# Patient Record
Sex: FEMALE | Race: WHITE | Employment: OTHER | URBAN - NONMETROPOLITAN AREA
[De-identification: names, ages, dates, MRNs, and addresses within clinical notes are randomized per-mention and may not be internally consistent; named-entity substitution may affect disease eponyms.]

---

## 2017-01-03 DIAGNOSIS — G44.201 INTRACTABLE TENSION-TYPE HEADACHE, UNSPECIFIED CHRONICITY PATTERN: ICD-10-CM

## 2017-01-03 RX ORDER — TRAMADOL HYDROCHLORIDE 50 MG/1
50 TABLET ORAL EVERY 4 HOURS PRN
Qty: 20 TABLET | Refills: 0 | Status: SHIPPED | OUTPATIENT
Start: 2017-01-03 | End: 2017-01-13 | Stop reason: SDUPTHER

## 2017-01-03 RX ORDER — PROMETHAZINE HYDROCHLORIDE 25 MG/1
25 TABLET ORAL EVERY 6 HOURS PRN
Qty: 6 TABLET | Refills: 0 | Status: SHIPPED | OUTPATIENT
Start: 2017-01-03 | End: 2017-01-13 | Stop reason: SDUPTHER

## 2017-01-13 DIAGNOSIS — G44.201 INTRACTABLE TENSION-TYPE HEADACHE, UNSPECIFIED CHRONICITY PATTERN: ICD-10-CM

## 2017-01-16 RX ORDER — PROMETHAZINE HYDROCHLORIDE 25 MG/1
25 TABLET ORAL EVERY 6 HOURS PRN
Qty: 6 TABLET | Refills: 0 | Status: SHIPPED | OUTPATIENT
Start: 2017-01-16 | End: 2017-02-11

## 2017-01-16 RX ORDER — TRAMADOL HYDROCHLORIDE 50 MG/1
50 TABLET ORAL EVERY 4 HOURS PRN
Qty: 20 TABLET | Refills: 0 | Status: SHIPPED | OUTPATIENT
Start: 2017-01-16 | End: 2017-01-24 | Stop reason: SDUPTHER

## 2017-01-24 DIAGNOSIS — G44.201 INTRACTABLE TENSION-TYPE HEADACHE, UNSPECIFIED CHRONICITY PATTERN: ICD-10-CM

## 2017-01-24 RX ORDER — TRAMADOL HYDROCHLORIDE 50 MG/1
50 TABLET ORAL EVERY 4 HOURS PRN
Qty: 20 TABLET | Refills: 0 | Status: SHIPPED | OUTPATIENT
Start: 2017-01-24 | End: 2017-02-15 | Stop reason: ALTCHOICE

## 2017-01-25 ENCOUNTER — TELEPHONE (OUTPATIENT)
Dept: FAMILY MEDICINE CLINIC | Age: 62
End: 2017-01-25

## 2017-02-06 DIAGNOSIS — G44.201 INTRACTABLE TENSION-TYPE HEADACHE, UNSPECIFIED CHRONICITY PATTERN: ICD-10-CM

## 2017-02-06 RX ORDER — TRAMADOL HYDROCHLORIDE 50 MG/1
50 TABLET ORAL EVERY 4 HOURS PRN
Qty: 20 TABLET | Refills: 0 | Status: CANCELLED | OUTPATIENT
Start: 2017-02-06

## 2017-02-11 ENCOUNTER — HOSPITAL ENCOUNTER (EMERGENCY)
Age: 62
Discharge: HOME | End: 2017-02-11
Attending: EMERGENCY MEDICINE
Payer: COMMERCIAL

## 2017-02-11 VITALS
DIASTOLIC BLOOD PRESSURE: 72 MMHG | HEART RATE: 80 BPM | WEIGHT: 285 LBS | HEIGHT: 68 IN | RESPIRATION RATE: 18 BRPM | SYSTOLIC BLOOD PRESSURE: 149 MMHG | BODY MASS INDEX: 43.19 KG/M2 | OXYGEN SATURATION: 94 % | TEMPERATURE: 96.3 F

## 2017-02-11 DIAGNOSIS — G89.29 CHRONIC INTRACTABLE HEADACHE, UNSPECIFIED HEADACHE TYPE: ICD-10-CM

## 2017-02-11 DIAGNOSIS — J40 BRONCHITIS: ICD-10-CM

## 2017-02-11 DIAGNOSIS — J01.11 ACUTE RECURRENT FRONTAL SINUSITIS: Primary | ICD-10-CM

## 2017-02-11 DIAGNOSIS — R51.9 CHRONIC INTRACTABLE HEADACHE, UNSPECIFIED HEADACHE TYPE: ICD-10-CM

## 2017-02-11 PROCEDURE — 6370000000 HC RX 637 (ALT 250 FOR IP): Performed by: EMERGENCY MEDICINE

## 2017-02-11 PROCEDURE — 94664 DEMO&/EVAL PT USE INHALER: CPT

## 2017-02-11 PROCEDURE — 99283 EMERGENCY DEPT VISIT LOW MDM: CPT

## 2017-02-11 PROCEDURE — 6360000002 HC RX W HCPCS: Performed by: EMERGENCY MEDICINE

## 2017-02-11 RX ORDER — ALBUTEROL SULFATE 2.5 MG/3ML
2.5 SOLUTION RESPIRATORY (INHALATION) ONCE
Status: DISCONTINUED | OUTPATIENT
Start: 2017-02-11 | End: 2017-02-11

## 2017-02-11 RX ORDER — FLUTICASONE PROPIONATE 50 MCG
1 SPRAY, SUSPENSION (ML) NASAL DAILY
Qty: 1 BOTTLE | Refills: 0 | Status: SHIPPED | OUTPATIENT
Start: 2017-02-11 | End: 2017-03-30

## 2017-02-11 RX ORDER — ALBUTEROL SULFATE 90 UG/1
2 AEROSOL, METERED RESPIRATORY (INHALATION) ONCE
Status: COMPLETED | OUTPATIENT
Start: 2017-02-11 | End: 2017-02-11

## 2017-02-11 RX ORDER — AMOXICILLIN AND CLAVULANATE POTASSIUM 875; 125 MG/1; MG/1
1 TABLET, FILM COATED ORAL ONCE
Status: COMPLETED | OUTPATIENT
Start: 2017-02-11 | End: 2017-02-11

## 2017-02-11 RX ORDER — PROMETHAZINE HYDROCHLORIDE 25 MG/1
25 TABLET ORAL ONCE
Status: COMPLETED | OUTPATIENT
Start: 2017-02-11 | End: 2017-02-11

## 2017-02-11 RX ORDER — AMOXICILLIN AND CLAVULANATE POTASSIUM 875; 125 MG/1; MG/1
1 TABLET, FILM COATED ORAL 2 TIMES DAILY
Qty: 20 TABLET | Refills: 0 | Status: SHIPPED | OUTPATIENT
Start: 2017-02-11 | End: 2017-02-21

## 2017-02-11 RX ORDER — ALBUTEROL SULFATE 90 UG/1
2 AEROSOL, METERED RESPIRATORY (INHALATION) EVERY 6 HOURS PRN
Status: DISCONTINUED | OUTPATIENT
Start: 2017-02-11 | End: 2017-02-11

## 2017-02-11 RX ORDER — OXYCODONE HYDROCHLORIDE AND ACETAMINOPHEN 5; 325 MG/1; MG/1
2 TABLET ORAL ONCE
Status: COMPLETED | OUTPATIENT
Start: 2017-02-11 | End: 2017-02-11

## 2017-02-11 RX ORDER — PROMETHAZINE HYDROCHLORIDE 25 MG/1
25 TABLET ORAL EVERY 6 HOURS PRN
Qty: 20 TABLET | Refills: 0 | Status: SHIPPED | OUTPATIENT
Start: 2017-02-11 | End: 2017-02-18

## 2017-02-11 RX ORDER — ALBUTEROL SULFATE 90 UG/1
2 AEROSOL, METERED RESPIRATORY (INHALATION) 4 TIMES DAILY
Qty: 1 INHALER | Refills: 0 | Status: SHIPPED | OUTPATIENT
Start: 2017-02-11 | End: 2017-06-15 | Stop reason: ALTCHOICE

## 2017-02-11 RX ADMIN — AMOXICILLIN AND CLAVULANATE POTASSIUM 1 TABLET: 875; 125 TABLET, FILM COATED ORAL at 12:07

## 2017-02-11 RX ADMIN — OXYCODONE HYDROCHLORIDE AND ACETAMINOPHEN 2 TABLET: 5; 325 TABLET ORAL at 12:07

## 2017-02-11 RX ADMIN — ALBUTEROL SULFATE 2 PUFF: 90 AEROSOL, METERED RESPIRATORY (INHALATION) at 12:23

## 2017-02-11 RX ADMIN — PROMETHAZINE HYDROCHLORIDE 25 MG: 25 TABLET ORAL at 12:07

## 2017-02-11 ASSESSMENT — PAIN DESCRIPTION - ONSET: ONSET: ON-GOING

## 2017-02-11 ASSESSMENT — PAIN SCALES - GENERAL
PAINLEVEL_OUTOF10: 9

## 2017-02-11 ASSESSMENT — PAIN DESCRIPTION - FREQUENCY: FREQUENCY: CONTINUOUS

## 2017-02-11 ASSESSMENT — PAIN SCALES - WONG BAKER: WONGBAKER_NUMERICALRESPONSE: 8

## 2017-02-11 ASSESSMENT — PAIN DESCRIPTION - LOCATION: LOCATION: HEAD

## 2017-02-11 ASSESSMENT — PAIN DESCRIPTION - DESCRIPTORS: DESCRIPTORS: ACHING

## 2017-02-11 ASSESSMENT — PAIN DESCRIPTION - PAIN TYPE: TYPE: ACUTE PAIN

## 2017-02-15 ENCOUNTER — OFFICE VISIT (OUTPATIENT)
Dept: FAMILY MEDICINE CLINIC | Age: 62
End: 2017-02-15

## 2017-02-15 VITALS
BODY MASS INDEX: 40.98 KG/M2 | RESPIRATION RATE: 16 BRPM | TEMPERATURE: 98.7 F | HEART RATE: 84 BPM | DIASTOLIC BLOOD PRESSURE: 88 MMHG | HEIGHT: 68 IN | WEIGHT: 270.4 LBS | SYSTOLIC BLOOD PRESSURE: 142 MMHG | OXYGEN SATURATION: 98 %

## 2017-02-15 DIAGNOSIS — J01.40 ACUTE PANSINUSITIS, RECURRENCE NOT SPECIFIED: Primary | ICD-10-CM

## 2017-02-15 DIAGNOSIS — R51.9 CHRONIC NONINTRACTABLE HEADACHE, UNSPECIFIED HEADACHE TYPE: ICD-10-CM

## 2017-02-15 DIAGNOSIS — G89.29 CHRONIC NONINTRACTABLE HEADACHE, UNSPECIFIED HEADACHE TYPE: ICD-10-CM

## 2017-02-15 PROCEDURE — 96372 THER/PROPH/DIAG INJ SC/IM: CPT | Performed by: FAMILY MEDICINE

## 2017-02-15 PROCEDURE — 99213 OFFICE O/P EST LOW 20 MIN: CPT | Performed by: FAMILY MEDICINE

## 2017-02-15 RX ORDER — KETOROLAC TROMETHAMINE 30 MG/ML
30 INJECTION, SOLUTION INTRAMUSCULAR; INTRAVENOUS ONCE
Status: COMPLETED | OUTPATIENT
Start: 2017-02-15 | End: 2017-02-15

## 2017-02-15 RX ORDER — PREDNISONE 20 MG/1
20 TABLET ORAL 2 TIMES DAILY
Qty: 6 TABLET | Refills: 0 | Status: SHIPPED | OUTPATIENT
Start: 2017-02-15 | End: 2017-02-18

## 2017-02-15 RX ADMIN — KETOROLAC TROMETHAMINE 30 MG: 30 INJECTION, SOLUTION INTRAMUSCULAR; INTRAVENOUS at 15:44

## 2017-03-01 DIAGNOSIS — G44.201 INTRACTABLE TENSION-TYPE HEADACHE, UNSPECIFIED CHRONICITY PATTERN: ICD-10-CM

## 2017-03-01 RX ORDER — CLONIDINE HYDROCHLORIDE 0.1 MG/1
0.1 TABLET ORAL 2 TIMES DAILY PRN
Qty: 60 TABLET | Refills: 0 | Status: SHIPPED | OUTPATIENT
Start: 2017-03-01 | End: 2017-05-01 | Stop reason: SDUPTHER

## 2017-03-06 ENCOUNTER — OFFICE VISIT (OUTPATIENT)
Dept: PRIMARY CARE CLINIC | Age: 62
End: 2017-03-06

## 2017-03-06 VITALS
SYSTOLIC BLOOD PRESSURE: 130 MMHG | OXYGEN SATURATION: 98 % | HEART RATE: 60 BPM | WEIGHT: 278 LBS | BODY MASS INDEX: 42.13 KG/M2 | DIASTOLIC BLOOD PRESSURE: 82 MMHG | TEMPERATURE: 98.4 F | HEIGHT: 68 IN

## 2017-03-06 DIAGNOSIS — G44.89 OTHER HEADACHE SYNDROME: Primary | ICD-10-CM

## 2017-03-06 PROCEDURE — 96372 THER/PROPH/DIAG INJ SC/IM: CPT | Performed by: FAMILY MEDICINE

## 2017-03-06 PROCEDURE — 99214 OFFICE O/P EST MOD 30 MIN: CPT | Performed by: FAMILY MEDICINE

## 2017-03-06 RX ORDER — KETOROLAC TROMETHAMINE 30 MG/ML
60 INJECTION, SOLUTION INTRAMUSCULAR; INTRAVENOUS ONCE
Status: COMPLETED | OUTPATIENT
Start: 2017-03-06 | End: 2017-03-06

## 2017-03-06 RX ADMIN — KETOROLAC TROMETHAMINE 60 MG: 30 INJECTION, SOLUTION INTRAMUSCULAR; INTRAVENOUS at 18:20

## 2017-03-06 ASSESSMENT — ENCOUNTER SYMPTOMS
PHOTOPHOBIA: 0
RESPIRATORY NEGATIVE: 1
GASTROINTESTINAL NEGATIVE: 1
EYES NEGATIVE: 1

## 2017-03-07 RX ORDER — PROMETHAZINE HYDROCHLORIDE 25 MG/1
25 TABLET ORAL EVERY 6 HOURS PRN
Qty: 20 TABLET | Refills: 0 | Status: SHIPPED | OUTPATIENT
Start: 2017-03-07 | End: 2017-03-14

## 2017-03-30 ENCOUNTER — OFFICE VISIT (OUTPATIENT)
Dept: FAMILY MEDICINE CLINIC | Age: 62
End: 2017-03-30
Payer: COMMERCIAL

## 2017-03-30 VITALS
RESPIRATION RATE: 16 BRPM | DIASTOLIC BLOOD PRESSURE: 86 MMHG | HEART RATE: 86 BPM | HEIGHT: 68 IN | SYSTOLIC BLOOD PRESSURE: 124 MMHG | BODY MASS INDEX: 42.56 KG/M2 | WEIGHT: 280.8 LBS

## 2017-03-30 DIAGNOSIS — I10 ESSENTIAL HYPERTENSION: Primary | ICD-10-CM

## 2017-03-30 DIAGNOSIS — G44.89 OTHER HEADACHE SYNDROME: Primary | ICD-10-CM

## 2017-03-30 PROCEDURE — 99213 OFFICE O/P EST LOW 20 MIN: CPT | Performed by: FAMILY MEDICINE

## 2017-03-30 RX ORDER — METOCLOPRAMIDE 10 MG/1
10 TABLET ORAL 3 TIMES DAILY PRN
COMMUNITY
Start: 2017-03-27 | End: 2017-04-26

## 2017-03-30 RX ORDER — NORTRIPTYLINE HYDROCHLORIDE 10 MG/1
CAPSULE ORAL
Refills: 0 | COMMUNITY
Start: 2017-03-08 | End: 2017-06-15 | Stop reason: ALTCHOICE

## 2017-03-30 RX ORDER — MAGNESIUM OXIDE 400 MG/1
400 TABLET ORAL DAILY
COMMUNITY
End: 2017-12-20

## 2017-04-17 ENCOUNTER — TELEPHONE (OUTPATIENT)
Dept: FAMILY MEDICINE CLINIC | Age: 62
End: 2017-04-17

## 2017-05-01 DIAGNOSIS — G44.201 INTRACTABLE TENSION-TYPE HEADACHE, UNSPECIFIED CHRONICITY PATTERN: ICD-10-CM

## 2017-05-01 RX ORDER — CLONIDINE HYDROCHLORIDE 0.1 MG/1
0.1 TABLET ORAL 2 TIMES DAILY PRN
Qty: 60 TABLET | Refills: 1 | Status: SHIPPED | OUTPATIENT
Start: 2017-05-01 | End: 2017-07-06 | Stop reason: SDUPTHER

## 2017-06-12 ENCOUNTER — HOSPITAL ENCOUNTER (EMERGENCY)
Age: 62
Discharge: HOME | End: 2017-06-12
Attending: EMERGENCY MEDICINE
Payer: COMMERCIAL

## 2017-06-12 VITALS
DIASTOLIC BLOOD PRESSURE: 76 MMHG | BODY MASS INDEX: 40.92 KG/M2 | SYSTOLIC BLOOD PRESSURE: 165 MMHG | HEART RATE: 86 BPM | HEIGHT: 68 IN | OXYGEN SATURATION: 96 % | TEMPERATURE: 99.1 F | RESPIRATION RATE: 16 BRPM | WEIGHT: 270 LBS

## 2017-06-12 DIAGNOSIS — G89.29 CHRONIC INTRACTABLE HEADACHE, UNSPECIFIED HEADACHE TYPE: Primary | ICD-10-CM

## 2017-06-12 DIAGNOSIS — R51.9 CHRONIC INTRACTABLE HEADACHE, UNSPECIFIED HEADACHE TYPE: Primary | ICD-10-CM

## 2017-06-12 PROCEDURE — 99283 EMERGENCY DEPT VISIT LOW MDM: CPT

## 2017-06-12 PROCEDURE — 96372 THER/PROPH/DIAG INJ SC/IM: CPT

## 2017-06-12 PROCEDURE — 6370000000 HC RX 637 (ALT 250 FOR IP): Performed by: EMERGENCY MEDICINE

## 2017-06-12 PROCEDURE — 6360000002 HC RX W HCPCS: Performed by: EMERGENCY MEDICINE

## 2017-06-12 RX ORDER — GABAPENTIN 600 MG/1
600 TABLET ORAL
COMMUNITY
End: 2017-08-22 | Stop reason: SDUPTHER

## 2017-06-12 RX ORDER — OXYCODONE HYDROCHLORIDE AND ACETAMINOPHEN 5; 325 MG/1; MG/1
2 TABLET ORAL ONCE
Status: COMPLETED | OUTPATIENT
Start: 2017-06-12 | End: 2017-06-12

## 2017-06-12 RX ORDER — OXYCODONE HYDROCHLORIDE AND ACETAMINOPHEN 5; 325 MG/1; MG/1
1-2 TABLET ORAL EVERY 6 HOURS PRN
Qty: 10 TABLET | Refills: 0 | Status: SHIPPED | OUTPATIENT
Start: 2017-06-12 | End: 2017-06-16 | Stop reason: ALTCHOICE

## 2017-06-12 RX ORDER — PROMETHAZINE HYDROCHLORIDE 25 MG/ML
25 INJECTION, SOLUTION INTRAMUSCULAR; INTRAVENOUS ONCE
Status: COMPLETED | OUTPATIENT
Start: 2017-06-12 | End: 2017-06-12

## 2017-06-12 RX ADMIN — PROMETHAZINE HYDROCHLORIDE 25 MG: 25 INJECTION INTRAMUSCULAR; INTRAVENOUS at 13:05

## 2017-06-12 RX ADMIN — OXYCODONE HYDROCHLORIDE AND ACETAMINOPHEN 2 TABLET: 5; 325 TABLET ORAL at 13:05

## 2017-06-12 ASSESSMENT — PAIN SCALES - GENERAL
PAINLEVEL_OUTOF10: 10
PAINLEVEL_OUTOF10: 10
PAINLEVEL_OUTOF10: 8

## 2017-06-12 ASSESSMENT — PAIN DESCRIPTION - LOCATION: LOCATION: HEAD

## 2017-06-12 ASSESSMENT — PAIN DESCRIPTION - PAIN TYPE
TYPE: ACUTE PAIN;CHRONIC PAIN
TYPE: CHRONIC PAIN;ACUTE PAIN

## 2017-06-15 ENCOUNTER — OFFICE VISIT (OUTPATIENT)
Dept: FAMILY MEDICINE CLINIC | Age: 62
End: 2017-06-15
Payer: COMMERCIAL

## 2017-06-15 VITALS
HEIGHT: 68 IN | TEMPERATURE: 97.2 F | WEIGHT: 290.8 LBS | OXYGEN SATURATION: 100 % | SYSTOLIC BLOOD PRESSURE: 156 MMHG | BODY MASS INDEX: 44.07 KG/M2 | RESPIRATION RATE: 12 BRPM | DIASTOLIC BLOOD PRESSURE: 76 MMHG | HEART RATE: 80 BPM

## 2017-06-15 DIAGNOSIS — Z91.09 ENVIRONMENTAL ALLERGIES: ICD-10-CM

## 2017-06-15 DIAGNOSIS — G44.201 INTRACTABLE TENSION-TYPE HEADACHE, UNSPECIFIED CHRONICITY PATTERN: Primary | ICD-10-CM

## 2017-06-15 DIAGNOSIS — J01.40 ACUTE PANSINUSITIS, RECURRENCE NOT SPECIFIED: ICD-10-CM

## 2017-06-15 PROCEDURE — 99214 OFFICE O/P EST MOD 30 MIN: CPT | Performed by: NURSE PRACTITIONER

## 2017-06-15 RX ORDER — FEXOFENADINE HCL 180 MG/1
180 TABLET ORAL DAILY
Qty: 30 TABLET | Refills: 0 | Status: SHIPPED | OUTPATIENT
Start: 2017-06-15 | End: 2017-07-15

## 2017-06-15 RX ORDER — AMOXICILLIN AND CLAVULANATE POTASSIUM 875; 125 MG/1; MG/1
1 TABLET, FILM COATED ORAL 2 TIMES DAILY
Qty: 20 TABLET | Refills: 0 | Status: SHIPPED | OUTPATIENT
Start: 2017-06-15 | End: 2017-06-25

## 2017-06-15 ASSESSMENT — ENCOUNTER SYMPTOMS
NAUSEA: 0
ABDOMINAL PAIN: 0
SINUS PRESSURE: 1
GASTROINTESTINAL NEGATIVE: 1
CHEST TIGHTNESS: 0
EYES NEGATIVE: 1
RESPIRATORY NEGATIVE: 1
COUGH: 0
SHORTNESS OF BREATH: 0
TROUBLE SWALLOWING: 0
ALLERGIC/IMMUNOLOGIC NEGATIVE: 1
DIARRHEA: 0
RHINORRHEA: 1
CONSTIPATION: 0
SORE THROAT: 0
VOMITING: 0

## 2017-06-15 ASSESSMENT — PATIENT HEALTH QUESTIONNAIRE - PHQ9
SUM OF ALL RESPONSES TO PHQ QUESTIONS 1-9: 0
SUM OF ALL RESPONSES TO PHQ9 QUESTIONS 1 & 2: 0
2. FEELING DOWN, DEPRESSED OR HOPELESS: 0
1. LITTLE INTEREST OR PLEASURE IN DOING THINGS: 0

## 2017-06-16 ENCOUNTER — OFFICE VISIT (OUTPATIENT)
Dept: OTOLARYNGOLOGY | Age: 62
End: 2017-06-16
Payer: COMMERCIAL

## 2017-06-16 VITALS
TEMPERATURE: 97.7 F | WEIGHT: 289 LBS | SYSTOLIC BLOOD PRESSURE: 132 MMHG | DIASTOLIC BLOOD PRESSURE: 82 MMHG | RESPIRATION RATE: 12 BRPM | HEIGHT: 68 IN | HEART RATE: 72 BPM | BODY MASS INDEX: 43.8 KG/M2

## 2017-06-16 DIAGNOSIS — J32.9 SINUSITIS, UNSPECIFIED CHRONICITY, UNSPECIFIED LOCATION: Primary | ICD-10-CM

## 2017-06-16 DIAGNOSIS — J34.89 NASAL SEPTAL PERFORATION: ICD-10-CM

## 2017-06-16 DIAGNOSIS — R51.9 HEADACHE, UNSPECIFIED HEADACHE TYPE: ICD-10-CM

## 2017-06-16 LAB
ABSOLUTE EOS #: 0.1 K/UL (ref 0–0.4)
ABSOLUTE LYMPH #: 1.6 K/UL (ref 1–4.8)
ABSOLUTE MONO #: 0.6 K/UL (ref 0.1–1.2)
BASOPHILS # BLD: 1 %
BASOPHILS ABSOLUTE: 0 K/UL (ref 0–0.2)
DIFFERENTIAL TYPE: ABNORMAL
EOSINOPHILS RELATIVE PERCENT: 2 %
HCT VFR BLD CALC: 36.9 % (ref 36–46)
HEMOGLOBIN: 11.8 G/DL (ref 12–16)
LYMPHOCYTES # BLD: 20 %
MCH RBC QN AUTO: 28.3 PG (ref 26–34)
MCHC RBC AUTO-ENTMCNC: 31.9 G/DL (ref 31–37)
MCV RBC AUTO: 88.7 FL (ref 80–100)
MONOCYTES # BLD: 7 %
PDW BLD-RTO: 14.4 % (ref 11–14.5)
PLATELET # BLD: 202 K/UL (ref 140–450)
PLATELET ESTIMATE: ABNORMAL
PMV BLD AUTO: 9.3 FL (ref 6–12)
RBC # BLD: 4.16 M/UL (ref 4–5.2)
RBC # BLD: ABNORMAL 10*6/UL
SEG NEUTROPHILS: 70 %
SEGMENTED NEUTROPHILS ABSOLUTE COUNT: 5.8 K/UL (ref 1.8–7.7)
WBC # BLD: 8.2 K/UL (ref 3.5–11)
WBC # BLD: ABNORMAL 10*3/UL

## 2017-06-16 PROCEDURE — 31231 NASAL ENDOSCOPY DX: CPT | Performed by: OTOLARYNGOLOGY

## 2017-06-20 ENCOUNTER — OFFICE VISIT (OUTPATIENT)
Dept: FAMILY MEDICINE CLINIC | Age: 62
End: 2017-06-20
Payer: COMMERCIAL

## 2017-06-20 VITALS
SYSTOLIC BLOOD PRESSURE: 142 MMHG | WEIGHT: 288.6 LBS | HEART RATE: 76 BPM | BODY MASS INDEX: 43.74 KG/M2 | HEIGHT: 68 IN | RESPIRATION RATE: 16 BRPM | DIASTOLIC BLOOD PRESSURE: 78 MMHG

## 2017-06-20 DIAGNOSIS — I10 ESSENTIAL HYPERTENSION: ICD-10-CM

## 2017-06-20 DIAGNOSIS — R80.9 TYPE 2 DIABETES MELLITUS WITH MICROALBUMINURIA, WITHOUT LONG-TERM CURRENT USE OF INSULIN (HCC): ICD-10-CM

## 2017-06-20 DIAGNOSIS — E11.29 TYPE 2 DIABETES MELLITUS WITH MICROALBUMINURIA, WITHOUT LONG-TERM CURRENT USE OF INSULIN (HCC): ICD-10-CM

## 2017-06-20 DIAGNOSIS — R51.9 HEADACHE, UNSPECIFIED HEADACHE TYPE: Primary | ICD-10-CM

## 2017-06-20 PROCEDURE — 99213 OFFICE O/P EST LOW 20 MIN: CPT | Performed by: FAMILY MEDICINE

## 2017-06-20 RX ORDER — TRAMADOL HYDROCHLORIDE 50 MG/1
50 TABLET ORAL EVERY 4 HOURS PRN
Qty: 40 TABLET | Refills: 0 | Status: SHIPPED | OUTPATIENT
Start: 2017-06-20 | End: 2017-07-03 | Stop reason: SDUPTHER

## 2017-06-29 ENCOUNTER — TELEPHONE (OUTPATIENT)
Dept: FAMILY MEDICINE CLINIC | Age: 62
End: 2017-06-29

## 2017-06-29 DIAGNOSIS — R51.9 HEADACHE, UNSPECIFIED HEADACHE TYPE: Primary | ICD-10-CM

## 2017-07-03 ENCOUNTER — TELEPHONE (OUTPATIENT)
Dept: FAMILY MEDICINE CLINIC | Age: 62
End: 2017-07-03

## 2017-07-03 DIAGNOSIS — R51.9 HEADACHE, UNSPECIFIED HEADACHE TYPE: Primary | ICD-10-CM

## 2017-07-03 RX ORDER — TRAMADOL HYDROCHLORIDE 50 MG/1
50 TABLET ORAL EVERY 4 HOURS PRN
Qty: 20 TABLET | Refills: 0 | Status: SHIPPED | OUTPATIENT
Start: 2017-07-03 | End: 2017-07-20 | Stop reason: ALTCHOICE

## 2017-07-06 DIAGNOSIS — G44.201 INTRACTABLE TENSION-TYPE HEADACHE, UNSPECIFIED CHRONICITY PATTERN: ICD-10-CM

## 2017-07-06 RX ORDER — CLONIDINE HYDROCHLORIDE 0.1 MG/1
0.1 TABLET ORAL 2 TIMES DAILY PRN
Qty: 60 TABLET | Refills: 0 | Status: SHIPPED | OUTPATIENT
Start: 2017-07-06 | End: 2017-07-20 | Stop reason: SDUPTHER

## 2017-07-11 ENCOUNTER — HOSPITAL ENCOUNTER (EMERGENCY)
Age: 62
Discharge: HOME | End: 2017-07-11
Attending: EMERGENCY MEDICINE
Payer: COMMERCIAL

## 2017-07-11 VITALS
SYSTOLIC BLOOD PRESSURE: 178 MMHG | BODY MASS INDEX: 41.68 KG/M2 | OXYGEN SATURATION: 100 % | HEART RATE: 58 BPM | WEIGHT: 275 LBS | TEMPERATURE: 96.6 F | RESPIRATION RATE: 16 BRPM | HEIGHT: 68 IN | DIASTOLIC BLOOD PRESSURE: 70 MMHG

## 2017-07-11 DIAGNOSIS — R51.9 HEADACHE, UNSPECIFIED HEADACHE TYPE: Primary | ICD-10-CM

## 2017-07-11 DIAGNOSIS — G89.29 CHRONIC INTRACTABLE HEADACHE, UNSPECIFIED HEADACHE TYPE: Primary | ICD-10-CM

## 2017-07-11 DIAGNOSIS — R51.9 CHRONIC INTRACTABLE HEADACHE, UNSPECIFIED HEADACHE TYPE: Primary | ICD-10-CM

## 2017-07-11 PROCEDURE — 2580000003 HC RX 258: Performed by: EMERGENCY MEDICINE

## 2017-07-11 PROCEDURE — 99283 EMERGENCY DEPT VISIT LOW MDM: CPT

## 2017-07-11 PROCEDURE — 6360000002 HC RX W HCPCS: Performed by: EMERGENCY MEDICINE

## 2017-07-11 PROCEDURE — 96372 THER/PROPH/DIAG INJ SC/IM: CPT

## 2017-07-11 PROCEDURE — 96374 THER/PROPH/DIAG INJ IV PUSH: CPT

## 2017-07-11 RX ORDER — METHYLPREDNISOLONE 4 MG/1
TABLET ORAL
Qty: 1 KIT | Refills: 0 | Status: SHIPPED | OUTPATIENT
Start: 2017-07-11 | End: 2017-07-17

## 2017-07-11 RX ORDER — KETOROLAC TROMETHAMINE 30 MG/ML
30 INJECTION, SOLUTION INTRAMUSCULAR; INTRAVENOUS ONCE
Status: COMPLETED | OUTPATIENT
Start: 2017-07-11 | End: 2017-07-11

## 2017-07-11 RX ORDER — METHYLPREDNISOLONE SODIUM SUCCINATE 125 MG/2ML
125 INJECTION, POWDER, LYOPHILIZED, FOR SOLUTION INTRAMUSCULAR; INTRAVENOUS ONCE
Status: COMPLETED | OUTPATIENT
Start: 2017-07-11 | End: 2017-07-11

## 2017-07-11 RX ORDER — DIPHENHYDRAMINE HYDROCHLORIDE 50 MG/ML
50 INJECTION INTRAMUSCULAR; INTRAVENOUS ONCE
Status: COMPLETED | OUTPATIENT
Start: 2017-07-11 | End: 2017-07-11

## 2017-07-11 RX ORDER — 0.9 % SODIUM CHLORIDE 0.9 %
1000 INTRAVENOUS SOLUTION INTRAVENOUS ONCE
Status: COMPLETED | OUTPATIENT
Start: 2017-07-11 | End: 2017-07-11

## 2017-07-11 RX ORDER — TRAMADOL HYDROCHLORIDE 50 MG/1
50 TABLET ORAL EVERY 4 HOURS PRN
Qty: 20 TABLET | Refills: 0 | Status: CANCELLED | OUTPATIENT
Start: 2017-07-11

## 2017-07-11 RX ADMIN — KETOROLAC TROMETHAMINE 30 MG: 30 INJECTION, SOLUTION INTRAMUSCULAR at 05:23

## 2017-07-11 RX ADMIN — DIPHENHYDRAMINE HYDROCHLORIDE 50 MG: 50 INJECTION, SOLUTION INTRAMUSCULAR; INTRAVENOUS at 05:22

## 2017-07-11 RX ADMIN — PROCHLORPERAZINE EDISYLATE 10 MG: 5 INJECTION INTRAMUSCULAR; INTRAVENOUS at 05:24

## 2017-07-11 RX ADMIN — METHYLPREDNISOLONE SODIUM SUCCINATE 125 MG: 125 INJECTION, POWDER, FOR SOLUTION INTRAMUSCULAR; INTRAVENOUS at 06:28

## 2017-07-11 RX ADMIN — SODIUM CHLORIDE 1000 ML: 9 INJECTION, SOLUTION INTRAVENOUS at 05:50

## 2017-07-11 ASSESSMENT — ENCOUNTER SYMPTOMS
VOMITING: 0
SHORTNESS OF BREATH: 0

## 2017-07-11 ASSESSMENT — PAIN SCALES - GENERAL
PAINLEVEL_OUTOF10: 9
PAINLEVEL_OUTOF10: 6

## 2017-07-11 ASSESSMENT — PAIN DESCRIPTION - PAIN TYPE: TYPE: CHRONIC PAIN

## 2017-07-11 ASSESSMENT — PAIN DESCRIPTION - LOCATION: LOCATION: HEAD

## 2017-07-12 ENCOUNTER — TELEPHONE (OUTPATIENT)
Dept: PAIN MANAGEMENT | Age: 62
End: 2017-07-12

## 2017-07-13 ENCOUNTER — TELEPHONE (OUTPATIENT)
Dept: FAMILY MEDICINE CLINIC | Age: 62
End: 2017-07-13

## 2017-07-20 ENCOUNTER — OFFICE VISIT (OUTPATIENT)
Dept: FAMILY MEDICINE CLINIC | Age: 62
End: 2017-07-20
Payer: COMMERCIAL

## 2017-07-20 VITALS
SYSTOLIC BLOOD PRESSURE: 138 MMHG | RESPIRATION RATE: 16 BRPM | WEIGHT: 289.2 LBS | HEART RATE: 82 BPM | BODY MASS INDEX: 43.83 KG/M2 | HEIGHT: 68 IN | DIASTOLIC BLOOD PRESSURE: 86 MMHG

## 2017-07-20 DIAGNOSIS — G44.201 INTRACTABLE TENSION-TYPE HEADACHE, UNSPECIFIED CHRONICITY PATTERN: ICD-10-CM

## 2017-07-20 DIAGNOSIS — Z12.31 ENCOUNTER FOR SCREENING MAMMOGRAM FOR BREAST CANCER: ICD-10-CM

## 2017-07-20 DIAGNOSIS — E11.29 TYPE 2 DIABETES MELLITUS WITH MICROALBUMINURIA, WITHOUT LONG-TERM CURRENT USE OF INSULIN (HCC): Primary | ICD-10-CM

## 2017-07-20 DIAGNOSIS — R80.9 TYPE 2 DIABETES MELLITUS WITH MICROALBUMINURIA, WITHOUT LONG-TERM CURRENT USE OF INSULIN (HCC): Primary | ICD-10-CM

## 2017-07-20 DIAGNOSIS — E55.9 VITAMIN D DEFICIENCY: ICD-10-CM

## 2017-07-20 DIAGNOSIS — I10 ESSENTIAL HYPERTENSION: ICD-10-CM

## 2017-07-20 PROCEDURE — 99214 OFFICE O/P EST MOD 30 MIN: CPT | Performed by: FAMILY MEDICINE

## 2017-07-20 RX ORDER — LISINOPRIL AND HYDROCHLOROTHIAZIDE 20; 12.5 MG/1; MG/1
1 TABLET ORAL DAILY
Qty: 30 TABLET | Refills: 6 | Status: SHIPPED | OUTPATIENT
Start: 2017-07-20 | End: 2018-01-23 | Stop reason: SDUPTHER

## 2017-07-20 RX ORDER — DULOXETIN HYDROCHLORIDE 60 MG/1
CAPSULE, DELAYED RELEASE ORAL
Qty: 30 CAPSULE | Refills: 6 | Status: SHIPPED | OUTPATIENT
Start: 2017-07-20 | End: 2017-08-18 | Stop reason: SDUPTHER

## 2017-07-20 RX ORDER — TRAMADOL HYDROCHLORIDE 50 MG/1
50 TABLET ORAL EVERY 4 HOURS PRN
Qty: 30 TABLET | Refills: 0 | Status: SHIPPED | OUTPATIENT
Start: 2017-07-20 | End: 2017-08-04 | Stop reason: SDUPTHER

## 2017-07-20 RX ORDER — CLONIDINE HYDROCHLORIDE 0.1 MG/1
0.1 TABLET ORAL 2 TIMES DAILY PRN
Qty: 60 TABLET | Refills: 6 | Status: SHIPPED | OUTPATIENT
Start: 2017-07-20 | End: 2017-12-22 | Stop reason: ALTCHOICE

## 2017-07-20 RX ORDER — PIOGLITAZONE HCL AND METFORMIN HCL 500; 15 MG/1; MG/1
TABLET ORAL
Qty: 60 TABLET | Refills: 6 | Status: SHIPPED | OUTPATIENT
Start: 2017-07-20 | End: 2017-08-18 | Stop reason: SDUPTHER

## 2017-07-20 RX ORDER — CHOLECALCIFEROL (VITAMIN D3) 1250 MCG
1 CAPSULE ORAL WEEKLY
Qty: 13 CAPSULE | Refills: 1 | Status: SHIPPED | OUTPATIENT
Start: 2017-07-20 | End: 2017-10-24 | Stop reason: SDUPTHER

## 2017-07-20 RX ORDER — LISINOPRIL 20 MG/1
TABLET ORAL
Qty: 30 TABLET | Refills: 6 | Status: CANCELLED | OUTPATIENT
Start: 2017-07-20

## 2017-08-04 ENCOUNTER — OFFICE VISIT (OUTPATIENT)
Dept: PRIMARY CARE CLINIC | Age: 62
End: 2017-08-04
Payer: COMMERCIAL

## 2017-08-04 VITALS
WEIGHT: 287.2 LBS | SYSTOLIC BLOOD PRESSURE: 110 MMHG | BODY MASS INDEX: 43.53 KG/M2 | DIASTOLIC BLOOD PRESSURE: 80 MMHG | RESPIRATION RATE: 16 BRPM | OXYGEN SATURATION: 96 % | HEIGHT: 68 IN | TEMPERATURE: 98.7 F | HEART RATE: 89 BPM

## 2017-08-04 DIAGNOSIS — R07.81 RIB PAIN ON RIGHT SIDE: Primary | ICD-10-CM

## 2017-08-04 PROCEDURE — 99213 OFFICE O/P EST LOW 20 MIN: CPT | Performed by: NURSE PRACTITIONER

## 2017-08-04 RX ORDER — TRAMADOL HYDROCHLORIDE 50 MG/1
50 TABLET ORAL EVERY 6 HOURS PRN
Qty: 10 TABLET | Refills: 0 | Status: SHIPPED | OUTPATIENT
Start: 2017-08-04 | End: 2017-12-20 | Stop reason: ALTCHOICE

## 2017-08-04 RX ORDER — DULOXETIN HYDROCHLORIDE 60 MG/1
60 CAPSULE, DELAYED RELEASE ORAL
COMMUNITY
End: 2017-08-04 | Stop reason: SDUPTHER

## 2017-08-04 ASSESSMENT — ENCOUNTER SYMPTOMS
DIARRHEA: 0
CONSTIPATION: 0
CHOKING: 0
CHEST TIGHTNESS: 0
WHEEZING: 0
ABDOMINAL PAIN: 0
SHORTNESS OF BREATH: 0
BACK PAIN: 0
BLOOD IN STOOL: 0
NAUSEA: 0
COUGH: 0
ABDOMINAL DISTENTION: 0
ANAL BLEEDING: 0

## 2017-08-18 DIAGNOSIS — E11.29 TYPE 2 DIABETES MELLITUS WITH MICROALBUMINURIA, WITHOUT LONG-TERM CURRENT USE OF INSULIN (HCC): ICD-10-CM

## 2017-08-18 DIAGNOSIS — G44.201 INTRACTABLE TENSION-TYPE HEADACHE, UNSPECIFIED CHRONICITY PATTERN: ICD-10-CM

## 2017-08-18 DIAGNOSIS — R80.9 TYPE 2 DIABETES MELLITUS WITH MICROALBUMINURIA, WITHOUT LONG-TERM CURRENT USE OF INSULIN (HCC): ICD-10-CM

## 2017-08-18 RX ORDER — DULOXETIN HYDROCHLORIDE 60 MG/1
CAPSULE, DELAYED RELEASE ORAL
Qty: 90 CAPSULE | Refills: 1 | Status: SHIPPED | OUTPATIENT
Start: 2017-08-18 | End: 2018-01-23 | Stop reason: SDUPTHER

## 2017-08-18 RX ORDER — PIOGLITAZONE HCL AND METFORMIN HCL 500; 15 MG/1; MG/1
TABLET ORAL
Qty: 180 TABLET | Refills: 1 | Status: SHIPPED | OUTPATIENT
Start: 2017-08-18 | End: 2018-01-23 | Stop reason: SDUPTHER

## 2017-08-22 RX ORDER — GABAPENTIN 600 MG/1
600 TABLET ORAL
Qty: 15 TABLET | Refills: 0 | Status: SHIPPED | OUTPATIENT
Start: 2017-08-22 | End: 2017-11-28 | Stop reason: SDUPTHER

## 2017-09-13 ENCOUNTER — TELEPHONE (OUTPATIENT)
Dept: FAMILY MEDICINE CLINIC | Age: 62
End: 2017-09-13

## 2017-09-13 DIAGNOSIS — R51.9 HEADACHE, UNSPECIFIED HEADACHE TYPE: Primary | ICD-10-CM

## 2017-10-23 DIAGNOSIS — E55.9 VITAMIN D DEFICIENCY: ICD-10-CM

## 2017-10-24 RX ORDER — METHOCARBAMOL 750 MG/1
TABLET ORAL
Qty: 13 CAPSULE | Refills: 1 | Status: SHIPPED | OUTPATIENT
Start: 2017-10-24 | End: 2018-06-22 | Stop reason: SDUPTHER

## 2017-11-28 DIAGNOSIS — R51.9 NONINTRACTABLE HEADACHE, UNSPECIFIED CHRONICITY PATTERN, UNSPECIFIED HEADACHE TYPE: Primary | ICD-10-CM

## 2017-11-28 RX ORDER — GABAPENTIN 600 MG/1
600 TABLET ORAL
Qty: 65 TABLET | Refills: 0 | Status: SHIPPED | OUTPATIENT
Start: 2017-11-28 | End: 2018-03-28 | Stop reason: SDUPTHER

## 2017-11-28 NOTE — TELEPHONE ENCOUNTER
Last ov 07/20/17,next ov 01/23/18. oarrs report available for review including [address] [place] [address] and [place]. Last filled 08/22/17,#450,90 day supply. She is scheduled to see a new neurologist at [facility] 12/11/17. She no longer sees pain management. Requesting enough medication to cover until her ov 12/11/17.

## 2017-12-12 DIAGNOSIS — R51.9 NONINTRACTABLE HEADACHE, UNSPECIFIED CHRONICITY PATTERN, UNSPECIFIED HEADACHE TYPE: ICD-10-CM

## 2017-12-13 ENCOUNTER — TELEPHONE (OUTPATIENT)
Dept: FAMILY MEDICINE CLINIC | Age: 62
End: 2017-12-13

## 2017-12-13 RX ORDER — GABAPENTIN 600 MG/1
600 TABLET ORAL
Qty: 65 TABLET | Refills: 0 | OUTPATIENT
Start: 2017-12-13

## 2017-12-13 NOTE — TELEPHONE ENCOUNTER
Pt calling requesting a referral to Dr Maicol Pettit at [facility] for her headaches, please advise pt at above number.

## 2017-12-13 NOTE — TELEPHONE ENCOUNTER
The [place] office of Dr Janee Vasques last saw Renea Schaumann in 2008,Left message at his other office at [facility] to call back regarding last OV and pain contract.

## 2017-12-13 NOTE — TELEPHONE ENCOUNTER
Notes reviewed. I will order referral to Dr. Syed Wilson. If Dr. Delfino Montanez does not want to order Gabapentin, I can order, but she will need to have regular office visits with me. I reviewed Dr. Morris Knox notes from the visit at [facility]. He has strongly recommended evaluation at the [facility] [address] in [place] due to the complexity of her case. She can get another opinion from Dr. Syed Wilson if she would like. The referral order is in the refill encounter.

## 2017-12-13 NOTE — TELEPHONE ENCOUNTER
Please clarify. The telephone encounter from 12/12/2017 indicated that she was seeing a neurologist at [facility].

## 2017-12-14 NOTE — TELEPHONE ENCOUNTER
Patient notified that a referral was made to Dr Mikey Allan office. She reports that Dr Julianne Saenz did refill the Gabapentin.

## 2017-12-20 ENCOUNTER — OFFICE VISIT (OUTPATIENT)
Dept: FAMILY MEDICINE CLINIC | Age: 62
End: 2017-12-20
Payer: COMMERCIAL

## 2017-12-20 VITALS
HEART RATE: 74 BPM | WEIGHT: 282.4 LBS | BODY MASS INDEX: 42.8 KG/M2 | HEIGHT: 68 IN | SYSTOLIC BLOOD PRESSURE: 132 MMHG | RESPIRATION RATE: 14 BRPM | DIASTOLIC BLOOD PRESSURE: 78 MMHG

## 2017-12-20 DIAGNOSIS — Z23 NEED FOR SHINGLES VACCINE: ICD-10-CM

## 2017-12-20 DIAGNOSIS — M25.511 CHRONIC RIGHT SHOULDER PAIN: Primary | ICD-10-CM

## 2017-12-20 DIAGNOSIS — G89.29 CHRONIC RIGHT SHOULDER PAIN: Primary | ICD-10-CM

## 2017-12-20 PROCEDURE — 99213 OFFICE O/P EST LOW 20 MIN: CPT | Performed by: FAMILY MEDICINE

## 2017-12-22 ENCOUNTER — OFFICE VISIT (OUTPATIENT)
Dept: PRIMARY CARE CLINIC | Age: 62
End: 2017-12-22
Payer: COMMERCIAL

## 2017-12-22 VITALS
DIASTOLIC BLOOD PRESSURE: 76 MMHG | BODY MASS INDEX: 42.89 KG/M2 | HEIGHT: 68 IN | RESPIRATION RATE: 16 BRPM | OXYGEN SATURATION: 98 % | SYSTOLIC BLOOD PRESSURE: 142 MMHG | WEIGHT: 283 LBS | HEART RATE: 56 BPM | TEMPERATURE: 97.9 F

## 2017-12-22 DIAGNOSIS — M77.8 SUBACROMIAL TENDINITIS OF RIGHT SHOULDER: Primary | ICD-10-CM

## 2017-12-22 DIAGNOSIS — I49.49 PREMATURE BEATS: ICD-10-CM

## 2017-12-22 PROCEDURE — 20610 DRAIN/INJ JOINT/BURSA W/O US: CPT | Performed by: FAMILY MEDICINE

## 2017-12-22 PROCEDURE — 93000 ELECTROCARDIOGRAM COMPLETE: CPT | Performed by: FAMILY MEDICINE

## 2017-12-22 PROCEDURE — 99214 OFFICE O/P EST MOD 30 MIN: CPT | Performed by: FAMILY MEDICINE

## 2017-12-22 ASSESSMENT — ENCOUNTER SYMPTOMS
ALLERGIC/IMMUNOLOGIC NEGATIVE: 1
RESPIRATORY NEGATIVE: 1
EYES NEGATIVE: 1
GASTROINTESTINAL NEGATIVE: 1

## 2017-12-22 NOTE — PROGRESS NOTES
Subjective:      Patient ID: Amadou West is a 58 y.o. female. HPI acute urgent care visit for requested injection to right shoulder. She has reported rotator cuff injury from past history. She has had injection in September that worked well until the last 2 weeks. Pain persistent and progressive in the last 2 weeks. No recalled injury or initiating event , other than wrapping presents. I have reviewed the patient's medical history in detail and updated the computerized patient record. She reports some chronic neck pain and headaches. Neck and occipital area hurting since may. Chiropractor has helped some.        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 Marv Portillo    COLONOSCOPY  5/3/2012    diverticular disease    DENTAL SURGERY  08/2015    full dental extraction    FOREIGN BODY REMOVAL  5/28/2016    left-sided temporal craniotomy wound reexploration with removal of small retained foreign body (plastic from Ruth clip from surgery 3 days prior), [facility], Dr. Taylor Parks  09/06/2005    supracervical hysterectomy bilateral salpingo oophectomy    KNEE ARTHROSCOPY Left     OTHER SURGICAL HISTORY  2011    endoscopic mucosal resection of duodenal polyp    TONSILLECTOMY      UPPER GASTROINTESTINAL ENDOSCOPY       Current Outpatient Prescriptions   Medication Sig Dispense Refill    Zoster Vac Recomb Adjuvanted (SHINGRIX) 50 MCG SUSR Inject 0.5 mls into the muscle  Once for 1 dose . Repeat dose in 2 months. Disp 0.5 each,refill 1 1 each 1    gabapentin (NEURONTIN) 600 MG tablet Take 1 tablet by mouth 5 times daily 65 tablet 0    D3-50 34027 units CAPS TAKE 1 CAPSULE ONCE A WEEK 13 capsule 1    DULoxetine (CYMBALTA) 60 MG extended release capsule TAKE 1 CAPSULE DAILY 90 capsule 1    pioglitazone-metformin (ACTOPLUS MET)  MG per tablet TAKE 1 TABLET TWICE A DAY WITH MEALS 180 tablet 1    lisinopril-hydrochlorothiazide (PRINZIDE;ZESTORETIC) 20-12.5 MG per tablet Take 1 tablet by mouth daily 30 tablet 6     No current facility-administered medications for this visit. Allergies   Allergen Reactions    Asa [Aspirin] Other (See Comments)     Stomach irritation         Review of Systems   Constitutional: Negative. HENT: Negative. Eyes: Negative. Respiratory: Negative. Cardiovascular: Negative. Negative for chest pain, palpitations and leg swelling. Gastrointestinal: Negative. Endocrine: Negative. Genitourinary: Negative. Musculoskeletal: Positive for arthralgias (right shoulder), neck pain and neck stiffness. Skin: Negative. Allergic/Immunologic: Negative. Neurological: Positive for headaches. Hematological: Negative. Psychiatric/Behavioral: Negative. Objective:   Physical Exam   Constitutional: She is oriented to person, place, and time. She appears well-developed and well-nourished. No distress. HENT:   Head: Normocephalic and atraumatic. Right Ear: External ear normal.   Left Ear: External ear normal.   Mouth/Throat: Oropharynx is clear and moist. No oropharyngeal exudate. Eyes: Conjunctivae and EOM are normal. No scleral icterus. Neck: Neck supple. No thyromegaly present.    Cardiovascular:

## 2017-12-27 DIAGNOSIS — G89.29 CHRONIC RIGHT SHOULDER PAIN: Primary | ICD-10-CM

## 2017-12-27 DIAGNOSIS — M25.511 CHRONIC RIGHT SHOULDER PAIN: Primary | ICD-10-CM

## 2018-01-18 ENCOUNTER — TELEPHONE (OUTPATIENT)
Dept: FAMILY MEDICINE CLINIC | Age: 63
End: 2018-01-18

## 2018-01-18 DIAGNOSIS — Z13.29 THYROID DISORDER SCREENING: Primary | ICD-10-CM

## 2018-01-19 ENCOUNTER — HOSPITAL ENCOUNTER (OUTPATIENT)
Dept: LAB | Age: 63
Setting detail: SPECIMEN
Discharge: HOME | End: 2018-01-19
Payer: COMMERCIAL

## 2018-01-19 DIAGNOSIS — E11.29 TYPE 2 DIABETES MELLITUS WITH MICROALBUMINURIA, WITHOUT LONG-TERM CURRENT USE OF INSULIN (HCC): ICD-10-CM

## 2018-01-19 DIAGNOSIS — I10 ESSENTIAL HYPERTENSION: ICD-10-CM

## 2018-01-19 DIAGNOSIS — R80.9 TYPE 2 DIABETES MELLITUS WITH MICROALBUMINURIA, WITHOUT LONG-TERM CURRENT USE OF INSULIN (HCC): ICD-10-CM

## 2018-01-19 DIAGNOSIS — Z13.29 THYROID DISORDER SCREENING: ICD-10-CM

## 2018-01-19 DIAGNOSIS — E55.9 VITAMIN D DEFICIENCY: ICD-10-CM

## 2018-01-19 LAB
ALBUMIN SERPL-MCNC: 3.9 G/DL (ref 3.5–5.2)
ALBUMIN/GLOBULIN RATIO: 1.3 (ref 1–2.5)
ALP BLD-CCNC: 112 U/L (ref 35–104)
ALT SERPL-CCNC: 10 U/L (ref 5–33)
ANION GAP SERPL CALCULATED.3IONS-SCNC: 10 MMOL/L (ref 9–17)
AST SERPL-CCNC: 12 U/L
BILIRUB SERPL-MCNC: 0.38 MG/DL (ref 0.3–1.2)
BUN BLDV-MCNC: 25 MG/DL (ref 8–23)
BUN/CREAT BLD: 27 (ref 9–20)
CALCIUM SERPL-MCNC: 9.4 MG/DL (ref 8.6–10.4)
CHLORIDE BLD-SCNC: 100 MMOL/L (ref 98–107)
CHOLESTEROL/HDL RATIO: 2.4
CHOLESTEROL: 161 MG/DL
CO2: 31 MMOL/L (ref 20–31)
CREAT SERPL-MCNC: 0.94 MG/DL (ref 0.5–0.9)
ESTIMATED AVERAGE GLUCOSE: 143 MG/DL
GFR AFRICAN AMERICAN: >60 ML/MIN
GFR NON-AFRICAN AMERICAN: >60 ML/MIN
GFR SERPL CREATININE-BSD FRML MDRD: ABNORMAL ML/MIN/{1.73_M2}
GFR SERPL CREATININE-BSD FRML MDRD: ABNORMAL ML/MIN/{1.73_M2}
GLUCOSE BLD-MCNC: 133 MG/DL (ref 70–99)
HBA1C MFR BLD: 6.6 % (ref 4.8–5.9)
HDLC SERPL-MCNC: 66 MG/DL
LDL CHOLESTEROL: 83 MG/DL (ref 0–130)
POTASSIUM SERPL-SCNC: 4.4 MMOL/L (ref 3.7–5.3)
SODIUM BLD-SCNC: 141 MMOL/L (ref 135–144)
THYROXINE, FREE: 1.28 NG/DL (ref 0.93–1.7)
TOTAL PROTEIN: 7 G/DL (ref 6.4–8.3)
TRIGL SERPL-MCNC: 61 MG/DL
TSH SERPL DL<=0.05 MIU/L-ACNC: 3.82 MIU/L (ref 0.3–5)
VITAMIN D 25-HYDROXY: 29.8 NG/ML (ref 30–100)
VLDLC SERPL CALC-MCNC: NORMAL MG/DL (ref 1–30)

## 2018-01-19 PROCEDURE — 36415 COLL VENOUS BLD VENIPUNCTURE: CPT

## 2018-01-19 PROCEDURE — 80061 LIPID PANEL: CPT

## 2018-01-19 PROCEDURE — 80053 COMPREHEN METABOLIC PANEL: CPT

## 2018-01-19 PROCEDURE — 84443 ASSAY THYROID STIM HORMONE: CPT

## 2018-01-19 PROCEDURE — 83036 HEMOGLOBIN GLYCOSYLATED A1C: CPT

## 2018-01-19 PROCEDURE — 82306 VITAMIN D 25 HYDROXY: CPT

## 2018-01-19 PROCEDURE — 84439 ASSAY OF FREE THYROXINE: CPT

## 2018-01-23 ENCOUNTER — OFFICE VISIT (OUTPATIENT)
Dept: FAMILY MEDICINE CLINIC | Age: 63
End: 2018-01-23
Payer: COMMERCIAL

## 2018-01-23 VITALS
RESPIRATION RATE: 16 BRPM | DIASTOLIC BLOOD PRESSURE: 72 MMHG | SYSTOLIC BLOOD PRESSURE: 112 MMHG | HEART RATE: 74 BPM | WEIGHT: 289 LBS | BODY MASS INDEX: 43.8 KG/M2 | HEIGHT: 68 IN

## 2018-01-23 DIAGNOSIS — G44.201 INTRACTABLE TENSION-TYPE HEADACHE, UNSPECIFIED CHRONICITY PATTERN: ICD-10-CM

## 2018-01-23 DIAGNOSIS — M54.2 NECK PAIN: ICD-10-CM

## 2018-01-23 DIAGNOSIS — R80.9 TYPE 2 DIABETES MELLITUS WITH MICROALBUMINURIA, WITHOUT LONG-TERM CURRENT USE OF INSULIN (HCC): Primary | ICD-10-CM

## 2018-01-23 DIAGNOSIS — E11.29 TYPE 2 DIABETES MELLITUS WITH MICROALBUMINURIA, WITHOUT LONG-TERM CURRENT USE OF INSULIN (HCC): Primary | ICD-10-CM

## 2018-01-23 DIAGNOSIS — I10 ESSENTIAL HYPERTENSION: ICD-10-CM

## 2018-01-23 DIAGNOSIS — Z23 NEED FOR INFLUENZA VACCINATION: ICD-10-CM

## 2018-01-23 PROCEDURE — 90688 IIV4 VACCINE SPLT 0.5 ML IM: CPT | Performed by: FAMILY MEDICINE

## 2018-01-23 PROCEDURE — 99214 OFFICE O/P EST MOD 30 MIN: CPT | Performed by: FAMILY MEDICINE

## 2018-01-23 PROCEDURE — 90471 IMMUNIZATION ADMIN: CPT | Performed by: FAMILY MEDICINE

## 2018-01-23 RX ORDER — DULOXETIN HYDROCHLORIDE 60 MG/1
CAPSULE, DELAYED RELEASE ORAL
Qty: 90 CAPSULE | Refills: 1 | Status: SHIPPED | OUTPATIENT
Start: 2018-01-23 | End: 2018-07-26 | Stop reason: SDUPTHER

## 2018-01-23 RX ORDER — LISINOPRIL AND HYDROCHLOROTHIAZIDE 20; 12.5 MG/1; MG/1
1 TABLET ORAL DAILY
Qty: 90 TABLET | Refills: 1 | Status: SHIPPED | OUTPATIENT
Start: 2018-01-23 | End: 2018-07-19 | Stop reason: SDUPTHER

## 2018-01-23 RX ORDER — PIOGLITAZONE HCL AND METFORMIN HCL 500; 15 MG/1; MG/1
TABLET ORAL
Qty: 180 TABLET | Refills: 1 | Status: SHIPPED | OUTPATIENT
Start: 2018-01-23 | End: 2018-03-21 | Stop reason: SDUPTHER

## 2018-01-23 NOTE — PROGRESS NOTES
Have you had an allergic reaction to the flu (influenza) shot? NO  Are you allergic to eggs or any component of the flu vaccine? NO  Do you have a history of Guillain-Milton Syndrome (GBS), which is paralysis after receiving the flu vaccine? NO  Are you feeling well today? YES  Flu vaccine given as ordered. Patient tolerated it well. No questions re: VIS information.

## 2018-01-23 NOTE — PROGRESS NOTES
40-59    Borderline    >59     Desirable         LDL Cholesterol 01/19/2018 83  0 - 130 mg/dL Final    Comment:    LDL Guidelines:     <100    Desirable   100-129   Near to/above Desirable   130-159   Borderline      >159   Undesirable     Direct (measured) LDL and calculated LDL are not interchangeable tests.  Chol/HDL Ratio 01/19/2018 2.4  <5 Final    Triglycerides 01/19/2018 61  <150 mg/dL Final    Comment:    Triglyceride Guidelines:     <150   Desirable   150-199  Borderline   200-499  High     >499   Very high   Based on AHA Guidelines for fasting triglyceride, October 2012. Performed at [facility] [address] [address] [phone]      VLDL 01/19/2018 NOT REPORTED  1 - 30 mg/dL Final    Vit D, 25-Hydroxy 01/19/2018 29.8* 30.0 - 100.0 ng/mL Final    Comment:    Reference Range:  Vitamin D status         Range   Deficiency              <20 ng/mL   Mild Deficiency       20-30 ng/mL   Sufficiency           ng/mL   Toxicity               >100 ng/mL  Performed at [address] [place] [address] [phone]         Assessment and Plan:    1. Type 2 diabetes mellitus with microalbuminuria, without long-term current use of insulin (HCC)  Her HgbA1c was 6.6 %, which is excellent. She was advised to continue her current regimen. ActoPlus Met was refilled:   - pioglitazone-metformin (ACTOPLUS MET)  MG per tablet; TAKE 1 TABLET TWICE A DAY WITH MEALS  Dispense: 180 tablet; Refill: 1    Labs were ordered to be done prior to her return visit in 6 months:  - Microalbumin, Ur; Future  - Hemoglobin A1C; Future  - Lipid Panel; Future    2. Essential hypertension  Her blood pressure is very well-controlled. (BP: 112/72)   She was advised to continue current medications. Lisinopril-Hydrochlorothiazide was refilled:   - lisinopril-hydrochlorothiazide (PRINZIDE;ZESTORETIC) 20-12.5 MG per tablet;  Take 1 tablet by mouth daily  Dispense: 90 tablet; Refill: 1    - Comprehensive Metabolic Panel; Future was ordered to be done in 6 months. 3. Intractable tension-type headache, unspecified chronicity pattern  As above, she is waiting for an appointment with Dr. Rickey Briscoe. Cymbalta was refilled:  - DULoxetine (CYMBALTA) 60 MG extended release capsule; TAKE 1 CAPSULE DAILY  Dispense: 90 capsule; Refill: 1    4. Neck pain  She was referred for physical therapy:  - Ambulatory referral to Physical Therapy    5. Routine health maintenance  Health maintenance was reviewed with the patient. Annual influenza vaccine was recommended and ordered. She has an order for a mammogram.  All other health maintenance, including Pneumovax, and Tdap, is up to date at this time. There is no indication for Prevnar-13 at this time.      (Please note that portions of this note were completed with a voice-recognition program. Efforts were made to edit the dictation but occasionally words are mis-transcribed.)

## 2018-02-01 RX ORDER — NAPROXEN 500 MG/1
500 TABLET ORAL 2 TIMES DAILY WITH MEALS
Qty: 60 TABLET | Refills: 3 | Status: SHIPPED | OUTPATIENT
Start: 2018-02-01 | End: 2018-07-26

## 2018-02-09 ENCOUNTER — HOSPITAL ENCOUNTER (OUTPATIENT)
Dept: PHYSICAL THERAPY | Age: 63
Setting detail: THERAPIES SERIES
Discharge: HOME | End: 2018-02-09
Payer: COMMERCIAL

## 2018-02-09 PROCEDURE — 97110 THERAPEUTIC EXERCISES: CPT | Performed by: PHYSICAL THERAPIST

## 2018-02-09 PROCEDURE — G8982 BODY POS GOAL STATUS: HCPCS | Performed by: PHYSICAL THERAPIST

## 2018-02-09 PROCEDURE — 97161 PT EVAL LOW COMPLEX 20 MIN: CPT | Performed by: PHYSICAL THERAPIST

## 2018-02-09 PROCEDURE — G8981 BODY POS CURRENT STATUS: HCPCS | Performed by: PHYSICAL THERAPIST

## 2018-02-09 ASSESSMENT — PAIN DESCRIPTION - ORIENTATION: ORIENTATION: RIGHT

## 2018-02-09 ASSESSMENT — PAIN DESCRIPTION - PAIN TYPE: TYPE: CHRONIC PAIN

## 2018-02-09 ASSESSMENT — PAIN DESCRIPTION - LOCATION: LOCATION: NECK;HEAD

## 2018-02-09 ASSESSMENT — PAIN SCALES - GENERAL: PAINLEVEL_OUTOF10: 8

## 2018-02-09 ASSESSMENT — PAIN DESCRIPTION - PROGRESSION: CLINICAL_PROGRESSION: NOT CHANGED

## 2018-02-09 ASSESSMENT — PAIN DESCRIPTION - FREQUENCY: FREQUENCY: CONTINUOUS

## 2018-02-09 ASSESSMENT — PAIN DESCRIPTION - ONSET: ONSET: ON-GOING

## 2018-02-09 ASSESSMENT — PAIN DESCRIPTION - DESCRIPTORS: DESCRIPTORS: PRESSURE

## 2018-02-09 NOTE — FLOWSHEET NOTE
Physical Therapy Daily Treatment Note    Date:  2018    Patient Name:  Akira Zapien    :  1955  MRN: [id number]  Restrictions/Precautions:     Medical/Treatment Diagnosis Information:   · Diagnosis: M54.2 neck pain  · Treatment Diagnosis: M54.2 neck pain  Insurance/Certification information:  PT Insurance Information: [address]  Physician Information:  Referring Practitioner: Arlene Edge of care signed (Y/N):  n  Visit# / total visits:   Pain level: 8/10     G-Code (if applicable):      Date G-Code Applied: 18   PT G-Codes  Functional Assessment Tool Used: Neck Pain Disability Scale  Score: 1750 = 34%  Functional Limitation: Changing and maintaining body position  Changing and Maintaining Body Position Current Status (): At least 20 percent but less than 40 percent impaired, limited or restricted  Changing and Maintaining Body Position Goal Status (): At least 1 percent but less than 20 percent impaired, limited or restricted    Time In:12:30   Time Out:1:10    Progress Note: [x]  Yes  []  No  Next due by: Visit #8, or 3/11/18      Subjective:   See eval    Objective: See eval  Observation:   Test measurements:      Exercises: There ex for C-spine ROM  Exercise/Equipment Resistance/Repetitions Other comments   Retraction 10x x3    Retraction/extension 10x    Active rotation 10xL, 10xR         B rowing/extension     Closed chain rotation          Upper cervical mob  Supine on towel roll                        Manual retraction/ extension 10'    MFR sob occ 5'    [x] Provided verbal/tactile cueing for activities related to strengthening, flexibility, endurance, ROM. (78861)  [] Provided verbal/tactile cueing for activities related to improving balance, coordination, kinesthetic sense, posture, motor skill, proprioception. (98451)    Therapeutic Activities:     [] Therapeutic activities, direct (one-on-one) patient contact (use of dynamic activities to improve functional performance). Cervical Traction    Electronically signed by:  Estefania Meyer

## 2018-02-13 ENCOUNTER — HOSPITAL ENCOUNTER (OUTPATIENT)
Dept: PHYSICAL THERAPY | Age: 63
Setting detail: THERAPIES SERIES
Discharge: HOME | End: 2018-02-13
Payer: COMMERCIAL

## 2018-02-13 PROCEDURE — 97110 THERAPEUTIC EXERCISES: CPT | Performed by: PHYSICAL THERAPY ASSISTANT

## 2018-02-13 PROCEDURE — 97012 MECHANICAL TRACTION THERAPY: CPT | Performed by: PHYSICAL THERAPY ASSISTANT

## 2018-02-13 NOTE — FLOWSHEET NOTE
Physical Therapy Daily Treatment Note    Date:  2018    Patient Name:  Hattie Hernandez    :  1955  MRN: [id number]  Restrictions/Precautions:     Medical/Treatment Diagnosis Information:   · Diagnosis: M54.2 neck pain  · Treatment Diagnosis: M54.2 neck pain  Insurance/Certification information:  PT Insurance Information: Lliia Zendejas  Physician Information:  Referring Practitioner: [address] [place] [address],  [address] of care signed (Y/N): YES  Visit# / total visits:   Pain level: 5/10     G-Code (if applicable):      Date G-Code Applied: 18   PT G-Codes  Functional Assessment Tool Used: [place] Pain Disability Scale  Score: 50 = 34%  Functional Limitation: Changing and maintaining body position  Changing and Maintaining Body Position Current Status (): At least 20 percent but less than 40 percent impaired, limited or restricted  Changing and Maintaining Body Position Goal Status (): At least 1 percent but less than 20 percent impaired, limited or restricted    Time In: 1138  Time Out:1225    Progress Note: []  Yes  [x]  No  Next due by: Visit #8, or 3/11/18      Subjective:   Pt. Relates improvement in symptoms but pain remains. Rated 5/10 this date in right cervical paraspinal.  Pt. States compliance with current HEP. Objective: RASHID complete per flow chart to facilitate postural strength, cervical motion and stability. Verbal cuing for progression, technique and order of exercises.    Observation:   Test measurements:      Exercises:    Exercise/Equipment Resistance/Repetitions Other comments   Retraction 10x x3    Retraction/extension 10x    Active rotation 10xL, 10xR         B rowing/extension 15x RED    Closed chain rotation 10x RED         Upper cervical mob 10x Supine on towel roll                        Manual retraction/ extension 10'    MFR sob occ 5'    [x] Provided verbal/tactile cueing for activities related to strengthening, flexibility, endurance, ROM. (54599)  [] Provided verbal/tactile cueing for

## 2018-02-15 ENCOUNTER — HOSPITAL ENCOUNTER (OUTPATIENT)
Dept: PHYSICAL THERAPY | Age: 63
Setting detail: THERAPIES SERIES
Discharge: HOME | End: 2018-02-15
Payer: COMMERCIAL

## 2018-02-15 PROCEDURE — 97110 THERAPEUTIC EXERCISES: CPT | Performed by: PHYSICAL THERAPY ASSISTANT

## 2018-02-15 PROCEDURE — 97012 MECHANICAL TRACTION THERAPY: CPT | Performed by: PHYSICAL THERAPY ASSISTANT

## 2018-02-15 NOTE — FLOWSHEET NOTE
medical complications  [] Other:     Prognosis: [x] Good [] Fair  [] Poor    Patient Requires Follow-up: [x] Yes  [] No      Goals:  Short term goals  Time Frame for Short term goals: 1 week  Short term goal 1: Start HEP    Long term goals  Time Frame for Long term goals : 4 weeks  Long term goal 1: Pain controlled 2-3/10 to allowregular ADL  Long term goal 2: Active c-spine rotation 60 deg for proper driving  Long term goal 3: Reduction of neck pain and headache for reductiion of pain meds and further medical intervention  Long term goal 4: Continue part time emploment    Plan:   [x] Continue per plan of care [] Alter current plan (see comments)  [] Plan of care initiated [] Hold pending MD visit [] Discharge    Plan for Next Session:   Monitor tolerance to traction and advance, progress as able. Electronically signed by:   Ashli Walton

## 2018-02-20 ENCOUNTER — HOSPITAL ENCOUNTER (OUTPATIENT)
Dept: PHYSICAL THERAPY | Age: 63
Setting detail: THERAPIES SERIES
Discharge: HOME | End: 2018-02-20
Payer: COMMERCIAL

## 2018-02-20 PROCEDURE — 97012 MECHANICAL TRACTION THERAPY: CPT | Performed by: PHYSICAL THERAPY ASSISTANT

## 2018-02-20 PROCEDURE — 97110 THERAPEUTIC EXERCISES: CPT | Performed by: PHYSICAL THERAPY ASSISTANT

## 2018-02-20 NOTE — FLOWSHEET NOTE
pain  [] Patient limited by other medical complications  [] Other:     Prognosis: [x] Good [] Fair  [] Poor    Patient Requires Follow-up: [x] Yes  [] No      Goals:  Short term goals  Time Frame for Short term goals: 1 week  Short term goal 1: Start HEP    Long term goals  Time Frame for Long term goals : 4 weeks  Long term goal 1: Pain controlled 2-3/10 to allowregular ADL (8/10 at worst)  Long term goal 2: Active c-spine rotation 60 deg for proper driving  Long term goal 3: Reduction of neck pain and headache for reductiion of pain meds and further medical intervention (Overall improvement reported, pain remains but varied)  Long term goal 4: Continue part time emploment    Plan:   [x] Continue per plan of care [] Alter current plan (see comments)  [] Plan of care initiated [] Hold pending MD visit [] Discharge    Plan for Next Session:   Monitor tolerance to traction and advance, progress as able. Electronically signed by:   Cayla Portillo

## 2018-02-21 ENCOUNTER — TELEPHONE (OUTPATIENT)
Dept: FAMILY MEDICINE CLINIC | Age: 63
End: 2018-02-21

## 2018-02-21 DIAGNOSIS — R51.9 NONINTRACTABLE HEADACHE, UNSPECIFIED CHRONICITY PATTERN, UNSPECIFIED HEADACHE TYPE: Primary | ICD-10-CM

## 2018-02-23 ENCOUNTER — HOSPITAL ENCOUNTER (OUTPATIENT)
Dept: PHYSICAL THERAPY | Age: 63
Setting detail: THERAPIES SERIES
Discharge: HOME | End: 2018-02-23
Payer: COMMERCIAL

## 2018-02-23 PROCEDURE — 97012 MECHANICAL TRACTION THERAPY: CPT | Performed by: PHYSICAL THERAPY ASSISTANT

## 2018-02-23 PROCEDURE — 97110 THERAPEUTIC EXERCISES: CPT | Performed by: PHYSICAL THERAPY ASSISTANT

## 2018-02-23 NOTE — FLOWSHEET NOTE
Physical Therapy Daily Treatment Note    Date:  2018    Patient Name:  Gómez Zapata    :  1955  MRN: [id number]  Restrictions/Precautions:     Medical/Treatment Diagnosis Information:   · Diagnosis: M54.2 neck pain  · Treatment Diagnosis: M54.2 neck pain  Insurance/Certification information:  PT Insurance Information: [address]  Physician Information:  Referring Practitioner: [address] [place] [address],  [address] of care signed (Y/N): YES  Visit# / total visits:   Pain level: 3/10     G-Code (if applicable):      Date G-Code Applied: 18   PT G-Codes  Functional Assessment Tool Used: Neck Pain Disability Scale  Score: 17/50 = 34%  Functional Limitation: Changing and maintaining body position  Changing and Maintaining Body Position Current Status (): At least 20 percent but less than 40 percent impaired, limited or restricted  Changing and Maintaining Body Position Goal Status (): At least 1 percent but less than 20 percent impaired, limited or restricted    Time In: 1100  Time Out:1146    Progress Note: []  Yes  [x]  No  Next due by: Visit #8, or 3/11/18      Subjective:   Pain this date rated 3/10 through neck. Notes pain at worst rated 8-9/10 previous day. Questions if headache is primary cause of pain. Objective: RASHID complete per flow chart to facilitate postural strength, cervical motion and stability. Held resisted shoulder exercises due to patient complaints of shoulder discomfort. Manual cervical distraction, occipital release, retraction and extension performed to improve mobility with increased mobility noted this date. Verbal cuing for progression and technique with exercises. Mechanical cervical traction 19#-8# to decrease pain and tightness.     Observation:   Test measurements:  CROM rotation 65° bilaterally    Exercises:    Exercise/Equipment Resistance/Repetitions Other comments   Retraction 10x x3    Retraction/extension 10x3    Active rotation 10xL, 10xR         B rowing/extension 15x  No resistance

## 2018-02-27 ENCOUNTER — HOSPITAL ENCOUNTER (OUTPATIENT)
Dept: PHYSICAL THERAPY | Age: 63
Setting detail: THERAPIES SERIES
Discharge: HOME | End: 2018-02-27
Payer: COMMERCIAL

## 2018-02-27 ENCOUNTER — TELEPHONE (OUTPATIENT)
Dept: FAMILY MEDICINE CLINIC | Age: 63
End: 2018-02-27

## 2018-02-27 DIAGNOSIS — G89.29 CHRONIC RIGHT SHOULDER PAIN: Primary | ICD-10-CM

## 2018-02-27 DIAGNOSIS — M25.511 CHRONIC RIGHT SHOULDER PAIN: Primary | ICD-10-CM

## 2018-02-27 NOTE — PROGRESS NOTES
Physical Therapy    Outpatient Physical Therapy    [x] [place]  Phone: [phone]  Fax: [phone]      [] [place]  Phone: [phone]  Fax: [phone]    Physical Therapy  Cancellation/No-show Note  Patient Name:  Bernadette Gates  :  1955   Date:  2018  Cancelled visits to date: 0  No-shows to date: 0    For today's appointment patient:  [x]  Cancelled  []  Rescheduled appointment  []  No-show     Reason given by patient:  []  Patient ill  []  Conflicting appointment  [x]  No transportation    []  Conflict with work  []  No reason given  []  Other:     Comments:      Electronically signed by: Asa Johnson PT

## 2018-02-27 NOTE — TELEPHONE ENCOUNTER
Pt calling requesting a referral to ortho for her rotator cuff tear, please advise pt at above number.

## 2018-03-01 ENCOUNTER — HOSPITAL ENCOUNTER (OUTPATIENT)
Dept: PHYSICAL THERAPY | Age: 63
Setting detail: THERAPIES SERIES
Discharge: HOME | End: 2018-03-01
Payer: COMMERCIAL

## 2018-03-01 PROCEDURE — G0283 ELEC STIM OTHER THAN WOUND: HCPCS | Performed by: PHYSICAL THERAPY ASSISTANT

## 2018-03-01 PROCEDURE — 97012 MECHANICAL TRACTION THERAPY: CPT | Performed by: PHYSICAL THERAPY ASSISTANT

## 2018-03-01 PROCEDURE — 97110 THERAPEUTIC EXERCISES: CPT | Performed by: PHYSICAL THERAPY ASSISTANT

## 2018-03-01 NOTE — FLOWSHEET NOTE
Retraction/extension 10x3    Active rotation 10xL, 10xR         B rowing/extension 20x No resistance due to shoulder pain/injury   Closed chain rotation 10x2 RED         Upper cervical mob 10x Supine on towel roll        Wall posture 10x flex/h. abd Ball behind head,              Manual retraction/ extension 10'    MFR sob occ 5'    [x] Provided verbal/tactile cueing for activities related to strengthening, flexibility, endurance, ROM. (76759)  [] Provided verbal/tactile cueing for activities related to improving balance, coordination, kinesthetic sense, posture, motor skill, proprioception. (84279)    Therapeutic Activities:     [] Therapeutic activities, direct (one-on-one) patient contact (use of dynamic activities to improve functional performance). (35792)    Gait:   [] Provided training and instruction to the patient for ambulation re-education. (58360)    Self-Care/ADL's  [] Self-care/home management training and compensatory training, meal preparation, safety procedures, and instructions in use of assistive technology devices/adaptive equipment, direct one-on-one contact. (33694)    Home Exercise Program:   C-spine extension progression  [x] Reviewed/Progressed HEP activities related to strengthening, flexibility, endurance, ROM. (19724)  [] Reviewed/Progressed HEP activities related to improving balance, coordination, kinesthetic sense, posture, motor skill, proprioception.  (53774)    Manual Treatments:    [] Provided manual therapy to mobilize soft tissue/joints for the purpose of modulating pain, promoting relaxation,  increasing ROM, reducing/eliminating soft tissue swelling/inflammation/restriction, improving soft tissue extensibility.  (14192)    Service Based Modalities:  19-8# mechanical cervical traction x15'       IFC 15'    Timed Code Treatment Minutes:  31' RASHID      Total Treatment Minutes: 64'    Treatment/Activity Tolerance:  [x] Patient tolerated treatment well [] Patient limited by

## 2018-03-06 ENCOUNTER — HOSPITAL ENCOUNTER (OUTPATIENT)
Dept: PHYSICAL THERAPY | Age: 63
Setting detail: THERAPIES SERIES
Discharge: HOME | End: 2018-03-06
Payer: COMMERCIAL

## 2018-03-06 PROCEDURE — 97012 MECHANICAL TRACTION THERAPY: CPT | Performed by: PHYSICAL THERAPIST

## 2018-03-06 PROCEDURE — 97110 THERAPEUTIC EXERCISES: CPT | Performed by: PHYSICAL THERAPIST

## 2018-03-06 NOTE — FLOWSHEET NOTE
for Long term goals : 4 weeks  Long term goal 1: Pain controlled 2-3/10 to allowregular ADL (8/10 at worst previous day)  Long term goal 2: Active c-spine rotation 60 deg for proper driving (Tightness remains, L>R)  Long term goal 3: Reduction of neck pain and headache for reductiion of pain meds and further medical intervention (Overall improvement reported, pain remains but varied)  Long term goal 4: Continue part time emploment    Plan:   [x] Continue per plan of care [] Alter current plan (see comments)  [] Plan of care initiated [] Hold pending MD visit [] Discharge    Plan for Next Session:   Monitor tolerance to progression and initiation of IFC.     Electronically signed by:  Manuel Rocha

## 2018-03-07 ENCOUNTER — OFFICE VISIT (OUTPATIENT)
Dept: ORTHOPEDIC SURGERY | Age: 63
End: 2018-03-07
Payer: COMMERCIAL

## 2018-03-07 ENCOUNTER — HOSPITAL ENCOUNTER (OUTPATIENT)
Dept: GENERAL RADIOLOGY | Age: 63
Discharge: HOME | End: 2018-03-09
Payer: COMMERCIAL

## 2018-03-07 VITALS
BODY MASS INDEX: 43.8 KG/M2 | SYSTOLIC BLOOD PRESSURE: 138 MMHG | HEIGHT: 68 IN | DIASTOLIC BLOOD PRESSURE: 77 MMHG | WEIGHT: 289 LBS | HEART RATE: 67 BPM

## 2018-03-07 DIAGNOSIS — G89.29 CHRONIC RIGHT SHOULDER PAIN: ICD-10-CM

## 2018-03-07 DIAGNOSIS — M25.511 CHRONIC RIGHT SHOULDER PAIN: ICD-10-CM

## 2018-03-07 DIAGNOSIS — M12.811 ROTATOR CUFF TEAR ARTHROPATHY, RIGHT: Primary | ICD-10-CM

## 2018-03-07 DIAGNOSIS — M75.101 ROTATOR CUFF TEAR ARTHROPATHY, RIGHT: Primary | ICD-10-CM

## 2018-03-07 PROCEDURE — 73030 X-RAY EXAM OF SHOULDER: CPT

## 2018-03-07 PROCEDURE — 20610 DRAIN/INJ JOINT/BURSA W/O US: CPT | Performed by: FAMILY MEDICINE

## 2018-03-07 PROCEDURE — 99203 OFFICE O/P NEW LOW 30 MIN: CPT | Performed by: FAMILY MEDICINE

## 2018-03-07 RX ORDER — TRIAMCINOLONE ACETONIDE 40 MG/ML
40 INJECTION, SUSPENSION INTRA-ARTICULAR; INTRAMUSCULAR ONCE
Status: COMPLETED | OUTPATIENT
Start: 2018-03-07 | End: 2018-03-07

## 2018-03-07 RX ORDER — BUPIVACAINE HYDROCHLORIDE 5 MG/ML
4 INJECTION, SOLUTION PERINEURAL ONCE
Status: COMPLETED | OUTPATIENT
Start: 2018-03-07 | End: 2018-03-07

## 2018-03-07 RX ADMIN — TRIAMCINOLONE ACETONIDE 40 MG: 40 INJECTION, SUSPENSION INTRA-ARTICULAR; INTRAMUSCULAR at 13:53

## 2018-03-07 RX ADMIN — BUPIVACAINE HYDROCHLORIDE 20 MG: 5 INJECTION, SOLUTION PERINEURAL at 13:55

## 2018-03-09 ENCOUNTER — HOSPITAL ENCOUNTER (OUTPATIENT)
Dept: PHYSICAL THERAPY | Age: 63
Setting detail: THERAPIES SERIES
Discharge: HOME | End: 2018-03-09
Payer: COMMERCIAL

## 2018-03-09 PROCEDURE — 97012 MECHANICAL TRACTION THERAPY: CPT | Performed by: PHYSICAL THERAPY ASSISTANT

## 2018-03-09 PROCEDURE — 97110 THERAPEUTIC EXERCISES: CPT | Performed by: PHYSICAL THERAPY ASSISTANT

## 2018-03-10 PROCEDURE — G8983 BODY POS D/C STATUS: HCPCS | Performed by: PHYSICAL THERAPIST

## 2018-03-10 PROCEDURE — G8982 BODY POS GOAL STATUS: HCPCS | Performed by: PHYSICAL THERAPIST

## 2018-03-10 NOTE — DISCHARGE SUMMARY
HEP-met     Long term goals  Time Frame for Long term goals : 4 weeks  Long term goal 1: Pain controlled 2-3/10 to allowregular ADL -part met  Long term goal 2: Active c-spine rotation 60 deg for proper driving -met  Long term goal 3: Reduction of neck pain and headache for reductiion of pain meds and further medical intervention - met  Long term goal 4: Continue part time employment- met          Percentage of Goals Met: 90            Discharge Prognosis: [] Excellent [x] Good [] Fair  [] Poor     Goal Status:  [x] Achieved [] Partially Achieved  [] Not Achieved       Electronically signed by:  Macie Chaudhari, PT

## 2018-03-14 ENCOUNTER — HOSPITAL ENCOUNTER (OUTPATIENT)
Dept: PHYSICAL THERAPY | Age: 63
Setting detail: THERAPIES SERIES
Discharge: HOME | End: 2018-03-14
Payer: COMMERCIAL

## 2018-03-14 PROCEDURE — 97035 APP MDLTY 1+ULTRASOUND EA 15: CPT

## 2018-03-14 PROCEDURE — G8984 CARRY CURRENT STATUS: HCPCS

## 2018-03-14 PROCEDURE — G8985 CARRY GOAL STATUS: HCPCS

## 2018-03-14 PROCEDURE — 97161 PT EVAL LOW COMPLEX 20 MIN: CPT

## 2018-03-14 ASSESSMENT — PAIN DESCRIPTION - LOCATION: LOCATION: SHOULDER

## 2018-03-14 ASSESSMENT — PAIN DESCRIPTION - ORIENTATION: ORIENTATION: RIGHT

## 2018-03-14 ASSESSMENT — PAIN DESCRIPTION - ONSET: ONSET: AWAKENED FROM SLEEP

## 2018-03-14 ASSESSMENT — PAIN DESCRIPTION - FREQUENCY: FREQUENCY: CONTINUOUS

## 2018-03-14 ASSESSMENT — PAIN DESCRIPTION - PROGRESSION: CLINICAL_PROGRESSION: GRADUALLY WORSENING

## 2018-03-14 ASSESSMENT — PAIN DESCRIPTION - PAIN TYPE: TYPE: ACUTE PAIN

## 2018-03-14 ASSESSMENT — PAIN DESCRIPTION - DESCRIPTORS: DESCRIPTORS: SHARP

## 2018-03-14 ASSESSMENT — PAIN SCALES - GENERAL: PAINLEVEL_OUTOF10: 8

## 2018-03-14 NOTE — PROGRESS NOTES
Physical Therapy  Initial Assessment  Date: 3/14/2018  Patient Name: Osman Corey  MRN: [id number]  : 1955          Restrictions       Subjective   General  Chart Reviewed: Yes  Patient assessed for rehabilitation services?: Yes  Referring Practitioner: Manuel Kirk DO  Referral Date : 18  Diagnosis: M12.811 (ICD-10-CM) - Rotator cuff tear arthropathy, right  PT Visit Information  PT Insurance Information: BCBS  Subjective  Subjective: Feb shoulder started to bother her, but has had shoulder problems for years. Patient noting having incresaed pain in the last month. Patient noting has had a RTC tear for years. Patient noting over the last several years and has been getting injections and would get relief, but did not get relief after last injections.     Pain Screening  Patient Currently in Pain: Yes  Pain Assessment  Pain Assessment: 0-10  Pain Level: 8  Pain Type: Acute pain  Pain Location: Shoulder (Shoulder blade )  Pain Orientation: Right  Pain Descriptors: Sharp  Pain Frequency: Continuous  Pain Onset: Awakened from sleep  Clinical Progression: Gradually worsening  Vital Signs  Patient Currently in Pain: Yes    Vision/Hearing       Orientation       Social/Functional History  Social/Functional History  Type of Home: House  Home Layout: Two level  ADL Assistance: Independent  Homemaking Assistance: Independent  Homemaking Responsibilities: Yes  Ambulation Assistance: Independent  Transfer Assistance: Independent  Active : Yes  Mode of Transportation: Car  Occupation: Part time employment  Type of occupation: [place]  Objective     Observation/Palpation  Posture: Fair  Palpation: mild to moderate tenderness of the long head of the biceps and insertion of the deltoid, tenderness also of upper trap and rhomboids     PROM RUE (degrees)  RUE PROM: Exceptions  R Shoulder Flex  0-180: 170  R Shoulder ABduction 0-180: 180  R Shoulder Int Rotation  0-70: 64  R Shoulder Ext Rotation  0-90: 90  AROM RUE moving and handling objects  Carrying, Moving and Handling Objects Current Status (): At least 20 percent but less than 40 percent impaired, limited or restricted  Carrying, Moving and Handling Objects Goal Status ():  At least 1 percent but less than 20 percent impaired, limited or restricted    OutComes Score                                           Goals  Short term goals  Time Frame for Short term goals: 1 week  Short term goal 1: Start HEP  Long term goals  Time Frame for Long term goals : 4 weeks  Long term goal 1: Pain controlled 2-3/10 to allow regular ADL  Long term goal 2: Improve AROM of the Right shoulder to 150 to allow for overhead reach   Long term goal 3: Improve shoulder flexion strength to 4+/5 and abd to 4/5 to allow for light lifting   Long term goal 4: Continue part time emploment  Long term goal 5: UEFI 65/80 or greater to improve tolerance to ADLs        Therapy Time   Individual Concurrent Group Co-treatment   Time In 1120         Time Out 1210         Minutes 50         Timed Code Treatment Minutes: 15 [facility], PT

## 2018-03-14 NOTE — FLOWSHEET NOTE
Physical Therapy Daily Treatment Note    Date:  3/14/2018    Patient Name:  Candida Silveira    :  1955  MRN: [id number]  Restrictions/Precautions:     Medical/Treatment Diagnosis Information:   · Diagnosis: M12.811 (ICD-10-CM) - Rotator cuff tear arthropathy, right  ·    Insurance/Certification information:  PT Insurance Information: BCBS  Physician Information:  Referring Practitioner: Kang Davis DO  Plan of care signed (Y/N):  n  Visit# / total visits: 1 / 10  Pain level: 8/10     G-Code (if applicable):      Date G-Code Applied:  3/15/18  PT G-Codes  Functional Assessment Tool Used: UEFI   Score: 50/80=38% Disability   Functional Limitation: Carrying, moving and handling objects  Carrying, Moving and Handling Objects Current Status (): At least 20 percent but less than 40 percent impaired, limited or restricted  Carrying, Moving and Handling Objects Goal Status (): At least 1 percent but less than 20 percent impaired, limited or restricted    Time In:1120   Time Out:1210    Progress Note: [x]  Yes  []  No  Next due by: Visit #10      Subjective:   See Eval     Objective: See Eval   Observation:   Test measurements:      Exercises:   Exercise/Equipment Resistance/Repetitions Other comments        Pulleys      Pendulums  4way HEP    Table Glides     Wall Walks      scap squeezes                                              [x] Provided verbal/tactile cueing for activities related to strengthening, flexibility, endurance, ROM. (48719)  [] Provided verbal/tactile cueing for activities related to improving balance, coordination, kinesthetic sense, posture, motor skill, proprioception. (67566)    Therapeutic Activities:     [] Therapeutic activities, direct (one-on-one) patient contact (use of dynamic activities to improve functional performance). (75045)    Gait:   [] Provided training and instruction to the patient for ambulation re-education.  (43181)    Self-Care/ADL's  [] Self-care/home management training and compensatory training, meal preparation, safety procedures, and instructions in use of assistive technology devices/adaptive equipment, direct one-on-one contact. (05131)    Home Exercise Program:     [] Reviewed/Progressed HEP activities related to strengthening, flexibility, endurance, ROM. (61967)  [] Reviewed/Progressed HEP activities related to improving balance, coordination, kinesthetic sense, posture, motor skill, proprioception.  (63568)    Manual Treatments:    [] Provided manual therapy to mobilize soft tissue/joints for the purpose of modulating pain, promoting relaxation,  increasing ROM, reducing/eliminating soft tissue swelling/inflammation/restriction, improving soft tissue extensibility. (76268)    Service Based Modalities:  US 1 MHZ 1.2 w/cm2 50% to decrease pain and inflammation to the ant shoulder     Timed Code Treatment Minutes:        Total Treatment Minutes: 48'      Treatment/Activity Tolerance:  [x] Patient tolerated treatment well [] Patient limited by fatique  [] Patient limited by pain  [] Patient limited by other medical complications  [] Other:     Prognosis: [x] Good [] Fair  [] Poor    Patient Requires Follow-up: [x] Yes  [] No      Goals:  Short term goals  Time Frame for Short term goals: 1 week  Short term goal 1: Start HEP    Long term goals  Time Frame for Long term goals : 4 weeks  Long term goal 1: Pain controlled 2-3/10 to allow regular ADL  Long term goal 2: Improve AROM of the Right shoulder to 150 to allow for overhead reach   Long term goal 3: Improve shoulder flexion strength to 4+/5 and abd to 4/5 to allow for light lifting   Long term goal 4: Continue part time emploment  Long term goal 5: UEFI 65/80 or greater to improve tolerance to ADLs           Plan:   [] Continue per plan of care [] Alter current plan (see comments)  [x] Plan of care initiated [] Hold pending MD visit [] Discharge  Plan for Next Session:   Decrease Pain and improve ROM

## 2018-03-14 NOTE — PLAN OF CARE
[facility]    [x] [place]  Phone: [phone]  Fax: [phone]      [] [place]  Phone: [phone]  Fax: [phone]        To: Referring Practitioner: Payton Lowry DO      Patient: Amparo Madrigal  : 1955   MRN: [id number]  Evaluation Date: 3/14/2018      Diagnosis Information:  · Diagnosis: M12.811 (ICD-10-CM) - Rotator cuff tear arthropathy, right   ·       Physical Therapy Certification  Dear Payton Lowry DO  The following patient has been evaluated for physical therapy services and for therapy to continue, Medicare requires monthly physician review of the treatment plan. Please review the attached evaluation and/or summary of the patient's plan of care, and verify that you agree therapy should continue by signing the attached document and sending it back to our office. Plan of Care/Treatment to date:  [x] Therapeutic Exercise    [x] Modalities:  [x] Therapeutic Activity     [x] Ultrasound  [x] Electrical Stimulation  [x] Gait Training      [] Cervical Traction [] Lumbar Traction  [x] Neuromuscular Re-education    [x] Cold/hotpack [] Iontophoresis   [x] Instruction in HEP     Other:  [x] Manual Therapy      []             [] Aquatic Therapy      []           ? Goals:  Short term goals  Time Frame for Short term goals: 1 week  Short term goal 1: Start HEP    Long term goals  Time Frame for Long term goals : 4 weeks  Long term goal 1: Pain controlled 2-3/10 to allow regular ADL  Long term goal 2: Improve AROM of the Right shoulder to 150 to allow for overhead reach   Long term goal 3: Improve shoulder flexion strength to 4+/5 and abd to 4/5 to allow for light lifting   Long term goal 4: Continue part time emploment  Long term goal 5: UEFI 65/80 or greater to improve tolerance to ADLs     Frequency/Duration:3/14/18-18  # Days per week: [] 1 day # Weeks: [] 1 week [] 5 weeks     [x] 2 days?    [] 2 weeks [x] 6 weeks     [x] 3 days   []

## 2018-03-20 ENCOUNTER — HOSPITAL ENCOUNTER (OUTPATIENT)
Dept: PHYSICAL THERAPY | Age: 63
Setting detail: THERAPIES SERIES
Discharge: HOME | End: 2018-03-20
Payer: COMMERCIAL

## 2018-03-20 PROCEDURE — 97035 APP MDLTY 1+ULTRASOUND EA 15: CPT

## 2018-03-20 PROCEDURE — 97110 THERAPEUTIC EXERCISES: CPT

## 2018-03-20 NOTE — FLOWSHEET NOTE
verbal/tactile cueing for activities related to improving balance, coordination, kinesthetic sense, posture, motor skill, proprioception. (45072)    Therapeutic Activities:     [] Therapeutic activities, direct (one-on-one) patient contact (use of dynamic activities to improve functional performance). (30971)    Gait:   [] Provided training and instruction to the patient for ambulation re-education. (06230)    Self-Care/ADL's  [] Self-care/home management training and compensatory training, meal preparation, safety procedures, and instructions in use of assistive technology devices/adaptive equipment, direct one-on-one contact. (88515)    Home Exercise Program:     [] Reviewed/Progressed HEP activities related to strengthening, flexibility, endurance, ROM. (20072)  [] Reviewed/Progressed HEP activities related to improving balance, coordination, kinesthetic sense, posture, motor skill, proprioception.  (95225)    Manual Treatments:    [] Provided manual therapy to mobilize soft tissue/joints for the purpose of modulating pain, promoting relaxation,  increasing ROM, reducing/eliminating soft tissue swelling/inflammation/restriction, improving soft tissue extensibility.  (77196)    Service Based Modalities:   Timed Code Treatment Minutes:    8' US 1 MHZ 1.4 w/cm2 50% to decrease pain and inflammation to the ant shoulder, 34' Ex       Total Treatment Minutes: 43'       Treatment/Activity Tolerance:  [x] Patient tolerated treatment well [] Patient limited by fatique  [] Patient limited by pain  [] Patient limited by other medical complications  [] Other:     Prognosis: [x] Good [] Fair  [] Poor    Patient Requires Follow-up: [x] Yes  [] No      Goals:  Short term goals  Time Frame for Short term goals: 1 week  Short term goal 1: Start HEP    Long term goals  Time Frame for Long term goals : 4 weeks  Long term goal 1: Pain controlled 2-3/10 to allow regular ADL  Long term goal 2: Improve AROM of the Right shoulder to 150 to allow for overhead reach   Long term goal 3: Improve shoulder flexion strength to 4+/5 and abd to 4/5 to allow for light lifting   Long term goal 4: Continue part time emploment  Long term goal 5: UEFI 65/80 or greater to improve tolerance to ADLs           Plan:   [x] Continue per plan of care [] Alter current plan (see comments)  [] Plan of care initiated [] Hold pending MD visit [] Discharge  Plan for Next Session:   Decrease Pain and improve ROM     Electronically signed by:  Gaby Anne

## 2018-03-21 DIAGNOSIS — E11.29 TYPE 2 DIABETES MELLITUS WITH MICROALBUMINURIA, WITHOUT LONG-TERM CURRENT USE OF INSULIN (HCC): ICD-10-CM

## 2018-03-21 DIAGNOSIS — R51.9 NONINTRACTABLE HEADACHE, UNSPECIFIED CHRONICITY PATTERN, UNSPECIFIED HEADACHE TYPE: ICD-10-CM

## 2018-03-21 DIAGNOSIS — R80.9 TYPE 2 DIABETES MELLITUS WITH MICROALBUMINURIA, WITHOUT LONG-TERM CURRENT USE OF INSULIN (HCC): ICD-10-CM

## 2018-03-21 RX ORDER — GABAPENTIN 600 MG/1
600 TABLET ORAL
Qty: 80 TABLET | Refills: 0 | Status: CANCELLED | OUTPATIENT
Start: 2018-03-21 | End: 2018-04-20

## 2018-03-21 RX ORDER — PIOGLITAZONE HCL AND METFORMIN HCL 500; 15 MG/1; MG/1
1 TABLET ORAL 2 TIMES DAILY WITH MEALS
Qty: 180 TABLET | Refills: 1 | Status: SHIPPED | OUTPATIENT
Start: 2018-03-21 | End: 2018-07-26 | Stop reason: SDUPTHER

## 2018-03-22 DIAGNOSIS — E11.29 TYPE 2 DIABETES MELLITUS WITH MICROALBUMINURIA, WITHOUT LONG-TERM CURRENT USE OF INSULIN (HCC): ICD-10-CM

## 2018-03-22 DIAGNOSIS — R80.9 TYPE 2 DIABETES MELLITUS WITH MICROALBUMINURIA, WITHOUT LONG-TERM CURRENT USE OF INSULIN (HCC): ICD-10-CM

## 2018-03-22 RX ORDER — PIOGLITAZONE HCL AND METFORMIN HCL 500; 15 MG/1; MG/1
TABLET ORAL
Qty: 60 TABLET | Refills: 6 | OUTPATIENT
Start: 2018-03-22

## 2018-03-23 ENCOUNTER — HOSPITAL ENCOUNTER (OUTPATIENT)
Dept: PHYSICAL THERAPY | Age: 63
Setting detail: THERAPIES SERIES
Discharge: HOME | End: 2018-03-23
Payer: COMMERCIAL

## 2018-03-23 NOTE — PROGRESS NOTES
Physical Therapy    Outpatient Physical Therapy    [x] [place]  Phone: [phone]  Fax: [phone]      [] [place]  Phone: [phone]  Fax: [phone]    Physical Therapy  Cancellation/No-show Note  Patient Name:  Hattie Hernandez  :  1955   Date:  3/23/2018  Cancelled visits to date: 1  No-shows to date: 0    For today's appointment patient:  [x]  Cancelled  []  Rescheduled appointment  []  No-show     Reason given by patient:  [x]  Patient ill  []  Conflicting appointment  []  No transportation    []  Conflict with work  []  No reason given  []  Other:     Comments:      Electronically signed by: Le Willams PT

## 2018-03-27 ENCOUNTER — HOSPITAL ENCOUNTER (OUTPATIENT)
Dept: PHYSICAL THERAPY | Age: 63
Setting detail: THERAPIES SERIES
Discharge: HOME | End: 2018-03-27
Payer: COMMERCIAL

## 2018-03-27 PROCEDURE — G0283 ELEC STIM OTHER THAN WOUND: HCPCS | Performed by: PHYSICAL THERAPY ASSISTANT

## 2018-03-27 PROCEDURE — 97110 THERAPEUTIC EXERCISES: CPT | Performed by: PHYSICAL THERAPY ASSISTANT

## 2018-03-28 DIAGNOSIS — R51.9 NONINTRACTABLE HEADACHE, UNSPECIFIED CHRONICITY PATTERN, UNSPECIFIED HEADACHE TYPE: ICD-10-CM

## 2018-03-28 RX ORDER — GABAPENTIN 600 MG/1
600 TABLET ORAL
Qty: 90 TABLET | Refills: 0 | Status: SHIPPED | OUTPATIENT
Start: 2018-03-28 | End: 2018-04-11 | Stop reason: SDUPTHER

## 2018-03-29 ENCOUNTER — HOSPITAL ENCOUNTER (OUTPATIENT)
Dept: PHYSICAL THERAPY | Age: 63
Setting detail: THERAPIES SERIES
Discharge: HOME | End: 2018-03-29
Payer: COMMERCIAL

## 2018-03-29 PROCEDURE — 97110 THERAPEUTIC EXERCISES: CPT

## 2018-03-29 PROCEDURE — 97035 APP MDLTY 1+ULTRASOUND EA 15: CPT

## 2018-03-29 NOTE — FLOWSHEET NOTE
regular ADL  Long term goal 2: Improve AROM of the Right shoulder to 150 to allow for overhead reach   Long term goal 3: Improve shoulder flexion strength to 4+/5 and abd to 4/5 to allow for light lifting   Long term goal 4: Continue part time emploment  Long term goal 5: UEFI 65/80 or greater to improve tolerance to ADLs           Plan:   [x] Continue per plan of care [] Alter current plan (see comments)  [] Plan of care initiated [] Hold pending MD visit [] Discharge  Plan for Next Session:   Monitor patient tolerance, try to readd previous days exs as patient able.       Electronically signed by:  Sangita Chambers

## 2018-04-06 ENCOUNTER — HOSPITAL ENCOUNTER (OUTPATIENT)
Dept: PHYSICAL THERAPY | Age: 63
Setting detail: THERAPIES SERIES
Discharge: HOME | End: 2018-04-06
Payer: COMMERCIAL

## 2018-04-06 PROCEDURE — 97110 THERAPEUTIC EXERCISES: CPT

## 2018-04-11 ENCOUNTER — HOSPITAL ENCOUNTER (OUTPATIENT)
Dept: PHYSICAL THERAPY | Age: 63
Setting detail: THERAPIES SERIES
Discharge: HOME | End: 2018-04-11
Payer: COMMERCIAL

## 2018-04-11 DIAGNOSIS — R51.9 NONINTRACTABLE HEADACHE, UNSPECIFIED CHRONICITY PATTERN, UNSPECIFIED HEADACHE TYPE: ICD-10-CM

## 2018-04-11 PROCEDURE — 97110 THERAPEUTIC EXERCISES: CPT | Performed by: PHYSICAL THERAPY ASSISTANT

## 2018-04-11 PROCEDURE — 97150 GROUP THERAPEUTIC PROCEDURES: CPT | Performed by: PHYSICAL THERAPY ASSISTANT

## 2018-04-11 RX ORDER — GABAPENTIN 600 MG/1
600 TABLET ORAL
Qty: 90 TABLET | Refills: 0 | Status: SHIPPED | OUTPATIENT
Start: 2018-04-11 | End: 2018-05-02 | Stop reason: SDUPTHER

## 2018-04-13 ENCOUNTER — HOSPITAL ENCOUNTER (OUTPATIENT)
Dept: PHYSICAL THERAPY | Age: 63
Setting detail: THERAPIES SERIES
Discharge: HOME | End: 2018-04-13
Payer: COMMERCIAL

## 2018-04-13 PROCEDURE — 97110 THERAPEUTIC EXERCISES: CPT | Performed by: PHYSICAL THERAPY ASSISTANT

## 2018-04-13 PROCEDURE — 97150 GROUP THERAPEUTIC PROCEDURES: CPT | Performed by: PHYSICAL THERAPY ASSISTANT

## 2018-04-18 ENCOUNTER — HOSPITAL ENCOUNTER (OUTPATIENT)
Dept: PHYSICAL THERAPY | Age: 63
Setting detail: THERAPIES SERIES
Discharge: HOME | End: 2018-04-18
Payer: COMMERCIAL

## 2018-04-18 PROCEDURE — G0283 ELEC STIM OTHER THAN WOUND: HCPCS | Performed by: PHYSICAL THERAPY ASSISTANT

## 2018-04-18 PROCEDURE — 97110 THERAPEUTIC EXERCISES: CPT | Performed by: PHYSICAL THERAPY ASSISTANT

## 2018-04-20 ENCOUNTER — HOSPITAL ENCOUNTER (OUTPATIENT)
Dept: PHYSICAL THERAPY | Age: 63
Setting detail: THERAPIES SERIES
Discharge: HOME | End: 2018-04-20
Payer: COMMERCIAL

## 2018-04-20 PROCEDURE — 97110 THERAPEUTIC EXERCISES: CPT | Performed by: PHYSICAL THERAPY ASSISTANT

## 2018-04-20 PROCEDURE — 97035 APP MDLTY 1+ULTRASOUND EA 15: CPT | Performed by: PHYSICAL THERAPY ASSISTANT

## 2018-04-23 PROCEDURE — G8986 CARRY D/C STATUS: HCPCS

## 2018-04-23 PROCEDURE — G8985 CARRY GOAL STATUS: HCPCS

## 2018-04-25 DIAGNOSIS — R51.9 NONINTRACTABLE HEADACHE, UNSPECIFIED CHRONICITY PATTERN, UNSPECIFIED HEADACHE TYPE: ICD-10-CM

## 2018-04-25 RX ORDER — GABAPENTIN 600 MG/1
600 TABLET ORAL
Qty: 90 TABLET | Refills: 0 | Status: CANCELLED | OUTPATIENT
Start: 2018-04-25 | End: 2018-05-13

## 2018-05-02 DIAGNOSIS — R51.9 NONINTRACTABLE HEADACHE, UNSPECIFIED CHRONICITY PATTERN, UNSPECIFIED HEADACHE TYPE: ICD-10-CM

## 2018-05-02 RX ORDER — GABAPENTIN 600 MG/1
600 TABLET ORAL
Qty: 150 TABLET | Refills: 0 | Status: SHIPPED | OUTPATIENT
Start: 2018-05-02 | End: 2018-05-17 | Stop reason: SDUPTHER

## 2018-05-17 DIAGNOSIS — R51.9 NONINTRACTABLE HEADACHE, UNSPECIFIED CHRONICITY PATTERN, UNSPECIFIED HEADACHE TYPE: ICD-10-CM

## 2018-05-17 RX ORDER — GABAPENTIN 600 MG/1
600 TABLET ORAL
Qty: 150 TABLET | Refills: 0 | Status: SHIPPED | OUTPATIENT
Start: 2018-05-17 | End: 2020-05-07

## 2018-06-22 DIAGNOSIS — E55.9 VITAMIN D DEFICIENCY: ICD-10-CM

## 2018-06-22 RX ORDER — METHOCARBAMOL 750 MG/1
TABLET ORAL
Qty: 13 CAPSULE | Refills: 0 | Status: SHIPPED | OUTPATIENT
Start: 2018-06-22 | End: 2018-06-27 | Stop reason: SDUPTHER

## 2018-07-17 ENCOUNTER — OFFICE VISIT (OUTPATIENT)
Dept: PRIMARY CARE CLINIC | Age: 63
End: 2018-07-17
Payer: COMMERCIAL

## 2018-07-17 VITALS
OXYGEN SATURATION: 97 % | TEMPERATURE: 98.5 F | BODY MASS INDEX: 44.41 KG/M2 | SYSTOLIC BLOOD PRESSURE: 120 MMHG | DIASTOLIC BLOOD PRESSURE: 70 MMHG | WEIGHT: 293 LBS | HEART RATE: 76 BPM | RESPIRATION RATE: 16 BRPM | HEIGHT: 68 IN

## 2018-07-17 DIAGNOSIS — M25.511 CHRONIC RIGHT SHOULDER PAIN: Primary | ICD-10-CM

## 2018-07-17 DIAGNOSIS — M75.101 TEAR OF RIGHT ROTATOR CUFF, UNSPECIFIED TEAR EXTENT: ICD-10-CM

## 2018-07-17 DIAGNOSIS — G89.29 CHRONIC RIGHT SHOULDER PAIN: Primary | ICD-10-CM

## 2018-07-17 PROCEDURE — 99214 OFFICE O/P EST MOD 30 MIN: CPT | Performed by: FAMILY MEDICINE

## 2018-07-17 PROCEDURE — 20610 DRAIN/INJ JOINT/BURSA W/O US: CPT | Performed by: FAMILY MEDICINE

## 2018-07-17 RX ORDER — TRIAMCINOLONE ACETONIDE 40 MG/ML
40 INJECTION, SUSPENSION INTRA-ARTICULAR; INTRAMUSCULAR ONCE
Status: COMPLETED | OUTPATIENT
Start: 2018-07-17 | End: 2018-07-17

## 2018-07-17 RX ORDER — CLONIDINE HYDROCHLORIDE 0.1 MG/1
0.1 TABLET ORAL 2 TIMES DAILY PRN
COMMUNITY
End: 2018-11-26 | Stop reason: SDUPTHER

## 2018-07-17 RX ADMIN — TRIAMCINOLONE ACETONIDE 40 MG: 40 INJECTION, SUSPENSION INTRA-ARTICULAR; INTRAMUSCULAR at 11:07

## 2018-07-17 ASSESSMENT — PATIENT HEALTH QUESTIONNAIRE - PHQ9
2. FEELING DOWN, DEPRESSED OR HOPELESS: 0
1. LITTLE INTEREST OR PLEASURE IN DOING THINGS: 0
SUM OF ALL RESPONSES TO PHQ QUESTIONS 1-9: 0
SUM OF ALL RESPONSES TO PHQ9 QUESTIONS 1 & 2: 0

## 2018-07-17 ASSESSMENT — ENCOUNTER SYMPTOMS
GASTROINTESTINAL NEGATIVE: 1
EYES NEGATIVE: 1
RESPIRATORY NEGATIVE: 1

## 2018-07-17 NOTE — PROGRESS NOTES
Musculoskeletal: She exhibits tenderness. She exhibits no edema or deformity. Right shoulder with pain with palpation to the anterior, lateral and posterior aspect of the joint. Pain with abduction of the right upper extremity past 90 degrees. Neurological: She is alert and oriented to person, place, and time. Skin: Skin is warm and dry. No rash noted. She is not diaphoretic. No erythema. No pallor. Psychiatric: She has a normal mood and affect. Her behavior is normal. Judgment and thought content normal.   Nursing note and vitals reviewed. Assessment:      Encounter Diagnoses   Name Primary?  Chronic right shoulder pain Yes    Tear of right rotator cuff, unspecified tear extent            Plan:      Orders Placed This Encounter   Medications    triamcinolone acetonide (KENALOG-40) injection 40 mg     Orders Placed This Encounter   Procedures    GA ARTHROCENTESIS ASPIR&/INJ MAJOR JT/BURSA W/O US     Procedure Note    PREOP DIAGNOSIS:  [x] Right []  Left    [] Bilateral Shoulder Pain    POSTOP DIAGNOSIS:  [x] Right []  Left    [] Bilateral Shoulder Pain    OPERATION:  [x] Right []  Left    [] Bilateral INTRA-ARTICULAR Glenohumeral Injection    PROCEDURE:  After sterile prep, a posterior approach was used. 40 mg of Kenalog and 2 cc of 1% Xylocaine injected intra-articularly without incident. Pre- and post neurovascular status verified. No complications noted. Continue with NSAIDs as directed. Would consider evaluation by a shoulder specialist if persistent issues or if no improvement with injection today. Return  if no improvement in symptoms or if any further symptoms arise.

## 2018-07-19 DIAGNOSIS — I10 ESSENTIAL HYPERTENSION: ICD-10-CM

## 2018-07-19 RX ORDER — LISINOPRIL AND HYDROCHLOROTHIAZIDE 20; 12.5 MG/1; MG/1
1 TABLET ORAL DAILY
Qty: 90 TABLET | Refills: 0 | Status: SHIPPED | OUTPATIENT
Start: 2018-07-19 | End: 2018-07-26 | Stop reason: SDUPTHER

## 2018-07-25 ENCOUNTER — HOSPITAL ENCOUNTER (OUTPATIENT)
Dept: LAB | Age: 63
Setting detail: SPECIMEN
Discharge: HOME | End: 2018-07-25
Payer: COMMERCIAL

## 2018-07-25 DIAGNOSIS — R80.9 TYPE 2 DIABETES MELLITUS WITH MICROALBUMINURIA, WITHOUT LONG-TERM CURRENT USE OF INSULIN (HCC): ICD-10-CM

## 2018-07-25 DIAGNOSIS — E11.29 TYPE 2 DIABETES MELLITUS WITH MICROALBUMINURIA, WITHOUT LONG-TERM CURRENT USE OF INSULIN (HCC): ICD-10-CM

## 2018-07-25 LAB
ALBUMIN SERPL-MCNC: 3.9 G/DL (ref 3.5–5.2)
ALBUMIN/GLOBULIN RATIO: 1.1 (ref 1–2.5)
ALP BLD-CCNC: 129 U/L (ref 35–104)
ALT SERPL-CCNC: 10 U/L (ref 5–33)
ANION GAP SERPL CALCULATED.3IONS-SCNC: 10 MMOL/L (ref 9–17)
AST SERPL-CCNC: 12 U/L
BILIRUB SERPL-MCNC: 0.35 MG/DL (ref 0.3–1.2)
BUN BLDV-MCNC: 33 MG/DL (ref 8–23)
BUN/CREAT BLD: 31 (ref 9–20)
CALCIUM SERPL-MCNC: 9.5 MG/DL (ref 8.6–10.4)
CHLORIDE BLD-SCNC: 100 MMOL/L (ref 98–107)
CHOLESTEROL/HDL RATIO: 2.5
CHOLESTEROL: 151 MG/DL
CO2: 31 MMOL/L (ref 20–31)
CREAT SERPL-MCNC: 1.07 MG/DL (ref 0.5–0.9)
CREATININE URINE: 55.9 MG/DL (ref 28–217)
ESTIMATED AVERAGE GLUCOSE: 146 MG/DL
GFR AFRICAN AMERICAN: >60 ML/MIN
GFR NON-AFRICAN AMERICAN: 52 ML/MIN
GFR SERPL CREATININE-BSD FRML MDRD: ABNORMAL ML/MIN/{1.73_M2}
GFR SERPL CREATININE-BSD FRML MDRD: ABNORMAL ML/MIN/{1.73_M2}
GLUCOSE BLD-MCNC: 145 MG/DL (ref 70–99)
HBA1C MFR BLD: 6.7 % (ref 4.8–5.9)
HDLC SERPL-MCNC: 60 MG/DL
LDL CHOLESTEROL: 80 MG/DL (ref 0–130)
MICROALBUMIN/CREAT 24H UR: <12 MG/L
MICROALBUMIN/CREAT UR-RTO: NORMAL MCG/MG CREAT
POTASSIUM SERPL-SCNC: 4.4 MMOL/L (ref 3.7–5.3)
SODIUM BLD-SCNC: 141 MMOL/L (ref 135–144)
TOTAL PROTEIN: 7.6 G/DL (ref 6.4–8.3)
TRIGL SERPL-MCNC: 57 MG/DL
VLDLC SERPL CALC-MCNC: NORMAL MG/DL (ref 1–30)

## 2018-07-25 PROCEDURE — 80061 LIPID PANEL: CPT

## 2018-07-25 PROCEDURE — 82043 UR ALBUMIN QUANTITATIVE: CPT

## 2018-07-25 PROCEDURE — 82570 ASSAY OF URINE CREATININE: CPT

## 2018-07-25 PROCEDURE — 36415 COLL VENOUS BLD VENIPUNCTURE: CPT

## 2018-07-25 PROCEDURE — 80053 COMPREHEN METABOLIC PANEL: CPT

## 2018-07-25 PROCEDURE — 83036 HEMOGLOBIN GLYCOSYLATED A1C: CPT

## 2018-07-26 ENCOUNTER — OFFICE VISIT (OUTPATIENT)
Dept: FAMILY MEDICINE CLINIC | Age: 63
End: 2018-07-26
Payer: COMMERCIAL

## 2018-07-26 VITALS
WEIGHT: 293 LBS | DIASTOLIC BLOOD PRESSURE: 86 MMHG | BODY MASS INDEX: 44.41 KG/M2 | HEIGHT: 68 IN | SYSTOLIC BLOOD PRESSURE: 128 MMHG | HEART RATE: 86 BPM | RESPIRATION RATE: 16 BRPM

## 2018-07-26 DIAGNOSIS — M25.511 RIGHT SHOULDER PAIN, UNSPECIFIED CHRONICITY: ICD-10-CM

## 2018-07-26 DIAGNOSIS — R80.9 TYPE 2 DIABETES MELLITUS WITH MICROALBUMINURIA, WITHOUT LONG-TERM CURRENT USE OF INSULIN (HCC): Primary | ICD-10-CM

## 2018-07-26 DIAGNOSIS — E11.29 TYPE 2 DIABETES MELLITUS WITH MICROALBUMINURIA, WITHOUT LONG-TERM CURRENT USE OF INSULIN (HCC): Primary | ICD-10-CM

## 2018-07-26 DIAGNOSIS — G44.201 INTRACTABLE TENSION-TYPE HEADACHE, UNSPECIFIED CHRONICITY PATTERN: ICD-10-CM

## 2018-07-26 DIAGNOSIS — Z23 NEED FOR SHINGLES VACCINE: ICD-10-CM

## 2018-07-26 DIAGNOSIS — I10 ESSENTIAL HYPERTENSION: ICD-10-CM

## 2018-07-26 DIAGNOSIS — Z12.31 ENCOUNTER FOR SCREENING MAMMOGRAM FOR BREAST CANCER: ICD-10-CM

## 2018-07-26 PROCEDURE — 99214 OFFICE O/P EST MOD 30 MIN: CPT | Performed by: FAMILY MEDICINE

## 2018-07-26 RX ORDER — DULOXETIN HYDROCHLORIDE 60 MG/1
CAPSULE, DELAYED RELEASE ORAL
Qty: 90 CAPSULE | Refills: 1 | Status: SHIPPED | OUTPATIENT
Start: 2018-07-26 | End: 2019-01-22 | Stop reason: SDUPTHER

## 2018-07-26 RX ORDER — LISINOPRIL AND HYDROCHLOROTHIAZIDE 20; 12.5 MG/1; MG/1
1 TABLET ORAL DAILY
Qty: 90 TABLET | Refills: 1 | Status: SHIPPED | OUTPATIENT
Start: 2018-07-26 | End: 2019-02-05 | Stop reason: SDUPTHER

## 2018-07-26 RX ORDER — PIOGLITAZONE HCL AND METFORMIN HCL 500; 15 MG/1; MG/1
1 TABLET ORAL 2 TIMES DAILY WITH MEALS
Qty: 180 TABLET | Refills: 1 | Status: SHIPPED | OUTPATIENT
Start: 2018-07-26 | End: 2018-08-02 | Stop reason: SDUPTHER

## 2018-07-26 ASSESSMENT — PATIENT HEALTH QUESTIONNAIRE - PHQ9
2. FEELING DOWN, DEPRESSED OR HOPELESS: 0
SUM OF ALL RESPONSES TO PHQ QUESTIONS 1-9: 0
1. LITTLE INTEREST OR PLEASURE IN DOING THINGS: 0
SUM OF ALL RESPONSES TO PHQ9 QUESTIONS 1 & 2: 0

## 2018-07-26 NOTE — PROGRESS NOTES
[address], [address] [place] [address] Drive                        Telephone [phone]             Fax [phone]     Heidi Alatorre  1955  MRN:  [id number]  Date of visit:  7/26/2018    Subjective:    Heidi Alatorre is a 58 y.o.  female who presents to [facility] today (7/26/2018) for follow up/evaluation of:  Diabetes (6 month follow up); Hypertension (6 month follow up); and Shoulder Pain (seen in  07/17/18 for right shoulder pain,recieved injection in the joint with no relief,she is wanting to be referred to Ortho)      She is here for follow up of diabetes and hypertension. She also has concerns about right shoulder pain. She was seen at Urgent Care on 7/17/2018 and had an injection in the right shoulder. This did not provide any significant relief. She would like to see an orthopedic surgeon. She saw Dr. Sona Alicia a few months ago, but she would like a second opinion. She had an MRI in January 2018 that showed a tear of the supraspinatus. She is tolerating her medications well. She is seeing neurology for management of chronic headaches.        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  VLDL 07/25/2018 NOT REPORTED  1 - 30 mg/dL Final    Microalb, Ur 07/25/2018 <12  <21 mg/L Final    Creatinine, Ur 07/25/2018 55.9  28.0 - 217.0 mg/dL Final    Microalb/Crt. Ratio 07/25/2018 CANNOT BE CALCULATED  <25 mcg/mg creat Final         Assessment and Plan:    1. Type 2 diabetes mellitus with microalbuminuria, without long-term current use of insulin (HCC)  Her HgbA1c was 6.7 %, which is at goal.   She was advised to continue her current regimen. Actoplus Met was refilled:   - pioglitazone-metformin (ACTOPLUS MET)  MG per tablet; Take 1 tablet by mouth 2 times daily (with meals)  Dispense: 180 tablet; Refill: 1  - External Referral To Optometry  -  DIABETES FOOT EXAM  - Hemoglobin A1C; Future  - Lipid Panel; Future    2. Essential hypertension  Her blood pressure is adequately-controlled. (BP: 128/86)   She was advised to continue current medications. Lisinopril-Hydrochlorothiazide was refilled:   - lisinopril-hydrochlorothiazide (PRINZIDE;ZESTORETIC) 20-12.5 MG per tablet; Take 1 tablet by mouth daily  Dispense: 90 tablet; Refill: 1    - Comprehensive Metabolic Panel; Future prior to her return visit in 6 months. 3. Intractable tension-type headache, unspecified chronicity pattern  As above, she continues to see neurology. Cymbalta was refilled:  - DULoxetine (CYMBALTA) 60 MG extended release capsule; TAKE 1 CAPSULE DAILY  Dispense: 90 capsule; Refill: 1    4. Right shoulder pain, unspecified chronicity  As above, her pain has persisted despite a recent steroid injection. She has a documented supraspinatus tear on MRI. She was referred to orthopedics:  - Ambulatory referral to Orthopedic Surgery    5.   Routine health maintenance  Health maintenance was reviewed with the patient. Shingrix was recommended, and a printed prescription for Shingrix was given to the patient. Screening mammogram was recommended and ordered. All other health maintenance, including Tdap and Pneumovax, is up to date at this time. There is no indication for Prevnar-13 at this time.      (Please note that portions of this note were completed with a voice-recognition program. Efforts were made to edit the dictation but occasionally words are mis-transcribed.)

## 2018-08-02 DIAGNOSIS — R80.9 TYPE 2 DIABETES MELLITUS WITH MICROALBUMINURIA, WITHOUT LONG-TERM CURRENT USE OF INSULIN (HCC): ICD-10-CM

## 2018-08-02 DIAGNOSIS — E11.29 TYPE 2 DIABETES MELLITUS WITH MICROALBUMINURIA, WITHOUT LONG-TERM CURRENT USE OF INSULIN (HCC): ICD-10-CM

## 2018-08-02 RX ORDER — PIOGLITAZONE HCL AND METFORMIN HCL 500; 15 MG/1; MG/1
1 TABLET ORAL 2 TIMES DAILY WITH MEALS
Qty: 14 TABLET | Refills: 0 | Status: SHIPPED | OUTPATIENT
Start: 2018-08-02 | End: 2019-02-05 | Stop reason: SDUPTHER

## 2018-10-03 ENCOUNTER — TELEPHONE (OUTPATIENT)
Dept: FAMILY MEDICINE CLINIC | Age: 63
End: 2018-10-03

## 2018-10-03 NOTE — TELEPHONE ENCOUNTER
Patient was called and scheduled for pre op clearance 10/10/18. The orthopedic office will mail to her labs and ekg orders. Advised to have them done prior to appointment with Dr Leopoldo Leek voiced understanding.

## 2018-10-09 ENCOUNTER — HOSPITAL ENCOUNTER (OUTPATIENT)
Dept: LAB | Age: 63
Discharge: HOME | End: 2018-10-09
Payer: COMMERCIAL

## 2018-10-09 ENCOUNTER — HOSPITAL ENCOUNTER (OUTPATIENT)
Dept: NON INVASIVE DIAGNOSTICS | Age: 63
Discharge: HOME | End: 2018-10-09
Payer: COMMERCIAL

## 2018-10-09 LAB
ANION GAP SERPL CALCULATED.3IONS-SCNC: 11 MMOL/L (ref 9–17)
BUN BLDV-MCNC: 32 MG/DL (ref 8–23)
CHLORIDE BLD-SCNC: 98 MMOL/L (ref 98–107)
CO2: 29 MMOL/L (ref 20–31)
CREAT SERPL-MCNC: 1.12 MG/DL (ref 0.5–0.9)
EKG ATRIAL RATE: 56 BPM
EKG P AXIS: 24 DEGREES
EKG P-R INTERVAL: 184 MS
EKG Q-T INTERVAL: 446 MS
EKG QRS DURATION: 96 MS
EKG QTC CALCULATION (BAZETT): 430 MS
EKG R AXIS: 23 DEGREES
EKG T AXIS: 33 DEGREES
EKG VENTRICULAR RATE: 56 BPM
ESTIMATED AVERAGE GLUCOSE: 146 MG/DL
GFR AFRICAN AMERICAN: 60 ML/MIN
GFR NON-AFRICAN AMERICAN: 49 ML/MIN
GFR SERPL CREATININE-BSD FRML MDRD: ABNORMAL ML/MIN/{1.73_M2}
GFR SERPL CREATININE-BSD FRML MDRD: ABNORMAL ML/MIN/{1.73_M2}
GLUCOSE BLD-MCNC: 152 MG/DL (ref 70–99)
HBA1C MFR BLD: 6.7 % (ref 4.8–5.9)
POTASSIUM SERPL-SCNC: 4.7 MMOL/L (ref 3.7–5.3)
SODIUM BLD-SCNC: 138 MMOL/L (ref 135–144)

## 2018-10-09 PROCEDURE — 80051 ELECTROLYTE PANEL: CPT

## 2018-10-09 PROCEDURE — 83036 HEMOGLOBIN GLYCOSYLATED A1C: CPT

## 2018-10-09 PROCEDURE — 93005 ELECTROCARDIOGRAM TRACING: CPT

## 2018-10-09 PROCEDURE — 84520 ASSAY OF UREA NITROGEN: CPT

## 2018-10-09 PROCEDURE — 36415 COLL VENOUS BLD VENIPUNCTURE: CPT

## 2018-10-09 PROCEDURE — 82565 ASSAY OF CREATININE: CPT

## 2018-10-09 PROCEDURE — 82947 ASSAY GLUCOSE BLOOD QUANT: CPT

## 2018-10-10 ENCOUNTER — OFFICE VISIT (OUTPATIENT)
Dept: FAMILY MEDICINE CLINIC | Age: 63
End: 2018-10-10
Payer: COMMERCIAL

## 2018-10-10 VITALS
WEIGHT: 293 LBS | HEIGHT: 68 IN | DIASTOLIC BLOOD PRESSURE: 80 MMHG | TEMPERATURE: 98.1 F | RESPIRATION RATE: 16 BRPM | BODY MASS INDEX: 44.41 KG/M2 | HEART RATE: 76 BPM | OXYGEN SATURATION: 98 % | SYSTOLIC BLOOD PRESSURE: 128 MMHG

## 2018-10-10 DIAGNOSIS — Z01.818 PRE-OP EVALUATION: Primary | ICD-10-CM

## 2018-10-10 PROCEDURE — 99242 OFF/OP CONSLTJ NEW/EST SF 20: CPT | Performed by: FAMILY MEDICINE

## 2018-10-10 ASSESSMENT — ENCOUNTER SYMPTOMS
COUGH: 0
SPUTUM PRODUCTION: 0
SINUS PAIN: 0
HEARTBURN: 0
HEMOPTYSIS: 0
CONSTIPATION: 0
WHEEZING: 0
DIARRHEA: 0
BLOOD IN STOOL: 0
DOUBLE VISION: 0
NAUSEA: 0
EYE PAIN: 0
SHORTNESS OF BREATH: 1
VOMITING: 0
BLURRED VISION: 0

## 2018-10-10 ASSESSMENT — PATIENT HEALTH QUESTIONNAIRE - PHQ9
2. FEELING DOWN, DEPRESSED OR HOPELESS: 0
SUM OF ALL RESPONSES TO PHQ9 QUESTIONS 1 & 2: 0
SUM OF ALL RESPONSES TO PHQ QUESTIONS 1-9: 0
1. LITTLE INTEREST OR PLEASURE IN DOING THINGS: 0
SUM OF ALL RESPONSES TO PHQ QUESTIONS 1-9: 0

## 2018-10-10 NOTE — PROGRESS NOTES
contents into the lateral sphenoid sinus, [facility], Dr. Meagan Chamberlain COLONOSCOPY  5/3/2012    diverticular disease    DENTAL SURGERY  08/2015    full dental extraction    FOREIGN BODY REMOVAL  5/28/2016    left-sided temporal craniotomy wound reexploration with removal of small retained foreign body (plastic from Ruth clip from surgery 3 days prior), [facility], Dr. Susy Frost  09/06/2005    supracervical hysterectomy bilateral salpingo oophectomy    KNEE ARTHROSCOPY Left     OTHER SURGICAL HISTORY  2011    endoscopic mucosal resection of duodenal polyp    TONSILLECTOMY      UPPER GASTROINTESTINAL ENDOSCOPY         Current Outpatient Prescriptions   Medication Sig Dispense Refill    pioglitazone-metformin (ACTOPLUS MET)  MG per tablet Take 1 tablet by mouth 2 times daily (with meals) 14 tablet 0    DULoxetine (CYMBALTA) 60 MG extended release capsule TAKE 1 CAPSULE DAILY 90 capsule 1    vitamin D (CHOLECALCIFEROL) 67865 UNIT CAPS Take 1 capsule by mouth once a week 13 capsule 1    lisinopril-hydrochlorothiazide (PRINZIDE;ZESTORETIC) 20-12.5 MG per tablet Take 1 tablet by mouth daily 90 tablet 1    cloNIDine (CATAPRES) 0.1 MG tablet Take 0.1 mg by mouth 2 times daily as needed for Other      gabapentin (NEURONTIN) 600 MG tablet Take 1 tablet by mouth 5 times daily for 30 days. . 150 tablet 0     No current facility-administered medications for this visit. She is allergic to asa [aspirin]. She  reports that she has never smoked. She has never used smokeless tobacco..    Review of Systems:  Review of Systems   Constitutional: Negative for chills, diaphoresis and fever. HENT: Negative for ear pain, hearing loss, nosebleeds and sinus pain. Eyes: Negative for blurred vision, double vision and pain. Respiratory: Positive for shortness of breath. Negative for cough, hemoptysis, sputum production and wheezing. She reports some shortness of breath with activity. Final    GFR Non- 10/09/2018 49* >60 mL/min Final    GFR  10/09/2018 60* >60 mL/min Final    GFR Comment 10/09/2018        Final    Comment: Average GFR for 61-76 years old:   80 mL/min/1.73sq m  Chronic Kidney Disease:   <60 mL/min/1.73sq m  Kidney failure:   <15 mL/min/1.73sq m              eGFR calculated using average adult body mass. Additional eGFR calculator   available at:        [email or web address]            GFR Staging 10/09/2018 NOT REPORTED   Final    Glucose 10/09/2018 152* 70 - 99 mg/dL Final    Hemoglobin A1C 10/09/2018 6.7* 4.8 - 5.9 % Final    Estimated Avg Glucose 10/09/2018 146  mg/dL Final    Sodium 10/09/2018 138  135 - 144 mmol/L Final    Potassium 10/09/2018 4.7  3.7 - 5.3 mmol/L Final    Chloride 10/09/2018 98  98 - 107 mmol/L Final    CO2 10/09/2018 29  20 - 31 mmol/L Final    Anion Gap 10/09/2018 11  9 - 17 mmol/L Final        ECG done 10/9/2018 showed sinus bradycardia with a rate of 56, otherwise normal ECG. Assessment and Plan:    1. Pre-operative evaluation  Slim is a low-risk candidate for the planned surgery. 2.  Routine health maintenance  Health maintenance was reviewed with the patient. Annual influenza vaccine was recommended and declined. She has an order for a mammogram.  She was reminded to schedule this. Shingrix was recommended and declined. All other health maintenance, including Tdap and Pneumovax, is up to date at this time. There is no indication for Prevnar-13 at this time. This note was created using voice-recognition software, and may contain inaccuracies of transcription which were inadvertently overlooked prior to signature. For any questions, please contact me.

## 2018-10-30 ENCOUNTER — HOSPITAL ENCOUNTER (OUTPATIENT)
Dept: PHYSICAL THERAPY | Age: 63
Setting detail: THERAPIES SERIES
Discharge: HOME | End: 2018-10-30
Payer: COMMERCIAL

## 2018-10-30 PROCEDURE — G8985 CARRY GOAL STATUS: HCPCS

## 2018-10-30 PROCEDURE — 97161 PT EVAL LOW COMPLEX 20 MIN: CPT

## 2018-10-30 PROCEDURE — G8984 CARRY CURRENT STATUS: HCPCS

## 2018-10-30 ASSESSMENT — PAIN DESCRIPTION - ORIENTATION: ORIENTATION: RIGHT

## 2018-10-30 ASSESSMENT — PAIN DESCRIPTION - ONSET: ONSET: AWAKENED FROM SLEEP

## 2018-10-30 ASSESSMENT — PAIN SCALES - GENERAL: PAINLEVEL_OUTOF10: 8

## 2018-10-30 ASSESSMENT — PAIN DESCRIPTION - LOCATION: LOCATION: ARM;SHOULDER

## 2018-10-30 ASSESSMENT — PAIN DESCRIPTION - PAIN TYPE: TYPE: SURGICAL PAIN

## 2018-10-30 ASSESSMENT — PAIN DESCRIPTION - DESCRIPTORS: DESCRIPTORS: ACHING

## 2018-10-30 ASSESSMENT — PAIN DESCRIPTION - FREQUENCY: FREQUENCY: CONTINUOUS

## 2018-10-30 NOTE — PLAN OF CARE
[facility]    [x] [place]  Phone: [phone]  Fax: [phone]      [] [place]  Phone: [phone]  Fax: [phone]        To: Referring Practitioner: Dr Renelle Mortimer      Patient: Janeth Sarabia  : 1955   MRN: [id number]  Evaluation Date: 10/30/2018      Diagnosis Information:  · Diagnosis: Right Shoulder RTC Repair   ·       Physical Therapy Certification  Dear Bonny Garcia   The following patient has been evaluated for physical therapy services and for therapy to continue, Medicare requires monthly physician review of the treatment plan. Please review the attached evaluation and/or summary of the patient's plan of care, and verify that you agree therapy should continue by signing the attached document and sending it back to our office. Plan of Care/Treatment to date:  [x] Therapeutic Exercise    [x] Modalities:  [x] Therapeutic Activity     [] Ultrasound  [x] Electrical Stimulation  [] Gait Training      [] Cervical Traction [] Lumbar Traction  [x] Neuromuscular Re-education    [x] Cold/hotpack [] Iontophoresis   [x] Instruction in HEP     Other:  [x] Manual Therapy      []             [] Aquatic Therapy      []           ? Goals:  Short term goals  Time Frame for Short term goals: 3weeks   Short term goal 1: Initiate HEP    Long term goals  Time Frame for Long term goals : 6weeks   Long term goal 1: Independent in HEP  Long term goal 2: Increase PROM to [facility] with no greater than 2 point increase in pain  Long term goal 3: Goals for Strength and AROM to be progressed per protocol. Long term goal 4: Educate on need for use of sling. Long term goal 5: UEFI 50/80 or greater to improve tolerance to ADLs     Frequency/Duration:10/30/18-12/10/18  # Days per week: [] 1 day # Weeks: [] 1 week [] 5 weeks     [x] 2 days?    [] 2 weeks [x] 6 weeks     [x] 3 days   [] 3 weeks [] 7 weeks     [] 4 days   [] 4 weeks [] 8 weeks    Rehab Potential: [] Excellent [x] Good [] Fair  [] Poor     Electronically signed by:  Kristin James PT    If you have any questions or concerns, please don't hesitate to call.   Thank you for your referral.      Physician Signature:________________________________Date:__________________  By signing above, therapists plan is approved by physician

## 2018-11-01 ENCOUNTER — HOSPITAL ENCOUNTER (OUTPATIENT)
Dept: PHYSICAL THERAPY | Age: 63
Setting detail: THERAPIES SERIES
Discharge: HOME | End: 2018-11-01
Payer: COMMERCIAL

## 2018-11-01 PROCEDURE — 97140 MANUAL THERAPY 1/> REGIONS: CPT

## 2018-11-01 PROCEDURE — 97110 THERAPEUTIC EXERCISES: CPT

## 2018-11-01 NOTE — FLOWSHEET NOTE
Physical Therapy Daily Treatment Note    Date:  2018    Patient Name:  Leonel Farfan    :  1955  MRN: [id number]  Restrictions/Precautions:     Medical/Treatment Diagnosis Information:   · Diagnosis: Right Shoulder RTC Repair     Insurance/Certification information:  PT Insurance Information: BCBS - [place] PPO  Physician Information:  Referring Practitioner: Dr Nya Gutierrez of care signed (Y/N):  n  Visit# / total visits: 2/10  Pain level: 7/10     G-Code (if applicable):      Date G-Code Applied:  10/30/18  PT G-Codes  Functional Assessment Tool Used: UEFI   Score: 1680=80% Disabitity   Functional Limitation: Carrying, moving and handling objects  Carrying, Moving and Handling Objects Current Status (): At least 80 percent but less than 100 percent impaired, limited or restricted  Carrying, Moving and Handling Objects Goal Status (): At least 1 percent but less than 20 percent impaired, limited or restricted    Time In: 9:01   Time Out: 9:40    Progress Note: []  Yes  [x]  No  Next due by: Visit #10      Subjective:  Pt rpts to clinic with no protocol and moderate complaints of R shoulder pain stating \"I went to the doctor and they told me that this is just standard protocol. I'm R handed so this is kind of aggravating\". Objective: Pt tolerated todays session well indicating no increase in pain post session. Pt required vc with each exercise to ensure proper performance and was most challenged by walk backs this date indicating some pain and fatigue. Tolerated PROM well exhibiting little muscle guarding with all motions but had a hard time relaxing with passive motion. Given HEP with understanding noted. Observation: Rpts with sling donned. Test measurements:      Exercises: Performed all RASHID per flow chart to increase R shoulder ROM and strength.    Exercise/Equipment Resistance/Repetitions Other comments        PROM  10'    Stress ball squeeze 3' HEP   pendulums 1' ea 4 way   Wrist pro/sup x30 ea 2#   Wrist maze x15    Walk backs at bar 3'                                        [x] Provided verbal/tactile cueing for activities related to strengthening, flexibility, endurance, ROM. (12934)  [] Provided verbal/tactile cueing for activities related to improving balance, coordination, kinesthetic sense, posture, motor skill, proprioception. (89112)    Therapeutic Activities:     [] Therapeutic activities, direct (one-on-one) patient contact (use of dynamic activities to improve functional performance). (76322)    Gait:   [] Provided training and instruction to the patient for ambulation re-education. (16102)    Self-Care/ADL's  [] Self-care/home management training and compensatory training, meal preparation, safety procedures, and instructions in use of assistive technology devices/adaptive equipment, direct one-on-one contact. (14266)    Home Exercise Program: Pt given pendulums, ball squeeze. [x] Reviewed/Progressed HEP activities related to strengthening, flexibility, endurance, ROM. (78908)  [] Reviewed/Progressed HEP activities related to improving balance, coordination, kinesthetic sense, posture, motor skill, proprioception.  (34346)    Manual Treatments: PROM to R shoulder x 10'. [x] Provided manual therapy to mobilize soft tissue/joints for the purpose of modulating pain, promoting relaxation,  increasing ROM, reducing/eliminating soft tissue swelling/inflammation/restriction, improving soft tissue extensibility.  (23360)    Service Based Modalities:      Timed Code Treatment Minutes: 34' therex       10' manual      Total Treatment Minutes:   44'    Treatment/Activity Tolerance:  [] Patient tolerated treatment well [] Patient limited by fatique  [x] Patient limited by pain  [] Patient limited by other medical complications  [] Other:     Prognosis: [x] Good [] Fair  [] Poor    Patient Requires Follow-up: [x] Yes  [] No      Goals:  Short term goals  Time Frame for Short term goals: 3weeks   Short term goal 1: Initiate HEP    Long term goals  Time Frame for Long term goals : 6weeks   Long term goal 1: Independent in HEP  Long term goal 2: Increase PROM to [facility] with no greater than 2 point increase in pain  Long term goal 3: Goals for Strength and AROM to be progressed per protocol. Long term goal 4: Educate on need for use of sling. Long term goal 5: UEFI 50/80 or greater to improve tolerance to ADLs           Plan:   [x] Continue per plan of care [] Alter current plan (see comments)  [] Plan of care initiated [] Hold pending MD visit [] Discharge    Plan for Next Session: Progress per Carmen Suresh protocol.         Electronically signed by:  Raghu Cross PTA

## 2018-11-06 ENCOUNTER — HOSPITAL ENCOUNTER (OUTPATIENT)
Dept: PHYSICAL THERAPY | Age: 63
Setting detail: THERAPIES SERIES
Discharge: HOME | End: 2018-11-06
Payer: COMMERCIAL

## 2018-11-06 PROCEDURE — 97110 THERAPEUTIC EXERCISES: CPT

## 2018-11-06 PROCEDURE — 97140 MANUAL THERAPY 1/> REGIONS: CPT

## 2018-11-08 ENCOUNTER — HOSPITAL ENCOUNTER (OUTPATIENT)
Dept: PHYSICAL THERAPY | Age: 63
Setting detail: THERAPIES SERIES
Discharge: HOME | End: 2018-11-08
Payer: COMMERCIAL

## 2018-11-08 PROCEDURE — 97140 MANUAL THERAPY 1/> REGIONS: CPT

## 2018-11-08 PROCEDURE — 97110 THERAPEUTIC EXERCISES: CPT

## 2018-11-12 ENCOUNTER — HOSPITAL ENCOUNTER (OUTPATIENT)
Dept: PHYSICAL THERAPY | Age: 63
Setting detail: THERAPIES SERIES
Discharge: HOME | End: 2018-11-12
Payer: COMMERCIAL

## 2018-11-12 PROCEDURE — 97140 MANUAL THERAPY 1/> REGIONS: CPT

## 2018-11-12 PROCEDURE — 97110 THERAPEUTIC EXERCISES: CPT

## 2018-11-12 NOTE — FLOWSHEET NOTE
Physical Therapy Daily Treatment Note    Date:  2018    Patient Name:  Beth Vanegas    :  1955  MRN: [id number]  Restrictions/Precautions:     Medical/Treatment Diagnosis Information:   · Diagnosis: Right Shoulder RTC Repair     Insurance/Certification information:  PT Insurance Information: BCBS - [place]  Physician Information:  Referring Practiti tessa: Dr Rhett Briggs of care signed (Y/N):  n  Visit# / total visits: 5/10  Pain level: 6/10     G-Code (if applicable):      Date G-Code Applied:  10/30/18  PT G-Codes  Functional Assessment Tool Used: UEFI   Score: 1680=80% Disabitity   Functional Limitation: Carrying, moving and handling objects  Carrying, Moving and Handling Objects Current Status (): At least 80 percent but less than 100 percent impaired, limited or restricted  Carrying, Moving and Handling Objects Goal Status (): At least 1 percent but less than 20 percent impaired, limited or restricted    Time In: 2:00   Time Out:   2:38    Progress Note: []  Yes  [x]  No  Next due by: Visit #10      Subjective: Pt rpts to clinic with moderate complaints of R shoulder pain stating \"I took a trip to [place] this weekend and definitely had to take pain meds. I slept all night in my bed last night\". Objective: Pt tolerated todays session well indicating no increase in pain post session. Pt was most challenged by walk backs this date indicating some fatigue. Exhibits decent pain free ROM with no muscle guarding present and no limitations thus far. Observation: Rpts without sling donned. Test measurements:      Exercises: Performed all RASHID per flow chart to increase R shoulder ROM and strength.    Exercise/Equipment Resistance/Repetitions Other comments   pulleys 5'    PROM  10'    Stress ball squeeze 3' HEP   pendulums 1' ea 4 way   Wrist pro/sup x30 ea 3#   Wrist maze x15    Walk backs at wall 3'    Wrist flx/ext x20 3#   flxbar U bends/twist x20 ea red                             [x] Provided verbal/tactile cueing for activities related to strengthening, flexibility, endurance, ROM. (35916)  [] Provided verbal/tactile cueing for activities related to improving balance, coordination, kinesthetic sense, posture, motor skill, proprioception. (48765)    Therapeutic Activities:     [] Therapeutic activities, direct (one-on-one) patient contact (use of dynamic activities to improve functional performance). (79532)    Gait:   [] Provided training and instruction to the patient for ambulation re-education. (70510)    Self-Care/ADL's  [] Self-care/home management training and compensatory training, meal preparation, safety procedures, and instructions in use of assistive technology devices/adaptive equipment, direct one-on-one contact. (52541)    Home Exercise Program: Pt given pendulums, ball squeeze. [x] Reviewed/Progressed HEP activities related to strengthening, flexibility, endurance, ROM. (61348)  [] Reviewed/Progressed HEP activities related to improving balance, coordination, kinesthetic sense, posture, motor skill, proprioception.  (76371)    Manual Treatments: PROM to R shoulder x 10'. [x] Provided manual therapy to mobilize soft tissue/joints for the purpose of modulating pain, promoting relaxation,  increasing ROM, reducing/eliminating soft tissue swelling/inflammation/restriction, improving soft tissue extensibility.  (06021)    Service Based Modalities:      Timed Code Treatment Minutes: 29' therex       10' manual      Total Treatment Minutes:   45'    Treatment/Activity Tolerance:  [] Patient tolerated treatment well [] Patient limited by fatique  [x] Patient limited by pain  [] Patient limited by other medical complications  [] Other:     Prognosis: [x] Good [] Fair  [] Poor    Patient Requires Follow-up: [x] Yes  [] No      Goals:  Short term goals  Time Frame for Short term goals: 3weeks   Short term goal 1: Initiate HEP    Long term goals  Time Frame for Long term goals : 6weeks Long term goal 1: Independent in HEP  Long term goal 2: Increase PROM to [facility] with no greater than 2 point increase in pain  Long term goal 3: Goals for Strength and AROM to be progressed per protocol. Long term goal 4: Educate on need for use of sling. Long term goal 5: UEFI 50/80 or greater to improve tolerance to ADLs           Plan:   [x] Continue per plan of care [] Alter current plan (see comments)  [] Plan of care initiated [] Hold pending MD visit [] Discharge    Plan for Next Session: Progress per Guille Devlin protocol.         Electronically signed by:  Sofy Clark PTA

## 2018-11-14 ENCOUNTER — HOSPITAL ENCOUNTER (OUTPATIENT)
Dept: PHYSICAL THERAPY | Age: 63
Setting detail: THERAPIES SERIES
Discharge: HOME | End: 2018-11-14
Payer: COMMERCIAL

## 2018-11-20 ENCOUNTER — HOSPITAL ENCOUNTER (OUTPATIENT)
Dept: PHYSICAL THERAPY | Age: 63
Setting detail: THERAPIES SERIES
Discharge: HOME | End: 2018-11-20
Payer: COMMERCIAL

## 2018-11-20 PROCEDURE — 97140 MANUAL THERAPY 1/> REGIONS: CPT

## 2018-11-20 PROCEDURE — 97150 GROUP THERAPEUTIC PROCEDURES: CPT

## 2018-11-23 ENCOUNTER — HOSPITAL ENCOUNTER (OUTPATIENT)
Dept: PHYSICAL THERAPY | Age: 63
Setting detail: THERAPIES SERIES
Discharge: HOME | End: 2018-11-23
Payer: COMMERCIAL

## 2018-11-23 PROCEDURE — 97140 MANUAL THERAPY 1/> REGIONS: CPT | Performed by: PHYSICAL THERAPIST

## 2018-11-23 PROCEDURE — 97110 THERAPEUTIC EXERCISES: CPT | Performed by: PHYSICAL THERAPIST

## 2018-11-26 ENCOUNTER — TELEPHONE (OUTPATIENT)
Dept: FAMILY MEDICINE CLINIC | Age: 63
End: 2018-11-26

## 2018-11-26 DIAGNOSIS — F11.93 OPIOID WITHDRAWAL (HCC): Primary | ICD-10-CM

## 2018-11-26 RX ORDER — CLONIDINE HYDROCHLORIDE 0.1 MG/1
0.1 TABLET ORAL 2 TIMES DAILY PRN
Qty: 60 TABLET | Refills: 0 | Status: SHIPPED | OUTPATIENT
Start: 2018-11-26 | End: 2019-05-13

## 2018-11-29 ENCOUNTER — HOSPITAL ENCOUNTER (OUTPATIENT)
Dept: PHYSICAL THERAPY | Age: 63
Setting detail: THERAPIES SERIES
Discharge: HOME | End: 2018-11-29
Payer: COMMERCIAL

## 2018-11-29 PROCEDURE — 97140 MANUAL THERAPY 1/> REGIONS: CPT

## 2018-11-29 PROCEDURE — 97110 THERAPEUTIC EXERCISES: CPT

## 2018-11-30 ENCOUNTER — HOSPITAL ENCOUNTER (OUTPATIENT)
Dept: PHYSICAL THERAPY | Age: 63
Setting detail: THERAPIES SERIES
Discharge: HOME | End: 2018-11-30
Payer: COMMERCIAL

## 2018-11-30 PROCEDURE — 97110 THERAPEUTIC EXERCISES: CPT

## 2018-11-30 NOTE — FLOWSHEET NOTE
Physical Therapy Daily Treatment Note    Date:  2018    Patient Name:  Catherine Reynoso    :  1955  MRN: [id number]  Restrictions/Precautions:     Medical/Treatment Diagnosis Information:   · Diagnosis: Right Shoulder RTC Repair   s/p Right Shoulder Arthroscopic debribedment  S/p right subacromial decompression  Right mini open RTC Repiar   Insurance/Certification information:  PT Insurance Information: [address] [facility]  Physician Information:  Referring Practiti tessa: Dr Darius Corrales of care signed (Y/N):  n  Visit# / total visits: 9/10  Pain level: 4/10     G-Code (if applicable):      Date G-Code Applied:  10/30/18  PT G-Codes  Functional Assessment Tool Used: UEFI   Score: 16/80=80% Disabitity   Functional Limitation: Carrying, moving and handling objects  Carrying, Moving and Handling Objects Current Status (): At least 80 percent but less than 100 percent impaired, limited or restricted  Carrying, Moving and Handling Objects Goal Status (): At least 1 percent but less than 20 percent impaired, limited or restricted    Time In: 1000 Time Out: 1040  Progress Note: []  Yes  [x]  No  Next due by: Visit #10      Subjective: Patient reporting pian of 6/10 this date and just took a pain pill. Patient noting having increased pain after last session. Surgery Date: Oct 18    Return to Dr Marisol Hinds     Objective: Pt tolerated todays session with slight increase in pain. Added wand supine exs with shoulder flex to 165. Good AAROM exhibited this date. Moderate pain with abd AROM, which decreased upon completion of exs. Currently 5 weeks post-op   Observation: Rpts with sling donned. Test measurements:    AAROM flexion 165  AROM flexion 120, abd 85  Exercises: Performed all RASHID per flow chart to increase R shoulder ROM and strength.    Exercise/Equipment Resistance/Repetitions Other comments   pulleys 5' Flex/abd    PROM      Stress ball squeeze  HEP   pendulums 1' ea 4 way   Wrist pro/sup  3#

## 2018-12-04 ENCOUNTER — HOSPITAL ENCOUNTER (OUTPATIENT)
Dept: PHYSICAL THERAPY | Age: 63
Setting detail: THERAPIES SERIES
Discharge: HOME | End: 2018-12-04
Payer: COMMERCIAL

## 2018-12-04 PROCEDURE — 97110 THERAPEUTIC EXERCISES: CPT

## 2018-12-04 PROCEDURE — G8984 CARRY CURRENT STATUS: HCPCS

## 2018-12-04 PROCEDURE — G8985 CARRY GOAL STATUS: HCPCS

## 2018-12-04 NOTE — FLOWSHEET NOTE
Physical Therapy Daily Treatment Note    Date:  2018    Patient Name:  Lisa Cordero    :  1955  MRN: [id number]  Restrictions/Precautions:     Medical/Treatment Diagnosis Information:   · Diagnosis: Right Shoulder RTC Repair   s/p Right Shoulder Arthroscopic debribedment  S/p right subacromial decompression  Right mini open RTC Repiar   Insurance/Certification information:  PT Insurance Information: [address] [facility]  Physician Information:  Referring Practiti tessa: Dr Yi Ice of care signed (Y/N):  n  Visit# / total visits: 10/10, 0/10  Pain level: 6/10     G-Code (if applicable):      Date G-Code Applied:  18  PT G-Codes  Functional Assessment Tool Used: UEFI   Score: 1680=80% Disabitity IE, Current 48/80=40%  Functional Limitation: Carrying, moving and handling objects  Carrying, Moving and Handling Objects Current Status (): At least 40 percent but less than 60 percent impaired, limited or restricted  Carrying, Moving and Handling Objects Goal Status (): At least 1 percent but less than 20 percent impaired, limited or restricted    Time In: 26 Time Out: 1321  Progress Note: []  Yes  [x]  No  Next due by: Visit #10      Subjective: Patient reporting pian of 6/10 this date. More pain since  [address] sling and starting AROM. Patinet worried about having so much pain. Discussed with patient overall improvement with AROM, decreased pain since having surgery and improvement in UEFI. Surgery Date: Oct 18    Return to Dr Emma Colin     Objective: Currently 7 weeks post-op on 18. Recheck completed this date with improvement in strength, initiation of AROM and progression of PROM/AAROM. Patient has moderate fatigue and weakness with AROM, but overall steadily improving. Observation: Rpts with sling donned.    Test measurements:    Strength felxion/abd 3+/5, IR 4+/5, ER 4 to 4+/5  AAROM flexion 168, abd 170,    AROM flexion 102, abd 94, IR to iliac crest, ER to C3   Exercises: Performed all RASHID per flow chart to increase R shoulder ROM and strength. Exercise/Equipment Resistance/Repetitions Other comments   pulleys 5' Flex/abd    PROM      Stress ball squeeze  HEP   pendulums  4 way   Wrist pro/sup  3#   Wrist maze     Walk backs at wall     Wrist flx/ext  3#   flxbar U bends/twist  grn    AROM flexion/abd  X 10     Finger ladder  3x Flex/abd    Shoulder isometrics  5\" x 10     Shoulder Rows/Ext      Supine Wand Flexion/ER/Abd  x 10     Table Glides  Flexion/abd/ER x 10          Sidelying ABd      Sidelying ER     [x] Provided verbal/tactile cueing for activities related to strengthening, flexibility, endurance, ROM. (31623)  [] Provided verbal/tactile cueing for activities related to improving balance, coordination, kinesthetic sense, posture, motor skill, proprioception. (84353)    Therapeutic Activities:     [] Therapeutic activities, direct (one-on-one) patient contact (use of dynamic activities to improve functional performance). (77615)    Gait:   [] Provided training and instruction to the patient for ambulation re-education. (50657)    Self-Care/ADL's  [] Self-care/home management training and compensatory training, meal preparation, safety procedures, and instructions in use of assistive technology devices/adaptive equipment, direct one-on-one contact. (79139)    Home Exercise Program: Pt given pendulums, ball squeeze. [x] Reviewed/Progressed HEP activities related to strengthening, flexibility, endurance, ROM. (58990)  [] Reviewed/Progressed HEP activities related to improving balance, coordination, kinesthetic sense, posture, motor skill, proprioception.  (51117)    Manual Treatments:   [] Provided manual therapy to mobilize soft tissue/joints for the purpose of modulating pain, promoting relaxation,  increasing ROM, reducing/eliminating soft tissue swelling/inflammation/restriction, improving soft tissue extensibility.  (53921)    Service Based Modalities:      Timed Code

## 2018-12-06 ENCOUNTER — HOSPITAL ENCOUNTER (OUTPATIENT)
Dept: PHYSICAL THERAPY | Age: 63
Setting detail: THERAPIES SERIES
Discharge: HOME | End: 2018-12-06
Payer: COMMERCIAL

## 2018-12-06 PROCEDURE — 97110 THERAPEUTIC EXERCISES: CPT

## 2018-12-11 ENCOUNTER — HOSPITAL ENCOUNTER (OUTPATIENT)
Dept: PHYSICAL THERAPY | Age: 63
Setting detail: THERAPIES SERIES
Discharge: HOME | End: 2018-12-11
Payer: COMMERCIAL

## 2018-12-11 PROCEDURE — 97110 THERAPEUTIC EXERCISES: CPT

## 2018-12-11 NOTE — FLOWSHEET NOTE
Physical Therapy Daily Treatment Note    Date:  2018    Patient Name:  Jimmy Chavez    :  1955  MRN: [id number]  Restrictions/Precautions:     Medical/Treatment Diagnosis Information:   · Diagnosis: Right Shoulder RTC Repair   s/p Right Shoulder Arthroscopic debribedment  S/p right subacromial decompression  Right mini open RTC Repiar   Insurance/Certification information:  PT Insurance Information: [address] [facility]  Physician Information:  Referring Practiti tessa: Dr Enedina Farrell of care signed (Y/N):  n  Visit# / total visits: 2/10 2nd POC 12 total  Pain level:  5/10     G-Code (if applicable):      Date G-Code Applied:  18  PT G-Codes  Functional Assessment Tool Used: UEFI   Score: 16/80=80% Disabitity IE, Current 48/80=40%  Functional Limitation: Carrying, moving and handling objects  Carrying, Moving and Handling Objects Current Status (): At least 40 percent but less than 60 percent impaired, limited or restricted  Carrying, Moving and Handling Objects Goal Status (): At least 1 percent but less than 20 percent impaired, limited or restricted     Time In: 335 Time Out: 415  Progress Note: []  Yes  [x]  No  Next due by: Visit #10      Subjective: Pt rpts to clinic with moderate complaints of R shoulder pain. Reporting had an increase in shoulder pain on  after working. Patient states she had to open a heavy sliding door approx 10 times with increased pain. Surgery Date: Oct 18     Return to Dr Ankit Abdul     Objective: Pt having increased difficulty this date, due to having more pain. Patient needing 2 rests with ball on wall activiity indicating fatigue and pain. PROM this date reaching functional end range easily with passive movement. Pt was able to perform all RASHID per flow chart with little vc required for proper performance. Held AROM flexion and abduction/scaption movement this date due to pain and fatigue.    Observation:  Test measurements:      Exercises: Performed all RASHID per flow chart to increase R shoulder ROM and strength. Exercise/Equipment Resistance/Repetitions Other comments   pulleys 5' Flex/abd    PROM      Stress ball squeeze  HEP   pendulums  4 way   Wrist pro/sup  3#   Wrist maze     Walk backs at wall 3'    Wrist flx/ext  3#   flxbar U bends/twist  grn    AROM flexion/abd  X  15 (held this date)    Finger ladder  5x (held this date) Flex/abd    Shoulder isometrics  5\" x 15  Adv   Shoulder Rows/Ext      Supine Wand Flexion/ER/Abd  x 15 Adv   Table Glides  Flexion/abd/ER x 15  Adv   Ball up wall x15 Red swiss ball   Sidelying ABd      Sidelying ER     [x] Provided verbal/tactile cueing for activities related to strengthening, flexibility, endurance, ROM. (46276)  [] Provided verbal/tactile cueing for activities related to improving balance, coordination, kinesthetic sense, posture, motor skill, proprioception. (71581)    Therapeutic Activities:     [] Therapeutic activities, direct (one-on-one) patient contact (use of dynamic activities to improve functional performance). (36007)    Gait:   [] Provided training and instruction to the patient for ambulation re-education. (90434)    Self-Care/ADL's  [] Self-care/home management training and compensatory training, meal preparation, safety procedures, and instructions in use of assistive technology devices/adaptive equipment, direct one-on-one contact. (43724)    Home Exercise Program: Pt given pendulums, ball squeeze.     [x] Reviewed/Progressed HEP activities related to strengthening, flexibility, endurance, ROM. (48538)  [] Reviewed/Progressed HEP activities related to improving balance, coordination, kinesthetic sense, posture, motor skill, proprioception.  (02425)    Manual Treatments:   [] Provided manual therapy to mobilize soft tissue/joints for the purpose of modulating pain, promoting relaxation,  increasing ROM, reducing/eliminating soft tissue swelling/inflammation/restriction, improving soft tissue

## 2018-12-13 ENCOUNTER — HOSPITAL ENCOUNTER (OUTPATIENT)
Dept: PHYSICAL THERAPY | Age: 63
Setting detail: THERAPIES SERIES
Discharge: HOME | End: 2018-12-13
Payer: COMMERCIAL

## 2018-12-13 NOTE — PROGRESS NOTES
Physical Therapy    Outpatient Physical Therapy    [x] [place]  Phone: [phone]  Fax: [phone]      [] [place]  Phone: [phone]  Fax: [phone]    Physical Therapy  Cancellation/No-show Note  Patient Name:  Jimmy Chavez  :  1955   Date:  2018  Cancelled visits to date: 2  No-shows to date: 0    For today's appointment patient:  [x]  Cancelled  []  Rescheduled appointment  []  No-show     Reason given by patient:  [x]  Patient ill  []  Conflicting appointment  []  No transportation    []  Conflict with work  []  No reason given  []  Other:     Comments:      Electronically signed by: Olvin Coronel PT

## 2018-12-18 ENCOUNTER — HOSPITAL ENCOUNTER (OUTPATIENT)
Dept: PHYSICAL THERAPY | Age: 63
Setting detail: THERAPIES SERIES
Discharge: HOME | End: 2018-12-18
Payer: COMMERCIAL

## 2018-12-18 PROCEDURE — 97110 THERAPEUTIC EXERCISES: CPT

## 2018-12-18 NOTE — FLOWSHEET NOTE
Exercise/Equipment Resistance/Repetitions Other comments   pulleys 5' Flex/abd    PROM      Stress ball squeeze  HEP   pendulums  4 way   Wrist pro/sup  3#   Wrist maze     Walk backs at wall     Wrist flx/ext  3#   flxbar U bends/twist  grn    AROM flexion/abd  X  10    Finger ladder  5x  Flex/abd    Shoulder isometrics  5\" x 15  Adv   Shoulder Rows/Ext      Supine Wand Flexion/ER/Abd  x 15 Adv   Table Glides  Flexion/abd/ER x 15  Adv   Ball up wall x15 Red ball   Sidelying ABd      Sidelying ER     [x] Provided verbal/tactile cueing for activities related to strengthening, flexibility, endurance, ROM. (10042)  [] Provided verbal/tactile cueing for activities related to improving balance, coordination, kinesthetic sense, posture, motor skill, proprioception. (78062)    Therapeutic Activities:     [] Therapeutic activities, direct (one-on-one) patient contact (use of dynamic activities to improve functional performance). (79286)    Gait:   [] Provided training and instruction to the patient for ambulation re-education. (58102)    Self-Care/ADL's  [] Self-care/home management training and compensatory training, meal preparation, safety procedures, and instructions in use of assistive technology devices/adaptive equipment, direct one-on-one contact. (53986)    Home Exercise Program: Pt given pendulums, ball squeeze. [x] Reviewed/Progressed HEP activities related to strengthening, flexibility, endurance, ROM. (63007)  [] Reviewed/Progressed HEP activities related to improving balance, coordination, kinesthetic sense, posture, motor skill, proprioception.  (38345)    Manual Treatments:   [] Provided manual therapy to mobilize soft tissue/joints for the purpose of modulating pain, promoting relaxation,  increasing ROM, reducing/eliminating soft tissue swelling/inflammation/restriction, improving soft tissue extensibility.  (08455)    Service Based Modalities:      Timed Code Treatment Minutes: 40' ex    Total

## 2018-12-20 ENCOUNTER — HOSPITAL ENCOUNTER (OUTPATIENT)
Dept: PHYSICAL THERAPY | Age: 63
Setting detail: THERAPIES SERIES
Discharge: HOME | End: 2018-12-20
Payer: COMMERCIAL

## 2018-12-20 PROCEDURE — G0283 ELEC STIM OTHER THAN WOUND: HCPCS

## 2018-12-20 PROCEDURE — 97110 THERAPEUTIC EXERCISES: CPT

## 2018-12-20 NOTE — FLOWSHEET NOTE
Physical Therapy Daily Treatment Note    Date:  2018    Patient Name:  Danyel Hyde    :  1955  MRN: [id number]  Restrictions/Precautions:     Medical/Treatment Diagnosis Information:   · Diagnosis: Right Shoulder RTC Repair   s/p Right Shoulder Arthroscopic debribedment  S/p right subacromial decompression  Right mini open RTC Repiar   Insurance/Certification information:  PT Insurance Information: [address] [facility]  Physician Information:  Referring Practiti tessa: Dr Jerome Alexis of care signed (Y/N):  n  Visit# / total visits: 4/10 2nd POC 14 total  Pain level:  5/10     G-Code (if applicable):      Date G-Code Applied:  18  PT G-Codes  Functional Assessment Tool Used: UEFI   Score: 16/80=80% Disabitity IE, Current 48/80=40%  Functional Limitation: Carrying, moving and handling objects  Carrying, Moving and Handling Objects Current Status (): At least 40 percent but less than 60 percent impaired, limited or restricted  Carrying, Moving and Handling Objects Goal Status (): At least 1 percent but less than 20 percent impaired, limited or restricted     Time In: 1236 Time Out: 1335  Progress Note: []  Yes  [x]  No  Next due by: Visit #10      Subjective: Pt rpts to clinic with R shoulder pain of 4/10. Reporting pain of 6/10 in shoulder blade this date. Surgery Date: Oct 18     Return to Dr Les Quinn     Objective: Pt having increased difficulty this date, due to having more pain. Patient needing 2 rests with ball on wall activiity indicating fatigue and pain. Pt was able to perform all RASHID per flow chart with little vc required for proper performance. Patient noting moderate fatigue and pain with ball on wall acitivites. Applied Estim this date with decreased pain noted in the scapula. Observation:  Test measurements:      Exercises: Performed all RASHID per flow chart to increase R shoulder ROM and strength.    Exercise/Equipment Resistance/Repetitions Other comments   pulleys 5' Flex/abd    PROM      Stress ball squeeze  HEP   pendulums  4 way   Wrist pro/sup  3#   Wrist maze     Walk backs at wall     Wrist flx/ext  3#   flxbar U bends/twist  grn    AROM flexion/abd  X  15 Adv   Finger ladder  5x  Flex/abd    Shoulder isometrics  5\" x 10    Shoulder Rows/Ext      Supine Wand Flexion/ER/Abd  x 15 Adv   Table Glides  Flexion/abd/ER x 15  Adv   Ball up wall x15 Red ball   Sidelying ABd      Sidelying ER     [x] Provided verbal/tactile cueing for activities related to strengthening, flexibility, endurance, ROM. (06072)  [] Provided verbal/tactile cueing for activities related to improving balance, coordination, kinesthetic sense, posture, motor skill, proprioception. (35463)    Therapeutic Activities:     [] Therapeutic activities, direct (one-on-one) patient contact (use of dynamic activities to improve functional performance). (83670)    Gait:   [] Provided training and instruction to the patient for ambulation re-education. (80828)    Self-Care/ADL's  [] Self-care/home management training and compensatory training, meal preparation, safety procedures, and instructions in use of assistive technology devices/adaptive equipment, direct one-on-one contact. (46346)    Home Exercise Program: Pt given pendulums, ball squeeze. [x] Reviewed/Progressed HEP activities related to strengthening, flexibility, endurance, ROM. (61721)  [] Reviewed/Progressed HEP activities related to improving balance, coordination, kinesthetic sense, posture, motor skill, proprioception.  (77497)    Manual Treatments:   [] Provided manual therapy to mobilize soft tissue/joints for the purpose of modulating pain, promoting relaxation,  increasing ROM, reducing/eliminating soft tissue swelling/inflammation/restriction, improving soft tissue extensibility.  (64113)    Service Based Modalities:  13' Estim to the Right shoulder and scapula to decrease pain     Timed Code Treatment Minutes:35'' ex    Total Treatment Minutes:

## 2018-12-26 ENCOUNTER — HOSPITAL ENCOUNTER (OUTPATIENT)
Dept: PHYSICAL THERAPY | Age: 63
Setting detail: THERAPIES SERIES
Discharge: HOME | End: 2018-12-26
Payer: COMMERCIAL

## 2018-12-26 PROCEDURE — 97110 THERAPEUTIC EXERCISES: CPT

## 2018-12-26 NOTE — FLOWSHEET NOTE
Physical Therapy Daily Treatment Note    Date:  2018    Patient Name:  Zeke Mayfield    :  1955  MRN: [id number]  Restrictions/Precautions:     Medical/Treatment Diagnosis Information:   · Diagnosis: Right Shoulder RTC Repair   s/p Right Shoulder Arthroscopic debribedment  S/p right subacromial decompression  Right mini open RTC Repiar   Insurance/Certification information:  PT Insurance Information: [address] [facility]  Physician Information:  Referring Practiti tessa: Dr Fabián Barrios of care signed (Y/N):  n  Visit# / total visits: 5/10 2nd POC 15 total  Pain level:  6/10     G-Code (if applicable):      Date G-Code Applied:  18  PT G-Codes  Functional Assessment Tool Used: UEFI   Score: 16/80=80% Disabitity IE, Current 48/80=40%  Functional Limitation: Carrying, moving and handling objects  Carrying, Moving and Handling Objects Current Status (): At least 40 percent but less than 60 percent impaired, limited or restricted  Carrying, Moving and Handling Objects Goal Status (): At least 1 percent but less than 20 percent impaired, limited or restricted     Time In: 1:21 Time Out: 2:02     Progress Note: []  Yes  [x]  No  Next due by: Visit #10      Subjective: Pt rpts to clinic with moderate complaints of R shoulder pain stating \"My shoulder hurts a lot today. Its been hurting since Saturday because I had to move heavy items across the scanner at work\". Surgery Date: Oct 18     Return to Dr Rudy Huddleston     Objective: Pt tolerated todays session relatively well indicating no increase in pain post session and noting decreased pain following IFC application. Pt was able to initiate wand exercises with good tolerance noted and very little pain noted with passive range reaching premature end ranges. Pt was most challenged by ball up wall exercises. Tolerated IFC application well.    Observation:   Test measurements:      Exercises: Performed all RASHID per flow chart to increase R shoulder ROM and

## 2018-12-28 ENCOUNTER — HOSPITAL ENCOUNTER (OUTPATIENT)
Dept: PHYSICAL THERAPY | Age: 63
Setting detail: THERAPIES SERIES
Discharge: HOME | End: 2018-12-28
Payer: COMMERCIAL

## 2018-12-28 PROCEDURE — 97110 THERAPEUTIC EXERCISES: CPT

## 2019-01-04 ENCOUNTER — HOSPITAL ENCOUNTER (OUTPATIENT)
Dept: PHYSICAL THERAPY | Age: 64
Setting detail: THERAPIES SERIES
Discharge: HOME | End: 2019-01-04
Payer: COMMERCIAL

## 2019-01-04 PROCEDURE — 97110 THERAPEUTIC EXERCISES: CPT

## 2019-01-08 ENCOUNTER — HOSPITAL ENCOUNTER (OUTPATIENT)
Dept: PHYSICAL THERAPY | Age: 64
Setting detail: THERAPIES SERIES
Discharge: HOME | End: 2019-01-08
Payer: COMMERCIAL

## 2019-01-08 PROCEDURE — 97110 THERAPEUTIC EXERCISES: CPT

## 2019-01-10 ENCOUNTER — HOSPITAL ENCOUNTER (OUTPATIENT)
Dept: PHYSICAL THERAPY | Age: 64
Setting detail: THERAPIES SERIES
Discharge: HOME | End: 2019-01-10
Payer: COMMERCIAL

## 2019-01-10 PROCEDURE — 97110 THERAPEUTIC EXERCISES: CPT

## 2019-01-15 ENCOUNTER — APPOINTMENT (OUTPATIENT)
Dept: PHYSICAL THERAPY | Age: 64
End: 2019-01-15
Payer: COMMERCIAL

## 2019-01-18 ENCOUNTER — APPOINTMENT (OUTPATIENT)
Dept: PHYSICAL THERAPY | Age: 64
End: 2019-01-18
Payer: COMMERCIAL

## 2019-01-21 ENCOUNTER — TELEPHONE (OUTPATIENT)
Dept: GENERAL RADIOLOGY | Age: 64
End: 2019-01-21

## 2019-01-24 ENCOUNTER — HOSPITAL ENCOUNTER (OUTPATIENT)
Dept: MRI IMAGING | Age: 64
Discharge: HOME | End: 2019-01-26
Payer: COMMERCIAL

## 2019-01-24 ENCOUNTER — HOSPITAL ENCOUNTER (OUTPATIENT)
Dept: GENERAL RADIOLOGY | Age: 64
Discharge: HOME | End: 2019-01-26
Payer: COMMERCIAL

## 2019-01-24 DIAGNOSIS — M75.121 COMPLETE ROTATR-CUFF TEAR/RUPTR OF R SHOULDER, NOT TRAUMA: ICD-10-CM

## 2019-01-24 DIAGNOSIS — Z47.89 ORTHOPEDIC AFTERCARE: ICD-10-CM

## 2019-01-24 DIAGNOSIS — M75.121 COMPLETE ROTATOR CUFF TEAR OR RUPTURE OF RIGHT SHOULDER, NOT SPECIFIED AS TRAUMATIC: ICD-10-CM

## 2019-01-24 PROCEDURE — A9579 GAD-BASE MR CONTRAST NOS,1ML: HCPCS | Performed by: ORTHOPAEDIC SURGERY

## 2019-01-24 PROCEDURE — 73040 CONTRAST X-RAY OF SHOULDER: CPT

## 2019-01-24 PROCEDURE — 6360000004 HC RX CONTRAST MEDICATION: Performed by: ORTHOPAEDIC SURGERY

## 2019-01-24 PROCEDURE — 73222 MRI JOINT UPR EXTREM W/DYE: CPT

## 2019-01-24 PROCEDURE — 2580000003 HC RX 258

## 2019-01-24 PROCEDURE — 2709999900 FL ARTHROGRAM INJECTION SHOULDER

## 2019-01-24 RX ADMIN — IOHEXOL 5 ML: 240 INJECTION, SOLUTION INTRATHECAL; INTRAVASCULAR; INTRAVENOUS; ORAL at 14:26

## 2019-01-24 RX ADMIN — GADOTERIDOL 0.2 ML: 279.3 INJECTION, SOLUTION INTRAVENOUS at 14:26

## 2019-01-26 ENCOUNTER — OFFICE VISIT (OUTPATIENT)
Dept: PRIMARY CARE CLINIC | Age: 64
End: 2019-01-26
Payer: COMMERCIAL

## 2019-01-26 VITALS
OXYGEN SATURATION: 98 % | HEART RATE: 74 BPM | WEIGHT: 284 LBS | DIASTOLIC BLOOD PRESSURE: 82 MMHG | SYSTOLIC BLOOD PRESSURE: 132 MMHG | TEMPERATURE: 98 F | BODY MASS INDEX: 43.18 KG/M2

## 2019-01-26 DIAGNOSIS — S86.912A KNEE STRAIN, LEFT, INITIAL ENCOUNTER: Primary | ICD-10-CM

## 2019-01-26 DIAGNOSIS — M25.562 ACUTE PAIN OF LEFT KNEE: ICD-10-CM

## 2019-01-26 PROCEDURE — 20610 DRAIN/INJ JOINT/BURSA W/O US: CPT | Performed by: FAMILY MEDICINE

## 2019-01-26 PROCEDURE — 99214 OFFICE O/P EST MOD 30 MIN: CPT | Performed by: FAMILY MEDICINE

## 2019-01-26 RX ORDER — TRAMADOL HYDROCHLORIDE 50 MG/1
50 TABLET ORAL EVERY 6 HOURS PRN
COMMUNITY
End: 2019-05-13

## 2019-01-26 RX ORDER — TRIAMCINOLONE ACETONIDE 40 MG/ML
40 INJECTION, SUSPENSION INTRA-ARTICULAR; INTRAMUSCULAR ONCE
Status: COMPLETED | OUTPATIENT
Start: 2019-01-26 | End: 2019-01-26

## 2019-01-26 RX ADMIN — TRIAMCINOLONE ACETONIDE 40 MG: 40 INJECTION, SUSPENSION INTRA-ARTICULAR; INTRAMUSCULAR at 12:34

## 2019-01-26 ASSESSMENT — ENCOUNTER SYMPTOMS
RESPIRATORY NEGATIVE: 1
GASTROINTESTINAL NEGATIVE: 1

## 2019-01-29 ENCOUNTER — TELEPHONE (OUTPATIENT)
Dept: FAMILY MEDICINE CLINIC | Age: 64
End: 2019-01-29

## 2019-01-29 DIAGNOSIS — M25.562 ACUTE PAIN OF LEFT KNEE: Primary | ICD-10-CM

## 2019-02-02 ENCOUNTER — HOSPITAL ENCOUNTER (OUTPATIENT)
Dept: LAB | Age: 64
Discharge: HOME | End: 2019-02-02
Payer: COMMERCIAL

## 2019-02-02 DIAGNOSIS — E11.29 TYPE 2 DIABETES MELLITUS WITH MICROALBUMINURIA, WITHOUT LONG-TERM CURRENT USE OF INSULIN (HCC): ICD-10-CM

## 2019-02-02 DIAGNOSIS — I10 ESSENTIAL HYPERTENSION: ICD-10-CM

## 2019-02-02 DIAGNOSIS — R80.9 TYPE 2 DIABETES MELLITUS WITH MICROALBUMINURIA, WITHOUT LONG-TERM CURRENT USE OF INSULIN (HCC): ICD-10-CM

## 2019-02-02 LAB
ALBUMIN SERPL-MCNC: 4.1 G/DL (ref 3.5–5.2)
ALBUMIN/GLOBULIN RATIO: 1.2 (ref 1–2.5)
ALP BLD-CCNC: 125 U/L (ref 35–104)
ALT SERPL-CCNC: 6 U/L (ref 5–33)
ANION GAP SERPL CALCULATED.3IONS-SCNC: 10 MMOL/L (ref 9–17)
AST SERPL-CCNC: 11 U/L
BILIRUB SERPL-MCNC: 0.34 MG/DL (ref 0.3–1.2)
BUN BLDV-MCNC: 25 MG/DL (ref 8–23)
BUN/CREAT BLD: 24 (ref 9–20)
CALCIUM SERPL-MCNC: 9.9 MG/DL (ref 8.6–10.4)
CHLORIDE BLD-SCNC: 98 MMOL/L (ref 98–107)
CHOLESTEROL/HDL RATIO: 2.6
CHOLESTEROL: 148 MG/DL
CO2: 31 MMOL/L (ref 20–31)
CREAT SERPL-MCNC: 1.03 MG/DL (ref 0.5–0.9)
ESTIMATED AVERAGE GLUCOSE: 146 MG/DL
GFR AFRICAN AMERICAN: >60 ML/MIN
GFR NON-AFRICAN AMERICAN: 54 ML/MIN
GFR SERPL CREATININE-BSD FRML MDRD: ABNORMAL ML/MIN/{1.73_M2}
GFR SERPL CREATININE-BSD FRML MDRD: ABNORMAL ML/MIN/{1.73_M2}
GLUCOSE BLD-MCNC: 120 MG/DL (ref 70–99)
HBA1C MFR BLD: 6.7 % (ref 4.8–5.9)
HDLC SERPL-MCNC: 57 MG/DL
LDL CHOLESTEROL: 76 MG/DL (ref 0–130)
POTASSIUM SERPL-SCNC: 4.1 MMOL/L (ref 3.7–5.3)
SODIUM BLD-SCNC: 139 MMOL/L (ref 135–144)
TOTAL PROTEIN: 7.4 G/DL (ref 6.4–8.3)
TRIGL SERPL-MCNC: 77 MG/DL
VLDLC SERPL CALC-MCNC: NORMAL MG/DL (ref 1–30)

## 2019-02-02 PROCEDURE — 80053 COMPREHEN METABOLIC PANEL: CPT

## 2019-02-02 PROCEDURE — 80061 LIPID PANEL: CPT

## 2019-02-02 PROCEDURE — 36415 COLL VENOUS BLD VENIPUNCTURE: CPT

## 2019-02-02 PROCEDURE — 83036 HEMOGLOBIN GLYCOSYLATED A1C: CPT

## 2019-02-05 ENCOUNTER — OFFICE VISIT (OUTPATIENT)
Dept: FAMILY MEDICINE CLINIC | Age: 64
End: 2019-02-05
Payer: COMMERCIAL

## 2019-02-05 VITALS
DIASTOLIC BLOOD PRESSURE: 78 MMHG | SYSTOLIC BLOOD PRESSURE: 112 MMHG | WEIGHT: 287.4 LBS | HEIGHT: 68 IN | BODY MASS INDEX: 43.56 KG/M2 | HEART RATE: 62 BPM | RESPIRATION RATE: 16 BRPM

## 2019-02-05 DIAGNOSIS — G62.9 PERIPHERAL POLYNEUROPATHY: ICD-10-CM

## 2019-02-05 DIAGNOSIS — E55.9 VITAMIN D DEFICIENCY: ICD-10-CM

## 2019-02-05 DIAGNOSIS — M25.562 LEFT KNEE PAIN, UNSPECIFIED CHRONICITY: ICD-10-CM

## 2019-02-05 DIAGNOSIS — E11.29 TYPE 2 DIABETES MELLITUS WITH MICROALBUMINURIA, WITHOUT LONG-TERM CURRENT USE OF INSULIN (HCC): Primary | ICD-10-CM

## 2019-02-05 DIAGNOSIS — Z91.81 H/O FALL: ICD-10-CM

## 2019-02-05 DIAGNOSIS — R80.9 TYPE 2 DIABETES MELLITUS WITH MICROALBUMINURIA, WITHOUT LONG-TERM CURRENT USE OF INSULIN (HCC): Primary | ICD-10-CM

## 2019-02-05 DIAGNOSIS — I10 ESSENTIAL HYPERTENSION: ICD-10-CM

## 2019-02-05 DIAGNOSIS — G44.201 INTRACTABLE TENSION-TYPE HEADACHE, UNSPECIFIED CHRONICITY PATTERN: ICD-10-CM

## 2019-02-05 PROCEDURE — 99214 OFFICE O/P EST MOD 30 MIN: CPT | Performed by: FAMILY MEDICINE

## 2019-02-05 RX ORDER — LISINOPRIL AND HYDROCHLOROTHIAZIDE 20; 12.5 MG/1; MG/1
1 TABLET ORAL DAILY
Qty: 90 TABLET | Refills: 1 | Status: SHIPPED | OUTPATIENT
Start: 2019-02-05 | End: 2019-08-29 | Stop reason: SDUPTHER

## 2019-02-05 RX ORDER — DULOXETIN HYDROCHLORIDE 60 MG/1
CAPSULE, DELAYED RELEASE ORAL
Qty: 90 CAPSULE | Refills: 1 | Status: SHIPPED | OUTPATIENT
Start: 2019-02-05 | End: 2019-09-25 | Stop reason: SDUPTHER

## 2019-02-05 RX ORDER — CHOLECALCIFEROL (VITAMIN D3) 1250 MCG
CAPSULE ORAL
Qty: 13 CAPSULE | Refills: 1 | Status: SHIPPED | OUTPATIENT
Start: 2019-02-05 | End: 2019-09-25 | Stop reason: SDUPTHER

## 2019-02-05 RX ORDER — PIOGLITAZONE HCL AND METFORMIN HCL 500; 15 MG/1; MG/1
1 TABLET ORAL 2 TIMES DAILY WITH MEALS
Qty: 180 TABLET | Refills: 1 | Status: SHIPPED | OUTPATIENT
Start: 2019-02-05 | End: 2019-09-02 | Stop reason: SDUPTHER

## 2019-02-05 ASSESSMENT — PATIENT HEALTH QUESTIONNAIRE - PHQ9
2. FEELING DOWN, DEPRESSED OR HOPELESS: 1
SUM OF ALL RESPONSES TO PHQ QUESTIONS 1-9: 2
SUM OF ALL RESPONSES TO PHQ QUESTIONS 1-9: 2
SUM OF ALL RESPONSES TO PHQ9 QUESTIONS 1 & 2: 2
1. LITTLE INTEREST OR PLEASURE IN DOING THINGS: 1

## 2019-02-11 ENCOUNTER — HOSPITAL ENCOUNTER (OUTPATIENT)
Dept: MRI IMAGING | Age: 64
Discharge: HOME | End: 2019-02-13
Payer: COMMERCIAL

## 2019-02-11 DIAGNOSIS — M25.562 LEFT KNEE PAIN, UNSPECIFIED CHRONICITY: ICD-10-CM

## 2019-02-12 ENCOUNTER — TELEPHONE (OUTPATIENT)
Dept: FAMILY MEDICINE CLINIC | Age: 64
End: 2019-02-12

## 2019-02-14 ENCOUNTER — TELEPHONE (OUTPATIENT)
Dept: PRIMARY CARE CLINIC | Age: 64
End: 2019-02-14

## 2019-02-14 DIAGNOSIS — M25.562 LEFT KNEE PAIN, UNSPECIFIED CHRONICITY: Primary | ICD-10-CM

## 2019-02-19 ENCOUNTER — OFFICE VISIT (OUTPATIENT)
Dept: ORTHOPEDIC SURGERY | Age: 64
End: 2019-02-19
Payer: COMMERCIAL

## 2019-02-19 VITALS
WEIGHT: 287 LBS | HEART RATE: 68 BPM | HEIGHT: 68 IN | SYSTOLIC BLOOD PRESSURE: 118 MMHG | BODY MASS INDEX: 43.5 KG/M2 | DIASTOLIC BLOOD PRESSURE: 62 MMHG

## 2019-02-19 DIAGNOSIS — M25.562 LEFT KNEE PAIN, UNSPECIFIED CHRONICITY: Primary | ICD-10-CM

## 2019-02-19 DIAGNOSIS — M17.12 ARTHRITIS OF LEFT KNEE: ICD-10-CM

## 2019-02-19 PROCEDURE — 99202 OFFICE O/P NEW SF 15 MIN: CPT | Performed by: PHYSICIAN ASSISTANT

## 2019-02-19 RX ORDER — NAPROXEN 500 MG/1
250 TABLET ORAL 2 TIMES DAILY WITH MEALS
Qty: 60 TABLET | Refills: 3 | Status: SHIPPED | OUTPATIENT
Start: 2019-02-19 | End: 2019-03-01

## 2019-02-25 ENCOUNTER — HOSPITAL ENCOUNTER (OUTPATIENT)
Dept: NON INVASIVE DIAGNOSTICS | Age: 64
Discharge: HOME | End: 2019-02-25
Payer: COMMERCIAL

## 2019-02-25 LAB
EKG ATRIAL RATE: 77 BPM
EKG P AXIS: 57 DEGREES
EKG P-R INTERVAL: 162 MS
EKG Q-T INTERVAL: 386 MS
EKG QRS DURATION: 96 MS
EKG QTC CALCULATION (BAZETT): 436 MS
EKG R AXIS: 26 DEGREES
EKG T AXIS: 37 DEGREES
EKG VENTRICULAR RATE: 77 BPM

## 2019-02-25 PROCEDURE — 93005 ELECTROCARDIOGRAM TRACING: CPT

## 2019-02-26 ENCOUNTER — HOSPITAL ENCOUNTER (OUTPATIENT)
Dept: PHYSICAL THERAPY | Age: 64
Setting detail: THERAPIES SERIES
Discharge: HOME | End: 2019-02-26
Payer: COMMERCIAL

## 2019-02-26 PROCEDURE — 97161 PT EVAL LOW COMPLEX 20 MIN: CPT

## 2019-02-26 PROCEDURE — 97140 MANUAL THERAPY 1/> REGIONS: CPT

## 2019-02-26 ASSESSMENT — PAIN DESCRIPTION - LOCATION: LOCATION: KNEE

## 2019-02-26 ASSESSMENT — PAIN DESCRIPTION - FREQUENCY: FREQUENCY: CONTINUOUS

## 2019-02-26 ASSESSMENT — PAIN DESCRIPTION - DESCRIPTORS: DESCRIPTORS: ACHING

## 2019-02-26 ASSESSMENT — PAIN DESCRIPTION - ORIENTATION: ORIENTATION: LEFT

## 2019-02-26 ASSESSMENT — PAIN DESCRIPTION - PAIN TYPE: TYPE: ACUTE PAIN;CHRONIC PAIN

## 2019-02-26 ASSESSMENT — PAIN SCALES - GENERAL: PAINLEVEL_OUTOF10: 5

## 2019-02-26 ASSESSMENT — PAIN DESCRIPTION - PROGRESSION: CLINICAL_PROGRESSION: GRADUALLY IMPROVING

## 2019-02-27 ENCOUNTER — TELEPHONE (OUTPATIENT)
Dept: FAMILY MEDICINE CLINIC | Age: 64
End: 2019-02-27

## 2019-02-28 ENCOUNTER — HOSPITAL ENCOUNTER (OUTPATIENT)
Dept: PHYSICAL THERAPY | Age: 64
Setting detail: THERAPIES SERIES
Discharge: HOME | End: 2019-02-28
Payer: COMMERCIAL

## 2019-02-28 PROCEDURE — 97110 THERAPEUTIC EXERCISES: CPT

## 2019-02-28 PROCEDURE — 97140 MANUAL THERAPY 1/> REGIONS: CPT

## 2019-03-01 ENCOUNTER — OFFICE VISIT (OUTPATIENT)
Dept: FAMILY MEDICINE CLINIC | Age: 64
End: 2019-03-01
Payer: COMMERCIAL

## 2019-03-01 VITALS
TEMPERATURE: 97.6 F | RESPIRATION RATE: 16 BRPM | WEIGHT: 284.8 LBS | SYSTOLIC BLOOD PRESSURE: 118 MMHG | HEART RATE: 80 BPM | BODY MASS INDEX: 43.16 KG/M2 | HEIGHT: 68 IN | DIASTOLIC BLOOD PRESSURE: 70 MMHG | OXYGEN SATURATION: 94 %

## 2019-03-01 DIAGNOSIS — Z01.818 PRE-OP EVALUATION: Primary | ICD-10-CM

## 2019-03-01 PROCEDURE — 99213 OFFICE O/P EST LOW 20 MIN: CPT | Performed by: FAMILY MEDICINE

## 2019-03-01 ASSESSMENT — ENCOUNTER SYMPTOMS
NAUSEA: 0
DIARRHEA: 0
SINUS PRESSURE: 0
COUGH: 0
CHEST TIGHTNESS: 0
SHORTNESS OF BREATH: 0
EYE DISCHARGE: 0
ABDOMINAL PAIN: 0
SORE THROAT: 0
SINUS PAIN: 0
CONSTIPATION: 0
VOMITING: 0

## 2019-03-01 ASSESSMENT — PATIENT HEALTH QUESTIONNAIRE - PHQ9
SUM OF ALL RESPONSES TO PHQ QUESTIONS 1-9: 0
SUM OF ALL RESPONSES TO PHQ9 QUESTIONS 1 & 2: 0
1. LITTLE INTEREST OR PLEASURE IN DOING THINGS: 0
SUM OF ALL RESPONSES TO PHQ QUESTIONS 1-9: 0
2. FEELING DOWN, DEPRESSED OR HOPELESS: 0

## 2019-03-04 ENCOUNTER — HOSPITAL ENCOUNTER (OUTPATIENT)
Dept: PHYSICAL THERAPY | Age: 64
Setting detail: THERAPIES SERIES
Discharge: HOME | End: 2019-03-04
Payer: COMMERCIAL

## 2019-03-04 PROCEDURE — 97140 MANUAL THERAPY 1/> REGIONS: CPT | Performed by: PHYSICAL THERAPIST

## 2019-03-04 PROCEDURE — 97110 THERAPEUTIC EXERCISES: CPT | Performed by: PHYSICAL THERAPIST

## 2019-03-05 ENCOUNTER — HOSPITAL ENCOUNTER (OUTPATIENT)
Dept: MAMMOGRAPHY | Age: 64
Discharge: HOME | End: 2019-03-07
Payer: COMMERCIAL

## 2019-03-05 DIAGNOSIS — Z12.31 ENCOUNTER FOR SCREENING MAMMOGRAM FOR BREAST CANCER: ICD-10-CM

## 2019-03-05 PROCEDURE — 77063 BREAST TOMOSYNTHESIS BI: CPT

## 2019-03-06 ENCOUNTER — HOSPITAL ENCOUNTER (OUTPATIENT)
Dept: PHYSICAL THERAPY | Age: 64
Setting detail: THERAPIES SERIES
Discharge: HOME | End: 2019-03-06
Payer: COMMERCIAL

## 2019-03-12 ENCOUNTER — HOSPITAL ENCOUNTER (OUTPATIENT)
Dept: PHYSICAL THERAPY | Age: 64
Setting detail: THERAPIES SERIES
Discharge: HOME | End: 2019-03-12
Payer: COMMERCIAL

## 2019-03-12 PROCEDURE — 97162 PT EVAL MOD COMPLEX 30 MIN: CPT | Performed by: PHYSICAL THERAPIST

## 2019-03-12 PROCEDURE — 97140 MANUAL THERAPY 1/> REGIONS: CPT | Performed by: PHYSICAL THERAPIST

## 2019-03-12 ASSESSMENT — PAIN DESCRIPTION - PROGRESSION: CLINICAL_PROGRESSION: GRADUALLY IMPROVING

## 2019-03-12 ASSESSMENT — PAIN DESCRIPTION - ONSET: ONSET: ON-GOING

## 2019-03-12 ASSESSMENT — PAIN DESCRIPTION - FREQUENCY: FREQUENCY: CONTINUOUS

## 2019-03-12 ASSESSMENT — PAIN DESCRIPTION - DESCRIPTORS: DESCRIPTORS: SHARP;STABBING

## 2019-03-12 ASSESSMENT — PAIN DESCRIPTION - PAIN TYPE: TYPE: SURGICAL PAIN

## 2019-03-12 ASSESSMENT — PAIN - FUNCTIONAL ASSESSMENT: PAIN_FUNCTIONAL_ASSESSMENT: PREVENTS OR INTERFERES WITH ALL ACTIVE AND SOME PASSIVE ACTIVITIES

## 2019-03-12 ASSESSMENT — PAIN SCALES - GENERAL: PAINLEVEL_OUTOF10: 5

## 2019-03-12 ASSESSMENT — PAIN DESCRIPTION - LOCATION: LOCATION: SHOULDER

## 2019-03-13 ENCOUNTER — HOSPITAL ENCOUNTER (OUTPATIENT)
Dept: PHYSICAL THERAPY | Age: 64
Setting detail: THERAPIES SERIES
Discharge: HOME | End: 2019-03-13
Payer: COMMERCIAL

## 2019-03-13 PROCEDURE — 97140 MANUAL THERAPY 1/> REGIONS: CPT | Performed by: PHYSICAL THERAPY ASSISTANT

## 2019-03-13 PROCEDURE — 97110 THERAPEUTIC EXERCISES: CPT | Performed by: PHYSICAL THERAPY ASSISTANT

## 2019-03-15 ENCOUNTER — HOSPITAL ENCOUNTER (OUTPATIENT)
Dept: PHYSICAL THERAPY | Age: 64
Setting detail: THERAPIES SERIES
Discharge: HOME | End: 2019-03-15
Payer: COMMERCIAL

## 2019-03-15 PROCEDURE — 97140 MANUAL THERAPY 1/> REGIONS: CPT

## 2019-03-15 PROCEDURE — 97110 THERAPEUTIC EXERCISES: CPT

## 2019-03-18 ENCOUNTER — HOSPITAL ENCOUNTER (OUTPATIENT)
Dept: PHYSICAL THERAPY | Age: 64
Setting detail: THERAPIES SERIES
Discharge: HOME | End: 2019-03-18
Payer: COMMERCIAL

## 2019-03-18 PROCEDURE — 97140 MANUAL THERAPY 1/> REGIONS: CPT

## 2019-03-18 PROCEDURE — 97110 THERAPEUTIC EXERCISES: CPT

## 2019-03-20 ENCOUNTER — HOSPITAL ENCOUNTER (OUTPATIENT)
Dept: PHYSICAL THERAPY | Age: 64
Setting detail: THERAPIES SERIES
Discharge: HOME | End: 2019-03-20
Payer: COMMERCIAL

## 2019-03-20 PROCEDURE — 97140 MANUAL THERAPY 1/> REGIONS: CPT

## 2019-03-20 PROCEDURE — 97110 THERAPEUTIC EXERCISES: CPT

## 2019-03-22 ENCOUNTER — HOSPITAL ENCOUNTER (OUTPATIENT)
Dept: PHYSICAL THERAPY | Age: 64
Setting detail: THERAPIES SERIES
Discharge: HOME | End: 2019-03-22
Payer: COMMERCIAL

## 2019-03-22 PROCEDURE — 97110 THERAPEUTIC EXERCISES: CPT

## 2019-03-22 PROCEDURE — 97140 MANUAL THERAPY 1/> REGIONS: CPT

## 2019-03-26 ENCOUNTER — HOSPITAL ENCOUNTER (OUTPATIENT)
Dept: PHYSICAL THERAPY | Age: 64
Setting detail: THERAPIES SERIES
Discharge: HOME | End: 2019-03-26
Payer: COMMERCIAL

## 2019-03-26 PROCEDURE — 97110 THERAPEUTIC EXERCISES: CPT

## 2019-03-26 PROCEDURE — 97140 MANUAL THERAPY 1/> REGIONS: CPT

## 2019-03-28 ENCOUNTER — APPOINTMENT (OUTPATIENT)
Dept: PHYSICAL THERAPY | Age: 64
End: 2019-03-28
Payer: COMMERCIAL

## 2019-04-01 ENCOUNTER — HOSPITAL ENCOUNTER (OUTPATIENT)
Dept: PHYSICAL THERAPY | Age: 64
Setting detail: THERAPIES SERIES
Discharge: HOME | End: 2019-04-01
Payer: COMMERCIAL

## 2019-04-01 PROCEDURE — 97140 MANUAL THERAPY 1/> REGIONS: CPT

## 2019-04-01 PROCEDURE — 97110 THERAPEUTIC EXERCISES: CPT

## 2019-04-01 NOTE — FLOWSHEET NOTE
Physical Therapy Daily Treatment Note    Date:  2019     Patient Name:  Rufino Castro    :  1955  MRN: [id number]  Restrictions/Precautions:     Medical/Treatment Diagnosis Information:   · Diagnosis: R rotator cuff repair-revision  · Treatment Diagnosis: R shoulder pain, weakness. s/p rotator cuff revision  Insurance/Certification information:  PT Insurance Information: [address]  Physician Information:  Referring Practitioner: Dr Louise Gann of care signed (Y/N):  N   Visit# / total visits:     Pain level: 4/10     Time In: 2:00   Time Out: 2:35    Progress Note: []  Yes  [x]  No  Next due by: Visit #12  Or by 19    Subjective: Pt rpts to clinic with moderate complaints of R shoulder pain stating \"I had to work this weekend. Its the first time I've been back and its harder than I thought working with one hand\". RTD: 3/20/19    Objective: Pt tolerated todays session well indicating no increase in pain or fatigue post session. Pt requires little vc to ensure proper and safe performance of exercises. Most challenged by progressed pendulums this date. Continues to tolerate PROM well having no muscle guarding present in all direction and noting only slight pain near premature end range. Observation: Rpts to clinic with sling donned. Test measurements:  ER 45, IR 65, flex 145, abd 110  Exercises:   Exercise/Equipment Resistance/Repetitions Other comments   Pendulums 5' 1#        Ball Squeeze 3'    Wrist maze 20x Initiated   gripper x15 ea digit.  red   Red flexbar x20 U's, n's, twist   Wrist DB flex/ext/UD/RD/pro/sup x20 2#                                  PROM 15'    [x] Provided verbal/tactile cueing for activities related to strengthening, flexibility, endurance, ROM. (45523)  [] Provided verbal/tactile cueing for activities related to improving balance, coordination, kinesthetic sense, posture, motor skill, proprioception. (40745)    Therapeutic Activities:     [] Therapeutic shoulder AROM to [facility] for improved ease with ADL and lifting/reaching tasks  Long term goal 4: Increase R shoulder strength to 4+/5 all motions for improved ease with lifting and reaching  Long term goal 5: DASH and/or SPADI score to <15% disabled for return to previouc level of function    Plan:   [x] Continue per plan of care [] Alter current plan (see comments)  [] Plan of care initiated [] Hold pending MD visit [] Discharge    Plan for Next Session:  Progress as tolerated.   PROM and pendulums only for 4-6 weeks    Electronically signed by:  Kingsley Zaldivar PTA

## 2019-04-03 ENCOUNTER — HOSPITAL ENCOUNTER (OUTPATIENT)
Dept: PHYSICAL THERAPY | Age: 64
Setting detail: THERAPIES SERIES
Discharge: HOME | End: 2019-04-03
Payer: COMMERCIAL

## 2019-04-03 PROCEDURE — 97140 MANUAL THERAPY 1/> REGIONS: CPT

## 2019-04-03 PROCEDURE — 97110 THERAPEUTIC EXERCISES: CPT

## 2019-04-03 NOTE — FLOWSHEET NOTE
dynamic activities to improve functional performance). (57838)    Gait:   [] Provided training and instruction to the patient for ambulation re-education. (98534)    Self-Care/ADL's  [] Self-care/home management training and compensatory training, meal preparation, safety procedures, and instructions in use of assistive technology devices/adaptive equipment, direct one-on-one contact. (00908)    Home Exercise Program: PROM and pendulums. [x] Reviewed/Progressed HEP activities related to strengthening, flexibility, endurance, ROM. (55024)  [] Reviewed/Progressed HEP activities related to improving balance, coordination, kinesthetic sense, posture, motor skill, proprioception.  (55457)    Manual Treatments:    [x] Provided manual therapy to mobilize soft tissue/joints for the purpose of modulating pain, promoting relaxation,  increasing ROM, reducing/eliminating soft tissue swelling/inflammation/restriction, improving soft tissue extensibility. (53424)    Service Based Modalities:       Timed Code Treatment Minutes:   15' Manual        19' RASHID           Total Treatment Minutes:   29'    Treatment/Activity Tolerance:  [x] Patient tolerated treatment well [] Patient limited by fatique  [] Patient limited by pain  [] Patient limited by other medical complications  [] Other:     Prognosis: [x] Good [] Fair  [] Poor    Patient Requires Follow-up: [x] Yes  [] No      Goals:  Short term goals  Time Frame for Short term goals: 6 weeks  Short term goal 1: Initiate HEP  Short term goal 2: Decrease R shoulder pain to <2/10 for improved ease with ADL  Short term goal 3: Increase R shoulder PROM to WNL for improved ease with ADL and prepare for strengthening phase    Long term goals  Time Frame for Long term goals : 12 weeks  Long term goal 1: Indep with HEP  Long term goal 2: Decrease R shoulder pain to 0/10 for improved ease with ADL   Long term goal 3:  Increase R shoulder AROM to [facility] for improved ease with ADL and lifting/reaching tasks  Long term goal 4: Increase R shoulder strength to 4+/5 all motions for improved ease with lifting and reaching  Long term goal 5: DASH and/or SPADI score to <15% disabled for return to previouc level of function    Plan:   [x] Continue per plan of care [] Alter current plan (see comments)  [] Plan of care initiated [] Hold pending MD visit [] Discharge    Plan for Next Session:  Progress as tolerated. Progress after f/u on 15APR.      Electronically signed by:  Ne Mcdonald PTA

## 2019-04-10 ENCOUNTER — HOSPITAL ENCOUNTER (OUTPATIENT)
Dept: PHYSICAL THERAPY | Age: 64
Setting detail: THERAPIES SERIES
Discharge: HOME | End: 2019-04-10
Payer: COMMERCIAL

## 2019-04-10 PROCEDURE — 97110 THERAPEUTIC EXERCISES: CPT

## 2019-04-10 PROCEDURE — 97140 MANUAL THERAPY 1/> REGIONS: CPT

## 2019-04-10 NOTE — FLOWSHEET NOTE
Physical Therapy Daily Treatment Note    Date:  4/10/2019     Patient Name:  Francesco Bell    :  1955  MRN: [id number]  Restrictions/Precautions:     Medical/Treatment Diagnosis Information:   · Diagnosis: R rotator cuff repair-revision   · Treatment Diagnosis: R shoulder pain, weakness. s/p rotator cuff revision  Insurance/Certification information:  PT Insurance Information: [address]   Physician Information:  Referring Practitioner: Dr Oanh Schultz of care signed (Y/N):  N     Visit# / total visits:  10/12   Pain level: 1/10       Time In: 12:34   Time Out: 1:07    Progress Note: []  Yes  [x]  No  Next due by: Visit #12  Or by 19    Subjective: Pt rpts to clinic with mild complaints of R shoulder pain stating \"I did a gentle stretch to it yesterday which aggravated it but today it feels great\". RTD: 3/20/19    Objective: Pt tolerated todays session well indicating no increase in pain post session. Pt was able to achieve greater PROM this date with slight muscle guarding near premature end range of motion. Pt was most challenged by abduction PROM this date. Observation: Rpts to clinic with sling donned. Test measurements:  ER 55, IR 65, flex 145, abd 115 (10APR)  Exercises:   Exercise/Equipment Resistance/Repetitions Other comments   Pendulums 5' 1#        Ball Squeeze 3'    Wrist maze 20x Initiated   gripper x15 ea digit. grn   Red flexbar x20 U's, n's, twist   Wrist DB flex/ext/UD/RD/pro/sup x20 2#                                  PROM 15'    [x] Provided verbal/tactile cueing for activities related to strengthening, flexibility, endurance, ROM. (14728)  [] Provided verbal/tactile cueing for activities related to improving balance, coordination, kinesthetic sense, posture, motor skill, proprioception. (95916)    Therapeutic Activities:     [] Therapeutic activities, direct (one-on-one) patient contact (use of dynamic activities to improve functional performance).  (88421)    Gait:   [] Provided training and instruction to the patient for ambulation re-education. (66470)    Self-Care/ADL's  [] Self-care/home management training and compensatory training, meal preparation, safety procedures, and instructions in use of assistive technology devices/adaptive equipment, direct one-on-one contact. (91738)    Home Exercise Program: PROM and pendulums. [x] Reviewed/Progressed HEP activities related to strengthening, flexibility, endurance, ROM. (99736)  [] Reviewed/Progressed HEP activities related to improving balance, coordination, kinesthetic sense, posture, motor skill, proprioception.  (41804)    Manual Treatments:    [x] Provided manual therapy to mobilize soft tissue/joints for the purpose of modulating pain, promoting relaxation,  increasing ROM, reducing/eliminating soft tissue swelling/inflammation/restriction, improving soft tissue extensibility. (66804)    Service Based Modalities:       Timed Code Treatment Minutes:   15' Manual        18' RASHID           Total Treatment Minutes:   35'    Treatment/Activity Tolerance:  [x] Patient tolerated treatment well [] Patient limited by fatique  [] Patient limited by pain  [] Patient limited by other medical complications  [] Other:     Prognosis: [x] Good [] Fair  [] Poor    Patient Requires Follow-up: [x] Yes  [] No      Goals:  Short term goals  Time Frame for Short term goals: 6 weeks  Short term goal 1: Initiate HEP  Short term goal 2: Decrease R shoulder pain to <2/10 for improved ease with ADL  Short term goal 3: Increase R shoulder PROM to WNL for improved ease with ADL and prepare for strengthening phase    Long term goals  Time Frame for Long term goals : 12 weeks  Long term goal 1: Indep with HEP  Long term goal 2: Decrease R shoulder pain to 0/10 for improved ease with ADL (Not met. 10APR)  Long term goal 3: Increase R shoulder AROM to [facility] for improved ease with ADL and lifting/reaching tasks  Long term goal 4:  Increase R shoulder

## 2019-04-12 ENCOUNTER — HOSPITAL ENCOUNTER (OUTPATIENT)
Dept: PHYSICAL THERAPY | Age: 64
Setting detail: THERAPIES SERIES
Discharge: HOME | End: 2019-04-12
Payer: COMMERCIAL

## 2019-04-16 ENCOUNTER — HOSPITAL ENCOUNTER (OUTPATIENT)
Dept: PHYSICAL THERAPY | Age: 64
Setting detail: THERAPIES SERIES
Discharge: HOME | End: 2019-04-16
Payer: COMMERCIAL

## 2019-04-16 PROCEDURE — 97110 THERAPEUTIC EXERCISES: CPT

## 2019-04-16 NOTE — FLOWSHEET NOTE
Physical Therapy Daily Treatment Note    Date:  2019     Patient Name:  Olimpia Lee    :  1955  MRN: [id number]  Restrictions/Precautions:     Medical/Treatment Diagnosis Information:   · Diagnosis: R rotator cuff repair-revision   · Treatment Diagnosis: R shoulder pain, weakness. s/p rotator cuff revision  Insurance/Certification information:  PT Insurance Information: [address]   Physician Information:  Referring Practitioner: Dr Lorena Barillas of care signed (Y/N):  N     Visit# / total visits:     Pain level: 5/10       Time In: 11:21   Time Out: 11:57    Progress Note: []  Yes  [x]  No  Next due by: Visit #12  Or by 19    Subjective: Pt rpts to clinic with moderate complaints of R shoulder pain stating \"I had a rough day at work today so its hurting me. I felt fine after the last session\". Objective: Pt tolerated todays session well indicating no increase in pain post session and noting only fatigue with exercises. Pt was able to perform finger ladder and ball up wall exercises with decent tolerance noted. Updated goals this date but didn't attempt muscle testing d/t complaints of increased pain. Observation: Rpts to clinic with sling donned.    Test measurements:  ER 55, IR 65, flex 145, abd 115 (10APR)  Exercises:   Exercise/Equipment Resistance/Repetitions Other comments   Pendulums 5' 1#        Ball Squeeze 3'    Wrist maze 20x Initiated   gripper x15 ea digit. grn   Red flexbar x20 U's, n's, twist   Wrist DB flex/ext/UD/RD/pro/sup x20 3#   Finger ladder x10 flx   Ball up wall x15 Red swiss ball                        PROM 15'    [x] Provided verbal/tactile cueing for activities related to strengthening, flexibility, endurance, ROM. (30467)  [] Provided verbal/tactile cueing for activities related to improving balance, coordination, kinesthetic sense, posture, motor skill, proprioception. (05144)    Therapeutic Activities:     [] Therapeutic activities, direct term goal 3: Increase R shoulder AROM to [facility] for improved ease with ADL and lifting/reaching tasks (flx: 90, abd: 85, Ext: 50, IR: R PSIS, ER: subocciput. 16APR)  Long term goal 4: Increase R shoulder strength to 4+/5 all motions for improved ease with lifting and reaching (Not attempted. 16APR)   Long term goal 5: DASH and/or SPADI score to <15% disabled for return to previouc level of function (Scored 51/130=39%disability. 16APR)    Plan:   [x] Continue per plan of care [] Alter current plan (see comments)  [] Plan of care initiated [] Hold pending MD visit [] Discharge    Plan for Next Session:  Continue to progress to tolerance.      Electronically signed by:  Wallace Hayes PTA

## 2019-04-19 ENCOUNTER — HOSPITAL ENCOUNTER (OUTPATIENT)
Dept: PHYSICAL THERAPY | Age: 64
Setting detail: THERAPIES SERIES
Discharge: HOME | End: 2019-04-19
Payer: COMMERCIAL

## 2019-04-24 ENCOUNTER — HOSPITAL ENCOUNTER (OUTPATIENT)
Dept: PHYSICAL THERAPY | Age: 64
Setting detail: THERAPIES SERIES
Discharge: HOME | End: 2019-04-24
Payer: COMMERCIAL

## 2019-04-24 PROCEDURE — 97110 THERAPEUTIC EXERCISES: CPT

## 2019-04-24 NOTE — FLOWSHEET NOTE
Physical Therapy Daily Treatment Note    Date:  2019     Patient Name:  Von Clinton    :  1955  MRN: [id number]  Restrictions/Precautions:     Medical/Treatment Diagnosis Information:   · Diagnosis: R rotator cuff repair-revision   · Treatment Diagnosis: R shoulder pain, weakness. s/p rotator cuff revision  Insurance/Certification information:  PT Insurance Information: [address]   Physician Information:  Referring Practitioner: Dr Leyda Auguste of care signed (Y/N):  N     Visit# / total visits:     Pain level: 5/10       Time In:237   Time Out: 325  Progress Note: []  Yes  [x]  No  Next due by: Visit #12  Or by 19    Subjective: Pt rpts to clinic DC'd sling at last 's appt. /10 at worst.  Current 6/10 shoudler blade pain and 3/10 shoulder. Patient noting an increase in pain since DC 'ing the sling. States she swept with the vacuum and mopped in the last 2 days and has had an increase in pain. Patient was educated that she should not be performing either of those tasks yet. Educated that she is still at risk for a re-tear of the shoulder. Instructed to wear sling x 2 days to see if shoulder will calm down. If no relief of pain, patient is to call the Dr. Wagner Re: Pt tolerated todays session, with no noted increase in pain. Recheck completed with PROM assessed. Education completed for do/Don'ts of the Right shoulder. Pt was able to perform finger ladder and ball up wall exercises with decent tolerance noted. Updated goals this date but didn't attempt muscle testing d/t complaints of increased pain. Observation: Rpts to clinic no sling .    Test measurements:  ER 75, IR 80, flex 154, abd/scaption 168 (10APR)  19-- 7 weeks post op     Exercises:   Exercise/Equipment Resistance/Repetitions Other comments   Pendulums 5' 1#        Ball Squeeze 3'    Wrist maze 20x Initiated   gripper x15 ea digit. grn   Red flexbar x20 U's, n's, twist   Wrist DB flex/ext/UD/RD/pro/sup x20 3#   Finger ladder x10 flx   Ball up wall x15 Red swiss ball                        PROM 15'    [x] Provided verbal/tactile cueing for activities related to strengthening, flexibility, endurance, ROM. (11704)  [] Provided verbal/tactile cueing for activities related to improving balance, coordination, kinesthetic sense, posture, motor skill, proprioception. (29123)    Therapeutic Activities:     [] Therapeutic activities, direct (one-on-one) patient contact (use of dynamic activities to improve functional performance). (31622)    Gait:   [] Provided training and instruction to the patient for ambulation re-education. (88423)    Self-Care/ADL's  [] Self-care/home management training and compensatory training, meal preparation, safety procedures, and instructions in use of assistive technology devices/adaptive equipment, direct one-on-one contact. (40408)    Home Exercise Program: PROM and pendulums. [x] Reviewed/Progressed HEP activities related to strengthening, flexibility, endurance, ROM. (65832)  [] Reviewed/Progressed HEP activities related to improving balance, coordination, kinesthetic sense, posture, motor skill, proprioception.  (47613)    Manual Treatments:    [x] Provided manual therapy to mobilize soft tissue/joints for the purpose of modulating pain, promoting relaxation,  increasing ROM, reducing/eliminating soft tissue swelling/inflammation/restriction, improving soft tissue extensibility.  (08293)    Service Based Modalities:       Timed Code Treatment Minutes:         50' RASHID           Total Treatment Minutes:   50'    Treatment/Activity Tolerance:  [x] Patient tolerated treatment well [] Patient limited by fatique  [] Patient limited by pain  [] Patient limited by other medical complications  [] Other:     Prognosis: [x] Good [] Fair  [] Poor    Patient Requires Follow-up: [x] Yes  [] No      Goals:  Short term goals  Time Frame for Short term goals: 6 weeks  Short term goal 1: Initiate HEP (MET. 16APR)  Short term goal 2: Decrease R shoulder pain to <2/10 for improved ease with ADL (  Short term goal 3: Increase R shoulder PROM to WNL for improved ease with ADL and prepare for strengthening phase (Not met. 16APR)    Long term goals  Time Frame for Long term goals : 12 weeks   Long term goal 1: Indep with HEP (Met.)  Long term goal 2: Decrease R shoulder pain to 0/10 for improved ease with ADL (Not met. 10APR)   Long term goal 3: Increase R shoulder AROM to [facility] for improved ease with ADL and lifting/reaching tasks (flx: 90, abd: 85, Ext: 50, IR: R PSIS, ER: subocciput. 16APR)  Long term goal 4: Increase R shoulder strength to 4+/5 all motions for improved ease with lifting and reaching (Not attempted. 16APR)   Long term goal 5: DASH and/or SPADI score to <15% disabled for return to previouc level of function (Scored 51/130=39%disability. 16APR)    Plan:   [x] Continue per plan of care [] Alter current plan (see comments)  [] Plan of care initiated [] Hold pending MD visit [] Discharge    Plan for Next Session:  Continue to progress to tolerance.      Electronically signed by:  Michael Pineda PT

## 2019-04-24 NOTE — PROGRESS NOTES
Physical Therapy    [facility]  Rehabilitation and Sports Medicine    [x] [place]  Phone: [phone]  Fax: [phone]      [] [place]  Phone: [phone]  Fax: [phone]    Physical Therapy Progress Note  Date: 2019        Patient Name:  Kurtis Unger    :  1955  MRN: [id number]  Restrictions/Precautions:     Medical/Treatment Diagnosis Information:   · Diagnosis: R rotator cuff repair-revision   · Treatment Diagnosis: R shoulder pain, weakness. s/p rotator cuff revision  Insurance/Certification information:  PT Insurance Information: [address]   Physician Information:  Referring Practitioner: Dr Lisa Jung of care signed (Y/N):  N     Visit# / total visits:     Pain level:      5/10             Time Period for Report: 3/12/19-19  Cancels/No-shows to date:  3    Plan of Care/Treatment to date:  [x] Therapeutic Exercise    [x] Modalities:  [x] Therapeutic Activity     [x] Ultrasound  [x] Electrical Stimulation  [] Gait Training      [] Cervical Traction    [] Lumbar Traction  [x] Neuromuscular Re-education  [x] Cold/hotpack [] Iontophoresis  [x] Instruction in HEP      Other:  [x] Manual Therapy       []    [] Aquatic Therapy       []                    ? Subjective:    Subjective: Pt rpts to clinic [address] sling at last Dr's appt. 6/10 at worst.  Current 6/10 shoudler blade pain and 3/10 shoulder. Patient noting an increase in pain since DC 'ing the sling. States she swept with the vacuum and mopped in the last 2 days and has had an increase in pain. Patient was educated that she should not be performing either of those tasks yet. Educated that she is still at risk for a re-tear of the shoulder. Instructed to wear sling x 2 days to see if shoulder will calm down. If no relief of pain, patient is to call the Dr. Greg Campoverde  · Objective: Pt tolerated todays session, with no noted increase in pain. Recheck completed with PROM assessed.   Education completed for do/Don'ts of the Right shoulder. Pt was able to perform finger ladder and ball up wall exercises with decent tolerance noted. Updated goals this date but didn't attempt muscle testing d/t complaints of increased pain. · Observation: Rpts to clinic no sling . Test measurements:  ER 75, IR 80, flex 154, abd/scaption 168 (10APR)  8/7/19-- 7 weeks post op       Assessment:     Patient demonstrates increased PROM to date, AROM very limited as is strength. Patient mopped in the last several days with increased pian noted. Patient educated that she not to mop or vacuum. Patient to wear sling x 2 days if no change in pain will have patient return to the Dr. Jad Chew:    Continue to Progress AROM and strength per protocol and per pain tolerance,. Goals:    Short term goals  Time Frame for Short term goals: 6 weeks  Short term goal 1: Initiate HEP (MET. 16APR)  Short term goal 2: Decrease R shoulder pain to <2/10 for improved ease with ADL (  Short term goal 3: Increase R shoulder PROM to WNL for improved ease with ADL and prepare for strengthening phase (Not met. 16APR)     Long term goals  Time Frame for Long term goals : 12 weeks   Long term goal 1: Indep with HEP (Met.)  Long term goal 2: Decrease R shoulder pain to 0/10 for improved ease with ADL (Not met. 10APR)   Long term goal 3: Increase R shoulder AROM to [facility] for improved ease with ADL and lifting/reaching tasks (flx: 90, abd: 85, Ext: 50, IR: R PSIS, ER: subocciput. 16APR)  Long term goal 4: Increase R shoulder strength to 4+/5 all motions for improved ease with lifting and reaching (Not attempted. 16APR)   Long term goal 5: DASH and/or SPADI score to <15% disabled for return to previouc level of function (Scored 51/130=39%disability. 16APR)    Current Frequency/Duration:4/24/19-6/5/19  # Days per week: [] 1 day # Weeks: [] 1 week [] 4 weeks      [x] 2 days?    [] 2 weeks [] 5 weeks      [x] 3 days   [] 3 weeks [x] 6 weeks     Rehab

## 2019-04-26 ENCOUNTER — HOSPITAL ENCOUNTER (OUTPATIENT)
Dept: PHYSICAL THERAPY | Age: 64
Setting detail: THERAPIES SERIES
Discharge: HOME | End: 2019-04-26
Payer: COMMERCIAL

## 2019-04-26 PROCEDURE — 97140 MANUAL THERAPY 1/> REGIONS: CPT

## 2019-04-26 PROCEDURE — 97110 THERAPEUTIC EXERCISES: CPT

## 2019-04-26 NOTE — FLOWSHEET NOTE
Physical Therapy Daily Treatment Note    Date:  2019     Patient Name:  Rufino Castro    :  1955  MRN: [id number]  Restrictions/Precautions:     Medical/Treatment Diagnosis Information:   · Diagnosis: R rotator cuff repair-revision   · Treatment Diagnosis: R shoulder pain, weakness. s/p rotator cuff revision   Insurance/Certification information:  PT Insurance Information: [address]   Physician Information:  Referring Practitioner: Dr Louise Gann of care signed (Y/N):  N      Visit# / total visits:   2nd POC 13 total  Pain level: 3/10       Time In: 11:02   Time Out: 11:43  Progress Note: []  Yes  [x]  No  Next due by: Visit #12  Or by 19    Subjective: Pt rpts to clinic with decreased complaints of pain from last session stating \"It feels better since the last session. I wore my sling since that visit and was very careful not to aggravate it\". Objective: Pt tolerated todays session well indicating little increase in pain or fatigue. Most challenged by PROM this date but was able to move pt into further passive range with little pain and minimal muscle guarding present. Instructed extensive safety precautions with pt as pt stated wanting to go into a shallow pool. Safety instructions understood by pt. Observation: Rpts to clinic no sling .    Test measurements:  ER 75, IR 80, flex 154, abd/scaption 168 (10APR)  19-- 7 weeks post op     Exercises:   Exercise/Equipment Resistance/Repetitions Other comments   Pendulums 5' 1#        Ball Squeeze 3'    Wrist maze 20x Initiated   gripper x15 ea digit. grn   grn flexbar x20 U's, n's, twist   Wrist DB flex/ext/UD/RD/pro/sup x20 3#   Finger ladder x10 flx   Ball up wall x15 Red swiss ball                        PROM 15'    [x] Provided verbal/tactile cueing for activities related to strengthening, flexibility, endurance, ROM. (41466)  [] Provided verbal/tactile cueing for activities related to improving balance, coordination, kinesthetic sense, posture, motor skill, proprioception. (66425)    Therapeutic Activities:     [] Therapeutic activities, direct (one-on-one) patient contact (use of dynamic activities to improve functional performance). (11794)    Gait:   [] Provided training and instruction to the patient for ambulation re-education. (00680)    Self-Care/ADL's  [] Self-care/home management training and compensatory training, meal preparation, safety procedures, and instructions in use of assistive technology devices/adaptive equipment, direct one-on-one contact. (98208)    Home Exercise Program: PROM and pendulums. [x] Reviewed/Progressed HEP activities related to strengthening, flexibility, endurance, ROM. (14468)  [] Reviewed/Progressed HEP activities related to improving balance, coordination, kinesthetic sense, posture, motor skill, proprioception.  (55648)    Manual Treatments:  PROM x 10'  [x] Provided manual therapy to mobilize soft tissue/joints for the purpose of modulating pain, promoting relaxation,  increasing ROM, reducing/eliminating soft tissue swelling/inflammation/restriction, improving soft tissue extensibility. (89876)    Service Based Modalities:       Timed Code Treatment Minutes:  Manual 10'       31' RASHID           Total Treatment Minutes:   39'    Treatment/Activity Tolerance:  [x] Patient tolerated treatment well [] Patient limited by fatique  [] Patient limited by pain  [] Patient limited by other medical complications  [] Other:     Prognosis: [x] Good [] Fair  [] Poor    Patient Requires Follow-up: [x] Yes  [] No      Goals:  Short term goals  Time Frame for Short term goals: 6 weeks  Short term goal 1: Initiate HEP (MET. 16APR)  Short term goal 2: Decrease R shoulder pain to <2/10 for improved ease with ADL (  Short term goal 3: Increase R shoulder PROM to WNL for improved ease with ADL and prepare for strengthening phase (Not met.  16APR)    Long term goals  Time Frame for Long term goals : 12 weeks   Long term goal 1: Indep with HEP (Met.)   Long term goal 2: Decrease R shoulder pain to 0/10 for improved ease with ADL (Not met. 10APR)   Long term goal 3: Increase R shoulder AROM to [facility] for improved ease with ADL and lifting/reaching tasks (flx: 90, abd: 85, Ext: 50, IR: R PSIS, ER: subocciput. 16APR)  Long term goal 4: Increase R shoulder strength to 4+/5 all motions for improved ease with lifting and reaching (Not attempted. 16APR)   Long term goal 5: DASH and/or SPADI score to <15% disabled for return to previouc level of function (Scored 51/130=39%disability. 16APR)    Plan:   [x] Continue per plan of care [] Alter current plan (see comments)  [] Plan of care initiated [] Hold pending MD visit [] Discharge    Plan for Next Session:  Continue to progress to tolerance.      Electronically signed by:  Jeovany Cano PTA

## 2019-04-30 ENCOUNTER — HOSPITAL ENCOUNTER (OUTPATIENT)
Dept: PHYSICAL THERAPY | Age: 64
Setting detail: THERAPIES SERIES
Discharge: HOME | End: 2019-04-30
Payer: COMMERCIAL

## 2019-04-30 PROCEDURE — 97140 MANUAL THERAPY 1/> REGIONS: CPT

## 2019-04-30 PROCEDURE — 97110 THERAPEUTIC EXERCISES: CPT

## 2019-04-30 NOTE — FLOWSHEET NOTE
Physical Therapy Daily Treatment Note    Date:  2019     Patient Name:  Francesco Bell    :  1955  MRN: [id number]  Restrictions/Precautions:     Medical/Treatment Diagnosis Information:   · Diagnosis: R rotator cuff repair-revision   · Treatment Diagnosis: R shoulder pain, weakness. s/p rotator cuff revision   Insurance/Certification information:  PT Insurance Information: [address]   Physician Information:  Referring Practitioner: Dr Oanh Schultz of care signed (Y/N):  N      Visit# / total visits:   2nd POC 14 total  Pain level: 4/10       Time In: 12:35   Time Out: 1:15   Progress Note: []  Yes  [x]  No  Next due by: Visit #12  Or by 19    Subjective: Pt rpts to clinic with mild complaints of R shoulder pain stating \"My shoulder blade is still bothering me a bit. I iced it today to help relieve some of the pain\". Objective: Pt tolerated todays session well indicating no increase in pain post session. Pt was able to perform all RASHID per flow chart to increase R shoulder strength and stability and reduce pain with vc required for proper performance. Most challenged by ball up wall and finger ladder this date noting fatigue. Tolerates PROM well. Observation: Rpts to clinic no sling. Test measurements:  ER 75, IR 80, flex 154, abd/scaption 168 (10APR)  19-- 7 weeks post op     Exercises:   Exercise/Equipment Resistance/Repetitions Other comments   Pendulums 5' 1#        Ball Squeeze 3'    Wrist maze 20x Initiated   gripper x15 ea digit. grn   grn flexbar x20 U's, n's, twist   Wrist DB flex/ext/UD/RD/pro/sup x20 3#   Finger ladder x10 flx   Ball up wall x15 Red swiss ball                        PROM 15'    [x] Provided verbal/tactile cueing for activities related to strengthening, flexibility, endurance, ROM. (75122)  [] Provided verbal/tactile cueing for activities related to improving balance, coordination, kinesthetic sense, posture, motor skill, proprioception. (65854)    Therapeutic Activities:     [] Therapeutic activities, direct (one-on-one) patient contact (use of dynamic activities to improve functional performance). (95610)    Gait:   [] Provided training and instruction to the patient for ambulation re-education. (94583)    Self-Care/ADL's  [] Self-care/home management training and compensatory training, meal preparation, safety procedures, and instructions in use of assistive technology devices/adaptive equipment, direct one-on-one contact. (39574)    Home Exercise Program: PROM and pendulums. [x] Reviewed/Progressed HEP activities related to strengthening, flexibility, endurance, ROM. (65402)  [] Reviewed/Progressed HEP activities related to improving balance, coordination, kinesthetic sense, posture, motor skill, proprioception.  (06386)    Manual Treatments:  PROM x 10'  [x] Provided manual therapy to mobilize soft tissue/joints for the purpose of modulating pain, promoting relaxation,  increasing ROM, reducing/eliminating soft tissue swelling/inflammation/restriction, improving soft tissue extensibility. (28583)    Service Based Modalities:       Timed Code Treatment Minutes:  Manual 10'       30' RASHID           Total Treatment Minutes:   36'    Treatment/Activity Tolerance:  [x] Patient tolerated treatment well [] Patient limited by fatique  [] Patient limited by pain  [] Patient limited by other medical complications  [] Other:     Prognosis: [x] Good [] Fair  [] Poor    Patient Requires Follow-up: [x] Yes  [] No      Goals:  Short term goals  Time Frame for Short term goals: 6 weeks  Short term goal 1: Initiate HEP (MET. 16APR)  Short term goal 2: Decrease R shoulder pain to <2/10 for improved ease with ADL (  Short term goal 3: Increase R shoulder PROM to WNL for improved ease with ADL and prepare for strengthening phase (Not met.  16APR)    Long term goals  Time Frame for Long term goals : 12 weeks   Long term goal 1: Indep with HEP (Met.)   Long term goal 2: Decrease R shoulder pain to 0/10 for improved ease with ADL (Not met. 10APR)   Long term goal 3: Increase R shoulder AROM to [facility] for improved ease with ADL and lifting/reaching tasks (flx: 90, abd: 85, Ext: 50, IR: R PSIS, ER: subocciput. 16APR)  Long term goal 4: Increase R shoulder strength to 4+/5 all motions for improved ease with lifting and reaching (Not attempted. 16APR)   Long term goal 5: DASH and/or SPADI score to <15% disabled for return to previouc level of function (Scored 51/130=39%disability. 16APR)    Plan:   [x] Continue per plan of care [] Alter current plan (see comments)  [] Plan of care initiated [] Hold pending MD visit [] Discharge    Plan for Next Session:  Continue to progress to tolerance.      Electronically signed by:  Bernie Brandon PTA

## 2019-05-02 ENCOUNTER — HOSPITAL ENCOUNTER (OUTPATIENT)
Dept: PHYSICAL THERAPY | Age: 64
Setting detail: THERAPIES SERIES
Discharge: HOME | End: 2019-05-02
Payer: COMMERCIAL

## 2019-05-02 PROCEDURE — 97140 MANUAL THERAPY 1/> REGIONS: CPT

## 2019-05-02 PROCEDURE — 97110 THERAPEUTIC EXERCISES: CPT

## 2019-05-02 NOTE — FLOWSHEET NOTE
Physical Therapy Daily Treatment Note    Date:  2019     Patient Name:  Kurtis Unger    :  1955  MRN: [id number]  Restrictions/Precautions:     Medical/Treatment Diagnosis Information:   · Diagnosis: R rotator cuff repair-revision   · Treatment Diagnosis: R shoulder pain, weakness. s/p rotator cuff revision    Insurance/Certification information:  PT Insurance Information: [address]   Physician Information:  Referring Practitioner: Dr Lisa Jung of care signed (Y/N):  N      Visit# / total visits:  3/12 2nd POC 15 total  Pain level: 3/10       Time In: 12:35   Time Out:  1:17    Progress Note: []  Yes  [x]  No  Next due by: Visit #12  Or by 19    Subjective: Pt rpts to clinic with mild complaints of R shoulder pain stating \"I still am icing it at home. It just gets sore because I'm using it more\". Objective: Pt continues to tolerate PT sessions well progressing and initiating multiple exercises to increase R shoulder strength and stability. Most challenged by ball up wall this date noting some pain that didn't persist past conclusion of exercise. Tolerates PROM well. Observation: Rpts to clinic no sling.    Test measurements:  ER 75, IR 80, flex 154, abd/scaption 168 (10APR)  19-- 7 weeks post op     Exercises:   Exercise/Equipment Resistance/Repetitions Other comments   Pendulums 5' 1#   pulleys 5'    Ball Squeeze 3'    Wrist maze 20x Initiated   gripper x15 ea digit. grn   grn flexbar x20 U's, n's, twist   Wrist DB flex/ext/UD/RD/pro/sup x20 3#   Finger ladder x10 flx   Ball up wall x15 Red swiss ball    bicep curls x20 4#                  PROM 15'    [x] Provided verbal/tactile cueing for activities related to strengthening, flexibility, endurance, ROM. (70175)  [] Provided verbal/tactile cueing for activities related to improving balance, coordination, kinesthetic sense, posture, motor skill, proprioception. (89903)    Therapeutic Activities:     [] Therapeutic activities, direct (one-on-one) patient contact (use of dynamic activities to improve functional performance). (67559)    Gait:   [] Provided training and instruction to the patient for ambulation re-education. (63097)    Self-Care/ADL's  [] Self-care/home management training and compensatory training, meal preparation, safety procedures, and instructions in use of assistive technology devices/adaptive equipment, direct one-on-one contact. (51307)    Home Exercise Program: PROM and pendulums. [x] Reviewed/Progressed HEP activities related to strengthening, flexibility, endurance, ROM. (93247)  [] Reviewed/Progressed HEP activities related to improving balance, coordination, kinesthetic sense, posture, motor skill, proprioception.  (00561)    Manual Treatments:  PROM x 10'  [x] Provided manual therapy to mobilize soft tissue/joints for the purpose of modulating pain, promoting relaxation,  increasing ROM, reducing/eliminating soft tissue swelling/inflammation/restriction, improving soft tissue extensibility. (04543)    Service Based Modalities:       Timed Code Treatment Minutes:  Manual 10'       32' RASHID           Total Treatment Minutes:   43'    Treatment/Activity Tolerance:  [x] Patient tolerated treatment well [] Patient limited by fatique  [] Patient limited by pain  [] Patient limited by other medical complications  [] Other:     Prognosis: [x] Good [] Fair  [] Poor    Patient Requires Follow-up: [x] Yes  [] No      Goals:  Short term goals  Time Frame for Short term goals: 6 weeks  Short term goal 1: Initiate HEP (MET. 16APR)  Short term goal 2: Decrease R shoulder pain to <2/10 for improved ease with ADL (  Short term goal 3: Increase R shoulder PROM to WNL for improved ease with ADL and prepare for strengthening phase (Not met.  16APR)    Long term goals  Time Frame for Long term goals : 12 weeks   Long term goal 1: Indep with HEP (Met.)   Long term goal 2: Decrease R shoulder pain to 0/10 for improved ease with ADL (Not met. 10APR)   Long term goal 3: Increase R shoulder AROM to [facility] for improved ease with ADL and lifting/reaching tasks (flx: 90, abd: 85, Ext: 50, IR: R PSIS, ER: subocciput. 16APR)  Long term goal 4: Increase R shoulder strength to 4+/5 all motions for improved ease with lifting and reaching (Not attempted. 16APR)   Long term goal 5: DASH and/or SPADI score to <15% disabled for return to previouc level of function (Scored 51/130=39%disability. 16APR)    Plan:   [x] Continue per plan of care [] Alter current plan (see comments)  [] Plan of care initiated [] Hold pending MD visit [] Discharge    Plan for Next Session:  Continue to progress to tolerance.      Electronically signed by:  Harshil Bourgeois PTA

## 2019-05-07 ENCOUNTER — HOSPITAL ENCOUNTER (OUTPATIENT)
Dept: PHYSICAL THERAPY | Age: 64
Setting detail: THERAPIES SERIES
Discharge: HOME | End: 2019-05-07
Payer: COMMERCIAL

## 2019-05-07 PROCEDURE — 97110 THERAPEUTIC EXERCISES: CPT

## 2019-05-07 NOTE — FLOWSHEET NOTE
Physical Therapy Daily Treatment Note    Date:  2019     Patient Name:  Veronica Gibson    :  1955  MRN: [id number]  Restrictions/Precautions:     Medical/Treatment Diagnosis Information:   · Diagnosis: R rotator cuff repair-revision   · Treatment Diagnosis: R shoulder pain, weakness. s/p rotator cuff revision    Insurance/Certification information:  PT Insurance Information: [address]   Physician Information:  Referring Practitioner: Dr Garcia Child of care signed (Y/N):  N      Visit# / total visits:   2nd POC 16 total  Pain level: 3/10       Time In: 11:30  Time Out:  8667  Progress Note: []  Yes  [x]  No  Next due by: Visit #12  Or by 19    Subjective: Pt rpts to clinic 2-3/10 this date. Patient noting decreased pain over the weekend. Patient reports she needs to start cutting out the tramadol and asked for suggestions. Patient instructed to contact , if she feels she is unable to tolerate entirely cutting out the tramadol. Objective: Pt continues to tolerate PT sessions well progressing and initiating multiple exercises to increase R shoulder strength and stability. Added AROM this date, with pain noted approx 85-90 degrees. Tolerates PROM well. Observation: Rpts to clinic no sling.    Test measurements:  ER 75, IR 80, flex 160, abd/scaption 175 ()  19-- 9 weeks post op     Exercises:   Exercise/Equipment Resistance/Repetitions Other comments   Pendulums 5' 1#   pulleys 5'    Ball Squeeze 3'    Wrist maze 20x Initiated   gripper x15 ea digit. grn   grn flexbar x20 U's, n's, twist   Wrist DB flex/ext/UD/RD/pro/sup x20 3#   Finger ladder x10 flx   Ball up wall x15 Red swiss ball    bicep curls x20 4#   AROM flexion/scaption  X 10               PROM 15'    [x] Provided verbal/tactile cueing for activities related to strengthening, flexibility, endurance, ROM. (89665)  [] Provided verbal/tactile cueing for activities related to improving balance, coordination, term goal 1: Indep with HEP (Met.)   Long term goal 2: Decrease R shoulder pain to 0/10 for improved ease with ADL (Not met. 10APR)   Long term goal 3: Increase R shoulder AROM to [facility] for improved ease with ADL and lifting/reaching tasks (flx: 90, abd: 85, Ext: 50, IR: R PSIS, ER: subocciput. 16APR)  Long term goal 4: Increase R shoulder strength to 4+/5 all motions for improved ease with lifting and reaching (Not attempted. 16APR)   Long term goal 5: DASH and/or SPADI score to <15% disabled for return to previouc level of function (Scored 51/130=39%disability. 16APR)    Plan:   [x] Continue per plan of care [] Alter current plan (see comments)  [] Plan of care initiated [] Hold pending MD visit [] Discharge    Plan for Next Session:  Continue to progress to tolerance.      Electronically signed by:  Saturnino Cannon, PT

## 2019-05-09 ENCOUNTER — HOSPITAL ENCOUNTER (OUTPATIENT)
Dept: PHYSICAL THERAPY | Age: 64
Setting detail: THERAPIES SERIES
Discharge: HOME | End: 2019-05-09
Payer: COMMERCIAL

## 2019-05-09 PROCEDURE — 97110 THERAPEUTIC EXERCISES: CPT

## 2019-05-13 ENCOUNTER — OFFICE VISIT (OUTPATIENT)
Dept: PRIMARY CARE CLINIC | Age: 64
End: 2019-05-13
Payer: COMMERCIAL

## 2019-05-13 VITALS
BODY MASS INDEX: 43.04 KG/M2 | TEMPERATURE: 97.6 F | SYSTOLIC BLOOD PRESSURE: 120 MMHG | HEART RATE: 63 BPM | HEIGHT: 68 IN | DIASTOLIC BLOOD PRESSURE: 74 MMHG | OXYGEN SATURATION: 99 % | WEIGHT: 284 LBS

## 2019-05-13 DIAGNOSIS — B96.89 ACUTE BACTERIAL SINUSITIS: Primary | ICD-10-CM

## 2019-05-13 DIAGNOSIS — R11.0 NAUSEA: ICD-10-CM

## 2019-05-13 DIAGNOSIS — J01.90 ACUTE BACTERIAL SINUSITIS: Primary | ICD-10-CM

## 2019-05-13 PROCEDURE — 99213 OFFICE O/P EST LOW 20 MIN: CPT | Performed by: NURSE PRACTITIONER

## 2019-05-13 RX ORDER — ONDANSETRON 4 MG/1
4 TABLET, ORALLY DISINTEGRATING ORAL EVERY 8 HOURS PRN
Qty: 9 TABLET | Refills: 0 | Status: SHIPPED | OUTPATIENT
Start: 2019-05-13 | End: 2019-05-16

## 2019-05-13 RX ORDER — AMOXICILLIN 500 MG/1
500 CAPSULE ORAL 3 TIMES DAILY
Qty: 30 CAPSULE | Refills: 0 | Status: SHIPPED | OUTPATIENT
Start: 2019-05-13 | End: 2019-05-21 | Stop reason: ALTCHOICE

## 2019-05-13 ASSESSMENT — ENCOUNTER SYMPTOMS
COUGH: 0
SINUS PRESSURE: 1
NAUSEA: 1
SINUS PAIN: 1
RHINORRHEA: 1
SINUS COMPLAINT: 1
RESPIRATORY NEGATIVE: 1

## 2019-05-13 ASSESSMENT — PATIENT HEALTH QUESTIONNAIRE - PHQ9
SUM OF ALL RESPONSES TO PHQ QUESTIONS 1-9: 0
2. FEELING DOWN, DEPRESSED OR HOPELESS: 0
SUM OF ALL RESPONSES TO PHQ9 QUESTIONS 1 & 2: 0
SUM OF ALL RESPONSES TO PHQ QUESTIONS 1-9: 0
1. LITTLE INTEREST OR PLEASURE IN DOING THINGS: 0

## 2019-05-14 ENCOUNTER — HOSPITAL ENCOUNTER (OUTPATIENT)
Dept: PHYSICAL THERAPY | Age: 64
Setting detail: THERAPIES SERIES
Discharge: HOME | End: 2019-05-14
Payer: COMMERCIAL

## 2019-05-14 NOTE — PROGRESS NOTES
Physical Therapy       Outpatient Physical Therapy    [x] [place]  Phone: [phone]  Fax: [phone]      [] [place]  Phone: [phone]  Fax: [phone]    Physical Therapy  Cancellation/No-show Note  Patient Name:  Ferdinand Silver  :  1955   Date:  2019  Cancelled visits to date: 4  No-shows to date: 0    For today's appointment patient:  [x]  Cancelled  []  Rescheduled appointment  []  No-show     Reason given by patient:  [x]  Patient ill  []  Conflicting appointment  []  No transportation    []  Conflict with work  []  No reason given  []  Other:     Comments:      Electronically signed by: Trevor Becker PTA

## 2019-05-17 ENCOUNTER — OFFICE VISIT (OUTPATIENT)
Dept: PRIMARY CARE CLINIC | Age: 64
End: 2019-05-17
Payer: COMMERCIAL

## 2019-05-17 ENCOUNTER — HOSPITAL ENCOUNTER (OUTPATIENT)
Dept: PHYSICAL THERAPY | Age: 64
Setting detail: THERAPIES SERIES
Discharge: HOME | End: 2019-05-17
Payer: COMMERCIAL

## 2019-05-17 VITALS
HEIGHT: 68 IN | OXYGEN SATURATION: 98 % | BODY MASS INDEX: 43.62 KG/M2 | HEART RATE: 74 BPM | WEIGHT: 287.8 LBS | TEMPERATURE: 98.7 F | SYSTOLIC BLOOD PRESSURE: 110 MMHG | DIASTOLIC BLOOD PRESSURE: 62 MMHG

## 2019-05-17 DIAGNOSIS — B96.89 ACUTE BACTERIAL SINUSITIS: Primary | ICD-10-CM

## 2019-05-17 DIAGNOSIS — J01.90 ACUTE BACTERIAL SINUSITIS: Primary | ICD-10-CM

## 2019-05-17 PROCEDURE — 97110 THERAPEUTIC EXERCISES: CPT

## 2019-05-17 PROCEDURE — 99214 OFFICE O/P EST MOD 30 MIN: CPT | Performed by: FAMILY MEDICINE

## 2019-05-17 RX ORDER — PREDNISONE 20 MG/1
20 TABLET ORAL 2 TIMES DAILY
Qty: 10 TABLET | Refills: 0 | Status: SHIPPED | OUTPATIENT
Start: 2019-05-17 | End: 2019-05-22

## 2019-05-17 RX ORDER — FLUTICASONE PROPIONATE 50 MCG
1 SPRAY, SUSPENSION (ML) NASAL 2 TIMES DAILY
Qty: 1 BOTTLE | Refills: 1 | COMMUNITY
Start: 2019-05-17 | End: 2019-07-02

## 2019-05-17 RX ORDER — LORATADINE 10 MG/1
10 TABLET ORAL DAILY
Qty: 30 TABLET | Refills: 2 | COMMUNITY
Start: 2019-05-17 | End: 2019-07-02

## 2019-05-17 RX ORDER — CLONIDINE HYDROCHLORIDE 0.1 MG/1
0.1 TABLET ORAL 2 TIMES DAILY PRN
COMMUNITY
End: 2020-04-24 | Stop reason: SDUPTHER

## 2019-05-17 ASSESSMENT — ENCOUNTER SYMPTOMS
SHORTNESS OF BREATH: 0
TROUBLE SWALLOWING: 0
SINUS PRESSURE: 1
NAUSEA: 0
WHEEZING: 0
DIARRHEA: 0
CHEST TIGHTNESS: 0
CHOKING: 0
CONSTIPATION: 0
COUGH: 0
SINUS COMPLAINT: 1
SORE THROAT: 0

## 2019-05-17 NOTE — FLOWSHEET NOTE
Physical Therapy Daily Treatment Note    Date:  2019     Patient Name:  Rufino Castro    :  1955  MRN: [id number]  Restrictions/Precautions:     Medical/Treatment Diagnosis Information:   · Diagnosis: R rotator cuff repair-revision   · Treatment Diagnosis: R shoulder pain, weakness. s/p rotator cuff revision    Insurance/Certification information:  PT Insurance Information: [address]   Physician Information:  Referring Practitioner: Dr Louise Gann of care signed (Y/N):  N      Visit# / total visits:   2nd POC 17 total  Pain level: 3/10       Time In: 0039  Time Out:  2553  Progress Note: []  Yes  [x]  No  Next due by: Visit #12  Or by 19    Subjective: Pt rpts to clinic 6/10 this date. Reports having an instance and fell. Patient noting has had an increase in pain since. States has not done much in the past few days and has been icing regularly. Objective: Pt able to tolerate PT sessions well progressing and initiating multiple exercises to increase R shoulder strength and stability. Added/Progressed AROM this date, patient demonstrates increased pain with active ROM, but feel did not lose any ROM since fall. Patient has moderate weakness and fatigue with flexion and abduction. Observation: Rpts to clinic no sling.    Test measurements:  ER 75, IR 80, flex 160, abd/scaption 175 ()  AROM flex 115, abd 76 with pain   5/15/19-- 10 weeks post op      Exercises:   Exercise/Equipment Resistance/Repetitions Other comments   Pendulums  1#   pulleys 5'    Ball Squeeze     Wrist maze  Initiated   gripper  grn   grn flexbar x20 U's, n's, twist   Wrist DB flex/ext/UD/RD/pro/sup x20 3#   Finger ladder x10 Flx/ABd    Ball up wall  Red swiss ball    bicep curls x20 4# 3 way    AROM flexion/scaption  X 10 flex,10 abd     Wand 8x     Flex/abd/ER    Shoulder theraband  Red x 10 flexion/ext     PROM     [x] Provided verbal/tactile cueing for activities related to strengthening, flexibility, endurance, ROM. (03789)  [] Provided verbal/tactile cueing for activities related to improving balance, coordination, kinesthetic sense, posture, motor skill, proprioception. (82954)    Therapeutic Activities:     [] Therapeutic activities, direct (one-on-one) patient contact (use of dynamic activities to improve functional performance). (46530)    Gait:   [] Provided training and instruction to the patient for ambulation re-education. (72654)    Self-Care/ADL's  [] Self-care/home management training and compensatory training, meal preparation, safety procedures, and instructions in use of assistive technology devices/adaptive equipment, direct one-on-one contact. (79508)    Home Exercise Program: PROM and pendulums. [x] Reviewed/Progressed HEP activities related to strengthening, flexibility, endurance, ROM. (87487)  [] Reviewed/Progressed HEP activities related to improving balance, coordination, kinesthetic sense, posture, motor skill, proprioception.  (21221)    Manual Treatments:  [] Provided manual therapy to mobilize soft tissue/joints for the purpose of modulating pain, promoting relaxation,  increasing ROM, reducing/eliminating soft tissue swelling/inflammation/restriction, improving soft tissue extensibility. (96224)    Service Based Modalities:       Timed Code Treatment Minutes:         32' RASHID           Total Treatment Minutes:   28' ex    Treatment/Activity Tolerance:  [x] Patient tolerated treatment well [] Patient limited by fatique  [] Patient limited by pain  [] Patient limited by other medical complications  [] Other:     Prognosis: [x] Good [] Fair  [] Poor    Patient Requires Follow-up: [x] Yes  [] No      Goals:  Short term goals  Time Frame for Short term goals: 6 weeks  Short term goal 1: Initiate HEP (MET. 16APR)  Short term goal 2: Decrease R shoulder pain to <2/10 for improved ease with ADL (  Short term goal 3:  Increase R shoulder PROM to WNL for improved ease with ADL and prepare for strengthening phase (Not met. 16APR)    Long term goals  Time Frame for Long term goals : 12 weeks   Long term goal 1: Indep with HEP (Met.)   Long term goal 2: Decrease R shoulder pain to 0/10 for improved ease with ADL (Not met. 10APR)   Long term goal 3: Increase R shoulder AROM to [facility] for improved ease with ADL and lifting/reaching tasks (flx: 90, abd: 85, Ext: 50, IR: R PSIS, ER: subocciput. 16APR)  Long term goal 4: Increase R shoulder strength to 4+/5 all motions for improved ease with lifting and reaching (Not attempted. 16APR)   Long term goal 5: DASH and/or SPADI score to <15% disabled for return to previouc level of function (Scored 51/130=39%disability. 16APR)    Plan:   [x] Continue per plan of care [] Alter current plan (see comments)  [] Plan of care initiated [] Hold pending MD visit [] Discharge    Plan for Next Session:  Continue to progress to tolerance.      Electronically signed by:  Jojo Velasco, PT

## 2019-05-17 NOTE — PROGRESS NOTES
[address]  [address] [facility]  Dept: [phone]  Dept Fax: [phone]  Loc: [phone]    Ursula Lopes is a 61 y.o. female who presents today for her medical conditions/complaints as noted below. Ursula Lopes is c/o of   Chief Complaint   Patient presents with    Sinus Problem     came in 8 days ago and was given antbiotics but states she still feels pain in her sinuses, and headache is the only sxs that are left        HPI:     Here today for sinus issues. Sinus Problem   This is a new problem. The current episode started 1 to 4 weeks ago (almost 2 weeks). The problem has been gradually improving since onset. There has been no fever. Her pain is at a severity of 6/10. The pain is moderate. Associated symptoms include headaches and sinus pressure. Pertinent negatives include no chills, congestion, coughing, ear pain, shortness of breath, sneezing or sore throat. Past treatments include antibiotics, acetaminophen and oral decongestants (mucinex). The treatment provided mild relief.          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    Obesity     Peripheral neuropathy     Pulmonary hypertension ([address] 2012    by echocardiogram    Shoulder pain, bilateral 8/21/2013    Type 2 diabetes mellitus ([address]     Unspecified essential hypertension     Essential hypertension    Vitamin D deficiency 11/5/2014          Social History     Tobacco Use    Smoking status: Never Smoker    Smokeless tobacco: Never Used   Substance Use Topics    Alcohol use: No     Alcohol/week: 0.0 oz     Current Outpatient Medications   Medication Sig Dispense Refill    cloNIDine (CATAPRES) 0.1 MG tablet Take 0.1 mg by mouth      predniSONE (DELTASONE) 20 MG tablet Take 1 tablet by mouth 2 times daily for 5 days 10 tablet 0    loratadine (CLARITIN) 10 MG tablet Take 1 tablet by mouth daily 30 tablet 2    fluticasone (FLONASE) 50 MCG/ACT nasal spray 1 spray by Each Nare route 2 times daily 1 Bottle 1    Gabapentin (NEURONTIN PO) Take 600 mg by mouth      amoxicillin (AMOXIL) 500 MG capsule Take 1 capsule by mouth 3 times daily 30 capsule 0    Cholecalciferol (VITAMIN D3) 19378 units CAPS TAKE 1 CAPSULE ONCE A WEEK 13 capsule 1    DULoxetine (CYMBALTA) 60 MG extended release capsule TAKE 1 CAPSULE DAILY 90 capsule 1    pioglitazone-metformin (ACTOPLUS MET)  MG per tablet Take 1 tablet by mouth 2 times daily (with meals) 180 tablet 1    lisinopril-hydrochlorothiazide (PRINZIDE;ZESTORETIC) 20-12.5 MG per tablet Take 1 tablet by mouth daily 90 tablet 1    Handicap Placard MISC by Does not apply route Issue parking placard for person with disability; Applicant meets the qualifying disability criteria. Length of time expected to have disability_____Lifetime  Other __x__. Prescription expires in 5 years from issuing date. 02/05/2024 1 each 0    Misc. Devices (ROLLATOR) MISC Use to reduce risk of falls when walking. 1 each 0    gabapentin (NEURONTIN) 600 MG tablet Take 1 tablet by mouth 5 times daily for 30 days. . 150 tablet 0     No current facility-administered medications for this visit. Allergies   Allergen Reactions    Asa [Aspirin] Other (See Comments)     Stomach irritation       Subjective:     Review of Systems   Constitutional: Negative for activity change, appetite change, chills, fatigue and fever. HENT: Positive for postnasal drip and sinus pressure. Negative for congestion, ear pain, sneezing, sore throat and trouble swallowing. Eyes: Negative for visual disturbance. Respiratory: Negative for cough, choking, chest tightness, shortness of breath and wheezing.     Cardiovascular: Negative for chest pain, palpitations and leg swelling. Gastrointestinal: Negative for constipation, diarrhea and nausea. Skin: Negative for rash. Allergic/Immunologic: Negative for environmental allergies. Neurological: Positive for headaches. Objective:      Physical Exam   Constitutional: She is oriented to person, place, and time. She appears well-developed and well-nourished. No distress. HENT:   Right Ear: Tympanic membrane, external ear and ear canal normal.   Left Ear: Tympanic membrane, external ear and ear canal normal.   Nose: Mucosal edema present. Right sinus exhibits maxillary sinus tenderness and frontal sinus tenderness. Left sinus exhibits maxillary sinus tenderness and frontal sinus tenderness. Mouth/Throat: Oropharynx is clear and moist. No oropharyngeal exudate. Eyes: Conjunctivae are normal.   Neck: Normal range of motion. Neck supple. Cardiovascular: Normal rate, regular rhythm, normal heart sounds and intact distal pulses. No murmur heard. Pulmonary/Chest: Effort normal and breath sounds normal. No respiratory distress. She has no wheezes. She has no rales. Lymphadenopathy:     She has no cervical adenopathy. Neurological: She is alert and oriented to person, place, and time. Skin: Skin is warm and dry. No rash noted. Psychiatric: She has a normal mood and affect. Her behavior is normal. Judgment normal.   Nursing note and vitals reviewed. /62 (Site: Left Upper Arm, Position: Sitting, Cuff Size: Large Adult)   Pulse 74   Temp 98.7 °F (37.1 °C) (Tympanic)   Ht 5' 8\" (1.727 m)   Wt 287 lb 12.8 oz (130.5 kg)   LMP 08/24/2010 (Approximate)   SpO2 98%   BMI 43.76 kg/m²     Assessment:       Diagnosis Orders   1. Acute bacterial sinusitis               Plan:        Sinusitis: stable; I recommended she finish her antibiotic. I also recommended Flonase and Claritin to help with he allergy component and prednisone for her swelling.     Return if symptoms worsen or fail to improve. Orders Placed This Encounter   Medications    predniSONE (DELTASONE) 20 MG tablet     Sig: Take 1 tablet by mouth 2 times daily for 5 days     Dispense:  10 tablet     Refill:  0    loratadine (CLARITIN) 10 MG tablet     Sig: Take 1 tablet by mouth daily     Dispense:  30 tablet     Refill:  2    fluticasone (FLONASE) 50 MCG/ACT nasal spray     Si spray by Each Nare route 2 times daily     Dispense:  1 Bottle     Refill:  1       Patientgiven educational materials - see patient instructions. Discussed use, benefit,and side effects of prescribed medications. All patient questions answered. Ptvoiced understanding. Reviewed health maintenance. Instructed to continue currentmedications, diet and exercise. Patient agreed with treatment plan. Follow up asdirected.      Electronically signed by Christopher Lutz MD on 2019 at 1:51 PM

## 2019-05-21 ENCOUNTER — HOSPITAL ENCOUNTER (OUTPATIENT)
Dept: GENERAL RADIOLOGY | Age: 64
Discharge: HOME | End: 2019-05-23
Payer: COMMERCIAL

## 2019-05-21 ENCOUNTER — OFFICE VISIT (OUTPATIENT)
Dept: PRIMARY CARE CLINIC | Age: 64
End: 2019-05-21
Payer: COMMERCIAL

## 2019-05-21 ENCOUNTER — HOSPITAL ENCOUNTER (OUTPATIENT)
Dept: PHYSICAL THERAPY | Age: 64
Setting detail: THERAPIES SERIES
Discharge: HOME | End: 2019-05-21
Payer: COMMERCIAL

## 2019-05-21 VITALS
HEIGHT: 68 IN | DIASTOLIC BLOOD PRESSURE: 80 MMHG | OXYGEN SATURATION: 99 % | SYSTOLIC BLOOD PRESSURE: 112 MMHG | BODY MASS INDEX: 43.04 KG/M2 | TEMPERATURE: 97.4 F | WEIGHT: 284 LBS | HEART RATE: 79 BPM

## 2019-05-21 DIAGNOSIS — R07.81 RIB PAIN: Primary | ICD-10-CM

## 2019-05-21 DIAGNOSIS — R07.81 RIB PAIN: ICD-10-CM

## 2019-05-21 PROCEDURE — 71101 X-RAY EXAM UNILAT RIBS/CHEST: CPT

## 2019-05-21 PROCEDURE — 99213 OFFICE O/P EST LOW 20 MIN: CPT | Performed by: NURSE PRACTITIONER

## 2019-05-21 ASSESSMENT — ENCOUNTER SYMPTOMS
SINUS PAIN: 1
SINUS PRESSURE: 1

## 2019-05-21 NOTE — PROGRESS NOTES
Manpreet Fung  5/21/19    Chief Complaint   Patient presents with    Chest Pain     Rib pain on left side; fell on Sunday- down 4 stairs; sinus pressure which caused her to be dizzy        HPI: Patient presents with sinus issues for a couple of weeks, she has been treated with steroids, amoxicillin, claritin and flonase- starting to improve a little bit, but still has maxillary pressure. The dizziness from the sinusitis caused her to fall down 4 steps 2 days ago. She has been using tylenol and icing it, some relief. The pain has not worsened, it just isn't getting better.      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 Mel Amaral    COLONOSCOPY  5/3/2012    diverticular disease    DENTAL SURGERY  08/2015    full dental extraction    FOREIGN BODY REMOVAL  5/28/2016    left-sided temporal craniotomy wound reexploration with removal of small retained foreign body (plastic from Ruth clip from surgery 3 days prior), [facility], Dr. Lily Garcia  09/06/2005    supracervical hysterectomy bilateral salpingo oophectomy    KNEE ARTHROSCOPY Left     OTHER SURGICAL HISTORY  2011    endoscopic mucosal resection of duodenal polyp    SHOULDER ARTHROPLASTY Right 10/18/2018    Harsh Jung MD; done at [address] [place] [address] ARTHROSCOPY Right 03/07/2019    revision arthroscopy with debridement,revision mini-open rotator cuff repair per Dr Radha Navarrete at [facility] Hx:     Social History     Tobacco Use    Smoking status: Never Smoker    Smokeless tobacco: Never Used   Substance Use Topics    Alcohol use: No     Alcohol/week: 0.0 oz    Drug use: No       Lab Results   Component Value Date    LABA1C 6.7 (H) 02/02/2019     Lab Results   Component Value Date     02/02/2019     Lab Results   Component Value Date    WBC 8.2 06/16/2017    HGB 11.8 (L) 06/16/2017    HCT 36.9 06/16/2017    MCV 88.7 06/16/2017     06/16/2017     Lab Results   Component Value Date     02/02/2019    K 4.1 02/02/2019    CL 98 02/02/2019    CO2 31 02/02/2019    BUN 25 (H) 02/02/2019    CREATININE 1.03 (H) 02/02/2019    GLUCOSE 120 (H) 02/02/2019    CALCIUM 9.9 02/02/2019    PROT 7.4 02/02/2019    LABALBU 4.1 02/02/2019    BILITOT 0.34 02/02/2019    ALKPHOS 125 (H) 02/02/2019    AST 11 02/02/2019    ALT 6 02/02/2019    LABGLOM 54 (L) 02/02/2019    GFRAA >60 02/02/2019       Outpatient Encounter Medications as of 5/21/2019   Medication Sig Dispense Refill    cloNIDine (CATAPRES) 0.1 MG tablet Take 0.1 mg by mouth      predniSONE (DELTASONE) 20 MG tablet Take 1 tablet by mouth 2 times daily for 5 days 10 tablet 0    loratadine (CLARITIN) 10 MG tablet Take 1 tablet by mouth daily 30 tablet 2    fluticasone (FLONASE) 50 MCG/ACT nasal spray 1 spray by Each Nare route 2 times daily 1 Bottle 1    Gabapentin (NEURONTIN PO) Take 600 mg by mouth      Cholecalciferol (VITAMIN D3) 51581 units CAPS TAKE 1 CAPSULE ONCE A WEEK 13 capsule 1    DULoxetine (CYMBALTA) 60 MG extended release capsule TAKE 1 CAPSULE DAILY 90 capsule 1    pioglitazone-metformin (ACTOPLUS MET)  MG per tablet Take 1 tablet by mouth 2 times daily (with meals) 180 tablet 1    lisinopril-hydrochlorothiazide (PRINZIDE;ZESTORETIC) 20-12.5 MG per tablet Take 1 tablet by mouth daily 90 tablet 1    Handicap Placard MISC by Does not apply route Issue parking placard for person with disability; Applicant meets the qualifying disability criteria. Length of time expected to have disability_____Lifetime  Other __x__. Prescription expires in 5 years from issuing date. 02/05/2024 1 each 0    Misc. Devices (ROLLATOR) MISC Use to reduce risk of falls when walking. 1 each 0    [DISCONTINUED] amoxicillin (AMOXIL) 500 MG capsule Take 1 capsule by mouth 3 times daily 30 capsule 0    gabapentin (NEURONTIN) 600 MG tablet Take 1 tablet by mouth 5 times daily for 30 days. . 150 tablet 0     No facility-administered encounter medications on file as of 5/21/2019. Review of Systems   Constitutional: Negative. HENT: Positive for sinus pressure and sinus pain. Musculoskeletal: Positive for arthralgias, joint swelling and myalgias. Neurological: Positive for dizziness. Physical Exam   Constitutional: She is oriented to person, place, and time. She appears well-developed and well-nourished. HENT:   Head: Normocephalic. Right Ear: External ear normal.   Left Ear: External ear normal.   Nose: Left sinus exhibits maxillary sinus tenderness. Mouth/Throat: Oropharynx is clear and moist.   Neck: Normal range of motion. No thyromegaly present. Cardiovascular: Normal rate, regular rhythm and normal heart sounds. Pulmonary/Chest: Effort normal and breath sounds normal.   Musculoskeletal: Normal range of motion. Arms:  Left rib pain, very scant bruising, no swelling, non tender to touch   Neurological: She is alert and oriented to person, place, and time. Skin: Skin is warm. Psychiatric: She has a normal mood and affect. Data reviewed:      :   Diagnosis Orders   1.  Rib pain  XR RIBS LEFT INCLUDE CHEST (MIN 3 VIEWS)

## 2019-05-21 NOTE — PROGRESS NOTES
Physical Therapy    Pt rpts to physical therapy for tx, but verbalized having a fall over the weekend and noting difficulty with taking deep breaths and having constant R shoulder pain since fall. Instructed to report to urgent care immediately and to hold therapy until f/u with surgeon to ensure no excessive damage to R shoulder. Instructed to return to sling until f/u with PCP.      Signed,    Dayo Cortes, PTA

## 2019-05-24 ENCOUNTER — APPOINTMENT (OUTPATIENT)
Dept: PHYSICAL THERAPY | Age: 64
End: 2019-05-24
Payer: COMMERCIAL

## 2019-05-28 ENCOUNTER — HOSPITAL ENCOUNTER (OUTPATIENT)
Dept: PHYSICAL THERAPY | Age: 64
Setting detail: THERAPIES SERIES
Discharge: HOME | End: 2019-05-28
Payer: COMMERCIAL

## 2019-05-28 NOTE — PROGRESS NOTES
Physical Therapy       Outpatient Physical Therapy    [x] [place]  Phone: [phone]  Fax: [phone]      [] [place]  Phone: [phone]  Fax: [phone]    Physical Therapy  Cancellation/No-show Note  Patient Name:  Sil Mendoza  :  1955   Date:  2019  Cancelled visits to date: 5  No-shows to date: 0    For today's appointment patient:  [x]  Cancelled  []  Rescheduled appointment  []  No-show     Reason given by patient:  []  Patient ill  []  Conflicting appointment  []  No transportation    []  Conflict with work  []  No reason given  [x]  Other:  Going to Dr   Comments:      Electronically signed by: Yan Hinds PT

## 2019-05-30 ENCOUNTER — HOSPITAL ENCOUNTER (OUTPATIENT)
Dept: PHYSICAL THERAPY | Age: 64
Setting detail: THERAPIES SERIES
Discharge: HOME | End: 2019-05-30
Payer: COMMERCIAL

## 2019-05-30 ENCOUNTER — OFFICE VISIT (OUTPATIENT)
Dept: PRIMARY CARE CLINIC | Age: 64
End: 2019-05-30
Payer: COMMERCIAL

## 2019-05-30 VITALS
SYSTOLIC BLOOD PRESSURE: 137 MMHG | WEIGHT: 290 LBS | RESPIRATION RATE: 16 BRPM | DIASTOLIC BLOOD PRESSURE: 70 MMHG | OXYGEN SATURATION: 96 % | HEIGHT: 68 IN | HEART RATE: 88 BPM | TEMPERATURE: 98.2 F | BODY MASS INDEX: 43.95 KG/M2

## 2019-05-30 DIAGNOSIS — R07.89 CHEST WALL PAIN: Primary | ICD-10-CM

## 2019-05-30 PROCEDURE — 99213 OFFICE O/P EST LOW 20 MIN: CPT | Performed by: FAMILY MEDICINE

## 2019-05-30 RX ORDER — HYDROCODONE BITARTRATE AND ACETAMINOPHEN 5; 325 MG/1; MG/1
1 TABLET ORAL EVERY 4 HOURS PRN
Qty: 18 TABLET | Refills: 0 | Status: SHIPPED | OUTPATIENT
Start: 2019-05-30 | End: 2019-06-02

## 2019-05-30 NOTE — PATIENT INSTRUCTIONS
Patient Education        Safe Use of Opioid Pain Medicine: Care Instructions  Your Care Instructions  Pain is your body's way of warning you that something is wrong. Pain feels different for everybody. Only you can describe your pain. A doctor can suggest or prescribe many types of medicines for pain. These range from over-the-counter medicines like acetaminophen (Tylenol) to powerful medicines called opioids. Examples of opioids are fentanyl, hydrocodone, morphine, and oxycodone. Heroin is an illegal opioid  Opioids are strong medicines. They can help you manage pain when you use them the right way. But if you misuse them, they can cause serious harm and even death. For these reasons, doctors are very careful about how they prescribe opioids. If you decide to take opioids, here are some things to remember. · Keep your doctor informed. You can get addicted to opioids. The risk is higher if you have a history of substance use. Your doctor will monitor you closely for signs of misuse and addiction and to figure out when you no longer need to take opioids. · Make a treatment plan. The goal of your plan is to be able to function and do the things you need to do, even if you still have some pain. You might be able to manage your pain with other non-opioid options like physical therapy, relaxation, or over-the-counter pain medicines. · Be aware of the side effects. Opioids can cause serious side effects, such as constipation, dry mouth, and nausea. And over time, you may need a higher dose to get pain relief. This is called tolerance. Your body also gets used to opioids. This is called physical dependence. If you suddenly stop taking them, you may have withdrawal symptoms. The doctor carefully considered what pain medicine is right for you. You may not have received opioids if your doctor was concerned about drug interactions or your safety, or if he or she had other concerns.   It is best to have one doctor or clinic treat your pain. This way you will get the pain medicine that will help you the most. And a doctor will be able to watch for any problems that the medicine might cause. The doctor has checked you carefully, but problems can develop later. If you notice any problems or new symptoms,  get medical treatment right away. Follow-up care is a key part of your treatment and safety. Be sure to make and go to all appointments, and call your doctor if you are having problems. It's also a good idea to know your test results and keep a list of the medicines you take. How can you care for yourself at home? If you need to take opioids to manage your pain, remember these safety tips. · Follow directions carefully. It's easy to misuse opioids if you take a dose other than what's prescribed by your doctor. This can lead to overdose and even death. Even sharing them with someone they weren't meant for is misuse. · Be cautious. Opioids may affect your judgment and decision making. Do not drive or operate machinery until you can think clearly. Talk with your doctor about when it is safe to drive. · Reduce the risk of drug interactions. Opioids can be dangerous if you take them with alcohol or with certain drugs like sleeping pills and muscle relaxers. Make sure your doctor knows about all the other medicines you take, including over-the-counter medicines. Don't start any new medicines before you talk to your doctor or pharmacist.  · Safely store and dispose of opioids. Store opioids in a safe and secure place. Make sure that pets, children, friends, and family can't get to them. When you're done using opioids, make sure to dispose of them safely and as quickly as possible. The U.S. Food and Drug Administration (FDA) recommends these disposal options. ? The best option is to take your medicine to a drop-off box or take-back program that is authorized by the [address] ([facility]).   ? If these programs aren't available in your area and your medicine doesn't have specific disposal instructions (such as flushing), you can throw them into your household trash if you follow the FDA's instructions. Visit fda.gov and search for \"unused medicine disposal.\"  ? If you have opioid patches (used or unused), your options are to take them to a [facility]-authorized site or flush them down the toilet. Do not throw them in the trash. ? Only flush your medicine down the toilet if you can't get to a [facility]-approved site or your medicine instructions state clearly to flush them. · Reduce the risk of overdose. Misuse of opioids can be very dangerous. Protect yourself by asking your doctor about a naloxone rescue kit. It can help you--and even save your life--if you take too much of an opioid. Try other ways to reduce pain. · Relax, and reduce stress. Relaxation techniques such as deep breathing or meditation can help. · Keep moving. Gentle, daily exercise can help reduce pain over the long run. Try low- or no-impact exercises such as walking, swimming, and stationary biking. Do stretches to stay flexible. · Try heat, cold packs, and massage. · Get enough sleep. Pain can make you tired and drain your energy. Talk with your doctor if you have trouble sleeping because of pain. · Think positive. Your thoughts can affect your pain level. Do things that you enjoy to distract yourself when you have pain instead of focusing on the pain. See a movie, read a book, listen to music, or spend time with a friend. If you are not taking a prescription pain medicine, ask your doctor if you can take an over-the-counter medicine. When should you call for help?   Call your doctor now or seek immediate medical care if:    · You have a new kind of pain.     · You have new symptoms, such as a fever or rash, along with the pain.    Watch closely for changes in your health, and be sure to contact your doctor if:    · You think you might be using too much pain medicine, and you need help to use less or stop.     · Your pain gets worse.     · You would like a referral to a doctor or clinic that specializes in pain management. Where can you learn more? Go to [email or web address]://[employer].Maimai. org and sign in to your [employer] account. Enter R108 in the [facility] box to learn more about \"Safe Use of Opioid Pain Medicine: Care Instructions. \"     If you do not have an account, please click on the \"Sign Up Now\" link. Current as of: Мария 3, 2018  Content Version: 12.0  © 3883-8595 Healthwise, Incorporated. Care instructions adapted under license by [facility] ([facility]). If you have questions about a medical condition or this instruction, always ask your healthcare professional. [address] any warranty or liability for your use of this information. Warning:  One or more of the medications that you have been prescribed may cause drowsiness and impair your ability to operate vehicles or machinery. Do not drive or operate machinery while taking narcotic pain medication. Do not use in combination with alcohol.

## 2019-05-30 NOTE — PROGRESS NOTES
[facility]             [address] [facility], [place], [address]                        Telephone [phone]             Fax [phone]       Miranda Vicente  1955  MRN:  [id number]  Date of visit:  5/30/2019     Subjective:    Miranda Vicente is a 61 y.o.  female who presents to [facility] today (5/30/2019) for evaluation of:  Chest Pain (rib pain, hurts to breathe, fell down stairs 10 days ago. was seen 5/21. xrays negative)      She states that she fell down some stairs on 5/19/2019. She has had pain in her back on the left side since the injury. She was seen at Urgent Care on 5/21/2019. An x-ray was done which was negative for fracture. She states that she continues to have a lot of pain. She has been using over the counter Lidocaine patches without significant improvement in her symptoms. She states that she has been taking Ibuprofen and Tylenol, which have helped somewhat. She states that she has taken Norco prescribed by her orthopedic surgeon due to recent shoulder surgery, which did help.     She has the following problem list:  Patient Active Problem List   Diagnosis    Shoulder pain, bilateral    Cervical spine pain    Knee pain, left    Essential hypertension    Pulmonary hypertension ([address]    Vitamin D deficiency    Encounter for long-term (current) use of other medications    Pneumonia    Headache    Subacute sphenoidal sinusitis    Type 2 diabetes mellitus with microalbuminuria ([address]    Peripheral neuropathy    Dizziness    H/O fall    Chronic sinusitis    Gastroesophageal reflux disease    Microalbuminuria    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HCC)    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 Assessment and Plan:    Chest wall pain  Chest wall contusion due to fall. As above, she has not been able to get adequate pain relief with non-narcotic pain medication. Norco was refilled:  - HYDROcodone-acetaminophen (NORCO) 5-325 MG per tablet; Take 1 tablet by mouth every 4 hours as needed for Pain for up to 3 days. Intended supply: 3 days. Take lowest dose possible to manage pain  Dispense: 18 tablet; Refill: 0    Printed information regarding Safe Use of Opioid Pain Medicine was provided to the patient with her after visit summary. She was advised not to drive or operate machinery while taking narcotic pain medication. She was also advised not to take this medication in combination with alcohol.        (Please note that portions of this note were completed with a voice-recognition program. Efforts were made to edit the dictation but occasionally words are mis-transcribed.)

## 2019-05-30 NOTE — PROGRESS NOTES
Physical Therapy       Outpatient Physical Therapy    [x] [place]  Phone: [phone]  Fax: [phone]      [] [place]  Phone: [phone]  Fax: [phone]    Physical Therapy  Cancellation/No-show Note  Patient Name:  Manpreet Fung  :  1955   Date:  2019  Cancelled visits to date: 10  No-shows to date: 0    For today's appointment patient:  [x]  Cancelled  []  Rescheduled appointment  []  No-show     Reason given by patient:  []  Patient ill  []  Conflicting appointment  []  No transportation    []  Conflict with work  []  No reason given  [x]  Other:  Going to Dr, c/o difficulty breathing and pain.     Comments:      Electronically signed by: Estefanía Vick PT

## 2019-06-07 DIAGNOSIS — R07.89 CHEST WALL PAIN: ICD-10-CM

## 2019-06-07 RX ORDER — HYDROCODONE BITARTRATE AND ACETAMINOPHEN 5; 325 MG/1; MG/1
1 TABLET ORAL EVERY 4 HOURS PRN
Qty: 18 TABLET | Refills: 0 | OUTPATIENT
Start: 2019-06-07 | End: 2019-06-10

## 2019-06-07 NOTE — TELEPHONE ENCOUNTER
Pt requesting refill of pain med, pt states she just needs enough until her ribs heal, please advise at above number.

## 2019-06-07 NOTE — TELEPHONE ENCOUNTER
Chiquita Franklin called requesting a refill of the below medication which has been pended for you: O[place] from [place], and [place] reviewed. Clyda Searing 5-325mg last filled: 05/30/19 #18/3days. Report available for review    Requested Prescriptions     Pending Prescriptions Disp Refills    HYDROcodone-acetaminophen (NORCO) 5-325 MG per tablet 18 tablet 0     Sig: Take 1 tablet by mouth every 4 hours as needed for Pain for up to 3 days. Intended supply: 3 days.  Take lowest dose possible to manage pain       Last Appointment Date: 5/30/2019  Next Appointment Date: 8/6/2019    Allergies   Allergen Reactions    Asa [Aspirin] Other (See Comments)     Stomach irritation

## 2019-06-18 ENCOUNTER — OFFICE VISIT (OUTPATIENT)
Dept: PRIMARY CARE CLINIC | Age: 64
End: 2019-06-18
Payer: COMMERCIAL

## 2019-06-18 VITALS
SYSTOLIC BLOOD PRESSURE: 128 MMHG | TEMPERATURE: 98 F | DIASTOLIC BLOOD PRESSURE: 68 MMHG | BODY MASS INDEX: 43.1 KG/M2 | WEIGHT: 291 LBS | HEIGHT: 69 IN | HEART RATE: 80 BPM | OXYGEN SATURATION: 95 %

## 2019-06-18 DIAGNOSIS — M54.50 LEFT-SIDED LOW BACK PAIN WITHOUT SCIATICA, UNSPECIFIED CHRONICITY: Primary | ICD-10-CM

## 2019-06-18 DIAGNOSIS — R07.81 RIB PAIN: ICD-10-CM

## 2019-06-18 PROCEDURE — 99214 OFFICE O/P EST MOD 30 MIN: CPT | Performed by: FAMILY MEDICINE

## 2019-06-18 RX ORDER — CYCLOBENZAPRINE HCL 5 MG
5-10 TABLET ORAL 3 TIMES DAILY PRN
Qty: 30 TABLET | Refills: 0 | Status: SHIPPED | OUTPATIENT
Start: 2019-06-18 | End: 2019-06-28

## 2019-06-18 RX ORDER — HYDROCODONE BITARTRATE AND ACETAMINOPHEN 5; 325 MG/1; MG/1
1 TABLET ORAL EVERY 4 HOURS PRN
Qty: 18 TABLET | Refills: 0 | Status: SHIPPED | OUTPATIENT
Start: 2019-06-18 | End: 2019-07-02 | Stop reason: SDUPTHER

## 2019-06-18 NOTE — PROGRESS NOTES
 Microalbuminuria    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)    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    Type 2 diabetes mellitus with microalbuminuria, without long-term current use of insulin ([facility])    Morbid obesity with BMI of 40.0-44.9, adult ([facility])    Restrictive lung disease secondary to obesity        Current medications are:  Current Outpatient Medications   Medication Sig Dispense Refill    cloNIDine (CATAPRES) 0.1 MG tablet Take 0.1 mg by mouth      loratadine (CLARITIN) 10 MG tablet Take 1 tablet by mouth daily 30 tablet 2    fluticasone (FLONASE) 50 MCG/ACT nasal spray 1 spray by Each Nare route 2 times daily 1 Bottle 1    Gabapentin (NEURONTIN PO) Take 600 mg by mouth      Cholecalciferol (VITAMIN D3) 89425 units CAPS TAKE 1 CAPSULE ONCE A WEEK 13 capsule 1    DULoxetine (CYMBALTA) 60 MG extended release capsule TAKE 1 CAPSULE DAILY 90 capsule 1    pioglitazone-metformin (ACTOPLUS MET)  MG per tablet Take 1 tablet by mouth 2 times daily (with meals) 180 tablet 1    lisinopril-hydrochlorothiazide (PRINZIDE;ZESTORETIC) 20-12.5 MG per tablet Take 1 tablet by mouth daily 90 tablet 1    Handicap Placard MISC by Does not apply route Issue parking placard for person with disability; Applicant meets the qualifying disability criteria. Length of time expected to have disability_____Lifetime  Other __x__. Prescription expires in 5 years from issuing date. 02/05/2024 1 each 0    Misc. Devices (ROLLATOR) MISC Use to reduce risk of falls when walking. 1 each 0    gabapentin (NEURONTIN) 600 MG tablet Take 1 tablet by mouth 5 times daily for 30 days. . 150 tablet 0     No current facility-administered medications for this visit. She is allergic to asa [aspirin]. She  reports that she has never smoked.  She has never used smokeless tobacco.      Objective:    Vitals:    06/18/19 1140   BP: 128/68   Pulse: 80   Temp: 98 °F (36.7 °C)   SpO2: 95%   Weight: 291 lb (132 kg)   Height: 5' 9\" (1.753 m)     Body mass index is 42.97 kg/m². Extremely obese  female, alert, no distress, cooperative. There is diffuse tenderness to palpation of the chest wall anteriorly, and the ribs bilaterally. There is no midline tenderness to palpation of the back. There is mild tenderness to palpation in the lumbar area on the right. There is no tenderness to palpation over the sciatic notches bilaterally. Strength and sensation are symmetric in the lower extremities bilaterally. The progress notes from the 5/21/2019 and 5/30/2019 Urgent Care visits were reviewed. The radiology report from 5/21/2019 was reviewed. Assessment and Plan:    1. Left-sided low back pain without sciatica, unspecified chronicity  2. Rib pain  As above, she has been going to therapy for her shoulder. I have recommended physical therapy for the back and rib pain also:  - Ambulatory referral to Physical Therapy    Controlled substances monitoring: No signs of potential drug abuse or diversion identified when the OAR[place] report from [place], and [place] was reviewed today. The activity on the report was consistent with the treatment plan. As she is not getting adequate pain relief with non-narcotic medications, Norco was prescribed:  - HYDROcodone-acetaminophen (NORCO) 5-325 MG per tablet; Take 1 tablet by mouth every 4 hours as needed for Pain for up to 3 days. Intended supply: 3 days. Take lowest dose possible to manage pain  Dispense: 18 tablet; Refill: 0    Flexeril was also prescribed:  - cyclobenzaprine (FLEXERIL) 5 MG tablet; Take 1-2 tablets by mouth 3 times daily as needed for Muscle spasms  Dispense: 30 tablet; Refill: 0    She was advised not to drive or operate machinery while taking narcotic pain medication or muscle relaxers. She was also advised not to take this medication in combination with alcohol.       (Please note that portions of this note were completed with a voice-recognition program. Efforts were made to edit the dictation but occasionally words are mis-transcribed.)

## 2019-06-19 ENCOUNTER — HOSPITAL ENCOUNTER (OUTPATIENT)
Dept: PHYSICAL THERAPY | Age: 64
Setting detail: THERAPIES SERIES
Discharge: HOME | End: 2019-06-19
Payer: COMMERCIAL

## 2019-06-19 PROCEDURE — 97110 THERAPEUTIC EXERCISES: CPT

## 2019-06-19 NOTE — PROGRESS NOTES
of pain limiting AROM and strength. Plan:    Continue to Progress AROM and strength per pain tolerance. Plan to initiate Aquatics secondary to high pain levels. Goals:    Short term goals  Time Frame for Short term goals: 6 weeks  Short term goal 1: Initiate HEP (MET. 16APR)  Short term goal 2: Decrease R shoulder pain to <2/10 for improved ease with ADL (not met)  Short term goal 3: Increase R shoulder PROM to WNL for improved ease with ADL and prepare for strengthening phase (Not met. 16APR)     Long term goals  Time Frame for Long term goals : 12 weeks   Long term goal 1: Indep with HEP (Met.)  Long term goal 2: Decrease R shoulder pain to 0/10 for improved ease with ADL (Not met. 10APR)   Long term goal 3: Increase R shoulder AROM to [facility] for improved ease with ADL and lifting/reaching tasks (not met ER C5, IR L1, flex 125, abd 122)  Long term goal 4: Increase R shoulder strength to 4+/5 all motions for improved ease with lifting and reaching (not met Flexion 4+/5, abd 4+/5, IR 4+/5, ER 5/5)   Long term goal 5: DASH and/or SPADI score to <15% disabled for return to previouc level of function (Scored 44/130=34%disability. )    Current Frequency/Duration:6/19/19-7/16/19  # Days per week: [] 1 day # Weeks: [] 1 week [x] 4 weeks      [x] 2 days? [] 2 weeks [] 5 weeks      [] 3 days   [] 3 weeks [] 6 weeks     Rehab Potential: [] Excellent [x] Good [x] Fair  [] Poor     Goal Status:  [] Achieved [x] Partially Achieved  [] Not Achieved     Patient Status: [] Continue per initial plan of Care     [] Patient now discharged     [x] Additional visits requested, Please re-certify for additional visits:      Requested frequency/duration:  2-3X/week for 6weeks    Electronically signed by:  Lexa Barboza PT    If you have any questions or concerns, please don't hesitate to call.   Thank you for your referral.    Physician Signature:________________________________Date:__________________  By signing above, therapists plan is approved by physician

## 2019-06-19 NOTE — FLOWSHEET NOTE
Outpatient Physical Therapy    [x] [place]  Phone: [phone]  Fax: [phone]      [] [place]  Phone: [phone]  Fax: [phone]      Physical Therapy Aquatic Flow Sheet   Date:  2019    Patient Name:  Ferdinand Silver    :  1955  Restrictions/Precautions:     Medical/Treatment Diagnosis Information:   · Diagnosis: R rotator cuff repair-revision   · Treatment Diagnosis: R shoulder pain, weakness. s/p rotator cuff revision  Insurance/Certification information:  PT Insurance Information: [address]   Physician Information:  Referring Practitioner: Dr Francisco Calles of care signed (Y/N):  N     Visit# / total visits:  1/10, 18 total   Pain level:      3/10            Progress Note: [x]  Yes  []  No  Next due by: Visit #10:    Time In : 1235  Time Out: 1300  Subjective:    ·   Subjective: Patient reporting pain of 3/10 with use of OTC patch for pain. Patient noting pain of 6/61 prior to application, and at worst 6/10. Patient c/o shoulder blade pain. Patient noting difficulty with reaching behind back, and overall strength. ·    · Objective:   Recheck completed this date, patient was last seen 19.   Updated goals this date. · Observation: mild to moderate rounded shoulders.    Test measurements:       AROM   ER C5, IR L1, flex 125, abd 122     3/7/19-- 15 weeks post op     MMT --Flexion 4+/5, abd 4+/5, IR 4+/5, ER 5/5        Key  B= Belt DB= Dumbells T= Theratube   H= Hydrotone N= Noodles W= Weights   P= Paddles S= Speedo equipment K= Kickboard     Exercises/Activities   Warm-up/Amb    Exercises      Slow forward     HR/TR      Slow sideways    Marches      Slow backwards    Mini-squats      Medium forward    4-way SLR      Medium sideways    Hip circles/fig 8      Small shuffle    Hamstring curls      Jog    Knee extension      Braiding    Pelvic tilts      Bicycling    Scap squeezes          Shoulder flex/ext      Functional    Shoulder abd/add      Step    Shoulder H. abd/add Lifting    Shoulder IR/ER      Hand to opp knee    Rowing      Push down squat    Bilateral pull down      UE PNF    Push/pull      LE PNF    Push downs      Wall push ups    Arm circles      SLS    Elbow flex/ext          Chin tuck      Stretching    UT shrugs/rolls      Gastroc/Soleus    Rocking horse      Hamstring          SKTC    Other      Piriformis          Hip flexor          Ladder pull          Pec stretch          Post deltoid           Aquatic Therapy  [] Aquatic therapy with therapeutic exercises (04205)    Timed Code Treatment Minutes:  Ex 25'    Total Treatment Minutes:  25'    Treatment/Activity Tolerance:  [x] Patient tolerated treatment well [] Patient limited by fatique  [] Patient limited by pain [] Patient limited by other medical complications  [] Other:     Prognosis: [] Good [x] Fair  [] Poor    Patient Requires Follow-up: [x] Yes  [] No    Goals:    Short term goals  Time Frame for Short term goals: 6 weeks  Short term goal 1: Initiate HEP (MET. 16APR)  Short term goal 2: Decrease R shoulder pain to <2/10 for improved ease with ADL (not met)  Short term goal 3: Increase R shoulder PROM to WNL for improved ease with ADL and prepare for strengthening phase (Not met. 16APR)     Long term goals  Time Frame for Long term goals : 12 weeks   Long term goal 1: Indep with HEP (Met.)  Long term goal 2: Decrease R shoulder pain to 0/10 for improved ease with ADL (Not met. 10APR)   Long term goal 3: Increase R shoulder AROM to [facility] for improved ease with ADL and lifting/reaching tasks (not met ER C5, IR L1, flex 125, abd 122)  Long term goal 4:  Increase R shoulder strength to 4+/5 all motions for improved ease with lifting and reaching (not met Flexion 4+/5, abd 4+/5, IR 4+/5, ER 5/5)   Long term goal 5: DASH and/or SPADI score to <15% disabled for return to previouc level of function (Scored 44/130=34%disability. )       Plan:   [] Continue per plan of care [] Alter current plan (see comments)  [] Plan of care initiated [] Hold pending MD visit [] Discharge  Plan for Next Session:  Trial Aquatics     Electronically signed by:  Yan Hinds PT

## 2019-06-21 ENCOUNTER — HOSPITAL ENCOUNTER (OUTPATIENT)
Dept: PHYSICAL THERAPY | Age: 64
Setting detail: THERAPIES SERIES
Discharge: HOME | End: 2019-06-21
Payer: COMMERCIAL

## 2019-06-21 PROCEDURE — 97113 AQUATIC THERAPY/EXERCISES: CPT

## 2019-06-21 NOTE — FLOWSHEET NOTE
Outpatient Physical Therapy    [x] [place]  Phone: [phone]  Fax: [phone]      [] [place]  Phone: [phone]  Fax: [phone]      Physical Therapy Aquatic Flow Sheet   Date:  2019    Patient Name:  Paul Saldivar    :  1955  Restrictions/Precautions:     Medical/Treatment Diagnosis Information:   · Diagnosis: R rotator cuff repair-revision   · Treatment Diagnosis: R shoulder pain, weakness. s/p rotator cuff revision  Insurance/Certification information:  PT Insurance Information: [address]   Physician Information:  Referring Practitioner: Dr Glendia Claude of care signed (Y/N):  N     Visit# / total visits:  2/10, 19 total     Pain level:      4/10            Progress Note: []  Yes  [x]  No  Next due by: Visit #10:    Time In : 11:12  Time Out: 11:45  Subjective:  Pt rpts to clinic with moderate complaints of R shoulder pain stating \"I worked this morning so I'm feeling it a little more today\". · Objective:  Pt tolerated todays session well as first pool session indicating no increase in pain post session. Required vc from PTA at [facility] to ensure proper and safe performance of exercises. Most challenged by arm circles this date. · Observation: mild to moderate rounded shoulders.    Test measurements:       AROM   ER C5, IR L1, flex 125, abd 122     3/7/19-- 15 weeks post op     MMT --Flexion 4+/5, abd 4+/5, IR 4+/5, ER 5/5        Key  B= Belt DB= Dumbells T= Theratube   H= Hydrotone N= Noodles W= Weights   P= Paddles S= Speedo equipment K= Kickboard     Exercises/Activities   Warm-up/Amb    Exercises      Slow forward   3 laps  HR/TR      Slow sideways  3 laps  Marches      Slow backwards  3 laps  Mini-squats      Medium forward    4-way SLR      Medium sideways    Hip circles/fig 8      Small shuffle    Hamstring curls      Jog    Knee extension      Braiding    Pelvic tilts      Bicycling    Scap squeezes  x20        Shoulder flex/ext  x30    Functional    Shoulder abd/add x30    Step    Shoulder H. abd/add  x30    Lifting    Shoulder IR/ER      Hand to opp knee    Rowing      Push down squat    Bilateral pull down  x15 N    UE PNF    Push/pull  x15 K    LE PNF    Push downs      Wall push ups  x20  Arm circles  3', frnt, lat    SLS    Elbow flex/ext          Chin tuck      Stretching    UT shrugs/rolls      Gastroc/Soleus    Rocking horse      Hamstring          SKTC    Other      Piriformis          Hip flexor          Ladder pull          Pec stretch          Post deltoid           Aquatic Therapy   [x] Aquatic therapy with therapeutic exercises (29860)    Timed Code Treatment Minutes:  Ex 33'    Total Treatment Minutes:  35'    Treatment/Activity Tolerance:  [x] Patient tolerated treatment well [] Patient limited by fatique  [] Patient limited by pain [] Patient limited by other medical complications  [] Other:     Prognosis: [] Good [x] Fair  [] Poor    Patient Requires Follow-up: [x] Yes  [] No    Goals:    Short term goals  Time Frame for Short term goals: 6 weeks  Short term goal 1: Initiate HEP (MET. 16APR)  Short term goal 2: Decrease R shoulder pain to <2/10 for improved ease with ADL (not met)  Short term goal 3: Increase R shoulder PROM to WNL for improved ease with ADL and prepare for strengthening phase (Not met. 16APR)     Long term goals  Time Frame for Long term goals : 12 weeks   Long term goal 1: Indep with HEP (Met.)  Long term goal 2: Decrease R shoulder pain to 0/10 for improved ease with ADL (Not met. 10APR)   Long term goal 3: Increase R shoulder AROM to [facility] for improved ease with ADL and lifting/reaching tasks (not met ER C5, IR L1, flex 125, abd 122)  Long term goal 4:  Increase R shoulder strength to 4+/5 all motions for improved ease with lifting and reaching (not met Flexion 4+/5, abd 4+/5, IR 4+/5, ER 5/5)   Long term goal 5: DASH and/or SPADI score to <15% disabled for return to previouc level of function (Scored 44/130=34%disability. )       Plan:   [x] Continue per plan of care [] Alter current plan (see comments)  [] Plan of care initiated [] Hold pending MD visit [] Discharge     Plan for Next Session:  Progress aquatics.      Electronically signed by:  Harshil Bourgeois PTA

## 2019-06-24 ENCOUNTER — HOSPITAL ENCOUNTER (OUTPATIENT)
Dept: PHYSICAL THERAPY | Age: 64
Setting detail: THERAPIES SERIES
Discharge: HOME | End: 2019-06-24
Payer: COMMERCIAL

## 2019-06-24 PROCEDURE — 97113 AQUATIC THERAPY/EXERCISES: CPT

## 2019-06-24 NOTE — FLOWSHEET NOTE
Outpatient Physical Therapy    [x] [place]  Phone: [phone]  Fax: [phone]      [] [place]  Phone: [phone]  Fax: [phone]      Physical Therapy Aquatic Flow Sheet   Date:  2019    Patient Name:  Al Olsen    :  1955  Restrictions/Precautions:     Medical/Treatment Diagnosis Information:   · Diagnosis: R rotator cuff repair-revision   · Treatment Diagnosis: R shoulder pain, weakness. s/p rotator cuff revision  Insurance/Certification information:  PT Insurance Information: [address]   Physician Information:  Referring Practitioner: Dr Kezia Riggs of care signed (Y/N):  N      Visit# / total visits:  3/10,  total     Pain level:      4/10            Progress Note: []  Yes  [x]  No  Next due by: Visit #10:    Time In : 12:35  Time Out: 1:15   · Subjective: Pt rpts to clinic with mild complaints of R shoulder pain stating \"I had some increased pain following the last session. It was sore but not too bad\". · Objective:  Pt tolerated todays aquatic session well indicating decreased complaints of pain this date. Progressed therex per flow chart to increase R shoulder strength requiring vc from PTA at [facility] to ensure safe and proper performance. · Observation: mild to moderate rounded shoulders.    Test measurements:       AROM   ER C5, IR L1, flex 125, abd 122     3/7/19-- 15 weeks post op     MMT --Flexion 4+/5, abd 4+/5, IR 4+/5, ER 5/5        Key  B= Belt DB= Dumbells T= Theratube   H= Hydrotone N= Noodles W= Weights   P= Paddles S= Speedo equipment K= Kickboard     Exercises/Activities   Warm-up/Amb    Exercises      Slow forward   3 laps  HR/TR      Slow sideways  3 laps  Marches      Slow backwards  3 laps  Mini-squats      Medium forward    4-way SLR      Medium sideways    Hip circles/fig 8      Small shuffle    Hamstring curls      Jog    Knee extension      Braiding    Pelvic tilts      Bicycling    Scap squeezes  x20        Shoulder flex/ext  x30 return to previouc level of function (Scored 44/130=34%disability. )       Plan:   [x] Continue per plan of care [] Alter current plan (see comments)  [] Plan of care initiated [] Hold pending MD visit [] Discharge     Plan for Next Session:  Progress aquatics.      Electronically signed by:  Elías Love PTA

## 2019-06-26 ENCOUNTER — HOSPITAL ENCOUNTER (OUTPATIENT)
Dept: GENERAL RADIOLOGY | Age: 64
Discharge: HOME | End: 2019-06-28
Payer: COMMERCIAL

## 2019-06-26 ENCOUNTER — HOSPITAL ENCOUNTER (OUTPATIENT)
Age: 64
Setting detail: SPECIMEN
Discharge: HOME | End: 2019-06-26
Payer: COMMERCIAL

## 2019-06-26 ENCOUNTER — HOSPITAL ENCOUNTER (OUTPATIENT)
Dept: PHYSICAL THERAPY | Age: 64
Setting detail: THERAPIES SERIES
Discharge: HOME | End: 2019-06-26
Payer: COMMERCIAL

## 2019-06-26 ENCOUNTER — OFFICE VISIT (OUTPATIENT)
Dept: PRIMARY CARE CLINIC | Age: 64
End: 2019-06-26
Payer: COMMERCIAL

## 2019-06-26 ENCOUNTER — HOSPITAL ENCOUNTER (OUTPATIENT)
Age: 64
Discharge: HOME | End: 2019-06-28
Payer: COMMERCIAL

## 2019-06-26 VITALS
HEART RATE: 94 BPM | DIASTOLIC BLOOD PRESSURE: 74 MMHG | OXYGEN SATURATION: 98 % | BODY MASS INDEX: 41.94 KG/M2 | TEMPERATURE: 102 F | WEIGHT: 284 LBS | SYSTOLIC BLOOD PRESSURE: 132 MMHG

## 2019-06-26 DIAGNOSIS — R07.9 CHEST PAIN, UNSPECIFIED TYPE: Primary | ICD-10-CM

## 2019-06-26 DIAGNOSIS — R31.9 HEMATURIA, UNSPECIFIED TYPE: ICD-10-CM

## 2019-06-26 DIAGNOSIS — R07.9 CHEST PAIN, UNSPECIFIED TYPE: ICD-10-CM

## 2019-06-26 LAB
-: ABNORMAL
AMORPHOUS: ABNORMAL
BACTERIA: ABNORMAL
BILIRUBIN URINE: NEGATIVE
CASTS UA: ABNORMAL /LPF (ref 0–2)
COLOR: ABNORMAL
COMMENT UA: ABNORMAL
CRYSTALS, UA: ABNORMAL /HPF
EPITHELIAL CELLS UA: ABNORMAL /HPF (ref 0–5)
GLUCOSE URINE: ABNORMAL
KETONES, URINE: NEGATIVE
LEUKOCYTE ESTERASE, URINE: NEGATIVE
MUCUS: ABNORMAL
NITRITE, URINE: NEGATIVE
OTHER OBSERVATIONS UA: ABNORMAL
PH UA: 5.5 (ref 5–6)
PROTEIN UA: NEGATIVE
RBC UA: ABNORMAL /HPF (ref 0–4)
RENAL EPITHELIAL, UA: ABNORMAL /HPF
SPECIFIC GRAVITY UA: 1.02 (ref 1.01–1.02)
TRICHOMONAS: ABNORMAL
TURBIDITY: ABNORMAL
URINE HGB: ABNORMAL
UROBILINOGEN, URINE: NORMAL
WBC UA: ABNORMAL /HPF (ref 0–4)
YEAST: ABNORMAL

## 2019-06-26 PROCEDURE — 93000 ELECTROCARDIOGRAM COMPLETE: CPT | Performed by: NURSE PRACTITIONER

## 2019-06-26 PROCEDURE — 97113 AQUATIC THERAPY/EXERCISES: CPT

## 2019-06-26 PROCEDURE — 71046 X-RAY EXAM CHEST 2 VIEWS: CPT

## 2019-06-26 PROCEDURE — 81001 URINALYSIS AUTO W/SCOPE: CPT

## 2019-06-26 PROCEDURE — 99213 OFFICE O/P EST LOW 20 MIN: CPT | Performed by: NURSE PRACTITIONER

## 2019-06-26 PROCEDURE — 96372 THER/PROPH/DIAG INJ SC/IM: CPT | Performed by: NURSE PRACTITIONER

## 2019-06-26 PROCEDURE — 87086 URINE CULTURE/COLONY COUNT: CPT

## 2019-06-26 RX ORDER — KETOROLAC TROMETHAMINE 30 MG/ML
30 INJECTION, SOLUTION INTRAMUSCULAR; INTRAVENOUS ONCE
Status: COMPLETED | OUTPATIENT
Start: 2019-06-26 | End: 2019-06-26

## 2019-06-26 RX ORDER — CIPROFLOXACIN 500 MG/1
500 TABLET, FILM COATED ORAL 2 TIMES DAILY
Qty: 10 TABLET | Refills: 0 | Status: SHIPPED | OUTPATIENT
Start: 2019-06-26 | End: 2019-07-01

## 2019-06-26 RX ADMIN — KETOROLAC TROMETHAMINE 30 MG: 30 INJECTION, SOLUTION INTRAMUSCULAR; INTRAVENOUS at 20:03

## 2019-06-26 ASSESSMENT — ENCOUNTER SYMPTOMS
COUGH: 0
BACK PAIN: 1

## 2019-06-26 NOTE — PROGRESS NOTES
[facility]  6/26/19    Chief Complaint   Patient presents with    Back Pain     chest pain fever       HPI: Patient presents today with onset of back and chest pain, onset yesterday. She also has a fever. She has tried ibuprofen, Tens unit, tiger balm, and a heating pad with no relief. She states it hurts to breathe. No known exposures. No cough. She has been dealing with chronic low back pain for weeks now, but this is different.  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    Obesity     Peripheral neuropathy     Pulmonary hypertension ([facility] 2012    by echocardiogram    Shoulder pain, bilateral 8/21/2013    Type 2 diabetes mellitus ([facility]     Unspecified essential hypertension     Essential hypertension    Vitamin D deficiency 11/5/2014        Past Surgical History:   Procedure Laterality Date    APPENDECTOMY      BRAIN SURGERY  05/24/2016    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 Michel White    COLONOSCOPY  5/3/2012    diverticular disease    DENTAL SURGERY  08/2015    full dental extraction    FOREIGN BODY REMOVAL  5/28/2016    left-sided temporal craniotomy wound reexploration with removal of small retained foreign body (plastic from Ruth clip from surgery 3 days prior), [facility], Dr. oRby Gupta  09/06/2005    supracervical hysterectomy bilateral salpingo oophectomy    KNEE ARTHROSCOPY Left     OTHER SURGICAL HISTORY  2011    endoscopic mucosal resection of duodenal polyp    SHOULDER ARTHROPLASTY Right 10/18/2018    Sumaya Calderon MD; done at [place] [id number] Right 03/07/2019 revision arthroscopy with debridement,revision mini-open rotator cuff repair per Dr Faith Whaley at [facility] Hx:     Social History     Tobacco Use    Smoking status: Never Smoker    Smokeless tobacco: Never Used   Substance Use Topics    Alcohol use: No     Alcohol/week: 0.0 oz    Drug use: No       Lab Results   Component Value Date    LABA1C 6.7 (H) 02/02/2019     Lab Results   Component Value Date     02/02/2019     Lab Results   Component Value Date    WBC 8.2 06/16/2017    HGB 11.8 (L) 06/16/2017    HCT 36.9 06/16/2017    MCV 88.7 06/16/2017     06/16/2017     Lab Results   Component Value Date     02/02/2019    K 4.1 02/02/2019    CL 98 02/02/2019    CO2 31 02/02/2019    BUN 25 (H) 02/02/2019    CREATININE 1.03 (H) 02/02/2019    GLUCOSE 120 (H) 02/02/2019    CALCIUM 9.9 02/02/2019    PROT 7.4 02/02/2019    LABALBU 4.1 02/02/2019    BILITOT 0.34 02/02/2019    ALKPHOS 125 (H) 02/02/2019    AST 11 02/02/2019    ALT 6 02/02/2019    LABGLOM 54 (L) 02/02/2019    GFRAA >60 02/02/2019       Outpatient Encounter Medications as of 6/26/2019   Medication Sig Dispense Refill    cyclobenzaprine (FLEXERIL) 5 MG tablet Take 1-2 tablets by mouth 3 times daily as needed for Muscle spasms 30 tablet 0    cloNIDine (CATAPRES) 0.1 MG tablet Take 0.1 mg by mouth      loratadine (CLARITIN) 10 MG tablet Take 1 tablet by mouth daily 30 tablet 2    fluticasone (FLONASE) 50 MCG/ACT nasal spray 1 spray by Each Nare route 2 times daily 1 Bottle 1    Gabapentin (NEURONTIN PO) Take 600 mg by mouth      Cholecalciferol (VITAMIN D3) 09079 units CAPS TAKE 1 CAPSULE ONCE A WEEK 13 capsule 1    DULoxetine (CYMBALTA) 60 MG extended release capsule TAKE 1 CAPSULE DAILY 90 capsule 1    pioglitazone-metformin (ACTOPLUS MET)  MG per tablet Take 1 tablet by mouth 2 times daily (with meals) 180 tablet 1    lisinopril-hydrochlorothiazide (PRINZIDE;ZESTORETIC) 20-12.5 MG per tablet Take 1 tablet by mouth daily 90 tablet 1    Handicap Placard MISC by Does not apply route Issue parking placard for person with disability; Applicant meets the qualifying disability criteria. Length of time expected to have disability_____Lifetime  Other __x__. Prescription expires in 5 years from issuing date. 02/05/2024 1 each 0    Misc. Devices (ROLLATOR) MISC Use to reduce risk of falls when walking. 1 each 0    gabapentin (NEURONTIN) 600 MG tablet Take 1 tablet by mouth 5 times daily for 30 days. . 150 tablet 0     No facility-administered encounter medications on file as of 6/26/2019. Review of Systems   Constitutional: Positive for fatigue and fever. HENT: Negative. Respiratory: Negative for cough. Chest tightness: hurts to breathe. Cardiovascular: Positive for chest pain. Musculoskeletal: Positive for arthralgias and back pain. Physical Exam   Constitutional: She appears well-developed and well-nourished. She appears ill. HENT:   Right Ear: Tympanic membrane normal.   Left Ear: Tympanic membrane normal.   Nose: Nose normal.   Mouth/Throat: Uvula is midline, oropharynx is clear and moist and mucous membranes are normal.   Cardiovascular: Normal rate and regular rhythm. Pulmonary/Chest: Effort normal. She has decreased breath sounds. She has no wheezes. She has no rhonchi. She has no rales. Musculoskeletal:        Back:    Low bilateral back pain   Lymphadenopathy:     She has no cervical adenopathy. Skin: Skin is warm and dry. Data reviewed:      Normal EKG  Normal Chest xray  Trace bacteria, Hgb and Glucose in urine      :   Diagnosis Orders   1. Chest pain, unspecified type  XR Chest PA and Lateral    EKG 12 Lead       PLAN:  Return if symptoms worsen or fail to improve. No orders of the defined types were placed in this encounter. Will treat for UTI due to trace bacteria and blood in urine, in addition to fever.

## 2019-06-26 NOTE — FLOWSHEET NOTE
Outpatient Physical Therapy    [x] [place]  Phone: [phone]  Fax: [phone]      [] [place]  Phone: [phone]  Fax: [phone]      Physical Therapy Aquatic Flow Sheet   Date:  2019    Patient Name:  Veronica Gibson    :  1955  Restrictions/Precautions:     Medical/Treatment Diagnosis Information:   · Diagnosis: R rotator cuff repair-revision   · Treatment Diagnosis: R shoulder pain, weakness. s/p rotator cuff revision  Insurance/Certification information:  PT Insurance Information: [address]   Physician Information:  Referring Practitioner: Dr Garcia Child of care signed (Y/N):  N      Visit# / total visits:  4/10,  total     Pain level:      7/10            Progress Note: []  Yes  [x]  No  Next due by: Visit #10:    Time In : 12:38  Time Out:  1:16  · Subjective: Pt rpts to clinic with moderate complaints of general body aches stating \"We went to [place] the last few days and all that walking really made me hurt for the last few days\". · Objective:  Pt tolerated todays aquatic session well indicating less pain post session and able to progress some UE exercises requiring vc from PTA at [facility] to ensure proper performance. Most challenged by arm circles this date noting fatigue. · Observation: mild to moderate rounded shoulders.    Test measurements:       AROM   ER C5, IR L1, flex 125, abd 122     3/7/19-- 15 weeks post op     MMT --Flexion 4+/5, abd 4+/5, IR 4+/5, ER 5/5        Key  B= Belt DB= Dumbells T= Theratube   H= Hydrotone N= Noodles W= Weights   P= Paddles S= Speedo equipment K= Kickboard     Exercises/Activities   Warm-up/Amb    Exercises      Slow forward   3 laps  HR/TR      Slow sideways  3 laps  Marches      Slow backwards  3 laps  Mini-squats      Medium forward    4-way SLR      Medium sideways    Hip circles/fig 8      Small shuffle    Hamstring curls      Jog    Knee extension      Braiding    Pelvic tilts      Bicycling    Scap squeezes  x20 Shoulder flex/ext  x30    Functional    Shoulder abd/add  x30    Step    Shoulder H. abd/add  x30    Lifting    Shoulder IR/ER      Hand to opp knee    Rowing      Push down squat    Bilateral pull down  x25 N    UE PNF    Push/pull  x20 K    LE PNF    Push downs  x20 N    Wall push ups  x20  Arm circles  3', frnt, lat    SLS    Elbow flex/ext          Chin tuck      Stretching    UT shrugs/rolls      Gastroc/Soleus    Rocking horse      Hamstring          SKTC    Other      Piriformis    makeshift butterly stroke  5 laps fwd/retro    Hip flexor          Ladder pull          Pec stretch          Post deltoid           Aquatic Therapy    [x] Aquatic therapy with therapeutic exercises (47187)    Timed Code Treatment Minutes:   Aqua Ex 38'     Total Treatment Minutes:  45'    Treatment/Activity Tolerance:  [x] Patient tolerated treatment well [] Patient limited by fatique  [] Patient limited by pain [] Patient limited by other medical complications  [] Other:     Prognosis: [] Good [x] Fair  [] Poor    Patient Requires Follow-up: [x] Yes  [] No    Goals:    Short term goals  Time Frame for Short term goals: 6 weeks  Short term goal 1: Initiate HEP (MET. 16APR)  Short term goal 2: Decrease R shoulder pain to <2/10 for improved ease with ADL (not met)   Short term goal 3: Increase R shoulder PROM to WNL for improved ease with ADL and prepare for strengthening phase (Not met. 16APR)     Long term goals  Time Frame for Long term goals : 12 weeks    Long term goal 1: Indep with HEP (Met.)  Long term goal 2: Decrease R shoulder pain to 0/10 for improved ease with ADL (Not met. 10APR)   Long term goal 3: Increase R shoulder AROM to [facility] for improved ease with ADL and lifting/reaching tasks (not met ER C5, IR L1, flex 125, abd 122)  Long term goal 4:  Increase R shoulder strength to 4+/5 all motions for improved ease with lifting and reaching (not met Flexion 4+/5, abd 4+/5, IR 4+/5, ER 5/5)   Long term goal 5: DASH and/or SPADI

## 2019-06-28 LAB
CULTURE: NORMAL
Lab: NORMAL
SPECIMEN DESCRIPTION: NORMAL

## 2019-07-01 ENCOUNTER — HOSPITAL ENCOUNTER (OUTPATIENT)
Dept: PHYSICAL THERAPY | Age: 64
Setting detail: THERAPIES SERIES
Discharge: HOME | End: 2019-07-01
Payer: COMMERCIAL

## 2019-07-01 PROCEDURE — 97113 AQUATIC THERAPY/EXERCISES: CPT

## 2019-07-01 NOTE — FLOWSHEET NOTE
SPADI score to <15% disabled for return to previouc level of function (Scored 44/130=34%disability. )       Plan:   [x] Continue per plan of care [] Alter current plan (see comments)  [] Plan of care initiated [] Hold pending MD visit [] Discharge     Plan for Next Session:  Progress aquatics.      Electronically signed by:  Ta Verdugo PTA

## 2019-07-02 ENCOUNTER — HOSPITAL ENCOUNTER (OUTPATIENT)
Dept: GENERAL RADIOLOGY | Age: 64
Discharge: HOME | End: 2019-07-04
Payer: COMMERCIAL

## 2019-07-02 ENCOUNTER — OFFICE VISIT (OUTPATIENT)
Dept: FAMILY MEDICINE CLINIC | Age: 64
End: 2019-07-02
Payer: COMMERCIAL

## 2019-07-02 VITALS
HEART RATE: 72 BPM | HEIGHT: 68 IN | DIASTOLIC BLOOD PRESSURE: 76 MMHG | RESPIRATION RATE: 16 BRPM | SYSTOLIC BLOOD PRESSURE: 112 MMHG | BODY MASS INDEX: 43.8 KG/M2 | WEIGHT: 289 LBS

## 2019-07-02 DIAGNOSIS — M54.50 BILATERAL LOW BACK PAIN WITHOUT SCIATICA, UNSPECIFIED CHRONICITY: Primary | ICD-10-CM

## 2019-07-02 DIAGNOSIS — M54.50 BILATERAL LOW BACK PAIN WITHOUT SCIATICA, UNSPECIFIED CHRONICITY: ICD-10-CM

## 2019-07-02 PROCEDURE — 99213 OFFICE O/P EST LOW 20 MIN: CPT | Performed by: FAMILY MEDICINE

## 2019-07-02 PROCEDURE — 72100 X-RAY EXAM L-S SPINE 2/3 VWS: CPT

## 2019-07-02 RX ORDER — METHYLPREDNISOLONE 4 MG/1
TABLET ORAL
Qty: 1 KIT | Refills: 0 | Status: SHIPPED | OUTPATIENT
Start: 2019-07-02 | End: 2019-07-08

## 2019-07-02 RX ORDER — HYDROCODONE BITARTRATE AND ACETAMINOPHEN 5; 325 MG/1; MG/1
1 TABLET ORAL EVERY 4 HOURS PRN
Qty: 18 TABLET | Refills: 0 | Status: SHIPPED | OUTPATIENT
Start: 2019-07-02 | End: 2019-07-05

## 2019-07-02 NOTE — PROGRESS NOTES
[address], [address] [place] [address] Drive                        Telephone [phone]             Fax [phone]     Yeni Rey  1955  MRN:  [id number]  Date of visit:  7/2/2019    Subjective:    Yeni Rye is a 61 y.o.  female who presents to [facility] today (7/2/2019) for follow up/evaluation of:  Lower Back Pain (continues with low back pain,has been recieving water therapy through PT for her shoulder,has appt next week for PT evaluation regarding back pain)      She was seen at Urgent Care on 6/18/2019 with back pain. Physical therapy was ordered. Norco and Flexeril were prescribed. She states that there was some miscommunication, and she has not yet started the physical therapy for her back. She is getting physical therapy for her shoulder, and she has been getting water therapy recently, which has helped her symptoms. She requests a refill of Norco.  She denies radiation of pain into her legs. She denies numbness or tingling in her legs.        She has the following problem list:  Patient Active Problem List   Diagnosis    Shoulder pain, bilateral    Cervical spine pain    Knee pain, left    Essential hypertension    Pulmonary hypertension ([address]    Vitamin D deficiency    Encounter for long-term (current) use of other medications    Pneumonia    Headache    Subacute sphenoidal sinusitis    Type 2 diabetes mellitus with microalbuminuria ([address]    Peripheral neuropathy    Dizziness    H/O fall    Chronic sinusitis    Gastroesophageal reflux disease    Microalbuminuria    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HCC)    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[address]    Type 2 diabetes mellitus with microalbuminuria, without long-term current use of insulin (HCC)    Morbid obesity with BMI of 40.0-44.9, adult (HCC)    Restrictive lung disease secondary to obesity        Current medications are:  Outpatient Medications Marked as Taking for the 7/2/19 encounter (Office Visit) with Angel Cummins MD   Medication Sig Dispense Refill    cloNIDine (CATAPRES) 0.1 MG tablet Take 0.1 mg by mouth      Gabapentin (NEURONTIN PO) Take 600 mg by mouth 5 times daily       Cholecalciferol (VITAMIN D3) 10984 units CAPS TAKE 1 CAPSULE ONCE A WEEK 13 capsule 1    DULoxetine (CYMBALTA) 60 MG extended release capsule TAKE 1 CAPSULE DAILY 90 capsule 1    pioglitazone-metformin (ACTOPLUS MET)  MG per tablet Take 1 tablet by mouth 2 times daily (with meals) 180 tablet 1    lisinopril-hydrochlorothiazide (PRINZIDE;ZESTORETIC) 20-12.5 MG per tablet Take 1 tablet by mouth daily 90 tablet 1    Misc. Devices (ROLLATOR) MISC Use to reduce risk of falls when walking. 1 each 0       She is allergic to asa [aspirin]. She  reports that she has never smoked. She has never used smokeless tobacco.      Objective:    Vitals:    07/02/19 1446   BP: 112/76   Site: Right Upper Arm   Position: Sitting   Cuff Size: Large Adult   Pulse: 72   Resp: 16   Weight: 289 lb (131.1 kg)   Height: 5' 8\" (1.727 m)     Body mass index is 43.94 kg/m². Extremely obese  female, alert, cooperative anxious, otherwise in no distress. There is no tenderness to palpation of the back in the midline. There is mild tenderness to palpation in the lumbar area bilaterally. Strength and sensation are symmetric in the lower extremities bilaterally. Assessment and Plan:    Bilateral low back pain without sciatica, unspecified chronicity  She has not had recent imaging of her back. X-rays were ordered to be done today:  - XR LUMBAR SPINE (2-3 VIEWS); Future    - methylPREDNISolone (MEDROL, ELISABETH,) 4 MG tablet; Take by mouth as directed per instructions on dose pack. Take with food. Dispense: 1 kit;  Refill: 0    Controlled substances monitoring: No signs of potential drug abuse or diversion identified when the [place] report from [facility], [place], and [place] was reviewed today. The activity on the report was consistent with the treatment plan. Norco was refilled:  - HYDROcodone-acetaminophen (NORCO) 5-325 MG per tablet; Take 1 tablet by mouth every 4 hours as needed for Pain for up to 3 days. Intended supply: 3 days. Take lowest dose possible to manage pain  Dispense: 18 tablet; Refill: 0    She was advised not to drive or operate machinery while taking narcotic pain medication. She was also advised not to take this medication in combination with alcohol.      She was also referred to pain management:  - External Referral To Pain Clinic    (Please note that portions of this note were completed with a voice-recognition program. Efforts were made to edit the dictation but occasionally words are mis-transcribed.)

## 2019-07-03 ENCOUNTER — APPOINTMENT (OUTPATIENT)
Dept: PHYSICAL THERAPY | Age: 64
End: 2019-07-03
Payer: COMMERCIAL

## 2019-07-09 ENCOUNTER — HOSPITAL ENCOUNTER (OUTPATIENT)
Dept: PHYSICAL THERAPY | Age: 64
Setting detail: THERAPIES SERIES
Discharge: HOME | End: 2019-07-09
Payer: COMMERCIAL

## 2019-07-09 PROCEDURE — 97110 THERAPEUTIC EXERCISES: CPT

## 2019-07-09 PROCEDURE — 97164 PT RE-EVAL EST PLAN CARE: CPT

## 2019-07-09 NOTE — FLOWSHEET NOTE
timed 8' Ex   Total Treatment Minutes:  43'  Treatment/Activity Tolerance:  [x] Patient tolerated treatment well [] Patient limited by fatique  [] Patient limited by pain [] Patient limited by other medical complications  [] Other:     Prognosis: [] Good [x] Fair  [] Poor    Patient Requires Follow-up: [x] Yes  [] No    Goals:    Short term goals  Time Frame for Short term goals: 6 weeks  Short term goal 1: Initiate HEP (MET. 16APR)  Short term goal 2: Decrease R shoulder pain to <2/10 for improved ease with ADL (not met)  Short term goal 3: Increase R shoulder PROM to WNL for improved ease with ADL and prepare for strengthening phase (Met. 7/9)     Long term goals  Time Frame for Long term goals : 12 weeks   Long term goal 1: Indep with HEP (Met.)  Long term goal 2: Decrease R shoulder pain to 0/10 for improved ease with ADL (Not met. 7/9)   Long term goal 3: Increase R shoulder AROM to [facility] for improved ease with ADL and lifting/reaching tasks (not metER C7, IR T12, flex 127, abd 123)  Long term goal 4: Increase R shoulder strength to 4+/5 all motions for improved ease with lifting and reaching ( Met Flexion 4+/5, abd 4+/5, IR 4+/5, ER 5/5)   Long term goal 5: DASH and/or SPADI score to <15% disabled for return to previouc level of function (Scored 44/130=34%disability. Goal DC'd used UEFI)  Long Term Goal 6: Added 7/9/19. Patient to be able to tolerate standing up to 15min to allow for simple meal prep without increase in Low back pain. Long Term Goal 7: Added 7/9/19. Patient to be able to walk community distances with no greater than 2 point increase in pain in low back. Long Term Goal 8: Added 7/9/19.   Patient to be able to lift and carry a bag of groceries with mild difficulty.         Plan:   [] Continue per plan of care [x] Alter current plan (see comments)  [] Plan of care initiated [] Hold pending MD visit [] Discharge     Plan for Next Session:  Progress aquatics with addition of Thoracic and lumbar

## 2019-07-09 NOTE — PROGRESS NOTES
Left LE hip flexion 4/5, hip abd 4+/5, hip add 5/5, knee flexion 5/5, knee ext 4+/5  Right  LE hip flexion 4 to 4+/5, hip abd 5/5, hip add 5/5, knee flexion 5/5, knee ext 5/5     Trunk ROM flexion to ankles, ext to 10 with increased lumbar spine pain, Left rotation limited 25% with pain, Right rotation limited 25%    Prone centralization, Prone Pressup no change in pain. Lumbar paraspinals bilaterally tight, Right periscapular pain tightness with tenderness laterally over Right rib cage. Assessment:     Patient demonstrates slight gains in Right UE AROM to date, Strength limited away from body and lifting overhead. New diagnosis added for the thoracic and lumbar spine this date. More posterior in nature. Pain limiting AROM and strength, standing and gait tolerance. Patient noting a significant reduction in pain with use of Aquatics, feel patient most appropriate for pool at this time with addition of the lumbar and thoracic spine. Plan:    Continue to Progress AROM and strength per pain tolerance or Right shoulder with addition of UE/LE/Core strength for added diagnosis of thoracic and lumbar chronic pain. .  Plan to initiate Aquatics secondary to high pain levels. Goals:    Short term goals  Time Frame for Short term goals: 6 weeks  Short term goal 1: Initiate HEP (MET. 16APR)  Short term goal 2: Decrease R shoulder pain to <2/10 for improved ease with ADL (not met)  Short term goal 3: Increase R shoulder PROM to WNL for improved ease with ADL and prepare for strengthening phase (Met. 7/9)     Long term goals  Time Frame for Long term goals : 12 weeks   Long term goal 1: Indep with HEP (Met.)  Long term goal 2: Decrease R shoulder pain to 0/10 for improved ease with ADL (Not met. 7/9)   Long term goal 3: Increase R shoulder AROM to [facility] for improved ease with ADL and lifting/reaching tasks (not metER C7, IR T12, flex 127, abd 123)  Long term goal 4:  Increase R shoulder strength to 4+/5 all

## 2019-07-12 ENCOUNTER — HOSPITAL ENCOUNTER (OUTPATIENT)
Dept: PHYSICAL THERAPY | Age: 64
Setting detail: THERAPIES SERIES
Discharge: HOME | End: 2019-07-12
Payer: COMMERCIAL

## 2019-07-12 PROCEDURE — 97113 AQUATIC THERAPY/EXERCISES: CPT

## 2019-07-12 NOTE — FLOWSHEET NOTE
Outpatient Physical Therapy    [x] [place]  Phone: [phone]  Fax: [phone]      [] [place]  Phone: [phone]  Fax: [phone]      Physical Therapy Aquatic Flow Sheet   Date:  2019    Patient Name:  Satinder Nixno    :  1955  Restrictions/Precautions:       Medical/Treatment Diagnosis Information:   · Diagnosis: R rotator cuff repair-revision   · Treatment Diagnosis: R shoulder pain, weakness. s/p rotator cuff revision  · Right Parascapular Pain M25.511  · Lumbar Chronic Pain M54.5  · Thoracic Chronic Pain Y69.1  Insurance/Certification information:  PT Insurance Information: [employer] [address] (per patient 61 visits per year)  Physician Information:  Referring Practitioner: Dr Rocio Nguyenr of care signed (Y/N):  N     Visit# / total visits:  7/10, 2 4 total   Pain level:     6/10 low back R radicular symptoms           Progress Note: []  Yes  [x]  No  Next due by: Visit #10:  POC 19  · Time In: 2:34  Time Out: 3:17  ·   · Subjective: Pt rpts to clinic with moderate complaints of back pain stating \"I have the same pain in my lower back. Nothing seems to help. I see pain management on \".    · Objective: Pt tolerated todays first aquatic PT session focusing on back well indicating decreased pain post session. Pt required vc from PTA at [facility] to ensure safe and proper performance of exercises. Most challenged by walking HS curls this date noting fatigue and increased back pain. Felt much relief from floating exercises.    · Observation: mild to moderate rounded shoulders.    Test measurements:       AROM   ER C7, IR T12, flex 127, abd 123      3/7/19-- 32 weeks post op     MMT --Flexion 4+/5, abd 4+/5, IR 4+/5, ER 5/5   Left LE hip flexion 4/5, hip abd 4+/5, hip add 5/5, knee flexion 5/5, knee ext 4+/5  Right  LE hip flexion 4 to 4+/5, hip abd 5/5, hip add 5/5, knee flexion 5/5, knee ext 5/5     Trunk ROM flexion to ankles, ext to 10 with increased lumbar spine pain, Left rotation limited 25% with pain, Right rotation limited 25%     Prone centralization, Prone Pressup no change in pain.       Lumbar paraspinals bilaterally tight, Right periscapular pain tightness with tenderness laterally over Right rib cage. Key  B= Belt DB= Dumbells T= Theratube   H= Hydrotone N= Noodles W= Weights   P= Paddles S= Speedo equipment K= Kickboard     Exercises/Activities   Warm-up/Amb    Exercises      Slow forward   3 laps  HR/TR      Slow sideways  3 laps  Marches  2 laps    Slow backwards  3 laps  Mini-squats  3x10    Medium forward  4-way SLR  x15 abd ext    Medium sideways  Hip circles/fig 8      Small shuffle  Hamstring curls  2 laps    Jog  Knee extension      Braiding  Pelvic tilts      Bicycling  Scap squeezes      Shoulder flex/ext    Functional  Shoulder abd/add    Step  Shoulder H. abd/add x20 P   Lifting  Shoulder IR/ER    Hand to opp knee  Rowing    Push down squat  Bilateral pull down  x25 N   UE PNF  Push/pull    LE PNF  Push downs    Wall push ups  x20 Arm circles    SLS  Elbow flex/ext      Chin tuck    Stretching  UT shrugs/rolls    Gastroc/Soleus  Rocking horse    Hamstring      SKTC  Other    Piriformis  makeshift butterly stroke    Hip flexor  float 5'    Ladder pull Float kix 5'    Pec stretch    float abd/add  5'    Post deltoid           Aquatic Therapy    [x] Aquatic therapy  (46910)    Timed Code Treatment Minutes:  Domitila RASHID 43'    Total Treatment Minutes:  37'    Treatment/Activity Tolerance:  [x] Patient tolerated treatment well [] Patient limited by fatique  [] Patient limited by pain [] Patient limited by other medical complications  [] Other:     Prognosis: [] Good [x] Fair  [] Poor    Patient Requires Follow-up: [x] Yes  [] No    Goals:    Short term goals  Time Frame for Short term goals: 6 weeks  Short term goal 1: Initiate HEP (MET. 16APR)  Short term goal 2: Decrease R shoulder pain to <2/10 for improved ease with ADL (not met)  Short term goal 3:  Increase R

## 2019-07-17 ENCOUNTER — HOSPITAL ENCOUNTER (OUTPATIENT)
Dept: PHYSICAL THERAPY | Age: 64
Setting detail: THERAPIES SERIES
Discharge: HOME | End: 2019-07-17
Payer: COMMERCIAL

## 2019-07-17 PROCEDURE — 97113 AQUATIC THERAPY/EXERCISES: CPT

## 2019-07-17 NOTE — FLOWSHEET NOTE
Outpatient Physical Therapy    [x] [place]  Phone: [phone]  Fax: [phone]      [] [place]  Phone: [phone]  Fax: [phone]      Physical Therapy Aquatic Flow Sheet   Date:  2019    Patient Name:  Yaneli Arce    :  1955  Restrictions/Precautions:       Medical/Treatment Diagnosis Information:   · Diagnosis: R rotator cuff repair-revision   · Treatment Diagnosis: R shoulder pain, weakness. s/p rotator cuff revision  · Right Parascapular Pain M25.511  · Lumbar Chronic Pain M54.5  · Thoracic Chronic Pain Y82.6  Insurance/Certification information:  PT Insurance Information: [email or web address] [address] (per patient 61 visits per year)  Physician Information:  Referring Practitioner: Dr Nathalie Doyle of care signed (Y/N):  N     Visit# / total visits:  3/10, (7 aquatics) 26 total   Pain level:     5/10 mid/low back      Progress Note: []  Yes  [x]  No  Next due by: Visit #10:  POC 19    Time In: 12:33    Time Out: 1:16    Subjective: \"When I'm in the water it helps tremendously but once I get out the pain is still there. Nadeen Silva gave the home exercises but it's hard to do them because I've fallen twice so I just don't like doing them. \"    Objective: Performed aquatic treatment per flow sheet to improve pain and facilitate ROM/mobility and core/LE strength. Provided verbal cues from poolside to ensure proper exercise technique and sequence. Patient tolerated treatment well indicating decreased pain upon completion of session. Most challenged by ascending/descending stairs with  Entering/exitting pool. · Observation:   · mild to moderate rounded shoulders. · Sedentary lifestyle which has led to patient being overweight, possibly causing increased pull on anatomical structures.    · Ascends/Descends stairs one step at a time entering/exiting pool  · Test measurements:       Key  B= Belt DB= Dumbells T= Theratube   H= Hydrotone N= Noodles W= Weights   P= Paddles S= Speedo equipment K= Kickboard Exercises/Activities   Warm-up/Amb    Exercises      Slow forward   3 laps 5ft  HR/TR      Slow sideways  3 laps 5ft  Marches  2 laps    Slow backwards  3 laps 5ft  Mini-squats  3x10    Medium forward  4-way SLR  x15 abd ext    Medium sideways  Hip circles/fig 8      Small shuffle  Hamstring curls  2 laps    Jog  Knee extension      Braiding  Pelvic tilts      Bicycling  Scap squeezes x20     Shoulder flex/ext    Functional  Shoulder abd/add    Step  Shoulder H. abd/add x20 P   Lifting  Shoulder IR/ER    Hand to opp knee  Rowing    Push down squat  Bilateral pull down  x25 N   UE PNF  Push/pull    LE PNF  Push downs    Wall push ups  x20 Arm circles    SLS  Elbow flex/ext      Chin tuck    Stretching  UT shrugs/rolls    Gastroc/Soleus  Rocking horse    Hamstring      SKTC  Other    Piriformis  makeshift butterly stroke    Hip flexor  float 5'    Ladder pull Float kix '    Pec stretch    float abd/add  5'    Post deltoid           Aquatic Therapy    [x] Aquatic therapy  (85236)    Timed Code Treatment Minutes:  Aqua RASHID 43'    Total Treatment Minutes:  37'     Treatment/Activity Tolerance:  [x] Patient tolerated treatment well [] Patient limited by fatique  [] Patient limited by pain [] Patient limited by other medical complications  [] Other:     Prognosis: [] Good [x] Fair  [] Poor    Patient Requires Follow-up: [x] Yes  [] No    Goals:    Short term goals  Time Frame for Short term goals: 6 weeks  Short term goal 1: Initiate HEP (MET. 16APR)  Short term goal 2: Decrease R shoulder pain to <2/10 for improved ease with ADL (not met)  Short term goal 3: Increase R shoulder PROM to WNL for improved ease with ADL and prepare for strengthening phase (Met. 7/9)     Long term goals  Time Frame for Long term goals : 12 weeks   Long term goal 1: Indep with HEP (Met.)  Long term goal 2: Decrease R shoulder pain to 0/10 for improved ease with ADL (Not met. 7/9)   Long term goal 3:  Increase R shoulder AROM to [facility] for

## 2019-07-19 ENCOUNTER — HOSPITAL ENCOUNTER (OUTPATIENT)
Dept: PHYSICAL THERAPY | Age: 64
Setting detail: THERAPIES SERIES
Discharge: HOME | End: 2019-07-19
Payer: COMMERCIAL

## 2019-07-19 PROCEDURE — 97113 AQUATIC THERAPY/EXERCISES: CPT

## 2019-07-19 NOTE — FLOWSHEET NOTE
Outpatient Physical Therapy    [x] [place]  Phone: [phone]  Fax: [phone]      [] [place]  Phone: [phone]  Fax: [phone]      Physical Therapy Aquatic Flow Sheet   Date:  2019    Patient Name:  Everette Gitelman    :  1955  Restrictions/Precautions:       Medical/Treatment Diagnosis Information:   · Diagnosis: R rotator cuff repair-revision   · Treatment Diagnosis: R shoulder pain, weakness. s/p rotator cuff revision  · Right Parascapular Pain M25.511  · Lumbar Chronic Pain M54.5  · Thoracic Chronic Pain N12.6  Insurance/Certification information:  PT Insurance Information: [employer] [address] (per patient 61 visits per year)  Physician Information:  Referring Practitioner: Dr Adan Bernard of care signed (Y/N):  N     Visit# / total visits:  4/10, (8 aquatics) 27 total   Pain level:     5/10 mid/low back      Progress Note: []  Yes  [x]  No  Next due by: Visit #10:  POC 19    Time In: 10:20    Time Out: 11:02    Subjective: Pt rpts to clinic with moderate complaints of LBP stating \"I am doing the exercises about once a day because it hurts and is difficult\". Objective: Performed aquatic treatment per flow sheet to improve pain and facilitate ROM/mobility and core/LE strength. Provided verbal cues from poolside to ensure proper exercise technique and sequence. Patient tolerated treatment well indicating decreased pain upon completion of session. Most challenged by ascending/descending stairs with  Entering/exitting pool. Re-initiated floating kicks and push/pull with K with good tolerance noted. · Observation:   · mild to moderate rounded shoulders. · Sedentary lifestyle which has led to patient being overweight, possibly causing increased pull on anatomical structures.    · Ascends/Descends stairs one step at a time entering/exiting pool  · Test measurements:       Key  B= Belt DB= Dumbells T= Theratube   H= Hydrotone N= Noodles W= Weights   P= Paddles S= Speedo equipment K=

## 2019-07-23 ENCOUNTER — HOSPITAL ENCOUNTER (OUTPATIENT)
Dept: PHYSICAL THERAPY | Age: 64
Setting detail: THERAPIES SERIES
Discharge: HOME | End: 2019-07-23
Payer: COMMERCIAL

## 2019-07-23 PROCEDURE — 97113 AQUATIC THERAPY/EXERCISES: CPT

## 2019-07-23 NOTE — FLOWSHEET NOTE
to [facility] for improved ease with ADL and lifting/reaching tasks (not metER C7, IR T12, flex 127, abd 123)  Long term goal 4: Increase R shoulder strength to 4+/5 all motions for improved ease with lifting and reaching ( Met Flexion 4+/5, abd 4+/5, IR 4+/5, ER 5/5)   Long term goal 5: DASH and/or SPADI score to <15% disabled for return to previouc level of function (Scored 44/130=34%disability. Goal DC'd used UEFI)  Long Term Goal 6: Added 7/9/19. Patient to be able to tolerate standing up to 15min to allow for simple meal prep without increase in Low back pain. Long Term Goal 7: Added 7/9/19. Patient to be able to walk community distances with no greater than 2 point increase in pain in low back. Long Term Goal 8: Added 7/9/19. Patient to be able to lift and carry a bag of groceries with mild difficulty. Plan:    [x] Continue per plan of care [] Alter current plan (see comments)  [] Plan of care initiated [] Hold pending MD visit [] Discharge     Plan for Next Session:  Progress aquatics with addition of Thoracic and lumbar Pain.       Electronically signed by:  Ladan Lorenzana PTA

## 2019-07-25 ENCOUNTER — HOSPITAL ENCOUNTER (OUTPATIENT)
Dept: PHYSICAL THERAPY | Age: 64
Setting detail: THERAPIES SERIES
Discharge: HOME | End: 2019-07-25
Payer: COMMERCIAL

## 2019-07-25 PROCEDURE — 97113 AQUATIC THERAPY/EXERCISES: CPT

## 2019-07-31 ENCOUNTER — HOSPITAL ENCOUNTER (OUTPATIENT)
Dept: PHYSICAL THERAPY | Age: 64
Setting detail: THERAPIES SERIES
Discharge: HOME | End: 2019-07-31
Payer: COMMERCIAL

## 2019-07-31 PROCEDURE — 97113 AQUATIC THERAPY/EXERCISES: CPT

## 2019-07-31 NOTE — FLOWSHEET NOTE
C7, IR T12, flex 127, abd 123)  Long term goal 4: Increase R shoulder strength to 4+/5 all motions for improved ease with lifting and reaching ( Met Flexion 4+/5, abd 4+/5, IR 4+/5, ER 5/5)   Long term goal 5: DASH and/or SPADI score to <15% disabled for return to previouc level of function (Scored 44/130=34%disability. Goal DC'd used UEFI)  Long Term Goal 6: Added 7/9/19. Patient to be able to tolerate standing up to 15min to allow for simple meal prep without increase in Low back pain. Long Term Goal 7: Added 7/9/19. Patient to be able to walk community distances with no greater than 2 point increase in pain in low back. Long Term Goal 8: Added 7/9/19. Patient to be able to lift and carry a bag of groceries with mild difficulty. Plan:     [x] Continue per plan of care [] Alter current plan (see comments)  [] Plan of care initiated [] Hold pending MD visit [] Discharge     Plan for Next Session:  Progress aquatics with addition of Thoracic and lumbar Pain.       Electronically signed by:  Ta Verdugo PTA

## 2019-08-02 ENCOUNTER — HOSPITAL ENCOUNTER (OUTPATIENT)
Dept: PHYSICAL THERAPY | Age: 64
Setting detail: THERAPIES SERIES
Discharge: HOME | End: 2019-08-02
Payer: COMMERCIAL

## 2019-08-02 PROCEDURE — 97113 AQUATIC THERAPY/EXERCISES: CPT

## 2019-08-02 NOTE — FLOWSHEET NOTE
Outpatient Physical Therapy    [x] [place]  Phone: [phone]  Fax: [phone]      [] [place]  Phone: [phone]  Fax: [phone]      Physical Therapy Aquatic Flow Sheet   Date:  2019    Patient Name:  Connor Kc    :  1955  Restrictions/Precautions:       Medical/Treatment Diagnosis Information:   · Diagnosis: R rotator cuff repair-revision   · Treatment Diagnosis: R shoulder pain, weakness. s/p rotator cuff revision  · Right Parascapular Pain M25.511  · Lumbar Chronic Pain M54.5  · Thoracic Chronic Pain N81.7  Insurance/Certification information:  PT Insurance Information: [id number] [address] (per patient 61 visits per year)   Physician Information:  Referring Practitioner: Dr Nighat Wagner of care signed (Y/N):  N     Visit# / total visits:  8/10, (12 aquatics) 31 total   Pain level:     5/10 mid/low back      Progress Note: []  Yes  [x]  No  Next due by: Visit #10:  POC 19    Time In: 12:25    Time Out:  1:06    · Subjective: Pt rpts to clinic with no change in symptoms this date. Rpts feeling better initially after therapy but pain settles back in later in day\". · Objective: Pt tolerated todays aquatic session well indicating decreased complaints of back pain post session. Pt was able to perform all RASHID per flow chart to decrease back pain with vc required from PTA at poolside to ensure safe and proper performance. Exhibits slow and labored ambulation into/ out of, and in aquatic environment. · Observation:   · mild to moderate rounded shoulders. · Sedentary lifestyle which has led to patient being overweight, possibly causing increased pull on anatomical structures.    · Ascends/Descends stairs one step at a time entering/exiting pool  · Test measurements:       Key  B= Belt DB= Dumbells T= Theratube   H= Hydrotone N= Noodles W= Weights   P= Paddles S= Speedo equipment K= Kickboard     Exercises/Activities   Warm-up/Amb    Exercises      Slow forward   3 laps 5ft  HR/TR

## 2019-08-07 ENCOUNTER — HOSPITAL ENCOUNTER (OUTPATIENT)
Dept: PHYSICAL THERAPY | Age: 64
Setting detail: THERAPIES SERIES
Discharge: HOME | End: 2019-08-07
Payer: COMMERCIAL

## 2019-08-07 PROCEDURE — 97113 AQUATIC THERAPY/EXERCISES: CPT

## 2019-08-07 PROCEDURE — 97110 THERAPEUTIC EXERCISES: CPT

## 2019-08-07 NOTE — FLOWSHEET NOTE
Outpatient Physical Therapy    [x] [place]  Phone: [phone]  Fax: [phone]      [] [place]  Phone: [phone]  Fax: [phone]      Physical Therapy Aquatic Flow Sheet   Date:  2019    Patient Name:  Aaron Dunaway    :  1955  Restrictions/Precautions:       Medical/Treatment Diagnosis Information:   · Diagnosis: R rotator cuff repair-revision   · Treatment Diagnosis: R shoulder pain, weakness. s/p rotator cuff revision  · Right Parascapular Pain M25.511  · Lumbar Chronic Pain M54.5  · Thoracic Chronic Pain D74.0  Insurance/Certification information:  PT Insurance Information: [employer] [id number] [address] (per patient 61 visits per year)   Physician Information:  Referring Practitioner: Dr Molina [facility] of care signed (Y/N):  N     Visit# / total visits:  9/10, (13 aquatics) 32 total   Pain level:     10 mid/low back      Progress Note: []  Yes  [x]  No  Next due by: Visit #10:  POC 19    Time In: 12:40   Time Out:  118    · Subjective: Pt rpts to clinic after seeing the pain Dr. Jackie Duncan she will be having a pain injection on one day and then the next day go back to the Dr to see if less pain. Patient noting has appt scheduled for  and . · Objective: Pt tolerated todays aquatic session well indicating decreased complaints of back pain post session. Pt was able to perform all RASHID per flow chart to decrease back pain with vc required from PTA at [facility] to ensure safe and proper performance. Exhibits slow and labored ambulation into/ out of, and in aquatic environment. · Observation:   · mild to moderate rounded shoulders. · Sedentary lifestyle which has led to patient being overweight, possibly causing increased pull on anatomical structures.    · Ascends/Descends stairs one step at a time entering/exiting pool  · Test measurements:       Key  B= Belt DB= Dumbells T= Theratube   H= Hydrotone N= Noodles W= Weights   P= Paddles S= Speedo equipment K= Kickboard

## 2019-08-09 ENCOUNTER — HOSPITAL ENCOUNTER (OUTPATIENT)
Dept: PHYSICAL THERAPY | Age: 64
Setting detail: THERAPIES SERIES
Discharge: HOME | End: 2019-08-09
Payer: COMMERCIAL

## 2019-08-09 PROCEDURE — 97113 AQUATIC THERAPY/EXERCISES: CPT

## 2019-08-09 NOTE — FLOWSHEET NOTE
Outpatient Physical Therapy    [x] [place]  Phone: [phone]  Fax: [phone]      [] [place]  Phone: [phone]  Fax: [phone]      Physical Therapy Aquatic Flow Sheet   Date:  2019    Patient Name:  Vinod Galeano    :  1955  Restrictions/Precautions:       Medical/Treatment Diagnosis Information:   · Diagnosis: R rotator cuff repair-revision   · Treatment Diagnosis: R shoulder pain, weakness. s/p rotator cuff revision  · Right Parascapular Pain M25.511  · Lumbar Chronic Pain M54.5  · Thoracic Chronic Pain W06.6  Insurance/Certification information:  PT Insurance Information: [employer] [address] (per patient 60 visits per year)   Physician Information:  Referring Practitioner: Dr Ju Jenkins of care signed (Y/N):  N     Visit# / total visits:  10/10, (14 aquatics) 33 total   Pain level:     5/10 mid/low back      Progress Note: []  Yes  [x]  No  Next due by: Visit #10:  POC 19    Time In: 12:22   Time Out:  1:04    · Subjective: Pt rpts to clinic with moderate complaints of LBP stating \"I have injections scheduled for the  for the purpose of eventually getting a nerve burn done in my back\". · Objective: Pt tolerated todays aquatic session well always noting decreased pain upon entering aquatic environment but pain returns upon departure. Pt has not made any improvement in back pain since starting aquatic therapy for back symptoms. Notes no difficulty or issue with shoulder this date. Agreed to d/c this date d/t not improving in symptoms. · Observation:   · mild to moderate rounded shoulders. · Sedentary lifestyle which has led to patient being overweight, possibly causing increased pull on anatomical structures.    · Ascends/Descends stairs one step at a time entering/exiting pool  · Test measurements:       Key  B= Belt DB= Dumbells T= Theratube   H= Hydrotone N= Noodles W= Weights   P= Paddles S= Speedo equipment K= Kickboard     Exercises/Activities   Warm-up/Amb Exercises      Slow forward   3 laps 5ft  HR/TR      Slow sideways  3 laps 5ft  Marches  2 laps    Slow backwards  3 laps 5ft  Mini-squats  3x10    Medium forward  4-way SLR  x15 abd ext    Medium sideways  Hip circles/fig 8      Small shuffle  Hamstring curls  2 laps    Jog  Knee extension      Braiding  Pelvic tilts      Bicycling  Scap squeezes x20     Shoulder flex/ext    Functional  Shoulder abd/add    Step  Shoulder H. abd/add x25 P   Lifting  Shoulder IR/ER    Hand to opp knee  Rowing    Push down squat  Bilateral pull down  x25 N   UE PNF  Push/pull  x25 K    LE PNF  Push downs  x25 N    Wall push ups  x25 Arm circles    SLS  Elbow flex/ext      Chin tuck    Stretching  UT shrugs/rolls    Gastroc/Soleus  Rocking horse    Hamstring      SKTC  Other    Piriformis  makeshift butterly stroke    Hip flexor  float 5'    Ladder pull Float kix 5'    Pec stretch    float abd/add  5'    Post deltoid           Aquatic Therapy     [x] Aquatic therapy  (73976)    Timed Code Treatment Minutes:  Aqua RASHID 42'    Total Treatment Minutes:  43'      Treatment/Activity Tolerance:  [x] Patient tolerated treatment well [] Patient limited by fatique  [] Patient limited by pain [] Patient limited by other medical complications  [] Other:     Prognosis: [] Good [x] Fair  [] Poor    Patient Requires Follow-up: [] Yes  [x] No    Goals:    Short term goals  Time Frame for Short term goals: 6 weeks  Short term goal 1: Initiate HEP (MET. 16APR)  Short term goal 2: Decrease R shoulder pain to <2/10 for improved ease with ADL (not met)  Short term goal 3: Increase R shoulder PROM to WNL for improved ease with ADL and prepare for strengthening phase (Met. 7/9)     Long term goals  Time Frame for Long term goals : 12 weeks   Long term goal 1: Indep with HEP (Met.)  Long term goal 2: Decrease R shoulder pain to 0/10 for improved ease with ADL (Not met. 7/9)   Long term goal 3:  Increase R shoulder AROM to [facility] for improved ease with ADL and lifting/reaching tasks (not metER C7, IR T12, flex 127, abd 123)  Long term goal 4: Increase R shoulder strength to 4+/5 all motions for improved ease with lifting and reaching ( Met Flexion 4+/5, abd 4+/5, IR 4+/5, ER 5/5)   Long term goal 5: DASH and/or SPADI score to <15% disabled for return to previouc level of function (Scored 44/130=34%disability. Goal DC'd used UEFI)  Long Term Goal 6: Added 7/9/19. Patient to be able to tolerate standing up to 15min to allow for simple meal prep without increase in Low back pain. (Rpts only tolerating 5' max standing. 09AUG)  Long Term Goal 7: Added 7/9/19. Patient to be able to walk community distances with no greater than 2 point increase in pain in low back. (Rpts increased pain to 7/10 with walking. 09AUG)     Long Term Goal 8: Added 7/9/19. Patient to be able to lift and carry a bag of groceries with mild difficulty. (Rpts trying to avoid heavy bags at all costs. Rpts spouse carrying most heavy things. 09AUG)     Plan:     [] Continue per plan of care [] Alter current plan (see comments)  [] Plan of care initiated [] Hold pending MD visit [x] Discharge     Plan for Next Session:  Pt d/c'd this date.      Electronically signed by:  Carrie Bone PTA

## 2019-08-29 DIAGNOSIS — I10 ESSENTIAL HYPERTENSION: ICD-10-CM

## 2019-08-29 RX ORDER — LISINOPRIL AND HYDROCHLOROTHIAZIDE 20; 12.5 MG/1; MG/1
TABLET ORAL
Qty: 90 TABLET | Refills: 0 | Status: SHIPPED | OUTPATIENT
Start: 2019-08-29 | End: 2019-09-25 | Stop reason: SDUPTHER

## 2019-09-16 DIAGNOSIS — E11.29 TYPE 2 DIABETES MELLITUS WITH MICROALBUMINURIA, WITHOUT LONG-TERM CURRENT USE OF INSULIN (HCC): ICD-10-CM

## 2019-09-16 DIAGNOSIS — R80.9 TYPE 2 DIABETES MELLITUS WITH MICROALBUMINURIA, WITHOUT LONG-TERM CURRENT USE OF INSULIN (HCC): ICD-10-CM

## 2019-09-16 NOTE — TELEPHONE ENCOUNTER
Py requesting 14 day supply to local pharmacy as she owes a small balance with her mail order and they will not sent out until pt pays balance.

## 2019-09-17 RX ORDER — PIOGLITAZONE HCL AND METFORMIN HCL 500; 15 MG/1; MG/1
TABLET ORAL
Qty: 60 TABLET | Refills: 0 | Status: SHIPPED | OUTPATIENT
Start: 2019-09-17 | End: 2019-09-18 | Stop reason: SDUPTHER

## 2019-09-24 ENCOUNTER — HOSPITAL ENCOUNTER (OUTPATIENT)
Dept: LAB | Age: 64
Discharge: HOME | End: 2019-09-24
Payer: COMMERCIAL

## 2019-09-24 DIAGNOSIS — I10 ESSENTIAL HYPERTENSION: ICD-10-CM

## 2019-09-24 DIAGNOSIS — E55.9 VITAMIN D DEFICIENCY: ICD-10-CM

## 2019-09-24 DIAGNOSIS — R80.9 TYPE 2 DIABETES MELLITUS WITH MICROALBUMINURIA, WITHOUT LONG-TERM CURRENT USE OF INSULIN (HCC): ICD-10-CM

## 2019-09-24 DIAGNOSIS — E11.29 TYPE 2 DIABETES MELLITUS WITH MICROALBUMINURIA, WITHOUT LONG-TERM CURRENT USE OF INSULIN (HCC): ICD-10-CM

## 2019-09-24 LAB
ALBUMIN SERPL-MCNC: 4.3 G/DL (ref 3.5–5.2)
ALBUMIN/GLOBULIN RATIO: 1.3 (ref 1–2.5)
ALP BLD-CCNC: 126 U/L (ref 35–104)
ALT SERPL-CCNC: 7 U/L (ref 5–33)
ANION GAP SERPL CALCULATED.3IONS-SCNC: 12 MMOL/L (ref 9–17)
AST SERPL-CCNC: 12 U/L
BILIRUB SERPL-MCNC: 0.31 MG/DL (ref 0.3–1.2)
BUN BLDV-MCNC: 26 MG/DL (ref 8–23)
BUN/CREAT BLD: 20 (ref 9–20)
CALCIUM SERPL-MCNC: 10.2 MG/DL (ref 8.6–10.4)
CHLORIDE BLD-SCNC: 97 MMOL/L (ref 98–107)
CHOLESTEROL, FASTING: 153 MG/DL
CHOLESTEROL/HDL RATIO: 3.2
CO2: 30 MMOL/L (ref 20–31)
CREAT SERPL-MCNC: 1.27 MG/DL (ref 0.5–0.9)
CREATININE URINE: 78.4 MG/DL (ref 28–217)
ESTIMATED AVERAGE GLUCOSE: 146 MG/DL
GFR AFRICAN AMERICAN: 52 ML/MIN
GFR NON-AFRICAN AMERICAN: 43 ML/MIN
GFR SERPL CREATININE-BSD FRML MDRD: ABNORMAL ML/MIN/{1.73_M2}
GFR SERPL CREATININE-BSD FRML MDRD: ABNORMAL ML/MIN/{1.73_M2}
GLUCOSE BLD-MCNC: 145 MG/DL (ref 70–99)
HBA1C MFR BLD: 6.7 % (ref 4.8–5.9)
HDLC SERPL-MCNC: 48 MG/DL
LDL CHOLESTEROL: 81 MG/DL (ref 0–130)
MICROALBUMIN/CREAT 24H UR: <12 MG/L
MICROALBUMIN/CREAT UR-RTO: NORMAL MCG/MG CREAT
POTASSIUM SERPL-SCNC: 5.2 MMOL/L (ref 3.7–5.3)
SODIUM BLD-SCNC: 139 MMOL/L (ref 135–144)
TOTAL PROTEIN: 7.7 G/DL (ref 6.4–8.3)
TRIGLYCERIDE, FASTING: 120 MG/DL
VITAMIN D 25-HYDROXY: 42.4 NG/ML (ref 30–100)
VLDLC SERPL CALC-MCNC: NORMAL MG/DL (ref 1–30)

## 2019-09-24 PROCEDURE — 82043 UR ALBUMIN QUANTITATIVE: CPT

## 2019-09-24 PROCEDURE — 80053 COMPREHEN METABOLIC PANEL: CPT

## 2019-09-24 PROCEDURE — 36415 COLL VENOUS BLD VENIPUNCTURE: CPT

## 2019-09-24 PROCEDURE — 83036 HEMOGLOBIN GLYCOSYLATED A1C: CPT

## 2019-09-24 PROCEDURE — 82570 ASSAY OF URINE CREATININE: CPT

## 2019-09-24 PROCEDURE — 80061 LIPID PANEL: CPT

## 2019-09-24 PROCEDURE — 82306 VITAMIN D 25 HYDROXY: CPT

## 2019-09-25 ENCOUNTER — OFFICE VISIT (OUTPATIENT)
Dept: FAMILY MEDICINE CLINIC | Age: 64
End: 2019-09-25
Payer: COMMERCIAL

## 2019-09-25 VITALS
SYSTOLIC BLOOD PRESSURE: 104 MMHG | HEIGHT: 68 IN | WEIGHT: 276.4 LBS | BODY MASS INDEX: 41.89 KG/M2 | DIASTOLIC BLOOD PRESSURE: 62 MMHG | TEMPERATURE: 97.7 F | RESPIRATION RATE: 16 BRPM | HEART RATE: 78 BPM

## 2019-09-25 DIAGNOSIS — E78.2 MIXED HYPERLIPIDEMIA: ICD-10-CM

## 2019-09-25 DIAGNOSIS — E55.9 VITAMIN D DEFICIENCY: ICD-10-CM

## 2019-09-25 DIAGNOSIS — R80.9 TYPE 2 DIABETES MELLITUS WITH MICROALBUMINURIA, WITHOUT LONG-TERM CURRENT USE OF INSULIN (HCC): Primary | ICD-10-CM

## 2019-09-25 DIAGNOSIS — G44.201 INTRACTABLE TENSION-TYPE HEADACHE, UNSPECIFIED CHRONICITY PATTERN: ICD-10-CM

## 2019-09-25 DIAGNOSIS — R30.0 DYSURIA: ICD-10-CM

## 2019-09-25 DIAGNOSIS — I10 ESSENTIAL HYPERTENSION: ICD-10-CM

## 2019-09-25 DIAGNOSIS — E11.29 TYPE 2 DIABETES MELLITUS WITH MICROALBUMINURIA, WITHOUT LONG-TERM CURRENT USE OF INSULIN (HCC): Primary | ICD-10-CM

## 2019-09-25 PROCEDURE — 99214 OFFICE O/P EST MOD 30 MIN: CPT | Performed by: FAMILY MEDICINE

## 2019-09-25 RX ORDER — DULOXETIN HYDROCHLORIDE 60 MG/1
CAPSULE, DELAYED RELEASE ORAL
Qty: 90 CAPSULE | Refills: 1 | Status: SHIPPED | OUTPATIENT
Start: 2019-09-25 | End: 2020-03-23

## 2019-09-25 RX ORDER — TRAMADOL HYDROCHLORIDE 50 MG/1
50 TABLET ORAL EVERY 6 HOURS PRN
Refills: 0 | COMMUNITY
Start: 2019-09-10 | End: 2020-07-02 | Stop reason: ALTCHOICE

## 2019-09-25 RX ORDER — LISINOPRIL AND HYDROCHLOROTHIAZIDE 20; 12.5 MG/1; MG/1
TABLET ORAL
Qty: 90 TABLET | Refills: 1 | Status: SHIPPED | OUTPATIENT
Start: 2019-09-25 | End: 2020-02-12 | Stop reason: SDUPTHER

## 2019-09-25 RX ORDER — CHOLECALCIFEROL (VITAMIN D3) 1250 MCG
CAPSULE ORAL
Qty: 13 CAPSULE | Refills: 1 | Status: SHIPPED | OUTPATIENT
Start: 2019-09-25 | End: 2021-07-28 | Stop reason: SDUPTHER

## 2019-09-25 RX ORDER — PIOGLITAZONE HCL AND METFORMIN HCL 500; 15 MG/1; MG/1
TABLET ORAL
Qty: 180 TABLET | Refills: 1 | Status: SHIPPED | OUTPATIENT
Start: 2019-09-25 | End: 2020-02-12 | Stop reason: SDUPTHER

## 2019-09-25 ASSESSMENT — PATIENT HEALTH QUESTIONNAIRE - PHQ9
1. LITTLE INTEREST OR PLEASURE IN DOING THINGS: 0
SUM OF ALL RESPONSES TO PHQ9 QUESTIONS 1 & 2: 0
2. FEELING DOWN, DEPRESSED OR HOPELESS: 0
SUM OF ALL RESPONSES TO PHQ QUESTIONS 1-9: 0
SUM OF ALL RESPONSES TO PHQ QUESTIONS 1-9: 0

## 2019-09-26 ENCOUNTER — HOSPITAL ENCOUNTER (OUTPATIENT)
Dept: LAB | Age: 64
Discharge: HOME | End: 2019-09-26
Payer: COMMERCIAL

## 2019-09-26 DIAGNOSIS — R30.0 DYSURIA: ICD-10-CM

## 2019-09-26 DIAGNOSIS — N39.0 URINARY TRACT INFECTION WITHOUT HEMATURIA, SITE UNSPECIFIED: Primary | ICD-10-CM

## 2019-09-26 LAB
-: ABNORMAL
AMORPHOUS: ABNORMAL
BACTERIA: ABNORMAL
BILIRUBIN URINE: ABNORMAL
CASTS UA: ABNORMAL /LPF (ref 0–2)
CASTS UA: ABNORMAL /LPF (ref 0–2)
COLOR: ABNORMAL
COMMENT UA: ABNORMAL
CRYSTALS, UA: ABNORMAL /HPF
EPITHELIAL CELLS UA: >50 /HPF (ref 0–5)
GLUCOSE URINE: ABNORMAL
KETONES, URINE: ABNORMAL
LEUKOCYTE ESTERASE, URINE: ABNORMAL
MUCUS: ABNORMAL
NITRITE, URINE: POSITIVE
OTHER OBSERVATIONS UA: ABNORMAL
PH UA: 5 (ref 5–6)
PROTEIN UA: ABNORMAL
RBC UA: ABNORMAL /HPF (ref 0–4)
RENAL EPITHELIAL, UA: ABNORMAL /HPF
SPECIFIC GRAVITY UA: 1.02 (ref 1.01–1.02)
TRICHOMONAS: ABNORMAL
TURBIDITY: ABNORMAL
URINE HGB: NEGATIVE
UROBILINOGEN, URINE: ABNORMAL
WBC UA: >100 /HPF (ref 0–4)
YEAST: ABNORMAL

## 2019-09-26 PROCEDURE — 87186 SC STD MICRODIL/AGAR DIL: CPT

## 2019-09-26 PROCEDURE — 87086 URINE CULTURE/COLONY COUNT: CPT

## 2019-09-26 PROCEDURE — 81001 URINALYSIS AUTO W/SCOPE: CPT

## 2019-09-26 PROCEDURE — 87077 CULTURE AEROBIC IDENTIFY: CPT

## 2019-09-26 RX ORDER — CIPROFLOXACIN 500 MG/1
500 TABLET, FILM COATED ORAL 2 TIMES DAILY
Qty: 14 TABLET | Refills: 0 | Status: SHIPPED | OUTPATIENT
Start: 2019-09-26 | End: 2019-10-03

## 2019-09-28 LAB
CULTURE: ABNORMAL
Lab: ABNORMAL
SPECIMEN DESCRIPTION: ABNORMAL

## 2020-01-13 ENCOUNTER — HOSPITAL ENCOUNTER (EMERGENCY)
Age: 65
Discharge: HOME | End: 2020-01-13
Attending: EMERGENCY MEDICINE
Payer: COMMERCIAL

## 2020-01-13 ENCOUNTER — APPOINTMENT (OUTPATIENT)
Dept: CT IMAGING | Age: 65
End: 2020-01-13
Payer: COMMERCIAL

## 2020-01-13 VITALS
DIASTOLIC BLOOD PRESSURE: 70 MMHG | HEART RATE: 70 BPM | WEIGHT: 273 LBS | OXYGEN SATURATION: 99 % | TEMPERATURE: 98.6 F | SYSTOLIC BLOOD PRESSURE: 150 MMHG | BODY MASS INDEX: 41.51 KG/M2 | RESPIRATION RATE: 16 BRPM

## 2020-01-13 LAB
ABSOLUTE EOS #: 0.05 K/UL (ref 0–0.44)
ABSOLUTE IMMATURE GRANULOCYTE: 0.03 K/UL (ref 0–0.3)
ABSOLUTE LYMPH #: 1.38 K/UL (ref 1.1–3.7)
ABSOLUTE MONO #: 0.65 K/UL (ref 0.1–1.2)
ALBUMIN SERPL-MCNC: 4.2 G/DL (ref 3.5–5.2)
ALBUMIN/GLOBULIN RATIO: 1.2 (ref 1–2.5)
ALP BLD-CCNC: 122 U/L (ref 35–104)
ALT SERPL-CCNC: 5 U/L (ref 5–33)
AMYLASE: 42 U/L (ref 28–100)
ANION GAP SERPL CALCULATED.3IONS-SCNC: 12 MMOL/L (ref 9–17)
AST SERPL-CCNC: 11 U/L
BASOPHILS # BLD: 1 % (ref 0–2)
BASOPHILS ABSOLUTE: 0.04 K/UL (ref 0–0.2)
BILIRUB SERPL-MCNC: 0.45 MG/DL (ref 0.3–1.2)
BUN BLDV-MCNC: 21 MG/DL (ref 8–23)
BUN/CREAT BLD: 19 (ref 9–20)
CALCIUM SERPL-MCNC: 10 MG/DL (ref 8.6–10.4)
CHLORIDE BLD-SCNC: 98 MMOL/L (ref 98–107)
CO2: 28 MMOL/L (ref 20–31)
CREAT SERPL-MCNC: 1.09 MG/DL (ref 0.5–0.9)
DIFFERENTIAL TYPE: ABNORMAL
EKG ATRIAL RATE: 67 BPM
EKG P AXIS: 34 DEGREES
EKG P-R INTERVAL: 182 MS
EKG Q-T INTERVAL: 404 MS
EKG QRS DURATION: 98 MS
EKG QTC CALCULATION (BAZETT): 426 MS
EKG R AXIS: 10 DEGREES
EKG T AXIS: 28 DEGREES
EKG VENTRICULAR RATE: 67 BPM
EOSINOPHILS RELATIVE PERCENT: 1 % (ref 1–4)
GFR AFRICAN AMERICAN: >60 ML/MIN
GFR NON-AFRICAN AMERICAN: 51 ML/MIN
GFR SERPL CREATININE-BSD FRML MDRD: ABNORMAL ML/MIN/{1.73_M2}
GFR SERPL CREATININE-BSD FRML MDRD: ABNORMAL ML/MIN/{1.73_M2}
GLUCOSE BLD-MCNC: 135 MG/DL (ref 70–99)
HCT VFR BLD CALC: 36.3 % (ref 36.3–47.1)
HEMOGLOBIN: 11.5 G/DL (ref 11.9–15.1)
IMMATURE GRANULOCYTES: 0 %
LIPASE: 15 U/L (ref 13–60)
LYMPHOCYTES # BLD: 17 % (ref 24–43)
MAGNESIUM: 2 MG/DL (ref 1.6–2.6)
MCH RBC QN AUTO: 28.2 PG (ref 25.2–33.5)
MCHC RBC AUTO-ENTMCNC: 31.7 G/DL (ref 25.2–33.5)
MCV RBC AUTO: 89 FL (ref 82.6–102.9)
MONOCYTES # BLD: 8 % (ref 3–12)
NRBC AUTOMATED: 0 PER 100 WBC
PDW BLD-RTO: 14.1 % (ref 11.8–14.4)
PLATELET # BLD: 241 K/UL (ref 138–453)
PLATELET ESTIMATE: ABNORMAL
PMV BLD AUTO: 10.4 FL (ref 8.1–13.5)
POTASSIUM SERPL-SCNC: 3.9 MMOL/L (ref 3.7–5.3)
RBC # BLD: 4.08 M/UL (ref 3.95–5.11)
RBC # BLD: ABNORMAL 10*6/UL
SEG NEUTROPHILS: 73 % (ref 36–65)
SEGMENTED NEUTROPHILS ABSOLUTE COUNT: 5.88 K/UL (ref 1.5–8.1)
SODIUM BLD-SCNC: 138 MMOL/L (ref 135–144)
TOTAL PROTEIN: 7.6 G/DL (ref 6.4–8.3)
TROPONIN INTERP: NORMAL
TROPONIN T: NORMAL NG/ML
TROPONIN, HIGH SENSITIVITY: 11 NG/L (ref 0–14)
WBC # BLD: 8 K/UL (ref 3.5–11.3)
WBC # BLD: ABNORMAL 10*3/UL

## 2020-01-13 PROCEDURE — 85025 COMPLETE CBC W/AUTO DIFF WBC: CPT

## 2020-01-13 PROCEDURE — 84484 ASSAY OF TROPONIN QUANT: CPT

## 2020-01-13 PROCEDURE — 83735 ASSAY OF MAGNESIUM: CPT

## 2020-01-13 PROCEDURE — 96375 TX/PRO/DX INJ NEW DRUG ADDON: CPT

## 2020-01-13 PROCEDURE — 82150 ASSAY OF AMYLASE: CPT

## 2020-01-13 PROCEDURE — 2580000003 HC RX 258: Performed by: EMERGENCY MEDICINE

## 2020-01-13 PROCEDURE — 2500000003 HC RX 250 WO HCPCS: Performed by: EMERGENCY MEDICINE

## 2020-01-13 PROCEDURE — 80053 COMPREHEN METABOLIC PANEL: CPT

## 2020-01-13 PROCEDURE — 6360000002 HC RX W HCPCS: Performed by: EMERGENCY MEDICINE

## 2020-01-13 PROCEDURE — 96374 THER/PROPH/DIAG INJ IV PUSH: CPT

## 2020-01-13 PROCEDURE — 74176 CT ABD & PELVIS W/O CONTRAST: CPT

## 2020-01-13 PROCEDURE — 6370000000 HC RX 637 (ALT 250 FOR IP): Performed by: EMERGENCY MEDICINE

## 2020-01-13 PROCEDURE — 83690 ASSAY OF LIPASE: CPT

## 2020-01-13 PROCEDURE — 93005 ELECTROCARDIOGRAM TRACING: CPT | Performed by: EMERGENCY MEDICINE

## 2020-01-13 PROCEDURE — 99284 EMERGENCY DEPT VISIT MOD MDM: CPT

## 2020-01-13 RX ORDER — 0.9 % SODIUM CHLORIDE 0.9 %
500 INTRAVENOUS SOLUTION INTRAVENOUS ONCE
Status: COMPLETED | OUTPATIENT
Start: 2020-01-13 | End: 2020-01-13

## 2020-01-13 RX ORDER — MAGNESIUM HYDROXIDE/ALUMINUM HYDROXICE/SIMETHICONE 120; 1200; 1200 MG/30ML; MG/30ML; MG/30ML
15 SUSPENSION ORAL ONCE
Status: COMPLETED | OUTPATIENT
Start: 2020-01-13 | End: 2020-01-13

## 2020-01-13 RX ORDER — LIDOCAINE HYDROCHLORIDE 20 MG/ML
5 SOLUTION OROPHARYNGEAL
Status: DISCONTINUED | OUTPATIENT
Start: 2020-01-13 | End: 2020-01-13 | Stop reason: HOSPADM

## 2020-01-13 RX ORDER — PANTOPRAZOLE SODIUM 20 MG/1
20 TABLET, DELAYED RELEASE ORAL DAILY
Qty: 14 TABLET | Refills: 0 | Status: SHIPPED | OUTPATIENT
Start: 2020-01-13 | End: 2020-05-07 | Stop reason: ALTCHOICE

## 2020-01-13 RX ORDER — ONDANSETRON 2 MG/ML
4 INJECTION INTRAMUSCULAR; INTRAVENOUS ONCE
Status: COMPLETED | OUTPATIENT
Start: 2020-01-13 | End: 2020-01-13

## 2020-01-13 RX ORDER — PROMETHAZINE HYDROCHLORIDE 25 MG/1
25 TABLET ORAL 3 TIMES DAILY PRN
Qty: 15 TABLET | Refills: 0 | Status: SHIPPED | OUTPATIENT
Start: 2020-01-13 | End: 2020-01-23 | Stop reason: SDUPTHER

## 2020-01-13 RX ADMIN — ALUMINUM HYDROXIDE, MAGNESIUM HYDROXIDE, AND SIMETHICONE 15 ML: 200; 200; 20 SUSPENSION ORAL at 10:53

## 2020-01-13 RX ADMIN — ONDANSETRON 4 MG: 2 INJECTION INTRAMUSCULAR; INTRAVENOUS at 09:45

## 2020-01-13 RX ADMIN — LIDOCAINE HYDROCHLORIDE 5 ML: 20 SOLUTION ORAL; TOPICAL at 10:54

## 2020-01-13 RX ADMIN — SODIUM CHLORIDE 500 ML: 0.9 INJECTION, SOLUTION INTRAVENOUS at 09:44

## 2020-01-13 RX ADMIN — FAMOTIDINE 20 MG: 10 INJECTION, SOLUTION INTRAVENOUS at 09:45

## 2020-01-13 ASSESSMENT — ENCOUNTER SYMPTOMS
NAUSEA: 1
DIARRHEA: 0
VOMITING: 0
SORE THROAT: 0
SHORTNESS OF BREATH: 0

## 2020-01-13 ASSESSMENT — PAIN SCALES - GENERAL
PAINLEVEL_OUTOF10: 3
PAINLEVEL_OUTOF10: 2
PAINLEVEL_OUTOF10: 8

## 2020-01-13 NOTE — ED PROVIDER NOTES
tablet Per ortho, R-0Historical Med      pioglitazone-metformin (ACTOPLUS MET)  MG per tablet Take one tablet twice a day with meals, Disp-180 tablet, R-1Normal      lisinopril-hydrochlorothiazide (PRINZIDE;ZESTORETIC) 20-12.5 MG per tablet TAKE 1 TABLET DAILY, Disp-90 tablet, R-1Normal      Cholecalciferol (VITAMIN D3) 69643 units CAPS TAKE 1 CAPSULE ONCE A WEEK, Disp-13 capsule, R-1Normal      DULoxetine (CYMBALTA) 60 MG extended release capsule TAKE 1 CAPSULE DAILY, Disp-90 capsule, R-1Normal      cloNIDine (CATAPRES) 0.1 MG tablet Take 0.1 mg by mouth 2 times daily as needed Historical Med      Gabapentin (NEURONTIN PO) Take 600 mg by mouth 5 times daily Historical Med      Handicap Placard [facility] [place], [place] Starting Tue 2/5/2019, Disp-1 each, R-0, PrintIssue parking placard for person with disability; Applicant meets the qualifying disability criteria. Length of time expected to have disability_____Lifetime  Other __x__. Prescription expires in 5 years fro m issuing date. 02/05/2024      Misc. Devices (ROLLATOR) MISC Disp-1 each, R-0, PrintUse to reduce risk of falls when walking.              ALLERGIES     Asa [aspirin]    FAMILY HISTORY       Family History   Problem Relation Age of Onset    Cancer Mother     Hypertension Father     Diabetes Father     Cancer Father           SOCIAL HISTORY       Social History     Socioeconomic History    Marital status:      Spouse name: None    Number of children: None    Years of education: None    Highest education level: None   Occupational History    None   Social Needs    Financial resource strain: None    Food insecurity:     Worry: None     Inability: None    Transportation needs:     Medical: None     Non-medical: None   Tobacco Use    Smoking status: Never Smoker    Smokeless tobacco: Never Used   Substance and Sexual Activity    Alcohol use: No     Alcohol/week: 0.0 standard drinks    Drug use: No    Sexual activity: None   Lifestyle    Physical activity:     Days per week: None     Minutes per session: None    Stress: None   Relationships    Social connections:     Talks on phone: None     Gets together: None     Attends Voodoo service: None     Active member of club or organization: None     Attends meetings of clubs or organizations: None     Relationship status: None    Intimate partner violence:     Fear of current or ex partner: None     Emotionally abused: None     Physically abused: None     Forced sexual activity: None   Other Topics Concern    None   Social History Narrative    None       SCREENINGS             PHYSICAL EXAM    (up to 7 for level 4, 8 or more for level 5)     ED Triage Vitals [01/13/20 0910]   BP Temp Temp Source Pulse Resp SpO2 Height Weight   -- 98.6 °F (37 °C) Tympanic 83 20 98 % -- 273 lb (123.8 kg)       Physical Exam  Vitals signs and nursing note reviewed. Constitutional:       General: She is not in acute distress. Appearance: Normal appearance. She is not ill-appearing or toxic-appearing. HENT:      Head: Normocephalic and atraumatic. Nose: Nose normal. No congestion. Mouth/Throat:      Mouth: Mucous membranes are moist.   Eyes:      General:         Right eye: No discharge. Left eye: No discharge. Conjunctiva/sclera: Conjunctivae normal.   Neck:      Musculoskeletal: Normal range of motion. Cardiovascular:      Rate and Rhythm: Normal rate and regular rhythm. Pulses: Normal pulses. Heart sounds: Normal heart sounds. No murmur. Pulmonary:      Effort: Pulmonary effort is normal. No respiratory distress. Breath sounds: Normal breath sounds. No wheezing. Abdominal:      General: Abdomen is flat. There is no distension. Palpations: Abdomen is soft. There is no mass. Tenderness: There is tenderness. There is guarding. Hernia: No hernia is present.       Comments: Mild midepigastric tenderness with some guarding, no left or right lower quadrant tenderness, no pulsatile mass appreciated on exam   Musculoskeletal: Normal range of motion. General: No deformity or signs of injury. Skin:     General: Skin is warm and dry. Capillary Refill: Capillary refill takes less than 2 seconds. Findings: No rash. Neurological:      General: No focal deficit present. Mental Status: She is alert. Mental status is at baseline. Motor: No weakness. Comments: Speaking normally. No facial asymmetry. Moving all 4 extremities. Normal gait. Psychiatric:         Mood and Affect: Mood normal.         EMERGENCY DEPARTMENT COURSE and DIFFERENTIAL DIAGNOSIS/MDM:   Vitals:    Vitals:    01/13/20 0910 01/13/20 0930 01/13/20 1018 01/13/20 1114   BP: (!) 214/100 (!) 166/79 (!) 188/81 (!) 150/70   Pulse: 83  68 70   Resp: 20  16 16   Temp: 98.6 °F (37 °C)      TempSrc: Tympanic      SpO2: 98%  99% 99%   Weight: 273 lb (123.8 kg)          Patient presents to the emergency department with the complaints described above. Vitals are grossly normal and the patient is nontoxic. Physical examination reveals some mild midepigastric tenderness. At this time, based on her age, hypertension and type 2 diabetes she will need rule out of an acute coronary syndrome though I have low suspicion for this. I will also be getting a full metabolic work-up that includes pancreatic enzymes, I will give some IV fluids, IV antiemetics, IV antacids and reevaluate.       DIAGNOSTIC RESULTS     LABS:  Labs Reviewed   CBC WITH AUTO DIFFERENTIAL - Abnormal; Notable for the following components:       Result Value    Hemoglobin 11.5 (*)     Seg Neutrophils 73 (*)     Lymphocytes 17 (*)     All other components within normal limits   COMPREHENSIVE METABOLIC PANEL - Abnormal; Notable for the following components:    Glucose 135 (*)     CREATININE 1.09 (*)     Alkaline Phosphatase 122 (*)     GFR Non- 51 (*)     All other components within normal limits   MAGNESIUM TROPONIN   LIPASE   AMYLASE       All other labs were within normal range or not returned as of this dictation. RADIOLOGY:  CT ABDOMEN PELVIS WO CONTRAST Additional Contrast? None   Final Result   1. Subtle perigastric stranding adjacent to the gastric fundus, potentially   related to mild gastritis. 2. Mild gallbladder distention without findings of cholelithiasis or acute   cholecystitis. Consider further evaluation with sonography or a nuclear   medicine hepatobiliary scan if there are clinical findings of cholecystitis. ED Course as of Jan 13 1912   Mon Jan 13, 2020   1153 Twelve lead EKG interpreted by myself:  A 12 lead EKG done at 0932, interpreted by myself, showed a regular rhythm at a rate of 67bpm.  The HI interval was normal.  The QRS complex was normal.  There was no ST segment elevation or depression, T wave inversion not present. QRS progression through precordial leads was grossly normal.  Interpretation: Sinus rhythm, no ST segment changes and no pattern consistent with acute ischemia or infarct    [TS]   1053 Reevaluation patient did improve. Her blood pressure was coming down just a little bit. Blood work is grossly unremarkable with the exception of slight elevation in creatinine, I had ordered a CT scan of the abdomen and pelvis without contrast which shows some inflammation around the stomach but otherwise no significant or acute abnormalities. Going to give her a GI cocktail that consists of Maalox and lidocaine and I will then reevaluate. [TS]   1254 On reevaluation, patient resting comfortably, continued to improve. I am going to discharge her with a short course of antacid treatment as well as nausea medication. I have talked about the importance of PCP follow-up and what to return to the ER for    At this time the patient is without objective evidence of an acute process requiring hospitalization or inpatient management.  They have remained hemodynamically

## 2020-01-23 RX ORDER — PROMETHAZINE HYDROCHLORIDE 25 MG/1
25 TABLET ORAL 3 TIMES DAILY PRN
Qty: 30 TABLET | Refills: 0 | Status: SHIPPED | OUTPATIENT
Start: 2020-01-23 | End: 2020-03-05 | Stop reason: SDUPTHER

## 2020-02-12 ENCOUNTER — OFFICE VISIT (OUTPATIENT)
Dept: FAMILY MEDICINE CLINIC | Age: 65
End: 2020-02-12
Payer: COMMERCIAL

## 2020-02-12 VITALS
RESPIRATION RATE: 16 BRPM | BODY MASS INDEX: 41.95 KG/M2 | HEART RATE: 80 BPM | HEIGHT: 68 IN | SYSTOLIC BLOOD PRESSURE: 102 MMHG | DIASTOLIC BLOOD PRESSURE: 64 MMHG | WEIGHT: 276.8 LBS

## 2020-02-12 PROCEDURE — 90471 IMMUNIZATION ADMIN: CPT | Performed by: FAMILY MEDICINE

## 2020-02-12 PROCEDURE — 90686 IIV4 VACC NO PRSV 0.5 ML IM: CPT | Performed by: FAMILY MEDICINE

## 2020-02-12 PROCEDURE — 99214 OFFICE O/P EST MOD 30 MIN: CPT | Performed by: FAMILY MEDICINE

## 2020-02-12 RX ORDER — PIOGLITAZONE HCL AND METFORMIN HCL 500; 15 MG/1; MG/1
TABLET ORAL
Qty: 180 TABLET | Refills: 1 | Status: SHIPPED | OUTPATIENT
Start: 2020-02-12 | End: 2020-02-13 | Stop reason: SDUPTHER

## 2020-02-12 RX ORDER — LISINOPRIL AND HYDROCHLOROTHIAZIDE 20; 12.5 MG/1; MG/1
TABLET ORAL
Qty: 90 TABLET | Refills: 1 | Status: SHIPPED | OUTPATIENT
Start: 2020-02-12 | End: 2020-02-13 | Stop reason: SDUPTHER

## 2020-02-12 RX ORDER — ONDANSETRON 4 MG/1
4-8 TABLET, ORALLY DISINTEGRATING ORAL EVERY 8 HOURS PRN
Qty: 30 TABLET | Refills: 0 | Status: SHIPPED | OUTPATIENT
Start: 2020-02-12 | End: 2020-04-02 | Stop reason: SDUPTHER

## 2020-02-12 ASSESSMENT — PATIENT HEALTH QUESTIONNAIRE - PHQ9
1. LITTLE INTEREST OR PLEASURE IN DOING THINGS: 0
SUM OF ALL RESPONSES TO PHQ QUESTIONS 1-9: 0
SUM OF ALL RESPONSES TO PHQ9 QUESTIONS 1 & 2: 0
SUM OF ALL RESPONSES TO PHQ QUESTIONS 1-9: 0
2. FEELING DOWN, DEPRESSED OR HOPELESS: 0

## 2020-02-12 NOTE — PATIENT INSTRUCTIONS
Patient Education        Constipation: Care Instructions  Your Care Instructions    Constipation means that you have a hard time passing stools (bowel movements). People pass stools from 3 times a day to once every 3 days. What is normal for you may be different. Constipation may occur with pain in the rectum and cramping. The pain may get worse when you try to pass stools. Sometimes there are small amounts of bright red blood on toilet paper or the surface of stools. This is because of enlarged veins near the rectum (hemorrhoids). A few changes in your diet and lifestyle may help you avoid ongoing constipation. Your doctor may also prescribe medicine to help loosen your stool. Some medicines can cause constipation. These include pain medicines and antidepressants. Tell your doctor about all the medicines you take. Your doctor may want to make a medicine change to ease your symptoms. Follow-up care is a key part of your treatment and safety. Be sure to make and go to all appointments, and call your doctor if you are having problems. It's also a good idea to know your test results and keep a list of the medicines you take. How can you care for yourself at home? · Drink plenty of fluids, enough so that your urine is light yellow or clear like water. If you have kidney, heart, or liver disease and have to limit fluids, talk with your doctor before you increase the amount of fluids you drink. · Include high-fiber foods in your diet each day. These include fruits, vegetables, beans, and whole grains. · Get at least 30 minutes of exercise on most days of the week. Walking is a good choice. You also may want to do other activities, such as running, swimming, cycling, or playing tennis or team sports. · Take a fiber supplement, such as Citrucel or Metamucil, every day. Read and follow all instructions on the label. · Schedule time each day for a bowel movement. A daily routine may help.  Take your time having your bowel movement. · Support your feet with a small step stool when you sit on the toilet. This helps flex your hips and places your pelvis in a squatting position. · Your doctor may recommend an over-the-counter laxative to relieve your constipation. Examples are Milk of Magnesia and MiraLax. Read and follow all instructions on the label. Do not use laxatives on a long-term basis. When should you call for help? Call your doctor now or seek immediate medical care if:    · You have new or worse belly pain.     · You have new or worse nausea or vomiting.     · You have blood in your stools.    Watch closely for changes in your health, and be sure to contact your doctor if:    · Your constipation is getting worse.     · You do not get better as expected. Where can you learn more? Go to [email or web address] and sign in to your [employer] account. Enter 21 [phone] in the [employer] box to learn more about \"Constipation: Care Instructions. \"     If you do not have an account, please click on the \"Sign Up Now\" link. Current as of: June 26, 2019  Content Version: 12.3  © 3894-4701 Healthwise, Incorporated. Care instructions adapted under license by [facility] ([facility]). If you have questions about a medical condition or this instruction, always ask your healthcare professional. [address] any warranty or liability for your use of this information.

## 2020-02-12 NOTE — PROGRESS NOTES
[address], [address] [place] [address] Drive                        Telephone [phone]             Fax [phone]    Jennifer Ny  1955  MRN:  [id number]  Date of visit:  2020    Subjective:    Jennifer Ny is a 59 y.o.  female who presents to [facility] today (2020) for annual female exam and Pap smear. Her last Pap test was 2016 and was negative for intraepithelial lesion or malignancy, but had reactive cellular changes (encompasses typical repair). She is a . Patient's last menstrual period was 2010 (approximate). She has had no abnormal Pap tests that she is aware of. Patient is having issue with passing hard stools, abdominal cramping and nausea. She states that her current symptoms started 6 days ago. Patient also requests a referral to orthopedics for left knee pain. She also is having neuropathy pain/numbness in both feet. She was seen at the ER on 2020 for nausea. She continues to have issues with nausea.        She has the following problem list:  Patient Active Problem List   Diagnosis    Shoulder pain, bilateral    Cervical spine pain    Knee pain, left    Essential hypertension    Pulmonary hypertension ([address]    Vitamin D deficiency    Encounter for long-term (current) use of other medications    Pneumonia    Headache    Subacute sphenoidal sinusitis    Type 2 diabetes mellitus with microalbuminuria ([address]    Peripheral neuropathy    Dizziness    H/O fall    Chronic sinusitis    Gastroesophageal reflux disease    Microalbuminuria    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HCC)    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 Per ortho  0    pioglitazone-metformin (ACTOPLUS MET)  MG per tablet Take one tablet twice a day with meals 180 tablet 1    lisinopril-hydrochlorothiazide (PRINZIDE;ZESTORETIC) 20-12.5 MG per tablet TAKE 1 TABLET DAILY 90 tablet 1    Cholecalciferol (VITAMIN D3) 43189 units CAPS TAKE 1 CAPSULE ONCE A WEEK 13 capsule 1    DULoxetine (CYMBALTA) 60 MG extended release capsule TAKE 1 CAPSULE DAILY 90 capsule 1    Gabapentin (NEURONTIN PO) Take 600 mg by mouth 5 times daily       Handicap Placard MISC by Does not apply route Issue parking placard for person with disability; Applicant meets the qualifying disability criteria. Length of time expected to have disability_____Lifetime  Other __x__. Prescription expires in 5 years from issuing date. 02/05/2024 1 each 0    Misc. Devices (ROLLATOR) MISC Use to reduce risk of falls when walking. 1 each 0    pantoprazole (PROTONIX) 20 MG tablet Take 1 tablet by mouth daily for 14 days 14 tablet 0    cloNIDine (CATAPRES) 0.1 MG tablet Take 0.1 mg by mouth 2 times daily as needed       gabapentin (NEURONTIN) 600 MG tablet Take 1 tablet by mouth 5 times daily for 30 days. . 150 tablet 0     No current facility-administered medications for this visit. She is allergic to asa [aspirin]. She  reports that she has never smoked. She has never used smokeless tobacco.      Objective:    Vitals:    02/12/20 1456   BP: 102/64   Site: Right Upper Arm   Position: Sitting   Cuff Size: Large Adult   Pulse: 80   Resp: 16   Weight: 276 lb 12.8 oz (125.6 kg)   Height: 5' 8\" (1.727 m)     Body mass index is 42.09 kg/m². Extremely obese  female, alert, no distress, cooperative . Neck supple. No adenopathy.  Thyroid symmetric, normal size,  Chest is clear to auscultation, no wheezes, rales, or rhonchi. Heart sounds are regular rate and rhythm, no murmurs. Abdomen soft, non-tender. No masses or organomegaly. Lower extremities have no edema.     Labs done 1/13/2020 were reviewed with the patient:  Admission on 01/13/2020, Discharged on 01/13/2020   Component Date Value Ref Range Status    WBC 01/13/2020 8.0  3.5 - 11.3 k/uL Final    RBC 01/13/2020 4.08  3.95 - 5.11 m/uL Final    Hemoglobin 01/13/2020 11.5* 11.9 - 15.1 g/dL Final    Hematocrit 01/13/2020 36.3  36.3 - 47.1 % Final    MCV 01/13/2020 89.0  82.6 - 102.9 fL Final    MCH 01/13/2020 28.2  25.2 - 33.5 pg Final    MCHC 01/13/2020 31.7  25.2 - 33.5 g/dL Final    RDW 01/13/2020 14.1  11.8 - 14.4 % Final    Platelets 84/13/6576 241  138 - 453 k/uL Final    MPV 01/13/2020 10.4  8.1 - 13.5 fL Final    NRBC Automated 01/13/2020 0.0  0.0 per 100 WBC Final    Differential Type 01/13/2020 NOT REPORTED   Final    WBC Morphology 01/13/2020 NOT REPORTED   Final    RBC Morphology 01/13/2020 NOT REPORTED   Final    Platelet Estimate 76/20/8190 NOT REPORTED   Final    Seg Neutrophils 01/13/2020 73* 36 - 65 % Final    Lymphocytes 01/13/2020 17* 24 - 43 % Final    Monocytes 01/13/2020 8  3 - 12 % Final    Eosinophils % 01/13/2020 1  1 - 4 % Final    Basophils 01/13/2020 1  0 - 2 % Final    Immature Granulocytes 01/13/2020 0  0 % Final    Segs Absolute 01/13/2020 5.88  1.50 - 8.10 k/uL Final    Absolute Lymph # 01/13/2020 1.38  1.10 - 3.70 k/uL Final    Absolute Mono # 01/13/2020 0.65  0.10 - 1.20 k/uL Final    Absolute Eos # 01/13/2020 0.05  0.00 - 0.44 k/uL Final    Basophils Absolute 01/13/2020 0.04  0.00 - 0.20 k/uL Final    Absolute Immature Granulocyte 01/13/2020 0.03  0.00 - 0.30 k/uL Final    Glucose 01/13/2020 135* 70 - 99 mg/dL Final    BUN 01/13/2020 21  8 - 23 mg/dL Final    CREATININE 01/13/2020 1.09* 0.50 - 0.90 mg/dL Final    Bun/Cre Ratio 01/13/2020 19  9 - 20 Final    Calcium 01/13/2020 10.0  8.6 - 10.4 mg/dL Final  Sodium 01/13/2020 138  135 - 144 mmol/L Final    Potassium 01/13/2020 3.9  3.7 - 5.3 mmol/L Final    Chloride 01/13/2020 98  98 - 107 mmol/L Final    CO2 01/13/2020 28  20 - 31 mmol/L Final    Anion Gap 01/13/2020 12  9 - 17 mmol/L Final    Alkaline Phosphatase 01/13/2020 122* 35 - 104 U/L Final    ALT 01/13/2020 5  5 - 33 U/L Final    AST 01/13/2020 11  <32 U/L Final    Total Bilirubin 01/13/2020 0.45  0.3 - 1.2 mg/dL Final    Total Protein 01/13/2020 7.6  6.4 - 8.3 g/dL Final    Alb 01/13/2020 4.2  3.5 - 5.2 g/dL Final    Albumin/Globulin Ratio 01/13/2020 1.2  1.0 - 2.5 Final    GFR Non- 01/13/2020 51* >60 mL/min Final    GFR  01/13/2020 >60  >60 mL/min Final    GFR Comment 01/13/2020        Final    Comment: Average GFR for 61-76 years old:   80 mL/min/1.73sq m  Chronic Kidney Disease:   <60 mL/min/1.73sq m  Kidney failure:   <15 mL/min/1.73sq m              eGFR calculated using average adult body mass. Additional eGFR calculator available at:        [email or web address]            GFR Staging 01/13/2020 NOT REPORTED   Final    Ventricular Rate 01/13/2020 67  BPM Final    Atrial Rate 01/13/2020 67  BPM Final    P-R Interval 01/13/2020 182  ms Final    QRS Duration 01/13/2020 98  ms Final    Q-T Interval 01/13/2020 404  ms Final    QTc Calculation (Bazett) 01/13/2020 426  ms Final    P Axis 01/13/2020 34  degrees Final    R Axis 01/13/2020 10  degrees Final    T Axis 01/13/2020 28  degrees Final    Magnesium 01/13/2020 2.0  1.6 - 2.6 mg/dL Final    Troponin, High Sensitivity 01/13/2020 11  0 - 14 ng/L Final    Comment:       High Sensitivity Troponin values cannot be compared with other Troponin methodologies. Patients with high levels of Biotin oral intake (i.e >5mg/day) may have falsely decreased   Troponin levels. Samples collected within 8 hours of biotin intake may require additional   information for diagnosis.  Troponin T 01/13/2020 NOT REPORTED  <0.03 ng/mL Final    Troponin Interp 01/13/2020 NOT REPORTED   Final    Lipase 01/13/2020 15  13 - 60 U/L Final    Amylase 01/13/2020 42  28 - 100 U/L Final     Results of the CT scan of abdomen and pelvis done 1/13/2020 were reviewed with the patient:  Impression   1. Subtle perigastric stranding adjacent to the gastric fundus, potentially   related to mild gastritis. 2. Mild gallbladder distention without findings of cholelithiasis or acute   cholecystitis.  Consider further evaluation with sonography or a nuclear   medicine hepatobiliary scan if there are clinical findings of cholecystitis. Assessment and Plan:    1. Type 2 diabetes mellitus with microalbuminuria, without long-term current use of insulin (HCC)  Her HgbA1c was 6.7 % on 9/24/2019, which is excellent. She was advised to continue her current regimen. She is due for a diabetic eye exam, and a referral was ordered:  - External Referral To Optometry    She has labs ordered to be done in 3 months. 2. Essential hypertension  Her blood pressure is well-controlled. (BP: 102/64)   She was advised to continue current medications. 3. Nausea  Progress notes and test results from her ER visit las month were reviewed. Zofran was prescribed:  - ondansetron (ZOFRAN ODT) 4 MG disintegrating tablet; Take 1-2 tablets by mouth every 8 hours as needed for Nausea or Vomiting  Dispense: 30 tablet; Refill: 0    4. Abnormal CT scan, gallbladder  I discussed the CT results with the patient. Gallbladder ultrasound was ordered:  - US Gallbladder Ruq; Future    5. Constipation  She was advised to increase fiber and fluids in her diet. Printed information regarding Constipation was provided to the patient with her after visit summary. 6. Mild anemia  - CBC Auto Differential; Future was ordered to be done today. 7.  Routine health maintenance  Health maintenance was reviewed with the patient.    Annual influenza vaccine was recommended and ordered. Screening mammogram was recommended and ordered. A Pap test was not done today per the patient's request.  I asked her to re-schedule an appointment for a Pap test in the next couple of months. Hepatitis B vaccine and Shingrix were recommended and declined. All other health maintenance, including Tdap and Pneumovax, is up to date at this time. There is no indication for Prevnar-13 at this time.          (Please note that portions of this note were completed with a voice-recognition program. Efforts were made to edit the dictation but occasionally words are mis-transcribed.)

## 2020-02-13 ENCOUNTER — HOSPITAL ENCOUNTER (OUTPATIENT)
Dept: ULTRASOUND IMAGING | Age: 65
Discharge: HOME | End: 2020-02-15
Payer: COMMERCIAL

## 2020-02-13 PROCEDURE — 76705 ECHO EXAM OF ABDOMEN: CPT

## 2020-02-13 RX ORDER — PIOGLITAZONE HCL AND METFORMIN HCL 500; 15 MG/1; MG/1
TABLET ORAL
Qty: 180 TABLET | Refills: 1 | Status: SHIPPED | OUTPATIENT
Start: 2020-02-13 | End: 2020-07-10 | Stop reason: SDUPTHER

## 2020-02-13 RX ORDER — LISINOPRIL AND HYDROCHLOROTHIAZIDE 20; 12.5 MG/1; MG/1
TABLET ORAL
Qty: 90 TABLET | Refills: 1 | Status: SHIPPED | OUTPATIENT
Start: 2020-02-13 | End: 2020-07-10 | Stop reason: SDUPTHER

## 2020-02-13 NOTE — TELEPHONE ENCOUNTER
Pt came to window stating two of her [facility] to [facility]. [facility] told [facility]. [facility].    Requested Prescriptions     Pending Prescriptions Disp Refills    lisinopril-hydrochlorothiazide (PRINZIDE;ZESTORETIC) 20-12.5 MG per tablet 90 tablet 1     Sig: TAKE 1 TABLET DAILY    pioglitazone-metformin (ACTOPLUS MET)  MG per tablet 180 tablet 1     Sig: Take one tablet twice a day with meals

## 2020-02-14 ENCOUNTER — TELEPHONE (OUTPATIENT)
Dept: FAMILY MEDICINE CLINIC | Age: 65
End: 2020-02-14

## 2020-02-14 NOTE — TELEPHONE ENCOUNTER
----- Message from Marko Pinto MD sent at 2/13/2020  1:25 PM EST -----  Please advise Ledora Opitz that the ultrasound showed a couple of gallstones. The gallbladder did not appear to be inflamed. I recommend a HIDA scan next. Please order.
None

## 2020-02-20 NOTE — PROGRESS NOTES
----- Message from Mary Blanco MD sent at 2/20/2020  9:19 AM CST -----  Please notify the patient of normal results.  Follow-up as planned.   Patient refused shingles,hepatitis B vaccine.

## 2020-02-28 ENCOUNTER — HOSPITAL ENCOUNTER (OUTPATIENT)
Dept: NUCLEAR MEDICINE | Age: 65
Discharge: HOME | End: 2020-03-01
Payer: COMMERCIAL

## 2020-02-28 PROCEDURE — 78227 HEPATOBIL SYST IMAGE W/DRUG: CPT

## 2020-02-28 PROCEDURE — 3430000000 HC RX DIAGNOSTIC RADIOPHARMACEUTICAL: Performed by: FAMILY MEDICINE

## 2020-02-28 PROCEDURE — A9537 TC99M MEBROFENIN: HCPCS | Performed by: FAMILY MEDICINE

## 2020-02-28 RX ADMIN — Medication 6 MILLICURIE: at 08:20

## 2020-03-03 ENCOUNTER — TELEPHONE (OUTPATIENT)
Dept: FAMILY MEDICINE CLINIC | Age: 65
End: 2020-03-03

## 2020-03-05 ENCOUNTER — TELEPHONE (OUTPATIENT)
Dept: FAMILY MEDICINE CLINIC | Age: 65
End: 2020-03-05

## 2020-03-05 RX ORDER — PROMETHAZINE HYDROCHLORIDE 25 MG/1
25 TABLET ORAL 3 TIMES DAILY PRN
Qty: 30 TABLET | Refills: 0 | Status: SHIPPED | OUTPATIENT
Start: 2020-03-05 | End: 2020-04-30 | Stop reason: SDUPTHER

## 2020-03-05 NOTE — TELEPHONE ENCOUNTER
Pt calling requesting a script for phenergan for her nausea, states she has an upcoming appt with a general surgeon but would like something for the nausea to be called to loaded pharmacy, please advise at above number.

## 2020-04-02 ENCOUNTER — OFFICE VISIT (OUTPATIENT)
Dept: FAMILY MEDICINE CLINIC | Age: 65
End: 2020-04-02
Payer: COMMERCIAL

## 2020-04-02 ENCOUNTER — APPOINTMENT (OUTPATIENT)
Dept: ULTRASOUND IMAGING | Age: 65
End: 2020-04-02
Payer: COMMERCIAL

## 2020-04-02 ENCOUNTER — HOSPITAL ENCOUNTER (OUTPATIENT)
Dept: LAB | Age: 65
Discharge: HOME | End: 2020-04-02
Payer: COMMERCIAL

## 2020-04-02 VITALS
WEIGHT: 277.3 LBS | SYSTOLIC BLOOD PRESSURE: 120 MMHG | TEMPERATURE: 97.5 F | BODY MASS INDEX: 43.52 KG/M2 | OXYGEN SATURATION: 98 % | HEIGHT: 67 IN | HEART RATE: 65 BPM | RESPIRATION RATE: 20 BRPM | DIASTOLIC BLOOD PRESSURE: 60 MMHG

## 2020-04-02 LAB
ABSOLUTE EOS #: 0.1 K/UL (ref 0–0.44)
ABSOLUTE IMMATURE GRANULOCYTE: 0.05 K/UL (ref 0–0.3)
ABSOLUTE LYMPH #: 1.62 K/UL (ref 1.1–3.7)
ABSOLUTE MONO #: 0.69 K/UL (ref 0.1–1.2)
ALBUMIN SERPL-MCNC: 4.2 G/DL (ref 3.5–5.2)
ALBUMIN/GLOBULIN RATIO: 1.2 (ref 1–2.5)
ALP BLD-CCNC: 157 U/L (ref 35–104)
ALT SERPL-CCNC: 32 U/L (ref 5–33)
AMYLASE: 49 U/L (ref 28–100)
ANION GAP SERPL CALCULATED.3IONS-SCNC: 15 MMOL/L (ref 9–17)
AST SERPL-CCNC: 88 U/L
BASOPHILS # BLD: 0 % (ref 0–2)
BASOPHILS ABSOLUTE: 0.04 K/UL (ref 0–0.2)
BILIRUB SERPL-MCNC: 0.53 MG/DL (ref 0.3–1.2)
BUN BLDV-MCNC: 35 MG/DL (ref 8–23)
BUN/CREAT BLD: 27 (ref 9–20)
CALCIUM SERPL-MCNC: 9.4 MG/DL (ref 8.6–10.4)
CHLORIDE BLD-SCNC: 93 MMOL/L (ref 98–107)
CO2: 28 MMOL/L (ref 20–31)
CREAT SERPL-MCNC: 1.28 MG/DL (ref 0.5–0.9)
DIFFERENTIAL TYPE: ABNORMAL
EOSINOPHILS RELATIVE PERCENT: 1 % (ref 1–4)
GFR AFRICAN AMERICAN: 51 ML/MIN
GFR NON-AFRICAN AMERICAN: 42 ML/MIN
GFR SERPL CREATININE-BSD FRML MDRD: ABNORMAL ML/MIN/{1.73_M2}
GFR SERPL CREATININE-BSD FRML MDRD: ABNORMAL ML/MIN/{1.73_M2}
GLUCOSE BLD-MCNC: 208 MG/DL (ref 70–99)
HCT VFR BLD CALC: 38.1 % (ref 36.3–47.1)
HEMOGLOBIN: 12 G/DL (ref 11.9–15.1)
IMMATURE GRANULOCYTES: 1 %
LIPASE: 24 U/L (ref 13–60)
LYMPHOCYTES # BLD: 15 % (ref 24–43)
MCH RBC QN AUTO: 28.6 PG (ref 25.2–33.5)
MCHC RBC AUTO-ENTMCNC: 31.5 G/DL (ref 25.2–33.5)
MCV RBC AUTO: 90.9 FL (ref 82.6–102.9)
MONOCYTES # BLD: 6 % (ref 3–12)
NRBC AUTOMATED: 0 PER 100 WBC
PDW BLD-RTO: 13.5 % (ref 11.8–14.4)
PLATELET # BLD: 283 K/UL (ref 138–453)
PLATELET ESTIMATE: ABNORMAL
PMV BLD AUTO: 10.9 FL (ref 8.1–13.5)
POTASSIUM SERPL-SCNC: 4.2 MMOL/L (ref 3.7–5.3)
RBC # BLD: 4.19 M/UL (ref 3.95–5.11)
RBC # BLD: ABNORMAL 10*6/UL
SEG NEUTROPHILS: 77 % (ref 36–65)
SEGMENTED NEUTROPHILS ABSOLUTE COUNT: 8.41 K/UL (ref 1.5–8.1)
SODIUM BLD-SCNC: 136 MMOL/L (ref 135–144)
TOTAL PROTEIN: 7.7 G/DL (ref 6.4–8.3)
WBC # BLD: 10.9 K/UL (ref 3.5–11.3)
WBC # BLD: ABNORMAL 10*3/UL

## 2020-04-02 PROCEDURE — 99214 OFFICE O/P EST MOD 30 MIN: CPT | Performed by: FAMILY MEDICINE

## 2020-04-02 PROCEDURE — 85025 COMPLETE CBC W/AUTO DIFF WBC: CPT

## 2020-04-02 PROCEDURE — 82150 ASSAY OF AMYLASE: CPT

## 2020-04-02 PROCEDURE — 36415 COLL VENOUS BLD VENIPUNCTURE: CPT

## 2020-04-02 PROCEDURE — 83690 ASSAY OF LIPASE: CPT

## 2020-04-02 PROCEDURE — 80053 COMPREHEN METABOLIC PANEL: CPT

## 2020-04-02 RX ORDER — ONDANSETRON 4 MG/1
4 TABLET, ORALLY DISINTEGRATING ORAL EVERY 8 HOURS PRN
Qty: 30 TABLET | Refills: 0 | Status: SHIPPED | OUTPATIENT
Start: 2020-04-02 | End: 2020-05-02

## 2020-04-02 RX ORDER — HYDROCODONE BITARTRATE AND ACETAMINOPHEN 5; 325 MG/1; MG/1
1 TABLET ORAL EVERY 4 HOURS PRN
Qty: 18 TABLET | Refills: 0 | Status: SHIPPED | OUTPATIENT
Start: 2020-04-02 | End: 2020-04-05

## 2020-04-02 ASSESSMENT — PATIENT HEALTH QUESTIONNAIRE - PHQ9
SUM OF ALL RESPONSES TO PHQ QUESTIONS 1-9: 0
1. LITTLE INTEREST OR PLEASURE IN DOING THINGS: 0
SUM OF ALL RESPONSES TO PHQ QUESTIONS 1-9: 0

## 2020-04-02 NOTE — PATIENT INSTRUCTIONS
Patient Education        Learning About Acute Cholecystitis  What is cholecystitis? Cholecystitis (say \"koh-lih-sis-TY-tus\") is inflammation of the gallbladder. The gallbladder stores bile. Bile helps the body digest food. Normally, the bile flows from the gallbladder to the small intestine. A gallstone stuck in the cystic duct is most often the cause of sudden (acute) cholecystitis. The cystic duct is the tube that carries the bile out of the gallbladder. The gallstone blocks the bile from leaving the gallbladder. This results in an irritated and swollen gallbladder. The disease can also be caused by infection or trauma, such as an injury from a car accident. Cholecystitis has to be treated right away. You will probably have to go to the hospital. Surgery is the usual treatment. What are the symptoms? Symptoms include:  · Steady and severe pain in the upper right part of belly. This is the most common symptom. The pain can sometimes move to your back or right shoulder blade. It may last for more than 6 hours. · Nausea or vomiting. · A fever. How is it treated? The main way to treat this disease is surgery to remove the gallbladder. This surgery can often be done through small cuts (incisions) in the belly. This is called a laparoscopic cholecystectomy. In some cases, you may need a more extensive surgery. You may need surgery as soon as possible. The doctor may try to reduce swelling and irritation in the gallbladder before removing it. You may be given fluids and antibiotics through an IV. You may also be given pain medicine. Follow-up care is a key part of your treatment and safety. Be sure to make and go to all appointments, and call your doctor if you are having problems. It's also a good idea to know your test results and keep a list of the medicines you take. Where can you learn more? Go to [email or web address] and sign in to your [facility] account.  Enter T940 in the Search Health Information box to learn more about \"Learning About Acute Cholecystitis. \"     If you do not have an account, please click on the \"Sign Up Now\" link. Current as of: August 11, 2019Content Version: 12.4  © 0703-6247 Healthwise, Incorporated. Care instructions adapted under license by [facility]. If you have questions about a medical condition or this instruction, always ask your healthcare professional. [place] [address] any warranty or liability for your use of this information. Warning:  One or more of the medications that you have been prescribed may cause drowsiness and impair your ability to operate vehicles or machinery. Do not drive or operate machinery while taking narcotic pain medication. Do not use in combination with alcohol.

## 2020-04-02 NOTE — PROGRESS NOTES
tablet by mouth every 8 hours as needed for Nausea or Vomiting  Dispense: 30 tablet; Refill: 0  - HYDROcodone-acetaminophen (NORCO) 5-325 MG per tablet; Take 1 tablet by mouth every 4 hours as needed for Pain for up to 3 days. Intended supply: 3 days. Take lowest dose possible to manage pain  Dispense: 18 tablet; Refill: 0    She was advised to eat a bland, low-fat diet. She will be contacted when the results of the gall bladder ultrasound are available. Printed information regarding Learning About Acute Cholecystitis was provided to the patient with her after visit summary. She was advised not to drive or operate machinery while taking narcotic pain medication or Zofran. She was also advised not to take this medication in combination with alcohol.          (Please note that portions of this note were completed with a voice-recognition program. Efforts were made to edit the dictation but occasionally words are mis-transcribed.)

## 2020-04-03 ENCOUNTER — HOSPITAL ENCOUNTER (OUTPATIENT)
Dept: ULTRASOUND IMAGING | Age: 65
Discharge: HOME | End: 2020-04-05
Payer: COMMERCIAL

## 2020-04-03 PROCEDURE — 76705 ECHO EXAM OF ABDOMEN: CPT

## 2020-04-24 RX ORDER — CLONIDINE HYDROCHLORIDE 0.1 MG/1
0.1 TABLET ORAL 2 TIMES DAILY PRN
Qty: 60 TABLET | Refills: 0 | Status: SHIPPED | OUTPATIENT
Start: 2020-04-24 | End: 2020-10-20 | Stop reason: SDUPTHER

## 2020-04-24 NOTE — TELEPHONE ENCOUNTER
Graceann Mason called requesting a refill of the below medication which has been pended for you:     Requested Prescriptions     Pending Prescriptions Disp Refills    cloNIDine (CATAPRES) 0.1 MG tablet 60 tablet      Sig: Take 1 tablet by mouth 2 times daily as needed       Last Appointment Date: 4/2/2020  Next Appointment Date: 5/14/2020    Allergies   Allergen Reactions    Asa [Aspirin] Other (See Comments)     Stomach irritation

## 2020-04-30 RX ORDER — PROMETHAZINE HYDROCHLORIDE 25 MG/1
25 TABLET ORAL 3 TIMES DAILY PRN
Qty: 30 TABLET | Refills: 0 | Status: SHIPPED | OUTPATIENT
Start: 2020-04-30 | End: 2020-05-07

## 2020-05-07 ENCOUNTER — INITIAL CONSULT (OUTPATIENT)
Dept: SURGERY | Age: 65
End: 2020-05-07
Payer: COMMERCIAL

## 2020-05-07 VITALS
DIASTOLIC BLOOD PRESSURE: 60 MMHG | BODY MASS INDEX: 42.44 KG/M2 | HEIGHT: 68 IN | SYSTOLIC BLOOD PRESSURE: 130 MMHG | TEMPERATURE: 99.2 F | WEIGHT: 280 LBS | HEART RATE: 80 BPM

## 2020-05-07 PROCEDURE — 99204 OFFICE O/P NEW MOD 45 MIN: CPT | Performed by: SURGERY

## 2020-05-07 ASSESSMENT — ENCOUNTER SYMPTOMS
SWOLLEN GLANDS: 0
VOICE CHANGE: 0
SINUS PRESSURE: 0
RHINORRHEA: 0
TROUBLE SWALLOWING: 0
SORE THROAT: 0
ANAL BLEEDING: 0
BACK PAIN: 1
BLOOD IN STOOL: 0
NAUSEA: 1
DIARRHEA: 0
CHEST TIGHTNESS: 0
EYES NEGATIVE: 1
WHEEZING: 0
ABDOMINAL DISTENTION: 1
CHANGE IN BOWEL HABIT: 1
COUGH: 0
ABDOMINAL PAIN: 1
CONSTIPATION: 1
SINUS PAIN: 0
VOMITING: 0
SHORTNESS OF BREATH: 0

## 2020-05-07 NOTE — PROGRESS NOTES
EGD scheduled for 5/27/20 at [facility]. Pre op instructions given written and verbally.
foreign body (plastic from Ruth clip from surgery 3 days prior), [facility], Dr. Jade Crawford  09/06/2005    supracervical hysterectomy bilateral salpingo oophectomy    KNEE ARTHROSCOPY Left     NERVE BLOCK  09/09/2019    right side L2 through L5 block nerve,facet joint,lumbar,medial branch per Dr Mika Pan at . [address]  2011    endoscopic mucosal resection of duodenal polyp    SHOULDER ARTHROPLASTY Right 10/18/2018    Brenden Byers MD; done at [facility] ARTHROSCOPY Right 03/07/2019    revision arthroscopy with debridement,revision mini-open rotator cuff repair per Dr Tanner Herrera at [address] [facility]       Current Outpatient Medications   Medication Sig Dispense Refill    promethazine (PHENERGAN) 25 MG tablet Take 1 tablet by mouth 3 times daily as needed for Nausea 30 tablet 0    DULoxetine (CYMBALTA) 60 MG extended release capsule TAKE 1 CAPSULE DAILY 90 capsule 0    lisinopril-hydrochlorothiazide (PRINZIDE;ZESTORETIC) 20-12.5 MG per tablet TAKE 1 TABLET DAILY 90 tablet 1    pioglitazone-metformin (ACTOPLUS MET)  MG per tablet Take one tablet twice a day with meals 180 tablet 1    traMADol (ULTRAM) 50 MG tablet Take 50 mg by mouth every 6 hours as needed. Per ortho  0    Cholecalciferol (VITAMIN D3) 71014 units CAPS TAKE 1 CAPSULE ONCE A WEEK 13 capsule 1    Gabapentin (NEURONTIN PO) Take 600 mg by mouth 5 times daily       Handicap Placard MISC by Does not apply route Issue parking placard for person with disability; Applicant meets the qualifying disability criteria. Length of time expected to have disability_____Lifetime  Other __x__. Prescription expires in 5 years from issuing date. 02/05/2024 1 each 0    Misc. Devices (ROLLATOR) MISC Use to reduce risk of falls when walking.  1 each 0    cloNIDine (CATAPRES) 0.1 MG tablet Take 1 tablet by mouth 2 times daily as needed for Other (withdrawl

## 2020-05-07 NOTE — LETTER
AMBULATORY SURGERY INSTRUCTIONS    - If you should develop a cold, sore throat, cough, fever or other new indication of illness prior to the scheduled surgery, please notify the [address] [place] [address] office as early as possible. - DO NOT EAT OR DRINK ANYTHING AFTER MIDNIGHT THE NIGHT BEFORE YOUR SURGERY. This includes water, tea, juice, cereal, coffee, etc.    - Refrain from smoking the day of your surgery or procedure. - Wear simple loose clothing, which can be easily changed. - Do not wear any make-up, lipstick or nail polish. - Please leave contact lenses at home or bring container for them. - Leave jewelry (including rings) and other valuables at home. - Make arrangements for a family member or friend to drive you to and from the surgery center. The anesthetic may affect your judgement following surgery and driving a vehicle within 24 hours after the anesthesia could be dangerous. Please remember that a responsible adult must remain in the building at all times during your surgery. - Children should be accompanied by two adults; one to watch the child, the other to drive the vehicle home. - You may be asked to sign several forms prior to surgery; patients under age 25 have a parent or legal guardian sign the permit to operate.    - DO NOT take aspirin, Aleve, Motrin, Ibuprofen, Naproxen, Vitamins or Herbal supplements for 1 weeks or 7 days prior to the day of surgery.    -The morning of your procedure please take blood pressure, heart, and seizure medications with a small sip of water.    -Check in at Emergency room entrance at [facility] requests that all medications are kept in their original bottles and brought to the procedure. PROCEDURE DATE:        Estimated surgery arrival time  Surgery will call with arrival time    You will be notified the day before surgery (usually in the afternoon) of your arrival time by the surgery center.

## 2020-05-07 NOTE — PATIENT INSTRUCTIONS
Upper endoscopy scheduled for 5/27/20 at [facility]. Follow pre op instructions as given.

## 2020-06-12 ENCOUNTER — OFFICE VISIT (OUTPATIENT)
Dept: SURGERY | Age: 65
End: 2020-06-12
Payer: COMMERCIAL

## 2020-06-12 VITALS
SYSTOLIC BLOOD PRESSURE: 130 MMHG | RESPIRATION RATE: 16 BRPM | DIASTOLIC BLOOD PRESSURE: 70 MMHG | TEMPERATURE: 97.5 F | OXYGEN SATURATION: 95 % | HEIGHT: 68 IN | HEART RATE: 74 BPM | WEIGHT: 274 LBS | BODY MASS INDEX: 41.52 KG/M2

## 2020-06-12 PROBLEM — K80.70 CALCULUS OF GALLBLADDER AND BILE DUCT WITHOUT CHOLECYSTITIS OR OBSTRUCTION: Status: ACTIVE | Noted: 2020-06-12

## 2020-06-12 PROBLEM — K25.9 MULTIPLE GASTRIC ULCERS: Status: ACTIVE | Noted: 2020-06-12

## 2020-06-12 PROBLEM — R11.0 CHRONIC NAUSEA: Status: ACTIVE | Noted: 2020-06-12

## 2020-06-12 PROCEDURE — 99212 OFFICE O/P EST SF 10 MIN: CPT | Performed by: SURGERY

## 2020-06-12 RX ORDER — PROMETHAZINE HYDROCHLORIDE 25 MG/1
25 TABLET ORAL EVERY 4 HOURS
COMMUNITY
End: 2020-07-02

## 2020-06-12 RX ORDER — OMEPRAZOLE 20 MG/1
CAPSULE, DELAYED RELEASE ORAL
COMMUNITY
Start: 2020-05-27 | End: 2020-10-20

## 2020-06-23 RX ORDER — DULOXETIN HYDROCHLORIDE 60 MG/1
CAPSULE, DELAYED RELEASE ORAL
Qty: 90 CAPSULE | Refills: 0 | Status: SHIPPED | OUTPATIENT
Start: 2020-06-23 | End: 2020-09-21

## 2020-07-01 ENCOUNTER — TELEPHONE (OUTPATIENT)
Dept: SURGERY | Age: 65
End: 2020-07-01

## 2020-07-01 NOTE — TELEPHONE ENCOUNTER
Spoke with patient and explained to her that if she is having lots of abdominal pain than she should go to the ED, she states that she is not having pain mostly nausea, patient given appointment on July 2 to be evaluated.

## 2020-07-01 NOTE — TELEPHONE ENCOUNTER
Patient called stating she saw Dr Adolfo Camargo 6/12/2020, but she has been just miserable with nausea that is lasting all day. She is using phenergan 25mg one every 6 hours and omeprazole 20mg one a day in morning. Patient is tearful on phone and wanted to know if there was anything else she could do or different medication to help. She uses [facility].   Call her back at [phone] or [phone]

## 2020-07-02 ENCOUNTER — NURSE ONLY (OUTPATIENT)
Dept: LAB | Age: 65
End: 2020-07-02
Payer: COMMERCIAL

## 2020-07-02 ENCOUNTER — HOSPITAL ENCOUNTER (OUTPATIENT)
Dept: LAB | Age: 65
Discharge: HOME | End: 2020-07-02
Payer: COMMERCIAL

## 2020-07-02 ENCOUNTER — OFFICE VISIT (OUTPATIENT)
Dept: SURGERY | Age: 65
End: 2020-07-02
Payer: COMMERCIAL

## 2020-07-02 VITALS
HEART RATE: 72 BPM | HEIGHT: 68 IN | DIASTOLIC BLOOD PRESSURE: 60 MMHG | BODY MASS INDEX: 42.89 KG/M2 | WEIGHT: 283 LBS | SYSTOLIC BLOOD PRESSURE: 110 MMHG | TEMPERATURE: 96.9 F

## 2020-07-02 LAB
ABSOLUTE EOS #: 0.27 K/UL (ref 0–0.44)
ABSOLUTE IMMATURE GRANULOCYTE: 0.05 K/UL (ref 0–0.3)
ABSOLUTE LYMPH #: 2.6 K/UL (ref 1.1–3.7)
ABSOLUTE MONO #: 1.01 K/UL (ref 0.1–1.2)
ALBUMIN SERPL-MCNC: 4.1 G/DL (ref 3.5–5.2)
ALBUMIN/GLOBULIN RATIO: 1.1 (ref 1–2.5)
ALP BLD-CCNC: 138 U/L (ref 35–104)
ALT SERPL-CCNC: <5 U/L (ref 5–33)
AMYLASE: 54 U/L (ref 28–100)
ANION GAP SERPL CALCULATED.3IONS-SCNC: 13 MMOL/L (ref 9–17)
AST SERPL-CCNC: 10 U/L
BASOPHILS # BLD: 0 % (ref 0–2)
BASOPHILS ABSOLUTE: 0.05 K/UL (ref 0–0.2)
BILIRUB SERPL-MCNC: 0.31 MG/DL (ref 0.3–1.2)
BUN BLDV-MCNC: 32 MG/DL (ref 8–23)
BUN/CREAT BLD: 24 (ref 9–20)
CALCIUM SERPL-MCNC: 9.6 MG/DL (ref 8.6–10.4)
CHLORIDE BLD-SCNC: 97 MMOL/L (ref 98–107)
CO2: 30 MMOL/L (ref 20–31)
CREAT SERPL-MCNC: 1.31 MG/DL (ref 0.5–0.9)
DIFFERENTIAL TYPE: ABNORMAL
EOSINOPHILS RELATIVE PERCENT: 2 % (ref 1–4)
GFR AFRICAN AMERICAN: 50 ML/MIN
GFR NON-AFRICAN AMERICAN: 41 ML/MIN
GFR SERPL CREATININE-BSD FRML MDRD: ABNORMAL ML/MIN/{1.73_M2}
GFR SERPL CREATININE-BSD FRML MDRD: ABNORMAL ML/MIN/{1.73_M2}
GLUCOSE BLD-MCNC: 127 MG/DL (ref 70–99)
HCT VFR BLD CALC: 38.5 % (ref 36.3–47.1)
HEMOGLOBIN: 12.1 G/DL (ref 11.9–15.1)
IMMATURE GRANULOCYTES: 0 %
LYMPHOCYTES # BLD: 22 % (ref 24–43)
MAGNESIUM: 2.1 MG/DL (ref 1.6–2.6)
MCH RBC QN AUTO: 28.2 PG (ref 25.2–33.5)
MCHC RBC AUTO-ENTMCNC: 31.4 G/DL (ref 25.2–33.5)
MCV RBC AUTO: 89.7 FL (ref 82.6–102.9)
MONOCYTES # BLD: 9 % (ref 3–12)
NRBC AUTOMATED: 0 PER 100 WBC
PDW BLD-RTO: 13.9 % (ref 11.8–14.4)
PLATELET # BLD: 257 K/UL (ref 138–453)
PLATELET ESTIMATE: ABNORMAL
PMV BLD AUTO: 11.1 FL (ref 8.1–13.5)
POTASSIUM SERPL-SCNC: 4 MMOL/L (ref 3.7–5.3)
RBC # BLD: 4.29 M/UL (ref 3.95–5.11)
RBC # BLD: ABNORMAL 10*6/UL
SEG NEUTROPHILS: 67 % (ref 36–65)
SEGMENTED NEUTROPHILS ABSOLUTE COUNT: 7.71 K/UL (ref 1.5–8.1)
SODIUM BLD-SCNC: 140 MMOL/L (ref 135–144)
TOTAL PROTEIN: 7.8 G/DL (ref 6.4–8.3)
TSH SERPL DL<=0.05 MIU/L-ACNC: 3.34 MIU/L (ref 0.3–5)
WBC # BLD: 11.7 K/UL (ref 3.5–11.3)
WBC # BLD: ABNORMAL 10*3/UL

## 2020-07-02 PROCEDURE — 85025 COMPLETE CBC W/AUTO DIFF WBC: CPT

## 2020-07-02 PROCEDURE — 80053 COMPREHEN METABOLIC PANEL: CPT

## 2020-07-02 PROCEDURE — 36415 COLL VENOUS BLD VENIPUNCTURE: CPT

## 2020-07-02 PROCEDURE — 96372 THER/PROPH/DIAG INJ SC/IM: CPT | Performed by: SURGERY

## 2020-07-02 PROCEDURE — 99214 OFFICE O/P EST MOD 30 MIN: CPT | Performed by: SURGERY

## 2020-07-02 PROCEDURE — 82150 ASSAY OF AMYLASE: CPT

## 2020-07-02 PROCEDURE — 84443 ASSAY THYROID STIM HORMONE: CPT

## 2020-07-02 PROCEDURE — 83735 ASSAY OF MAGNESIUM: CPT

## 2020-07-02 RX ORDER — PROMETHAZINE HYDROCHLORIDE 25 MG/ML
25 INJECTION, SOLUTION INTRAMUSCULAR; INTRAVENOUS ONCE
Qty: 1 ML | Refills: 0
Start: 2020-07-02 | End: 2020-07-02

## 2020-07-02 RX ORDER — PROMETHAZINE HYDROCHLORIDE 25 MG/ML
25 INJECTION, SOLUTION INTRAMUSCULAR; INTRAVENOUS ONCE
Status: COMPLETED | OUTPATIENT
Start: 2020-07-02 | End: 2020-07-02

## 2020-07-02 RX ORDER — PROCHLORPERAZINE MALEATE 10 MG
10 TABLET ORAL EVERY 6 HOURS PRN
Qty: 60 TABLET | Refills: 1 | Status: SHIPPED | OUTPATIENT
Start: 2020-07-02 | End: 2020-08-27 | Stop reason: SDUPTHER

## 2020-07-02 RX ADMIN — PROMETHAZINE HYDROCHLORIDE 25 MG: 25 INJECTION, SOLUTION INTRAMUSCULAR; INTRAVENOUS at 10:26

## 2020-07-02 ASSESSMENT — ENCOUNTER SYMPTOMS
BACK PAIN: 1
CHEST TIGHTNESS: 0
WHEEZING: 0
CHOKING: 0
NAUSEA: 1
SORE THROAT: 0
TROUBLE SWALLOWING: 0
COUGH: 0
SHORTNESS OF BREATH: 0

## 2020-07-02 NOTE — PROGRESS NOTES
Continues to complain of nausea, continuously, without vomiting. Has chronic back pain, and has minimal intermittent RUQ pain. No diarrhea, constipation, fever, chills. Occasional dysuria. Not getting much relief from promethazine anymore. She has not seen Dr. Tayo Cee nor does she have an appointment as recommended to be evaluated for MILANA.     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    Obesity     Peripheral neuropathy     Pulmonary hypertension ([facility] [address] 2012    by echocardiogram    Shoulder pain, bilateral 8/21/2013    Type 2 diabetes mellitus ([facility] [facility]     Unspecified essential hypertension     Essential hypertension    Vitamin D deficiency 11/5/2014     Past Surgical History:   Procedure Laterality Date    APPENDECTOMY      BRAIN SURGERY  05/24/2016    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 Michael Lopez    COLONOSCOPY  5/3/2012    diverticular disease    DENTAL SURGERY  08/2015    full dental extraction    FOREIGN BODY REMOVAL  5/28/2016    left-sided temporal craniotomy wound reexploration with removal of small retained foreign body (plastic from Ruth clip from surgery 3 days prior), [facility], Dr. Deno Rubinstein  09/06/2005    supracervical hysterectomy bilateral salpingo oophectomy    KNEE ARTHROSCOPY Left     NERVE BLOCK  09/09/2019    right side L2 through L5 block nerve,facet joint,lumbar,medial branch per Dr Martin Pro at . [facility]  2011    endoscopic mucosal resection of duodenal polyp    SHOULDER ARTHROPLASTY Right 10/18/2018    Juan Tompkins MD; done at [place] [address] Right 03/07/2019    revision Mother     Hypertension Father     Diabetes Father     Cancer Father      Review of Systems   Constitutional: Negative for appetite change, chills and fever. HENT: Negative for sore throat and trouble swallowing. Respiratory: Negative for cough, choking, chest tightness, shortness of breath and wheezing. Cardiovascular: Negative for chest pain and palpitations. Gastrointestinal: Positive for nausea. Genitourinary: Positive for dysuria. Musculoskeletal: Positive for back pain. Neurological: Positive for headaches ( chronic). Negative for dizziness, seizures, syncope, speech difficulty, light-headedness and numbness. /60 (Site: Right Upper Arm, Position: Sitting, Cuff Size: Large Adult)   Pulse 72   Temp 96.9 °F (36.1 °C) (Temporal)   Ht 5' 8\" (1.727 m)   Wt 283 lb (128.4 kg)   LMP 08/24/2010 (Approximate)   BMI 43.03 kg/m²   Physical Exam  Constitutional:       Appearance: She is obese. She is ill-appearing. Eyes:      General: No scleral icterus. Extraocular Movements: Extraocular movements intact. Conjunctiva/sclera: Conjunctivae normal.      Pupils: Pupils are equal, round, and reactive to light. Cardiovascular:      Rate and Rhythm: Normal rate and regular rhythm. Pulses: Normal pulses. Pulmonary:      Effort: Pulmonary effort is normal. No respiratory distress. Breath sounds: Normal breath sounds. Abdominal:      General: A surgical scar is present. There is no distension or abdominal bruit. Palpations: Abdomen is soft. Tenderness: There is no abdominal tenderness. Hernia: No hernia is present. There is no hernia in the umbilical area, ventral area, right inguinal area or left inguinal area. Comments: She is obese enough that small hernias and organomegally may not be detectable. Skin:     General: Skin is warm and dry. Coloration: Skin is not jaundiced. Neurological:      General: No focal deficit present.       Mental Status: She is alert and oriented to person, place, and time. Psychiatric:         Attention and Perception: Attention normal.         Mood and Affect: Mood is depressed. Speech: Speech normal.         Behavior: Behavior normal.     IMP/PLAN  1) Chronic Nausea - unresponsive to promethazine and ondansetron. - Will try Compazine and give her an IM injection of promethazine today  - Options include:   - Consider cholecystectomy. The is a chance it could give her some relief although, her symptoms are not typical. There is also a very good chance it would not help at all.   - Consider evaluation and treatment for MILANA - I am almost certain she has some MILANA which can cause some chronic nausea. - Consider referral to gastroenterology. 2) It has been quite a while since she had lab work done.   3) Follow up in about 2 weeks

## 2020-07-02 NOTE — PATIENT INSTRUCTIONS
Schedule appointment with Dr. Jabari Ni. Follow up as previously scheduled. Call if increased discomfort. Get labs and injection today.

## 2020-07-10 ENCOUNTER — TELEPHONE (OUTPATIENT)
Dept: FAMILY MEDICINE CLINIC | Age: 65
End: 2020-07-10

## 2020-07-10 ENCOUNTER — VIRTUAL VISIT (OUTPATIENT)
Dept: FAMILY MEDICINE CLINIC | Age: 65
End: 2020-07-10
Payer: COMMERCIAL

## 2020-07-10 PROBLEM — M47.814 SPONDYLOSIS OF THORACIC REGION WITHOUT MYELOPATHY OR RADICULOPATHY: Status: ACTIVE | Noted: 2020-06-11

## 2020-07-10 PROBLEM — M47.816 SPONDYLOSIS OF LUMBAR SPINE: Status: ACTIVE | Noted: 2019-08-26

## 2020-07-10 PROBLEM — F11.90 CHRONIC, CONTINUOUS USE OF OPIOIDS: Status: ACTIVE | Noted: 2017-12-11

## 2020-07-10 PROBLEM — G44.221 CHRONIC TENSION-TYPE HEADACHE, INTRACTABLE: Status: ACTIVE | Noted: 2017-12-11

## 2020-07-10 PROBLEM — M75.121 COMPLETE TEAR OF RIGHT ROTATOR CUFF: Status: ACTIVE | Noted: 2019-03-07

## 2020-07-10 PROBLEM — F41.9 ANXIETY: Status: ACTIVE | Noted: 2017-12-11

## 2020-07-10 PROBLEM — M62.838 MUSCLE SPASMS OF NECK: Status: ACTIVE | Noted: 2017-12-11

## 2020-07-10 PROBLEM — R53.83 FATIGUE: Status: ACTIVE | Noted: 2017-12-11

## 2020-07-10 PROBLEM — F32.A DEPRESSION: Status: ACTIVE | Noted: 2017-12-11

## 2020-07-10 PROBLEM — M47.817 LUMBOSACRAL SPONDYLOSIS WITHOUT MYELOPATHY: Status: ACTIVE | Noted: 2020-05-21

## 2020-07-10 PROCEDURE — 99214 OFFICE O/P EST MOD 30 MIN: CPT | Performed by: FAMILY MEDICINE

## 2020-07-10 RX ORDER — PIOGLITAZONE HCL AND METFORMIN HCL 500; 15 MG/1; MG/1
TABLET ORAL
Qty: 180 TABLET | Refills: 1 | Status: SHIPPED | OUTPATIENT
Start: 2020-07-10 | End: 2021-01-20

## 2020-07-10 RX ORDER — TRAMADOL HYDROCHLORIDE 50 MG/1
1 TABLET ORAL EVERY 6 HOURS PRN
COMMUNITY
Start: 2020-07-06 | End: 2020-07-21

## 2020-07-10 RX ORDER — LISINOPRIL AND HYDROCHLOROTHIAZIDE 20; 12.5 MG/1; MG/1
TABLET ORAL
Qty: 90 TABLET | Refills: 1 | Status: SHIPPED | OUTPATIENT
Start: 2020-07-10 | End: 2021-01-20

## 2020-07-10 NOTE — PROGRESS NOTES
7/10/2020    TELEHEALTH EVALUATION -- Audio/Visual (During UWWHK-66 public health emergency)    HPI:    Evangelista Ying (:  1955) has requested an audio/video evaluation for the following concern(s):    She has had issues with chronic nausea. She has seen Dr. Jules Rowe for evaluation. She states that she has been having less nausea since Dr. Jules Rowe prescribed Compazine. She had recent labs that showed decreased kidney function. She had recent pain in her left knee. She was evaluated at [facility] ER. She requests a referral to orthopedic surgery. She has been seeing a specialist for chronic back pain. She states that she can no longer see this specialist as her insurance will not be accepted. Review of Systems    Prior to Visit Medications    Medication Sig Taking? Authorizing Provider   traMADol (ULTRAM) 50 MG tablet Take 1 tablet by mouth every 6 hours as needed for Pain. Yes Historical Provider, MD   prochlorperazine (COMPAZINE) 10 MG tablet Take 1 tablet by mouth every 6 hours as needed (nausea) Yes uMsa Art MD   DULoxetine (CYMBALTA) 60 MG extended release capsule TAKE 1 CAPSULE DAILY Yes Ivan Mast MD   omeprazole (PRILOSEC) 20 MG delayed release capsule take 1 capsule by mouth once daily Yes Historical Provider, MD   cloNIDine (CATAPRES) 0.1 MG tablet Take 1 tablet by mouth 2 times daily as needed for Other (withdrawl symptoms) Yes Ivan Mast MD   lisinopril-hydrochlorothiazide (PRINZIDE;ZESTORETIC) 20-12.5 MG per tablet TAKE 1 TABLET DAILY Yes Ivan Mast MD   pioglitazone-metformin (ACTOPLUS MET)  MG per tablet Take one tablet twice a day with meals Yes Ivan Mast MD   Cholecalciferol (VITAMIN D3) 33223 units CAPS TAKE 1 CAPSULE ONCE A WEEK Yes Ivan Mast MD   Gabapentin (NEURONTIN PO) Take 600 mg by mouth 5 times daily  Yes Historical Provider, MD   Handicap Placard MISC by Does not apply route Issue parking placard for person with disability;  Applicant meets region without myelopathy or radiculopathy         PHYSICAL EXAMINATION:    Vital Signs: (As obtained by patient/caregiver or practitioner observation)    Patient-Reported Vitals 7/10/2020   Patient-Reported Weight 280   Patient-Reported Height 5 8   Patient-Reported Pulse n/a   Patient-Reported Temperature n/a   Patient-Reported SpO2 n/a   Patient-Reported Peak Flow n/a         Constitutional:   She appears well-developed and well-nourished. She is in no apparent distress. Mental status:  She is alert and awake. She is oriented to person/place/time. She is able to follow commands. Eyes:  EOM are normal  Sclera are normal  There is no visible discharge. HENT:   Normocephalic, atraumatic. Mouth/throat: Mucous membranes are moist.     Neck: There is no visualized mass. Pulmonary/Chest: The respiratory effort is normal.  There are no visualized signs of difficulty breathing or respiratory distress. Musculoskeletal:  There is normal range of motion of the neck. Neurological:   There is no facial asymmetry (cranial nerve 7 motor function). (Exam is limited due to video visit.)             Skin:   There is no significant exanthematous lesions or discoloration noted on facial skin. Psychiatric:  Affect is normal.         Other pertinent observable physical exam findings:  She responds to questions appropriately. Speech is clear. There is no observed dyspnea with conversation. Dress and grooming are appropriate.      Results of labs done 7/2/2020 were reviewed with the patient:  Hospital Outpatient Visit on 07/02/2020   Component Date Value Ref Range Status    Magnesium 07/02/2020 2.1  1.6 - 2.6 mg/dL Final    TSH 07/02/2020 3.34  0.30 - 5.00 mIU/L Final    Glucose 07/02/2020 127* 70 - 99 mg/dL Final    BUN 07/02/2020 32* 8 - 23 mg/dL Final    CREATININE 07/02/2020 1.31* 0.50 - 0.90 mg/dL Final    Bun/Cre Ratio 07/02/2020 24* 9 - 20 Final    Calcium 07/02/2020 9.6  8.6 - 10.4 mg/dL Final    Sodium 07/02/2020 140  135 - 144 mmol/L Final    Potassium 07/02/2020 4.0  3.7 - 5.3 mmol/L Final    Chloride 07/02/2020 97* 98 - 107 mmol/L Final    CO2 07/02/2020 30  20 - 31 mmol/L Final    Anion Gap 07/02/2020 13  9 - 17 mmol/L Final    Alkaline Phosphatase 07/02/2020 138* 35 - 104 U/L Final    ALT 07/02/2020 <5* 5 - 33 U/L Final    AST 07/02/2020 10  <32 U/L Final    Total Bilirubin 07/02/2020 0.31  0.3 - 1.2 mg/dL Final    Total Protein 07/02/2020 7.8  6.4 - 8.3 g/dL Final    Alb 07/02/2020 4.1  3.5 - 5.2 g/dL Final    Albumin/Globulin Ratio 07/02/2020 1.1  1.0 - 2.5 Final    GFR Non- 07/02/2020 41* >60 mL/min Final    GFR African American 07/02/2020 50* >60 mL/min Final    GFR Comment 07/02/2020        Final    Comment: Average GFR for 61-76 years old:   80 mL/min/1.73sq m  Chronic Kidney Disease:   <60 mL/min/1.73sq m  Kidney failure:   <15 mL/min/1.73sq m              eGFR calculated using average adult body mass.  Additional eGFR calculator available at:        [email or web address]            GFR Staging 07/02/2020 NOT REPORTED   Final    WBC 07/02/2020 11.7* 3.5 - 11.3 k/uL Final    RBC 07/02/2020 4.29  3.95 - 5.11 m/uL Final    Hemoglobin 07/02/2020 12.1  11.9 - 15.1 g/dL Final    Hematocrit 07/02/2020 38.5  36.3 - 47.1 % Final    MCV 07/02/2020 89.7  82.6 - 102.9 fL Final    MCH 07/02/2020 28.2  25.2 - 33.5 pg Final    MCHC 07/02/2020 31.4  25.2 - 33.5 g/dL Final    RDW 07/02/2020 13.9  11.8 - 14.4 % Final    Platelets 89/00/1978 257  138 - 453 k/uL Final    MPV 07/02/2020 11.1  8.1 - 13.5 fL Final    NRBC Automated 07/02/2020 0.0  0.0 per 100 WBC Final    Differential Type 07/02/2020 NOT REPORTED   Final    Seg Neutrophils 07/02/2020 67* 36 - 65 % Final    Lymphocytes 07/02/2020 22* 24 - 43 % Final    Monocytes 07/02/2020 9  3 - 12 % Final    Eosinophils % 07/02/2020 2  1 - 4 % Final    Basophils 07/02/2020 0 0 - 2 % Final    Immature Granulocytes 07/02/2020 0  0 % Final    Segs Absolute 07/02/2020 7.71  1.50 - 8.10 k/uL Final    Absolute Lymph # 07/02/2020 2.60  1.10 - 3.70 k/uL Final    Absolute Mono # 07/02/2020 1.01  0.10 - 1.20 k/uL Final    Absolute Eos # 07/02/2020 0.27  0.00 - 0.44 k/uL Final    Basophils Absolute 07/02/2020 0.05  0.00 - 0.20 k/uL Final    Absolute Immature Granulocyte 07/02/2020 0.05  0.00 - 0.30 k/uL Final    WBC Morphology 07/02/2020 NOT REPORTED   Final    RBC Morphology 07/02/2020 NOT REPORTED   Final    Platelet Estimate 97/16/6866 NOT REPORTED   Final    Amylase 07/02/2020 54  28 - 100 U/L Final        ASSESSMENT/PLAN:  1. Nausea  As above, she states that her symptoms have improved recently. I discussed that she may want to try making some changes in her diet to see if her symptoms improve. Printed information regarding Learning About the Low FODMAP Diet for Irritable Bowel Syndrome (IBS) was provided to the patient with her after visit summary. 2. Elevated serum creatinine   BUN and creatinine were elevated on her recent labs. A referral to nephrology was ordered:  - Ambulatory referral to Nephrology    3. Chronic pain of left knee  The notes from her recent ER visit were reviewed. She was referred to orthopedic surgery:  - Ambulatory referral to Orthopedic Surgery    4. Lumbosacral spondylosis without myelopathy  As above, she can no longer see her current pain management doctor due to insurance changes. A referral was ordered:  - [facility], [place]    5. Essential hypertension  Her blood pressure was well-controlled on 7/2/2020.  (110/60)   She was advised to continue current medications. Lisinopril-Hydrochlorothiazide was refilled:   - lisinopril-hydroCHLOROthiazide (PRINZIDE;ZESTORETIC) 20-12.5 MG per tablet; TAKE 1 TABLET DAILY  Dispense: 90 tablet; Refill: 1    6.  Type 2 diabetes mellitus with microalbuminuria, without long-term current use of insulin ([facility] [address]  [employer] Met was refilled:  - pioglitazone-metformin (ACTOPLUS MET)  MG per tablet; Take one tablet twice a day with meals  Dispense: 180 tablet; Refill: 1    She was encouraged to have the labs done when she is able to come to the lab. I will have staff call her to schedule a follow up visit in 3 months. No follow-ups on file. Noe Reilly is a 59 y.o. female being evaluated by a Virtual Visit (video visit) encounter to address concerns as mentioned above. A caregiver was present when appropriate. Due to this being a TeleHealth encounter (During ETKQD-48 public health emergency), evaluation of the following organ systems was limited: Vitals/Constitutional/EENT/Resp/CV/GI//MS/Neuro/Skin/Heme-Lymph-Imm. Pursuant to the emergency declaration under the [address] [place] [address] and the [employer] Act, this Virtual Visit was conducted with patient's (and/or legal guardian's) consent, to reduce the patient's risk of exposure to COVID-19 and provide necessary medical care. The patient (and/or legal guardian) has also been advised to contact this office for worsening conditions or problems, and seek emergency medical treatment and/or call 911 if deemed necessary. Patient identification was verified at the start of the visit: Yes    Total time spent on this encounter: Not billed by time    Services were provided through a video synchronous discussion virtually to substitute for in-person clinic visit. Patient and provider were located at their individual homes. --Rd Powell MD on 7/10/2020 at 10:41 AM    An electronic signature was used to authenticate this note.

## 2020-07-10 NOTE — TELEPHONE ENCOUNTER
On the top of her chart it states she has a pain management agreement with [facility], wasn't for sure if this was discussed with her.

## 2020-07-10 NOTE — PROGRESS NOTES
Pt refused shingles vaccine a this time. She is aware she is due for pap and I explained could be completed at next in office visit, she stated ok.  I explained how to complete the doxy visit and she verbalized understanding

## 2020-07-10 NOTE — TELEPHONE ENCOUNTER
Pain management called and you placed referral to them. Patient has been dismissed from there services so will no be able to be scheduled.

## 2020-07-10 NOTE — PATIENT INSTRUCTIONS
Patient Education        Learning About the Low FODMAP Diet for Irritable Bowel Syndrome (IBS)  What is the low-FODMAP diet? A low-FODMAP diet is a way to find out what foods give you digestion problems. You stop eating certain high-FODMAP foods for about 2 months. Then you add them back to see how your body reacts. This is called a \"challenge diet. \" A dietitian or doctor can help you follow this diet. FODMAPs are carbohydrates. They are in many types of foods. FODMAP stands for:  · F ermentable. · O ligosaccharides. · D isaccharides. · M onosaccharides. · A nd p olyols. If you have digestive problems, some of these foods can make your symptoms worse. When you are on this diet, you can still eat certain fruits and vegetables. You can also eat certain grains, meats, fish, and lactose-free milks. What is it used for? If you have irritable bowel syndrome (IBS), you can ease your symptoms by not eating some types of foods. Some people also use this diet for inflammatory bowel disease (IBD) or some food intolerances. High-FODMAP foods can be hard to digest. They pull more fluid into your intestines. They are also easily fermented. This can lead to bloating, belly pain, gas, and diarrhea. The low-FODMAP diet can help you figure out what foods to avoid. And it can help you find foods that are easier to digest.  This diet can help with symptoms of some digestive diseases. But it's not a cure. You will still need to manage your condition. How does it work? You will work with a doctor or dietitian when you start the diet. At first, you won't eat any high-FODMAP foods for a few weeks. Go to www.[employer]. "[employer] to learn more about this diet. Marianne Soria also find links to an nicolas for your phone or other device. You'll find low-FODMAP cookbooks there too. After 6 to 8 weeks, you will start to try high-FODMAP foods again. You will add those foods back to your diet, one group at a time.  Your doctor or dietitian will probably have you wait a few days before you add each new group of those foods. Keep a food diary. You can write down the foods you try and note how they make you feel. After a few weeks, you may have a better idea of what foods you should avoid and what foods make you feel your best.  What are the risks? There is some risk of not getting all of the vitamins and nutrients you need on the low-FODMAP diet. These include:  · Folate. · Thiamin. · Vitamin B6.  · Calcium. · Vitamin D. Your dietitian or doctor can help you find other sources of these if needed. This diet may limit your fiber intake. Try to plan your meals to include other sources of fiber. What foods are on the low-FODMAP diet? Here is a guide to foods that you can eat, plus the foods that you should avoid, when you are on the low-FODMAP diet. Grains  Okay to eat: Foods made from grains like arrowroot, buckwheat, corn, millet, and oats. You can also eat potato, quinoa, rice, sorghum, tapioca, and teff. Cereals, pasta, breads, corn tortillas and baked goods made from these grains are also okay. (These grains may be labeled \"gluten-free. \")  Avoid: Grains like wheat, barley, and rye. Avoid ingredients such as bulgur, couscous, durum, and semolina. And avoid cereals, breads, and pastas made from these grains. Avoid chickpea, lentil, and pea flour. Proteins  Okay to eat: Most meat, fish, and eggs without high-FODMAP sauces. You can have small amounts of almonds or hazelnuts (10 nuts). Macadamia nuts, peanuts, pecans, pine nuts, and walnuts are also okay. You can also eat tracey and pumpkin seeds, tofu, and tempeh. Avoid: Beans, chickpeas, lentils, and soybeans. Avoid pistachio and cashew nuts. And some sausages may have high-FODMAP ingredients. Dairy  Okay to eat: Lactose-free dairy milks. Rice milk and almond milk are okay. So are lactose-free yogurts, kefirs, ice creams, and sorbet from low-FODMAP fruits and sweeteners.  (These are often labeled \"lactose-free. \") You can have small amounts (2 Tbsp) of cottage, cream, or ricotta cheese. Hard cheeses like cheddar, Bon Wier, ROSS, and Swiss are okay. So are small amounts (1 oz) of aged or ripened cheeses like Brie, blue, and feta. Avoid: Milk, including cow, goat, and sheep. Avoid condensed or evaporated milk, buttermilk, custard, cream, sour cream, yogurt, and ice cream. Avoid soy milk. (Check sauces for dairy ingredients.)  Vegetables  Okay to eat: Bamboo shoots, bell peppers, bok donell, up to ½ cup of broccoli or cabbage (red or white), and cucumbers. Eggplant, green beans, lettuce, olives, parsnips, and potatoes are okay to eat. So are pumpkin, rutabaga, seaweed, sprouts, Swiss chard, and spinach. You can eat scallions (green part only) and squash (not butternut). You can eat tomatoes, turnips, watercress, yams, and zucchini. You can also have small amounts of artichoke hearts (from can, 1 oz), carrots, corn (½ cob), and sweet potato (½ cup). Avoid: Artichokes, asparagus, Athena sprouts, shawn cabbage, cauliflower, and celery. And avoid garlic, leeks, mushrooms, okra, onions, scallions (white part), shallots, and peas. Fruits  Okay to eat: Bananas, blueberries, cantaloupe, coconut, grapes, and honeydew. Kiwi, hiram, limes, oranges, passion fruit, papaya, and pineapple are also okay. You can eat plantain, raspberries, rhubarb, star fruit, strawberries, tangelo, and tangerine. You can also have small amounts of dried banana chips (up to 10 chips), dried cranberries (1 Tbsp), and shredded coconut (up to ¼ cup). Avoid: Apples, applesauce, apricots, avocados, blackberries, boysenberries, and cherries. Also avoid dates, figs, grapefruit, guava, lychee, and mangoes. Don't eat nectarines, peaches, pears, persimmon, plums, prunes, tamarillo, or watermelon. And limit most canned and dried fruits.   Oils, spices, condiments, and sweeteners  Okay to eat: Vegetable oils (including garlic infused), butter, ghee, lard, and margarine (no trans fat). You can have most fresh herbs like basil, chives, coriander, timothy, parsley, rosemary and thyme. You can have salt, jams made from low-FODMAP fruits, mayonnaise, and mustard. Soy sauce, hot sauce (no garlic), tamari, and vinegar are also okay. Sweeteners that are okay include sugar (sucrose), powdered (confectioner's) sugar, brown sugar, glucose, and maple syrup. You can also have some artificial sweeteners like aspartame, saccharine, and stevia. Avoid: Chutneys, hummus, jellies, garlic sauces, and gravies made with onion or garlic. Avoid pickles, relish, some salad dressings and soup stocks, salsa, and tomato paste. And avoid sauces and other foods with high fructose corn syrup, honey, molasses, and agave. Avoid artificial sweeteners (isomalt, mannitol, malitol, sorbitol, and xylitol). Avoid corn syrup solids, fructose, fruit juice concentrate, and polydextrose. Other foods and drinks  Okay to have: Water, soda water, tonic, soft drinks sweetened with sugar, ½ cup of low-FODMAP fruit juice, and most teas and alcohols. You can also eat foods made with baking powder and soda, cocoa, and gelatin. Avoid: Juices from high-FODMAP fruits and vegetables. And avoid fortified tacos, chamomile and fennel teas, chicory-based drinks and coffee substitutes, and bouillon cubes. Follow-up care is a key part of your treatment and safety. Be sure to make and go to all appointments, and call your doctor if you are having problems. It's also a good idea to know your test results and keep a list of the medicines you take. Where can you learn more? Go to [email or web address] org and sign in to your [employer] account. Enter L235 in the [employer] box to learn more about \"Learning About the Low FODMAP Diet for Irritable Bowel Syndrome (IBS). \"     If you do not have an account, please click on the \"Sign Up Now\" link.   Current as of: August 22, 2019               Content Version: 12.5  © 3330-9484 Healthwise, Incorporated. Care instructions adapted under license by [address] If you have questions about a medical condition or this instruction, always ask your healthcare professional. [address] any warranty or liability for your use of this information.

## 2020-07-10 NOTE — TELEPHONE ENCOUNTER
She stated that she will be unable to continue with [facility] due to insurance reasons. I would offer a referral to a different pain management group outside of [facility].

## 2020-07-10 NOTE — Clinical Note
Please mail the after visit summary to the patient. She has referrals to nephrology, pain management, and orthopedics. Please call the patient to schedule a Pap test in a few months.

## 2020-07-13 ENCOUNTER — NURSE ONLY (OUTPATIENT)
Dept: LAB | Age: 65
End: 2020-07-13
Payer: COMMERCIAL

## 2020-07-13 ENCOUNTER — OFFICE VISIT (OUTPATIENT)
Dept: SURGERY | Age: 65
End: 2020-07-13
Payer: COMMERCIAL

## 2020-07-13 VITALS
BODY MASS INDEX: 41.37 KG/M2 | OXYGEN SATURATION: 98 % | RESPIRATION RATE: 16 BRPM | DIASTOLIC BLOOD PRESSURE: 72 MMHG | TEMPERATURE: 99.8 F | HEIGHT: 68 IN | SYSTOLIC BLOOD PRESSURE: 134 MMHG | WEIGHT: 273 LBS | HEART RATE: 83 BPM

## 2020-07-13 PROCEDURE — 96372 THER/PROPH/DIAG INJ SC/IM: CPT | Performed by: SURGERY

## 2020-07-13 PROCEDURE — 99212 OFFICE O/P EST SF 10 MIN: CPT | Performed by: SURGERY

## 2020-07-13 RX ORDER — PROMETHAZINE HYDROCHLORIDE 50 MG/ML
25 INJECTION, SOLUTION INTRAMUSCULAR; INTRAVENOUS ONCE
Status: COMPLETED | OUTPATIENT
Start: 2020-07-13 | End: 2020-07-13

## 2020-07-13 RX ADMIN — PROMETHAZINE HYDROCHLORIDE 25 MG: 50 INJECTION, SOLUTION INTRAMUSCULAR; INTRAVENOUS at 14:04

## 2020-07-13 NOTE — PROGRESS NOTES
Patient continues to have nausea. Did pretty well for a week, but is very nauseated again today. Would like another IM injection of promethazine. As we discuss last time she has several options which are not exclusive:  1) I still think she needs to be evaluated and treated for MILANA  2) Could consider seeing a gastroenterologist  3) Could consider proceeding with lap navdeep, understanding this may not help her at all. She is currently trying a FODMAP diet. Wants to hold off on surgery for now.     Follow up PRN

## 2020-07-21 ENCOUNTER — HOSPITAL ENCOUNTER (OUTPATIENT)
Dept: GENERAL RADIOLOGY | Age: 65
Discharge: HOME | End: 2020-07-23
Payer: COMMERCIAL

## 2020-07-21 ENCOUNTER — OFFICE VISIT (OUTPATIENT)
Dept: ORTHOPEDIC SURGERY | Age: 65
End: 2020-07-21
Payer: COMMERCIAL

## 2020-07-21 VITALS
SYSTOLIC BLOOD PRESSURE: 116 MMHG | DIASTOLIC BLOOD PRESSURE: 72 MMHG | WEIGHT: 287 LBS | BODY MASS INDEX: 43.5 KG/M2 | HEART RATE: 76 BPM | HEIGHT: 68 IN

## 2020-07-21 PROCEDURE — 99213 OFFICE O/P EST LOW 20 MIN: CPT | Performed by: NURSE PRACTITIONER

## 2020-07-21 PROCEDURE — 73562 X-RAY EXAM OF KNEE 3: CPT

## 2020-07-21 NOTE — PROGRESS NOTES
Orthopaedic Progress Note      CHIEF COMPLAINT: Recheck left knee pain    HISTORY OF PRESENT ILLNESS:       Ms. Delvis Barreto  is a 59 y.o. female  who presents with left knee pain. Patient was seen back in February where she was being evaluated for left knee pain. She states that years ago she did have multiple corticosteroid injections into her left knee which states that they no longer provided any benefit. She states that she was given naproxen to try upon last visit and states that she did not notice any significant benefits. She states pain is worse with prolonged walking and standing. She does get slight relief at rest.  She does have significant pain going up and down stairs and getting up from a seated position. She is here today to discuss further interventions. She states that a few weeks ago she did go to the ER for severe left knee pain. She was given Toradol injection and Ultram which did help calm her pain down she states.       Past Medical History:    Past Medical History:   Diagnosis Date    Cervical spine pain 8/21/2013    Chronic headaches     Chronic sinusitis     Diabetes mellitus ([address]     Dizziness     H/O fall     Hypertension     Knee pain, left 8/21/2013    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     left temporal    Obesity     Peripheral neuropathy     Pulmonary hypertension ([address] 2012    by echocardiogram    Shoulder pain, bilateral 8/21/2013    Type 2 diabetes mellitus ([facility]     Unspecified essential hypertension     Essential hypertension    Vitamin D deficiency 11/5/2014       Past Surgical History:    Past Surgical History:   Procedure Laterality Date    APPENDECTOMY      BRAIN SURGERY  05/24/2016    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 Gustavo Byrnes    COLONOSCOPY  5/3/2012    diverticular disease    DENTAL SURGERY  08/2015    full dental extraction    FOREIGN BODY REMOVAL  5/28/2016    left-sided temporal craniotomy wound reexploration with removal of small retained foreign body (plastic from Ruth clip from surgery 3 days prior), [facility], Dr. Judith Tripathi  09/06/2005    supracervical hysterectomy bilateral salpingo oophectomy    KNEE ARTHROSCOPY Left     NERVE BLOCK  09/09/2019    right side L2 through L5 block nerve,facet joint,lumbar,medial branch per Dr Patel Current at [facility]  2011    endoscopic mucosal resection of duodenal polyp    SHOULDER ARTHROPLASTY Right 10/18/2018    Nasreen Aparicio MD; done at [facility] ARTHROSCOPY Right 03/07/2019    revision arthroscopy with debridement,revision mini-open rotator cuff repair per Dr Samantha Greenwood at [facility]  05/27/2020    gastric ulcer, small hiatal hernia, Dr. Sally Paige, [facility]         Current  Medications:  Current Outpatient Medications   Medication Sig Dispense Refill    lisinopril-hydroCHLOROthiazide (PRINZIDE;ZESTORETIC) 20-12.5 MG per tablet TAKE 1 TABLET DAILY 90 tablet 1    pioglitazone-metformin (ACTOPLUS MET)  MG per tablet Take one tablet twice a day with meals 180 tablet 1    prochlorperazine (COMPAZINE) 10 MG tablet Take 1 tablet by mouth every 6 hours as needed (nausea) 60 tablet 1    DULoxetine (CYMBALTA) 60 MG extended release capsule TAKE 1 CAPSULE DAILY 90 capsule 0    omeprazole (PRILOSEC) 20 MG delayed release capsule take 1 capsule by mouth once daily      cloNIDine (CATAPRES) 0.1 MG tablet Take 1 tablet by mouth 2 times daily as needed for Other (withdrawl symptoms) 60 tablet 0    Cholecalciferol (VITAMIN D3) 65797 units CAPS TAKE 1 CAPSULE ONCE A WEEK 13 capsule 1    Gabapentin (NEURONTIN PO) Take 600 mg by mouth 5 times daily       Handicap Placard MISC by Does not apply route Issue parking placard for person with disability; Applicant meets the qualifying disability criteria. Length of time expected to have disability_____Lifetime  Other __x__. Prescription expires in 5 years from issuing date. 02/05/2024 1 each 0    Misc. Devices (ROLLATOR) MISC Use to reduce risk of falls when walking. 1 each 0     No current facility-administered medications for this visit. Allergies:  Asa [aspirin]    Social History:   Social History     Tobacco Use   Smoking Status Never Smoker   Smokeless Tobacco Never Used     Social History     Substance and Sexual Activity   Alcohol Use Yes    Alcohol/week: 0.0 standard drinks    Comment: Maybe twice a year, small amounts     Social History     Substance and Sexual Activity   Drug Use No       Family History:  Family History   Problem Relation Age of Onset    Cancer Mother     Hypertension Father     Diabetes Father     Cancer Father        REVIEW OF SYSTEMS:  Constitutional: Denies any fever, chills. Musculoskeletal: Positive for left knee pain. PHYSICAL EXAM:  [unfilled]  General appearance:  Alert and oriented x 3. No apparent distress, appears stated age, calm and cooperative. Musculoskeletal: Left lower extremity was examined. Skin is intact no rashes lesions or drainage. Patient does have an obese body habitus toe flexion and extension is intact. Denies calf pain to palpation. No redness warmth or cellulitis noted over the knee. She is lacking a few degrees of full extension. Flexion is to 95 degrees. Good stability to varus and valgus stress testing. She denies calf pain to palpation. Toe flexion and extension is intact. Neurovascularly intact sensation intact to the left foot. Linda De La Rosa       DATA:  CBC:   Lab Results   Component Value Date    WBC 11.7 07/02/2020    HGB 12.1 07/02/2020     07/02/2020     BMP:    Lab Results   Component Value Date     07/02/2020    K 4.0 07/02/2020    CL 97 07/02/2020    CO2 30 07/02/2020    BUN 32 07/02/2020    CREATININE 1.31 07/02/2020    CALCIUM 9.6 07/02/2020    GLUCOSE 127 07/02/2020     PT/INR:    Lab Results   Component Value Date    PROTIME 9.6 11/23/2015    INR 0.9 11/23/2015     Troponin:  No results found for: TROPONINI  No results for input(s): LIPASE, AMYLASE in the last 72 hours. No results for input(s): AST, ALT, BILIDIR, BILITOT, ALKPHOS in the last 72 hours. Uric Acid:  No components found for: URIC  Urine Culture:  No components found for: CURINE    Radiology:   Xr Knee Left (3 Views)    Result Date: 7/21/2020  EXAMINATION: THREE XRAY VIEWS OF THE LEFT KNEE 7/21/2020 8:41 am COMPARISON: None. HISTORY: ORDERING SYSTEM PROVIDED HISTORY: Chronic pain of left knee TECHNOLOGIST PROVIDED HISTORY: pain Reason for Exam: Chronic left medial knee pain, progressively getting worse; no recent trauma FINDINGS: Medial predominant tricompartmental osteoarthritis with widening of the lateral joint space. Osteophyte formation. No aggressive osseous lesion. No focal swelling. Anterior and medial predominant tricompartmental osteoarthritis. X-rays personally reviewed by me of 3 views of the left knee does show moderate osteoarthritis within the left knee medial compartment also severe patellofemoral joint osteoarthritis. No acute fractures noted. Diagnosis Orders   1. Chronic pain of left knee     2. Primary osteoarthritis of left knee           PLAN:  Plan at this time patient would like to get preapproved for a Synvisc 1 injection. She has tried corticosteroid injections which no longer are providing benefits. She has also tried oral naproxen. Once this is improved by her insurance we will bring her back next week for Synvisc 1 injection into her left knee. No orders of the defined types were placed in this encounter.        Procedure:        Electronically signed by SHANNA Bryant CNP on 7/21/2020 at 9:21 AM

## 2020-07-22 ENCOUNTER — TELEPHONE (OUTPATIENT)
Dept: ORTHOPEDIC SURGERY | Age: 65
End: 2020-07-22

## 2020-07-22 NOTE — TELEPHONE ENCOUNTER
Spoke to Arben Courts at [facility] to do a prior authorization for synvisc one for her left knee ([phone]). Per Arben Courts this is not covered through her insurance. Spoke to Pooja March and she stated she would be going on Medicare the end of September, and she would call back to try and get the synvisc one. She did not want to pay out of pocket for the injection.

## 2020-08-06 NOTE — TELEPHONE ENCOUNTER
Patient found her medication that she had filled from her previous appointment, she does not need the refill.

## 2020-08-27 RX ORDER — PROCHLORPERAZINE MALEATE 10 MG
10 TABLET ORAL EVERY 6 HOURS PRN
Qty: 60 TABLET | Refills: 0 | Status: SHIPPED | OUTPATIENT
Start: 2020-08-27 | End: 2020-09-24 | Stop reason: SDUPTHER

## 2020-08-27 NOTE — TELEPHONE ENCOUNTER
Juli Giles called requesting a refill of the below medication which has been pended for you:     Requested Prescriptions     Pending Prescriptions Disp Refills    prochlorperazine (COMPAZINE) 10 MG tablet 60 tablet 1     Sig: Take 1 tablet by mouth every 6 hours as needed (nausea)       Last Appointment Date: 7/10/2020  Next Appointment Date: 10/8/2020    Allergies   Allergen Reactions    Asa [Aspirin] Other (See Comments)     Stomach irritation

## 2020-09-21 RX ORDER — DULOXETIN HYDROCHLORIDE 60 MG/1
CAPSULE, DELAYED RELEASE ORAL
Qty: 90 CAPSULE | Refills: 0 | Status: SHIPPED | OUTPATIENT
Start: 2020-09-21 | End: 2021-01-20

## 2020-09-21 NOTE — TELEPHONE ENCOUNTER
Oxana Flores called requesting a refill of the below medication which has been pended for you:     Requested Prescriptions     Pending Prescriptions Disp Refills    DULoxetine (CYMBALTA) 60 MG extended release capsule [Pharmacy Med Name: DULOXETINE HCL DR CAPS 60MG] 90 capsule 0     Sig: TAKE 1 CAPSULE DAILY       Last Appointment Date: 7/10/2020  Next Appointment Date: 10/8/2020    Allergies   Allergen Reactions    Asa [Aspirin] Other (See Comments)     Stomach irritation

## 2020-10-20 ENCOUNTER — OFFICE VISIT (OUTPATIENT)
Dept: FAMILY MEDICINE CLINIC | Age: 65
End: 2020-10-20
Payer: MEDICARE

## 2020-10-20 VITALS
HEIGHT: 67 IN | HEART RATE: 82 BPM | RESPIRATION RATE: 16 BRPM | SYSTOLIC BLOOD PRESSURE: 112 MMHG | BODY MASS INDEX: 45.17 KG/M2 | WEIGHT: 287.8 LBS | DIASTOLIC BLOOD PRESSURE: 72 MMHG | TEMPERATURE: 98 F

## 2020-10-20 PROCEDURE — 3046F HEMOGLOBIN A1C LEVEL >9.0%: CPT | Performed by: FAMILY MEDICINE

## 2020-10-20 PROCEDURE — G8400 PT W/DXA NO RESULTS DOC: HCPCS | Performed by: FAMILY MEDICINE

## 2020-10-20 PROCEDURE — 99214 OFFICE O/P EST MOD 30 MIN: CPT | Performed by: FAMILY MEDICINE

## 2020-10-20 PROCEDURE — G8427 DOCREV CUR MEDS BY ELIG CLIN: HCPCS | Performed by: FAMILY MEDICINE

## 2020-10-20 PROCEDURE — 1123F ACP DISCUSS/DSCN MKR DOCD: CPT | Performed by: FAMILY MEDICINE

## 2020-10-20 PROCEDURE — 1090F PRES/ABSN URINE INCON ASSESS: CPT | Performed by: FAMILY MEDICINE

## 2020-10-20 PROCEDURE — 4040F PNEUMOC VAC/ADMIN/RCVD: CPT | Performed by: FAMILY MEDICINE

## 2020-10-20 PROCEDURE — 3017F COLORECTAL CA SCREEN DOC REV: CPT | Performed by: FAMILY MEDICINE

## 2020-10-20 PROCEDURE — G8417 CALC BMI ABV UP PARAM F/U: HCPCS | Performed by: FAMILY MEDICINE

## 2020-10-20 PROCEDURE — 2022F DILAT RTA XM EVC RTNOPTHY: CPT | Performed by: FAMILY MEDICINE

## 2020-10-20 PROCEDURE — G8484 FLU IMMUNIZE NO ADMIN: HCPCS | Performed by: FAMILY MEDICINE

## 2020-10-20 PROCEDURE — 90694 VACC AIIV4 NO PRSRV 0.5ML IM: CPT | Performed by: FAMILY MEDICINE

## 2020-10-20 PROCEDURE — 1036F TOBACCO NON-USER: CPT | Performed by: FAMILY MEDICINE

## 2020-10-20 RX ORDER — PROCHLORPERAZINE MALEATE 10 MG
10 TABLET ORAL EVERY 6 HOURS PRN
Qty: 60 TABLET | Refills: 0 | Status: SHIPPED | OUTPATIENT
Start: 2020-10-20 | End: 2021-04-16 | Stop reason: ALTCHOICE

## 2020-10-20 RX ORDER — CLONIDINE HYDROCHLORIDE 0.1 MG/1
0.1 TABLET ORAL 2 TIMES DAILY PRN
Qty: 60 TABLET | Refills: 0 | Status: SHIPPED | OUTPATIENT
Start: 2020-10-20 | End: 2021-05-29

## 2020-10-20 ASSESSMENT — PATIENT HEALTH QUESTIONNAIRE - PHQ9
SUM OF ALL RESPONSES TO PHQ9 QUESTIONS 1 & 2: 2
SUM OF ALL RESPONSES TO PHQ QUESTIONS 1-9: 2
SUM OF ALL RESPONSES TO PHQ QUESTIONS 1-9: 2
2. FEELING DOWN, DEPRESSED OR HOPELESS: 1
SUM OF ALL RESPONSES TO PHQ QUESTIONS 1-9: 2
1. LITTLE INTEREST OR PLEASURE IN DOING THINGS: 1

## 2020-10-20 NOTE — PROGRESS NOTES
[address], [address] [place] [address] Drive                        Telephone [phone]             Fax [phone]     Rica Luna  1955  MRN:  [id number]  Date of visit:  10/20/2020    Subjective:    Rica Luna is a 72 y.o.  female who presents to [facility] today (10/20/2020) for follow up/evaluation of:  Diabetes; Nausea; and Fatigue      She was scheduled for an annual exam and Pap test today. She declines a pelvic exam today, because she has had nausea and diarrhea since last night. She states that she has daily nausea, but she ate a piece of pumpkin pie yesterday, and she thinks that made her symptoms worse. She denies fever or chills. She reports fatigue. She has seen Dr. Gely Macias for her complaints of nausea. She had an EGD 5/27/2020 that showed a gastric ulcer and a small hiatal hernia. At her follow up visit on 7/13/2020, Dr. Gely Macias recommended a sleep study, referral to gastroenterology, and consideration of a laparoscopic cholecystectomy. She states that she has daily nausea regardless of her diet. Her weight has been increasing.       She has the following problem list:  Patient Active Problem List   Diagnosis    Shoulder pain, bilateral    Cervical spine pain    Knee pain, left    Essential hypertension    Pulmonary hypertension ([address]    Vitamin D deficiency    Encounter for long-term (current) use of other medications    Pneumonia    Headache    Subacute sphenoidal sinusitis    Type 2 diabetes mellitus with microalbuminuria ([address]    Peripheral neuropathy    Dizziness    H/O fall    Chronic sinusitis    Gastroesophageal reflux disease    Microalbuminuria    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HCC)    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[address]    Type 2 diabetes mellitus with microalbuminuria, without long-term current use of insulin (HCC)    Morbid obesity with BMI of 40.0-44.9, adult [facility])    Restrictive lung disease secondary to obesity    Calculus of gallbladder and bile duct without cholecystitis or obstruction    Chronic nausea    Multiple gastric ulcers    Anxiety    Chronic tension-type headache, intractable    Chronic, continuous use of opioids    Complete tear of right rotator cuff    Depression    Fatigue    Lumbosacral spondylosis without myelopathy    Major depressive disorder, single episode, moderate (HCC)    Refractory migraine    Muscle spasms of neck    Obsessive-compulsive disorders    Spondylosis of lumbar spine    Spondylosis of thoracic region without myelopathy or radiculopathy        Current medications are:  Outpatient Medications Marked as Taking for the 10/20/20 encounter (Office Visit) with Zaheer More MD   Medication Sig Dispense Refill    prochlorperazine (COMPAZINE) 10 MG tablet Take 1 tablet by mouth every 6 hours as needed (nausea) 60 tablet 0    DULoxetine (CYMBALTA) 60 MG extended release capsule TAKE 1 CAPSULE DAILY 90 capsule 0    lisinopril-hydroCHLOROthiazide (PRINZIDE;ZESTORETIC) 20-12.5 MG per tablet TAKE 1 TABLET DAILY 90 tablet 1    pioglitazone-metformin (ACTOPLUS MET)  MG per tablet Take one tablet twice a day with meals 180 tablet 1    Cholecalciferol (VITAMIN D3) 02816 units CAPS TAKE 1 CAPSULE ONCE A WEEK 13 capsule 1    Gabapentin (NEURONTIN PO) Take 600 mg by mouth 5 times daily       Handicap Placard MISC by Does not apply route Issue parking placard for person with disability; Applicant meets the qualifying disability criteria. Length of time expected to have disability_____Lifetime  Other __x__. Prescription expires in 5 years from issuing date. 02/05/2024 1 each 0    Misc. Devices (ROLLATOR) MISC Use to reduce risk of falls when walking. 1 each 0       She is allergic to asa [aspirin]. She  reports that she has never smoked.  She has never used smokeless tobacco.      Objective:    Vitals:    10/20/20 1009   BP: 112/72   Site: Right Upper Arm   Position: Sitting   Cuff Size: Large Adult   Pulse: 82   Resp: 16   Temp: 98 °F (36.7 °C)   TempSrc: Temporal   Weight: 287 lb 12.8 oz (130.5 kg)   Height: 5' 7\" (1.702 m)     Body mass index is 45.08 kg/m². Extremely obese  female, alert, cooperative and in no distress. Neck supple. No adenopathy. Thyroid symmetric, normal size. Chest:  Normal expansion. Clear to auscultation. No rales, rhonchi, or wheezing. Heart sounds are normal.  Regular rate and rhythm without murmur, gallop or rub. Abdomen is soft, non-distended. There is mild lower abdominal tenderness to palpation. There is no right upper quadrant or epigastric tenderness to palpation. There are no masses palpated. There is no rebound or guarding. Lower extremities have  edema. Her feet were examined. There were no areas of redness, ulceration, or skin breakdown. There was normal sensation to light touch of the feet bilaterally. The pulses in the feet and ankles were diminished. She has had no recent labs. Assessment and Plan:    1. Nausea  As above, she has seen general surgery with the above recommendations. I agree with referral to gastroenterology, and a referral was ordered:  - [facility]    Compazine was refilled:  - prochlorperazine (COMPAZINE) 10 MG tablet; Take 1 tablet by mouth every 6 hours as needed (nausea)  Dispense: 60 tablet; Refill: 0    2. Snoring  3. Sleep disorder, unspecified   A sleep study was ordered:  - Ambulatory referral to Sleep Medicine  - Baseline Diagnostic Sleep Study; Future    4. Fatigue, unspecified type  Labs were ordered to be done today:  - CBC Auto Differential; Future  - TSH without Reflex; Future  - T4, Free; Future    She will be contacted when the results are available.     5. Chronic, continuous use of opioids  She has used Clonidine prn for narcotic withdrawal symptoms; she requests a refill:  - cloNIDine (CATAPRES) 0.1 MG tablet; Take 1 tablet by mouth 2 times daily as needed for Other (withdrawl symptoms)  Dispense: 60 tablet; Refill: 0    6. Type 2 diabetes mellitus with microalbuminuria, without long-term current use of insulin ([facility])  Labs were ordered to be done when she returns fasting:  - Comprehensive Metabolic Panel; Future  - Hemoglobin A1C; Future  - Lipid, Fasting; Future    She will be contacted when the results are available. She was referred for an annual diabetic eye exam:   - External Referral To Optometry    7. Essential hypertension  Her blood pressure is very well-controlled. (BP: 112/72)   She was advised to continue current medications. She states that she does not need a refill today. Labs were ordered to be done when she returns fasting:  - Comprehensive Metabolic Panel; Future  - Lipid, Fasting; Future    She will be contacted when the results are available. 8.  Routine health maintenance  Health maintenance was reviewed with the patient. Annual influenza vaccine was recommended and ordered. She was encouraged to re-schedule an appointment for cervical cancer screening. Pneumonia vaccination was recommended and declined.       (Please note that portions of this note were completed with a voice-recognition program. Efforts were made to edit the dictation but occasionally words are mis-transcribed.)

## 2020-10-20 NOTE — PROGRESS NOTES
Have you had an allergic reaction to the flu (influenza) shot? no  Are you allergic to eggs or any component of the flu vaccine? no  Do you have a history of Guillain-[place] Syndrome (GBS), which is paralysis after receiving the flu vaccine? no  Are you feeling well today? Yes  Flu vaccine given as ordered. Patient tolerated it well. No questions re: VIS information.

## 2020-11-02 ENCOUNTER — OFFICE VISIT (OUTPATIENT)
Dept: NEPHROLOGY | Age: 65
End: 2020-11-02
Payer: MEDICARE

## 2020-11-02 VITALS
HEIGHT: 67 IN | HEART RATE: 72 BPM | DIASTOLIC BLOOD PRESSURE: 70 MMHG | SYSTOLIC BLOOD PRESSURE: 112 MMHG | WEIGHT: 284 LBS | BODY MASS INDEX: 44.57 KG/M2

## 2020-11-02 PROCEDURE — 1123F ACP DISCUSS/DSCN MKR DOCD: CPT | Performed by: INTERNAL MEDICINE

## 2020-11-02 PROCEDURE — 99203 OFFICE O/P NEW LOW 30 MIN: CPT | Performed by: INTERNAL MEDICINE

## 2020-11-02 PROCEDURE — G8484 FLU IMMUNIZE NO ADMIN: HCPCS | Performed by: INTERNAL MEDICINE

## 2020-11-02 PROCEDURE — G8427 DOCREV CUR MEDS BY ELIG CLIN: HCPCS | Performed by: INTERNAL MEDICINE

## 2020-11-02 PROCEDURE — 1036F TOBACCO NON-USER: CPT | Performed by: INTERNAL MEDICINE

## 2020-11-02 PROCEDURE — 1090F PRES/ABSN URINE INCON ASSESS: CPT | Performed by: INTERNAL MEDICINE

## 2020-11-02 PROCEDURE — 3017F COLORECTAL CA SCREEN DOC REV: CPT | Performed by: INTERNAL MEDICINE

## 2020-11-02 PROCEDURE — 99214 OFFICE O/P EST MOD 30 MIN: CPT

## 2020-11-02 PROCEDURE — G8417 CALC BMI ABV UP PARAM F/U: HCPCS | Performed by: INTERNAL MEDICINE

## 2020-11-02 PROCEDURE — 4040F PNEUMOC VAC/ADMIN/RCVD: CPT | Performed by: INTERNAL MEDICINE

## 2020-11-02 PROCEDURE — G8400 PT W/DXA NO RESULTS DOC: HCPCS | Performed by: INTERNAL MEDICINE

## 2020-11-02 PROCEDURE — 3046F HEMOGLOBIN A1C LEVEL >9.0%: CPT | Performed by: INTERNAL MEDICINE

## 2020-11-02 PROCEDURE — 2022F DILAT RTA XM EVC RTNOPTHY: CPT | Performed by: INTERNAL MEDICINE

## 2020-11-02 RX ORDER — HYDROCODONE BITARTRATE AND ACETAMINOPHEN 5; 325 MG/1; MG/1
TABLET ORAL
COMMUNITY
Start: 2020-10-11 | End: 2022-06-16

## 2020-11-02 NOTE — PROGRESS NOTES
Nephrology Consult Note    Reason for Consult: Elevated creatinine  Requesting Physician: Lily Díaz MD (PCP)    Chief Complaint:  Abnormal Lab (elevated creatinine Ref Dr. Mercedes Rojas)     History Obtained From:  patient, electronic medical record    History of Present Illness: This is a 72 y.o. female who presents to the office for evaluation of elevated creatinine. Patient is here as she was referred by her PCP to establish nephrology care due to her elevated creatinine. Patient has past medical history of diabetes type 2 since at least 5-10 years, hypertension since at least  3-4 years, pulmonary hypertension, vitamin D deficiency, peripheral neuropathy, morbid obesity, history of gastric ulcers, depression, migraines, h/o renal stone x 1 in early 2000's. History of EGD in 5/27/2020 showed gastric ulcer and small hiatal hernia patient is to follow-up with GI per PCP note. Last creatinine was 1.31 mg/DL in July 2020. Her baseline creatinine seems to be around 1.1 to 1.3 mg/DL. She does not smoke, no alcohol usage or any drug usage reported. She reports allergies to aspirin-stomach irritation. Family history: Lung cancer, HTN, DM in father and mother with breast cancer. No renal dx. She states she does not very much water does about 2 glasses per day. Tea 2 glasses per day, 1 can of pop per day. No NSAIDs usage reported. He is currently on lisinopril-hydrochlorothiazide 20-12.5 mg daily, vitamin D 50,000 units weekly, also is on Metformin,    Pt denies any hx of heavy or prolonged NSAID use. There is no hx of jaundice or hepatitis or sexually transmitted disease. Pt has no hx of collagen vascular disease or vasculitis. No history of dysuria or frequency. No recent procedures involving IV contrast. There is no hx of paraprotein disease. Pt denies any hx of recurrent UTI , incontinence or nocturia or recurrent nephrolithiasis. No unusual skin rashes . No tea coloured urine.  Medication reviewed and noted the use of ace/arb and diuretic.     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 Nataliia Laboy    COLONOSCOPY  5/3/2012    diverticular disease    DENTAL SURGERY  08/2015    full dental extraction    FOREIGN BODY REMOVAL  5/28/2016    left-sided temporal craniotomy wound reexploration with removal of small retained foreign body (plastic from Ruth clip from surgery 3 days prior), [facility], Dr. Sd Montejo  09/06/2005    supracervical hysterectomy bilateral salpingo oophectomy    KNEE ARTHROSCOPY Left     NERVE BLOCK  09/09/2019    right side L2 through L5 block nerve,facet joint,lumbar[facility]    endoscopic mucosal resection of duodenal polyp    SHOULDER ARTHROPLASTY Right 10/18/2018    Sampson Ferguson MD; done at [facility] ARTHROSCOPY Right 03/07/2019    revision arthroscopy with debridement,revision mini-open rotator cuff [facility]  05/27/2020    gastric ulcer, small hiatal hernia, Dr. Isa Roy, [facility]       Current Medications:    Current Outpatient Medications   Medication Sig Dispense Refill    prochlorperazine (COMPAZINE) 10 MG tablet Take 1 tablet by mouth every 6 hours as needed (nausea) 60 tablet 0    DULoxetine (CYMBALTA) 60 MG extended release capsule TAKE 1 CAPSULE DAILY 90 capsule 0    lisinopril-hydroCHLOROthiazide (PRINZIDE;ZESTORETIC) 20-12.5 MG per tablet TAKE 1 TABLET DAILY 90 tablet 1    pioglitazone-metformin (ACTOPLUS MET)  MG per tablet Take one tablet twice a day with meals 180 tablet 1    Cholecalciferol (VITAMIN D3) 92909 units CAPS TAKE 1 CAPSULE ONCE A WEEK 13 capsule 1    Gabapentin (NEURONTIN PO) Take 600 mg by mouth 5 times daily       Handicap Placard MISC by Does not apply route Issue parking placard for person with disability; Applicant meets the qualifying disability criteria. Length of time expected to have disability_____Lifetime  Other __x__. Prescription expires in 5 years from issuing date. 02/05/2024 1 each 0    Misc. Devices (ROLLATOR) MISC Use to reduce risk of falls when walking. 1 each 0    cloNIDine (CATAPRES) 0.1 MG tablet Take 1 tablet by mouth 2 times daily as needed for Other (withdrawl symptoms) (Patient not taking: Reported on 11/2/2020) 60 tablet 0     No current facility-administered medications for this visit. Allergies:  Asa [aspirin]    Social History:   Social History     Socioeconomic History    Marital status:      Spouse name: Not on file    Number of children: Not on file    Years of education: Not on file    Highest education level: Not on file   Occupational History    Not on file   Social Needs    Financial resource strain: Not on file    Food insecurity     Worry: Not on file     Inability: Not on file   [employer] needs     Medical: Not on file     Non-medical: Not on file   Tobacco Use    Smoking status: Never Smoker    Smokeless tobacco: Never Used   Substance and Sexual Activity    Alcohol use:  Yes     Alcohol/week: 0.0 standard drinks     Comment: Maybe twice a year, small amounts    Drug use: No    Sexual activity: Not on file   Lifestyle    Physical activity     Days per week: Not on file     Minutes per session: Not on file    Stress: Not on file   Relationships    Social connections     Talks on phone: Not on file     Gets together: Not on file     Attends Orthodox service: Not on file     Active member of club or organization: Not on file     Attends meetings of clubs or organizations: Not on file     Relationship status: Not on file    Intimate partner violence     Fear of current or ex partner: Not on file     Emotionally abused: Not on file     Physically abused: Not on file     Forced sexual activity: Not on file   Other Topics Concern    Not on file   Social History Narrative    Not on file       Family History:   Family History   Problem Relation Age of Onset    Cancer Mother     Hypertension Father     Diabetes Father     Cancer Father        Review of Systems:    Constitutional: No fever, no chills, no lethargy, no weakness, +ve snoring, +ve fatigue. HEENT:  No headache, otalgia, itchy eyes, nasal discharge or sore throat. Cardiac:  No chest pain, dyspnea, orthopnea or PND. Chest:   No cough, phlegm or wheezing. Abdomen:  No abdominal pain, +ve nausea, no vomiting, some off/on diarrhea. Neuro:  No focal weakness, abnormal movements orseizure like activity. Skin:   No rashes, no itching. :   No hematuria, no pyuria, no dysuria, no flank pain. Extremities:  No swelling or joint pains. Objective:  Ht 5' 7\" (1.702 m)   Wt 284 lb (128.8 kg)   LMP 08/24/2010 (Approximate)   BMI 44.48 kg/m²     Physical Exam:  General appearance:Awake, alert, in no acute distress  Skin: warm and dry, no rash or erythema  Eyes: conjunctivae normal and sclera anicteric  ENT: :no thrush no pharyngeal congestion    Neck: supple.    Pulmonary: lungs are clear and without any wheezing or rhonchi Cardiovascular: normal S1 & S2, No Murmur   Abdomen: soft, obese, nontender, bowel sounds present, no organomegaly,  No ascites    Extremities: no cyanosis, clubbing or edema    Labs:    CBC:   Lab Results   Component Value Date    WBC 11.7 07/02/2020    RBC 4.29 07/02/2020    HGB 12.1 07/02/2020    HCT 38.5 07/02/2020    MCV 89.7 07/02/2020    RDW 13.9 07/02/2020     07/02/2020    MPV 11.1 07/02/2020      BMP:   Lab Results   Component Value Date     07/02/2020     04/02/2020     01/13/2020    K 4.0 07/02/2020    K 4.2 04/02/2020    K 3.9 01/13/2020    CL 97 07/02/2020    CL 93 04/02/2020    CL 98 01/13/2020    CO2 30 07/02/2020    CO2 28 04/02/2020    CO2 28 01/13/2020    BUN 32 07/02/2020    BUN 35 04/02/2020    BUN 21 01/13/2020    CREATININE 1.31 07/02/2020    CREATININE 1.28 04/02/2020    CREATININE 1.09 01/13/2020    GLUCOSE 127 07/02/2020    GLUCOSE 208 04/02/2020    GLUCOSE 135 01/13/2020    CALCIUM 9.6 07/02/2020    CALCIUM 9.4 04/02/2020    CALCIUM 10.0 01/13/2020      BNP:No results found for: BNP  PHOSPHORUS:  No results found for: PHOS  MAGNESIUM:   Lab Results   Component Value Date    MG 2.1 07/02/2020     ALBUMIN:   Lab Results   Component Value Date    LABALBU 4.1 07/02/2020     IRON:  No results found for: IRON  IRON SATURATION:  No results found for: LABIRON  TIBC:  No results found for: TIBC  FERRITIN:  No results found for: FERRITIN  BERT:   Lab Results   Component Value Date    BERT (A) 11/23/2015     This test is equivocal, which represents a borderline BERT result. Suggest       SPEP:   Lab Results   Component Value Date    PROT 7.8 07/02/2020     UPEP: No results found for: TPU   HEPBSAG:No results found for: HEPBSAG  HEPCAB:No results found for: HEPCAB  C3: No results found for: C3  C4: No results found for: C4  MPO ANCA:   Lab Results   Component Value Date    MPO 26 06/16/2017    .   PR3 ANCA:    Lab Results   Component Value Date    PR3 12 06/16/2017     PTH: No signed by Rosario Mauro MD on 11/2/2020 at 9:11 AM  [facility]     This note is created with the assistance of a speech-recognition program. While intending to generate a document that actually reflects the content of the visit, no guarantees can be provided that every mistake has been identified and corrected by editing.

## 2020-11-03 ENCOUNTER — TELEPHONE (OUTPATIENT)
Dept: GASTROENTEROLOGY | Age: 65
End: 2020-11-03

## 2020-11-04 ENCOUNTER — OFFICE VISIT (OUTPATIENT)
Dept: GASTROENTEROLOGY | Age: 65
End: 2020-11-04
Payer: MEDICARE

## 2020-11-04 VITALS
SYSTOLIC BLOOD PRESSURE: 109 MMHG | TEMPERATURE: 96.3 F | RESPIRATION RATE: 18 BRPM | HEART RATE: 80 BPM | HEIGHT: 67 IN | WEIGHT: 287 LBS | DIASTOLIC BLOOD PRESSURE: 75 MMHG | BODY MASS INDEX: 45.04 KG/M2

## 2020-11-04 PROBLEM — M48.061 SPINAL STENOSIS, LUMBAR REGION, WITHOUT NEUROGENIC CLAUDICATION: Status: ACTIVE | Noted: 2020-08-20

## 2020-11-04 PROCEDURE — 99204 OFFICE O/P NEW MOD 45 MIN: CPT | Performed by: INTERNAL MEDICINE

## 2020-11-04 PROCEDURE — 1123F ACP DISCUSS/DSCN MKR DOCD: CPT | Performed by: INTERNAL MEDICINE

## 2020-11-04 PROCEDURE — 1090F PRES/ABSN URINE INCON ASSESS: CPT | Performed by: INTERNAL MEDICINE

## 2020-11-04 PROCEDURE — 4040F PNEUMOC VAC/ADMIN/RCVD: CPT | Performed by: INTERNAL MEDICINE

## 2020-11-04 PROCEDURE — G8427 DOCREV CUR MEDS BY ELIG CLIN: HCPCS | Performed by: INTERNAL MEDICINE

## 2020-11-04 PROCEDURE — 3017F COLORECTAL CA SCREEN DOC REV: CPT | Performed by: INTERNAL MEDICINE

## 2020-11-04 PROCEDURE — 1036F TOBACCO NON-USER: CPT | Performed by: INTERNAL MEDICINE

## 2020-11-04 PROCEDURE — G8400 PT W/DXA NO RESULTS DOC: HCPCS | Performed by: INTERNAL MEDICINE

## 2020-11-04 PROCEDURE — G8484 FLU IMMUNIZE NO ADMIN: HCPCS | Performed by: INTERNAL MEDICINE

## 2020-11-04 PROCEDURE — G8417 CALC BMI ABV UP PARAM F/U: HCPCS | Performed by: INTERNAL MEDICINE

## 2020-11-04 RX ORDER — OMEPRAZOLE 20 MG/1
20 CAPSULE, DELAYED RELEASE ORAL DAILY
COMMUNITY
Start: 2020-05-27 | End: 2020-12-10

## 2020-11-04 ASSESSMENT — ENCOUNTER SYMPTOMS
CONSTIPATION: 0
NAUSEA: 1
DIARRHEA: 1
ABDOMINAL DISTENTION: 0
VOMITING: 0
ANAL BLEEDING: 0
COUGH: 0
BLOOD IN STOOL: 0
RECTAL PAIN: 0
ABDOMINAL PAIN: 0
CHOKING: 0
WHEEZING: 0
TROUBLE SWALLOWING: 0
BACK PAIN: 1

## 2020-11-04 NOTE — PROGRESS NOTES
Reason for Referral:   Cheryle Sines, MD  [address] [facility], [address]    Chief Complaint   Patient presents with    Nausea     Patient is new, referred for nausea. She states she has nausea daily since Feb 1, 2020    Diarrhea     Patient c/o diarrhea twice a week. HISTORY OF PRESENT ILLNESS: Ms.Nancy Lupe Bowers is a 72 y.o. female , referred for evaluation of nausea, diarrhea  Here for the first time . Multiple medical hx    diabetic    nausea since feb    no abd pain    no bleeding    no f/c   does admit to have Diarrhea, q 2-3 days , no relation to food   Painless  Looking at labs : elevated alk phos rest of LFTs are normal   cbc is normal in 7/2020  abnormal ct abd in 1/2020 :as below   had egd by a surgeon SCAR Hackett , bxs negative     did have us GB as below     On compazine          1. Subtle perigastric stranding adjacent to the gastric fundus, potentially    related to mild gastritis. 2. Mild gallbladder distention without findings of cholelithiasis or acute    cholecystitis.  Consider further evaluation with sonography or a nuclear    medicine hepatobiliary scan if there are clinical findings of cholecystitis.             US GB      1. Cholelithiasis, without sonographic evidence of cholecystitis. 2. Otherwise, unremarkable right upper quadrant ultrasound. Past Medical,Family, and Social History reviewed and does contribute to the patient presentingcondition. Patient's PMH/PSH,SH,PSYCH Hx, MEDs, ALLERGIES, and ROS were all reviewed and updated in the appropriate sections.     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    Obesity     Peripheral neuropathy     Pulmonary hypertension ([address] 2012    by echocardiogram    Shoulder pain, bilateral 8/21/2013    Type 2 diabetes mellitus [facility]     Unspecified essential hypertension     Essential hypertension    Vitamin D deficiency 11/5/2014       Past Surgical History:   Procedure Laterality Date    APPENDECTOMY      BRAIN SURGERY  05/24/2016    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 Zain Salcido COLONOSCOPY  5/3/2012    diverticular disease    DENTAL SURGERY  08/2015    full dental extraction    FOREIGN BODY REMOVAL  5/28/2016    left-sided temporal craniotomy wound reexploration with removal of small retained foreign body (plastic from Ruth clip from surgery 3 days prior), [facility], Dr. Steffany Kat  09/06/2005    supracervical hysterectomy bilateral salpingo oophectomy    KNEE ARTHROSCOPY Left     NERVE BLOCK  09/09/2019    right side L2 through L5 block nerve,facet joint,lumbar,medial branch per Dr Mindy Mcginnis at . [facility]  2011    endoscopic mucosal resection of duodenal polyp    SHOULDER ARTHROPLASTY Right 10/18/2018    Urbano Dela Cruz MD; done at [facility] ARTHROSCOPY Right 03/07/2019    revision arthroscopy with debridement,revision mini-open rotator cuff repair per Dr Dean Roa at [address]  05/27/2020    gastric ulcer, small hiatal hernia, Dr. Noé Sanchez, [facility]:    Current Outpatient Medications:     HYDROcodone-acetaminophen (NORCO) 5-325 MG per tablet, take 1 tablet by mouth twice a day if needed for pain ( FILL ON 10/11/2020), Disp: , Rfl:     prochlorperazine (COMPAZINE) 10 MG tablet, Take 1 tablet by mouth every 6 hours as needed (nausea), Disp: 60 tablet, Rfl: 0    DULoxetine (CYMBALTA) 60 MG extended release capsule, TAKE 1 CAPSULE DAILY, Disp: 90 capsule, Rfl: 0    lisinopril-hydroCHLOROthiazide (PRINZIDE;ZESTORETIC) 20-12.5 MG per tablet, TAKE 1 TABLET DAILY, Disp: 90 tablet, Rfl: 1    pioglitazone-metformin (ACTOPLUS MET)  MG per tablet, Take one tablet twice a day with meals, Disp: 180 tablet, Rfl: 1    Cholecalciferol (VITAMIN D3) 04274 units CAPS, TAKE 1 CAPSULE ONCE A WEEK, Disp: 13 capsule, Rfl: 1    Gabapentin (NEURONTIN PO), Take 600 mg by mouth 5 times daily , Disp: , Rfl:     Handicap Placard MISC, by Does not apply route Issue parking placard for person with disability; Applicant meets the qualifying disability criteria. Length of time expected to have disability_____Lifetime  Other __x__. Prescription expires in 5 years from issuing date. 02/05/2024, Disp: 1 each, Rfl: 0    Misc.  Devices (ROLLATOR) MISC, Use to reduce risk of falls when walking., Disp: 1 each, Rfl: 0    omeprazole (PRILOSEC) 20 MG delayed release capsule, Take 20 mg by mouth daily, Disp: , Rfl:     cloNIDine (CATAPRES) 0.1 MG tablet, Take 1 tablet by mouth 2 times daily as needed for Other (withdrawl symptoms) (Patient not taking: Reported on 11/2/2020), Disp: 60 tablet, Rfl: 0    ALLERGIES:   Allergies   Allergen Reactions    Asa [Aspirin] Other (See Comments)     Stomach irritation       FAMILY HISTORY:       Problem Relation Age of Onset    Cancer Mother     Hypertension Father     Diabetes Father     Cancer Father          SOCIAL HISTORY:   Social History     Socioeconomic History    Marital status:      Spouse name: Not on file    Number of children: Not on file    Years of education: Not on file    Highest education level: Not on file   Occupational History    Not on file   Social Needs    Financial resource strain: Not on file    Food insecurity     Worry: Not on file     Inability: Not on file    Transportation needs     Medical: Not on file     Non-medical: Not on file   Tobacco Use    Smoking status: Never Smoker    Smokeless headaches. Hematological: Bruises/bleeds easily. Psychiatric/Behavioral: Negative for sleep disturbance. The patient is not nervous/anxious. LABORATORY DATA: Reviewed  Lab Results   Component Value Date    WBC 11.7 (H) 07/02/2020    HGB 12.1 07/02/2020    HCT 38.5 07/02/2020    MCV 89.7 07/02/2020     07/02/2020     07/02/2020    K 4.0 07/02/2020    CL 97 (L) 07/02/2020    CO2 30 07/02/2020    BUN 32 (H) 07/02/2020    CREATININE 1.31 (H) 07/02/2020    LABALBU 4.1 07/02/2020    BILITOT 0.31 07/02/2020    ALKPHOS 138 (H) 07/02/2020    AST 10 07/02/2020    ALT <5 (L) 07/02/2020    INR 0.9 11/23/2015         Lab Results   Component Value Date    RBC 4.29 07/02/2020    HGB 12.1 07/02/2020    MCV 89.7 07/02/2020    MCH 28.2 07/02/2020    MCHC 31.4 07/02/2020    RDW 13.9 07/02/2020    MPV 11.1 07/02/2020    BASOPCT 0 07/02/2020    LYMPHSABS 2.60 07/02/2020    MONOSABS 1.01 07/02/2020    NEUTROABS 7.71 07/02/2020    EOSABS 0.27 07/02/2020    BASOSABS 0.05 07/02/2020         DIAGNOSTIC TESTING:     No results found. /75   Pulse 80   Temp 96.3 °F (35.7 °C)   Resp 18   Ht 5' 7\" (1.702 m)   Wt 287 lb (130.2 kg)   LMP 08/24/2010 (Approximate)   BMI 44.95 kg/m²     PHYSICAL EXAMINATION: Vital signs reviewed per the nursing documentation. Body mass index is 44.95 kg/m². Physical Exam  Vitals signs and nursing note reviewed. Constitutional:       General: She is not in acute distress. Appearance: She is well-developed. She is not diaphoretic. HENT:      Head: Normocephalic. Mouth/Throat:      Pharynx: No oropharyngeal exudate. Eyes:      General: No scleral icterus. Pupils: Pupils are equal, round, and reactive to light. Neck:      Musculoskeletal: Normal range of motion and neck supple. Thyroid: No thyromegaly. Vascular: No JVD. Trachea: No tracheal deviation. Cardiovascular:      Rate and Rhythm: Normal rate and regular rhythm.       Heart sounds: Normal heart sounds. No murmur. Pulmonary:      Effort: Pulmonary effort is normal. No respiratory distress. Breath sounds: Normal breath sounds. No wheezing. Abdominal:      General: Bowel sounds are normal. There is no distension. Palpations: Abdomen is soft. Tenderness: There is no abdominal tenderness. There is no guarding or rebound. Comments: No ascites   Musculoskeletal: Normal range of motion. Skin:     General: Skin is warm. Coloration: Skin is not pale. Findings: No erythema or rash. Comments: She is not diaphoretic   Neurological:      Mental Status: She is alert and oriented to person, place, and time. Deep Tendon Reflexes: Reflexes are normal and symmetric. Psychiatric:         Behavior: Behavior normal.         Thought Content: Thought content normal.         Judgment: Judgment normal.         IMPRESSION: Ms. Zheng Benoit is a 72 y.o. female with      Diagnosis Orders   1. Nausea  EGD    MRI ABDOMEN W WO CONTRAST MRCP   2. Diarrhea, unspecified type  COLONOSCOPY W/ OR W/O BIOPSY    MRI ABDOMEN W WO CONTRAST MRCP   3. Calculus of gallbladder without cholecystitis without obstruction  MRI ABDOMEN W WO CONTRAST MRCP   4. Acute gastric ulcer without hemorrhage or perforation  EGD   5. Elevated alkaline phosphatase level     Will proceed with the above        Diet/life style/natural hx /complication of the dx were all explained in details   Past medical, past surgical, social history, psychiatric history, medications or allergies, all reviewed and  updated    Spent 50 minutes providing patient education and counseling. Thank you for allowing me to participate in the care of Ms. Zheng Benoit. For any further questions please do not hesitate to contact me. I have reviewed and agree with the MA/RN ROS. Note is dictated utilizing voice recognition software. Unfortunately this leads to occasional typographical errors.  Please contact our office if you have any questions.     Nehemiah Bruner MD  [facility]  O: #[phone]

## 2020-11-18 RX ORDER — POLYETHYLENE GLYCOL 3350 17 G/17G
POWDER, FOR SOLUTION ORAL
Qty: 238 G | Refills: 0 | Status: SHIPPED | OUTPATIENT
Start: 2020-11-18 | End: 2021-05-05 | Stop reason: SDUPTHER

## 2020-11-18 NOTE — TELEPHONE ENCOUNTER
Writer spoke to pt over the phone in regards to scheduling EGD/colon procs. Pt is sched bishop/ Marija at [facility] Wed 12/23/20 @ 7:45am proc time, 6:15am arrival time. Dulcolax/Miralax orders will be sent to [facility] in [place], [place]. Bowel prep instructions given to pt over the phone and hard copy mailed to pt's home address. Pt instructed she will need a . Covid testing will be sched by [facility] and they will call pt to sched date & time. Pt voices her understanding. Writer spoke to Tj at [facility] and gave pt's information and proc date. Per Tj, they will call pt about a week before to sched covid testing.

## 2020-11-30 ENCOUNTER — HOSPITAL ENCOUNTER (OUTPATIENT)
Dept: SLEEP CENTER | Age: 65
Discharge: HOME | End: 2020-12-02
Payer: MEDICARE

## 2020-11-30 PROCEDURE — 95810 POLYSOM 6/> YRS 4/> PARAM: CPT

## 2020-12-04 NOTE — PROGRESS NOTES
[address]                 [address] [place] [id number]                                  SLEEP STUDY  PATIENT NAME: Deborah Benito                       :        1955  MED REC NO:   [id number]                             ROOM:  ACCOUNT NO:   [de-identified]                           ADMIT DATE: 2020  PROVIDER:     Leatha Beth    [facility]  INTERPRETATION OF CLINICAL POLYSOMNOGRAPHY    DATE OF STUDY:  2020    REFERRING PROVIDER:  Lyle Cordon MD    CLINICAL IMPRESSION:  1. Obstructive sleep apnea syndrome. 2.  Morbid obesity. 3.  Hypertension. 4.  Diabetes. PROCEDURE STANDARD:  1-night study. PROCEDURE:  The sleep/wake evaluation consisted of an intensive intake  interview, one night of clinical polysomnography, a nocturnal  respiratory battery, left and right leg anterior tibialis surface  electromyography. The standard montage for clinical polysomnography included the  electroencephalogram, the electro-oculogram, mentalis surface  electromyography, and modified Lead II cardiography. The respiratory  battery consisted of measurements of oral/nasal airflow by heat  thermistor, nasal pressure transducer, thoracic and abdominal effort by  Respiratory Inductance Plethysmography. Nocturnal oxyhemoglobin  saturations were obtained by finger oximetry. Polysomnography revealed that the patient spent 420 minutes in bed with  a total sleep time of 270 minutes. Sleep efficiency was reduced to 64%. Latency of sleep onset was normal at 12 minutes. There were 53 sleep  stage changes. There were 15 awakenings during the night. Sleep architecture was abnormal with 31% stage I, 35% stage II, 14%  delta, 0% REM. Latency of various sleep stages were normal.    Polysomnography did not reveal any other abnormality. Electrocardiogram did not reveal any arrhythmia.     Respiratory evaluation revealed the patient had a total of 7 respiratory  events out of which 3 were complete, 4 were partial.  Apnea-hypopnea  index was 2 per hour. Lowest oxygen saturation during the night being  89%. Movement disorder evaluation revealed mild degree of periodic leg  movement. The PLM index accompanied by arousal being 18 per hour. IMPRESSION:  1. Periodic leg movement. 2.  No significant apnea noted. 3.  Hypertension. 4.  Morbid obesity. 5.  Diabetes. RECOMMENDATIONS:  The patient does not have any significant degree of  sleep apnea. A clinical correlation should be obtained. If the snoring  is pathological, then ENT may be requested for the treatment of snoring. The patient should be encouraged to lose weight. The patient's diabetes and hypertension should be appropriately managed.         Brook Shaw    D: 12/04/2020 9:06:42       T: 12/04/2020 9:16:27     [email or web address]  Job#: [id number]     Doc#: [id number]    CC:  Amy Osborn

## 2020-12-07 ENCOUNTER — HOSPITAL ENCOUNTER (OUTPATIENT)
Dept: ULTRASOUND IMAGING | Age: 65
Discharge: HOME | End: 2020-12-09
Payer: MEDICARE

## 2020-12-07 ENCOUNTER — HOSPITAL ENCOUNTER (OUTPATIENT)
Dept: LAB | Age: 65
Discharge: HOME | End: 2020-12-07
Payer: MEDICARE

## 2020-12-07 LAB
ABSOLUTE EOS #: 0.12 K/UL (ref 0–0.44)
ABSOLUTE EOS #: 0.12 K/UL (ref 0–0.44)
ABSOLUTE IMMATURE GRANULOCYTE: 0.05 K/UL (ref 0–0.3)
ABSOLUTE IMMATURE GRANULOCYTE: 0.05 K/UL (ref 0–0.3)
ABSOLUTE LYMPH #: 2.06 K/UL (ref 1.1–3.7)
ABSOLUTE LYMPH #: 2.06 K/UL (ref 1.1–3.7)
ABSOLUTE MONO #: 0.68 K/UL (ref 0.1–1.2)
ABSOLUTE MONO #: 0.68 K/UL (ref 0.1–1.2)
ALBUMIN SERPL-MCNC: 4 G/DL (ref 3.5–5.2)
ALBUMIN/GLOBULIN RATIO: 1.1 (ref 1–2.5)
ALP BLD-CCNC: 126 U/L (ref 35–104)
ALT SERPL-CCNC: 6 U/L (ref 5–33)
ANION GAP SERPL CALCULATED.3IONS-SCNC: 9 MMOL/L (ref 9–17)
ANION GAP SERPL CALCULATED.3IONS-SCNC: 9 MMOL/L (ref 9–17)
AST SERPL-CCNC: 11 U/L
BASOPHILS # BLD: 1 % (ref 0–2)
BASOPHILS # BLD: 1 % (ref 0–2)
BASOPHILS ABSOLUTE: 0.05 K/UL (ref 0–0.2)
BASOPHILS ABSOLUTE: 0.05 K/UL (ref 0–0.2)
BILIRUB SERPL-MCNC: 0.42 MG/DL (ref 0.3–1.2)
BUN BLDV-MCNC: 26 MG/DL (ref 8–23)
BUN BLDV-MCNC: 26 MG/DL (ref 8–23)
BUN/CREAT BLD: 20 (ref 9–20)
BUN/CREAT BLD: 20 (ref 9–20)
CALCIUM IONIZED: 1.26 MMOL/L (ref 1.13–1.33)
CALCIUM SERPL-MCNC: 9.9 MG/DL (ref 8.6–10.4)
CALCIUM SERPL-MCNC: 9.9 MG/DL (ref 8.6–10.4)
CHLORIDE BLD-SCNC: 95 MMOL/L (ref 98–107)
CHLORIDE BLD-SCNC: 95 MMOL/L (ref 98–107)
CHOLESTEROL, FASTING: 167 MG/DL
CHOLESTEROL/HDL RATIO: 3.3
CO2: 27 MMOL/L (ref 20–31)
CO2: 27 MMOL/L (ref 20–31)
COMPLEMENT C3: 167 MG/DL (ref 90–180)
COMPLEMENT C4: 27 MG/DL (ref 10–40)
CREAT SERPL-MCNC: 1.27 MG/DL (ref 0.5–0.9)
CREAT SERPL-MCNC: 1.27 MG/DL (ref 0.5–0.9)
DIFFERENTIAL TYPE: ABNORMAL
DIFFERENTIAL TYPE: ABNORMAL
EOSINOPHILS RELATIVE PERCENT: 1 % (ref 1–4)
EOSINOPHILS RELATIVE PERCENT: 1 % (ref 1–4)
ESTIMATED AVERAGE GLUCOSE: 131 MG/DL
FREE KAPPA/LAMBDA RATIO: 1 (ref 0.26–1.65)
GFR AFRICAN AMERICAN: 51 ML/MIN
GFR AFRICAN AMERICAN: 51 ML/MIN
GFR NON-AFRICAN AMERICAN: 42 ML/MIN
GFR NON-AFRICAN AMERICAN: 42 ML/MIN
GFR SERPL CREATININE-BSD FRML MDRD: ABNORMAL ML/MIN/{1.73_M2}
GLUCOSE BLD-MCNC: 124 MG/DL (ref 70–99)
GLUCOSE BLD-MCNC: 124 MG/DL (ref 70–99)
HAV IGM SER IA-ACNC: NONREACTIVE
HBA1C MFR BLD: 6.2 % (ref 4.8–5.9)
HCT VFR BLD CALC: 38 % (ref 36.3–47.1)
HCT VFR BLD CALC: 38 % (ref 36.3–47.1)
HDLC SERPL-MCNC: 51 MG/DL
HEMOGLOBIN: 11.7 G/DL (ref 11.9–15.1)
HEMOGLOBIN: 11.7 G/DL (ref 11.9–15.1)
HEPATITIS B CORE IGM ANTIBODY: NONREACTIVE
HEPATITIS B SURFACE ANTIGEN: NONREACTIVE
HEPATITIS C ANTIBODY: NONREACTIVE
IMMATURE GRANULOCYTES: 1 %
IMMATURE GRANULOCYTES: 1 %
KAPPA FREE LIGHT CHAINS QNT: 2.14 MG/DL (ref 0.37–1.94)
LAMBDA FREE LIGHT CHAINS QNT: 2.15 MG/DL (ref 0.57–2.63)
LDL CHOLESTEROL: 94 MG/DL (ref 0–130)
LYMPHOCYTES # BLD: 23 % (ref 24–43)
LYMPHOCYTES # BLD: 23 % (ref 24–43)
MCH RBC QN AUTO: 29.3 PG (ref 25.2–33.5)
MCH RBC QN AUTO: 29.3 PG (ref 25.2–33.5)
MCHC RBC AUTO-ENTMCNC: 30.8 G/DL (ref 25.2–33.5)
MCHC RBC AUTO-ENTMCNC: 30.8 G/DL (ref 25.2–33.5)
MCV RBC AUTO: 95 FL (ref 82.6–102.9)
MCV RBC AUTO: 95 FL (ref 82.6–102.9)
MONOCYTES # BLD: 7 % (ref 3–12)
MONOCYTES # BLD: 7 % (ref 3–12)
NRBC AUTOMATED: 0 PER 100 WBC
NRBC AUTOMATED: 0 PER 100 WBC
PDW BLD-RTO: 13.4 % (ref 11.8–14.4)
PDW BLD-RTO: 13.4 % (ref 11.8–14.4)
PHOSPHORUS: 3.6 MG/DL (ref 2.6–4.5)
PLATELET # BLD: 239 K/UL (ref 138–453)
PLATELET # BLD: 239 K/UL (ref 138–453)
PLATELET ESTIMATE: ABNORMAL
PLATELET ESTIMATE: ABNORMAL
PMV BLD AUTO: 10.4 FL (ref 8.1–13.5)
PMV BLD AUTO: 10.4 FL (ref 8.1–13.5)
POTASSIUM SERPL-SCNC: 4.2 MMOL/L (ref 3.7–5.3)
POTASSIUM SERPL-SCNC: 4.2 MMOL/L (ref 3.7–5.3)
PTH INTACT: 52.16 PG/ML (ref 15–65)
RBC # BLD: 4 M/UL (ref 3.95–5.11)
RBC # BLD: 4 M/UL (ref 3.95–5.11)
RBC # BLD: ABNORMAL 10*6/UL
RBC # BLD: ABNORMAL 10*6/UL
SEG NEUTROPHILS: 67 % (ref 36–65)
SEG NEUTROPHILS: 68 % (ref 36–65)
SEGMENTED NEUTROPHILS ABSOLUTE COUNT: 6.17 K/UL (ref 1.5–8.1)
SEGMENTED NEUTROPHILS ABSOLUTE COUNT: 6.17 K/UL (ref 1.5–8.1)
SODIUM BLD-SCNC: 131 MMOL/L (ref 135–144)
SODIUM BLD-SCNC: 131 MMOL/L (ref 135–144)
THYROXINE, FREE: 1.12 NG/DL (ref 0.93–1.7)
TOTAL PROTEIN: 7.5 G/DL (ref 6.4–8.3)
TRIGLYCERIDE, FASTING: 111 MG/DL
TSH SERPL DL<=0.05 MIU/L-ACNC: 3.42 MIU/L (ref 0.3–5)
VITAMIN D 25-HYDROXY: 41.8 NG/ML (ref 30–100)
VLDLC SERPL CALC-MCNC: NORMAL MG/DL (ref 1–30)
WBC # BLD: 9.1 K/UL (ref 3.5–11.3)
WBC # BLD: 9.1 K/UL (ref 3.5–11.3)
WBC # BLD: ABNORMAL 10*3/UL
WBC # BLD: ABNORMAL 10*3/UL

## 2020-12-07 PROCEDURE — 86038 ANTINUCLEAR ANTIBODIES: CPT

## 2020-12-07 PROCEDURE — 76770 US EXAM ABDO BACK WALL COMP: CPT

## 2020-12-07 PROCEDURE — 84155 ASSAY OF PROTEIN SERUM: CPT

## 2020-12-07 PROCEDURE — 86334 IMMUNOFIX E-PHORESIS SERUM: CPT

## 2020-12-07 PROCEDURE — 80074 ACUTE HEPATITIS PANEL: CPT

## 2020-12-07 PROCEDURE — 85025 COMPLETE CBC W/AUTO DIFF WBC: CPT

## 2020-12-07 PROCEDURE — 84165 PROTEIN E-PHORESIS SERUM: CPT

## 2020-12-07 PROCEDURE — 82306 VITAMIN D 25 HYDROXY: CPT

## 2020-12-07 PROCEDURE — 82330 ASSAY OF CALCIUM: CPT

## 2020-12-07 PROCEDURE — 84166 PROTEIN E-PHORESIS/URINE/CSF: CPT

## 2020-12-07 PROCEDURE — 83970 ASSAY OF PARATHORMONE: CPT

## 2020-12-07 PROCEDURE — 80053 COMPREHEN METABOLIC PANEL: CPT

## 2020-12-07 PROCEDURE — 80061 LIPID PANEL: CPT

## 2020-12-07 PROCEDURE — 36415 COLL VENOUS BLD VENIPUNCTURE: CPT

## 2020-12-07 PROCEDURE — 84443 ASSAY THYROID STIM HORMONE: CPT

## 2020-12-07 PROCEDURE — 86160 COMPLEMENT ANTIGEN: CPT

## 2020-12-07 PROCEDURE — 83036 HEMOGLOBIN GLYCOSYLATED A1C: CPT

## 2020-12-07 PROCEDURE — 80048 BASIC METABOLIC PNL TOTAL CA: CPT

## 2020-12-07 PROCEDURE — 84439 ASSAY OF FREE THYROXINE: CPT

## 2020-12-07 PROCEDURE — 84100 ASSAY OF PHOSPHORUS: CPT

## 2020-12-07 PROCEDURE — 83883 ASSAY NEPHELOMETRY NOT SPEC: CPT

## 2020-12-07 PROCEDURE — 84156 ASSAY OF PROTEIN URINE: CPT

## 2020-12-08 LAB
ALBUMIN (CALCULATED): 4.1 G/DL (ref 3.2–5.2)
ALBUMIN PERCENT: 58 % (ref 45–65)
ALPHA 1 PERCENT: 3 % (ref 3–6)
ALPHA 2 PERCENT: 12 % (ref 6–13)
ALPHA-1-GLOBULIN: 0.2 G/DL (ref 0.1–0.4)
ALPHA-2-GLOBULIN: 0.8 G/DL (ref 0.5–0.9)
ANTI-NUCLEAR ANTIBODY (ANA): NEGATIVE
BETA GLOBULIN: 1 G/DL (ref 0.5–1.1)
BETA PERCENT: 14 % (ref 11–19)
GAMMA GLOBULIN %: 13 % (ref 9–20)
GAMMA GLOBULIN: 1 G/DL (ref 0.5–1.5)
PATHOLOGIST: NORMAL
PATHOLOGIST: NORMAL
PROTEIN ELECTROPHORESIS, SERUM: NORMAL
SERUM IFX INTERP: NORMAL
TOTAL PROT. SUM,%: 100 % (ref 98–102)
TOTAL PROT. SUM: 7.1 G/DL (ref 6.3–8.2)
TOTAL PROTEIN: 7.1 G/DL (ref 6.4–8.3)

## 2020-12-09 ENCOUNTER — HOSPITAL ENCOUNTER (OUTPATIENT)
Age: 65
Setting detail: SPECIMEN
Discharge: HOME | End: 2020-12-09
Payer: MEDICARE

## 2020-12-09 PROCEDURE — 82436 ASSAY OF URINE CHLORIDE: CPT

## 2020-12-09 PROCEDURE — 82570 ASSAY OF URINE CREATININE: CPT

## 2020-12-09 PROCEDURE — 84156 ASSAY OF PROTEIN URINE: CPT

## 2020-12-09 PROCEDURE — 84300 ASSAY OF URINE SODIUM: CPT

## 2020-12-10 LAB
CHLORIDE, UR: 68 MMOL/L
CREATININE URINE: 56.6 MG/DL (ref 28–217)
SODIUM,UR: 84 MMOL/L
TOTAL PROTEIN, URINE: 12 MG/DL
URINE TOTAL PROTEIN CREATININE RATIO: 0.21 (ref 0–0.2)

## 2020-12-11 ENCOUNTER — HOSPITAL ENCOUNTER (OUTPATIENT)
Dept: MRI IMAGING | Age: 65
Discharge: HOME | End: 2020-12-13
Payer: MEDICARE

## 2020-12-11 PROCEDURE — 6360000004 HC RX CONTRAST MEDICATION: Performed by: INTERNAL MEDICINE

## 2020-12-11 PROCEDURE — 74183 MRI ABD W/O CNTR FLWD CNTR: CPT

## 2020-12-11 PROCEDURE — A9577 INJ MULTIHANCE: HCPCS | Performed by: INTERNAL MEDICINE

## 2020-12-11 RX ADMIN — GADOBENATE DIMEGLUMINE 20 ML: 529 INJECTION, SOLUTION INTRAVENOUS at 13:32

## 2020-12-14 ENCOUNTER — PRE-PROCEDURE TELEPHONE (OUTPATIENT)
Dept: PREADMISSION TESTING | Age: 65
End: 2020-12-14

## 2020-12-15 ENCOUNTER — HOSPITAL ENCOUNTER (OUTPATIENT)
Dept: LAB | Age: 65
Discharge: HOME | End: 2020-12-15
Payer: MEDICARE

## 2020-12-15 LAB
ANION GAP SERPL CALCULATED.3IONS-SCNC: 11 MMOL/L (ref 9–17)
BUN BLDV-MCNC: 26 MG/DL (ref 8–23)
BUN/CREAT BLD: 23 (ref 9–20)
CALCIUM SERPL-MCNC: 10.3 MG/DL (ref 8.6–10.4)
CHLORIDE BLD-SCNC: 100 MMOL/L (ref 98–107)
CO2: 30 MMOL/L (ref 20–31)
CREAT SERPL-MCNC: 1.12 MG/DL (ref 0.5–0.9)
GFR AFRICAN AMERICAN: 59 ML/MIN
GFR NON-AFRICAN AMERICAN: 49 ML/MIN
GFR SERPL CREATININE-BSD FRML MDRD: ABNORMAL ML/MIN/{1.73_M2}
GFR SERPL CREATININE-BSD FRML MDRD: ABNORMAL ML/MIN/{1.73_M2}
GLUCOSE BLD-MCNC: 150 MG/DL (ref 70–99)
POTASSIUM SERPL-SCNC: 4.6 MMOL/L (ref 3.7–5.3)
SODIUM BLD-SCNC: 141 MMOL/L (ref 135–144)

## 2020-12-15 PROCEDURE — 80048 BASIC METABOLIC PNL TOTAL CA: CPT

## 2020-12-15 PROCEDURE — 36415 COLL VENOUS BLD VENIPUNCTURE: CPT

## 2020-12-18 ENCOUNTER — HOSPITAL ENCOUNTER (OUTPATIENT)
Dept: PREADMISSION TESTING | Age: 65
Setting detail: SPECIMEN
Discharge: HOME | End: 2020-12-22
Payer: MEDICARE

## 2020-12-18 PROCEDURE — U0003 INFECTIOUS AGENT DETECTION BY NUCLEIC ACID (DNA OR RNA); SEVERE ACUTE RESPIRATORY SYNDROME CORONAVIRUS 2 (SARS-COV-2) (CORONAVIRUS DISEASE [COVID-19]), AMPLIFIED PROBE TECHNIQUE, MAKING USE OF HIGH THROUGHPUT TECHNOLOGIES AS DESCRIBED BY CMS-2020-01-R: HCPCS

## 2020-12-19 LAB — SARS-COV-2, NAA: NOT DETECTED

## 2020-12-21 ENCOUNTER — ANESTHESIA EVENT (OUTPATIENT)
Dept: OPERATING ROOM | Age: 65
End: 2020-12-21
Payer: MEDICARE

## 2020-12-21 ENCOUNTER — TELEPHONE (OUTPATIENT)
Dept: PRIMARY CARE CLINIC | Age: 65
End: 2020-12-21

## 2020-12-23 ENCOUNTER — ANESTHESIA (OUTPATIENT)
Dept: OPERATING ROOM | Age: 65
End: 2020-12-23
Payer: MEDICARE

## 2020-12-23 ENCOUNTER — HOSPITAL ENCOUNTER (OUTPATIENT)
Age: 65
Setting detail: OUTPATIENT SURGERY
Discharge: HOME | End: 2020-12-23
Attending: INTERNAL MEDICINE | Admitting: INTERNAL MEDICINE
Payer: MEDICARE

## 2020-12-23 VITALS
OXYGEN SATURATION: 100 % | RESPIRATION RATE: 15 BRPM | SYSTOLIC BLOOD PRESSURE: 86 MMHG | DIASTOLIC BLOOD PRESSURE: 54 MMHG

## 2020-12-23 VITALS
TEMPERATURE: 97.4 F | SYSTOLIC BLOOD PRESSURE: 110 MMHG | HEART RATE: 65 BPM | WEIGHT: 289.2 LBS | HEIGHT: 68 IN | DIASTOLIC BLOOD PRESSURE: 68 MMHG | OXYGEN SATURATION: 98 % | BODY MASS INDEX: 43.83 KG/M2 | RESPIRATION RATE: 18 BRPM

## 2020-12-23 LAB — GLUCOSE BLD-MCNC: 136 MG/DL (ref 65–105)

## 2020-12-23 PROCEDURE — 6360000002 HC RX W HCPCS: Performed by: NURSE ANESTHETIST, CERTIFIED REGISTERED

## 2020-12-23 PROCEDURE — 2709999900 HC NON-CHARGEABLE SUPPLY: Performed by: INTERNAL MEDICINE

## 2020-12-23 PROCEDURE — 43239 EGD BIOPSY SINGLE/MULTIPLE: CPT | Performed by: INTERNAL MEDICINE

## 2020-12-23 PROCEDURE — 3700000000 HC ANESTHESIA ATTENDED CARE: Performed by: INTERNAL MEDICINE

## 2020-12-23 PROCEDURE — 3609012400 HC EGD TRANSORAL BIOPSY SINGLE/MULTIPLE: Performed by: INTERNAL MEDICINE

## 2020-12-23 PROCEDURE — 7100000011 HC PHASE II RECOVERY - ADDTL 15 MIN: Performed by: INTERNAL MEDICINE

## 2020-12-23 PROCEDURE — 7100000010 HC PHASE II RECOVERY - FIRST 15 MIN: Performed by: INTERNAL MEDICINE

## 2020-12-23 PROCEDURE — 3609010300 HC COLONOSCOPY W/BIOPSY SINGLE/MULTIPLE: Performed by: INTERNAL MEDICINE

## 2020-12-23 PROCEDURE — 2580000003 HC RX 258: Performed by: ANESTHESIOLOGY

## 2020-12-23 PROCEDURE — 3700000001 HC ADD 15 MINUTES (ANESTHESIA): Performed by: INTERNAL MEDICINE

## 2020-12-23 PROCEDURE — 45380 COLONOSCOPY AND BIOPSY: CPT | Performed by: INTERNAL MEDICINE

## 2020-12-23 PROCEDURE — 88305 TISSUE EXAM BY PATHOLOGIST: CPT

## 2020-12-23 PROCEDURE — 82947 ASSAY GLUCOSE BLOOD QUANT: CPT

## 2020-12-23 PROCEDURE — 2500000003 HC RX 250 WO HCPCS: Performed by: NURSE ANESTHETIST, CERTIFIED REGISTERED

## 2020-12-23 RX ORDER — SODIUM CHLORIDE 0.9 % (FLUSH) 0.9 %
10 SYRINGE (ML) INJECTION EVERY 12 HOURS SCHEDULED
Status: DISCONTINUED | OUTPATIENT
Start: 2020-12-23 | End: 2020-12-23 | Stop reason: HOSPADM

## 2020-12-23 RX ORDER — ONDANSETRON 2 MG/ML
4 INJECTION INTRAMUSCULAR; INTRAVENOUS
Status: DISCONTINUED | OUTPATIENT
Start: 2020-12-23 | End: 2020-12-23 | Stop reason: HOSPADM

## 2020-12-23 RX ORDER — PROPOFOL 10 MG/ML
INJECTION, EMULSION INTRAVENOUS CONTINUOUS PRN
Status: DISCONTINUED | OUTPATIENT
Start: 2020-12-23 | End: 2020-12-23 | Stop reason: SDUPTHER

## 2020-12-23 RX ORDER — PROPOFOL 10 MG/ML
INJECTION, EMULSION INTRAVENOUS PRN
Status: DISCONTINUED | OUTPATIENT
Start: 2020-12-23 | End: 2020-12-23 | Stop reason: SDUPTHER

## 2020-12-23 RX ORDER — MORPHINE SULFATE 1 MG/ML
1 INJECTION, SOLUTION EPIDURAL; INTRATHECAL; INTRAVENOUS EVERY 5 MIN PRN
Status: DISCONTINUED | OUTPATIENT
Start: 2020-12-23 | End: 2020-12-23 | Stop reason: HOSPADM

## 2020-12-23 RX ORDER — HYDROCODONE BITARTRATE AND ACETAMINOPHEN 5; 325 MG/1; MG/1
2 TABLET ORAL PRN
Status: DISCONTINUED | OUTPATIENT
Start: 2020-12-23 | End: 2020-12-23 | Stop reason: HOSPADM

## 2020-12-23 RX ORDER — ONDANSETRON 2 MG/ML
INJECTION INTRAMUSCULAR; INTRAVENOUS PRN
Status: DISCONTINUED | OUTPATIENT
Start: 2020-12-23 | End: 2020-12-23 | Stop reason: SDUPTHER

## 2020-12-23 RX ORDER — SODIUM CHLORIDE 9 MG/ML
INJECTION, SOLUTION INTRAVENOUS CONTINUOUS
Status: DISCONTINUED | OUTPATIENT
Start: 2020-12-23 | End: 2020-12-23 | Stop reason: HOSPADM

## 2020-12-23 RX ORDER — HYDROCODONE BITARTRATE AND ACETAMINOPHEN 5; 325 MG/1; MG/1
1 TABLET ORAL PRN
Status: DISCONTINUED | OUTPATIENT
Start: 2020-12-23 | End: 2020-12-23 | Stop reason: HOSPADM

## 2020-12-23 RX ORDER — SODIUM CHLORIDE, SODIUM LACTATE, POTASSIUM CHLORIDE, CALCIUM CHLORIDE 600; 310; 30; 20 MG/100ML; MG/100ML; MG/100ML; MG/100ML
INJECTION, SOLUTION INTRAVENOUS CONTINUOUS
Status: DISCONTINUED | OUTPATIENT
Start: 2020-12-23 | End: 2020-12-23 | Stop reason: HOSPADM

## 2020-12-23 RX ORDER — MEPERIDINE HYDROCHLORIDE 50 MG/ML
12.5 INJECTION INTRAMUSCULAR; INTRAVENOUS; SUBCUTANEOUS EVERY 5 MIN PRN
Status: DISCONTINUED | OUTPATIENT
Start: 2020-12-23 | End: 2020-12-23 | Stop reason: HOSPADM

## 2020-12-23 RX ORDER — LIDOCAINE HYDROCHLORIDE 20 MG/ML
INJECTION, SOLUTION INFILTRATION; PERINEURAL PRN
Status: DISCONTINUED | OUTPATIENT
Start: 2020-12-23 | End: 2020-12-23 | Stop reason: SDUPTHER

## 2020-12-23 RX ORDER — HYDRALAZINE HYDROCHLORIDE 20 MG/ML
5 INJECTION INTRAMUSCULAR; INTRAVENOUS EVERY 10 MIN PRN
Status: DISCONTINUED | OUTPATIENT
Start: 2020-12-23 | End: 2020-12-23 | Stop reason: HOSPADM

## 2020-12-23 RX ORDER — DIPHENHYDRAMINE HYDROCHLORIDE 50 MG/ML
12.5 INJECTION INTRAMUSCULAR; INTRAVENOUS
Status: DISCONTINUED | OUTPATIENT
Start: 2020-12-23 | End: 2020-12-23 | Stop reason: HOSPADM

## 2020-12-23 RX ORDER — PROMETHAZINE HYDROCHLORIDE 25 MG/ML
6.25 INJECTION, SOLUTION INTRAMUSCULAR; INTRAVENOUS
Status: DISCONTINUED | OUTPATIENT
Start: 2020-12-23 | End: 2020-12-23 | Stop reason: HOSPADM

## 2020-12-23 RX ORDER — FENTANYL CITRATE 50 UG/ML
25 INJECTION, SOLUTION INTRAMUSCULAR; INTRAVENOUS EVERY 5 MIN PRN
Status: DISCONTINUED | OUTPATIENT
Start: 2020-12-23 | End: 2020-12-23 | Stop reason: HOSPADM

## 2020-12-23 RX ORDER — SODIUM CHLORIDE 0.9 % (FLUSH) 0.9 %
10 SYRINGE (ML) INJECTION PRN
Status: DISCONTINUED | OUTPATIENT
Start: 2020-12-23 | End: 2020-12-23 | Stop reason: HOSPADM

## 2020-12-23 RX ADMIN — PROPOFOL 50 MCG/KG/MIN: 10 INJECTION, EMULSION INTRAVENOUS at 08:01

## 2020-12-23 RX ADMIN — ONDANSETRON 4 MG: 2 INJECTION INTRAMUSCULAR; INTRAVENOUS at 08:08

## 2020-12-23 RX ADMIN — SODIUM CHLORIDE: 9 INJECTION, SOLUTION INTRAVENOUS at 07:58

## 2020-12-23 RX ADMIN — PROPOFOL 50 MG: 10 INJECTION, EMULSION INTRAVENOUS at 08:03

## 2020-12-23 RX ADMIN — LIDOCAINE HYDROCHLORIDE 100 MG: 20 INJECTION, SOLUTION INFILTRATION; PERINEURAL at 08:00

## 2020-12-23 RX ADMIN — PROPOFOL 50 MG: 10 INJECTION, EMULSION INTRAVENOUS at 08:00

## 2020-12-23 ASSESSMENT — PULMONARY FUNCTION TESTS
PIF_VALUE: 0

## 2020-12-23 ASSESSMENT — PAIN DESCRIPTION - DESCRIPTORS: DESCRIPTORS: CRAMPING

## 2020-12-23 ASSESSMENT — PAIN SCALES - GENERAL
PAINLEVEL_OUTOF10: 0

## 2020-12-23 ASSESSMENT — PAIN - FUNCTIONAL ASSESSMENT: PAIN_FUNCTIONAL_ASSESSMENT: 0-10

## 2020-12-23 NOTE — OP NOTE
Operative Note  PROCEDURE NOTE    DATE OF PROCEDURE: 12/23/2020    SURGEON: Nithin Velasco MD  Facility : [facility]   ASSISTANT: None  Anesthesia: MAC  PREOPERATIVE DIAGNOSIS:   Diarrhea    POSTOPERATIVE DIAGNOSIS: as described below    OPERATION: Total colonoscopy     ANESTHESIA: Moderate Sedation    ESTIMATED BLOOD LOSS: less than 50     COMPLICATIONS: None. SPECIMENS:  Was Obtained:     Small sessile polyp in the sigmoid colon measuring around 7 mm removed with cold biopsy    Random colon biopsies were taken          HISTORY: The patient is a 72y.o. year old female with history of above preop diagnosis. I recommended colonoscopy with possible biopsy or polypectomy and I explained the risk, benefits, expected outcome, and alternatives to the procedure. Risks included but are not limited to bleeding, infection, respiratory distress, hypotension, and perforation of the colon and possibility of missing a lesion. The patient understands and is in agreement. The patient was counseled at length about the risks of libertad Covid-19 during their perioperative period and any recovery window from their procedure. The patient was made aware that libertad Covid-19  may worsen their prognosis for recovering from their procedure  and lend to a higher morbidity and/or mortality risk. All material risks, benefits, and reasonable alternatives including postponing the procedure were discussed. The patient does wish to proceed with the procedure at this time. PROCEDURE: The patient was given IV conscious sedation. The patient's SPO2 remained above 90% throughout the procedure. The colonoscope was inserted per rectum and advanced under direct vision to the cecum without difficulty. Post sedation note : The patient's SPO2 remained above 90% throughout the procedure. the vital signs remained stable , and no immediate complication form the procedure noted, patient will be ready for d/c when criteria is met . The prep was fair. Findings:  Terminal ileum: normal            Random colon biopsies were taken      Cecum/Ascending colon: normal    Transverse colon: normal    Descending/Sigmoid colon: abnormal: *Small sessile polyp in the sigmoid colon measuring around 7 mm removed with cold biopsy    Rectum/Anus: examined in normal and retroflexed positions and was abnormal: hemorrhoids     Withdrawal Time was (minutes): 10    The colon was decompressed and the scope was removed. The patient tolerated the procedure well. Recommendations/Plan:   1. Lifestyle and dietary modifications as discussed  2. F/U Biopsies  3. F/U In OfficeYes  4. Discussed with the family  5.  Repeat colonoscopy [id number]    Electronically signed by Shobha Esteban MD  on 12/23/2020 at 8:36 AM

## 2020-12-23 NOTE — H&P
Procedure History and Physical    Pre-Procedural Diagnosis:      Nausea  diarrhea  PUD   Indications:  same    Procedure Planned: endoscopy and colonoscopy     History Obtained From:  patient    HISTORY OF PRESENT ILLNESS:       The patient is a 72 y.o. female who presents for the above procedure.         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    Obesity     Peripheral neuropathy     Pulmonary hypertension ([facility] 2012    by echocardiogram    Shoulder pain, bilateral 8/21/2013    Type 2 diabetes mellitus ([facility]     Unspecified essential hypertension     Essential hypertension    Vitamin D deficiency 11/5/2014       Past Surgical History:    Past Surgical History:   Procedure Laterality Date    APPENDECTOMY      BRAIN SURGERY  05/24/2016    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 Kaleigh Bates    COLONOSCOPY  5/3/2012    diverticular disease    DENTAL SURGERY  08/2015    full dental extraction    FOREIGN BODY REMOVAL  5/28/2016    left-sided temporal craniotomy wound reexploration with removal of small retained foreign body (plastic from Ruth clip from surgery 3 days prior), [facility], Dr. Tio Medel  09/06/2005    supracervical hysterectomy bilateral salpingo oophectomy    KNEE ARTHROSCOPY Left     NERVE BLOCK  09/09/2019    right side L2 through L5 block nerve,facet joint,lumbar,medial branch per Dr Domingo Delgado at . [facility]  2011    endoscopic mucosal resection of duodenal polyp    SHOULDER ARTHROPLASTY Right 10/18/2018    Soledad Villavicencio MD; done at [place] [id number] Right 03/07/2019 revision arthroscopy with debridement,revision mini-open rotator cuff repair per Dr Maria Dolores Negron at [facility] ENDOSCOPY  05/27/2020    gastric ulcer, small hiatal hernia, Dr. Odalys Londono, [facility]       Medications:  Current Facility-Administered Medications   Medication Dose Route Frequency Provider Last Rate Last Admin    0.9 % sodium chloride infusion   Intravenous Continuous Albina Gray MD        lactated ringers infusion   Intravenous Continuous Albina Gray MD        sodium chloride flush 0.9 % injection 10 mL  10 mL Intravenous 2 times per day Albina Gray MD        sodium chloride flush 0.9 % injection 10 mL  10 mL Intravenous PRN Albina Gray MD        meperidine (DEMEROL) injection 12.5 mg  12.5 mg Intravenous Q5 Min PRN Albina Gray MD        morphine (PF) injection 1 mg  1 mg Intravenous Q5 Min PRN Albina Gray MD        HYDROmorphone (DILAUDID) injection 0.5 mg  0.5 mg Intravenous Q5 Min PRN Albina Gray MD        fentaNYL (SUBLIMAZE) injection 25 mcg  25 mcg Intravenous Q5 Min PRN Albina Gray MD        HYDROcodone-acetaminophen (NORCO) 5-325 MG per tablet 1 tablet  1 tablet Oral PRN Albina Gray MD        Or    HYDROcodone-acetaminophen (NORCO) 5-325 MG per tablet 2 tablet  2 tablet Oral PRN Albina Gray MD        ondansetron [facility]) injection 4 mg  4 mg Intravenous Once PRN Albina Gray MD        promethazine (PHENERGAN) injection 6.25 mg  6.25 mg Intramuscular Once PRN Albina Gray MD        diphenhydrAMINE (BENADRYL) injection 12.5 mg  12.5 mg Intravenous Once PRN Albina Gray MD        hydrALAZINE (APRESOLINE) injection 5 mg  5 mg Intravenous Q10 Min PRN Albina Gray MD           Allergies:    Allergies   Allergen Reactions    Asa [Aspirin] Other (See Comments)     Stomach irritation                 Social   Social History     Tobacco Use    Smoking status: Never Smoker    Smokeless tobacco: Never Used   Substance Use Topics  Alcohol use: Yes     Alcohol/week: 0.0 standard drinks     Comment: Maybe twice a year, small amounts        PSYCH HISTORY:  Depression No  Anxiety No  Suicide No       Family History   Problem Relation Age of Onset    Cancer Mother     Hypertension Father     Diabetes Father     Cancer Father       No family history of colon cancer, Crohn's disease, or ulcerative colitis    Problems with Sedation/Anesthesia in the past? no    REVIEW OF SYSTEMS:  12 point review of systems negative other than mentioned above. PHYSICAL EXAM:    Vitals:  /71   Pulse 73   Temp 97.3 °F (36.3 °C) (Temporal)   Resp 20   Ht 5' 8\" (1.727 m)   Wt 289 lb 3.2 oz (131.2 kg)   LMP 08/24/2010 (Approximate)   SpO2 98%   BMI 43.97 kg/m²     Focused Exam related to procedure:    General appearance: NAD, conversant   Eyes: anicteric sclerae, moist conjunctivae; no lid-lag; PERRLA   Lungs: CTA, with normal respiratory effort and no intercostal retractions   CV: RRR, no MRGs   Abdomen: Soft, non-tender; no masses or HSM   Skin: Normal temperature, turgor and texture; no rash, ulcers or subcutaneous nodules     DATA:  CBC:   Lab Results   Component Value Date    WBC 9.1 12/07/2020    HGB 11.7 (L) 12/07/2020    HCT 38.0 12/07/2020    MCV 95.0 12/07/2020     12/07/2020     BUN/Cr:   Lab Results   Component Value Date    BUN 26 (H) 12/15/2020   ,   Lab Results   Component Value Date    CREATININE 1.12 (H) 12/15/2020     Potassium:   Lab Results   Component Value Date    K 4.6 12/15/2020     PT/INR:   Lab Results   Component Value Date    INR 0.9 11/23/2015    INR 1.0 (L) 08/23/2015    PROTIME 9.6 11/23/2015    PROTIME 10.2 08/23/2015       ASSESSMENT AND PLAN:       1. Patient is a 72 y.o. female with above specified procedure planned. Expected Sedation/Anesthesia Type: MAC    2.   ASA (American Society Anesthesiology) Anesthesia Status: Class 1 - A normal healthy patient 3. Mallampati: I (soft palate, uvula, fauces, tonsillar pillars visible)  4. Procedure options, risks and benefits reviewed with Patient. Patient expresses understanding.     5.  Consent has been signed:  Yes    Maria C Avendano

## 2020-12-23 NOTE — OP NOTE
Operative Note  PROCEDURE NOTE    DATE OF PROCEDURE: 12/23/2020     SURGEON: [address], [place]  Facility: [facility]   ASSISTANT: None  Anesthesia: mac   PREOPERATIVE DIAGNOSIS:   Nausea  History of peptic ulcer disease  Diarrhea    Diagnosis:  Random biopsies taken from the small bowel    Severe gastritis with erosions biopsies were taken    Irregular Z-line with 2 salmon-colored circular raised areas at the GE junction raising suspicion for Elizalde's esophagus, biopsies were taken          POSTOPERATIVE DIAGNOSIS: As described below    OPERATION: Upper GI endoscopy with Biopsy    ANESTHESIA: Moderate Sedation     ESTIMATED BLOOD LOSS: Less than 50 ml    COMPLICATIONS: None. SPECIMENS:  Was Obtained:     Random biopsies taken from the small bowel    Severe gastritis with erosions biopsies were taken    Irregular Z-line with 2 salmon-colored circular raised areas at the GE junction raising suspicion for Elizalde's esophagus, biopsies were taken    HISTORY: The patient is a 72y.o. year old female with history of above preop diagnosis. I recommended esophagogastroduodenoscopy with possible biopsy and I explained the risk, benefits, expected outcome, and alternatives to the procedure. Risks included but are not limited to bleeding, infection, respiratory distress, hypotension, and perforation of the esophagus, stomach, or duodenum. Patient understands and is in agreement. The patient was counseled at length about the risks of libertad Covid-19 during their perioperative period and any recovery window from their procedure. The patient was made aware that libertad Covid-19  may worsen their prognosis for recovering from their procedure  and lend to a higher morbidity and/or mortality risk. All material risks, benefits, and reasonable alternatives including postponing the procedure were discussed. The patient does wish to proceed with the procedure at this time. PROCEDURE: The patient was given IV conscious sedation. The patient's SPO2 remained above 90% throughout the procedure. The gastroscope was inserted orally and advanced under direct vision through the esophagus, through the stomach, through the pylorus, and into the descending duodenum. Post sedation note : The patient's SPO2 remained above 90% throughout the procedure. the vital signs remained stable , and no immediate complication form the procedure noted, patient will be ready for d/c when criteria is met . Findings:    Retropharyngeal area was grossly normal appearing    Esophagus: abnormal:   Irregular Z-line with 2 salmon-colored circular raised areas at the GE junction raising suspicion for Elizalde's esophagus, biopsies were taken  Small hiatal hernia      Stomach:    Fundus: abnormal:   Severe gastritis with erosions biopsies were taken    Body: abnormal: Severe gastritis with erosions biopsies were taken    Antrum: abnormal: Severe gastritis with erosions biopsies were taken    Duodenum:     Descending: abnormal: Random biopsies taken from the small bowel    Bulb: abnormal: Random biopsies taken from the small bowel    The scope was removed and the patient tolerated the procedure well. Recommendations/Plan:   1. F/U Biopsies  2. F/U In Office in 3-4 weeks  3.  Discussed with the family    Electronically signed by Shobha Esteban MD  on 12/23/2020 at 8:33 AM

## 2020-12-23 NOTE — ANESTHESIA PRE PROCEDURE
Department of Anesthesiology  Preprocedure Note       Name:  Temo Glover   Age:  72 y.o.  :  1955                                          MRN:  [id number]         Date:  2020      Surgeon: Kasie Curran):  Vani pUton MD    Procedure: Procedure(s):  EGD ESOPHAGOGASTRODUODENOSCOPY  COLONOSCOPY DIAGNOSTIC    Medications prior to admission:   Prior to Admission medications    Medication Sig Start Date End Date Taking? Authorizing Provider   bisacodyl (DULCOLAX) 5 MG EC tablet TAKE 4 TABS AT 10 AM THE DAY PRIOR TO COLONOSCOPY 20  Yes Estela Dutton MD   polyethylene glycol (GLYCOLAX) 17 GM/SCOOP powder Use as directed by following your patient instructions given by office.  20  Yes Estela Dutton MD   HYDROcodone-acetaminophen (NORCO) 5-325 MG per tablet take 1 tablet by mouth twice a day if needed for pain ( FILL ON 10/11/2020) 10/11/20  Yes Historical Provider, MD   prochlorperazine (COMPAZINE) 10 MG tablet Take 1 tablet by mouth every 6 hours as needed (nausea) 10/20/20  Yes Muriel Hernandez MD   DULoxetine (CYMBALTA) 60 MG extended release capsule TAKE 1 CAPSULE DAILY 20  Yes Muriel Hernandez MD   lisinopril-hydroCHLOROthiazide (PRINZIDE;ZESTORETIC) 20-12.5 MG per tablet TAKE 1 TABLET DAILY 7/10/20  Yes Muriel Hernandez MD   pioglitazone-metformin (ACTOPLUS MET)  MG per tablet Take one tablet twice a day with meals 7/10/20  Yes Muriel Hernandez MD   Cholecalciferol (VITAMIN D3) 12501 units CAPS TAKE 1 CAPSULE ONCE A WEEK 19  Yes Muriel Hernandez MD   Gabapentin (NEURONTIN PO) Take 600 mg by mouth 5 times daily  09  Yes Historical Provider, MD   cloNIDine (CATAPRES) 0.1 MG tablet Take 1 tablet by mouth 2 times daily as needed for Other (withdrawl symptoms)  Patient not taking: Reported on 2020 10/20/20   Muriel Hernandez MD Handicap Placard MISC by Does not apply route Issue parking placard for person with disability; Applicant meets the qualifying disability criteria. Length of time expected to have disability_____Lifetime  Other __x__. Prescription expires in 5 years from issuing date. 02/05/2024 2/5/19   Alfredito Dixon MD   [facility]. Devices (ROLLATOR) MISC Use to reduce risk of falls when walking. 1/29/19   Griselda Sitter Rohrs, MD       Current medications:    Current Facility-Administered Medications   Medication Dose Route Frequency Provider Last Rate Last Admin    0.9 % sodium chloride infusion   Intravenous Continuous Zuleika Montejo MD        lactated ringers infusion   Intravenous Continuous Zuleika Montejo MD        sodium chloride flush 0.9 % injection 10 mL  10 mL Intravenous 2 times per day Zuleika Montejo MD        sodium chloride flush 0.9 % injection 10 mL  10 mL Intravenous PRN Zuleika Montejo MD           Allergies:     Allergies   Allergen Reactions    Asa [Aspirin] Other (See Comments)     Stomach irritation       Problem List:    Patient Active Problem List   Diagnosis Code    Shoulder pain, bilateral M25.511, M25.512    Cervical spine pain M54.2    Knee pain, left M25.562    Essential hypertension I10    Pulmonary hypertension ([facility] I27.20    Vitamin D deficiency E55.9    Encounter for long-term (current) use of other medications Z79.899    Pneumonia J18.9    Headache R51.9    Subacute sphenoidal sinusitis J01.30    Type 2 diabetes mellitus with microalbuminuria ([facility] E11.29, R80.9    Peripheral neuropathy G62.9    Dizziness R42    H/O fall Z91.81    Chronic sinusitis J32.9    Gastroesophageal reflux disease K21.9    Microalbuminuria R80.9    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    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    Type 2 diabetes mellitus with microalbuminuria, without long-term current use of insulin ([facility]) E11.29, R80.9    Morbid obesity with BMI of 40.0-44.9, adult ([facility] E66.01, Z68.41  Restrictive lung disease secondary to obesity J98.4, E66.9    Calculus of gallbladder and bile duct without cholecystitis or obstruction K80.70    Nausea R11.0    Multiple gastric ulcers K25.9    Anxiety F41.9    Chronic tension-type headache, intractable G44.221    Chronic, continuous use of opioids F11.90    Complete tear of right rotator cuff M75.121    Depression F32.9    Fatigue R53.83    Lumbosacral spondylosis without myelopathy M47.817    Major depressive disorder, single episode, moderate (HCC) F32.1    Refractory migraine G43.919    Muscle spasms of neck M62.838    Obsessive-compulsive disorders F42.9    Spondylosis of lumbar spine M47.816    Spondylosis of thoracic region without myelopathy or radiculopathy M47.814    Spinal stenosis, lumbar region, without neurogenic claudication M48.061       Past Medical History:        Diagnosis Date    Cervical spine pain 8/21/2013    Chronic headaches     Chronic sinusitis     Diabetes mellitus ([address]     Dizziness     H/O fall     Hypertension     Knee pain, left 8/21/2013    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 Chase Doty    COLONOSCOPY  5/3/2012    diverticular disease    DENTAL SURGERY  08/2015    full dental extraction    FOREIGN BODY REMOVAL  5/28/2016 left-sided temporal craniotomy wound reexploration with removal of small retained foreign body (plastic from Ruth clip from surgery 3 days prior), [facility], Dr. Irizarry Paci  09/06/2005    supracervical hysterectomy bilateral salpingo oophectomy    KNEE ARTHROSCOPY Left     NERVE BLOCK  09/09/2019    right side L2 through L5 block nerve,facet joint,lumbar,medial branch per Dr Lasandra Cushing at [address]  2011    endoscopic mucosal resection of duodenal polyp    SHOULDER ARTHROPLASTY Right 10/18/2018    Dane Coker MD; done at [facility] ARTHROSCOPY Right 03/07/2019    revision arthroscopy with debridement,revision mini-open rotator cuff repair per Dr Pineda Peres at [address] [place] [address]  05/27/2020    gastric ulcer, small hiatal hernia, Dr. Yvette Rey, [facility]       Social History:    Social History     Tobacco Use    Smoking status: Never Smoker    Smokeless tobacco: Never Used   Substance Use Topics    Alcohol use:  Yes     Alcohol/week: 0.0 standard drinks     Comment: Maybe twice a year, small amounts                                Counseling given: Not Answered      Vital Signs (Current):   Vitals:    12/10/20 1332 12/23/20 0643   BP:  133/71   Pulse:  73   Resp:  20   Temp:  97.3 °F (36.3 °C)   TempSrc:  Temporal   SpO2:  98%   Weight: 285 lb (129.3 kg) 289 lb 3.2 oz (131.2 kg)   Height: 5' 8\" (1.727 m) 5' 8\" (1.727 m)                                              BP Readings from Last 3 Encounters:   12/23/20 133/71   11/04/20 109/75   11/02/20 112/70       NPO Status: Time of last liquid consumption: 1830                        Time of last solid consumption: 1900                        Date of last liquid consumption: 12/22/20                        Date of last solid food consumption: 12/21/20    BMI:   Wt Readings from Last 3 Encounters: 12/23/20 289 lb 3.2 oz (131.2 kg)   11/04/20 287 lb (130.2 kg)   11/02/20 284 lb (128.8 kg)     Body mass index is 43.97 kg/m².     CBC:   Lab Results   Component Value Date    WBC 9.1 12/07/2020    RBC 4.00 12/07/2020    HGB 11.7 12/07/2020    HCT 38.0 12/07/2020    MCV 95.0 12/07/2020    RDW 13.4 12/07/2020     12/07/2020       CMP:   Lab Results   Component Value Date     12/15/2020    K 4.6 12/15/2020     12/15/2020    CO2 30 12/15/2020    BUN 26 12/15/2020    CREATININE 1.12 12/15/2020    GFRAA 59 12/15/2020    LABGLOM 49 12/15/2020    GLUCOSE 150 12/15/2020    PROT 7.1 12/07/2020    CALCIUM 10.3 12/15/2020    BILITOT 0.42 12/07/2020    ALKPHOS 126 12/07/2020    AST 11 12/07/2020    ALT 6 12/07/2020       POC Tests:   Recent Labs     12/23/20  0657   POCGLU 136*       Coags:   Lab Results   Component Value Date    PROTIME 9.6 11/23/2015    INR 0.9 11/23/2015    APTT 24.7 11/23/2015       HCG (If Applicable): No results found for: PREGTESTUR, PREGSERUM, HCG, HCGQUANT     ABGs: No results found for: PHART, PO2ART, PGN4ZCW, TPT7LJF, BEART, L6KCQTZF     Type & Screen (If Applicable):  No results found for: LABABO, LABRH    Drug/Infectious Status (If Applicable):  Lab Results   Component Value Date    HEPCAB NONREACTIVE 12/07/2020       COVID-19 Screening (If Applicable):   Lab Results   Component Value Date    COVID19 Not Detected 12/18/2020         Anesthesia Evaluation  Patient summary reviewed and Nursing notes reviewed no history of anesthetic complications:   Airway: Mallampati: III  TM distance: >3 FB   Neck ROM: full  Mouth opening: > = 3 FB Dental: normal exam         Pulmonary:normal exam  breath sounds clear to auscultation      (-) pneumonia                           Cardiovascular:    (+) hypertension: no interval change, pulmonary hypertension: no interval change,       ECG reviewed  Rhythm: regular  Rate: normal           Beta Blocker:  Not on Beta Blocker         Neuro/Psych: (+) neuromuscular disease:, headaches:, psychiatric history: stable with treatmentdepression/anxiety              ROS comment: Peripheral neuropathy GI/Hepatic/Renal:   (+) GERD:, PUD, bowel prep, morbid obesity          Endo/Other:    (+) DiabetesType II DM, no interval change, , .                 Abdominal:   (+) obese,     Abdomen: soft. Vascular: negative vascular ROS. Anesthesia Plan      MAC     ASA 3             Anesthetic plan and risks discussed with patient. Plan discussed with CRNA.                   Huy Vital MD   12/23/2020

## 2020-12-23 NOTE — ANESTHESIA POSTPROCEDURE EVALUATION
POST- ANESTHESIA EVALUATION       Pt Name: Santiago Pike  MRN: [id number]  [place]: 1955  Date of evaluation: 12/23/2020  Time:  9:29 AM      /68   Pulse 65   Temp 97.4 °F (36.3 °C) (Temporal)   Resp 18   Ht 5' 8\" (1.727 m)   Wt 289 lb 3.2 oz (131.2 kg)   LMP 08/24/2010 (Approximate)   SpO2 98%   BMI 43.97 kg/m²      Consciousness Level  Awake  Cardiopulmonary Status  Stable  Pain Adequately Treated YES  Nausea / Vomiting  NO  Adequate Hydration  YES  Anesthesia Related Complications NONE      Electronically signed by Terra Galvez MD on 12/23/2020 at 9:29 AM       Department of Anesthesiology  Postprocedure Note    Patient: Santiago Pike  MRN: [id number]  YOB: 1955  Date of evaluation: 12/23/2020  Time:  9:28 AM     Procedure Summary     Date: 12/23/20 Room / Location: [place] [address] / [facility]    Anesthesia Start: 2397 Anesthesia Stop: 4856    Procedures:       EGD BIOPSY (N/A )      COLONOSCOPY WITH BIOPSY (N/A ) Diagnosis:       (NAUSEA, GASTRIC ULCER)      (DIARRHEA)    Surgeons: Ana Ramirez MD Responsible Provider: Terra Galvez MD    Anesthesia Type: MAC ASA Status: 3          Anesthesia Type: MAC    Hodan Phase I: Hodan Score: 10    Hodan Phase II: Hodan Score: 9    Last vitals: Reviewed and per EMR flowsheets.        Anesthesia Post Evaluation

## 2020-12-24 LAB — SURGICAL PATHOLOGY REPORT: NORMAL

## 2021-01-11 ENCOUNTER — OFFICE VISIT (OUTPATIENT)
Dept: NEPHROLOGY | Age: 66
End: 2021-01-11
Payer: MEDICARE

## 2021-01-11 VITALS
DIASTOLIC BLOOD PRESSURE: 72 MMHG | HEART RATE: 72 BPM | OXYGEN SATURATION: 99 % | WEIGHT: 292 LBS | BODY MASS INDEX: 44.25 KG/M2 | SYSTOLIC BLOOD PRESSURE: 104 MMHG | HEIGHT: 68 IN

## 2021-01-11 DIAGNOSIS — E66.01 CLASS 3 SEVERE OBESITY WITH BODY MASS INDEX (BMI) OF 40.0 TO 44.9 IN ADULT, UNSPECIFIED OBESITY TYPE, UNSPECIFIED WHETHER SERIOUS COMORBIDITY PRESENT (HCC): ICD-10-CM

## 2021-01-11 DIAGNOSIS — E55.9 VITAMIN D DEFICIENCY: ICD-10-CM

## 2021-01-11 DIAGNOSIS — N18.31 STAGE 3A CHRONIC KIDNEY DISEASE (HCC): Primary | ICD-10-CM

## 2021-01-11 DIAGNOSIS — I10 HYPERTENSION, ESSENTIAL: ICD-10-CM

## 2021-01-11 DIAGNOSIS — N20.0 RENAL STONE: ICD-10-CM

## 2021-01-11 DIAGNOSIS — E11.22 TYPE 2 DIABETES MELLITUS WITH STAGE 3A CHRONIC KIDNEY DISEASE, WITHOUT LONG-TERM CURRENT USE OF INSULIN (HCC): ICD-10-CM

## 2021-01-11 DIAGNOSIS — N18.31 TYPE 2 DIABETES MELLITUS WITH STAGE 3A CHRONIC KIDNEY DISEASE, WITHOUT LONG-TERM CURRENT USE OF INSULIN (HCC): ICD-10-CM

## 2021-01-11 PROCEDURE — 99214 OFFICE O/P EST MOD 30 MIN: CPT

## 2021-01-11 PROCEDURE — 99213 OFFICE O/P EST LOW 20 MIN: CPT | Performed by: INTERNAL MEDICINE

## 2021-01-11 NOTE — PROGRESS NOTES
Nephrology Progress Note    Mague Dominguez MD      SUBJECTIVE      Chief Complaint   Patient presents with    Chronic Kidney Disease     2 month recheck stage 3a       01/11/21:  Patient is here for follow-up of his CKD 3 likely due to diabetic and hypertensive nephrosclerosis. Her baseline creatinine seems to be around 1.1 to 1.3 mg/DL. Patient doing well. No new issues. No fever, no chills, no CP, no SOB, +ve nausea, no vomiting, no diarrhea or constipation, no abdomen pain, no urinary complaints, no LE edema. She is working with her PCP and also saw GI s/p EGD and will be followings up. She did get a sleep study done and was negative for MILANA and no CPAP required as reported by patient. Patient is currently on lisinopril-hydrochlorothiazide 20-12.5 mg daily, vitamin D 50,000 units weekly, also is on Metformin. Serology 12/7/2020: BERT, SPEP, immunofixation, hepatitis panel, complement level all negative. K/L 1.0. UPC 0.21 (12/9/2020). UA not available. Renal ultrasound 12/7/2020: Right kidney 7.5 cm left kidney 9.4 cm. Impression: Bilateral renal cortical thinning. No nephrolithiasis or urinary obstruction. Urinary bladder is empty and not well visualized. She drinks only about glass of water/day, and 1-2 glass of Gatorade. Last labs 12/15/2020: BUN/Cr 26/1.12, Na 141, K 4.6, Cl 100, Bicarb 30, Ca 10.3, Hb 11.7 (12/7/2020), UPC 0.21 (12/9/2020). Blood pressure today 104/72, heart rate 72. History of Present Illness 11/2/2020: This is a 72 y.o. female who presents to the office for evaluation of elevated creatinine. Patient is here as she was referred by her PCP to establish nephrology care due to her elevated creatinine.   Patient has past medical history of diabetes type 2 since at least 5-10 years, hypertension since at least  3-4 years, pulmonary hypertension, vitamin D deficiency, peripheral neuropathy, morbid obesity, history of gastric ulcers, depression, migraines, h/o renal stone x 1 in early 2000's. History of EGD in 5/27/2020 showed gastric ulcer and small hiatal hernia patient is to follow-up with GI per PCP note. Last creatinine was 1.31 mg/DL in July 2020. Her baseline creatinine seems to be around 1.1 to 1.3 mg/DL. She does not smoke, no alcohol usage or any drug usage reported. She reports allergies to aspirin-stomach irritation. Family history: Lung cancer, HTN, DM in father and mother with breast cancer. No renal dx. She states she does not very much water does about 2 glasses per day. Tea 2 glasses per day, 1 can of pop per day. No NSAIDs usage reported. He is currently on lisinopril-hydrochlorothiazide 20-12.5 mg daily, vitamin D 50,000 units weekly, also is on Metformin. Pt denies any hx of heavy or prolonged NSAID use. There is no hx of jaundice or hepatitis or sexually transmitted disease. Pt has no hx of collagen vascular disease or vasculitis. No history of dysuria or frequency. No recent procedures involving IV contrast. There is no hx of paraprotein disease. Pt denies any hx of recurrent UTI , incontinence or nocturia or recurrent nephrolithiasis. No unusual skin rashes . No tea coloured urine. Medication reviewed and noted the use of ace/arb and diuretic.     Patient Active Problem List    Diagnosis Date Noted    Spinal stenosis, lumbar region, without neurogenic claudication 08/20/2020    Calculus of gallbladder and bile duct without cholecystitis or obstruction 06/12/2020    Nausea 06/12/2020    Multiple gastric ulcers 06/12/2020    Spondylosis of thoracic region without myelopathy or radiculopathy 06/11/2020    Lumbosacral spondylosis without myelopathy 05/21/2020    Spondylosis of lumbar spine 08/26/2019    Complete tear of right rotator cuff 03/07/2019    Anxiety 12/11/2017    Chronic tension-type headache, intractable 12/11/2017    Chronic, continuous use of opioids 12/11/2017     Overview Note:     She sees Dr. Bill Marquez at [facility] for pain management. 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 Status post resection 5/24/2016 at [facility] by Dr. Fabiola Britt. 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 Fabiola Britt at [facility].  Microalbuminuria 12/05/2015    Gastroesophageal reflux disease 12/03/2015    Peripheral neuropathy     Dizziness     H/O fall     Chronic sinusitis     Type 2 diabetes mellitus with microalbuminuria ([facility] [address] 11/05/2015    Subacute sphenoidal sinusitis     Pneumonia 08/30/2015    Headache 08/30/2015     Overview Note:     She is seeing Dr. Héctor Lynn (neurologist). He recommended weaning Neurontin and stopping Tramadol and Topamax.       Encounter for long-term (current) use of other medications 06/24/2015    Vitamin D deficiency 11/05/2014    Essential hypertension     Pulmonary hypertension ([facility] [address]      Overview Note:     by echocardiogram      Shoulder pain, bilateral 08/21/2013    Cervical spine pain 08/21/2013    Knee pain, left 08/21/2013    Major depressive disorder, single episode, moderate (HCC) 01/13/2009    Refractory migraine 01/13/2009    Obsessive-compulsive disorders 01/13/2009         CURRENT MEDICATIONS      Current Outpatient Medications   Medication Sig Dispense Refill    bisacodyl (DULCOLAX) 5 MG EC tablet TAKE 4 TABS AT 10 AM THE DAY PRIOR TO COLONOSCOPY 4 tablet 0    HYDROcodone-acetaminophen (NORCO) 5-325 MG per tablet take 1 tablet by mouth twice a day if needed for pain ( FILL ON 10/11/2020)      prochlorperazine (COMPAZINE) 10 MG tablet Take 1 tablet by mouth every 6 hours as needed (nausea) 60 tablet 0    DULoxetine (CYMBALTA) 60 MG extended release capsule TAKE 1 CAPSULE DAILY 90 capsule 0    lisinopril-hydroCHLOROthiazide (PRINZIDE;ZESTORETIC) 20-12.5 MG per tablet TAKE 1 TABLET DAILY 90 tablet 1    pioglitazone-metformin (ACTOPLUS MET)  MG per tablet Take one tablet twice a day with meals 180 tablet 1    Cholecalciferol (VITAMIN D3) 51948 units CAPS TAKE 1 CAPSULE ONCE A WEEK 13 capsule 1    Gabapentin (NEURONTIN PO) Take 600 mg by mouth 5 times daily       Handicap Placard MISC by Does not apply route Issue parking placard for person with disability; Applicant meets the qualifying disability criteria. Length of time expected to have disability_____Lifetime  Other __x__. Prescription expires in 5 years from issuing date. 02/05/2024 1 each 0    Misc. Devices (ROLLATOR) MISC Use to reduce risk of falls when walking. 1 each 0    polyethylene glycol (GLYCOLAX) 17 GM/SCOOP powder Use as directed by following your patient instructions given by office. (Patient not taking: Reported on 1/11/2021) 238 g 0    cloNIDine (CATAPRES) 0.1 MG tablet Take 1 tablet by mouth 2 times daily as needed for Other (withdrawl symptoms) (Patient not taking: Reported on 11/2/2020) 60 tablet 0     No current facility-administered medications for this visit. REVIEW OF SYSTEMS     Constitutional: No fever, no chills, no lethargy, no weakness. HEENT:  No headache, otalgia, itchy eyes, nasal discharge or sore throat. Cardiac:  No chest pain, dyspnea, orthopnea or PND. Chest:   No cough, phlegm or wheezing or hemoptysis . Abdomen:  No abdominal pain, +ve nausea, no vomiting. Neuro:  No focal weakness, abnormal movements or seizure like activity. Skin:   No rashes, no itching.   :   No hematuria, no pyuria, no dysuria, no flank pain.  Extremities:  No swelling or joint pains. ROS was otherwise negative except as mentioned in the [address] OBJECTIVE      Vitals:    01/11/21 0953   BP: 104/72   Pulse: 72   SpO2: 99%     Wt Readings from Last 2 Encounters:   01/11/21 292 lb (132.5 kg)   12/23/20 289 lb 3.2 oz (131.2 kg)     Body mass index is 44.4 kg/m².      PHYSICAL EXAM      GENERAL APPEARANCE:Awake, alert, in no acute distress  SKIN: warm and dry, no rash or erythema  EYES: conjunctivae normal and sclera anicteric  ENT: no thrush no pharyngeal congestion   NECK: supple    PULMONARY: clear to auscultation and no wheezing noted   CADRDIOVASCULAR: :Normal S1 & S2,  no murmur   ABDOMEN: soft, obese, nontender, bowel sounds, present, no organomegaly,  no ascites   EXTREMITIES: no cyanosis, clubbing or edema  NEURO: alert and oriented, no deficits    LABS    CBC:   Lab Results   Component Value Date    WBC 9.1 12/07/2020    HGB 11.7 12/07/2020    HCT 38.0 12/07/2020    MCV 95.0 12/07/2020    RDW 13.4 12/07/2020     12/07/2020    MPV 10.4 12/07/2020      BMP:   Lab Results   Component Value Date     12/15/2020    K 4.6 12/15/2020     12/15/2020    CO2 30 12/15/2020    BUN 26 12/15/2020    CREATININE 1.12 12/15/2020    GLUCOSE 150 12/15/2020    CALCIUM 10.3 12/15/2020      PHOSPHORUS:    Lab Results   Component Value Date    PHOS 3.6 12/07/2020     MAGNESIUM:   Lab Results   Component Value Date    MG 2.1 07/02/2020     ALBUMIN:   Lab Results   Component Value Date    LABALBU 4.0 12/07/2020     PTH: No components found for: PTHINTACT  VIT D3,25 HYDROXY: No results found for: VITD3     IRON:  No results found for: IRON  IRON SATURATION:  No results found for: LABIRON  TIBC:  No results found for: TIBC  FERRITIN:  No results found for: FERRITIN          BERT:   Lab Results   Component Value Date    BERT NEGATIVE 12/07/2020       SPEP:   Lab Results   Component Value Date    PROT 7.1 12/07/2020    ALBCAL 4.1 12/07/2020    ALBPCT 58 12/07/2020    LABALPH 0.2 12/07/2020    LABALPH 0.8 12/07/2020    A1PCT 3 12/07/2020    A2PCT 12 12/07/2020    LABBETA 1.0 12/07/2020    BETAPCT 14 12/07/2020    GAMGLOB 1.0 12/07/2020    GGPCT 13 12/07/2020    PATH ELECTRONICALLY SIGNED. Yann Claudio M.D. 12/07/2020    PATH ELECTRONICALLY SIGNED. Yann Claudio M.D. 12/07/2020     UPEP: No results found for: TPU   HEPBSAG:  Lab Results   Component Value Date    HEPBSAG NONREACTIVE 12/07/2020     HEPCAB:  Lab Results   Component Value Date    HEPCAB NONREACTIVE 12/07/2020     C3:   Lab Results   Component Value Date    C3 167 12/07/2020     C4:   Lab Results   Component Value Date    C4 27 12/07/2020     MPO ANCA:   Lab Results   Component Value Date    MPO 26 06/16/2017    . PR3 ANCA:    Lab Results   Component Value Date    PR3 12 06/16/2017                      URINALYSIS/URINE CHEMISTRIES      URINE CREATININE:    Lab Results   Component Value Date    LABCREA 56.6 12/09/2020     URINE EOSINOPHILS : No results found for: UREO  URINE PROTEIN:  No results found for: TPU        RADIOLOGY      Results for orders placed during the hospital encounter of 12/07/20   US RENAL COMPLETE    Narrative EXAMINATION:  RETROPERITONEAL ULTRASOUND OF THE KIDNEYS    12/7/2020    COMPARISON:  None    HISTORY:  ORDERING SYSTEM PROVIDED HISTORY: Stage 3a chronic kidney disease  TECHNOLOGIST PROVIDED HISTORY:  Please check post void residual  For cortical thickness, renal size and corticomedullary differentiation. Reason for Exam: CKD STAGE 3  Acuity: Chronic  Type of Exam: Ongoing    FINDINGS:    Kidneys: The right kidney measures 7.5 cm in length and the left kidney measures 9.4  cm in length. Kidneys demonstrate normal cortical echogenicity. No evidence of  hydronephrosis or intrarenal stones. Mild bilateral cortical thinning noted. Bladder: The urinary bladder is empty and not well visualized. Impression Bilateral renal cortical thinning.   No mistake has been identified and corrected by editing.

## 2021-01-19 ENCOUNTER — TELEPHONE (OUTPATIENT)
Dept: FAMILY MEDICINE CLINIC | Age: 66
End: 2021-01-19

## 2021-01-19 DIAGNOSIS — R11.0 NAUSEA: Primary | ICD-10-CM

## 2021-01-19 RX ORDER — PROMETHAZINE HYDROCHLORIDE 25 MG/1
25 TABLET ORAL 4 TIMES DAILY PRN
Qty: 20 TABLET | Refills: 0 | Status: SHIPPED | OUTPATIENT
Start: 2021-01-19 | End: 2021-02-23 | Stop reason: SDUPTHER

## 2021-01-19 NOTE — TELEPHONE ENCOUNTER
Last ov 10/20/2020,no future appointment scheduled. Writer spoke with patient-she had said Compazine has not been working for her nausea she questioned if Dr Lacy Mayfield would change to phenergan instead? Please advise.  (not Fentanyl)

## 2021-01-19 NOTE — TELEPHONE ENCOUNTER
Patient calling stating that she is out of compazine but she would like a refill of fentanyl instead would like it called into [facility]

## 2021-01-20 DIAGNOSIS — I10 ESSENTIAL HYPERTENSION: ICD-10-CM

## 2021-01-20 DIAGNOSIS — E11.29 TYPE 2 DIABETES MELLITUS WITH MICROALBUMINURIA, WITHOUT LONG-TERM CURRENT USE OF INSULIN (HCC): ICD-10-CM

## 2021-01-20 DIAGNOSIS — R80.9 TYPE 2 DIABETES MELLITUS WITH MICROALBUMINURIA, WITHOUT LONG-TERM CURRENT USE OF INSULIN (HCC): ICD-10-CM

## 2021-01-20 DIAGNOSIS — G44.201 INTRACTABLE TENSION-TYPE HEADACHE, UNSPECIFIED CHRONICITY PATTERN: ICD-10-CM

## 2021-01-20 RX ORDER — DULOXETIN HYDROCHLORIDE 60 MG/1
CAPSULE, DELAYED RELEASE ORAL
Qty: 90 CAPSULE | Refills: 0 | Status: SHIPPED | OUTPATIENT
Start: 2021-01-20 | End: 2021-06-08 | Stop reason: SDUPTHER

## 2021-01-20 RX ORDER — LISINOPRIL AND HYDROCHLOROTHIAZIDE 20; 12.5 MG/1; MG/1
TABLET ORAL
Qty: 90 TABLET | Refills: 0 | Status: SHIPPED | OUTPATIENT
Start: 2021-01-20 | End: 2021-04-20

## 2021-01-20 RX ORDER — PIOGLITAZONE HCL AND METFORMIN HCL 500; 15 MG/1; MG/1
TABLET ORAL
Qty: 180 TABLET | Refills: 0 | Status: SHIPPED | OUTPATIENT
Start: 2021-01-20 | End: 2021-04-20

## 2021-02-23 DIAGNOSIS — R11.0 NAUSEA: ICD-10-CM

## 2021-02-23 RX ORDER — PROMETHAZINE HYDROCHLORIDE 25 MG/1
25 TABLET ORAL 4 TIMES DAILY PRN
Qty: 20 TABLET | Refills: 0 | Status: SHIPPED | OUTPATIENT
Start: 2021-02-23 | End: 2021-05-28 | Stop reason: SDUPTHER

## 2021-02-23 NOTE — TELEPHONE ENCOUNTER
Last Appt:  10/20/2020  Next Appt:   Visit date not found  Med verified in [facility] with patient says she continues to have the nausea and has one dose of the phenergan left and would like to have a refill.

## 2021-03-23 ENCOUNTER — OUTSIDE SERVICES (OUTPATIENT)
Dept: FAMILY MEDICINE CLINIC | Age: 66
End: 2021-03-23
Payer: MEDICARE

## 2021-03-23 DIAGNOSIS — E11.9 TYPE 2 DIABETES MELLITUS WITHOUT COMPLICATION, WITHOUT LONG-TERM CURRENT USE OF INSULIN (HCC): ICD-10-CM

## 2021-03-23 DIAGNOSIS — K80.20 CALCULUS OF GALLBLADDER WITHOUT CHOLECYSTITIS WITHOUT OBSTRUCTION: ICD-10-CM

## 2021-03-23 DIAGNOSIS — G62.9 PERIPHERAL POLYNEUROPATHY: ICD-10-CM

## 2021-03-23 DIAGNOSIS — S72.432D CLOSED BICONDYLAR FRACTURE OF LEFT FEMUR WITH ROUTINE HEALING, SUBSEQUENT ENCOUNTER: ICD-10-CM

## 2021-03-23 DIAGNOSIS — F32.A DEPRESSION, UNSPECIFIED DEPRESSION TYPE: ICD-10-CM

## 2021-03-23 DIAGNOSIS — I27.20 PULMONARY HTN (HCC): ICD-10-CM

## 2021-03-23 DIAGNOSIS — S72.422D CLOSED BICONDYLAR FRACTURE OF LEFT FEMUR WITH ROUTINE HEALING, SUBSEQUENT ENCOUNTER: ICD-10-CM

## 2021-03-23 DIAGNOSIS — E66.9 OBESITY, UNSPECIFIED CLASSIFICATION, UNSPECIFIED OBESITY TYPE, UNSPECIFIED WHETHER SERIOUS COMORBIDITY PRESENT: ICD-10-CM

## 2021-03-23 DIAGNOSIS — N39.0 RECURRENT UTI: ICD-10-CM

## 2021-03-23 DIAGNOSIS — F11.90 CHRONIC, CONTINUOUS USE OF OPIOIDS: ICD-10-CM

## 2021-03-23 DIAGNOSIS — F41.9 ANXIETY: ICD-10-CM

## 2021-03-23 DIAGNOSIS — I10 ESSENTIAL HYPERTENSION: ICD-10-CM

## 2021-03-23 DIAGNOSIS — M19.90 OSTEOARTHRITIS, UNSPECIFIED OSTEOARTHRITIS TYPE, UNSPECIFIED SITE: ICD-10-CM

## 2021-03-23 DIAGNOSIS — K21.9 GASTROESOPHAGEAL REFLUX DISEASE WITHOUT ESOPHAGITIS: ICD-10-CM

## 2021-03-23 PROCEDURE — 99305 1ST NF CARE MODERATE MDM 35: CPT | Performed by: FAMILY MEDICINE

## 2021-04-02 NOTE — PROGRESS NOTES
[facility]  Date of Service:  3/23/2021  Faby Medeiros  Date of Birth 1955  MRN: [id number]  Code Status----FULL CODE      HPI:  This is a pleasant female patient of Dr. Viktor Johnson who was at [facility] after suffering a left distal femur fracture from a fall around March 5, 2021. She had an accident in her hallway slipped and the leg folded up underneath of her leading to a distal femur fracture. She had an intramedullary nail placed at [facility] complicated by hypotension and postoperative anemia. She was transfused 3 units of packed red blood cells with a resulting hemoglobin of 8.7 as of March 10, 2021. She is generally doing well postoperatively. She plans to go home with her  after physical therapy and rehab.        Immunization History   Administered Date(s) Administered    COVID-19, Pfizer, PF, 30mcg/0.3mL 02/27/2021, 03/20/2021    DTP 07/07/2011    Influenza, Quadv, IM, (6 mo and older Fluzone, Flulaval, Fluarix and 3 yrs and older Afluria) 01/23/2018    Influenza, Quadv, IM, PF (6 mo and older Fluzone, Flulaval, Fluarix, and 3 yrs and older Afluria) 02/12/2020    Influenza, Quadv, adjuvanted, 65 yrs +, IM, PF (Fluad) 10/20/2020    Pneumococcal Polysaccharide ([id number]) 08/31/2015    Tdap (Boostrix, Adacel) 11/16/2012       Allergies   Allergen Reactions    Asa [Aspirin] Other (See Comments)     Stomach irritation       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    Obesity     Peripheral neuropathy     Pulmonary hypertension ([address] 2012    by echocardiogram    Shoulder pain, bilateral 8/21/2013    Type 2 diabetes mellitus (HCC)     Unspecified essential hypertension     Essential hypertension    Vitamin D deficiency 11/5/2014       Past Surgical History:   Procedure Laterality Date    APPENDECTOMY      BACK INJECTION  02/25/2021    [facility] Right lumbar medial branch block using Fluroscopy    BRAIN SURGERY  05/24/2016    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 Maritza Driscoll    COLONOSCOPY  05/03/2012    diverticular disease    COLONOSCOPY N/A 12/23/2020    COLONOSCOPY WITH BIOPSY performed by Daisy Mccallum MD at [facility]  08/2015    full dental extraction    FOREIGN BODY REMOVAL  05/28/2016    left-sided temporal craniotomy wound reexploration with removal of small retained foreign body (plastic from Ruth clip from surgery 3 days prior), [facility], Dr. Tejinder Roy  09/06/2005    supracervical hysterectomy bilateral salpingo oophectomy    KNEE ARTHROSCOPY Left     NERVE BLOCK  09/09/2019    right side L2 through L5 block nerve,facet joint,lumbar,medial branch per Dr Cohen Letters at . [facility]  2011    endoscopic mucosal resection of duodenal polyp    SHOULDER ARTHROPLASTY Right 10/18/2018    Tamia Schneider MD; done at [facility] ARTHROSCOPY Right 03/07/2019    revision arthroscopy with debridement,revision mini-open rotator cuff repair per Dr Adams Florian at [facility]  05/27/2020    gastric ulcer, small hiatal hernia, Dr. Mildred Monzon, [facility] N/A 12/23/2020    EGD BIOPSY performed by Daisy Mccallum MD at [address] Marital status:      Spouse name: Not on file    Number of children: Not on file    Years of education: Not on file    Highest education level: Not on file   Occupational History    Not on file   Social Needs    Financial resource strain: Not on file    Food insecurity     Worry: Not on file     Inability: Not on file    Transportation needs     Medical: Not on file     Non-medical: Not on file   Tobacco Use    Smoking status: Never Smoker    Smokeless tobacco: Never Used   Substance and Sexual Activity    Alcohol use: Yes     Alcohol/week: 0.0 standard drinks     Comment: Maybe twice a year, small amounts    Drug use: No    Sexual activity: Not on file   Lifestyle    Physical activity     Days per week: Not on file     Minutes per session: Not on file    Stress: Not on file   Relationships    Social connections     Talks on phone: Not on file     Gets together: Not on file     Attends Oriental orthodox service: Not on file     Active member of club or organization: Not on file     Attends meetings of clubs or organizations: Not on file     Relationship status: Not on file    Intimate partner violence     Fear of current or ex partner: Not on file     Emotionally abused: Not on file     Physically abused: Not on file     Forced sexual activity: Not on file   Other Topics Concern    Not on file   Social History Narrative    Not on file       Current Outpatient Medications   Medication Sig Dispense Refill    DULoxetine (CYMBALTA) 60 MG extended release capsule TAKE 1 CAPSULE DAILY 90 capsule 0    lisinopril-hydroCHLOROthiazide (PRINZIDE;ZESTORETIC) 20-12.5 MG per tablet TAKE 1 TABLET DAILY 90 tablet 0    pioglitazone-metformin (ACTOPLUS MET)  MG per tablet TAKE 1 TABLET TWICE A DAY WITH MEALS 180 tablet 0    bisacodyl (DULCOLAX) 5 MG EC tablet TAKE 4 TABS AT 10 AM THE DAY PRIOR TO COLONOSCOPY 4 tablet 0    polyethylene glycol (GLYCOLAX) 17 GM/SCOOP powder Use as directed by following your patient instructions given by office.  (Patient not taking: Reported on 1/11/2021) 238 g 0    HYDROcodone-acetaminophen (NORCO) 5-325 MG per tablet take 1 tablet by mouth twice a day if needed for pain ( FILL ON 10/11/2020)     Kal Wilkes prochlorperazine (COMPAZINE) 10 MG tablet Take 1 tablet by mouth every 6 hours as needed (nausea) 60 tablet 0    cloNIDine (CATAPRES) 0.1 MG tablet Take 1 tablet by mouth 2 times daily as needed for Other (withdrawl symptoms) (Patient not taking: Reported on 11/2/2020) 60 tablet 0    Cholecalciferol (VITAMIN D3) 30433 units CAPS TAKE 1 CAPSULE ONCE A WEEK 13 capsule 1    Gabapentin (NEURONTIN PO) Take 600 mg by mouth 5 times daily       Handicap Placard MISC by Does not apply route Issue parking placard for person with disability; Applicant meets the qualifying disability criteria. Length of time expected to have disability_____Lifetime  Other __x__. Prescription expires in 5 years from issuing date. 02/05/2024 1 each 0    Misc. Devices (ROLLATOR) MISC Use to reduce risk of falls when walking. 1 each 0     No current facility-administered medications for this visit. REVIEW OF SYSTEMS:  She has some recent diarrhea. She has had some urinary frequency that she attributes to a water pill. No significant dysuria. She has mild to moderate extremity pain on the left side the side of the fracture. No peripheral edema. She has no chest pain, shortness of breath, fever, chills, or other concerns. PHYSICAL EXAM:  Constitutional:   Age appropriate female. Large body habitus size. No apparent distress. Cardiovascular:   Regular rate and rhythm. Pulmonary:   Lungs sounds are distant but clear. Abdominal:  Obese. Nontender. Nondistended. Active bowel sounds. Musculoskeletal:      She has a left knee brace in place. The distal extremity looks well perfused with no peripheral edema. No pain in the calf. Skin:   Her incision sites are well healed at present. Skin is grossly intact with no current issues of concern. Neurological:    She is alert and oriented. She answers questions easily. No visits with results within 1 Month(s) from this visit.    Latest known visit with results is: x 0.9 x 0.3 cm in aggregate. Entirely 1cs. mpb tm       Microscopic Description  1, 4. Microscopic examination performed. 2. Architecturally normal  small intestine mucosa is negative for active inflammation and chronic  inflammatory changes. 3. Gastric mucosa has mild patchy chronic  inactive inflammation. There is no evidence of H pylori gastritis. 5. Chronic mucosa biopsies have normal crypt architecture and  u                          nremarkable surface epithelium. There is no active inflammation or  chronic inflammatory changes. SURGICAL PATHOLOGY CONSULTATION       Patient Name: Yaakov Moore: [id number]  Path Number: [id number]    [address] [place] [address], [facility] [address]. [place], [address]  [phone]  Fax: [phone]           ASSESSMENT/PLAN:  1. Left distal femur fracture, March 5, 2021. Status post intramedullary nail. She is doing quite well at present with adequate pain control. Plans currently are awaiting orthopedic clearance for weightbearing. She is currently on non-weightbearing status. Complicating hypo tension her blood pressure medications were held this has resolved likely post-operative concern. She also had some postoperative anemia and was transfused 3 units of packed red blood cells. Hemoglobin of 8.7 as of March 10, 2021, consider serial follow up, but currently no concerns for ongoing bleeding or blood loss. 2. History of anxiety and depression. Currently on duloxetine 60 mg daily. She has no concerns currently for anxiety or depression issues. 3. History of hypertension. She was hypotensive after surgery. Currently she is doing well and back on her lisinopril hydrochlorothiazide combination. Continue same dosing. 4. History of GERD with gastric ulcer. Found on EGD May 27, 2020. Small hiatal hernia. She is relatively asymptomatic on a daily course of pantoprazole 40 mg daily.  Plan to continue same.   5. Adult onset diabetes. Reportedly under fair control. Currently on Actos 15 mg twice daily. Consider updated HA1c if she ends up becoming a prolonged patient here. 6. History of vitamin D deficiency. Plan to continue her weekly vitamin D supplement. 7. History of recurrent urinary tract infections. She is having a little more frequency right now with recent diarrhea. We are going to update urinalysis. 8. Chronic use of opioids. She had some Clonidine in case of opiate withdrawal. May have been contributed to her hypotension. Currently she is on a dose of Norco without any adverse effects. 9. History of peripheral neuropathy. Currently on gabapentin and duloxetine. No current concerns. 10. Multi focal osteoarthritis of the neck, knees, shoulders, and lumbar spine. She is currently on hydro codon for postoperative pain she also has Tylenol as needed. 11. History of pulmonary hypertension. 12. History of obesity. 13. History of gallbladder calculus. She suffered from some chronic nausea and last follow up with Dr. Juanito Wagner recommended elective removal of know calculus in the gallbladder, but she has not completed that as of yet. No vomiting or significant weight loss. Remainder of chronic health conditions stable and unchanged  Plan as noted above. Will follow up for routine monthly rounds. They will call sooner with any concerns prior. Terry Donovan am personally transcribing for Azalia Steele MD 4/2/21 at 9:15 AM EDT. Lauren Ferrer MD, personally performed the services described in this document as transcribed by the , and it is both accurate and complete.     Electronically signed by Azalia Steele MD on 4/24/21 at 5:18 PM EDT

## 2021-04-07 DIAGNOSIS — R26.0 STAGGERING GAIT: ICD-10-CM

## 2021-04-07 DIAGNOSIS — R26.81 UNSTEADY: ICD-10-CM

## 2021-04-07 DIAGNOSIS — S72.92XD CLOSED FRACTURE OF LEFT FEMUR WITH ROUTINE HEALING, UNSPECIFIED FRACTURE MORPHOLOGY, UNSPECIFIED PORTION OF FEMUR, SUBSEQUENT ENCOUNTER: Primary | ICD-10-CM

## 2021-04-07 DIAGNOSIS — M62.81 MUSCLE WEAKNESS (GENERALIZED): ICD-10-CM

## 2021-04-07 RX ORDER — WHEELCHAIR
1 EACH MISCELLANEOUS DAILY PRN
Qty: 1 EACH | Refills: 0 | Status: SHIPPED | OUTPATIENT
Start: 2021-04-07 | End: 2022-06-15

## 2021-04-16 ENCOUNTER — VIRTUAL VISIT (OUTPATIENT)
Dept: FAMILY MEDICINE CLINIC | Age: 66
End: 2021-04-16
Payer: MEDICARE

## 2021-04-16 DIAGNOSIS — R11.0 NAUSEA: ICD-10-CM

## 2021-04-16 DIAGNOSIS — R80.9 TYPE 2 DIABETES MELLITUS WITH MICROALBUMINURIA, WITHOUT LONG-TERM CURRENT USE OF INSULIN (HCC): ICD-10-CM

## 2021-04-16 DIAGNOSIS — E11.29 TYPE 2 DIABETES MELLITUS WITH MICROALBUMINURIA, WITHOUT LONG-TERM CURRENT USE OF INSULIN (HCC): ICD-10-CM

## 2021-04-16 DIAGNOSIS — S72.92XD CLOSED FRACTURE OF LEFT FEMUR WITH ROUTINE HEALING, UNSPECIFIED FRACTURE MORPHOLOGY, UNSPECIFIED PORTION OF FEMUR, SUBSEQUENT ENCOUNTER: Primary | ICD-10-CM

## 2021-04-16 DIAGNOSIS — D64.9 POSTOPERATIVE ANEMIA: ICD-10-CM

## 2021-04-16 DIAGNOSIS — Z91.81 AT HIGH RISK FOR FALLS: ICD-10-CM

## 2021-04-16 DIAGNOSIS — I10 ESSENTIAL HYPERTENSION: ICD-10-CM

## 2021-04-16 PROBLEM — S72.92XA FEMUR FRACTURE, LEFT (HCC): Status: ACTIVE | Noted: 2021-03-05

## 2021-04-16 PROCEDURE — 99213 OFFICE O/P EST LOW 20 MIN: CPT | Performed by: FAMILY MEDICINE

## 2021-04-16 PROCEDURE — 99212 OFFICE O/P EST SF 10 MIN: CPT

## 2021-04-16 PROCEDURE — 1111F DSCHRG MED/CURRENT MED MERGE: CPT | Performed by: FAMILY MEDICINE

## 2021-04-16 RX ORDER — PANTOPRAZOLE SODIUM 40 MG/1
TABLET, DELAYED RELEASE ORAL
COMMUNITY
Start: 2021-04-08 | End: 2021-05-04 | Stop reason: SDUPTHER

## 2021-04-16 ASSESSMENT — PATIENT HEALTH QUESTIONNAIRE - PHQ9
2. FEELING DOWN, DEPRESSED OR HOPELESS: 0
SUM OF ALL RESPONSES TO PHQ QUESTIONS 1-9: 0
1. LITTLE INTEREST OR PLEASURE IN DOING THINGS: 0

## 2021-04-16 NOTE — PROGRESS NOTES
Estevan at [phone]- also aware she is due for [facility] but will wait till leg is healed

## 2021-04-16 NOTE — PROGRESS NOTES
Post-Discharge Transitional Care Management Services or Hospital Follow Up      Tonia Mercer   YOB: 1955    Date of Office Visit:  4/16/2021  Date of Hospital Admission: 3/5/2021  Date of Hospital Discharge: 3/12/2021  Date of skilled nursing facility discharge:  4/9/2021    Care management risk score Rising risk (score 2-5) and Complex Care (Scores >=6): 5     Non face to face  following discharge, date last encounter closed (first attempt may have been earlier): *No documented post hospital discharge outreach found in the last 14 days *No documented post hospital discharge outreach found in the last 14 days    Call initiated 2 business days of discharge: *No response recorded in the last 14 days    Patient Active Problem List   Diagnosis    Shoulder pain, bilateral    Cervical spine pain    Knee pain, left    Essential hypertension    Pulmonary hypertension ([address]    Vitamin D deficiency    Encounter for long-term (current) use of other medications    Pneumonia    Headache    Subacute sphenoidal sinusitis    Type 2 diabetes mellitus with microalbuminuria ([address]    Peripheral neuropathy    Dizziness    H/O fall    Chronic sinusitis    Gastroesophageal reflux disease    Microalbuminuria    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[address]    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[address]    Type 2 diabetes mellitus with microalbuminuria, without long-term current use of insulin ([address]    Morbid obesity with BMI of 40.0-44.9, adult ([address]    Restrictive lung disease secondary to obesity    Calculus of gallbladder and bile duct without cholecystitis or obstruction    Nausea    Multiple gastric ulcers    Anxiety    Chronic tension-type headache, intractable    Chronic, continuous use of opioids    Complete tear of right rotator cuff    Depression    Fatigue    Lumbosacral spondylosis without myelopathy    Major depressive disorder, single episode, moderate (HCC)    Refractory migraine    Muscle spasms of neck    Obsessive-compulsive disorders    Spondylosis of lumbar spine    Spondylosis of thoracic region without myelopathy or radiculopathy    Spinal stenosis, lumbar region, without neurogenic claudication       Allergies   Allergen Reactions    Asa [Aspirin] Other (See Comments)     Stomach irritation       Medications listed as ordered at the time of discharge from hospital  Same as medications marked \"taking\" at this time. Medications marked \"taking\" at this time  Outpatient Medications Marked as Taking for the 4/16/21 encounter (Virtual Visit) with Demario Cardoso MD   Medication Sig Dispense Refill    pantoprazole (PROTONIX) 40 MG tablet take 1 tablet by mouth daily      Misc. Devices [facility]) MISC 1 Device by Does not apply route daily as needed (gait difficulty) 1 each 0    Misc. Devices (WALKER) MISC 1 each by Does not apply route daily 1 each 0    DULoxetine (CYMBALTA) 60 MG extended release capsule TAKE 1 CAPSULE DAILY 90 capsule 0    lisinopril-hydroCHLOROthiazide (PRINZIDE;ZESTORETIC) 20-12.5 MG per tablet TAKE 1 TABLET DAILY 90 tablet 0    pioglitazone-metformin (ACTOPLUS MET)  MG per tablet TAKE 1 TABLET TWICE A DAY WITH MEALS 180 tablet 0    HYDROcodone-acetaminophen (NORCO) 5-325 MG per tablet take 1 tablet by mouth twice a day if needed for pain ( FILL ON 10/11/2020)      Cholecalciferol (VITAMIN D3) 82134 units CAPS TAKE 1 CAPSULE ONCE A WEEK 13 capsule 1    Gabapentin (NEURONTIN PO) Take 600 mg by mouth 5 times daily       Handicap Placard MISC by Does not apply route Issue parking placard for person with disability; Applicant meets the qualifying disability criteria. Length of time expected to have disability_____Lifetime  Other __x__. Prescription expires in 5 years from issuing date. 02/05/2024 1 each 0    Misc. Devices (ROLLATOR) MISC Use to reduce risk of falls when walking.  1 each 0        Medications patient taking as of now reconciled against medications ordered at time of hospital discharge: Yes    Chief Complaint   Patient presents with    Other     face to face for [address] post discharge [place]    Nausea     over a year medicine recieved fro specialit which is protonix       History of Present illness - Follow up of Hospital diagnosis(es): left femur fracture, post-operative anemia, urinary tract infection    She states that she slipped and fell in her home on 3/5/2021, and fractured her left femur. She was admitted to [facility] had intramedullary nail placement. She experienced postoperative hypotension and anemia. She received 3 units packed red blood cells. She was also treated for a urinary tract infection during her admission. She was discharged on 3/12/2021, and went to [place] for rehab. She has been home for one week. She has home health, and home physical therapy. She states that her pain has been controlled. Her bowels are moving regularly. Inpatient course: Discharge summary reviewed- see chart. Interval history/Current status: She states that she has been feeling well since she was discharged from [place].  She has continued physical therapy. She is using a walker in her home. She has been taking Pantoprazole, and this has helped her nausea. She had a follow up appointment scheduled with her gastroenterologist, but this was cancelled due to her hospitalization. There were no vitals filed for this visit. There is no height or weight on file to calculate BMI. Wt Readings from Last 3 Encounters:   01/11/21 292 lb (132.5 kg)   12/23/20 289 lb 3.2 oz (131.2 kg)   11/04/20 287 lb (130.2 kg)     BP Readings from Last 3 Encounters:   01/11/21 104/72   12/23/20 (!) 86/54   12/23/20 110/68           Physical Exam:  There were no vitals filed for this visit. Constitutional:   She appears well-developed and well-nourished. She is in no apparent distress. Mental status:  She is alert and awake.   She is oriented to Future was ordered to be drawn by [employer] [facility]. She will be contacted when the results are available. 6. Nausea    She has been taking Protonix for nausea, and she states that this has been working well. I advised her to call when she needs a refill. I have asked clinic staff to call the patient to schedule an appointment in 3 months. Everette Montez, was evaluated through a synchronous (real-time) audio-video encounter. The patient (or guardian if applicable) is aware that this is a billable service. Verbal consent to proceed has been obtained within the past 12 months. The visit was conducted pursuant to the emergency declaration under the [facility], [address] [place] [address] authority and the [email or web address] and [employer] General Act. Patient identification was verified, and a caregiver was present when appropriate. The patient was located in a state where the provider was credentialed to provide care. Total time spent for this encounter: Not billed by time    --Dee Ballard MD on 4/16/2021 at 2:42 PM    An electronic signature was used to authenticate this note.

## 2021-04-16 NOTE — Clinical Note
Please fax lab orders to her home health agency so they can be drawn at their next visit. Please schedule an appointment with me in 3 months for an annual exam with Pap test.  Thank you.

## 2021-04-20 ENCOUNTER — TELEPHONE (OUTPATIENT)
Dept: FAMILY MEDICINE CLINIC | Age: 66
End: 2021-04-20
Payer: MEDICARE

## 2021-04-20 DIAGNOSIS — I10 ESSENTIAL HYPERTENSION: ICD-10-CM

## 2021-04-20 DIAGNOSIS — S72.002D FRACTURE OF UNSPECIFIED PART OF NECK OF LEFT FEMUR, SUBSEQUENT ENCOUNTER FOR CLOSED FRACTURE WITH ROUTINE HEALING: Primary | ICD-10-CM

## 2021-04-20 DIAGNOSIS — E11.42 TYPE 2 DIABETES MELLITUS WITH DIABETIC POLYNEUROPATHY, WITHOUT LONG-TERM CURRENT USE OF INSULIN (HCC): ICD-10-CM

## 2021-04-20 PROCEDURE — G0180 MD CERTIFICATION HHA PATIENT: HCPCS | Performed by: FAMILY MEDICINE

## 2021-04-20 RX ORDER — OXYBUTYNIN CHLORIDE 10 MG/1
10 TABLET, EXTENDED RELEASE ORAL DAILY
COMMUNITY
End: 2021-07-28

## 2021-04-20 NOTE — TELEPHONE ENCOUNTER
I agree with the documentation of Caitlyn Jara RN.    Electronically signed by Anil Rhoades MD on 4/20/2021 at 1:17 PM.

## 2021-04-20 NOTE — TELEPHONE ENCOUNTER
Home health plan of care reviewed and certification completed 04/20/2021 on patient for service dates 04/12/2021 to 06/10/2021. Verified current medications. Physician time spent on activities to coordinate service, documenting, medical decision making, review of reports/treatment plans/test results is 15 minutes.

## 2021-04-27 ENCOUNTER — OFFICE VISIT (OUTPATIENT)
Dept: FAMILY MEDICINE CLINIC | Age: 66
End: 2021-04-27
Payer: MEDICARE

## 2021-04-27 VITALS
OXYGEN SATURATION: 96 % | HEIGHT: 68 IN | TEMPERATURE: 98.6 F | HEART RATE: 72 BPM | DIASTOLIC BLOOD PRESSURE: 64 MMHG | BODY MASS INDEX: 44.4 KG/M2 | SYSTOLIC BLOOD PRESSURE: 106 MMHG

## 2021-04-27 DIAGNOSIS — R42 VERTIGO: Primary | ICD-10-CM

## 2021-04-27 DIAGNOSIS — J01.00 ACUTE MAXILLARY SINUSITIS, RECURRENCE NOT SPECIFIED: ICD-10-CM

## 2021-04-27 PROCEDURE — 99213 OFFICE O/P EST LOW 20 MIN: CPT | Performed by: FAMILY MEDICINE

## 2021-04-27 PROCEDURE — 99212 OFFICE O/P EST SF 10 MIN: CPT

## 2021-04-27 RX ORDER — MECLIZINE HCL 12.5 MG/1
12.5-25 TABLET ORAL 3 TIMES DAILY PRN
Qty: 30 TABLET | Refills: 1 | Status: SHIPPED | OUTPATIENT
Start: 2021-04-27 | End: 2021-05-07

## 2021-04-27 RX ORDER — FLUTICASONE PROPIONATE 50 MCG
1 SPRAY, SUSPENSION (ML) NASAL DAILY
Qty: 1 BOTTLE | Refills: 3 | Status: SHIPPED | OUTPATIENT
Start: 2021-04-27 | End: 2021-07-28

## 2021-04-27 RX ORDER — ONDANSETRON 4 MG/1
TABLET, ORALLY DISINTEGRATING ORAL
COMMUNITY
Start: 2021-04-23 | End: 2021-05-04 | Stop reason: SDUPTHER

## 2021-04-27 RX ORDER — MECLIZINE HYDROCHLORIDE 25 MG/1
TABLET ORAL
COMMUNITY
Start: 2021-04-23 | End: 2021-04-27 | Stop reason: SDUPTHER

## 2021-04-27 NOTE — PATIENT INSTRUCTIONS
loss of movement in your face, arm, or leg, especially on only one side of your body. ? Sudden vision changes. ? Sudden trouble speaking. ? Sudden confusion or trouble understanding simple statements. ? Sudden problems with walking or balance. ? A sudden, severe headache that is different from past headaches. Call your doctor now or seek immediate medical care if:    · You have new or worse nausea and vomiting.     · You have new symptoms such as hearing loss or roaring in your ears. Watch closely for changes in your health, and be sure to contact your doctor if:    · You are not getting better as expected.     · Your vertigo gets worse. Where can you learn more? Go to [email or web address]://[email or web address].[email or web address] org and sign in to your [employer] account. Enter  in the [employer] box to learn more about \"Benign Paroxysmal Positional Vertigo (BPPV): Care Instructions. \"     If you do not have an account, please click on the \"Sign Up Now\" link. Current as of: December 2, 2020               Content Version: 12.8  © 2006-2021 Healthwise, Incorporated. Care instructions adapted under license by [facility] ([facility]). If you have questions about a medical condition or this instruction, always ask your healthcare professional. [place] [address] any warranty or liability for your use of this information. Warning:  One or more of the medications that you have been prescribed may cause drowsiness and impair your ability to operate vehicles or machinery. Do not drive or operate machinery while taking Antivert. Do not use in combination with alcohol.

## 2021-04-27 NOTE — PROGRESS NOTES
[address], [address] [place] [address] [address], [address]                        Telephone [phone]             Fax [phone]     Mayur Ewing  1955  MRN:  [id number]  Date of visit:  4/27/2021    Subjective:    Mayur Ewing is a 72 y.o. female who presents to [facility] today (4/27/2021) for follow up/evaluation of:  ED Follow-up (ER f/u 4/23/21- nausea and dizziness x 1 week), Abdominal Pain (x 1 week- lower abdomen ), and Constipation (x 3 days)      She states that she has had intermittent episodes of vertigo for about a week. She also reports nausea. She has also had lower abdominal pain and constipation. She was seen at [facility]  on 4/23/2021. She was given IV fluids, Zofran, and Antivert. She was discharged with prescriptions for Antivert and Zofran. She states that the medications seem to help briefly. She reports \"a little bit\" of a headache. She reports some sinus congestion. She has a history of chronic nausea. She has been taking Protonix daily. She states that her bowels have been moving daily, but her bowel movements have been small. She has been taking Norco for pain from a recent left femur fracture.        She has the following problem list:  Patient Active Problem List   Diagnosis    Shoulder pain, bilateral    Cervical spine pain    Knee pain, left    Essential hypertension    Pulmonary hypertension ([address]    Vitamin D deficiency    Encounter for long-term (current) use of other medications    Pneumonia    Headache    Subacute sphenoidal sinusitis    Type 2 diabetes mellitus with microalbuminuria ([address]    Peripheral neuropathy    Dizziness    H/O fall    Chronic sinusitis    Gastroesophageal reflux disease    Microalbuminuria    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HCC)    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[address]    Type 2 diabetes mellitus with microalbuminuria, without long-term current use of insulin ([facility]    Morbid obesity with BMI of 40.0-44.9, adult ([address]    Restrictive lung disease secondary to obesity    Calculus of gallbladder and bile duct without cholecystitis or obstruction    Nausea    Multiple gastric ulcers    Anxiety    Chronic tension-type headache, intractable    Chronic, continuous use of opioids    Complete tear of right rotator cuff    Depression    Fatigue    Lumbosacral spondylosis without myelopathy    Major depressive disorder, single episode, moderate (HCC)    Refractory migraine    Muscle spasms of neck    Obsessive-compulsive disorders    Spondylosis of lumbar spine    Spondylosis of thoracic region without myelopathy or radiculopathy    Spinal stenosis, lumbar region, without neurogenic claudication    Femur fracture, left ([address]        Current medications are:  Outpatient Medications Marked as Taking for the 4/27/21 encounter (Office Visit) with Mayi Munoz MD   Medication Sig Dispense Refill    meclizine (ANTIVERT) 25 MG tablet take 1 tablet by mouth three times a day if needed for dizziness      ondansetron (ZOFRAN-ODT) 4 MG disintegrating tablet dissolve 1 tablet on top of the tongue every 8 hours if needed for nausea      pioglitazone-metFORMIN (ACTOPLUS MET)  MG per tablet TAKE 1 TABLET TWICE A DAY WITH MEALS 180 tablet 1    lisinopril-hydroCHLOROthiazide (PRINZIDE;ZESTORETIC) 20-12.5 MG per tablet TAKE 1 TABLET DAILY 90 tablet 1    pantoprazole (PROTONIX) 40 MG tablet take 1 tablet by mouth daily      Misc. Devices [facility]) MISC 1 Device by Does not apply route daily as needed (gait difficulty) 1 each 0    Misc.  Devices (WALKER) MISC 1 each by Does not apply route daily 1 each 0    DULoxetine (CYMBALTA) 60 MG extended release capsule TAKE 1 CAPSULE DAILY 90 capsule 0    HYDROcodone-acetaminophen (NORCO) 5-325 MG per tablet Indications: Patient is taking 1 tablet a day       Cholecalciferol (VITAMIN D3) 37742 units CAPS TAKE 1 CAPSULE ONCE A WEEK 13 capsule 1    Gabapentin (NEURONTIN PO) Take 600 mg by mouth 5 times daily       Handicap Placard MISC by Does not apply route Issue parking placard for person with disability; Applicant meets the qualifying disability criteria. Length of time expected to have disability_____Lifetime  Other __x__. Prescription expires in 5 years from issuing date. 02/05/2024 1 each 0    Misc. Devices (ROLLATOR) MISC Use to reduce risk of falls when walking. 1 each 0       She is allergic to asa [aspirin]. She  reports that she has never smoked. She has never used smokeless tobacco.      Objective:    Vitals:    04/27/21 1501   BP: 106/64   Site: Right Upper Arm   Position: Sitting   Cuff Size: Large Adult   Pulse: 72   Temp: 98.6 °F (37 °C)   TempSrc: Tympanic   SpO2: 96%   Height: 5' 8\" (1.727 m)     Body mass index is 44.4 kg/m². Extremely obese female, alert, cooperative and in no acute distress. Cranial nerves II-XII are intact. There is mild tenderness to palpation over the maxillary sinuses bilaterally. The oropharynx is clear. Neck supple. No adenopathy. Thyroid symmetric, normal size. Chest:  Normal expansion. Clear to auscultation. No rales, rhonchi, or wheezing. Heart sounds are normal.  Regular rate and rhythm without murmur, gallop or rub. Lower extremities have no edema. Assessment and Plan:    1. Vertigo  - External Referral To Physical Therapy  - meclizine (ANTIVERT) 12.5 MG tablet; Take 1-2 tablets by mouth 3 times daily as needed for Dizziness or Nausea  Dispense: 30 tablet; Refill: 1    Printed information regarding Benign Paroxysmal Positional Vertigo (BPPV) was provided to the patient with her after visit summary. She was advised not to drive or operate machinery while taking Antivert. She was also advised not to take this medication in combination with alcohol.      2. Acute maxillary sinusitis, recurrence not specified  - fluticasone (FLONASE) 50 MCG/ACT nasal spray; 1 spray by Nasal route daily  Dispense: 1 Bottle; Refill: 3    She was advised to follow up if symptoms worsen or do not resolve.            (Please note that portions of this note were completed with a voice-recognition program. Efforts were made to edit the dictation but occasionally words are mis-transcribed.)

## 2021-04-28 ENCOUNTER — HOSPITAL ENCOUNTER (OUTPATIENT)
Age: 66
Setting detail: SPECIMEN
Discharge: HOME | End: 2021-04-28
Payer: MEDICARE

## 2021-04-28 ENCOUNTER — TELEPHONE (OUTPATIENT)
Dept: FAMILY MEDICINE CLINIC | Age: 66
End: 2021-04-28

## 2021-04-28 DIAGNOSIS — I10 ESSENTIAL HYPERTENSION: ICD-10-CM

## 2021-04-28 DIAGNOSIS — E11.29 TYPE 2 DIABETES MELLITUS WITH MICROALBUMINURIA, WITHOUT LONG-TERM CURRENT USE OF INSULIN (HCC): ICD-10-CM

## 2021-04-28 DIAGNOSIS — D64.9 POSTOPERATIVE ANEMIA: ICD-10-CM

## 2021-04-28 DIAGNOSIS — R80.9 TYPE 2 DIABETES MELLITUS WITH MICROALBUMINURIA, WITHOUT LONG-TERM CURRENT USE OF INSULIN (HCC): ICD-10-CM

## 2021-04-28 LAB
ABSOLUTE EOS #: 0.18 K/UL (ref 0–0.44)
ABSOLUTE IMMATURE GRANULOCYTE: <0.03 K/UL (ref 0–0.3)
ABSOLUTE LYMPH #: 1.42 K/UL (ref 1.1–3.7)
ABSOLUTE MONO #: 0.59 K/UL (ref 0.1–1.2)
ALBUMIN SERPL-MCNC: 3.5 G/DL (ref 3.5–5.2)
ALBUMIN/GLOBULIN RATIO: 1.2 (ref 1–2.5)
ALP BLD-CCNC: 139 U/L (ref 35–104)
ALT SERPL-CCNC: <5 U/L (ref 5–33)
ANION GAP SERPL CALCULATED.3IONS-SCNC: 8 MMOL/L (ref 9–17)
AST SERPL-CCNC: 10 U/L
BASOPHILS # BLD: 1 % (ref 0–2)
BASOPHILS ABSOLUTE: 0.03 K/UL (ref 0–0.2)
BILIRUB SERPL-MCNC: 0.32 MG/DL (ref 0.3–1.2)
BUN BLDV-MCNC: 18 MG/DL (ref 8–23)
BUN/CREAT BLD: 17 (ref 9–20)
CALCIUM SERPL-MCNC: 9.5 MG/DL (ref 8.6–10.4)
CHLORIDE BLD-SCNC: 99 MMOL/L (ref 98–107)
CO2: 33 MMOL/L (ref 20–31)
CREAT SERPL-MCNC: 1.04 MG/DL (ref 0.5–0.9)
DIFFERENTIAL TYPE: ABNORMAL
EOSINOPHILS RELATIVE PERCENT: 3 % (ref 1–4)
GFR AFRICAN AMERICAN: >60 ML/MIN
GFR NON-AFRICAN AMERICAN: 53 ML/MIN
GFR SERPL CREATININE-BSD FRML MDRD: ABNORMAL ML/MIN/{1.73_M2}
GFR SERPL CREATININE-BSD FRML MDRD: ABNORMAL ML/MIN/{1.73_M2}
GLUCOSE BLD-MCNC: 124 MG/DL (ref 70–99)
HCT VFR BLD CALC: 33.2 % (ref 36.3–47.1)
HEMOGLOBIN: 10.1 G/DL (ref 11.9–15.1)
IMMATURE GRANULOCYTES: 0 %
LYMPHOCYTES # BLD: 23 % (ref 24–43)
MCH RBC QN AUTO: 29.4 PG (ref 25.2–33.5)
MCHC RBC AUTO-ENTMCNC: 30.4 G/DL (ref 25.2–33.5)
MCV RBC AUTO: 96.5 FL (ref 82.6–102.9)
MONOCYTES # BLD: 10 % (ref 3–12)
NRBC AUTOMATED: 0 PER 100 WBC
PDW BLD-RTO: 14.4 % (ref 11.8–14.4)
PLATELET # BLD: 227 K/UL (ref 138–453)
PLATELET ESTIMATE: ABNORMAL
PMV BLD AUTO: 10.9 FL (ref 8.1–13.5)
POTASSIUM SERPL-SCNC: 4 MMOL/L (ref 3.7–5.3)
RBC # BLD: 3.44 M/UL (ref 3.95–5.11)
RBC # BLD: ABNORMAL 10*6/UL
SEG NEUTROPHILS: 63 % (ref 36–65)
SEGMENTED NEUTROPHILS ABSOLUTE COUNT: 3.87 K/UL (ref 1.5–8.1)
SODIUM BLD-SCNC: 140 MMOL/L (ref 135–144)
TOTAL PROTEIN: 6.5 G/DL (ref 6.4–8.3)
WBC # BLD: 6.1 K/UL (ref 3.5–11.3)
WBC # BLD: ABNORMAL 10*3/UL

## 2021-04-28 PROCEDURE — 80053 COMPREHEN METABOLIC PANEL: CPT

## 2021-04-28 PROCEDURE — 83036 HEMOGLOBIN GLYCOSYLATED A1C: CPT

## 2021-04-28 PROCEDURE — 85025 COMPLETE CBC W/AUTO DIFF WBC: CPT

## 2021-04-28 NOTE — TELEPHONE ENCOUNTER
Patient has CMP, A1C, and CBC ordered to have drawn by home health. They are questioning if she needs to redraw CBC [facility] Please advise.

## 2021-04-29 DIAGNOSIS — R80.9 TYPE 2 DIABETES MELLITUS WITH MICROALBUMINURIA, WITHOUT LONG-TERM CURRENT USE OF INSULIN (HCC): Primary | ICD-10-CM

## 2021-04-29 DIAGNOSIS — E11.29 TYPE 2 DIABETES MELLITUS WITH MICROALBUMINURIA, WITHOUT LONG-TERM CURRENT USE OF INSULIN (HCC): Primary | ICD-10-CM

## 2021-04-29 LAB
ESTIMATED AVERAGE GLUCOSE: 126 MG/DL
HBA1C MFR BLD: 6 % (ref 4–6)

## 2021-05-04 RX ORDER — PANTOPRAZOLE SODIUM 40 MG/1
TABLET, DELAYED RELEASE ORAL
Qty: 30 TABLET | Refills: 0 | Status: SHIPPED | OUTPATIENT
Start: 2021-05-04 | End: 2021-05-05 | Stop reason: SDUPTHER

## 2021-05-04 RX ORDER — ONDANSETRON 4 MG/1
TABLET, ORALLY DISINTEGRATING ORAL
Qty: 30 TABLET | Refills: 0 | Status: SHIPPED | OUTPATIENT
Start: 2021-05-04 | End: 2021-07-28

## 2021-05-04 NOTE — TELEPHONE ENCOUNTER
James Guevara called requesting a refill of the below medication which has been pended for you:     Requested Prescriptions     Pending Prescriptions Disp Refills    ondansetron (ZOFRAN-ODT) 4 MG disintegrating tablet 30 tablet 1     Sig: dissolve 1 tablet on top of the tongue every 8 hours if needed for nausea    pantoprazole (PROTONIX) 40 MG tablet 30 tablet 0     Sig: take 1 tablet by mouth daily       Last Appointment Date: 4/27/2021  Next Appointment Date: 7/28/2021    Allergies   Allergen Reactions    Asa [Aspirin] Other (See Comments)     Stomach irritation

## 2021-05-05 ENCOUNTER — HOSPITAL ENCOUNTER (OUTPATIENT)
Dept: GENERAL RADIOLOGY | Age: 66
Discharge: HOME | End: 2021-05-07
Payer: MEDICARE

## 2021-05-05 ENCOUNTER — HOSPITAL ENCOUNTER (OUTPATIENT)
Age: 66
Discharge: HOME | End: 2021-05-07
Payer: MEDICARE

## 2021-05-05 ENCOUNTER — OFFICE VISIT (OUTPATIENT)
Dept: PRIMARY CARE CLINIC | Age: 66
End: 2021-05-05
Payer: MEDICARE

## 2021-05-05 VITALS
DIASTOLIC BLOOD PRESSURE: 79 MMHG | OXYGEN SATURATION: 98 % | RESPIRATION RATE: 16 BRPM | HEIGHT: 68 IN | BODY MASS INDEX: 44.25 KG/M2 | SYSTOLIC BLOOD PRESSURE: 126 MMHG | TEMPERATURE: 97.8 F | HEART RATE: 68 BPM | WEIGHT: 292 LBS

## 2021-05-05 DIAGNOSIS — R11.0 NAUSEA: Primary | ICD-10-CM

## 2021-05-05 DIAGNOSIS — R11.0 NAUSEA: ICD-10-CM

## 2021-05-05 DIAGNOSIS — K59.01 SLOW TRANSIT CONSTIPATION: ICD-10-CM

## 2021-05-05 PROCEDURE — 99214 OFFICE O/P EST MOD 30 MIN: CPT | Performed by: FAMILY MEDICINE

## 2021-05-05 PROCEDURE — 74019 RADEX ABDOMEN 2 VIEWS: CPT

## 2021-05-05 RX ORDER — POLYETHYLENE GLYCOL 3350 17 G/17G
17 POWDER, FOR SOLUTION ORAL DAILY
Qty: 510 G | Refills: 5 | Status: SHIPPED | OUTPATIENT
Start: 2021-05-05 | End: 2021-06-04

## 2021-05-05 RX ORDER — PROMETHAZINE HYDROCHLORIDE 25 MG/ML
25 INJECTION, SOLUTION INTRAMUSCULAR; INTRAVENOUS ONCE
Status: COMPLETED | OUTPATIENT
Start: 2021-05-05 | End: 2021-05-05

## 2021-05-05 RX ORDER — PANTOPRAZOLE SODIUM 40 MG/1
40 TABLET, DELAYED RELEASE ORAL 2 TIMES DAILY
Qty: 60 TABLET | Refills: 0 | Status: SHIPPED | OUTPATIENT
Start: 2021-05-05 | End: 2021-06-15 | Stop reason: SDUPTHER

## 2021-05-05 RX ADMIN — PROMETHAZINE HYDROCHLORIDE 25 MG: 25 INJECTION INTRAMUSCULAR; INTRAVENOUS at 18:42

## 2021-05-05 ASSESSMENT — PATIENT HEALTH QUESTIONNAIRE - PHQ9
1. LITTLE INTEREST OR PLEASURE IN DOING THINGS: 0
SUM OF ALL RESPONSES TO PHQ QUESTIONS 1-9: 0

## 2021-05-05 ASSESSMENT — ENCOUNTER SYMPTOMS
ABDOMINAL DISTENTION: 0
RECTAL PAIN: 0
ABDOMINAL PAIN: 1
DIARRHEA: 0
NAUSEA: 1
CONSTIPATION: 1
RESPIRATORY NEGATIVE: 1
VOMITING: 0
EYES NEGATIVE: 1
BLOOD IN STOOL: 0

## 2021-05-05 NOTE — PROGRESS NOTES
fluticasone (FLONASE) 50 MCG/ACT nasal spray 1 spray by Nasal route daily Yes Andreas Gandhi MD   meclizine (ANTIVERT) 12.5 MG tablet Take 1-2 tablets by mouth 3 times daily as needed for Dizziness or Nausea Yes Andreas Gandhi MD   pioglitazone-metFORMIN (ACTOPLUS MET)  MG per tablet TAKE 1 TABLET TWICE A DAY WITH MEALS Yes Andreas Gandhi MD   lisinopril-hydroCHLOROthiazide (PRINZIDE;ZESTORETIC) 20-12.5 MG per tablet TAKE 1 TABLET DAILY Yes Andreas Gandhi MD   oxybutynin (DITROPAN-XL) 10 MG extended release tablet Take 10 mg by mouth daily Yes Historical Provider, MD   Misc. Devices [facility]) MISC 1 Device by Does not apply route daily as needed (gait difficulty) Yes MD Gianna Ramos. Devices (WALKER) MISC 1 each by Does not apply route daily Yes Nora Elder MD   DULoxetine (CYMBALTA) 60 MG extended release capsule TAKE 1 CAPSULE DAILY Yes Andreas Gandhi MD   HYDROcodone-acetaminophen (Clyda Lara) 5-325 MG per tablet Indications: Patient is taking 1 tablet a day  Yes Historical Provider, MD   Cholecalciferol (VITAMIN D3) 48994 units CAPS TAKE 1 CAPSULE ONCE A WEEK Yes Andreas Gandhi MD   Gabapentin (NEURONTIN PO) Take 600 mg by mouth 5 times daily  Yes Historical Provider, MD   Handicap Placard MISC by Does not apply route Issue parking placard for person with disability; Applicant meets the qualifying disability criteria. Length of time expected to have disability_____Lifetime  Other __x__. Prescription expires in 5 years from issuing date. 02/05/2024 Yes MD Gianna Tavarez. Devices (ROLLATOR) MISC Use to reduce risk of falls when walking. Yes Andreas Gandhi MD   polyethylene glycol (GLYCOLAX) 17 GM/SCOOP powder Use as directed by following your patient instructions given by office.   Patient not taking: Reported on 1/11/2021  Sushant Chinchilla MD   cloNIDine (CATAPRES) 0.1 MG tablet Take 1 tablet by mouth 2 times daily as needed for Other (withdrawl symptoms)  Patient not taking: Reported on 11/2/2020 Judgment: Judgment normal.         ASSESSMENT/PLAN:  Encounter Diagnoses   Name Primary?  Nausea Yes    Slow transit constipation      Orders Placed This Encounter   Medications    promethazine (PHENERGAN) injection 25 mg    pantoprazole (PROTONIX) 40 MG tablet     Sig: Take 1 tablet by mouth 2 times daily take 1 tablet by mouth daily     Dispense:  60 tablet     Refill:  0    polyethylene glycol (GLYCOLAX) 17 GM/SCOOP powder     Sig: Take 17 g by mouth daily     Dispense:  510 g     Refill:  5     Orders Placed This Encounter   Procedures    XR ABDOMEN (2 VIEWS)     Standing Status:   Future     Number of Occurrences:   1     Standing Expiration Date:   5/5/2022     Scheduling Instructions:      Flat plate and upright     Order Specific Question:   Reason for exam:     Answer:   abd pain     I did personally review her xrays with her today. There is some moderate stool and air throughout colon, but no acute pathology    Suspect combination of gastritis with constipation. Did discuss need to modify dietary intake to follow a bland diet and eliminate Coke from her daily intake. Would recommend more water intake. Will increase protonix to bid dosing to help with acute flare of chronic GI symptoms. Also gave Miralax for patient to take daily. Would continue to work on increased ambulation as well. Has been more immobile due to femur fracture. Return  if no improvement in symptoms or if any further symptoms arise. No follow-ups on file. An electronic signature was used to authenticate this note.     --Lili Willingham DO on 5/5/2021 at 6:05 PM

## 2021-05-28 DIAGNOSIS — R11.0 NAUSEA: ICD-10-CM

## 2021-05-28 RX ORDER — PROMETHAZINE HYDROCHLORIDE 25 MG/1
25 TABLET ORAL 4 TIMES DAILY PRN
Qty: 20 TABLET | Refills: 0 | Status: SHIPPED | OUTPATIENT
Start: 2021-05-28 | End: 2021-06-04

## 2021-05-28 NOTE — TELEPHONE ENCOUNTER
Charlie Braga called requesting a refill of the below medication which has been pended for you:     Requested Prescriptions     Pending Prescriptions Disp Refills    promethazine (PHENERGAN) 25 MG tablet 20 tablet 0     Sig: Take 1 tablet by mouth 4 times daily as needed for Nausea       Last Appointment Date: 4/27/2021  Next Appointment Date: 7/28/2021    Allergies   Allergen Reactions    Asa [Aspirin] Other (See Comments)     Stomach irritation

## 2021-05-29 ENCOUNTER — OFFICE VISIT (OUTPATIENT)
Dept: PRIMARY CARE CLINIC | Age: 66
End: 2021-05-29
Payer: MEDICARE

## 2021-05-29 VITALS
TEMPERATURE: 97.3 F | DIASTOLIC BLOOD PRESSURE: 70 MMHG | SYSTOLIC BLOOD PRESSURE: 122 MMHG | HEIGHT: 68 IN | BODY MASS INDEX: 44.41 KG/M2 | OXYGEN SATURATION: 98 % | HEART RATE: 63 BPM | WEIGHT: 293 LBS

## 2021-05-29 DIAGNOSIS — K59.00 CONSTIPATION, UNSPECIFIED CONSTIPATION TYPE: ICD-10-CM

## 2021-05-29 DIAGNOSIS — R11.0 NAUSEA: Primary | ICD-10-CM

## 2021-05-29 PROBLEM — K22.70 BARRETT'S ESOPHAGUS WITHOUT DYSPLASIA: Status: ACTIVE | Noted: 2021-05-29

## 2021-05-29 PROCEDURE — 96372 THER/PROPH/DIAG INJ SC/IM: CPT | Performed by: FAMILY MEDICINE

## 2021-05-29 PROCEDURE — 99214 OFFICE O/P EST MOD 30 MIN: CPT | Performed by: FAMILY MEDICINE

## 2021-05-29 RX ORDER — PROMETHAZINE HYDROCHLORIDE 25 MG/ML
6.25 INJECTION, SOLUTION INTRAMUSCULAR; INTRAVENOUS ONCE
Status: COMPLETED | OUTPATIENT
Start: 2021-05-29 | End: 2021-05-29

## 2021-05-29 RX ORDER — SCOLOPAMINE TRANSDERMAL SYSTEM 1 MG/1
1 PATCH, EXTENDED RELEASE TRANSDERMAL
Qty: 3 PATCH | Refills: 0 | Status: SHIPPED | OUTPATIENT
Start: 2021-05-29 | End: 2021-07-28

## 2021-05-29 RX ADMIN — PROMETHAZINE HYDROCHLORIDE 6.25 MG: 25 INJECTION INTRAMUSCULAR; INTRAVENOUS at 12:34

## 2021-05-29 NOTE — PROGRESS NOTES
[facility]             [address] [facility], [place], [address]                        Telephone [phone]             Fax [phone]       Eve Alcaraz  1955  MRN:  [id number]  Date of visit:  5/29/2021     Subjective:    Eve Alcaraz is a 72 y.o. female who presents to [facility] today (5/29/2021) for evaluation of:  Nausea (chronic since 2020, reports phenergan and zofran are not helping)        She is here today due to nausea. She has had nausea for years. She has been taking Zofran and Phenergan without any improvement in her symptoms. She has seen gastroenterology in the past.  She had an EGD and colonoscopy 12/23/2020. One hyperplastic polyp was removed from the colon. Random colon biopsies were negative. The EGD showed severe gastritis. Pathology showed:  GE JUNCTION BIOPSIES:  NORMAL SQUAMOUS MUCOSA.  GASTRIC MUCOSA   WITH MILD CHRONIC INACTIVE INFLAMMATION AND PROMINENT   INTESTINAL/GOBLET CELL METAPLASIA (JONES'S ESOPHAGUS).  NEGATIVE FOR   DYSPLASIA. She had an MRI of the abdomen 12/11/2020 that showed: Impression   1. Cholelithiasis.  No evidence for acute cholecystitis. 2. No choledocholithiasis or biliary ductal dilatation. She states that she sometimes has increased nausea when she is constipated. She has been taking Miralax daily. She states that she had a small bowel movement this morning. She was scheduled for a follow up appointment with gastroenterology in March, but she fractured her femur, and the appointment had to be rescheduled. She is now scheduled to see Dr. Alma Panda on 6/28/2021. She reports abdominal cramping, which improves after a bowel movement. She denies pain after eating.        She has the following problem list:  Patient Active Problem List   Diagnosis    Shoulder pain, bilateral    Cervical spine pain    Knee pain, left    Essential hypertension    Pulmonary hypertension ([facility]    Vitamin D deficiency    Encounter for long-term (current) use of other medications    Pneumonia    Headache    Subacute sphenoidal sinusitis    Type 2 diabetes mellitus with microalbuminuria (HCC)    Peripheral neuropathy    Dizziness    H/O fall    Chronic sinusitis    Gastroesophageal reflux disease    Microalbuminuria    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HCC)    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[facility]    Type 2 diabetes mellitus with microalbuminuria, without long-term current use of insulin (HCC)    Morbid obesity with BMI of 40.0-44.9, adult (HCC)    Restrictive lung disease secondary to obesity    Calculus of gallbladder and bile duct without cholecystitis or obstruction    Nausea    Multiple gastric ulcers    Anxiety    Chronic tension-type headache, intractable    Chronic, continuous use of opioids    Complete tear of right rotator cuff    Depression    Fatigue    Lumbosacral spondylosis without myelopathy    Major depressive disorder, single episode, moderate (HCC)    Refractory migraine    Muscle spasms of neck    Obsessive-compulsive disorders    Spondylosis of lumbar spine    Spondylosis of thoracic region without myelopathy or radiculopathy    Spinal stenosis, lumbar region, without neurogenic claudication    Femur fracture, left (HCC)        Current medications are:  Current Outpatient Medications   Medication Sig Dispense Refill    promethazine (PHENERGAN) 25 MG tablet Take 1 tablet by mouth 4 times daily as needed for Nausea 20 tablet 0    pantoprazole (PROTONIX) 40 MG tablet Take 1 tablet by mouth 2 times daily take 1 tablet by mouth daily 60 tablet 0    polyethylene glycol (GLYCOLAX) 17 GM/SCOOP powder Take 17 g by mouth daily 510 g 5    ondansetron (ZOFRAN-ODT) 4 MG disintegrating tablet dissolve 1 tablet on top of the tongue every 8 hours if needed for nausea 30 tablet 0    fluticasone (FLONASE) 50 MCG/ACT nasal spray 1 spray by Nasal route daily 1 Bottle 3

## 2021-05-29 NOTE — PATIENT INSTRUCTIONS
Patient Education        Nausea and Vomiting: Care Instructions  Your Care Instructions     When you are nauseated, you may feel weak and sweaty and notice a lot of saliva in your mouth. Nausea often leads to vomiting. Most of the time you do not need to worry about nausea and vomiting, but they can be signs of other illnesses. Two common causes of nausea and vomiting are stomach flu and food poisoning. Nausea and vomiting from viral stomach flu will usually start to improve within 24 hours. Nausea and vomiting from food poisoning may last from 12 to 48 hours. The doctor has checked you carefully, but problems can develop later. If you notice any problems or new symptoms, get medical treatment right away. Follow-up care is a key part of your treatment and safety. Be sure to make and go to all appointments, and call your doctor if you are having problems. It's also a good idea to know your test results and keep a list of the medicines you take. How can you care for yourself at home? · To prevent dehydration, drink plenty of fluids. Choose water and other caffeine-free clear liquids until you feel better. If you have kidney, heart, or liver disease and have to limit fluids, talk with your doctor before you increase the amount of fluids you drink. · Rest in bed until you feel better. · When you are able to eat, try clear soups, mild foods, and liquids until all symptoms are gone for 12 to 48 hours. Other good choices include dry toast, crackers, cooked cereal, and gelatin dessert, such as Jell-O. When should you call for help? Call 911 anytime you think you may need emergency care. For example, call if:    · You passed out (lost consciousness). Call your doctor now or seek immediate medical care if:    · You have symptoms of dehydration, such as:  ? Dry eyes and a dry mouth. ? Passing only a little urine. ?  Feeling thirstier than usual.     · You have new or worsening belly pain.     · You have a new or higher fever.     · You vomit blood or what looks like coffee grounds. Watch closely for changes in your health, and be sure to contact your doctor if:    · You have ongoing nausea and vomiting.     · Your vomiting is getting worse.     · Your vomiting lasts longer than 2 days.     · You are not getting better as expected. Where can you learn more? Go to [email or web address] org and sign in to your [employer] account. Enter [phone] [id number] in the [employer] box to learn more about \"Nausea and Vomiting: Care Instructions. \"     If you do not have an account, please click on the \"Sign Up Now\" link. Current as of: February 26, 2020               Content Version: 12.8  © 2006-2021 Healthwise, [employer]. Care instructions adapted under license by [facility] ([facility]). If you have questions about a medical condition or this instruction, always ask your healthcare professional. [place] [address] any warranty or liability for your use of this information. Patient Education        Constipation: Care Instructions  Your Care Instructions     Constipation means that you have a hard time passing stools (bowel movements). People pass stools from 3 times a day to once every 3 days. What is normal for you may be different. Constipation may occur with pain in the rectum and cramping. The pain may get worse when you try to pass stools. Sometimes there are small amounts of bright red blood on toilet paper or the surface of stools. This is because of enlarged veins near the rectum (hemorrhoids). A few changes in your diet and lifestyle may help you avoid ongoing constipation. Your doctor may also prescribe medicine to help loosen your stool. Some medicines can cause constipation. These include pain medicines and antidepressants. Tell your doctor about all the medicines you take. Your doctor may want to make a medicine change to ease your symptoms.   Follow-up care is a key part of your an account, please click on the \"Sign Up Now\" link. Current as of: February 26, 2020               Content Version: 12.8  © 2006-2021 Healthwise, Inc[employer]. Care instructions adapted under license by [facility] ([facility]). If you have questions about a medical condition or this instruction, always ask your healthcare professional. [address] any warranty or liability for your use of this information.

## 2021-06-08 DIAGNOSIS — G44.201 INTRACTABLE TENSION-TYPE HEADACHE, UNSPECIFIED CHRONICITY PATTERN: ICD-10-CM

## 2021-06-08 RX ORDER — DULOXETIN HYDROCHLORIDE 60 MG/1
60 CAPSULE, DELAYED RELEASE ORAL DAILY
Qty: 90 CAPSULE | Refills: 0 | Status: SHIPPED | OUTPATIENT
Start: 2021-06-08 | End: 2021-06-10 | Stop reason: SDUPTHER

## 2021-06-10 DIAGNOSIS — G44.201 INTRACTABLE TENSION-TYPE HEADACHE, UNSPECIFIED CHRONICITY PATTERN: ICD-10-CM

## 2021-06-10 RX ORDER — DULOXETIN HYDROCHLORIDE 60 MG/1
60 CAPSULE, DELAYED RELEASE ORAL DAILY
Qty: 30 CAPSULE | Refills: 1 | Status: SHIPPED | OUTPATIENT
Start: 2021-06-10 | End: 2021-07-28 | Stop reason: SDUPTHER

## 2021-06-10 NOTE — TELEPHONE ENCOUNTER
[facility] called requesting a refill of the below medication which has been pended for you:     Duloxetine (Cymbalta) 60 mg, oral, once daily    Last Appointment Date: 5/29/2021  Next Appointment Date: 7/28/2021 (with Dr. Rob Garibay)    Allergies   Allergen Reactions   Elmarie Dew [Aspirin] Other (See Comments)     Stomach irritation

## 2021-06-15 RX ORDER — PANTOPRAZOLE SODIUM 40 MG/1
40 TABLET, DELAYED RELEASE ORAL 2 TIMES DAILY
Qty: 60 TABLET | Refills: 1 | Status: SHIPPED | OUTPATIENT
Start: 2021-06-15 | End: 2021-07-28 | Stop reason: SDUPTHER

## 2021-06-15 NOTE — TELEPHONE ENCOUNTER
[facility] called requesting a refill of the below medication which has been pended for you:     Requested Prescriptions     Pending Prescriptions Disp Refills    pantoprazole (PROTONIX) 40 MG tablet 60 tablet 0     Sig: Take 1 tablet by mouth 2 times daily take 1 tablet by mouth daily       Last Appointment Date: 5/29/2021  Next Appointment Date: 7/28/2021     Allergies   Allergen Reactions    Asa [Aspirin] Other (See Comments)     Stomach irritation

## 2021-06-25 NOTE — PROGRESS NOTES
GI CLINIC FOLLOW UP    INTERVAL HISTORY:   No referring provider defined for this encounter. Chief Complaint   Patient presents with    Gastroesophageal Reflux     Patient is f/u on EGD and colonosocpy. HISTORY OF PRESENT ILLNESS: Ms.Nancy Suman Paredes is a 72 y.o. female , referred for evaluation of* nausea , now constipation , used to have diarrhea , elevated alk phos     Last visit for which we did egd/colon/ MI/MRCP   Mri : cholelithiasis   Egd/colon as below; irregular z line, gastritis, colon polyps, negative SB bx s and colon bxs       here for f/u    had femur  fx was in [place]   On Vicodin   Been constipated and having nausea   Labs 4/2021 : normal cbc    elevated alk phos . Mild anemia   On Protonix          Findings:     Retropharyngeal area was grossly normal appearing     Esophagus: abnormal:   Irregular Z-line with 2 salmon-colored circular raised areas at the GE junction raising suspicion for Elizalde's esophagus, biopsies were taken  Small hiatal hernia      Stomach:    Fundus: abnormal:   Severe gastritis with erosions biopsies were taken    Body: abnormal: Severe gastritis with erosions biopsies were taken    Antrum: abnormal: Severe gastritis with erosions biopsies were taken     Duodenum:     Descending: abnormal: Random biopsies taken from the small bowel    Bulb: abnormal: Random biopsies taken from the small bowel     The scope was removed and the patient tolerated the procedure well.      Recommendations/Plan:   1. F/U Biopsies  2. F/U In Office in 3-4 weeks  3.  Discussed with the family     Electronically signed by Rogelio Anna MD  on 12/23/2020 at 8:33 AM    The prep was fair.       Findings:  Terminal ileum: normal     Random colon biopsies were taken      Cecum/Ascending colon: normal     Transverse colon: normal     Descending/Sigmoid colon: abnormal: *Small sessile polyp in the sigmoid colon measuring around 7 mm removed with cold biopsy     Rectum/Anus: examined in normal and retroflexed positions and was abnormal: hemorrhoids      Withdrawal Time was (minutes): 10     The colon was decompressed and the scope was removed. The patient tolerated the procedure well.      Recommendations/Plan:   1. Lifestyle and dietary modifications as discussed  2. F/U Biopsies  3. F/U In OfficeYes  4. Discussed with the family  5. Repeat colonoscopy [id number]     Electronically signed by Cruz Abarca MD  on 12/23/2020 at 8:36 AM    -- Diagnosis --   1.  GE JUNCTION BIOPSIES:  NORMAL SQUAMOUS MUCOSA.  GASTRIC MUCOSA   WITH MILD CHRONIC INACTIVE INFLAMMATION AND PROMINENT   INTESTINAL/GOBLET CELL METAPLASIA (JONES'S ESOPHAGUS).  NEGATIVE FOR   DYSPLASIA. 2.  SMALL BOWEL BIOPSIES:  NORMAL SMALL INTESTINAL MUCOSA. 3.  STOMACH BIOPSIES:  MILD CHRONIC INACTIVE GASTRITIS. 4.  SIGMOID COLON POLYP BIOPSY:  HYPERPLASTIC POLYP. 5.  RANDOM COLON BIOPSIES:  NORMAL COLONIC MUCOSA. SHARA Nichole   **Electronically Signed Out**         ajb/12/24/2020       Past Medical,Family, and Social History reviewed and does contribute to the patient presentingcondition. Patient's PMH/PSH,SH,PSYCH Hx, MEDs, ALLERGIES, and ROS were all reviewed and updated in the appropriate sections.     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    Obesity     Peripheral neuropathy     Pulmonary hypertension ([address] 01/01/2012    by echocardiogram    Shoulder pain, bilateral 08/21/2013    Type 2 diabetes mellitus ([address]     Unspecified essential hypertension     Essential hypertension    Vitamin D deficiency 11/05/2014       Past Surgical History:   Procedure Laterality Date    Rfl: 0    ALLERGIES:   Allergies   Allergen Reactions    Asa [Aspirin] Other (See Comments)     Stomach irritation       FAMILY HISTORY:       Problem Relation Age of Onset   [employer] Cancer Mother     Hypertension Father     Diabetes Father     Cancer Father          SOCIAL HISTORY:   Social History     Socioeconomic History    Marital status:      Spouse name: Not on file    Number of children: Not on file    Years of education: Not on file    Highest education level: Not on file   Occupational History    Not on file   Tobacco Use    Smoking status: Never Smoker    Smokeless tobacco: Never Used   Vaping Use    Vaping Use: Never used   Substance and Sexual Activity    Alcohol use: Yes     Alcohol/week: 0.0 standard drinks     Comment: Maybe twice a year, small amounts    Drug use: No    Sexual activity: Not on file   Other Topics Concern    Not on file   Social History Narrative    Not on file     Social Determinants of Health     Financial Resource Strain:     Difficulty of Paying Living Expenses:    Food Insecurity:     Worried About Running Out of Food in the Last Year:     [address] in the Last Year:    Transportation Needs:     Lack of Transportation (Medical):      Lack of Transportation (Non-Medical):    Physical Activity:     Days of Exercise per Week:     Minutes of Exercise per Session:    Stress:     Feeling of Stress :    Social Connections:     Frequency of Communication with Friends and Family:     Frequency of Social Gatherings with Friends and Family:     Attends Faith Services:     Active Member of Clubs or Organizations:     Attends Club or Organization Meetings:     Marital Status:    Intimate Partner Violence:     Fear of Current or Ex-Partner:     Emotionally Abused:     Physically Abused:     Sexually Abused:        REVIEW OF SYSTEMS: A 12-point review of systemswas obtained and pertinent positives and negatives were enumerated above in the history of present illness. All other reviewed systems / symptoms were negative. Review of Systems   Constitutional: Negative for appetite change, fatigue and unexpected weight change. HENT: Negative for trouble swallowing. Eyes: Positive for visual disturbance (glasses). Respiratory: Negative for cough, choking and wheezing. Cardiovascular: Negative for chest pain, palpitations and leg swelling. Gastrointestinal: Positive for abdominal pain (cramps), constipation and nausea (daily). Negative for abdominal distention, anal bleeding, blood in stool, diarrhea, rectal pain and vomiting. Genitourinary: Negative for difficulty urinating. Musculoskeletal: Positive for arthralgias, back pain and joint swelling. Allergic/Immunologic: Negative for environmental allergies and food allergies. Neurological: Positive for dizziness. Negative for weakness, light-headedness, numbness and headaches. Hematological: Bruises/bleeds easily. Psychiatric/Behavioral: Negative for sleep disturbance. The patient is not nervous/anxious.             LABORATORY DATA: Reviewed  Lab Results   Component Value Date    WBC 6.1 04/28/2021    HGB 10.1 (L) 04/28/2021    HCT 33.2 (L) 04/28/2021    MCV 96.5 04/28/2021     04/28/2021     04/28/2021    K 4.0 04/28/2021    CL 99 04/28/2021    CO2 33 (H) 04/28/2021    BUN 18 04/28/2021    CREATININE 1.04 (H) 04/28/2021    LABALBU 3.5 04/28/2021    BILITOT 0.32 04/28/2021    ALKPHOS 139 (H) 04/28/2021    AST 10 04/28/2021    ALT <5 (L) 04/28/2021    INR 0.9 11/23/2015         Lab Results   Component Value Date    RBC 3.44 (L) 04/28/2021    HGB 10.1 (L) 04/28/2021    MCV 96.5 04/28/2021    MCH 29.4 04/28/2021    MCHC 30.4 04/28/2021    RDW 14.4 04/28/2021    MPV 10.9 04/28/2021    BASOPCT 1 04/28/2021    LYMPHSABS 1.42 04/28/2021    MONOSABS 0.59 04/28/2021    NEUTROABS 3.87 04/28/2021    EOSABS 0.18 04/28/2021    BASOSABS 0.03 04/28/2021         DIAGNOSTIC TESTING:     No results found. PHYSICAL EXAMINATION: Vital signs reviewed per the nursing documentation. /65   Pulse 89   Resp 22   Wt (!) 302 lb (137 kg)   LMP 08/24/2010 (Approximate)   BMI 45.92 kg/m²   Body mass index is 45.92 kg/m². Physical Exam  Vitals and nursing note reviewed. Constitutional:       General: She is not in acute distress. Appearance: She is well-developed. She is not diaphoretic. HENT:      Head: Normocephalic. Mouth/Throat:      Pharynx: No oropharyngeal exudate. Eyes:      General: No scleral icterus. Pupils: Pupils are equal, round, and reactive to light. Neck:      Thyroid: No thyromegaly. Vascular: No JVD. Trachea: No tracheal deviation. Cardiovascular:      Rate and Rhythm: Normal rate and regular rhythm. Heart sounds: Normal heart sounds. No murmur heard. Pulmonary:      Effort: Pulmonary effort is normal. No respiratory distress. Breath sounds: Normal breath sounds. No wheezing. Abdominal:      General: Bowel sounds are normal. There is no distension. Palpations: Abdomen is soft. Tenderness: There is no abdominal tenderness. There is no guarding or rebound. Comments: No ascites   Musculoskeletal:         General: Normal range of motion. Cervical back: Normal range of motion and neck supple. Skin:     General: Skin is warm. Coloration: Skin is not pale. Findings: No erythema or rash. Comments: She is not diaphoretic   Neurological:      Mental Status: She is alert and oriented to person, place, and time. Deep Tendon Reflexes: Reflexes are normal and symmetric. Psychiatric:         Behavior: Behavior normal.         Thought Content: Thought content normal.         Judgment: Judgment normal.           IMPRESSION: Ms. Paula Beverly is a 72 y.o. female with      Diagnosis Orders   1. Other constipation     2. Nausea     3. Calculus of gallbladder without cholecystitis without obstruction     4. Elevated alkaline phosphatase level  Hepatic Function Panel   5. Anemia, unspecified type  CBC    Iron and TIBC    Vitamin B12    Folate     Will start Rleistor    alert pt about diarrhea   Continue PPI   zofran prn     Diet/life style/natural hx /complication of the dx were all explained in details   Past medical, past surgical, social history, psychiatric history, medications or allergies, all reviewed and  updated    Thank you for allowing me to participate in the care of Ms. Seferino Martinez. For any further questions please do not hesitate to contact me. I have reviewed and agree with the ROS entered by the MA/RN. Note is dictated utilizing voice recognition software. Unfortunately this leads to occasional typographical errors. Please contact our office if you have any questions.       Sebastian Wiseman MD  [facility]  O: #[phone]

## 2021-06-28 ENCOUNTER — OFFICE VISIT (OUTPATIENT)
Dept: GASTROENTEROLOGY | Age: 66
End: 2021-06-28
Payer: MEDICARE

## 2021-06-28 VITALS
HEART RATE: 89 BPM | DIASTOLIC BLOOD PRESSURE: 65 MMHG | SYSTOLIC BLOOD PRESSURE: 129 MMHG | RESPIRATION RATE: 22 BRPM | WEIGHT: 293 LBS | BODY MASS INDEX: 45.92 KG/M2

## 2021-06-28 DIAGNOSIS — D64.9 ANEMIA, UNSPECIFIED TYPE: ICD-10-CM

## 2021-06-28 DIAGNOSIS — R11.0 NAUSEA: ICD-10-CM

## 2021-06-28 DIAGNOSIS — K80.20 CALCULUS OF GALLBLADDER WITHOUT CHOLECYSTITIS WITHOUT OBSTRUCTION: ICD-10-CM

## 2021-06-28 DIAGNOSIS — R74.8 ELEVATED ALKALINE PHOSPHATASE LEVEL: ICD-10-CM

## 2021-06-28 DIAGNOSIS — K59.09 OTHER CONSTIPATION: Primary | ICD-10-CM

## 2021-06-28 PROCEDURE — 99214 OFFICE O/P EST MOD 30 MIN: CPT | Performed by: INTERNAL MEDICINE

## 2021-06-28 RX ORDER — METHYLNALTREXONE BROMIDE 150 MG/1
450 TABLET ORAL DAILY
Qty: 90 TABLET | Refills: 2 | Status: SHIPPED | OUTPATIENT
Start: 2021-06-28 | End: 2021-07-28

## 2021-06-28 ASSESSMENT — ENCOUNTER SYMPTOMS
VOMITING: 0
DIARRHEA: 0
COUGH: 0
NAUSEA: 1
ABDOMINAL PAIN: 1
WHEEZING: 0
CONSTIPATION: 1
ANAL BLEEDING: 0
BLOOD IN STOOL: 0
BACK PAIN: 1
RECTAL PAIN: 0
TROUBLE SWALLOWING: 0
CHOKING: 0
ABDOMINAL DISTENTION: 0

## 2021-07-06 ENCOUNTER — TELEPHONE (OUTPATIENT)
Dept: GASTROENTEROLOGY | Age: 66
End: 2021-07-06

## 2021-07-06 PROBLEM — K59.03 THERAPEUTIC OPIOID-INDUCED CONSTIPATION (OIC): Status: ACTIVE | Noted: 2021-07-06

## 2021-07-06 PROBLEM — T40.2X5A THERAPEUTIC OPIOID-INDUCED CONSTIPATION (OIC): Status: ACTIVE | Noted: 2021-07-06

## 2021-07-14 RX ORDER — LUBIPROSTONE 24 UG/1
24 CAPSULE, GELATIN COATED ORAL 2 TIMES DAILY WITH MEALS
Qty: 180 CAPSULE | Refills: 3 | Status: SHIPPED | OUTPATIENT
Start: 2021-07-14 | End: 2021-07-28

## 2021-07-20 ENCOUNTER — OFFICE VISIT (OUTPATIENT)
Dept: PRIMARY CARE CLINIC | Age: 66
End: 2021-07-20
Payer: MEDICARE

## 2021-07-20 VITALS
SYSTOLIC BLOOD PRESSURE: 120 MMHG | OXYGEN SATURATION: 100 % | HEART RATE: 72 BPM | BODY MASS INDEX: 44.41 KG/M2 | TEMPERATURE: 97.7 F | HEIGHT: 68 IN | DIASTOLIC BLOOD PRESSURE: 70 MMHG | WEIGHT: 293 LBS

## 2021-07-20 DIAGNOSIS — L30.9 DERMATITIS: Primary | ICD-10-CM

## 2021-07-20 PROCEDURE — 99212 OFFICE O/P EST SF 10 MIN: CPT | Performed by: FAMILY MEDICINE

## 2021-07-20 PROCEDURE — 99213 OFFICE O/P EST LOW 20 MIN: CPT | Performed by: FAMILY MEDICINE

## 2021-07-20 RX ORDER — PREDNISONE 20 MG/1
20 TABLET ORAL 2 TIMES DAILY
Qty: 10 TABLET | Refills: 0 | Status: SHIPPED | OUTPATIENT
Start: 2021-07-20 | End: 2021-07-25

## 2021-07-20 ASSESSMENT — ENCOUNTER SYMPTOMS
COUGH: 0
SHORTNESS OF BREATH: 0
ABDOMINAL PAIN: 0
WHEEZING: 0
DIARRHEA: 0
SORE THROAT: 0
NAUSEA: 0
COLOR CHANGE: 1

## 2021-07-20 NOTE — PROGRESS NOTES
[address] [place] [address]  Dept: [phone]  Dept Fax: [phone]  Loc: [phone]    Lord Alfredo a 72 y.o. female who presents today for her medical conditions/complaints as notedbelow. Zoila Schofield is c/o of   Chief Complaint   Patient presents with    Rash     upper body x 1 week; no change in med, food or detergents       HPI:     Here today for a rash. Rash  This is a new problem. The current episode started in the past 7 days (1 week). The problem is unchanged. The affected locations include the back. The rash is characterized by itchiness. It is unknown if there was an exposure to a precipitant. Pertinent negatives include no congestion, cough, diarrhea, facial edema, fatigue, fever, joint pain, shortness of breath or sore throat. Treatments tried: moisturizer. The treatment provided no relief. 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    Obesity     Peripheral neuropathy     Pulmonary hypertension ([address] 01/01/2012    by echocardiogram    Shoulder pain, bilateral 08/21/2013    Type 2 diabetes mellitus ([address]     Unspecified essential hypertension     Essential hypertension    Vitamin D deficiency 11/05/2014          Social History     Tobacco Use    Smoking status: Never Smoker    Smokeless tobacco: Never Used   Substance Use Topics    Alcohol use:  Yes     Alcohol/week: 0.0 standard drinks     Comment: Maybe twice a year, small amounts     Current Outpatient Medications   Medication Sig Dispense Refill    predniSONE (DELTASONE) 20 MG tablet Take 1 tablet by mouth 2 times daily for 5 days 10 tablet 0    lubiprostone (AMITIZA) 24 MCG capsule Take 1 capsule by mouth 2 times daily (with meals) 180 capsule 3    Methylnaltrexone Bromide (RELISTOR) 150 MG TABS Take 450 mg by mouth daily 90 tablet 2    pantoprazole (PROTONIX) 40 MG tablet Take 1 tablet by mouth 2 times daily take 1 tablet by mouth daily 60 tablet 1    DULoxetine (CYMBALTA) 60 MG extended release capsule Take 1 capsule by mouth daily 30 capsule 1    scopolamine (TRANSDERM-SCOP, 1.5 MG,) transdermal patch Place 1 patch onto the skin every 72 hours 3 patch 0    ondansetron (ZOFRAN-ODT) 4 MG disintegrating tablet dissolve 1 tablet on top of the tongue every 8 hours if needed for nausea 30 tablet 0    fluticasone (FLONASE) 50 MCG/ACT nasal spray 1 spray by Nasal route daily 1 Bottle 3    pioglitazone-metFORMIN (ACTOPLUS MET)  MG per tablet TAKE 1 TABLET TWICE A DAY WITH MEALS 180 tablet 1    lisinopril-hydroCHLOROthiazide (PRINZIDE;ZESTORETIC) 20-12.5 MG per tablet TAKE 1 TABLET DAILY 90 tablet 1    oxybutynin (DITROPAN-XL) 10 MG extended release tablet Take 10 mg by mouth daily      Misc. Devices [facility]) MISC 1 Device by Does not apply route daily as needed (gait difficulty) 1 each 0    Misc. Devices (WALKER) MISC 1 each by Does not apply route daily 1 each 0    HYDROcodone-acetaminophen (NORCO) 5-325 MG per tablet Indications: Patient is taking 1 tablet a day       Cholecalciferol (VITAMIN D3) 67332 units CAPS TAKE 1 CAPSULE ONCE A WEEK 13 capsule 1    Gabapentin (NEURONTIN PO) Take 600 mg by mouth 5 times daily       Handicap Placard MISC by Does not apply route Issue parking placard for person with disability; Applicant meets the qualifying disability criteria. Length of time expected to have disability_____Lifetime  Other __x__. Prescription expires in 5 years from issuing date. 02/05/2024 1 each 0    Misc. Devices (ROLLATOR) MISC Use to reduce risk of falls when walking.  1 each 0     No worsen or fail to improve. Orders Placed This Encounter   Medications    predniSONE (DELTASONE) 20 MG tablet     Sig: Take 1 tablet by mouth 2 times daily for 5 days     Dispense:  10 tablet     Refill:  0       Patientgiven educational materials - see patient instructions. Discussed use, benefit,and side effects of prescribed medications. All patient questions answered. Ptvoiced understanding. Reviewed health maintenance. Instructed to continue currentmedications, diet and exercise. Patient agreed with treatment plan. Follow up asdirected.      Electronically signed by Heriberto Ozuna MD on 7/20/2021 at 11:51 AM

## 2021-07-21 ENCOUNTER — HOSPITAL ENCOUNTER (OUTPATIENT)
Dept: LAB | Age: 66
Discharge: HOME | End: 2021-07-21
Payer: MEDICARE

## 2021-07-21 DIAGNOSIS — E55.9 VITAMIN D DEFICIENCY: ICD-10-CM

## 2021-07-21 DIAGNOSIS — R80.9 TYPE 2 DIABETES MELLITUS WITH MICROALBUMINURIA, WITHOUT LONG-TERM CURRENT USE OF INSULIN (HCC): ICD-10-CM

## 2021-07-21 DIAGNOSIS — E11.29 TYPE 2 DIABETES MELLITUS WITH MICROALBUMINURIA, WITHOUT LONG-TERM CURRENT USE OF INSULIN (HCC): ICD-10-CM

## 2021-07-21 DIAGNOSIS — D64.9 ANEMIA, UNSPECIFIED TYPE: ICD-10-CM

## 2021-07-21 DIAGNOSIS — R74.8 ELEVATED ALKALINE PHOSPHATASE LEVEL: ICD-10-CM

## 2021-07-21 DIAGNOSIS — N18.31 STAGE 3A CHRONIC KIDNEY DISEASE (HCC): ICD-10-CM

## 2021-07-21 LAB
ABSOLUTE EOS #: 0.13 K/UL (ref 0–0.44)
ABSOLUTE EOS #: 0.13 K/UL (ref 0–0.44)
ABSOLUTE IMMATURE GRANULOCYTE: <0.03 K/UL (ref 0–0.3)
ABSOLUTE IMMATURE GRANULOCYTE: <0.03 K/UL (ref 0–0.3)
ABSOLUTE LYMPH #: 1.53 K/UL (ref 1.1–3.7)
ABSOLUTE LYMPH #: 1.53 K/UL (ref 1.1–3.7)
ABSOLUTE MONO #: 0.49 K/UL (ref 0.1–1.2)
ABSOLUTE MONO #: 0.49 K/UL (ref 0.1–1.2)
ALBUMIN SERPL-MCNC: 3.9 G/DL (ref 3.5–5.2)
ALBUMIN SERPL-MCNC: 3.9 G/DL (ref 3.5–5.2)
ALBUMIN/GLOBULIN RATIO: 1.1 (ref 1–2.5)
ALBUMIN/GLOBULIN RATIO: 1.1 (ref 1–2.5)
ALP BLD-CCNC: 168 U/L (ref 35–104)
ALP BLD-CCNC: 168 U/L (ref 35–104)
ALT SERPL-CCNC: 6 U/L (ref 5–33)
ALT SERPL-CCNC: 6 U/L (ref 5–33)
ANION GAP SERPL CALCULATED.3IONS-SCNC: 7 MMOL/L (ref 9–17)
ANION GAP SERPL CALCULATED.3IONS-SCNC: 7 MMOL/L (ref 9–17)
AST SERPL-CCNC: 12 U/L
AST SERPL-CCNC: 12 U/L
BASOPHILS # BLD: 1 % (ref 0–2)
BASOPHILS # BLD: 1 % (ref 0–2)
BASOPHILS ABSOLUTE: 0.04 K/UL (ref 0–0.2)
BASOPHILS ABSOLUTE: 0.04 K/UL (ref 0–0.2)
BILIRUB SERPL-MCNC: 0.41 MG/DL (ref 0.3–1.2)
BILIRUB SERPL-MCNC: 0.41 MG/DL (ref 0.3–1.2)
BILIRUBIN DIRECT: 0.11 MG/DL
BILIRUBIN, INDIRECT: 0.3 MG/DL (ref 0–1)
BUN BLDV-MCNC: 19 MG/DL (ref 8–23)
BUN BLDV-MCNC: 19 MG/DL (ref 8–23)
BUN/CREAT BLD: 16 (ref 9–20)
BUN/CREAT BLD: 16 (ref 9–20)
CALCIUM IONIZED: 1.23 MMOL/L (ref 1.13–1.33)
CALCIUM SERPL-MCNC: 9.5 MG/DL (ref 8.6–10.4)
CALCIUM SERPL-MCNC: 9.5 MG/DL (ref 8.6–10.4)
CHLORIDE BLD-SCNC: 100 MMOL/L (ref 98–107)
CHLORIDE BLD-SCNC: 100 MMOL/L (ref 98–107)
CHOLESTEROL/HDL RATIO: 3.7
CHOLESTEROL: 183 MG/DL
CO2: 33 MMOL/L (ref 20–31)
CO2: 33 MMOL/L (ref 20–31)
CREAT SERPL-MCNC: 1.2 MG/DL (ref 0.5–0.9)
CREAT SERPL-MCNC: 1.2 MG/DL (ref 0.5–0.9)
DIFFERENTIAL TYPE: ABNORMAL
DIFFERENTIAL TYPE: ABNORMAL
EOSINOPHILS RELATIVE PERCENT: 2 % (ref 1–4)
EOSINOPHILS RELATIVE PERCENT: 2 % (ref 1–4)
ESTIMATED AVERAGE GLUCOSE: 143 MG/DL
FOLATE: 7.3 NG/ML
GFR AFRICAN AMERICAN: 55 ML/MIN
GFR AFRICAN AMERICAN: 55 ML/MIN
GFR NON-AFRICAN AMERICAN: 45 ML/MIN
GFR NON-AFRICAN AMERICAN: 45 ML/MIN
GFR SERPL CREATININE-BSD FRML MDRD: ABNORMAL ML/MIN/{1.73_M2}
GLOBULIN: 3.5 G/DL (ref 1.5–3.8)
GLUCOSE BLD-MCNC: 127 MG/DL (ref 70–99)
GLUCOSE BLD-MCNC: 127 MG/DL (ref 70–99)
HBA1C MFR BLD: 6.6 % (ref 4–6)
HCT VFR BLD CALC: 36.2 % (ref 36.3–47.1)
HDLC SERPL-MCNC: 49 MG/DL
HEMOGLOBIN: 11.1 G/DL (ref 11.9–15.1)
IMMATURE GRANULOCYTES: 0 %
IMMATURE GRANULOCYTES: 0 %
IRON SATURATION: 23 % (ref 20–55)
IRON: 64 UG/DL (ref 37–145)
LDL CHOLESTEROL: 116 MG/DL (ref 0–130)
LYMPHOCYTES # BLD: 25 % (ref 24–43)
LYMPHOCYTES # BLD: 25 % (ref 24–43)
MCH RBC QN AUTO: 28.9 PG (ref 25.2–33.5)
MCHC RBC AUTO-ENTMCNC: 30.7 G/DL (ref 25.2–33.5)
MCV RBC AUTO: 94.3 FL (ref 82.6–102.9)
MONOCYTES # BLD: 8 % (ref 3–12)
MONOCYTES # BLD: 8 % (ref 3–12)
NRBC AUTOMATED: 0 PER 100 WBC
PDW BLD-RTO: 14.3 % (ref 11.8–14.4)
PHOSPHORUS: 3.3 MG/DL (ref 2.6–4.5)
PLATELET # BLD: 226 K/UL (ref 138–453)
PLATELET ESTIMATE: ABNORMAL
PLATELET ESTIMATE: ABNORMAL
PMV BLD AUTO: 10.9 FL (ref 8.1–13.5)
POTASSIUM SERPL-SCNC: 4.4 MMOL/L (ref 3.7–5.3)
POTASSIUM SERPL-SCNC: 4.4 MMOL/L (ref 3.7–5.3)
PTH INTACT: 46.71 PG/ML (ref 15–65)
RBC # BLD: 3.84 M/UL (ref 3.95–5.11)
RBC # BLD: ABNORMAL 10*6/UL
RBC # BLD: ABNORMAL 10*6/UL
SEG NEUTROPHILS: 64 % (ref 36–65)
SEG NEUTROPHILS: 64 % (ref 36–65)
SEGMENTED NEUTROPHILS ABSOLUTE COUNT: 3.94 K/UL (ref 1.5–8.1)
SEGMENTED NEUTROPHILS ABSOLUTE COUNT: 3.94 K/UL (ref 1.5–8.1)
SODIUM BLD-SCNC: 140 MMOL/L (ref 135–144)
SODIUM BLD-SCNC: 140 MMOL/L (ref 135–144)
TOTAL IRON BINDING CAPACITY: 283 UG/DL (ref 250–450)
TOTAL PROTEIN: 7.4 G/DL (ref 6.4–8.3)
TOTAL PROTEIN: 7.4 G/DL (ref 6.4–8.3)
TRIGL SERPL-MCNC: 92 MG/DL
UNSATURATED IRON BINDING CAPACITY: 219 UG/DL (ref 112–347)
VITAMIN B-12: 423 PG/ML (ref 232–1245)
VITAMIN D 25-HYDROXY: 39.5 NG/ML (ref 30–100)
VLDLC SERPL CALC-MCNC: NORMAL MG/DL (ref 1–30)
WBC # BLD: 6.2 K/UL (ref 3.5–11.3)
WBC # BLD: ABNORMAL 10*3/UL
WBC # BLD: ABNORMAL 10*3/UL

## 2021-07-21 PROCEDURE — 85027 COMPLETE CBC AUTOMATED: CPT

## 2021-07-21 PROCEDURE — 80053 COMPREHEN METABOLIC PANEL: CPT

## 2021-07-21 PROCEDURE — 82330 ASSAY OF CALCIUM: CPT

## 2021-07-21 PROCEDURE — 80076 HEPATIC FUNCTION PANEL: CPT

## 2021-07-21 PROCEDURE — 83540 ASSAY OF IRON: CPT

## 2021-07-21 PROCEDURE — 84100 ASSAY OF PHOSPHORUS: CPT

## 2021-07-21 PROCEDURE — 82746 ASSAY OF FOLIC ACID SERUM: CPT

## 2021-07-21 PROCEDURE — 80061 LIPID PANEL: CPT

## 2021-07-21 PROCEDURE — 36415 COLL VENOUS BLD VENIPUNCTURE: CPT

## 2021-07-21 PROCEDURE — 82607 VITAMIN B-12: CPT

## 2021-07-21 PROCEDURE — 83036 HEMOGLOBIN GLYCOSYLATED A1C: CPT

## 2021-07-21 PROCEDURE — 82306 VITAMIN D 25 HYDROXY: CPT

## 2021-07-21 PROCEDURE — 83970 ASSAY OF PARATHORMONE: CPT

## 2021-07-21 PROCEDURE — 85025 COMPLETE CBC W/AUTO DIFF WBC: CPT

## 2021-07-21 PROCEDURE — 83550 IRON BINDING TEST: CPT

## 2021-07-21 PROCEDURE — 80048 BASIC METABOLIC PNL TOTAL CA: CPT

## 2021-07-28 ENCOUNTER — OFFICE VISIT (OUTPATIENT)
Dept: FAMILY MEDICINE CLINIC | Age: 66
End: 2021-07-28
Payer: MEDICARE

## 2021-07-28 VITALS
DIASTOLIC BLOOD PRESSURE: 78 MMHG | BODY MASS INDEX: 45.99 KG/M2 | RESPIRATION RATE: 16 BRPM | HEIGHT: 67 IN | WEIGHT: 293 LBS | HEART RATE: 84 BPM | SYSTOLIC BLOOD PRESSURE: 124 MMHG

## 2021-07-28 DIAGNOSIS — E78.2 MIXED HYPERLIPIDEMIA: ICD-10-CM

## 2021-07-28 DIAGNOSIS — I10 ESSENTIAL HYPERTENSION: ICD-10-CM

## 2021-07-28 DIAGNOSIS — N18.31 STAGE 3A CHRONIC KIDNEY DISEASE (HCC): ICD-10-CM

## 2021-07-28 DIAGNOSIS — G44.201 INTRACTABLE TENSION-TYPE HEADACHE, UNSPECIFIED CHRONICITY PATTERN: ICD-10-CM

## 2021-07-28 DIAGNOSIS — N95.1 FEMALE CLIMACTERIC STATE: ICD-10-CM

## 2021-07-28 DIAGNOSIS — E11.29 TYPE 2 DIABETES MELLITUS WITH MICROALBUMINURIA, WITHOUT LONG-TERM CURRENT USE OF INSULIN (HCC): Primary | ICD-10-CM

## 2021-07-28 DIAGNOSIS — Z23 NEED FOR SHINGLES VACCINE: ICD-10-CM

## 2021-07-28 DIAGNOSIS — Z23 NEED FOR VACCINATION AGAINST STREPTOCOCCUS PNEUMONIAE USING PNEUMOCOCCAL CONJUGATE VACCINE 13: ICD-10-CM

## 2021-07-28 DIAGNOSIS — F32.1 MAJOR DEPRESSIVE DISORDER, SINGLE EPISODE, MODERATE (HCC): ICD-10-CM

## 2021-07-28 DIAGNOSIS — Q01.9 MENINGOENCEPHALOCELE (HCC): ICD-10-CM

## 2021-07-28 DIAGNOSIS — R80.9 TYPE 2 DIABETES MELLITUS WITH MICROALBUMINURIA, WITHOUT LONG-TERM CURRENT USE OF INSULIN (HCC): Primary | ICD-10-CM

## 2021-07-28 DIAGNOSIS — E55.9 VITAMIN D DEFICIENCY: ICD-10-CM

## 2021-07-28 DIAGNOSIS — R11.0 NAUSEA: ICD-10-CM

## 2021-07-28 DIAGNOSIS — Z12.31 ENCOUNTER FOR SCREENING MAMMOGRAM FOR BREAST CANCER: ICD-10-CM

## 2021-07-28 DIAGNOSIS — Z00.00 ROUTINE GENERAL MEDICAL EXAMINATION AT A HEALTH CARE FACILITY: ICD-10-CM

## 2021-07-28 DIAGNOSIS — K22.70 BARRETT'S ESOPHAGUS WITHOUT DYSPLASIA: ICD-10-CM

## 2021-07-28 PROCEDURE — 90670 PCV13 VACCINE IM: CPT | Performed by: FAMILY MEDICINE

## 2021-07-28 PROCEDURE — 99212 OFFICE O/P EST SF 10 MIN: CPT

## 2021-07-28 PROCEDURE — G0402 INITIAL PREVENTIVE EXAM: HCPCS | Performed by: FAMILY MEDICINE

## 2021-07-28 PROCEDURE — 99214 OFFICE O/P EST MOD 30 MIN: CPT | Performed by: FAMILY MEDICINE

## 2021-07-28 RX ORDER — ZOSTER VACCINE RECOMBINANT, ADJUVANTED 50 MCG/0.5
0.5 KIT INTRAMUSCULAR SEE ADMIN INSTRUCTIONS
Qty: 0.5 ML | Refills: 0 | Status: SHIPPED | OUTPATIENT
Start: 2021-07-28 | End: 2021-09-29

## 2021-07-28 RX ORDER — DULOXETIN HYDROCHLORIDE 60 MG/1
60 CAPSULE, DELAYED RELEASE ORAL DAILY
Qty: 90 CAPSULE | Refills: 1 | Status: SHIPPED | OUTPATIENT
Start: 2021-07-28 | End: 2022-01-06 | Stop reason: SDUPTHER

## 2021-07-28 RX ORDER — PIOGLITAZONE HCL AND METFORMIN HCL 500; 15 MG/1; MG/1
TABLET ORAL
Qty: 180 TABLET | Refills: 1 | Status: SHIPPED | OUTPATIENT
Start: 2021-07-28 | End: 2022-04-18

## 2021-07-28 RX ORDER — PANTOPRAZOLE SODIUM 40 MG/1
40 TABLET, DELAYED RELEASE ORAL 2 TIMES DAILY
Qty: 60 TABLET | Refills: 5 | Status: SHIPPED | OUTPATIENT
Start: 2021-07-28

## 2021-07-28 RX ORDER — CHOLECALCIFEROL (VITAMIN D3) 1250 MCG
CAPSULE ORAL
Qty: 13 CAPSULE | Refills: 1 | Status: SHIPPED | OUTPATIENT
Start: 2021-07-28

## 2021-07-28 RX ORDER — ATORVASTATIN CALCIUM 20 MG/1
20 TABLET, FILM COATED ORAL DAILY
Qty: 30 TABLET | Refills: 3 | Status: SHIPPED | OUTPATIENT
Start: 2021-07-28 | End: 2022-05-05

## 2021-07-28 RX ORDER — LISINOPRIL AND HYDROCHLOROTHIAZIDE 20; 12.5 MG/1; MG/1
TABLET ORAL
Qty: 90 TABLET | Refills: 1 | Status: ON HOLD | OUTPATIENT
Start: 2021-07-28 | End: 2021-10-04 | Stop reason: HOSPADM

## 2021-07-28 ASSESSMENT — PATIENT HEALTH QUESTIONNAIRE - PHQ9
SUM OF ALL RESPONSES TO PHQ QUESTIONS 1-9: 0
1. LITTLE INTEREST OR PLEASURE IN DOING THINGS: 0
SUM OF ALL RESPONSES TO PHQ QUESTIONS 1-9: 0
SUM OF ALL RESPONSES TO PHQ QUESTIONS 1-9: 0
SUM OF ALL RESPONSES TO PHQ9 QUESTIONS 1 & 2: 0
2. FEELING DOWN, DEPRESSED OR HOPELESS: 0

## 2021-07-28 ASSESSMENT — LIFESTYLE VARIABLES: HOW OFTEN DO YOU HAVE A DRINK CONTAINING ALCOHOL: 0

## 2021-07-28 NOTE — PROGRESS NOTES
[address], [address] [place] [address] Drive                        Telephone [phone]             Fax [phone]     Balta Khan  1955  MRN:  [id number]  Date of visit:  7/28/2021    Subjective:    Balta Khan is a 72 y.o. female who presents to [facility] today (7/28/2021) for follow up/evaluation of:  Medicare AWV, Knee Pain, and Rash      She is here today for a Medicare Wellness Visit, as well as follow up of diabetes, hypertension, hyperlipidemia, headaches, and depression. She was also scheduled for a Pap test today, but she declined. She reports that she has had pain in the back of the left knee since yesterday when she attempted to change positions in her recliner. She has a history of a left femur fracture 3/6/2021. She is scheduled to see her orthopedic surgeon next week. She was seen at Urgent Care on 7/20/2021 for a rash on her back. Prednisone was prescribed. She states that her symptoms have improved, but she continues to have itching. She treated her home for bed bugs after the Urgent Care appointment. She also has concerns about nausea. She reports that she has intermittent issues with nausea. She saw Dr. Vina Bernheim on 6/28/2021. She discussed the nausea with him. She was unsatisfied with the visit. She would like to see a different gastroenterologist.  She had an EGD on 12/23/2020 that showed:   GE JUNCTION BIOPSIES:  NORMAL SQUAMOUS MUCOSA.  GASTRIC MUCOSA   WITH MILD CHRONIC INACTIVE INFLAMMATION AND PROMINENT   INTESTINAL/GOBLET CELL METAPLASIA (JONES'S ESOPHAGUS).  NEGATIVE FOR   DYSPLASIA. She has received both doses of the [employer] Covid-19 vaccine.        She has the following problem list:  Patient Active Problem List   Diagnosis    Shoulder pain, bilateral    Cervical spine pain    Knee pain, left    Essential hypertension    Pulmonary 20-12.5 MG per tablet TAKE 1 TABLET DAILY 90 tablet 1    Misc. Devices [facility]) MISC 1 Device by Does not apply route daily as needed (gait difficulty) 1 each 0    Misc. Devices (WALKER) MISC 1 each by Does not apply route daily 1 each 0    HYDROcodone-acetaminophen (NORCO) 5-325 MG per tablet Indications: Patient is taking 1 tablet a day       Cholecalciferol (VITAMIN D3) 21386 units CAPS TAKE 1 CAPSULE ONCE A WEEK 13 capsule 1    Gabapentin (NEURONTIN PO) Take 600 mg by mouth 5 times daily       Handicap Placard MISC by Does not apply route Issue parking placard for person with disability; Applicant meets the qualifying disability criteria. Length of time expected to have disability_____Lifetime  Other __x__. Prescription expires in 5 years from issuing date. 02/05/2024 1 each 0    Misc. Devices (ROLLATOR) MISC Use to reduce risk of falls when walking. 1 each 0       She is allergic to asa [aspirin]. She  reports that she has never smoked. She has never used smokeless tobacco.      Objective:    Vitals:    07/28/21 1311   BP: 124/78   Site: Right Upper Arm   Position: Sitting   Cuff Size: Large Adult   Pulse: 84   Resp: 16   Weight: (!) 304 lb 3.2 oz (138 kg)   Height: 5' 7\" (1.702 m)     Body mass index is 47.64 kg/m². Extremely obese female, alert, cooperative and in no acute distress. She is examined seated in a wheelchair. Neck supple. No adenopathy. Thyroid symmetric, normal size. Chest:  Normal expansion. Clear to auscultation. No rales, rhonchi, or wheezing. Heart sounds are normal.  Regular rate and rhythm without murmur, gallop or rub. Abdomen is obese, soft, non-distended. There is no significant tenderness to palpation of the abdomen. Lower extremities have no edema. There is no rash visualized on the back. There are excoriations on the upper back. Affect is flat. Thought processes are are logical. There is no evidence of paranoia or delusions.   Responses to questions are appropriate. Dress and grooming are appropriate. Results of labs done 7/21/2021 were reviewed with the patient:   Hospital Outpatient Visit on 07/21/2021   Component Date Value Ref Range Status    Vit D, 25-Hydroxy 07/21/2021 39.5  30.0 - 100.0 ng/mL Final    Comment:    Reference Range:  Vitamin D status         Range   Deficiency              <20 ng/mL   Mild Deficiency       20-30 ng/mL   Sufficiency           ng/mL   Toxicity               >100 ng/mL      Pth Intact 07/21/2021 46.71  15.0 - 65.0 pg/mL Final    Comment: SAMPLES FROM PATIENTS ROUTINELY RECEIVING HIGH DOSE BIOTIN THERAPY MAY SHOW FALSELY   DEPRESSED RESULTS. ADDITIONAL INFORMATION MAY BE REQUIRED FOR DIAGNOSIS.  Calcium, Ion 07/21/2021 1.23  1.13 - 1.33 mmol/L Final    Phosphorus 07/21/2021 3.3  2.6 - 4.5 mg/dL Final    Glucose 07/21/2021 127* 70 - 99 mg/dL Final    BUN 07/21/2021 19  8 - 23 mg/dL Final    CREATININE 07/21/2021 1.20* 0.50 - 0.90 mg/dL Final    Bun/Cre Ratio 07/21/2021 16  9 - 20 Final    Calcium 07/21/2021 9.5  8.6 - 10.4 mg/dL Final    Sodium 07/21/2021 140  135 - 144 mmol/L Final    Potassium 07/21/2021 4.4  3.7 - 5.3 mmol/L Final    Chloride 07/21/2021 100  98 - 107 mmol/L Final    CO2 07/21/2021 33* 20 - 31 mmol/L Final    Anion Gap 07/21/2021 7* 9 - 17 mmol/L Final    GFR Non- 07/21/2021 45* >60 mL/min Final    GFR  07/21/2021 55* >60 mL/min Final    GFR Comment 07/21/2021        Final    Comment: Average GFR for 61-76 years old:   80 mL/min/1.73sq m  Chronic Kidney Disease:   <60 mL/min/1.73sq m  Kidney failure:   <15 mL/min/1.73sq m              eGFR calculated using average adult body mass.  Additional eGFR calculator available at:        [email or web address]            GFR Staging 07/21/2021 NOT REPORTED   Final    WBC 07/21/2021 6.2  3.5 - 11.3 k/uL Final    RBC 07/21/2021 3.84* 3.95 - 5.11 m/uL Final    Hemoglobin 07/21/2021 11.1* 11.9 - 15.1 g/dL Final    Hematocrit 07/21/2021 36.2* 36.3 - 47.1 % Final    MCV 07/21/2021 94.3  82.6 - 102.9 fL Final    MCH 07/21/2021 28.9  25.2 - 33.5 pg Final    MCHC 07/21/2021 30.7  25.2 - 33.5 g/dL Final    RDW 07/21/2021 14.3  11.8 - 14.4 % Final    Platelets 02/62/4943 226  138 - 453 k/uL Final    MPV 07/21/2021 10.9  8.1 - 13.5 fL Final    NRBC Automated 07/21/2021 0.0  0.0 per 100 WBC Final    Differential Type 07/21/2021 NOT REPORTED   Final    Seg Neutrophils 07/21/2021 64  36 - 65 % Final    Lymphocytes 07/21/2021 25  24 - 43 % Final    Monocytes 07/21/2021 8  3 - 12 % Final    Eosinophils % 07/21/2021 2  1 - 4 % Final    Basophils 07/21/2021 1  0 - 2 % Final    Immature Granulocytes 07/21/2021 0  0 % Final    Segs Absolute 07/21/2021 3.94  1.50 - 8.10 k/uL Final    Absolute Lymph # 07/21/2021 1.53  1.10 - 3.70 k/uL Final    Absolute Mono # 07/21/2021 0.49  0.10 - 1.20 k/uL Final    Absolute Eos # 07/21/2021 0.13  0.00 - 0.44 k/uL Final    Basophils Absolute 07/21/2021 0.04  0.00 - 0.20 k/uL Final    Absolute Immature Granulocyte 07/21/2021 <0.03  0.00 - 0.30 k/uL Final    WBC Morphology 07/21/2021 NOT REPORTED   Final    RBC Morphology 07/21/2021 NOT REPORTED   Final    Platelet Estimate 35/89/6312 NOT REPORTED   Final   Hospital Outpatient Visit on 07/21/2021   Component Date Value Ref Range Status    Hemoglobin A1C 07/21/2021 6.6* 4.0 - 6.0 % Final    Estimated Avg Glucose 07/21/2021 143  mg/dL Final    Comment: The ADA and AACC recommend providing the estimated average glucose result to permit better   patient understanding of their HBA1c result.       Cholesterol 07/21/2021 183  <200 mg/dL Final    Comment:    Cholesterol Guidelines:      <200  Desirable   200-240  Borderline      >240  Undesirable         HDL 07/21/2021 49  >40 mg/dL Final    Comment:    HDL Guidelines:    <40     Undesirable   40-59    Borderline    >59     Desirable         LDL Cholesterol 07/21/2021 116  0 - 130 mg/dL Final    Comment:    LDL Guidelines:     <100    Desirable   100-129   Near to/above Desirable   130-159   Borderline      >159   Undesirable     Direct (measured) LDL and calculated LDL are not interchangeable tests.  Chol/HDL Ratio 07/21/2021 3.7  <5 Final            Triglycerides 07/21/2021 92  <150 mg/dL Final    Comment:    Triglyceride Guidelines:     <150   Desirable   150-199  Borderline   200-499  High     >499   Very high   Based on AHA Guidelines for fasting triglyceride, October 2012.  VLDL 07/21/2021 NOT REPORTED  1 - 30 mg/dL Final    Glucose 07/21/2021 127* 70 - 99 mg/dL Final    BUN 07/21/2021 19  8 - 23 mg/dL Final    CREATININE 07/21/2021 1.20* 0.50 - 0.90 mg/dL Final    Bun/Cre Ratio 07/21/2021 16  9 - 20 Final    Calcium 07/21/2021 9.5  8.6 - 10.4 mg/dL Final    Sodium 07/21/2021 140  135 - 144 mmol/L Final    Potassium 07/21/2021 4.4  3.7 - 5.3 mmol/L Final    Chloride 07/21/2021 100  98 - 107 mmol/L Final    CO2 07/21/2021 33* 20 - 31 mmol/L Final    Anion Gap 07/21/2021 7* 9 - 17 mmol/L Final    Alkaline Phosphatase 07/21/2021 168* 35 - 104 U/L Final    ALT 07/21/2021 6  5 - 33 U/L Final    AST 07/21/2021 12  <32 U/L Final    Total Bilirubin 07/21/2021 0.41  0.3 - 1.2 mg/dL Final    Total Protein 07/21/2021 7.4  6.4 - 8.3 g/dL Final    Albumin 07/21/2021 3.9  3.5 - 5.2 g/dL Final    Albumin/Globulin Ratio 07/21/2021 1.1  1.0 - 2.5 Final    GFR Non- 07/21/2021 45* >60 mL/min Final    GFR  07/21/2021 55* >60 mL/min Final    GFR Comment 07/21/2021        Final    Comment: Average GFR for 61-76 years old:   80 mL/min/1.73sq m  Chronic Kidney Disease:   <60 mL/min/1.73sq m  Kidney failure:   <15 mL/min/1.73sq m              eGFR calculated using average adult body mass.  Additional eGFR calculator available at:        [email or web address]            GFR Staging 07/21/2021 NOT worsening on her recent lab work. I recommended beginning Lipitor 20 mg daily:  - atorvastatin (LIPITOR) 20 MG tablet; Take 1 tablet by mouth daily  Dispense: 30 tablet; Refill: 3    - Lipid Panel; Future was ordered to be done in 2 months. - Lipid Panel; Future was also ordered to be done prior to her return visit in 6 months. 5. Intractable tension-type headache, unspecified chronicity pattern  6. Major depressive disorder, single episode, moderate (HCC)  Cymbalta was refilled:  - DULoxetine (CYMBALTA) 60 MG extended release capsule; Take 1 capsule by mouth daily  Dispense: 90 capsule; Refill: 1    7. Vitamin D deficiency  Her 25-hydroxy Vitamin D level was within normal limits (39.5 ng/mL) on  her recent lab work. She was advised to continue with her current dose of Vitamin D. Vitamin D was refilled:  - Cholecalciferol (VITAMIN D3) 1.25 MG (86215 UT) CAPS; TAKE 1 CAPSULE ONCE A WEEK  Dispense: 13 capsule; Refill: 1    - Vitamin D 25 Hydroxy prior to her return visit in 6 months. 8. Stage 3a chronic kidney disease (HCC)  Creatinine was increased (1.2 mg/dL) on her recent labs. - Comprehensive Metabolic Panel; Future prior to her return visit in 6 months. 9. Nausea  She prefers not to return to see Dr. Marilee Hairston. She was referred to Dr. Dakota Riggs:  - Kimber Narayan DO, Gastroenterology, [facility]. 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 11. Left knee pain  She has an appointment scheduled with her orthopedic surgeon next week. 12.  Routine health maintenance  Health maintenance was reviewed with the patient. She has received both doses of the Rick Peter Covid-19 vaccine.    Screening mammogram was recommended and ordered. DEXA scan was recommended and ordered. Prevnar-13 was recommended and ordered. Shingrix was recommended, and a printed prescription for Shingrix was given to the patient. Cervical cancer screening was recommended and declined. All other health maintenance, including Tdap and Pneumovax, is up to date at this time.      (Please note that portions of this note were completed with a voice-recognition program. Efforts were made to edit the dictation but occasionally words are mis-transcribed.)

## 2021-07-28 NOTE — PATIENT INSTRUCTIONS
Personalized Preventive Plan for Natasha Zaidi - 7/28/2021  Medicare offers a range of preventive health benefits. Some of the tests and screenings are paid in full while other may be subject to a deductible, co-insurance, and/or copay. Some of these benefits include a comprehensive review of your medical history including lifestyle, illnesses that may run in your family, and various assessments and screenings as appropriate. After reviewing your medical record and screening and assessments performed today your provider may have ordered immunizations, labs, imaging, and/or referrals for you. A list of these orders (if applicable) as well as your Preventive Care list are included within your After Visit Summary for your review. Other Preventive Recommendations:    · A preventive eye exam performed by an eye specialist is recommended every 1-2 years to screen for glaucoma; cataracts, macular degeneration, and other eye disorders. · A preventive dental visit is recommended every 6 months. · Try to get at least 150 minutes of exercise per week or 10,000 steps per day on a pedometer . · Order or download the FREE \"Exercise & Physical Activity: Your Everyday Guide\" from [employer]. Call 7-676.121.6295 or search [employer] online. · You need 1825-5505 mg of calcium and 5226-1741 IU of vitamin D per day. It is possible to meet your calcium requirement with diet alone, but a vitamin D supplement is usually necessary to meet this goal.  · When exposed to the sun, use a sunscreen that protects against both UVA and UVB radiation with an SPF of 30 or greater. Reapply every 2 to 3 hours or after sweating, drying off with a towel, or swimming. · Always wear a seat belt when traveling in a car. Always wear a helmet when riding a bicycle or motorcycle. Heart-Healthy Diet   Sodium, Fat, and Cholesterol Controlled Diet       What Is a Heart Healthy Diet?    A heart-healthy cholesterol, this type of cholesterol actually carries cholesterol away from your arteries and may, therefore, help lower your risk of having a heart attack. You want this level to be high (ideally greater than 60). It is a risk to have a level less than 40. You can raise this good cholesterol by eating olive oil, canola oil, avocados, or nuts. Exercise raises this level, too. Fat    Fat is calorie dense and packs a lot of calories into a small amount of food. Even though fats should be limited due to their high calorie content, not all fats are bad. In fact, some fats are quite healthful. Fat can be broken down into four main types. The good-for-you fats are:   Monounsaturated fat  found in oils such as olive and canola, avocados, and nuts and natural nut butters; can decrease cholesterol levels, while keeping levels of HDL cholesterol high   Polyunsaturated fat  found in oils such as safflower, sunflower, soybean, corn, and sesame; can decrease total cholesterol and LDL cholesterol   Omega-3 fatty acids  particularly those found in fatty fish (such as salmon, trout, tuna, mackerel, herring, and sardines); can decrease risk of arrhythmias, decrease triglyceride levels, and slightly lower blood pressure   The fats that you want to limit are:   Saturated fat  found in animal products, many fast foods, and a few vegetables; increases total blood cholesterol, including LDL levels   Animal fats that are saturated include: butter, lard, whole-milk dairy products, meat fat, and poultry skin   Vegetable fats that are saturated include: hydrogenated shortening, palm oil, coconut oil, cocoa butter   Hydrogenated or trans fat  found in margarine and vegetable shortening, most shelf stable snack foods, and fried foods; increases LDL and decreases HDL     It is generally recommended that you limit your total fat for the day to less than 30% of your total calories.  If you follow an 1800-calorie heart healthy diet, for example, this would mean 60 grams of fat or less per day. Saturated fat and trans fat in your diet raises your blood cholesterol the most, much more than dietary cholesterol does. For this reason, on a heart-healthy diet, less than 7% of your calories should come from saturated fat and ideally 0% from trans fat. On an 1800-calorie diet, this translates into less than 14 grams of saturated fat per day, leaving 46 grams of fat to come from mono- and polyunsaturated fats.    Food Choices on a Heart Healthy Diet   Food Category   Foods Recommended   Foods to Avoid   Grains   Breads and rolls without salted tops Most dry and cooked cereals Unsalted crackers and breadsticks Low-sodium or homemade breadcrumbs or stuffing All rice and pastas   Breads, rolls, and crackers with salted tops High-fat baked goods (eg, muffins, donuts, pastries) Quick breads, self-rising flour, and biscuit mixes Regular bread crumbs Instant hot cereals Commercially prepared rice, pasta, or stuffing mixes   Vegetables   Most fresh, frozen, and low-sodium canned vegetables Low-sodium and salt-free vegetable juices Canned vegetables if unsalted or rinsed   Regular canned vegetables and juices, including sauerkraut and pickled vegetables Frozen vegetables with sauces Commercially prepared potato and vegetable mixes   Fruits   Most fresh, frozen, and canned fruits All fruit juices   Fruits processed with salt or sodium   Milk   Nonfat or low-fat (1%) milk Nonfat or low-fat yogurt Cottage cheese, low-fat ricotta, cheeses labeled as low-fat and low-sodium   Whole milk Reduced-fat (2%) milk Malted and chocolate milk Full fat yogurt Most cheeses (unless low-fat and low salt) Buttermilk (no more than 1 cup per week)   Meats and Beans   Lean cuts of fresh or frozen beef, veal, lamb, or pork (look for the word loin) Fresh or frozen poultry without the skin Fresh or frozen fish and some shellfish Egg whites and egg substitutes (Limit whole eggs to three per week) Tofu Nuts or seeds (unsalted, dry-roasted), low-sodium peanut butter Dried peas, beans, and lentils   Any smoked, cured, salted, or canned meat, fish, or poultry (including ovalles, chipped beef, cold cuts, hot dogs, sausages, sardines, and anchovies) Poultry skins Breaded and/or fried fish or meats Canned peas, beans, and lentils Salted nuts   Fats and Oils   Olive oil and canola oil Low-sodium, low-fat salad dressings and mayonnaise   Butter, margarine, coconut and palm oils, ovalles fat   Snacks, Sweets, and Condiments   Low-sodium or unsalted versions of broths, soups, soy sauce, and condiments Pepper, herbs, and spices; vinegar, lemon, or lime juice Low-fat frozen desserts (yogurt, sherbet, fruit bars) Sugar, cocoa powder, honey, syrup, jam, and preserves Low-fat, trans-fat free cookies, cakes, and pies River and animal crackers, fig bars, timothy snaps   High-fat desserts Broth, soups, gravies, and sauces, made from instant mixes or other high-sodium ingredients Salted snack foods Canned olives Meat tenderizers, seasoning salt, and most flavored vinegars   Beverages   Low-sodium carbonated beverages Tea and coffee in moderation Soy milk   Commercially softened water   Suggestions   Make whole grains, fruits, and vegetables the base of your diet. Choose heart-healthy fats such as canola, olive, and flaxseed oil, and foods high in heart-healthy fats, such as nuts, seeds, soybeans, tofu, and fish. Eat fish at least twice per week; the fish highest in omega-3 fatty acids and lowest in mercury include salmon, herring, mackerel, sardines, and canned chunk light tuna. If you eat fish less than twice per week or have high triglycerides, talk to your doctor about taking fish oil supplements. Read food labels.    For products low in fat and cholesterol, look for fat free, low-fat, cholesterol free, saturated fat free, and trans fat freeAlso scan the Nutrition Facts Label, which lists saturated fat, trans fat, and cholesterol amounts. For products low in sodium, look for sodium free, very low sodium, low sodium, no added salt, and unsalted   Skip the salt when cooking or at the table; if food needs more flavor, get creative and try out different herbs and spices. Garlic and onion also add substantial flavor to foods. Trim any visible fat off meat and poultry before cooking, and drain the fat off after howe. Use cooking methods that require little or no added fat, such as grilling, boiling, baking, poaching, broiling, roasting, steaming, stir-frying, and sauting. Avoid fast food and convenience food. They tend to be high in saturated and trans fat and have a lot of added salt. Talk to a registered dietitian for individualized diet advice. Last Reviewed: March 2011 Tania Gordon MS, MPH, RD   Updated: 3/29/2011   ·     Keeping Home a [facility]       As we get older, changes in balance, gait, strength, vision, hearing, and cognition make even the most youthful senior more prone to accidents. Falls are one of the leading health risks for older people. This increased risk of falling is related to:   Aging process (eg, decreased muscle strength, slowed reflexes)   Higher incidence of chronic health problems (eg, arthritis, diabetes) that may limit mobility, agility or sensory awareness   Side effects of medicine (eg, dizziness, blurred vision)especially medicines like prescription pain medicines and drugs used to treat mental health conditions   Depending on the brittleness of your bones, the consequences of a fall can be serious and long lasting. Home Life   Research by the Association of Aging [facility]) shows that some home accidents among older adults can be prevented by making simple lifestyle changes and basic modifications and repairs to the home environment. Here are some lifestyle changes that experts recommend:   Have your hearing and vision checked regularly.  Be sure to wear prescription glasses that are right for you. Speak to your doctor or pharmacist about the possible side effects of your medicines. A number of medicines can cause dizziness. If you have problems with sleep, talk to your doctor. Limit your intake of alcohol. If necessary, use a cane or walker to help maintain your balance. Wear supportive, rubber-soled shoes, even at home. If you live in a region that gets wintry weather, you may want to put special cleats on your shoes to prevent you from slipping on the snow and ice. Exercise regularly to help maintain muscle tone, agility, and balance. Always hold the banister when going up or down stairs. Also, use  bars when getting in or out of the bath or shower, or using the toilet. To avoid dizziness, get up slowly from a lying down position. Sit up first, dangling your legs for a minute or two before rising to a standing position. Overall Home Safety Check   According to the Consumer Product Safety Commision's \"Older Consumer Home Safety Checklist,\" it is important to check for potential hazards in each room. And remember, proper lighting is an essential factor in home safety. If you cannot see clearly, you are more likely to fall. Important questions to ask yourself include:   Are lamp, electric, extension, and telephone cords placed out of the flow of traffic and maintained in good condition? Have frayed cords been replaced? Are all small rugs and runners slip resistant? If not, you can secure them to the floor with a special double-sided carpet tape. Are smoke detectors properly locatedone on every floor of your home and one outside of every sleeping area? Are they in good working order? Are batteries replaced at least once a year? Do you have a well-maintained carbon monoxide detector outside every sleeping are in your home? Does your furniture layout leave plenty of space to maneuver between and around chairs, tables, beds, and sofas?    Are hallways, stairs and passages between rooms well lit? Can you reach a lamp without getting out of bed? Are floor surfaces well maintained? Shag rugs, high-pile carpeting, tile floors, and polished wood floors can be particularly slippery. Stairs should always have handrails and be carpeted or fitted with a non-skid tread. Is your telephone easily reachable. Is the cord safely tucked away? Room by Room   According to the Association of Aging, bathrooms and emanuel are the two most potentially hazardous rooms in your home. In the Kitchen    Be sure your stove is in proper working order and always make sure burners and the oven are off before you go out or go to sleep. Keep pots on the back burners, turn handles away from the front of the stove, and keep stove clean and free of grease build-up. Kitchen ventilation systems and range exhausts should be working properly. Keep flammable objects such as towels and pot holders away from the cooking area except when in use. Make sure kitchen curtains are tied back. Move cords and appliances away from the sink and hot surfaces. If extension cords are needed, install wiring guides so they do not hang over the sink, range, or working areas. Look for coffee pots, kettles and toaster ovens with automatic shut-offs. Keep a mop handy in the kitchen so you can wipe up spills instantly. You should also have a small fire extinguisher. Arrange your kitchen with frequently used items on lower shelves to avoid the need to stand on a stepstool to reach them. Make sure countertops are well-lit to avoid injuries while cutting and preparing food. In the Bathroom    Use a non-slip mat or decals in the tub and shower, since wet, soapy tile or porcelain surfaces are extremely slippery. Make sure bathroom rugs are non-skid or tape them firmly to the floor. Bathtubs should have at least one, preferably two, grab bars, firmly attached to structural supports in the wall.  (Do not use built-in soap holders or glass shower doors as grab bars.)    Tub seats fitted with non-slip material on the legs allow you to wash sitting down. For people with limited mobility, bathtub transfer benches allow you to slide safely into the tub. Raised toilet seats and toilet safety rails are helpful for those with knee or hip problems. In the [facility]    Make sure you use a nightlight and that the area around your bed is clear of potential obstacles. Be careful with electric blankets and never go to sleep with a heating pad, which can cause serious burns even if on a low setting. Use fire-resistant mattress covers and pillows, and NEVER smoke in bed. Keep a phone next to the bed that is programmed to dial 911 at the push of a button. If you have a chronic condition, you may want to sign on with an automatic call-in service. Typically the system includes a small pendant that connects directly to an emergency medical voice-response system. You should also make arrangements to stay in contact with someonefriend, neighbor, family memberon a regular schedule. Fire Prevention   According to the [employer]. (Smoke Alarms for Every) [address] [place] [address], senior citizens are one of the two highest risk groups for death and serious injuries due to residential fires. When cooking, wear short-sleeved items, never a bulky long-sleeved robe. The [facility]'s Safety Checklist for Older Consumers emphasizes the importance of checking basements, garages, workshops and storage areas for fire hazards, such as volatile liquids, piles of old rags or clothing and overloaded circuits. Never smoke in bed or when lying down on a couch or recliner chair. Small portable electric or kerosene heaters are responsible for many home fires and should be used cautiously if at all. If you do use one, be sure to keep them away from flammable materials.     In case of fire, make sure you have a pre-established emergency exit plan. Have a professional check your fireplace and other fuel-burning appliances yearly. [employer]   Baby boomers entering the fry years will continue to see the development of new products to help older adults live safely and independently in spite of age-related changes. Making Life More Livable  , by [employer], lists over 1,000 products for \"living well in the mature years,\" such as bathing and mobility aids, household security devices, ergonomically designed knives and peelers, and faucet valves and knobs for temperature control. Medical supply stores and organizations are good sources of information about products that improve your quality of life and insure your safety. Last Reviewed: November 2009 Moiz Lopez MD   Updated: 3/7/2011     ·        Learning About Clem Pizarro  What is a living will? A living will, also called a declaration, is a legal form. It tells your family and your doctor your wishes when you can't speak for yourself. It's used by the health professionals who will treat you as you near the end of your life or if you get seriously hurt or ill. If you put your wishes in writing, your loved ones and others will know what kind of care you want. They won't need to guess. This can ease your mind and be helpful to others. And you can change or cancel your living will at any time. A living will is not the same as an estate or property will. An estate will explains what you want to happen with your money and property after you die. How do you use it? A living will is used to describe the kinds of treatment or life support you want as you near the end of your life or if you get seriously hurt or ill. Keep these facts in mind about living jordan. Your living will is used only if you can't speak or make decisions for yourself. Most often, one or more doctors must certify that you can't speak or decide for yourself before your living will takes effect.   If you get better and can speak for yourself again, you can accept or refuse any treatment. It doesn't matter what you said in your living will. Some states may limit your right to refuse treatment in certain cases. For example, you may need to clearly state in your living will that you don't want artificial hydration and nutrition, such as being fed through a tube. Is a living will a legal document? A living will is a legal document. Each state has its own laws about living jordan. And a living will may be called something else in your state. Here are some things to know about living jordan. You don't need an  to complete a living will. But legal advice can be helpful if your state's laws are unclear. It can also help if your health history is complicated or your family can't agree on what should be in your living will. You can change your living will at any time. Some people find that their wishes about end-of-life care change as their health changes. If you make big changes to your living will, complete a new form. If you move to another state, make sure that your living will is legal in the state where you now live. In most cases, doctors will respect your wishes even if you have a form from a different state. You might use a universal form that has been approved by many states. This kind of form can sometimes be filled out and stored online. Your digital copy will then be available wherever you have a connection to the internet. The doctors and nurses who need to treat you can find it right away. Your state may offer an online registry. This is another place where you can store your living will online. It's a good idea to get your living will notarized. This means using a person called a  to watch two people sign, or witness, your living will. What should you know when you create a living will?   Here are some questions to ask yourself as you make your living will:  Do you know enough about life support methods that might be used? If not, talk to your doctor so you know what might be done if you can't breathe on your own, your heart stops, or you can't swallow. What things would you still want to be able to do after you receive life-support methods? Would you want to be able to walk? To speak? To eat on your own? To live without the help of machines? Do you want certain Amish practices performed if you become very ill? If you have a choice, where do you want to be cared for? In your home? At a hospital or nursing home? If you have a choice at the end of your life, where would you prefer to die? At home? In a hospital or nursing home? Somewhere else? Would you prefer to be buried or cremated? Do you want your organs to be donated after you die? What should you do with your living will? Make sure that your family members and your health care agent have copies of your living will (also called a declaration). Give your doctor a copy of your living will. Ask him or her to keep it as part of your medical record. If you have more than one doctor, make sure that each one has a copy. Put a copy of your living will where it can be easily found. For example, some people may put a copy on their refrigerator door. If you are using a digital copy, be sure your doctor, family members, and health care agent know how to find and access it. Where can you learn more? Go to [email or web address] and sign in to your [employer] account. Enter T203 in the [facility] box to learn more about \"Learning About Living Perroalejandro. \"     If you do not have an account, please click on the \"Sign Up Now\" link. Current as of: March 17, 2021               Content Version: 12.9  © 4586-9157 Healthwise, Incorporated. Care instructions adapted under license by [facility] [place] ([facility]).  If you have questions about a medical condition or this instruction, always ask your healthcare professional. [employer] disclaims any warranty or liability for your use of this information. Patient Education        Learning About Being Physically Active  What is physical activity? Being physically active means doing any kind of activity that gets your body moving. The types of physical activity that can help you get fit and stay healthy include:  Aerobic or \"cardio\" activities. These make your heart beat faster and make you breathe harder, such as brisk walking, riding a bike, or running. They strengthen your heart and lungs and build up your endurance. Strength training activities. These make your muscles work against, or \"resist,\" something. Examples include lifting weights or doing push-ups. These activities help tone and strengthen your muscles and bones. Stretches. These let you move your joints and muscles through their full range of motion. Stretching helps you be more flexible. What are the benefits of being active? Being active is one of the best things you can do for your health. It helps you to:  Feel stronger and have more energy to do all the things you like to do. Focus better at school or work. Feel, think, and sleep better. Reach and stay at a healthy weight. Lose fat and build lean muscle. Lower your risk for serious health problems, including diabetes, heart attack, high blood pressure, and some cancers. Keep your heart, lungs, bones, muscles, and joints strong and healthy. How can you make being active part of your life? Start slowly. Make it your long-term goal to get at least 30 minutes of exercise on most days of the week. Walking is a good choice. You also may want to do other activities, such as running, swimming, cycling, or playing tennis or team sports. Pick activities that you likeones that make your heart beat faster, your muscles stronger, and your muscles and joints more flexible. If you find more than one thing you like doing, do them all.  You don't have to do the same thing every day. Get your heart pumping every day. Any activity that makes your heart beat faster and keeps it at that rate for a while counts. Here are some great ways to get your heart beating faster:  Go for a brisk walk, run, or bike ride. Go for a hike or swim. Go in-line skating. Play a game of touch football, basketball, or soccer. Ride a bike. Play tennis or racquetball. Climb stairs. Even some household chores can be aerobicjust do them at a faster pace. Vacuuming, raking or mowing the lawn, sweeping the garage, and washing and waxing the car all can help get your heart rate up. Strengthen your muscles during the week. You don't have to lift heavy weights or grow big, bulky muscles to get stronger. Doing a few simple activities that make your muscles work against, or \"resist,\" something can help you get stronger. For example, you can:  Do push-ups or sit-ups, which use your own body weight as resistance. Lift weights or dumbbells or use stretch bands at home or in a gym or community center. Stretch your muscles often. Stretching will help you as you become more active. It can help you stay flexible, loosen tight muscles, and avoid injury. It can also help improve your balance and posture and can be a great way to relax. Be sure to stretch the muscles you'll be using when you work out. It's best to warm your muscles slightly before you stretch them. Walk or do some other light aerobic activity for a few minutes, and then start stretching. When you stretch your muscles:  Do it slowly. Stretching is not about going fast or making sudden movements. Don't push or bounce during a stretch. Hold each stretch for at least 15 to 30 seconds, if you can. You should feel a stretch in the muscle, but not pain. Breathe out as you do the stretch. Then breathe in as you hold the stretch. Don't hold your breath.   If you're worried about how more activity might affect your health, have a checkup before you start. Follow any special advice your doctor gives you for getting a smart start. Where can you learn more? Go to https://chpepiceweb.Voucherlink. org and sign in to your [employer] account. Enter X513 in the [employer] box to learn more about \"Learning About Being Physically Active. \"     If you do not have an account, please click on the \"Sign Up Now\" link. Current as of: September 10, 2020               Content Version: 12.9  © 2006-2021 [employer]. Care instructions adapted under license by [facility] ([facility]). If you have questions about a medical condition or this instruction, always ask your healthcare professional. [address] any warranty or liability for your use of this information. Patient Education        Learning About How to Make a Home Safe  Making your home safe: Overview  You can help protect the person in your care by making the home safe. Here are some general tips for how to lower the chance of getting injured in the home. Pad sharp corners on furniture and counter tops. Keep objects that are used often within easy reach. Install handrails around the toilet and in the shower. Use a tub mat to prevent slipping. Use a shower chair or bath bench when the person bathes. Provide good lighting inside and outside the home. Put night-lights in bedrooms, hallways, and bathrooms. Have light at the top and bottom of stairways. Have a first aid kit. It is also important to be aware of safe temperatures in the home. When helping someone bathe, use the back of your hand to test the water to make sure it's not too hot. Lower the temperature setting in the hot water heater to 120°F or lower to avoid burns. And make sure other liquids (such as coffee, tea, or soup) are not too hot. How can you protect from fire and carbon monoxide? Home safety alarms are very important. A smoke alarm can detect small amounts of smoke.  This can allow time to escape from a fire. And a carbon monoxide alarm can let people know about this deadly gas before it starts to make them sick. Put smoke alarms: On each level of the home, in the hallway outside sleeping areas, and inside each bedroom. In the center of a ceiling, or on a wall 6 to 12 inches from the ceiling. This is where smoke goes first. Avoid places near doors, windows, or air ducts. Put a carbon monoxide alarm in the hallway outside of the bedrooms in each sleeping area of the house. The alarm should be placed high on the wall. Make sure that the alarm can't be covered up by furniture or drapes. Test alarms regularly by pressing the test button. Replace non-lithium batteries in alarms twice a year. Have a plan for getting out of the home if there is a fire. Practice by having a fire drill. Keep a fire extinguisher in the kitchen. What can you do to prevent falls? You can help prevent falls by keeping rooms uncluttered, with clear walkways around furniture. Keep electrical cords off the floor, and remove throw rugs to prevent tripping. If there are steps in the home, make sure they all have handrails, and always use the handrails. Don't leave items on the steps, and be sure to fix any that are loose, broken, or uneven. How can you increase safety for people with dementia? If you are caring for someone who has dementia, you may need to make some extra changes to create a safe home. People with dementia have a loss of mental skills, such as memory, problem solving, and learning. So things that might not have been a danger to them before can cause safety problems now. Here are some things to consider:  Don't move furniture around. The person may become confused. Use locks on doors and cupboards. Lock up knives, scissors, medicines, cleaning supplies, and other dangerous items. Use hidden switches or controls for the stove, thermostat, water heater, and other appliances.   If your loved one is still cooking, think about whether that is safe. It may be okay with some help, depending on your loved one's condition. But for people who have memory or thinking problems, it's best to avoid any activities that might not be safe. If the person tends to wander or to try to leave the home, install motion-sensor lights on all doors and windows. Have emergency numbers in a central area near a phone. Include 911 and numbers for the doctor and family members. Get medical alert jewelry for the person so you can be contacted if he or she wanders away. If possible, provide a safe place for wandering, such as an enclosed yard or garden. Where can you learn more? Go to https://[employer] Network.Delphi. org and sign in to your [employer] account. Enter C542 in the [employer] box to learn more about \"Learning About How to Make a Home Safe. \"     If you do not have an account, please click on the \"Sign Up Now\" link. Current as of: March 17, 2021               Content Version: 12.9  © 9165-2590 Healthwise, Incorporated. Care instructions adapted under license by [facility] ([facility]). If you have questions about a medical condition or this instruction, always ask your healthcare professional. [address] any warranty or liability for your use of this information. Your blood type is O POSITIVE.

## 2021-07-28 NOTE — PROGRESS NOTES
Medicare Annual Wellness Visit  Name: Lewis Sears Date: 2021   MRN: [id number] Sex: Female   Age: 72 y.o. Ethnicity: Non- / Non    : 1955 Race: White (non-)      Aarti Metz is here for Medicare AWV, Knee Pain, and Rash    Screenings for behavioral, psychosocial and functional/safety risks, and cognitive dysfunction are all negative except as indicated below. These results, as well as other patient data from the [address] form, are documented in Flowsheets linked to this Encounter. Allergies   Allergen Reactions    Asa [Aspirin] Other (See Comments)     Stomach irritation       Prior to Visit Medications    Medication Sig Taking? Authorizing Provider   zoster recombinant adjuvanted vaccine [facility]) 50 MCG/0.5ML SUSR injection Inject 0.5 mLs into the muscle See Admin Instructions 1 dose now and repeat in 2-6 months Yes Harsh Field MD   pantoprazole (PROTONIX) 40 MG tablet Take 1 tablet by mouth 2 times daily take 1 tablet by mouth daily Yes Harsh Field MD   DULoxetine (CYMBALTA) 60 MG extended release capsule Take 1 capsule by mouth daily Yes Wallace Fox MD   pioglitazone-metFORMIN (ACTOPLUS MET)  MG per tablet TAKE 1 TABLET TWICE A DAY WITH MEALS Yes Harsh Field MD   lisinopril-hydroCHLOROthiazide (PRINZIDE;ZESTORETIC) 20-12.5 MG per tablet TAKE 1 TABLET DAILY Yes Harsh Field MD   Misc. Devices [facility]) MISC 1 Device by Does not apply route daily as needed (gait difficulty) Yes Wallace Fox MD   Misc.  Devices (WALKER) MISC 1 each by Does not apply route daily Yes Wallace Fox MD   HYDROcodone-acetaminophen ([address]) 5-325 MG per tablet Indications: Patient is taking 1 tablet a day  Yes Historical Provider, MD   Cholecalciferol (VITAMIN D3) 14245 units CAPS TAKE 1 CAPSULE ONCE A WEEK Yes Harsh Field MD   Gabapentin (NEURONTIN PO) Take 600 mg by mouth 5 times daily  Yes Historical Provider, MD   Handicap Placard MISC by Does not apply route Issue parking placard for person with disability; Applicant meets the qualifying disability criteria. Length of time expected to have disability_____Lifetime  Other __x__. Prescription expires in 5 years from issuing date. 02/05/2024 Yes Della Edwards MD   Misc. Devices (ROLLATOR) MISC Use to reduce risk of falls when walking.  Yes Della Edwards MD   lubiprostone (AMITIZA) 24 MCG capsule Take 1 capsule by mouth 2 times daily (with meals)  Patient not taking: Reported on 7/28/2021  Estela Dutton MD   Methylnaltrexone Bromide (RELISTOR) 150 MG TABS Take 450 mg by mouth daily  Patient not taking: Reported on 7/28/2021  Estela Dutton MD   fluticasone (FLONASE) 50 MCG/ACT nasal spray 1 spray by Nasal route daily  Patient not taking: Reported on 7/28/2021  Della Edwards MD   oxybutynin (DITROPAN-XL) 10 MG extended release tablet Take 10 mg by mouth daily  Patient not taking: Reported on 7/28/2021  Historical Provider, MD       Past Medical History:   Diagnosis Date    Cervical spine pain 08/21/2013    Chronic headaches     Chronic sinusitis     Closed fracture of distal end of left femur with routine healing 03/2021    Diabetes mellitus ([facility]     Dizziness     H/O fall     Hypertension     Knee pain, left 08/21/2013    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    Obesity     Peripheral neuropathy     Pulmonary hypertension ([facility] 01/01/2012    by echocardiogram    Shoulder pain, bilateral 08/21/2013    Type 2 diabetes mellitus ([facility]     Unspecified essential hypertension     Essential hypertension    Vitamin D deficiency 11/05/2014       Past Surgical History:   Procedure Laterality Date    APPENDECTOMY      BACK INJECTION  02/25/2021    [facility] Right lumbar medial branch block using Fluroscopy    BRAIN SURGERY  05/24/2016    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 Jordan Li 05/03/2012    diverticular disease    COLONOSCOPY N/A 12/23/2020    COLONOSCOPY WITH BIOPSY performed by Genesis Malin MD at [address] [facility] 1 hyperplastic polyp, random biopsies negative    DENTAL SURGERY  08/2015    full dental extraction    FEMUR CLOSED REDUCTION Left 03/05/2020    [address]  05/28/2016    left-sided temporal craniotomy wound reexploration with removal of small retained foreign body (plastic from Ruth clip from surgery 3 days prior), [facility], Dr. Anna Alvarado  09/06/2005    supracervical hysterectomy bilateral salpingo oophectomy    KNEE ARTHROSCOPY Left     NERVE BLOCK  09/09/2019    right side L2 through L5 block nerve,facet joint,lumbar,medial branch per Dr Usama Andrew at . [facility]  2011    endoscopic mucosal resection of duodenal polyp    POLYSOMNOGRAPHY  11/30/2020    no significant sleep apnea    SHOULDER ARTHROPLASTY Right 10/18/2018    Harsh Jung MD; done at [facility] ARTHROSCOPY Right 03/07/2019    revision arthroscopy with debridement,revision mini-open rotator cuff repair per Dr Lis Baum at [address]  05/27/2020    gastric ulcer, small hiatal hernia[facility]    EGD BIOPSY performed by Genesis Malin MD at [address] [facility] Elizalde's esophagus       Family History   Problem Relation Age of Onset    Cancer Mother     Hypertension Father     Diabetes Father     Cancer Father        CareTeam (Including outside providers/suppliers regularly involved in providing care):   Patient Care Team:  Merlin Kil, MD as PCP - General (Family Medicine)  Merlin Kil, MD as PCP - [address] [place] [address] [facility]  Lizeth Mercer MD as Anesthesiologist (Anesthesiology)  Usama Andrew (Physical Medicine and Rehab)  Is Andrew Sierra MD as Consulting Physician (Gastroenterology)    Wt Readings from Last 3 Encounters:   07/28/21 (!) 304 lb 3.2 oz (138 kg)   07/20/21 (!) 302 lb (137 kg)   06/28/21 (!) 302 lb (137 kg)     Vitals:    07/28/21 1311   BP: 124/78   Site: Right Upper Arm   Position: Sitting   Cuff Size: Large Adult   Pulse: 84   Resp: 16   Weight: (!) 304 lb 3.2 oz (138 kg)   Height: 5' 7\" (1.702 m)     Body mass index is 47.64 kg/m². Based upon direct observation of the patient, evaluation of cognition reveals recent and remote memory intact. Patient's complete Health Risk Assessment and screening values have been reviewed and are found in Flowsheets. The following problems were reviewed today and where indicated follow up appointments were made and/or referrals ordered. Positive Risk Factor Screenings with Interventions:     Fall Risk:  Timed Up and Go Test > 12 seconds? (Complete if either Fall Risk answers are Yes): (!) yes (arrived in a wheelchair)  2 or more falls in past year?: no  Fall with injury in past year?: (!) yes (fx left femur 03/05/2021)  Fall Risk Interventions:    · Home safety tips provided        General Health and ACP:  General  In general, how would you say your health is?: Fair  In the past 7 days, have you experienced any of the following? New or Increased Pain, New or Increased Fatigue, Loneliness, Social Isolation, Stress or Anger?: (!) New or Increased Pain  Do you get the social and emotional support that you need?: Yes  Do you have a Living Will?: (!) No  Advance Directives     Power of  Living Will ACP-Advance Directive ACP-Power of     Not on File Not on File Not on File Not on File      General Health Risk Interventions:  · Pain issues: patient declines any further evaluation/treatment for this issue. She states that she has an appointment scheduled with her orthopedic surgeon tomorrow. · No Living Will: Advance Care Planning addressed with patient today. Information was provided with the after visit summary. Health Habits/Nutrition:  Health Habits/Nutrition  Do you exercise for at least 20 minutes 2-3 times per week?: (!) No  Have you lost any weight without trying in the past 3 months?: No  Do you eat only one meal per day?: No  Have you seen the dentist within the past year?: N/A - wear dentures  Body mass index: (!) 47.64  Health Habits/Nutrition Interventions:  · Inadequate physical activity:  educational materials provided to promote increased physical activity. Her neurologist ordered physical therapy yesterday.   · Nutritional issues:  educational materials for healthy, well-balanced diet provided     Safety:  Safety  Do you have working smoke detectors?: Yes  Have all throw rugs been removed or fastened?: (!) No  Do you have non-slip mats or surfaces in all bathtubs/showers?: Yes  Do all of your stairways have a railing or banister?: Yes  Are your doorways, halls and stairs free of clutter?: Yes  Do you always fasten your seatbelt when you are in a car?: Yes  Safety Interventions:  · Home safety tips provided     Personalized Preventive Plan   Current Health Maintenance Status  Immunization History   Administered Date(s) Administered    COVID-19, Rick Peter, PF, 30mcg/0.3mL 02/27/2021, 03/20/2021    DTP 07/07/2011    Influenza, Quadv, IM, (6 mo and older Fluzone, Flulaval, Fluarix and 3 yrs and older Afluria) 01/23/2018    Influenza, Quadv, IM, PF (6 mo and older Fluzone, Flulaval, Fluarix, and 3 yrs and older Afluria) 02/12/2020    Influenza, Quadv, adjuvanted, 65 yrs +, IM, PF (Fluad) 10/20/2020    Pneumococcal Conjugate 13-valent ([id number]) 07/28/2021    Pneumococcal Polysaccharide ([id number]) 08/31/2015    Tdap (Boostrix, Adacel) 11/16/2012        Health Maintenance   Topic Date Due    Shingles Vaccine (1 of 2) Never done    DEXA (modify frequency per FRAX score)  Never done    Cervical cancer screen  09/01/2019    Breast cancer screen 03/05/2020    Annual Wellness Visit (AWV)  Never done    Flu vaccine (1) 09/01/2021    Diabetic foot exam  10/20/2021    Diabetic retinal exam  05/11/2022    A1C test (Diabetic or Prediabetic)  07/21/2022    Lipid screen  07/21/2022    Potassium monitoring  07/21/2022    Creatinine monitoring  07/21/2022    Pneumococcal 65+ years Vaccine (2 of 2 - PPSV23) 07/28/2022    DTaP/Tdap/Td vaccine (3 - Td or Tdap) 11/16/2022    Colon cancer screen colonoscopy  12/23/2025    COVID-19 Vaccine  Completed    Hepatitis C screen  Completed    HIV screen  Addressed    Hepatitis A vaccine  Aged Out    Hib vaccine  Aged Out    Meningococcal (ACWY) vaccine  Aged Out     Recommendations for [employer] Due: see orders and patient instructions/AVS.  . Recommended screening schedule for the next 5-10 years is provided to the patient in written form: see Patient Instructions/AVS.    Jimmy Paniagua was seen today for medicare awv, knee pain and rash. 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    Intractable tension-type headache, unspecified chronicity pattern    Essential hypertension    Vitamin D deficiency  -     Vitamin D 25 Hydroxy    Need for shingles vaccine  -     zoster recombinant adjuvanted vaccine (SHINGRIX) 50 MCG/0.5ML SUSR injection; Inject 0.5 mLs into the muscle See Admin Instructions 1 dose now and repeat in 2-6 months    Need for vaccination against Streptococcus pneumoniae using pneumococcal conjugate vaccine 13  -     PREVNAR 13 IM (Pneumococcal conjugate vaccine 13-valent);  Future  -     PREVNAR 13 IM (Pneumococcal conjugate vaccine 13-valent)    Stage 3a chronic kidney disease (HCC)  -     Vitamin D 25 Hydroxy

## 2021-08-12 DIAGNOSIS — K22.70 BARRETT'S ESOPHAGUS WITHOUT DYSPLASIA: ICD-10-CM

## 2021-08-12 RX ORDER — PANTOPRAZOLE SODIUM 40 MG/1
40 TABLET, DELAYED RELEASE ORAL 2 TIMES DAILY
Qty: 60 TABLET | Refills: 5 | OUTPATIENT
Start: 2021-08-12

## 2021-08-23 ENCOUNTER — OFFICE VISIT (OUTPATIENT)
Dept: PRIMARY CARE CLINIC | Age: 66
End: 2021-08-23
Payer: MEDICARE

## 2021-08-23 VITALS
BODY MASS INDEX: 46.83 KG/M2 | TEMPERATURE: 99.1 F | SYSTOLIC BLOOD PRESSURE: 130 MMHG | WEIGHT: 293 LBS | HEART RATE: 88 BPM | OXYGEN SATURATION: 98 % | DIASTOLIC BLOOD PRESSURE: 80 MMHG

## 2021-08-23 DIAGNOSIS — R11.0 NAUSEA: Primary | ICD-10-CM

## 2021-08-23 PROCEDURE — 99212 OFFICE O/P EST SF 10 MIN: CPT | Performed by: FAMILY MEDICINE

## 2021-08-23 PROCEDURE — 99214 OFFICE O/P EST MOD 30 MIN: CPT | Performed by: FAMILY MEDICINE

## 2021-08-23 RX ORDER — PROMETHAZINE HYDROCHLORIDE 12.5 MG/1
TABLET ORAL
COMMUNITY
Start: 2021-08-17 | End: 2021-10-21 | Stop reason: SDUPTHER

## 2021-08-23 RX ORDER — METOCLOPRAMIDE 5 MG/1
5 TABLET ORAL 3 TIMES DAILY
Qty: 90 TABLET | Refills: 0 | Status: SHIPPED | OUTPATIENT
Start: 2021-08-23 | End: 2022-01-06

## 2021-08-23 RX ORDER — PROMETHAZINE HYDROCHLORIDE 25 MG/ML
12.5 INJECTION, SOLUTION INTRAMUSCULAR; INTRAVENOUS ONCE
Status: COMPLETED | OUTPATIENT
Start: 2021-08-23 | End: 2021-08-23

## 2021-08-23 RX ADMIN — PROMETHAZINE HYDROCHLORIDE 12.5 MG: 25 INJECTION INTRAMUSCULAR; INTRAVENOUS at 12:59

## 2021-08-23 ASSESSMENT — ENCOUNTER SYMPTOMS: NAUSEA: 1

## 2021-08-23 NOTE — PROGRESS NOTES
2021     Félix Stanford (:  1955) is a 72 y.o. female, here for evaluation of the following medical concerns:    Nausea & Vomiting  This is a chronic problem. The current episode started more than 1 year ago (has had nausea for the last year and a half. Was doing better with the addition of the protonix back in May when I had seen her in urgent care but then symptoms again started worsening. ). Associated symptoms include a change in bowel habit (has occasional issues with constipation, but this has not been a problem recently) and nausea. Pertinent negatives include no abdominal pain, chills, fatigue, fever, urinary symptoms or vomiting. Associated symptoms comments: Patient was advised when I saw her in May to give up drinking pop and sweet tea, but still primarily drinks Coke and Sweet Tea from [employer]. Is diabetic. This is discussed again today. She is able to eat, but just constantly feels nauseated. She does note that she has taken oral phenergan, but just doesn't feel that it helps like the injection does. Requesting injection of phenergan today. Nothing aggravates the symptoms. Treatments tried: anti-emetics. Does see a gastroenterologist.     Did review patient's med list, allergies, social history,pmhx and pshx today as noted in the record. Review of Systems   Constitutional: Negative for chills, fatigue and fever. HENT: Negative. Eyes: Negative. Respiratory: Negative. Cardiovascular: Negative. Gastrointestinal: Positive for change in bowel habit (has occasional issues with constipation, but this has not been a problem recently), constipation and nausea. Negative for abdominal distention, abdominal pain, diarrhea and vomiting. Prior to Visit Medications    Medication Sig Taking?  Authorizing Provider   promethazine (PHENERGAN) 12.5 MG tablet take 1 tablet by mouth every 6 hours for 30 DAYS Yes Historical Provider, MD   pantoprazole (PROTONIX) 40 MG tablet Take Position: Sitting   Cuff Size: Large Adult   Pulse: 88   Temp: 99.1 °F (37.3 °C)   TempSrc: Temporal   SpO2: 98%   Weight: 299 lb (135.6 kg)     Estimated body mass index is 46.83 kg/m² as calculated from the following:    Height as of 7/28/21: 5' 7\" (1.702 m). Weight as of this encounter: 299 lb (135.6 kg). Physical Exam  Vitals and nursing note reviewed. Constitutional:       General: She is not in acute distress. Appearance: Normal appearance. She is well-developed. She is not diaphoretic. HENT:      Head: Normocephalic and atraumatic. Nose: Nose normal.   Eyes:      General:         Right eye: No discharge. Left eye: No discharge. Conjunctiva/sclera: Conjunctivae normal.   Cardiovascular:      Rate and Rhythm: Normal rate and regular rhythm. Pulmonary:      Effort: Pulmonary effort is normal. No respiratory distress. Breath sounds: Normal breath sounds. No wheezing. Abdominal:      General: Bowel sounds are normal. There is no distension. Palpations: Abdomen is soft. There is no mass. Tenderness: There is abdominal tenderness (mild epigastric). There is no guarding or rebound. Musculoskeletal:      Cervical back: Normal range of motion and neck supple. No rigidity. Lymphadenopathy:      Cervical: No cervical adenopathy. Skin:     General: Skin is warm and dry. Neurological:      Mental Status: She is alert and oriented to person, place, and time. Psychiatric:         Behavior: Behavior normal.         Thought Content: Thought content normal.         Judgment: Judgment normal.         ASSESSMENT/PLAN:  Encounter Diagnosis   Name Primary?  Nausea Yes     Orders Placed This Encounter   Medications    metoclopramide (REGLAN) 5 MG tablet     Sig: Take 1 tablet by mouth 3 times daily     Dispense:  90 tablet     Refill:  0    promethazine (PHENERGAN) injection 12.5 mg     Chronic nausea with minimal improvement with oral anti-emetics.   Will treat today with IM phenergan and then ordered reglan to see if this helps with her chronic symptoms. Did discuss potential side effects of reglan and if she does have these symptoms, she should stop this medication. ? possible gastroparesis due to diabetes? Did advise patient again that she should really stop drinking pop and sweet tea and drink more water. This may help with upper GI irritation. Also avoid acidic foods. Patient has low motivation to make dietary changes as she states she is unsure if she is going to be successful with this. Should follow up with PCP for continued symptoms and should also see GI for continued management of chronic nausea. Return  if no improvement in symptoms or if any further symptoms arise. No follow-ups on file. An electronic signature was used to authenticate this note.     --Yuly Freedman,  on 8/23/2021 at 12:40 PM

## 2021-08-24 ASSESSMENT — ENCOUNTER SYMPTOMS
CHANGE IN BOWEL HABIT: 1
ABDOMINAL DISTENTION: 0
VOMITING: 0
DIARRHEA: 0
ABDOMINAL PAIN: 0
CONSTIPATION: 1
RESPIRATORY NEGATIVE: 1
EYES NEGATIVE: 1

## 2021-09-07 ENCOUNTER — HOSPITAL ENCOUNTER (OUTPATIENT)
Age: 66
Setting detail: SPECIMEN
Discharge: HOME | End: 2021-09-07
Payer: MEDICARE

## 2021-09-07 DIAGNOSIS — N18.31 STAGE 3A CHRONIC KIDNEY DISEASE (HCC): ICD-10-CM

## 2021-09-07 LAB
CREATININE URINE: 145.4 MG/DL (ref 28–217)
TOTAL PROTEIN, URINE: 33 MG/DL
URINE TOTAL PROTEIN CREATININE RATIO: 0.23 (ref 0–0.2)

## 2021-09-07 PROCEDURE — 84156 ASSAY OF PROTEIN URINE: CPT

## 2021-09-07 PROCEDURE — 82570 ASSAY OF URINE CREATININE: CPT

## 2021-09-14 ENCOUNTER — OFFICE VISIT (OUTPATIENT)
Dept: PRIMARY CARE CLINIC | Age: 66
End: 2021-09-14
Payer: MEDICARE

## 2021-09-14 ENCOUNTER — HOSPITAL ENCOUNTER (OUTPATIENT)
Age: 66
Setting detail: SPECIMEN
Discharge: HOME | End: 2021-09-14
Payer: MEDICARE

## 2021-09-14 VITALS
WEIGHT: 293 LBS | SYSTOLIC BLOOD PRESSURE: 110 MMHG | OXYGEN SATURATION: 98 % | HEIGHT: 67 IN | BODY MASS INDEX: 45.99 KG/M2 | HEART RATE: 82 BPM | TEMPERATURE: 100.7 F | DIASTOLIC BLOOD PRESSURE: 70 MMHG

## 2021-09-14 DIAGNOSIS — Z20.822 PERSON UNDER INVESTIGATION FOR COVID-19: ICD-10-CM

## 2021-09-14 DIAGNOSIS — R50.9 FEVER, UNSPECIFIED FEVER CAUSE: ICD-10-CM

## 2021-09-14 DIAGNOSIS — R09.81 CONGESTION OF NASAL SINUS: ICD-10-CM

## 2021-09-14 DIAGNOSIS — R53.83 FATIGUE, UNSPECIFIED TYPE: ICD-10-CM

## 2021-09-14 DIAGNOSIS — R05.9 COUGH: ICD-10-CM

## 2021-09-14 DIAGNOSIS — Z20.822 EXPOSURE TO CONFIRMED CASE OF COVID-19: ICD-10-CM

## 2021-09-14 DIAGNOSIS — Z20.822 EXPOSURE TO CONFIRMED CASE OF COVID-19: Primary | ICD-10-CM

## 2021-09-14 LAB
SARS-COV-2, RAPID: DETECTED
SPECIMEN DESCRIPTION: ABNORMAL

## 2021-09-14 PROCEDURE — 99212 OFFICE O/P EST SF 10 MIN: CPT | Performed by: NURSE PRACTITIONER

## 2021-09-14 PROCEDURE — 99214 OFFICE O/P EST MOD 30 MIN: CPT | Performed by: NURSE PRACTITIONER

## 2021-09-14 PROCEDURE — 87635 SARS-COV-2 COVID-19 AMP PRB: CPT

## 2021-09-14 SDOH — ECONOMIC STABILITY: FOOD INSECURITY: WITHIN THE PAST 12 MONTHS, THE FOOD YOU BOUGHT JUST DIDN'T LAST AND YOU DIDN'T HAVE MONEY TO GET MORE.: NEVER TRUE

## 2021-09-14 SDOH — ECONOMIC STABILITY: FOOD INSECURITY: WITHIN THE PAST 12 MONTHS, YOU WORRIED THAT YOUR FOOD WOULD RUN OUT BEFORE YOU GOT MONEY TO BUY MORE.: NEVER TRUE

## 2021-09-14 ASSESSMENT — ENCOUNTER SYMPTOMS
COUGH: 1
SINUS COMPLAINT: 1
DIARRHEA: 0
WHEEZING: 1
SHORTNESS OF BREATH: 1
NAUSEA: 1
VOMITING: 0

## 2021-09-14 ASSESSMENT — SOCIAL DETERMINANTS OF HEALTH (SDOH): HOW HARD IS IT FOR YOU TO PAY FOR THE VERY BASICS LIKE FOOD, HOUSING, MEDICAL CARE, AND HEATING?: NOT HARD AT ALL

## 2021-09-14 NOTE — PATIENT INSTRUCTIONS
Rapid covid is positive. You should isolate at home in an area away from family. If you must be around family members, please wear a mask. Quarantine at home until result is available. This means do not go to work/school, attend family gatherings, or invite others to your home until you know your test results. Will send order for Regeneron infusion to pharmacy and House SUpervisor. House supervisor will call you to schedule infusion, likely tomorrow morning. If you start feeling dramatically worse between now and then, please go to ER. The following are measures you can try at home to help provide symptom relief:    --Increase your water intake to help thin secretions and maintain hydration. --May try mucinex (guaifenesin) to help with congestion and robitussin (dextromethorphan) for persistent cough. Use cool mist humidifier at bedtime to moisturize air. Use nasal saline rinse as needed for congestion. --Tylenol or ibuprofen for fever/body aches/headache. Warm/cold packs to forehead and back of neck can help with headache pain. Warm baths/showers can sooth body aches also. Practice good hand hygiene and cover your cough and sneeze to prevent passing virus on. If symptoms worsen or fail to improve, please return to clinic. If you develop severe worsening of symptoms such as chest pain or difficulty breathing, please go to ER. Patient Education        10 Things to Do When You Have COVID-19    Stay home. Don't go to school, work, or public areas. And don't use public transportation, ride-shares, or taxis unless you have no choice. Leave your home only if you need to get medical care. But call the doctor's office first so they know you're coming. And wear a cloth face cover. Ask before leaving isolation. Talk with your doctor or other health professional about when it will be safe for you to leave isolation. Wear a cloth face cover when you are around other people.   It can help stop the spread of the virus when you cough or sneeze. Limit contact with people in your home. If possible, stay in a separate bedroom and use a separate bathroom. Avoid contact with pets and other animals. If possible, have a friend or family member care for them while you're sick. Cover your mouth and nose with a tissue when you cough or sneeze. Then throw the tissue in the trash right away. Wash your hands often, especially after you cough or sneeze. Use soap and water, and scrub for at least 20 seconds. If soap and water aren't available, use an alcohol-based hand . Don't share personal household items. These include bedding, towels, cups and glasses, and eating utensils. Clean and disinfect your home every day. Use household  or disinfectant wipes or sprays. Take special care to clean things that you grab with your hands. These include doorknobs, remote controls, phones, and handles on your refrigerator and microwave. And don't forget countertops, tabletops, bathrooms, and computer keyboards. Take acetaminophen (Tylenol) to relieve fever and body aches. Read and follow all instructions on the label. Current as of: March 26, 2021               Content Version: 12.9  © 2006-2021 Healthwise, Incorporated. Care instructions adapted under license by [facility] ([facility]). If you have questions about a medical condition or this instruction, always ask your healthcare professional. [place] [address] any warranty or liability for your use of this information.

## 2021-09-14 NOTE — PROGRESS NOTES
[address] [place] [address]  Dept: [phone]  Dept Fax: [phone]  Loc: [phone]        CHIEF COMPLAINT       Chief Complaint   Patient presents with    Shortness of Breath      fever, cough, congestion,  is covid positive currently       Nurses Notes reviewed and I agree except as noted in the HPI. HISTORY OF PRESENT ILLNESS   Elieser Pérez is a 72 y.o. female who presents to [facility] today (9/15/2021) for evaluation of:   Sinus Problem  This is a new problem. The current episode started 1 to 4 weeks ago (9/7/21). The problem has been gradually worsening since onset. The maximum temperature recorded prior to her arrival was 100.4 - 100.9 F. Associated symptoms include congestion, coughing, headaches and shortness of breath (new last night, states burning in throat that occasionally causes her to need to take deep breaths). Past treatments include acetaminophen (cough med).  tested positive for covid last Tuesday;  has not isolated away from patient. Pt is fully vaccinated against covid. REVIEW OF SYSTEMS     Review of Systems   Constitutional: Positive for fatigue and fever. HENT: Positive for congestion. Respiratory: Positive for cough, shortness of breath (new last night, states burning in throat that occasionally causes her to need to take deep breaths) and wheezing (slight). Cardiovascular: Negative for chest pain and palpitations. Gastrointestinal: Positive for nausea. Negative for diarrhea and vomiting. Musculoskeletal: Positive for myalgias (new yesterday). Neurological: Positive for headaches.        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    Obesity     Peripheral neuropathy     Pulmonary hypertension ([facility] 01/01/2012    by echocardiogram    Shoulder pain, bilateral 08/21/2013    Type 2 diabetes mellitus ([facility]     Unspecified essential hypertension     Essential hypertension    Vitamin D deficiency 11/05/2014       SURGICAL HISTORY     Patient  has a past surgical history that includes Hysterectomy (09/06/2005); Appendectomy; Upper gastrointestinal endoscopy; Knee arthroscopy (Left); Colonoscopy (05/03/2012); other surgical history (2011); Dental surgery (08/2015); Tonsillectomy; brain surgery (05/24/2016); Foreign Body Removal (05/28/2016); Total shoulder arthroplasty (Right, 10/18/2018); Shoulder arthroscopy (Right, 03/07/2019); Nerve Block (09/09/2019); Upper gastrointestinal endoscopy (05/27/2020); Upper gastrointestinal endoscopy (N/A, 12/23/2020); Colonoscopy (N/A, 12/23/2020); Back Injection (02/25/2021); Femur Closed Reduction (Left, 03/05/2020); polysomnography (11/30/2020); and Femur fracture surgery (Left, 03/06/2021).     CURRENT MEDICATIONS       Outpatient Medications Prior to Visit   Medication Sig Dispense Refill    promethazine (PHENERGAN) 12.5 MG tablet take 1 tablet by mouth every 6 hours for 30 DAYS      metoclopramide (REGLAN) 5 MG tablet Take 1 tablet by mouth 3 times daily 90 tablet 0    pantoprazole (PROTONIX) 40 MG tablet Take 1 tablet by mouth 2 times daily take 1 tablet by mouth daily 60 tablet 5    DULoxetine (CYMBALTA) 60 MG extended release capsule Take 1 capsule by mouth daily 90 capsule 1    pioglitazone-metFORMIN (ACTOPLUS MET)  MG per tablet TAKE 1 TABLET TWICE A DAY WITH MEALS 180 tablet 1    lisinopril-hydroCHLOROthiazide (PRINZIDE;ZESTORETIC) 20-12.5 MG per tablet TAKE 1 TABLET DAILY 90 tablet 1    Cholecalciferol (VITAMIN D3) 1.25 MG (91536 UT) CAPS TAKE 1 CAPSULE ONCE A WEEK 13 capsule 1    zoster recombinant adjuvanted vaccine (SHINGRIX) 50 MCG/0.5ML SUSR injection Inject 0.5 mLs into the muscle See Admin Instructions 1 dose now and repeat in 2-6 months 0.5 mL 0    atorvastatin (LIPITOR) 20 MG tablet Take 1 tablet by mouth daily 30 tablet 3    Misc. Devices [facility]) MISC 1 Device by Does not apply route daily as needed (gait difficulty) 1 each 0    Misc. Devices (WALKER) MISC 1 each by Does not apply route daily 1 each 0    HYDROcodone-acetaminophen (NORCO) 5-325 MG per tablet Indications: Patient is taking 1 tablet a day       Gabapentin (NEURONTIN PO) Take 600 mg by mouth 5 times daily       Handicap Placard MISC by Does not apply route Issue parking placard for person with disability; Applicant meets the qualifying disability criteria. Length of time expected to have disability_____Lifetime  Other __x__. Prescription expires in 5 years from issuing date. 02/05/2024 1 each 0    Misc. Devices (ROLLATOR) MISC Use to reduce risk of falls when walking. 1 each 0     No facility-administered medications prior to visit. ALLERGIES     Patient is is allergic to asa [aspirin]. FAMILY HISTORY     Patient's family history includes Cancer in her father and mother; Diabetes in her father; Hypertension in her father. SOCIAL HISTORY     Patient  reports that she has never smoked. She has never used smokeless tobacco. She reports current alcohol use. She reports that she does not use drugs. PHYSICAL EXAM     VITALS  BP: 110/70, Temp: 100.7 °F (38.2 °C), Pulse: 82,  , SpO2: 98 %  Physical Exam  Vitals reviewed. Constitutional:       General: She is not in acute distress. Appearance: She is ill-appearing. HENT:      Nose: Nose normal. No congestion or rhinorrhea. Mouth/Throat:      Lips: Pink. Mouth: Mucous membranes are moist.      Pharynx: Oropharynx is clear. Uvula midline.  No oropharyngeal exudate or posterior oropharyngeal erythema. Tonsils: No tonsillar exudate. Cardiovascular:      Rate and Rhythm: Normal rate and regular rhythm. Heart sounds: Normal heart sounds, S1 normal and S2 normal. No murmur heard. No S3 or S4 sounds. Pulmonary:      Effort: Pulmonary effort is normal. No accessory muscle usage, prolonged expiration, respiratory distress or retractions. Breath sounds: Normal breath sounds. No wheezing or rhonchi. Comments: Respirations 16 during exam  Musculoskeletal:      Cervical back: Normal range of motion and neck supple. Lymphadenopathy:      Cervical: Cervical adenopathy present. Skin:     General: Skin is warm and dry. Capillary Refill: Capillary refill takes less than 2 seconds. Neurological:      General: No focal deficit present. Mental Status: She is alert. DIAGNOSTIC RESULTS   Labs:No results found for this visit on 09/14/21. IMAGING:        CLINICAL COURSE:     Vitals:    09/14/21 1544   BP: 110/70   Pulse: 82   Temp: 100.7 °F (38.2 °C)   SpO2: 98%   Weight: 299 lb (135.6 kg)   Height: 5' 7\" (1.702 m)           PROCEDURES:  None  FINAL IMPRESSION      1. Exposure to confirmed case of COVID-19    2. Cough    3. Congestion of nasal sinus    4. Fatigue, unspecified type    5. Fever, unspecified fever cause    6. Person under investigation for COVID-19         DISPOSITION/PLAN     Rapid covid positive. Pt agrees to regeneron treatment. Order faxed to pharmacy and house supervisor; pt to be contacted by house supervisor and scheduled to infusion, likely tomorrow. Pt instructed that if her symptoms acutely worsen between now and infusion, pt is to go to ER. Discussed quarantine guidelines and educated pt on supportive measures for symptom relief. Patient Instructions     Rapid covid is positive. You should isolate at home in an area away from family. If you must be around family members, please wear a mask.   Quarantine at home until result is available. This means do not go to work/school, attend family gatherings, or invite others to your home until you know your test results. Will send order for Regeneron infusion to pharmacy and House SUpervisor. House supervisor will call you to schedule infusion, likely tomorrow morning. If you start feeling dramatically worse between now and then, please go to ER. The following are measures you can try at home to help provide symptom relief:    --Increase your water intake to help thin secretions and maintain hydration. --May try mucinex (guaifenesin) to help with congestion and robitussin (dextromethorphan) for persistent cough. Use cool mist humidifier at bedtime to moisturize air. Use nasal saline rinse as needed for congestion. --Tylenol or ibuprofen for fever/body aches/headache. Warm/cold packs to forehead and back of neck can help with headache pain. Warm baths/showers can sooth body aches also. Practice good hand hygiene and cover your cough and sneeze to prevent passing virus on. If symptoms worsen or fail to improve, please return to clinic. If you develop severe worsening of symptoms such as chest pain or difficulty breathing, please go to ER. Patient Education        10 Things to Do When You Have COVID-19    Stay home. Don't go to school, work, or public areas. And don't use public transportation, ride-shares, or taxis unless you have no choice. Leave your home only if you need to get medical care. But call the doctor's office first so they know you're coming. And wear a cloth face cover. Ask before leaving isolation. Talk with your doctor or other health professional about when it will be safe for you to leave isolation. Wear a cloth face cover when you are around other people. It can help stop the spread of the virus when you cough or sneeze. Limit contact with people in your home. If possible, stay in a separate bedroom and use a separate bathroom. Avoid contact with pets and other animals. If possible, have a friend or family member care for them while you're sick. Cover your mouth and nose with a tissue when you cough or sneeze. Then throw the tissue in the trash right away. Wash your hands often, especially after you cough or sneeze. Use soap and water, and scrub for at least 20 seconds. If soap and water aren't available, use an alcohol-based hand . Don't share personal household items. These include bedding, towels, cups and glasses, and eating utensils. Clean and disinfect your home every day. Use household  or disinfectant wipes or sprays. Take special care to clean things that you grab with your hands. These include doorknobs, remote controls, phones, and handles on your refrigerator and microwave. And don't forget countertops, tabletops, bathrooms, and computer keyboards. Take acetaminophen (Tylenol) to relieve fever and body aches. Read and follow all instructions on the label. Current as of: March 26, 2021               Content Version: 12.9  © 6899-5783 Healthwise, [employer]. Care instructions adapted under license by [facility] . If you have questions about a medical condition or this instruction, always ask your healthcare professional. [place] [address] any warranty or liability for your use of this information. The use, risks, benefits, and potential side effects of prescribed and/or recommended medications were discussed. All questions were answered and the patient/caregiver voiced understanding. No orders of the defined types were placed in this encounter.     Outpatient Encounter Medications as of 9/14/2021   Medication Sig Dispense Refill    promethazine (PHENERGAN) 12.5 MG tablet take 1 tablet by mouth every 6 hours for 30 DAYS      metoclopramide (REGLAN) 5 MG tablet Take 1 tablet by mouth 3 times daily 90 tablet 0    pantoprazole (PROTONIX) 40 MG tablet Take 1 tablet by mouth 2 times daily take 1 tablet by mouth daily 60 tablet 5    DULoxetine (CYMBALTA) 60 MG extended release capsule Take 1 capsule by mouth daily 90 capsule 1    pioglitazone-metFORMIN (ACTOPLUS MET)  MG per tablet TAKE 1 TABLET TWICE A DAY WITH MEALS 180 tablet 1    lisinopril-hydroCHLOROthiazide (PRINZIDE;ZESTORETIC) 20-12.5 MG per tablet TAKE 1 TABLET DAILY 90 tablet 1    Cholecalciferol (VITAMIN D3) 1.25 MG (37294 UT) CAPS TAKE 1 CAPSULE ONCE A WEEK 13 capsule 1    zoster recombinant adjuvanted vaccine (SHINGRIX) 50 MCG/0.5ML SUSR injection Inject 0.5 mLs into the muscle See Admin Instructions 1 dose now and repeat in 2-6 months 0.5 mL 0    atorvastatin (LIPITOR) 20 MG tablet Take 1 tablet by mouth daily 30 tablet 3    Misc. Devices [facility]) MISC 1 Device by Does not apply route daily as needed (gait difficulty) 1 each 0    Misc. Devices (WALKER) MISC 1 each by Does not apply route daily 1 each 0    HYDROcodone-acetaminophen (NORCO) 5-325 MG per tablet Indications: Patient is taking 1 tablet a day       Gabapentin (NEURONTIN PO) Take 600 mg by mouth 5 times daily       Handicap Placard MISC by Does not apply route Issue parking placard for person with disability; Applicant meets the qualifying disability criteria. Length of time expected to have disability_____Lifetime  Other __x__. Prescription expires in 5 years from issuing date. 02/05/2024 1 each 0    Misc. Devices (ROLLATOR) MISC Use to reduce risk of falls when walking. 1 each 0     No facility-administered encounter medications on file as of 9/14/2021. Return if symptoms worsen or fail to improve.                 Electronically signed by SHANNA Pena CNP on 9/15/2021 at 9:44 AM

## 2021-09-15 ENCOUNTER — HOSPITAL ENCOUNTER (OUTPATIENT)
Dept: INFUSION THERAPY | Age: 66
Setting detail: INFUSION SERIES
Discharge: HOME | End: 2021-09-15
Payer: MEDICARE

## 2021-09-15 VITALS
SYSTOLIC BLOOD PRESSURE: 113 MMHG | DIASTOLIC BLOOD PRESSURE: 62 MMHG | OXYGEN SATURATION: 95 % | HEART RATE: 66 BPM | TEMPERATURE: 99.9 F | RESPIRATION RATE: 18 BRPM

## 2021-09-15 PROCEDURE — M0243 CASIRIVI AND IMDEVI INFUSION: HCPCS | Performed by: NURSE PRACTITIONER

## 2021-09-15 PROCEDURE — M0240 HC IV INFUSION CASIRIVIMAB AND IMDEVIMAB W/MONITORING: HCPCS

## 2021-09-15 PROCEDURE — 2580000003 HC RX 258: Performed by: NURSE PRACTITIONER

## 2021-09-15 PROCEDURE — M0243 CASIRIVI AND IMDEVI INFUSION: HCPCS

## 2021-09-15 PROCEDURE — M0240 HC RX 250 WO HCPCS: HCPCS | Performed by: NURSE PRACTITIONER

## 2021-09-15 PROCEDURE — 2500000003 HC RX 250 WO HCPCS: Performed by: NURSE PRACTITIONER

## 2021-09-15 RX ADMIN — CASIRIVIMAB AND IMDEVIMAB: 600; 600 INJECTION, SOLUTION, CONCENTRATE INTRAVENOUS at 09:08

## 2021-09-19 ENCOUNTER — HOSPITAL ENCOUNTER (INPATIENT)
Age: 66
LOS: 1 days | Discharge: HOME | DRG: 177 | End: 2021-09-20
Attending: SPECIALIST | Admitting: FAMILY MEDICINE
Payer: MEDICARE

## 2021-09-19 ENCOUNTER — APPOINTMENT (OUTPATIENT)
Dept: GENERAL RADIOLOGY | Age: 66
DRG: 177 | End: 2021-09-19
Payer: MEDICARE

## 2021-09-19 DIAGNOSIS — I10 ESSENTIAL HYPERTENSION: ICD-10-CM

## 2021-09-19 DIAGNOSIS — N28.9 ACUTE ON CHRONIC RENAL INSUFFICIENCY: ICD-10-CM

## 2021-09-19 DIAGNOSIS — U07.1 PNEUMONIA DUE TO COVID-19 VIRUS: Primary | ICD-10-CM

## 2021-09-19 DIAGNOSIS — J12.82 PNEUMONIA DUE TO COVID-19 VIRUS: Primary | ICD-10-CM

## 2021-09-19 DIAGNOSIS — N18.9 ACUTE ON CHRONIC RENAL INSUFFICIENCY: ICD-10-CM

## 2021-09-19 PROBLEM — N30.01 ACUTE CYSTITIS WITH HEMATURIA: Status: ACTIVE | Noted: 2021-09-19

## 2021-09-19 PROBLEM — E86.0 DEHYDRATION: Status: ACTIVE | Noted: 2021-09-19

## 2021-09-19 PROBLEM — N18.31 STAGE 3A CHRONIC KIDNEY DISEASE (HCC): Status: ACTIVE | Noted: 2021-09-19

## 2021-09-19 LAB
-: ABNORMAL
ABSOLUTE EOS #: 0.17 K/UL (ref 0–0.4)
ABSOLUTE IMMATURE GRANULOCYTE: 0.12 K/UL (ref 0–0.3)
ABSOLUTE LYMPH #: 0.99 K/UL (ref 1–4.8)
ABSOLUTE MONO #: 0.58 K/UL (ref 0.1–1.2)
AMORPHOUS: ABNORMAL
ANION GAP SERPL CALCULATED.3IONS-SCNC: 10 MMOL/L (ref 9–17)
BACTERIA: ABNORMAL
BASOPHILS # BLD: 0 % (ref 0–1)
BASOPHILS ABSOLUTE: 0 K/UL (ref 0–0.2)
BILIRUBIN URINE: NEGATIVE
BUN BLDV-MCNC: 28 MG/DL (ref 8–23)
BUN/CREAT BLD: 20 (ref 9–20)
CALCIUM SERPL-MCNC: 9.2 MG/DL (ref 8.6–10.4)
CASTS UA: ABNORMAL /LPF (ref 0–2)
CHLORIDE BLD-SCNC: 96 MMOL/L (ref 98–107)
CO2: 29 MMOL/L (ref 20–31)
COLOR: ABNORMAL
COMMENT UA: ABNORMAL
CREAT SERPL-MCNC: 1.39 MG/DL (ref 0.5–0.9)
CRYSTALS, UA: ABNORMAL /HPF
D-DIMER QUANTITATIVE: 0.78 MG/L FEU (ref 0–0.59)
DIFFERENTIAL TYPE: ABNORMAL
EOSINOPHILS RELATIVE PERCENT: 3 % (ref 1–7)
EPITHELIAL CELLS UA: ABNORMAL /HPF (ref 0–5)
GFR AFRICAN AMERICAN: 46 ML/MIN
GFR NON-AFRICAN AMERICAN: 38 ML/MIN
GFR SERPL CREATININE-BSD FRML MDRD: ABNORMAL ML/MIN/{1.73_M2}
GFR SERPL CREATININE-BSD FRML MDRD: ABNORMAL ML/MIN/{1.73_M2}
GLUCOSE BLD-MCNC: 173 MG/DL (ref 70–99)
GLUCOSE URINE: NEGATIVE
HCT VFR BLD CALC: 33 % (ref 36.3–47.1)
HEMOGLOBIN: 10.3 G/DL (ref 11.9–15.1)
IMMATURE GRANULOCYTES: 2 %
KETONES, URINE: ABNORMAL
LACTIC ACID: 1.2 MMOL/L (ref 0.5–2.2)
LEUKOCYTE ESTERASE, URINE: ABNORMAL
LYMPHOCYTES # BLD: 17 % (ref 16–46)
MCH RBC QN AUTO: 29.1 PG (ref 25.2–33.5)
MCHC RBC AUTO-ENTMCNC: 31.2 G/DL (ref 25.2–33.5)
MCV RBC AUTO: 93.2 FL (ref 82.6–102.9)
MONOCYTES # BLD: 10 % (ref 4–11)
MORPHOLOGY: ABNORMAL
MORPHOLOGY: ABNORMAL
MUCUS: ABNORMAL
NITRITE, URINE: POSITIVE
NRBC AUTOMATED: 0 PER 100 WBC
OTHER OBSERVATIONS UA: ABNORMAL
PDW BLD-RTO: 13.8 % (ref 11.8–14.4)
PH UA: 5.5 (ref 5–6)
PLATELET # BLD: 227 K/UL (ref 138–453)
PLATELET ESTIMATE: ABNORMAL
PMV BLD AUTO: 10.2 FL (ref 8.1–13.5)
POTASSIUM SERPL-SCNC: 4.3 MMOL/L (ref 3.7–5.3)
PROTEIN UA: NEGATIVE
RBC # BLD: 3.54 M/UL (ref 3.95–5.11)
RBC # BLD: ABNORMAL 10*6/UL
RBC UA: ABNORMAL /HPF (ref 0–4)
RENAL EPITHELIAL, UA: ABNORMAL /HPF
SEG NEUTROPHILS: 68 % (ref 43–77)
SEGMENTED NEUTROPHILS ABSOLUTE COUNT: 3.94 K/UL (ref 1.5–8.1)
SODIUM BLD-SCNC: 135 MMOL/L (ref 135–144)
SPECIFIC GRAVITY UA: 1.02 (ref 1.01–1.02)
TRICHOMONAS: ABNORMAL
TROPONIN INTERP: ABNORMAL
TROPONIN INTERP: ABNORMAL
TROPONIN T: ABNORMAL NG/ML
TROPONIN T: ABNORMAL NG/ML
TROPONIN, HIGH SENSITIVITY: 20 NG/L (ref 0–14)
TROPONIN, HIGH SENSITIVITY: 23 NG/L (ref 0–14)
TURBIDITY: ABNORMAL
URINE HGB: ABNORMAL
UROBILINOGEN, URINE: NORMAL
WBC # BLD: 5.8 K/UL (ref 3.5–11.3)
WBC # BLD: ABNORMAL 10*3/UL
WBC UA: ABNORMAL /HPF (ref 0–4)
YEAST: ABNORMAL

## 2021-09-19 PROCEDURE — 87086 URINE CULTURE/COLONY COUNT: CPT

## 2021-09-19 PROCEDURE — 2060000000 HC ICU INTERMEDIATE R&B

## 2021-09-19 PROCEDURE — 87077 CULTURE AEROBIC IDENTIFY: CPT

## 2021-09-19 PROCEDURE — APPSS30 APP SPLIT SHARED TIME 16-30 MINUTES: Performed by: NURSE PRACTITIONER

## 2021-09-19 PROCEDURE — 87040 BLOOD CULTURE FOR BACTERIA: CPT

## 2021-09-19 PROCEDURE — 85379 FIBRIN DEGRADATION QUANT: CPT

## 2021-09-19 PROCEDURE — 84484 ASSAY OF TROPONIN QUANT: CPT

## 2021-09-19 PROCEDURE — G0378 HOSPITAL OBSERVATION PER HR: HCPCS

## 2021-09-19 PROCEDURE — 96361 HYDRATE IV INFUSION ADD-ON: CPT

## 2021-09-19 PROCEDURE — 80048 BASIC METABOLIC PNL TOTAL CA: CPT

## 2021-09-19 PROCEDURE — 2580000003 HC RX 258: Performed by: SPECIALIST

## 2021-09-19 PROCEDURE — 85025 COMPLETE CBC W/AUTO DIFF WBC: CPT

## 2021-09-19 PROCEDURE — 99285 EMERGENCY DEPT VISIT HI MDM: CPT

## 2021-09-19 PROCEDURE — 96374 THER/PROPH/DIAG INJ IV PUSH: CPT

## 2021-09-19 PROCEDURE — 87186 SC STD MICRODIL/AGAR DIL: CPT

## 2021-09-19 PROCEDURE — 83605 ASSAY OF LACTIC ACID: CPT

## 2021-09-19 PROCEDURE — 96375 TX/PRO/DX INJ NEW DRUG ADDON: CPT

## 2021-09-19 PROCEDURE — 6360000002 HC RX W HCPCS: Performed by: SPECIALIST

## 2021-09-19 PROCEDURE — 71045 X-RAY EXAM CHEST 1 VIEW: CPT

## 2021-09-19 PROCEDURE — 93005 ELECTROCARDIOGRAM TRACING: CPT | Performed by: SPECIALIST

## 2021-09-19 PROCEDURE — 81001 URINALYSIS AUTO W/SCOPE: CPT

## 2021-09-19 RX ORDER — ATORVASTATIN CALCIUM 10 MG/1
20 TABLET, FILM COATED ORAL DAILY
Status: DISCONTINUED | OUTPATIENT
Start: 2021-09-20 | End: 2021-09-20 | Stop reason: HOSPADM

## 2021-09-19 RX ORDER — ONDANSETRON 4 MG/1
4 TABLET, ORALLY DISINTEGRATING ORAL EVERY 8 HOURS PRN
Status: DISCONTINUED | OUTPATIENT
Start: 2021-09-19 | End: 2021-09-20 | Stop reason: HOSPADM

## 2021-09-19 RX ORDER — DULOXETIN HYDROCHLORIDE 30 MG/1
60 CAPSULE, DELAYED RELEASE ORAL DAILY
Status: DISCONTINUED | OUTPATIENT
Start: 2021-09-20 | End: 2021-09-20 | Stop reason: HOSPADM

## 2021-09-19 RX ORDER — ACETAMINOPHEN 650 MG/1
650 SUPPOSITORY RECTAL EVERY 6 HOURS PRN
Status: DISCONTINUED | OUTPATIENT
Start: 2021-09-19 | End: 2021-09-20 | Stop reason: HOSPADM

## 2021-09-19 RX ORDER — ALBUTEROL SULFATE 90 UG/1
2 AEROSOL, METERED RESPIRATORY (INHALATION) EVERY 4 HOURS PRN
Status: DISCONTINUED | OUTPATIENT
Start: 2021-09-19 | End: 2021-09-20 | Stop reason: HOSPADM

## 2021-09-19 RX ORDER — HYDROCODONE BITARTRATE AND ACETAMINOPHEN 5; 325 MG/1; MG/1
1 TABLET ORAL 2 TIMES DAILY PRN
Status: DISCONTINUED | OUTPATIENT
Start: 2021-09-19 | End: 2021-09-20 | Stop reason: HOSPADM

## 2021-09-19 RX ORDER — METOCLOPRAMIDE 10 MG/1
5 TABLET ORAL 3 TIMES DAILY
Status: DISCONTINUED | OUTPATIENT
Start: 2021-09-20 | End: 2021-09-20 | Stop reason: HOSPADM

## 2021-09-19 RX ORDER — SODIUM CHLORIDE 9 MG/ML
INJECTION, SOLUTION INTRAVENOUS CONTINUOUS
Status: ACTIVE | OUTPATIENT
Start: 2021-09-20 | End: 2021-09-20

## 2021-09-19 RX ORDER — ONDANSETRON 2 MG/ML
4 INJECTION INTRAMUSCULAR; INTRAVENOUS EVERY 6 HOURS PRN
Status: DISCONTINUED | OUTPATIENT
Start: 2021-09-19 | End: 2021-09-20 | Stop reason: HOSPADM

## 2021-09-19 RX ORDER — SODIUM CHLORIDE 0.9 % (FLUSH) 0.9 %
5-40 SYRINGE (ML) INJECTION EVERY 12 HOURS SCHEDULED
Status: DISCONTINUED | OUTPATIENT
Start: 2021-09-20 | End: 2021-09-20 | Stop reason: HOSPADM

## 2021-09-19 RX ORDER — SODIUM CHLORIDE 9 MG/ML
25 INJECTION, SOLUTION INTRAVENOUS PRN
Status: DISCONTINUED | OUTPATIENT
Start: 2021-09-19 | End: 2021-09-20 | Stop reason: HOSPADM

## 2021-09-19 RX ORDER — ONDANSETRON 2 MG/ML
4 INJECTION INTRAMUSCULAR; INTRAVENOUS ONCE
Status: COMPLETED | OUTPATIENT
Start: 2021-09-19 | End: 2021-09-19

## 2021-09-19 RX ORDER — 0.9 % SODIUM CHLORIDE 0.9 %
500 INTRAVENOUS SOLUTION INTRAVENOUS ONCE
Status: COMPLETED | OUTPATIENT
Start: 2021-09-19 | End: 2021-09-19

## 2021-09-19 RX ORDER — PROMETHAZINE HYDROCHLORIDE 25 MG/1
12.5 TABLET ORAL EVERY 6 HOURS PRN
Status: DISCONTINUED | OUTPATIENT
Start: 2021-09-19 | End: 2021-09-20 | Stop reason: HOSPADM

## 2021-09-19 RX ORDER — PANTOPRAZOLE SODIUM 40 MG/1
40 TABLET, DELAYED RELEASE ORAL
Status: DISCONTINUED | OUTPATIENT
Start: 2021-09-20 | End: 2021-09-20 | Stop reason: HOSPADM

## 2021-09-19 RX ORDER — POLYETHYLENE GLYCOL 3350 17 G/17G
17 POWDER, FOR SOLUTION ORAL DAILY PRN
Status: DISCONTINUED | OUTPATIENT
Start: 2021-09-19 | End: 2021-09-20 | Stop reason: HOSPADM

## 2021-09-19 RX ORDER — GUAIFENESIN/DEXTROMETHORPHAN 100-10MG/5
5 SYRUP ORAL EVERY 4 HOURS PRN
Status: DISCONTINUED | OUTPATIENT
Start: 2021-09-19 | End: 2021-09-20 | Stop reason: HOSPADM

## 2021-09-19 RX ORDER — SODIUM CHLORIDE 0.9 % (FLUSH) 0.9 %
5-40 SYRINGE (ML) INJECTION PRN
Status: DISCONTINUED | OUTPATIENT
Start: 2021-09-19 | End: 2021-09-20 | Stop reason: HOSPADM

## 2021-09-19 RX ORDER — GABAPENTIN 600 MG/1
600 TABLET ORAL 3 TIMES DAILY
Status: DISCONTINUED | OUTPATIENT
Start: 2021-09-20 | End: 2021-09-20 | Stop reason: HOSPADM

## 2021-09-19 RX ORDER — NICOTINE POLACRILEX 4 MG
15 LOZENGE BUCCAL PRN
Status: DISCONTINUED | OUTPATIENT
Start: 2021-09-19 | End: 2021-09-20 | Stop reason: HOSPADM

## 2021-09-19 RX ORDER — ACETAMINOPHEN 325 MG/1
650 TABLET ORAL EVERY 6 HOURS PRN
Status: DISCONTINUED | OUTPATIENT
Start: 2021-09-19 | End: 2021-09-20 | Stop reason: HOSPADM

## 2021-09-19 RX ORDER — DEXTROSE MONOHYDRATE 50 MG/ML
100 INJECTION, SOLUTION INTRAVENOUS PRN
Status: DISCONTINUED | OUTPATIENT
Start: 2021-09-19 | End: 2021-09-20 | Stop reason: HOSPADM

## 2021-09-19 RX ORDER — DEXTROSE MONOHYDRATE 25 G/50ML
12.5 INJECTION, SOLUTION INTRAVENOUS PRN
Status: DISCONTINUED | OUTPATIENT
Start: 2021-09-19 | End: 2021-09-20 | Stop reason: HOSPADM

## 2021-09-19 RX ADMIN — ONDANSETRON 4 MG: 2 INJECTION INTRAMUSCULAR; INTRAVENOUS at 20:48

## 2021-09-19 RX ADMIN — SODIUM CHLORIDE 500 ML: 9 INJECTION, SOLUTION INTRAVENOUS at 20:48

## 2021-09-19 ASSESSMENT — PAIN SCALES - GENERAL: PAINLEVEL_OUTOF10: 8

## 2021-09-19 ASSESSMENT — PAIN DESCRIPTION - ONSET: ONSET: ON-GOING

## 2021-09-19 ASSESSMENT — PAIN DESCRIPTION - PROGRESSION: CLINICAL_PROGRESSION: NOT CHANGED

## 2021-09-19 ASSESSMENT — PAIN DESCRIPTION - FREQUENCY: FREQUENCY: CONTINUOUS

## 2021-09-19 ASSESSMENT — PAIN DESCRIPTION - PAIN TYPE: TYPE: ACUTE PAIN

## 2021-09-19 ASSESSMENT — PAIN DESCRIPTION - DESCRIPTORS: DESCRIPTORS: ACHING

## 2021-09-19 ASSESSMENT — PAIN DESCRIPTION - LOCATION: LOCATION: HEAD

## 2021-09-20 ENCOUNTER — APPOINTMENT (OUTPATIENT)
Dept: CT IMAGING | Age: 66
DRG: 177 | End: 2021-09-20
Payer: MEDICARE

## 2021-09-20 ENCOUNTER — TELEPHONE (OUTPATIENT)
Dept: FAMILY MEDICINE CLINIC | Age: 66
End: 2021-09-20

## 2021-09-20 VITALS
RESPIRATION RATE: 24 BRPM | HEART RATE: 79 BPM | SYSTOLIC BLOOD PRESSURE: 94 MMHG | WEIGHT: 292.6 LBS | OXYGEN SATURATION: 94 % | BODY MASS INDEX: 44.34 KG/M2 | HEIGHT: 68 IN | TEMPERATURE: 98.2 F | DIASTOLIC BLOOD PRESSURE: 54 MMHG

## 2021-09-20 LAB
ABSOLUTE EOS #: 0.22 K/UL (ref 0–0.44)
ABSOLUTE IMMATURE GRANULOCYTE: 0.06 K/UL (ref 0–0.3)
ABSOLUTE LYMPH #: 1.06 K/UL (ref 1.1–3.7)
ABSOLUTE MONO #: 0.47 K/UL (ref 0.1–1.2)
ALBUMIN SERPL-MCNC: 3 G/DL (ref 3.5–5.2)
ALBUMIN/GLOBULIN RATIO: 0.8 (ref 1–2.5)
ALP BLD-CCNC: 106 U/L (ref 35–104)
ALT SERPL-CCNC: 6 U/L (ref 5–33)
ANION GAP SERPL CALCULATED.3IONS-SCNC: 9 MMOL/L (ref 9–17)
AST SERPL-CCNC: 13 U/L
BASOPHILS # BLD: 0 % (ref 0–2)
BASOPHILS ABSOLUTE: <0.03 K/UL (ref 0–0.2)
BILIRUB SERPL-MCNC: 0.24 MG/DL (ref 0.3–1.2)
BUN BLDV-MCNC: 27 MG/DL (ref 8–23)
BUN/CREAT BLD: 22 (ref 9–20)
C-REACTIVE PROTEIN: 87.3 MG/L (ref 0–5)
CALCIUM SERPL-MCNC: 8.9 MG/DL (ref 8.6–10.4)
CHLORIDE BLD-SCNC: 101 MMOL/L (ref 98–107)
CO2: 27 MMOL/L (ref 20–31)
CREAT SERPL-MCNC: 1.25 MG/DL (ref 0.5–0.9)
D-DIMER QUANTITATIVE: 0.73 MG/L FEU (ref 0–0.59)
DIFFERENTIAL TYPE: ABNORMAL
EOSINOPHILS RELATIVE PERCENT: 4 % (ref 1–4)
FERRITIN: 277 UG/L (ref 13–150)
GFR AFRICAN AMERICAN: 52 ML/MIN
GFR NON-AFRICAN AMERICAN: 43 ML/MIN
GFR SERPL CREATININE-BSD FRML MDRD: ABNORMAL ML/MIN/{1.73_M2}
GFR SERPL CREATININE-BSD FRML MDRD: ABNORMAL ML/MIN/{1.73_M2}
GLUCOSE BLD-MCNC: 111 MG/DL (ref 65–105)
GLUCOSE BLD-MCNC: 133 MG/DL (ref 70–99)
HCT VFR BLD CALC: 29.5 % (ref 36.3–47.1)
HEMOGLOBIN: 9.5 G/DL (ref 11.9–15.1)
IMMATURE GRANULOCYTES: 1 %
INR BLD: 1.1
LACTATE DEHYDROGENASE: 201 U/L (ref 135–214)
LYMPHOCYTES # BLD: 20 % (ref 24–43)
MCH RBC QN AUTO: 29.7 PG (ref 25.2–33.5)
MCHC RBC AUTO-ENTMCNC: 32.2 G/DL (ref 25.2–33.5)
MCV RBC AUTO: 92.2 FL (ref 82.6–102.9)
MONOCYTES # BLD: 9 % (ref 3–12)
NRBC AUTOMATED: 0 PER 100 WBC
PARTIAL THROMBOPLASTIN TIME: 32.7 SEC (ref 23.9–33.8)
PDW BLD-RTO: 13.8 % (ref 11.8–14.4)
PLATELET # BLD: 200 K/UL (ref 138–453)
PLATELET ESTIMATE: ABNORMAL
PMV BLD AUTO: 10.3 FL (ref 8.1–13.5)
POTASSIUM SERPL-SCNC: 3.9 MMOL/L (ref 3.7–5.3)
PROTHROMBIN TIME: 13.8 SEC (ref 11.5–14.2)
RBC # BLD: 3.2 M/UL (ref 3.95–5.11)
RBC # BLD: ABNORMAL 10*6/UL
SEG NEUTROPHILS: 66 % (ref 36–65)
SEGMENTED NEUTROPHILS ABSOLUTE COUNT: 3.47 K/UL (ref 1.5–8.1)
SODIUM BLD-SCNC: 137 MMOL/L (ref 135–144)
TOTAL PROTEIN: 6.6 G/DL (ref 6.4–8.3)
WBC # BLD: 5.3 K/UL (ref 3.5–11.3)
WBC # BLD: ABNORMAL 10*3/UL

## 2021-09-20 PROCEDURE — 85730 THROMBOPLASTIN TIME PARTIAL: CPT

## 2021-09-20 PROCEDURE — 85025 COMPLETE CBC W/AUTO DIFF WBC: CPT

## 2021-09-20 PROCEDURE — 2580000003 HC RX 258: Performed by: NURSE PRACTITIONER

## 2021-09-20 PROCEDURE — 36415 COLL VENOUS BLD VENIPUNCTURE: CPT

## 2021-09-20 PROCEDURE — G0378 HOSPITAL OBSERVATION PER HR: HCPCS

## 2021-09-20 PROCEDURE — 96367 TX/PROPH/DG ADDL SEQ IV INF: CPT

## 2021-09-20 PROCEDURE — 96372 THER/PROPH/DIAG INJ SC/IM: CPT

## 2021-09-20 PROCEDURE — 6360000002 HC RX W HCPCS: Performed by: NURSE PRACTITIONER

## 2021-09-20 PROCEDURE — 82728 ASSAY OF FERRITIN: CPT

## 2021-09-20 PROCEDURE — 96365 THER/PROPH/DIAG IV INF INIT: CPT

## 2021-09-20 PROCEDURE — 99222 1ST HOSP IP/OBS MODERATE 55: CPT | Performed by: FAMILY MEDICINE

## 2021-09-20 PROCEDURE — 85379 FIBRIN DEGRADATION QUANT: CPT

## 2021-09-20 PROCEDURE — 85610 PROTHROMBIN TIME: CPT

## 2021-09-20 PROCEDURE — 6370000000 HC RX 637 (ALT 250 FOR IP): Performed by: NURSE PRACTITIONER

## 2021-09-20 PROCEDURE — 86140 C-REACTIVE PROTEIN: CPT

## 2021-09-20 PROCEDURE — 83615 LACTATE (LD) (LDH) ENZYME: CPT

## 2021-09-20 PROCEDURE — 6360000004 HC RX CONTRAST MEDICATION: Performed by: NURSE PRACTITIONER

## 2021-09-20 PROCEDURE — 2709999900 CT CHEST PULMONARY EMBOLISM W CONTRAST

## 2021-09-20 PROCEDURE — 82947 ASSAY GLUCOSE BLOOD QUANT: CPT

## 2021-09-20 PROCEDURE — 80053 COMPREHEN METABOLIC PANEL: CPT

## 2021-09-20 RX ORDER — CEFDINIR 300 MG/1
300 CAPSULE ORAL 2 TIMES DAILY
Qty: 12 CAPSULE | Refills: 0 | Status: SHIPPED | OUTPATIENT
Start: 2021-09-20 | End: 2021-09-26

## 2021-09-20 RX ORDER — AZITHROMYCIN 500 MG/1
500 TABLET, FILM COATED ORAL DAILY
Qty: 6 TABLET | Refills: 0 | Status: SHIPPED | OUTPATIENT
Start: 2021-09-20 | End: 2021-09-26

## 2021-09-20 RX ORDER — SODIUM CHLORIDE 9 MG/ML
INJECTION, SOLUTION INTRAVENOUS CONTINUOUS
Status: DISCONTINUED | OUTPATIENT
Start: 2021-09-21 | End: 2021-09-20

## 2021-09-20 RX ORDER — ALBUTEROL SULFATE 90 UG/1
AEROSOL, METERED RESPIRATORY (INHALATION)
Qty: 18 G | Refills: 0 | Status: CANCELLED | OUTPATIENT
Start: 2021-09-20

## 2021-09-20 RX ORDER — GREEN TEA/HOODIA GORDONII 315-12.5MG
1 CAPSULE ORAL 3 TIMES DAILY
Qty: 30 TABLET | Refills: 0 | Status: CANCELLED | COMMUNITY
Start: 2021-09-20 | End: 2021-09-30

## 2021-09-20 RX ORDER — GREEN TEA/HOODIA GORDONII 315-12.5MG
1 CAPSULE ORAL 3 TIMES DAILY
Qty: 30 TABLET | Refills: 0 | COMMUNITY
Start: 2021-09-20 | End: 2021-09-30

## 2021-09-20 RX ORDER — CEFDINIR 300 MG/1
300 CAPSULE ORAL 2 TIMES DAILY
Qty: 12 CAPSULE | Refills: 0 | Status: CANCELLED | OUTPATIENT
Start: 2021-09-20 | End: 2021-09-26

## 2021-09-20 RX ORDER — AZITHROMYCIN 500 MG/1
500 TABLET, FILM COATED ORAL DAILY
Qty: 6 TABLET | Refills: 0 | Status: CANCELLED | OUTPATIENT
Start: 2021-09-20 | End: 2021-09-26

## 2021-09-20 RX ORDER — SODIUM CHLORIDE 9 MG/ML
INJECTION, SOLUTION INTRAVENOUS CONTINUOUS
Status: DISCONTINUED | OUTPATIENT
Start: 2021-09-20 | End: 2021-09-20 | Stop reason: HOSPADM

## 2021-09-20 RX ORDER — ALBUTEROL SULFATE 90 UG/1
AEROSOL, METERED RESPIRATORY (INHALATION)
Qty: 18 G | Refills: 0 | Status: SHIPPED | OUTPATIENT
Start: 2021-09-20 | End: 2022-01-06

## 2021-09-20 RX ADMIN — METOCLOPRAMIDE 5 MG: 10 TABLET ORAL at 07:54

## 2021-09-20 RX ADMIN — DULOXETINE HYDROCHLORIDE 60 MG: 30 CAPSULE, DELAYED RELEASE ORAL at 07:54

## 2021-09-20 RX ADMIN — ENOXAPARIN SODIUM 40 MG: 40 INJECTION SUBCUTANEOUS at 08:01

## 2021-09-20 RX ADMIN — AZITHROMYCIN MONOHYDRATE 500 MG: 500 INJECTION, POWDER, LYOPHILIZED, FOR SOLUTION INTRAVENOUS at 01:31

## 2021-09-20 RX ADMIN — ACETAMINOPHEN 650 MG: 325 TABLET ORAL at 01:02

## 2021-09-20 RX ADMIN — PANTOPRAZOLE SODIUM 40 MG: 40 TABLET, DELAYED RELEASE ORAL at 06:03

## 2021-09-20 RX ADMIN — HYDROCODONE BITARTRATE AND ACETAMINOPHEN 1 TABLET: 5; 325 TABLET ORAL at 08:01

## 2021-09-20 RX ADMIN — IOPAMIDOL 60 ML: 755 INJECTION, SOLUTION INTRAVENOUS at 00:28

## 2021-09-20 RX ADMIN — GABAPENTIN 600 MG: 600 TABLET, FILM COATED ORAL at 07:53

## 2021-09-20 RX ADMIN — GABAPENTIN 600 MG: 600 TABLET, FILM COATED ORAL at 14:40

## 2021-09-20 RX ADMIN — METOCLOPRAMIDE 5 MG: 10 TABLET ORAL at 14:41

## 2021-09-20 RX ADMIN — ATORVASTATIN CALCIUM 20 MG: 10 TABLET, FILM COATED ORAL at 07:54

## 2021-09-20 RX ADMIN — CEFTRIAXONE 1000 MG: 1 INJECTION, POWDER, FOR SOLUTION INTRAMUSCULAR; INTRAVENOUS at 00:47

## 2021-09-20 ASSESSMENT — ENCOUNTER SYMPTOMS
ABDOMINAL PAIN: 0
DIARRHEA: 1
COUGH: 1
NAUSEA: 1
SHORTNESS OF BREATH: 1
SORE THROAT: 0

## 2021-09-20 ASSESSMENT — PAIN SCALES - GENERAL
PAINLEVEL_OUTOF10: 5
PAINLEVEL_OUTOF10: 0
PAINLEVEL_OUTOF10: 6
PAINLEVEL_OUTOF10: 0
PAINLEVEL_OUTOF10: 0

## 2021-09-20 ASSESSMENT — PAIN DESCRIPTION - LOCATION: LOCATION: BACK

## 2021-09-20 ASSESSMENT — PAIN DESCRIPTION - PAIN TYPE: TYPE: CHRONIC PAIN

## 2021-09-20 NOTE — DISCHARGE SUMMARY
Discharge Summary    Maria Del Carmen Silverman Patient Status:  Inpatient    1955 MRN [id number]   Location [address] [place] Attending No att. providers found   [address] PCP Himanshu Main MD     Date of Admission: 2021    Date of Discharge: 2021    Admitting Diagnosis: Acute on chronic renal insufficiency [N28.9, N18.9]  Pneumonia due to COVID-19 virus [U07.1, J12.82]    Discharge Diagnosis: Principal Problem:    Pneumonia due to COVID-19 virus  Active Problems:    Dehydration    Acute cystitis with hematuria  Resolved Problems:    * No resolved hospital problems. *      Reason for Admission/HPI: Dry cough,nuasea and fatigue no fever. Has had covid symptoms with exposure at home since 2021 and was tested postive on  had received Regeneron treatment ON 9/15/2021. Has been Covid vaccinated. Was found to have UTI and was dehydrated with mild elevation of creatinine    Hospital Course: Patient was on rocephin and recieved IV hydration with improvement in creatinine. Has not been hypoxic .since patient was clinically improved was discharged home on oral antibiotics. Urine culture pending on discharge if needs antibiotic change will notify patient.     Consultations: None    Procedures: None    Complications: None    Disposition: Home    Discharge Condition: Good    Discharge Medications:  Fatimah Payton   Home Medication Instructions ULD:[id number]    Printed on:21   Medication Information                      albuterol sulfate  (90 Base) MCG/ACT inhaler  2 puffs every 4 hours and every 4 hours prn sob times 4 days then every 4 hours prn sob thereafter             atorvastatin (LIPITOR) 20 MG tablet  Take 1 tablet by mouth daily             azithromycin (ZITHROMAX) 500 MG tablet  Take 1 tablet by mouth daily for 6 days             cefdinir (OMNICEF) 300 MG capsule  Take 1 capsule by mouth 2 times daily for 6 days             Cholecalciferol (VITAMIN D3) 1.25 MG (91596 UT) CAPS  TAKE 1 CAPSULE ONCE A WEEK             DULoxetine (CYMBALTA) 60 MG extended release capsule  Take 1 capsule by mouth daily             Gabapentin (NEURONTIN PO)  Take 600 mg by mouth 5 times daily Only taking 3 times a day. Handicap Placard MISC  by Does not apply route Issue parking placard for person with disability; Applicant meets the qualifying disability criteria. Length of time expected to have disability_____Lifetime  Other __x__. Prescription expires in 5 years from issuing date. 02/05/2024             HYDROcodone-acetaminophen (NORCO) 5-325 MG per tablet  Indications: Patient is taking 1 tablet a day              metoclopramide (REGLAN) 5 MG tablet  Take 1 tablet by mouth 3 times daily             Misc. Devices (ROLLATOR) MISC  Use to reduce risk of falls when walking. Misc. Devices (WALKER) MISC  1 each by Does not apply route daily             Misc. Devices [facility]) MISC  1 Device by Does not apply route daily as needed (gait difficulty)             pantoprazole (PROTONIX) 40 MG tablet  Take 1 tablet by mouth 2 times daily take 1 tablet by mouth daily             pioglitazone-metFORMIN (ACTOPLUS MET)  MG per tablet  TAKE 1 TABLET TWICE A DAY WITH MEALS             Probiotic Acidophilus (FLORANEX) TABS  Take 1 tablet by mouth 3 times daily for 10 days             promethazine (PHENERGAN) 12.5 MG tablet  take 1 tablet by mouth every 6 hours for 30 DAYS             zoster recombinant adjuvanted vaccine (SHINGRIX) 50 MCG/0.5ML SUSR injection  Inject 0.5 mLs into the muscle See Admin Instructions 1 dose now and repeat in 2-6 months                 Follow up Visits:  Follow-up with PCP in 1 week      Total Time spent with patient and coordinating care: 30 minutes    Denise Wood MD  9/20/2021  7:44 PM

## 2021-09-20 NOTE — PROGRESS NOTES
Hospitalist Progress Note  9/20/2021 1:01 PM  Subjective:   Admit Date: 9/19/2021  PCP: Della Edwards MD    Interval History: Patient not needing supplemental oxygen. CR is improved. URine culture pending    Diet: ADULT DIET; Regular; 4 carb choices (60 gm/meal)  Medications:   Scheduled Meds:   sodium chloride flush  5-40 mL IntraVENous 2 times per day    enoxaparin  40 mg SubCUTAneous Daily    cefTRIAXone (ROCEPHIN) IV  1,000 mg IntraVENous Q24H    azithromycin  500 mg IntraVENous Q24H    insulin lispro  0-6 Units SubCUTAneous TID WC    insulin lispro  0-3 Units SubCUTAneous Nightly    atorvastatin  20 mg Oral Daily    DULoxetine  60 mg Oral Daily    gabapentin  600 mg Oral TID    metoclopramide  5 mg Oral TID    pantoprazole  40 mg Oral BID AC     Continuous Infusions:   sodium chloride 50 mL/hr at 09/20/21 0755    sodium chloride      dextrose       CBC:   Recent Labs     09/19/21 2045 09/20/21  0504   WBC 5.8 5.3   HGB 10.3* 9.5*    200     BMP:    Recent Labs     09/19/21 2045 09/20/21  0504    137   K 4.3 3.9   CL 96* 101   CO2 29 27   BUN 28* 27*   CREATININE 1.39* 1.25*   GLUCOSE 173* 133*     Hepatic:   Recent Labs     09/20/21  0504   AST 13   ALT 6   BILITOT 0.24*   ALKPHOS 106*     Troponin: No results for input(s): TROPONINI in the last 72 hours. BNP: No results for input(s): BNP in the last 72 hours. Lipids: No results for input(s): CHOL, HDL in the last 72 hours. Invalid input(s): LDLCALCU  INR:   Recent Labs     09/20/21  0504   INR 1.1       Objective:   Vitals: BP (!) 94/54   Pulse 79   Temp 98.2 °F (36.8 °C) (Tympanic)   Resp 24   Ht 5' 8\" (1.727 m)   Wt 292 lb 9.6 oz (132.7 kg)   LMP 08/24/2010 (Approximate)   SpO2 94%   BMI 44.49 kg/m²   General appearance: alert and cooperative with exam  HEENT: Eye: Normal external eye, conjunctiva, lids cornea, BONNIE.   Neck: no adenopathy, no carotid bruit, no JVD, supple, symmetrical, trachea midline and thyroid not enlarged, without dysplasia     Therapeutic opioid-induced constipation (OIC)     Stage 3a chronic kidney disease (HCC)     Pneumonia due to COVID-19 virus     Dehydration     Acute cystitis with hematuria      Karel Nath MD, MD  [facility]

## 2021-09-20 NOTE — ED PROVIDER NOTES
[address]      Pt Name: Brigida Gamez  MRN: [id number]  [place] 1955  Date of evaluation: 9/19/2021      CHIEF COMPLAINT       Chief Complaint   Patient presents with    Positive For Covid-19    Cough         HISTORY OF PRESENT ILLNESS    Brigida Gamez is a 72 y.o. female who presents to the emergency department for evaluation of cough with clear sputum, shortness of breath, nausea, diarrhea, dizziness and headache. Patient states symptoms started about 2 weeks ago when she was tested positive for Covid infection 5 days ago. She has had Pfizer vaccine in February and March 2021. She denies any fever, chills, sore throat or earache. She denies any generalized body ache, neck pain or back pain patient states her  tested positive for Covid infection recently but recovered with self-isolation at home. Patient grades headache as 8 out of 10 in intensity dull aching in nature. Patient states cough has gotten worse and she is concerned about Covid pneumonia. She denies any chest pain, palpitations, lightheadedness or diaphoresis. She also denies any abdominal pain. She denies any melena or hematochezia. She also denies any urinary frequency, urgency, dysuria or hematuria. There are no exacerbating or relieving factors. REVIEW OF SYSTEMS       Review of Systems   Constitutional: Positive for fatigue. Negative for chills and fever. HENT: Negative for congestion and sore throat. Respiratory: Positive for cough and shortness of breath. Cardiovascular: Negative for chest pain and palpitations. Gastrointestinal: Positive for diarrhea and nausea. Negative for abdominal pain. Genitourinary: Negative for dysuria and frequency. Neurological: Positive for dizziness and headaches. Negative for syncope. All other systems reviewed and are negative.        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 SURGICAL HISTORY      has a past surgical history that includes Hysterectomy (09/06/2005); Appendectomy; Upper gastrointestinal endoscopy; Knee arthroscopy (Left); Colonoscopy (05/03/2012); other surgical history (2011); Dental surgery (08/2015); Tonsillectomy; brain surgery (05/24/2016); Foreign Body Removal (05/28/2016); Total shoulder arthroplasty (Right, 10/18/2018); Shoulder arthroscopy (Right, 03/07/2019); Nerve Block (09/09/2019); Upper gastrointestinal endoscopy (05/27/2020); Upper gastrointestinal endoscopy (N/A, 12/23/2020); Colonoscopy (N/A, 12/23/2020); Back Injection (02/25/2021); Femur Closed Reduction (Left, 03/05/2020); polysomnography (11/30/2020); and Femur fracture surgery (Left, 03/06/2021). CURRENT MEDICATIONS       Previous Medications    ATORVASTATIN (LIPITOR) 20 MG TABLET    Take 1 tablet by mouth daily    CHOLECALCIFEROL (VITAMIN D3) 1.25 MG (96336 UT) CAPS    TAKE 1 CAPSULE ONCE A WEEK    DULOXETINE (CYMBALTA) 60 MG EXTENDED RELEASE CAPSULE    Take 1 capsule by mouth daily    GABAPENTIN (NEURONTIN PO)    Take 600 mg by mouth 5 times daily     HANDICAP PLACARD MISC    by Does not apply route Issue parking placard for person with disability; Applicant meets the qualifying disability criteria. Length of time expected to have disability_____Lifetime  Other __x__. Prescription expires in 5 years from issuing date.  02/05/2024    HYDROCODONE-ACETAMINOPHEN (NORCO) 5-325 MG PER TABLET    Indications: Patient is taking 1 tablet a day     LISINOPRIL-HYDROCHLOROTHIAZIDE (PRINZIDE;ZESTORETIC) 20-12.5 MG PER TABLET    TAKE 1 TABLET DAILY    METOCLOPRAMIDE (REGLAN) 5 MG TABLET    Take 1 tablet by mouth 3 times daily    MISC. DEVICES (ROLLATOR) MISC    Use to reduce risk of falls when walking. MISC. DEVICES (WALKER) MISC    1 each by Does not apply route daily    MISC. DEVICES (WHEELCHAIR) MISC    1 Device by Does not apply route daily as needed (gait difficulty)    PANTOPRAZOLE (PROTONIX) 40 MG TABLET    Take 1 tablet by mouth 2 times daily take 1 tablet by mouth daily    PIOGLITAZONE-METFORMIN (ACTOPLUS MET)  MG PER TABLET    TAKE 1 TABLET TWICE A DAY WITH MEALS    PROMETHAZINE (PHENERGAN) 12.5 MG TABLET    take 1 tablet by mouth every 6 hours for 30 DAYS    ZOSTER RECOMBINANT ADJUVANTED VACCINE (SHINGRIX) 50 MCG/0.5ML SUSR INJECTION    Inject 0.5 mLs into the muscle See Admin Instructions 1 dose now and repeat in 2-6 months       ALLERGIES     is allergic to asa [aspirin]. FAMILY HISTORY     She indicated that her mother is . She indicated that her father is . She indicated that her sister is alive. She indicated that her brother is alive. She indicated that her maternal grandmother is . She indicated that her maternal grandfather is . She indicated that her paternal grandmother is . She indicated that her paternal grandfather is . She indicated that her daughter is alive. She indicated that her son is alive. family history includes Cancer in her father and mother; Diabetes in her father; Hypertension in her father. SOCIAL HISTORY      reports that she has never smoked. She has never used smokeless tobacco. She reports current alcohol use. She reports that she does not use drugs. PHYSICAL EXAM     INITIAL VITALS:  height is 5' 8\" (1.727 m) and weight is 295 lb (133.8 kg). Her tympanic temperature is 98.2 °F (36.8 °C). Her blood pressure is 109/62 and her pulse is 82. Her respiration is 18 and oxygen saturation is 91%.       Physical Exam  Vitals and nursing note reviewed. Constitutional:       Appearance: She is well-developed. HENT:      Head: Normocephalic and atraumatic. Nose: Nose normal.   Eyes:      Extraocular Movements: Extraocular movements intact. Pupils: Pupils are equal, round, and reactive to light. Cardiovascular:      Rate and Rhythm: Normal rate and regular rhythm. Heart sounds: Normal heart sounds. No murmur heard. Pulmonary:      Effort: Pulmonary effort is normal. No respiratory distress. Breath sounds: Normal breath sounds. Abdominal:      General: Bowel sounds are normal. There is no distension. Palpations: Abdomen is soft. Tenderness: There is no abdominal tenderness. Musculoskeletal:      Cervical back: Normal range of motion and neck supple. Skin:     General: Skin is warm and dry. Neurological:      General: No focal deficit present. Mental Status: She is alert and oriented to person, place, and time. DIFFERENTIAL DIAGNOSIS/ MDM:     Covid pneumonia, dehydration, electrolyte imbalance, acute kidney injury, anemia    DIAGNOSTIC RESULTS     EKG: All EKG's are interpreted by the Emergency Department Physician who either signs or Co-signs this chart in the absence of a cardiologist.    EKG Interpretation    Interpreted by emergency department physician    Rhythm: normal sinus   Rate: normal  Axis: normal  Conduction: normal  ST Segments: no acute change  T Waves: no acute change  Q Waves: no acute change    Clinical Impression: no acute change, but this is a nonspecific EKG. RADIOLOGY:   Interpretation per the Radiologist below, if available at the time of this note:    XR CHEST PORTABLE    Result Date: 9/19/2021  EXAMINATION: ONE XRAY VIEW OF THE CHEST 9/19/2021 8:56 pm COMPARISON: None.  HISTORY: ORDERING SYSTEM PROVIDED HISTORY: Cough, SOB, Covid positive TECHNOLOGIST PROVIDED HISTORY: Cough, SOB, Covid positive Reason for Exam: COVID Acuity: Acute Type of Exam: Initial FINDINGS: There are mild to moderate multifocal infiltrates within the left lung. There may also be mild infiltrates within the right lung base, medially. The heart size is within normal limits. No effusion is identified. Findings consistent with pneumonia, given the history. ED BEDSIDE ULTRASOUND:       LABS:  Labs Reviewed   CBC WITH AUTO DIFFERENTIAL - Abnormal; Notable for the following components:       Result Value    RBC 3.54 (*)     Hemoglobin 10.3 (*)     Hematocrit 33.0 (*)     Immature Granulocytes 2 (*)     Absolute Lymph # 0.99 (*)     All other components within normal limits   BASIC METABOLIC PANEL W/ REFLEX TO MG FOR LOW K - Abnormal; Notable for the following components:    Glucose 173 (*)     BUN 28 (*)     CREATININE 1.39 (*)     Chloride 96 (*)     GFR Non- 38 (*)     GFR  46 (*)     All other components within normal limits   TROPONIN - Abnormal; Notable for the following components:    Troponin, High Sensitivity 23 (*)     All other components within normal limits   D-DIMER, QUANTITATIVE - Abnormal; Notable for the following components:    D-Dimer, Quant 0.78 (*)     All other components within normal limits   CULTURE, BLOOD 1   CULTURE, BLOOD 1   LACTIC ACID   URINE RT REFLEX TO CULTURE   TROPONIN   LACTIC ACID       Patient has mild anemia, acute on chronic renal insufficiency, mild hyperglycemia, mildly elevated troponin level and also elevated D-dimer. Her GFR is 38 and patient is getting IV fluids and then she will receive CT scan of the chest to rule out pulmonary embolism.     EMERGENCY DEPARTMENT COURSE:   Vitals:    Vitals:    09/19/21 1957 09/19/21 2100 09/19/21 2130   BP: 113/68 105/65 109/62   Pulse: 82     Resp: 18     Temp: 98.2 °F (36.8 °C)     TempSrc: Tympanic     SpO2: 97% 91%    Weight: 295 lb (133.8 kg)     Height: 5' 8\" (1.727 m)       -------------------------  BP: 109/62, Temp: 98.2 °F (36.8 °C), Pulse: 82, Resp: 18    Orders Placed This Encounter   Medications    0.9 % sodium chloride bolus    ondansetron (ZOFRAN) injection 4 mg    cefTRIAXone (ROCEPHIN) 1000 mg IVPB in 50 mL D5W minibag     Order Specific Question:   Antimicrobial Indications     Answer:   Pneumonia (CAP)    azithromycin (ZITHROMAX) 500 mg in D5W 250ml addavial     Order Specific Question:   Antimicrobial Indications     Answer:   Pneumonia (CAP)       During the emergency department course, patient was given normal saline bolus and Zofran 4 mg IV push. After blood cultures were obtained, patient was given Rocephin and azithromycin IV piggyback. She is not hypoxic upon arrival and did not receive Decadron. She will benefit from hospitalization for further evaluation and close monitoring and treatment for Covid pneumonia as well as acute on chronic renal insufficiency. I discussed the case with Nicole Porter CNP covering for hospitalist group and she kindly accepted the patient to be admitted on stepdown unit. Admission condition is stable. I have reviewed the disposition diagnosis with the patient and or their family/guardian. I have answered their questions and given discharge instructions. They voiced understanding of these instructions and did not have any further questions or complaints. Re-evaluation Notes    Upon reevaluation, patient is feeling much better and resting comfortably. CRITICAL CARE:   None        CONSULTS:  Nicole Porter CNP      PROCEDURES:  None    FINAL IMPRESSION      1. Pneumonia due to COVID-19 virus    2. Acute on chronic renal insufficiency          DISPOSITION/PLAN   DISPOSITION Decision To Admit    Condition on Disposition    Stable    PATIENT REFERRED TO:  No follow-up provider specified.     DISCHARGE MEDICATIONS:  New Prescriptions    No medications on file       (Please note that portions of this note were completed with a voice recognition program.  Efforts were made to edit the dictations but occasionally words are mis-transcribed.)    Kelli Cho MD,, MD, F.A.C.E.P.   Attending Emergency Physician                         Kelli Cho MD  09/20/21 [id number]

## 2021-09-20 NOTE — CARE COORDINATION
Case Management Initial Discharge Plan  Zan Wood             Met with:patient to discuss discharge plans. Information verified: address, contacts, phone number, , and insurance: Yes  Insurance Provider: Primary:  Payor: Guerita Romo / Plan: [facility] PPO / Product Type: Medicare /                                         Emergency Contact/Next of Kin name & number: verified  Who are involved in patient's support system?     PCP: Noam Mccord MD  Date of last visit: 2021    Discharge Planning    Living Arrangements:  Spouse/Significant Other     Home has 1 stories    Patient able to perform ADL's:Independent    Current Services (outpatient & in home) none    Is patient receiving oral anticoagulation therapy? No      Potential Assistance Needed:  N/A    Patient agreeable to home care: n/a  Freedom of choice provided:  n/a      Expected Discharge date:   2021    Patient expects to be discharged to: If home: is the family and/or caregiver wiling & able to provide support at home? yes  Who will be providing this support?     Follow Up Appointment: Best Day/ Time:      Transportation provider:   Transportation arrangements needed for discharge: No    Readmission Risk              Risk of Unplanned Readmission:  24             Does patient have a readmission risk score greater than 20?: Yes  If yes, follow-up appointment must be made within 7 days of discharge.      Goals of Care:       Educated patient on transitional options and patient is agreeable with plan      Discharge Plan: home with no needs          Electronically signed by Alexandro Marvin RN on 21 at 10:25 AM EDT

## 2021-09-20 NOTE — TELEPHONE ENCOUNTER
PCU follow up/dod 9-20, please advise of appt date and time when doing transcare call, pt will need a VV as she is Covid+

## 2021-09-20 NOTE — H&P
HOSPITALIST ADMISSION H&P      REASON FOR ADMISSION:  Covid-19 pneumonia, dehydration, UTI  ESTIMATED LENGTH OF STAY:>2 midnights, 3-4 days    ATTENDING/ADMITTING PHYSICIAN: Vin Castañeda MD  PCP: Kamar Perez MD    HISTORY OF PRESENT ILLNESS:      The patient is a 72 y.o. female patient of Kamar Perez MD who presents from the ER with c/o dry cough, nausea, fatigue, headaches, and generalized weakness worsening over the past 3 days. Her symptoms began on 09/07/2021 after she was exposed to her  who had covid-19. She came to urgent care for her symptoms and tested positive for covid-19 on 09/14/2021. She received a Regeneron infusion on 09/15/2021. She has underlying DMII, hypertension, CKD stage 3a, restrictive lung disease, and obesity. She did receive both Pfizer covid-19 vaccines. In ER, chest xray showed bilateral pneumonia. D. Dimer 0.78, CTA chest pending (per Dr. Agustin Bazan and radiology will use low dose contrast due to renal function). Troponin 23 --> 20, EKG showed sinus rhythm with PACs and no acute ST changes. WBC 5.8, lactic acid 1.2. Urinalysis shows UTI. Serum creatinine elevated at 1.39, up from 1.2 on 07/21/2021 and BUN elevated at 28 showing dehydration. Hypertension -- stable    DMII -- A1C 6.6    Anxiety/depression    Chronic pain syndrome    Wounds and LDAs present prior to admission: abdomen with multiple thick scabs -- patient picking      See below for additional PMH. Patient xula-zyrmxkjsnt-ktelryto-available records reviewed, including, but not limited to ER records, imaging results, lab results, office records, personal records, and OARRS -- no signs of abuse or diversion.      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[address]     left temporal    Obesity     Peripheral neuropathy     Pneumonia 8/30/2015    Pulmonary hypertension ([facility] [address] 01/01/2012    by echocardiogram    Shoulder pain, bilateral 08/21/2013    Type 2 diabetes mellitus ([facility] [address]     Unspecified essential hypertension     Essential hypertension    Vitamin D deficiency 11/05/2014           Past Surgical History:   Procedure Laterality Date    APPENDECTOMY      BACK INJECTION  02/25/2021    [facility] Right lumbar medial branch block using Fluroscopy    BRAIN SURGERY  05/24/2016    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 Rody Andre Gone COLONOSCOPY  05/03/2012    diverticular disease    COLONOSCOPY N/A 12/23/2020    COLONOSCOPY WITH BIOPSY performed by Pam Chao MD at [address] [facility] 1 hyperplastic polyp, random biopsies negative   [facility] DENTAL SURGERY  08/2015    full dental extraction    FEMUR CLOSED REDUCTION Left 03/05/2020    has [facility]    FEMUR FRACTURE SURGERY Left 03/06/2021    left retrograde femoral nailing.  Dr. Velazquez Lablianne, [place] REMOVAL  05/28/2016    left-sided temporal craniotomy wound reexploration with removal of small retained foreign body (plastic from Ruth clip from surgery 3 days prior), [facility], Dr. Leopoldo Lull  09/06/2005    supracervical hysterectomy bilateral salpingo oophectomy    KNEE ARTHROSCOPY Left     NERVE BLOCK  09/09/2019    right side L2 through L5 block nerve,facet joint,lumbar,medial branch per Dr Manpreet Issa at . [facility]  2011    endoscopic mucosal resection of duodenal polyp    POLYSOMNOGRAPHY  11/30/2020    no significant sleep apnea    SHOULDER ARTHROPLASTY Right 10/18/2018    Jaimie Han MD; done at [facility] SHOULDER ARTHROSCOPY Right 03/07/2019    revision arthroscopy with debridement,revision mini-open rotator cuff repair per Dr Mica Martin at [facility] ENDOSCOPY  05/27/2020    gastric ulcer, small hiatal hernia, Dr. José Miguel Bradley, [facility] N/A 12/23/2020    [facility] [address] Elizalde's esophagus       Medications Prior to Admission:    Not in a hospital admission. Allergies:    Asa [aspirin]    Social History:    reports that she has never smoked. She has never used smokeless tobacco. She reports current alcohol use. She reports that she does not use drugs. Family History:   family history includes Cancer in her father and mother; Diabetes in her father; Hypertension in her father. REVIEW OF SYSTEMS:  See HPI and problem list; otherwise no other new complaints with respect to eyes, ENT, neck, pulmonary, coronary, chest, GI, , endocrine, musculoskeletal, immune system/connective tissue disease, hematologic, neurologic, psychiatric, skin, lymphatics, or malignancies. Code status: patient/family wishes for Full Code at this time.     PHYSICAL EXAM:  Vitals:  /62   Pulse 82   Temp 98.2 °F (36.8 °C) (Tympanic)   Resp 18   Ht 5' 8\" (1.727 m)   Wt 295 lb (133.8 kg)   LMP 08/24/2010 (Approximate)   SpO2 91%   BMI 44.85 kg/m²     General: awake, alert and cooperative  HEENT: PERRLA, EMOI, External nose normal, Normocephalic, Atraumatic and Neck with full ROM  Neck: Supple, Carotid Pulses Present, No Masses, Tenderness, Nodularity and No Lymphadenopathy  Chest/Lungs: diminished throughout and Respirations even and unlabored  Cardiac: Regular Rate and Rhythm and Pedal Pulses Palpable Bilaterally  GI/Abdomen: obese, Bowel Sounds Present and Soft, Non-tender, without Guarding or Rebound Tenderness  : Not of insulin ([facility] [address]    Morbid obesity with BMI of 40.0-44.9, adult ([facility] [address]    Restrictive lung disease secondary to obesity    Calculus of gallbladder and bile duct without cholecystitis or obstruction    Nausea    Multiple gastric ulcers    Anxiety    Chronic tension-type headache, intractable    Chronic, continuous use of opioids    Complete tear of right rotator cuff    Depression    Fatigue    Lumbosacral spondylosis without myelopathy    Major depressive disorder, single episode, moderate (HCC)    Refractory migraine    Muscle spasms of neck    Obsessive-compulsive disorders    Spondylosis of lumbar spine    Spondylosis of thoracic region without myelopathy or radiculopathy    Spinal stenosis, lumbar region, without neurogenic claudication    Femur fracture, left ([facility] [address]    Elizalde's esophagus without dysplasia    Therapeutic opioid-induced constipation (OIC)    Stage 3a chronic kidney disease ([facility] [address]    Pneumonia due to COVID-19 virus    Dehydration    Acute cystitis with hematuria       PLAN:    1. Covid-19 pneumonia -- telemetry monitoring, pulse oximetry, IV antibiotics, supplemental oxygen prn, RT protocols, albuterol, prone positioning, supportive care -- if hypoxia develops would initiate decadron  2. CKD with dehydration -- gentle IV hydration, holding home zestoretic -- avoid fluid overload with covid-19, but hydration with CT contrast imperative -- monitor fluid balance, I&Os and renal function, avoid nephrotoxins  3. UTI -- IV Rocephin, urine culture pending  4. Hypertension -- stable, monitor  5. DMII -- carb controlled diet, low ISS, monitor and titrate prn  6. DVT prophylaxis   7. Home medications reviewed  8.  See orders     Note that over 50 minutes was spent in evaluation of the patient, review of the chart and pertinent records, discussion with family/staff, etc    SHANNA Harris - CNP,NP-C, FNP-BC  11:26 PM  9/19/2021

## 2021-09-20 NOTE — DISCHARGE INSTR - DIET

## 2021-09-20 NOTE — ED TRIAGE NOTES
Patient tested positive for COVID-19 on 9-14-21, but had symptoms the previous week. Patient states she is coughing more and is concerned about COVID pneumonia.

## 2021-09-20 NOTE — PLAN OF CARE
Problem: Falls - Risk of:  Goal: Will remain free from falls  Description: Will remain free from falls  9/20/2021 0319 by Crys Peterson RN  Outcome: Ongoing  9/20/2021 0043 by Elham Perez RN  Outcome: Ongoing  Goal: Absence of physical injury  Description: Absence of physical injury  9/20/2021 0319 by Crys Peterson RN  Outcome: Ongoing  9/20/2021 0043 by Elham Perez RN  Outcome: Ongoing     Problem: Airway Clearance - Ineffective  Goal: Achieve or maintain patent airway  9/20/2021 0319 by Crys Peterson RN  Outcome: Ongoing  9/20/2021 0043 by Elham Perez RN  Outcome: Ongoing     Problem: Gas Exchange - Impaired  Goal: Absence of hypoxia  9/20/2021 0319 by Crys Peterson RN  Outcome: Ongoing  9/20/2021 0043 by Elham Perez RN  Outcome: Ongoing  Goal: Promote optimal lung function  9/20/2021 0319 by Crys Peterson RN  Outcome: Ongoing  9/20/2021 0043 by Elham Perez RN  Outcome: Ongoing     Problem: Breathing Pattern - Ineffective  Goal: Ability to achieve and maintain a regular respiratory rate  9/20/2021 0319 by Crys Peterson RN  Outcome: Ongoing  9/20/2021 0043 by Elham Perez RN  Outcome: Ongoing     Problem:  Body Temperature -  Risk of, Imbalanced  Goal: Ability to maintain a body temperature within defined limits  9/20/2021 0319 by Crys Peterson RN  Outcome: Ongoing  9/20/2021 0043 by Elham Perez RN  Outcome: Ongoing  Goal: Will regain or maintain usual level of consciousness  9/20/2021 0319 by rCys Peterson RN  Outcome: Ongoing  9/20/2021 0043 by Elham Perez RN  Outcome: Ongoing  Goal: Complications related to the disease process, condition or treatment will be avoided or minimized  9/20/2021 0319 by Crys Peterson RN  Outcome: Ongoing  9/20/2021 0043 by Elham Perez RN  Outcome: Ongoing     Problem: Isolation Precautions - Risk of Spread of Infection  Goal: Prevent transmission of infection  9/20/2021 0319 by Sukhwinder Pratt RN  Outcome: Ongoing  9/20/2021 0043 by Nehal Thacker RN  Outcome: Ongoing     Problem: Nutrition Deficits  Goal: Optimize nutritional status  9/20/2021 0319 by Sukhwinder Pratt RN  Outcome: Ongoing  9/20/2021 0043 by Nehal Thacker RN  Outcome: Ongoing     Problem: Risk for Fluid Volume Deficit  Goal: Maintain normal heart rhythm  9/20/2021 0319 by Sukhwinder Pratt RN  Outcome: Ongoing  9/20/2021 0043 by Nehal Thacker RN  Outcome: Ongoing  Goal: Maintain absence of muscle cramping  9/20/2021 0319 by Sukhwinder Pratt RN  Outcome: Ongoing  9/20/2021 0043 by Nehal Thacker RN  Outcome: Ongoing  Goal: Maintain normal serum potassium, sodium, calcium, phosphorus, and pH  9/20/2021 0319 by Sukhwinder Pratt RN  Outcome: Ongoing  9/20/2021 0043 by Nehal Thacker RN  Outcome: Ongoing     Problem: Loneliness or Risk for Loneliness  Goal: Demonstrate positive use of time alone when socialization is not possible  9/20/2021 0319 by Sukhwinder Pratt RN  Outcome: Ongoing  9/20/2021 0043 by Nehal Thacker RN  Outcome: Ongoing     Problem: Fatigue  Goal: Verbalize increase energy and improved vitality  9/20/2021 0319 by Sukhwinder Pratt RN  Outcome: Ongoing  9/20/2021 0043 by Nehal Thacker RN  Outcome: Ongoing     Problem: Patient Education: Go to Patient Education Activity  Goal: Patient/Family Education  9/20/2021 0319 by Sukhwinder Pratt RN  Outcome: Ongoing  9/20/2021 0043 by Neahl Thacker RN  Outcome: Ongoing

## 2021-09-20 NOTE — RT PROTOCOL NOTE

## 2021-09-20 NOTE — FLOWSHEET NOTE
09/20/21 1227   Encounter Summary   Services provided to: Patient   Referral/Consult From: Rounding  (by phone)   Place of [address]  (pt being discharged.)   Continue Visiting   (9/20/21)   Complexity of Encounter Low   Length of Encounter 15 minutes   Spiritual/Yazidism   Type Spiritual support   Assessment Approachable   Intervention Active listening;Sustaining presence/ Ministry of presence   Outcome Expressed gratitude;Engaged in conversation

## 2021-09-21 ENCOUNTER — CARE COORDINATION (OUTPATIENT)
Dept: CASE MANAGEMENT | Age: 66
End: 2021-09-21

## 2021-09-21 DIAGNOSIS — U07.1 PNEUMONIA DUE TO COVID-19 VIRUS: Primary | ICD-10-CM

## 2021-09-21 DIAGNOSIS — J12.82 PNEUMONIA DUE TO COVID-19 VIRUS: Primary | ICD-10-CM

## 2021-09-21 LAB
CULTURE: ABNORMAL
Lab: ABNORMAL
SPECIMEN DESCRIPTION: ABNORMAL

## 2021-09-21 PROCEDURE — 1111F DSCHRG MED/CURRENT MED MERGE: CPT | Performed by: FAMILY MEDICINE

## 2021-09-21 NOTE — CARE COORDINATION
or worsening symptoms encounter was not routed to provider for escalation. Discussed follow-up appointments. If no appointment was previously scheduled, appointment scheduling offered: Yes. [address] follow up appointment(s):   Future Appointments   Date Time Provider Va Noel   10/4/2021 10:10 AM Katarzyna Agarwal MD [facility]   10/4/2021  2:00 PM Estela Murry MD [facility]   11/16/2021 11:15 AM [facility] MAMMO ROOM MD MAMMO [facility] [place]   11/16/2021 11:30 AM [facility] DEXA ROOM MD DEXA [facility] [place]   8/2/2022  1:00 PM Trista Olson MD [facility]     Non-[facility] follow up appointment(s): n/a    Non-face-to-face services provided:  Scheduled appointment with PCP-states she will call to schedule- virtual if needed  Scheduled appointment with Specialist-confirms appt with Dr Ender Art ([place]) 10/4 and Dr Deanna Murry (GI) 10/4  Obtained and reviewed discharge summary and/or continuity of care documents  Assessment and support for treatment adherence and medication management-[id number] complete     Advance Care Planning:   Does patient have an Advance Directive:  decision maker updated. Educated patient about risk for severe COVID-19 due to risk factors according to CDC guidelines. CTN reviewed discharge instructions, medical action plan and red flag symptoms with the patient who verbalized understanding. Discussed COVID vaccination status: Yes and has been vaccinated. Education provided on COVID-19 vaccination as appropriate. Discussed exposure protocols and quarantine with CDC Guidelines. Patient was given an opportunity to verbalize any questions and concerns and agrees to contact CTN or health care provider for questions related to their healthcare. Reviewed and educated patient on any new and changed medications related to discharge diagnosis     Was patient discharged with a pulse oximeter? No but educated on having one at home to monitor SpO2 at home s/p COVID pneumonia.  Discussed and confirmed pulse oximeter discharge instructions and when to notify provider or seek emergency care. CTN provided contact information. Plan for follow-up call in 3-5 days based on severity of symptoms and risk factors.           Care Transitions 24 Hour Call    Do you have any ongoing symptoms?: No  Do you have a copy of your discharge instructions?: Yes  Do you have all of your prescriptions and are they filled?: Yes  Have you been contacted by a [address]?: No  Have you scheduled your follow up appointment?: Yes  How are you going to get to your appointment?: Car - family or friend to transport  Were you discharged with any Home Care or Post Acute Services: No  Do you feel like you have everything you need to keep you well at home?: Yes  Care Transitions Interventions         Follow Up  Future Appointments   Date Time Provider Va Noel   10/4/2021 10:10 AM Sukhdev Abebe MD [facility]   10/4/2021  2:00 PM Estela Acevedo MD [facility]   11/16/2021 11:15 AM FREEMAN URRUTIA MD MAMMO [facility] [place]   11/16/2021 11:30 AM [address] DEXA [facility] [place]   8/2/2022  1:00 PM [facility]       Randolph Simpson, RN

## 2021-09-22 LAB
EKG ATRIAL RATE: 79 BPM
EKG P AXIS: 27 DEGREES
EKG P-R INTERVAL: 144 MS
EKG Q-T INTERVAL: 386 MS
EKG QRS DURATION: 92 MS
EKG QTC CALCULATION (BAZETT): 442 MS
EKG R AXIS: 10 DEGREES
EKG T AXIS: 20 DEGREES
EKG VENTRICULAR RATE: 79 BPM

## 2021-09-22 NOTE — TELEPHONE ENCOUNTER
Patient was contacted and scheduled Vv for 09/29/2021with Dr Osiel Jarvis. She was contacted by care coordinator yesterday. Since being home she has started to have diarrhea. Denies fever,slightly nauseated but no vomiting. Advised patient to try BRAT diet. She reports that she is forcing fluids as much as she can tolerate.

## 2021-09-24 ENCOUNTER — CARE COORDINATION (OUTPATIENT)
Dept: CASE MANAGEMENT | Age: 66
End: 2021-09-24

## 2021-09-24 NOTE — CARE COORDINATION
Lud[facility] Transitions Follow Up Call- COVID risk follow up call       2021    Patient: Michelet Leong  Patient : 1955   MRN: [id number]  Reason for Admission: pneumonia d/t COVID-19, HTN, Chronic renal insufficiency   Discharge Date: 21 RARS: Readmission Risk Score: 24         Spoke with: Dusty Russell     Call to pt who states she is doing pretty good. States she has had some diarrhea, started on BRAT diet (per PCP) and her  bought her some imodium and Pepto diarrhea and a probiotic which has helped   States understanding she should drink small portions at a time but frequently throughout the day   States breathing has been good and coughing just a little   States she has not needed inhaler   Confirms appts listed below  Denies needs  Encouraged to call writer/ CTC or providers if questions or concerns- v/u     Care Transitions Subsequent and Final Call    Subsequent and Final Calls  Do you have any ongoing symptoms?: No  Have your medications changed?: No  Do you have any questions related to your medications?: No  Do you currently have any active services?: No  Do you have any needs or concerns that I can assist you with?: No  Identified Barriers: Lack of Education  Care Transitions Interventions  Other Interventions:            Follow Up  Future Appointments   Date Time Provider Va Noel   2021 11:40 AM Monica Alberts MD [facility]   10/4/2021 10:10 AM [facility]   10/4/2021  2:00 PM [facility]   2021 11:15 AM [facility] MAMMO ROOM MD MAMMO [facility] [place]   2021 11:30 AM [facility] DEXA ROOM MD DEXA [facility] [place]   2022  1:00 PM Monica Alberts MD [facility]       Amilcar Hoskins RN

## 2021-09-26 LAB
CULTURE: NORMAL
CULTURE: NORMAL
Lab: NORMAL
Lab: NORMAL
SPECIMEN DESCRIPTION: NORMAL
SPECIMEN DESCRIPTION: NORMAL

## 2021-09-29 ENCOUNTER — VIRTUAL VISIT (OUTPATIENT)
Dept: FAMILY MEDICINE CLINIC | Age: 66
End: 2021-09-29
Payer: MEDICARE

## 2021-09-29 DIAGNOSIS — R19.7 DIARRHEA, UNSPECIFIED TYPE: ICD-10-CM

## 2021-09-29 DIAGNOSIS — U07.1 COVID-19: Primary | ICD-10-CM

## 2021-09-29 DIAGNOSIS — N28.9 ACUTE ON CHRONIC RENAL INSUFFICIENCY: ICD-10-CM

## 2021-09-29 DIAGNOSIS — N18.9 ACUTE ON CHRONIC RENAL INSUFFICIENCY: ICD-10-CM

## 2021-09-29 PROCEDURE — 99213 OFFICE O/P EST LOW 20 MIN: CPT | Performed by: FAMILY MEDICINE

## 2021-09-29 PROCEDURE — 99212 OFFICE O/P EST SF 10 MIN: CPT

## 2021-09-29 PROCEDURE — 1111F DSCHRG MED/CURRENT MED MERGE: CPT | Performed by: FAMILY MEDICINE

## 2021-09-29 NOTE — PROGRESS NOTES
Transitional Care Office Visit    Date of Face-to-Face: 9/29/2021    Here for follow after hospitalization for: renal insufficiency,pneumonia due to Covid 19 virus [facility] 09/19/2021 to 09/20/2021    Persons at visit: self    Medication changes during hospitalization: patient completed Zithromax 500 mg one tablet daily x 6 days, Omnicef 300 mg Bid x 6 days,Floranex Tid, Albuterol inhaler prn. Procedures during hospitalization: none    Activity:as tolerated    Any medication changes since post-hospitalization phone call? No.    Any treatment changes since post-hospitalization phone call?   No.    Other follow-up appointments scheduled:  Dr Marlo Keller 10/04/2021

## 2021-09-29 NOTE — PROGRESS NOTES
Post-Discharge Transitional Care Management Services or Hospital Follow Up      Sukumar Waller   YOB: 1955    Date of Office Visit:  9/29/2021  Date of Hospital Admission: 9/19/21  Date of Hospital Discharge: 9/20/21  Risk of hospital readmission (high >=14%.  Medium >=10%) :Readmission Risk Score: 24      Care management risk score Rising risk (score 2-5) and Complex Care (Scores >=6): 6     Non face to face  following discharge, date last encounter closed (first attempt may have been earlier): 9/21/2021 11:47 AM    Call initiated 2 business days of discharge: Yes    Patient Active Problem List   Diagnosis    Shoulder pain, bilateral    Cervical spine pain    Knee pain, left    Essential hypertension    Pulmonary hypertension ([address]    Vitamin D deficiency    Encounter for long-term (current) use of other medications    Headache    Subacute sphenoidal sinusitis    Type 2 diabetes mellitus with microalbuminuria ([address]    Peripheral neuropathy    Dizziness    H/O fall    Chronic sinusitis    Gastroesophageal reflux disease    Microalbuminuria    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[address]    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[address]    Type 2 diabetes mellitus with microalbuminuria, without long-term current use of insulin ([address]    Morbid obesity with BMI of 40.0-44.9, adult ([address]    Restrictive lung disease secondary to obesity    Calculus of gallbladder and bile duct without cholecystitis or obstruction    Nausea    Multiple gastric ulcers    Anxiety    Chronic tension-type headache, intractable    Chronic, continuous use of opioids    Complete tear of right rotator cuff    Depression    Fatigue    Lumbosacral spondylosis without myelopathy    Major depressive disorder, single episode, moderate (HCC)    Refractory migraine    Muscle spasms of neck    Obsessive-compulsive disorders    Spondylosis of lumbar spine    Spondylosis of thoracic region without myelopathy or radiculopathy    Spinal stenosis, lumbar region, without neurogenic claudication    Femur fracture, left (HCC)    Elizalde's esophagus without dysplasia    Therapeutic opioid-induced constipation ([facility]IC)    Stage 3a chronic kidney disease (HCC)    Pneumonia due to COVID-19 virus    Dehydration    Acute cystitis with hematuria       Allergies   Allergen Reactions    Asa [Aspirin] Other (See Comments)     Stomach irritation       Medications listed as ordered at the time of discharge from hospital   Amado Morrison   Home Medication Instructions SINTIA:[id number]    Printed on:09/29/21 1226   Medication Information                      albuterol sulfate  (90 Base) MCG/ACT inhaler  2 puffs every 4 hours and every 4 hours prn sob times 4 days then every 4 hours prn sob thereafter             atorvastatin (LIPITOR) 20 MG tablet  Take 1 tablet by mouth daily             Cholecalciferol (VITAMIN D3) 1.25 MG (07285 UT) CAPS  TAKE 1 CAPSULE ONCE A WEEK             DULoxetine (CYMBALTA) 60 MG extended release capsule  Take 1 capsule by mouth daily             Gabapentin (NEURONTIN PO)  Take 600 mg by mouth 5 times daily Only taking 3 times a day. Handicap Placard MISC  by Does not apply route Issue parking placard for person with disability; Applicant meets the qualifying disability criteria. Length of time expected to have disability_____Lifetime  Other __x__. Prescription expires in 5 years from issuing date. 02/05/2024             HYDROcodone-acetaminophen (NORCO) 5-325 MG per tablet  Indications: Patient is taking 1 tablet a day              metoclopramide (REGLAN) 5 MG tablet  Take 1 tablet by mouth 3 times daily             Misc. Devices (ROLLATOR) MISC  Use to reduce risk of falls when walking. Misc. Devices (WALKER) MISC  1 each by Does not apply route daily             Misc.  Devices [facility]) MISC  1 Device by Does not apply route daily as needed (gait difficulty)             pantoprazole (PROTONIX) 40 MG tablet  Take 1 tablet by mouth 2 times daily take 1 tablet by mouth daily             pioglitazone-metFORMIN (ACTOPLUS MET)  MG per tablet  TAKE 1 TABLET TWICE A DAY WITH MEALS             Probiotic Acidophilus (FLORANEX) TABS  Take 1 tablet by mouth 3 times daily for 10 days             promethazine (PHENERGAN) 12.5 MG tablet  take 1 tablet by mouth every 6 hours for 30 DAYS                   Medications marked \"taking\" at this time  Outpatient Medications Marked as Taking for the 9/29/21 encounter (Virtual Visit) with Leilani Pa MD   Medication Sig Dispense Refill    albuterol sulfate  (90 Base) MCG/ACT inhaler 2 puffs every 4 hours and every 4 hours prn sob times 4 days then every 4 hours prn sob thereafter 18 g 0    Probiotic Acidophilus (FLORANEX) TABS Take 1 tablet by mouth 3 times daily for 10 days 30 tablet 0    promethazine (PHENERGAN) 12.5 MG tablet take 1 tablet by mouth every 6 hours for 30 DAYS      metoclopramide (REGLAN) 5 MG tablet Take 1 tablet by mouth 3 times daily 90 tablet 0    pantoprazole (PROTONIX) 40 MG tablet Take 1 tablet by mouth 2 times daily take 1 tablet by mouth daily (Patient taking differently: Take 40 mg by mouth 2 times daily take 1 tablet by mouth daily. Patient states she takes 2 pills at the same times in the morning instead of 1 pill twice a day.) 60 tablet 5    DULoxetine (CYMBALTA) 60 MG extended release capsule Take 1 capsule by mouth daily 90 capsule 1    pioglitazone-metFORMIN (ACTOPLUS MET)  MG per tablet TAKE 1 TABLET TWICE A DAY WITH MEALS (Patient taking differently: TAKE 1 TABLET TWICE A DAY WITH MEALS. Patient states she takes 2 pills in the morning.) 180 tablet 1    Cholecalciferol (VITAMIN D3) 1.25 MG (77198 UT) CAPS TAKE 1 CAPSULE ONCE A WEEK 13 capsule 1    atorvastatin (LIPITOR) 20 MG tablet Take 1 tablet by mouth daily 30 tablet 3    Misc.  Devices [facility]) MISC 1 Device by Does not apply route daily as needed (gait difficulty) 1 each 0    Misc. Devices (WALKER) MISC 1 each by Does not apply route daily 1 each 0    HYDROcodone-acetaminophen (NORCO) 5-325 MG per tablet Indications: Patient is taking 1 tablet a day       Gabapentin (NEURONTIN PO) Take 600 mg by mouth 5 times daily Only taking 3 times a day.  Handicap Placard MISC by Does not apply route Issue parking placard for person with disability; Applicant meets the qualifying disability criteria. Length of time expected to have disability_____Lifetime  Other __x__. Prescription expires in 5 years from issuing date. 02/05/2024 1 each 0    Misc. Devices (ROLLATOR) MISC Use to reduce risk of falls when walking. 1 each 0        Medications patient taking as of now reconciled against medications ordered at time of hospital discharge: Yes    Chief Complaint   Patient presents with   [address] from Hospital       History of Present illness - Follow up of Hospital diagnosis(es): Covid-19 pneumonia, acute on chronic renal insufficiency    Inpatient course: Discharge summary reviewed- see chart. Interval history/Current status: She states that she has had improvement in diarrhea since she was discharged from the hospital.        There were no vitals filed for this visit. There is no height or weight on file to calculate BMI. Wt Readings from Last 3 Encounters:   09/20/21 292 lb 9.6 oz (132.7 kg)   09/14/21 299 lb (135.6 kg)   08/23/21 299 lb (135.6 kg)     BP Readings from Last 3 Encounters:   09/20/21 (!) 94/54   09/15/21 113/62   09/14/21 110/70        Physical Exam:  There were no vitals filed for this visit.    Patient-Reported Vitals 9/29/2021   Patient-Reported Weight (No Data)   Patient-Reported Height (No Data)   Patient-Reported Systolic (No Data)   Patient-Reported Diastolic (No Data)   Patient-Reported Pulse (No Data)   Patient-Reported Temperature (No Data)   Patient-Reported SpO2 (No Data)   Patient-Reported Peak Flow -        Constitutional:   She appears well-developed and well-nourished. She is in no apparent distress. Mental status:  She is alert and awake. She is oriented to person/place/time. She is able to follow commands. Eyes:  EOM are normal  Sclera are normal  There is no visible discharge. HENT:   Normocephalic, atraumatic. Mouth/throat: Mucous membranes are moist.    Neck: There is no visualized mass. Pulmonary/Chest: The respiratory effort is normal.  There are no visualized signs of difficulty breathing or respiratory distress. Musculoskeletal:  There is normal range of motion of the neck. Neurological:   There is no facial asymmetry (cranial nerve 7 motor function). (Exam is limited due to video visit.)   There is no gaze palsy. Skin:  There are no significant exanthematous lesions or discoloration noted on facial skin. Psychiatric:  Affect is normal.         Other pertinent observable physical exam findings:  She responds to questions appropriately. Speech is clear. There is no observed dyspnea with conversation. Dress and grooming are appropriate. Assessment/Plan:  1. COVID-19  Her symptoms have been improving since she was discharged. - [place] DISCHARGE MEDS RECONCILED W/ CURRENT OUTPATIENT MED LIST    2. Acute on chronic renal insufficiency  She was advised to drink adequate fluids. She has an appointment scheduled with Dr. Nell Pete 10/4/2021.    [facility] [place] [facility]      3. Diarrhea  She was advised to continue a probiotic supplement. She was advised to continue eating yogurt. She was advised to follow up if symptoms worsen or do not resolve. Medical Decision Making: low complexity       I will have staff call her to schedule a visit in 3 months. [facility], was evaluated through a synchronous (real-time) audio-video encounter. The patient (or guardian if applicable) is aware that this is a billable service.  Verbal consent to proceed has been obtained within the past 12 months. The visit was conducted pursuant to the emergency declaration under the [address] [place] [address] and the [employer] General Act. Patient identification was verified, and a caregiver was present when appropriate. The patient was located in a state where the provider was credentialed to provide care. Total time spent for this encounter: Not billed by time    --Sascha Bob MD on 10/1/2021 at 11:58 PM    An electronic signature was used to authenticate this note.

## 2021-09-30 ENCOUNTER — CARE COORDINATION (OUTPATIENT)
Dept: CASE MANAGEMENT | Age: 66
End: 2021-09-30

## 2021-09-30 ENCOUNTER — TELEPHONE (OUTPATIENT)
Dept: GASTROENTEROLOGY | Age: 66
End: 2021-09-30

## 2021-09-30 NOTE — TELEPHONE ENCOUNTER
Patient called [facility] to cancel appt on 10/4/2021 returned call to see if she wanted to reschedule .  Said she would call back

## 2021-09-30 NOTE — CARE COORDINATION
L[facility] Transitions Follow Up Call- COVID risk follow up call       2021    Patient: Jacklyn Simons  Patient : 1955   MRN: [id number]  Reason for Admission: pneumonia d/t COVID-19, HTN, Chronic renal insufficiency   Discharge Date: 21 RARS: Readmission Risk Score: 24         Spoke with: Nasra Conn     Needs to be reviewed by the provider   medications-continue or hold reglan>     Method of communication with provider : chart routing             Call to pt who states she is doing pretty good  States breathing is okay and she hasn't needed to use inhaler in a while   States still having the loose stools, eating bland diet and taking the probiotic, imodium and pepto diarrhea. States she had virtual appt yesterday with PCP   States she did have a frozen meal last night but ate bland food again today   Confirms she is out of isolation  Denies needs  Encouraged to call writer/ CTC or providers if questions or concerns- v/u     1600- Dr Rsoalinda Ortiz responded to message- \"I recommend that she continue Reglan. Please have her call if her symptoms are not improving. \"  Call to pt to inform of this- states understanding and will call her if it continues or worsens       Care Transitions Subsequent and Final Call    Subsequent and Final Calls  Do you have any ongoing symptoms?: No  Have your medications changed?: No  Do you have any questions related to your medications?: No  Do you currently have any active services?: No  Do you have any needs or concerns that I can assist you with?: No  Identified Barriers: Lack of Education  Care Transitions Interventions  Other Interventions:            Follow Up  Future Appointments   Date Time Provider Va Noel   10/4/2021 10:10 AM Julia Sow MD [facility]   10/4/2021  2:00 PM Estela Roe MD [facility]   2021 11:15 AM MD MAMMO ROOM MD MAMMO [facility] [place]   2021 11:30 AM MD DEXA ROOM MD DEXA [facility] [place]   2022  1:00 PM [facility]       Pinky Castellanos RN

## 2021-10-04 ENCOUNTER — VIRTUAL VISIT (OUTPATIENT)
Dept: NEPHROLOGY | Age: 66
End: 2021-10-04
Payer: MEDICARE

## 2021-10-04 DIAGNOSIS — E66.01 CLASS 3 SEVERE OBESITY WITH BODY MASS INDEX (BMI) OF 40.0 TO 44.9 IN ADULT, UNSPECIFIED OBESITY TYPE, UNSPECIFIED WHETHER SERIOUS COMORBIDITY PRESENT (HCC): ICD-10-CM

## 2021-10-04 DIAGNOSIS — E55.9 VITAMIN D DEFICIENCY: ICD-10-CM

## 2021-10-04 DIAGNOSIS — U07.1 COVID-19 VIRUS INFECTION: ICD-10-CM

## 2021-10-04 DIAGNOSIS — I10 HYPERTENSION, ESSENTIAL: ICD-10-CM

## 2021-10-04 DIAGNOSIS — E11.22 TYPE 2 DIABETES MELLITUS WITH STAGE 3A CHRONIC KIDNEY DISEASE, WITHOUT LONG-TERM CURRENT USE OF INSULIN (HCC): ICD-10-CM

## 2021-10-04 DIAGNOSIS — N18.31 TYPE 2 DIABETES MELLITUS WITH STAGE 3A CHRONIC KIDNEY DISEASE, WITHOUT LONG-TERM CURRENT USE OF INSULIN (HCC): ICD-10-CM

## 2021-10-04 DIAGNOSIS — N20.0 RENAL STONE: ICD-10-CM

## 2021-10-04 DIAGNOSIS — N18.31 STAGE 3A CHRONIC KIDNEY DISEASE (HCC): Primary | ICD-10-CM

## 2021-10-04 PROCEDURE — 99213 OFFICE O/P EST LOW 20 MIN: CPT | Performed by: INTERNAL MEDICINE

## 2021-10-04 PROCEDURE — 99442 PR PHYS/QHP TELEPHONE EVALUATION 11-20 MIN: CPT | Performed by: INTERNAL MEDICINE

## 2021-10-04 RX ORDER — LISINOPRIL AND HYDROCHLOROTHIAZIDE 20; 12.5 MG/1; MG/1
1 TABLET ORAL DAILY
COMMUNITY
End: 2022-08-01 | Stop reason: SDUPTHER

## 2021-10-04 NOTE — PROGRESS NOTES
PCP and also saw GI s/p EGD and will be followings up. She did get a sleep study done and was negative for MILANA and no CPAP required as reported by patient. Patient is currently on lisinopril-hydrochlorothiazide 20-12.5 mg daily, vitamin D 50,000 units weekly, also is on Metformin. Serology 12/7/2020: BERT, SPEP, immunofixation, hepatitis panel, complement level all negative. K/L 1.0. UPC 0.21 (12/9/2020). UA not available. Renal ultrasound 12/7/2020: Right kidney 7.5 cm left kidney 9.4 cm. Impression: Bilateral renal cortical thinning. No nephrolithiasis or urinary obstruction. Urinary bladder is empty and not well visualized. She drinks only about glass of water/day, and 1-2 glass of Gatorade. Last labs 12/15/2020: BUN/Cr 26/1.12, Na 141, K 4.6, Cl 100, Bicarb 30, Ca 10.3, Hb 11.7 (12/7/2020), UPC 0.21 (12/9/2020). Blood pressure today 104/72, heart rate 72. History of Present Illness 11/2/2020: This is a 72 y.o. female who presents to the office for evaluation of elevated creatinine. Patient is here as she was referred by her PCP to establish nephrology care due to her elevated creatinine. Patient has past medical history of diabetes type 2 since at least 5-10 years, hypertension since at least  3-4 years, pulmonary hypertension, vitamin D deficiency, peripheral neuropathy, morbid obesity, history of gastric ulcers, depression, migraines, h/o renal stone x 1 in early 2000's. History of EGD in 5/27/2020 showed gastric ulcer and small hiatal hernia patient is to follow-up with GI per PCP note. Last creatinine was 1.31 mg/DL in July 2020. Her baseline creatinine seems to be around 1.1 to 1.3 mg/DL. She does not smoke, no alcohol usage or any drug usage reported. She reports allergies to aspirin-stomach irritation. Family history: Lung cancer, HTN, DM in father and mother with breast cancer. No renal dx. She states she does not very much water does about 2 glasses per day.  Tea 2 glasses per day, 1 can of pop per day. No NSAIDs usage reported. He is currently on lisinopril-hydrochlorothiazide 20-12.5 mg daily, vitamin D 50,000 units weekly, also is on Metformin. Pt denies any hx of heavy or prolonged NSAID use. There is no hx of jaundice or hepatitis or sexually transmitted disease. Pt has no hx of collagen vascular disease or vasculitis. No history of dysuria or frequency. No recent procedures involving IV contrast. There is no hx of paraprotein disease. Pt denies any hx of recurrent UTI , incontinence or nocturia or recurrent nephrolithiasis. No unusual skin rashes . No tea coloured urine. Medication reviewed and noted the use of ace/arb and diuretic. Patient Active Problem List    Diagnosis Date Noted    Stage 3a chronic kidney disease ([facility] 09/19/2021    Pneumonia due to COVID-19 virus 09/19/2021    Dehydration 09/19/2021    Acute cystitis with hematuria 09/19/2021    Therapeutic opioid-induced constipation (OIC) 07/06/2021    Jones's esophagus without dysplasia 05/29/2021     Overview Note:     EGD 12/23/2020 (Dr. Evelina Elmore):  GE JUNCTION BIOPSIES:  NORMAL SQUAMOUS MUCOSA.  GASTRIC MUCOSA   WITH MILD CHRONIC INACTIVE INFLAMMATION AND PROMINENT   INTESTINAL/GOBLET CELL METAPLASIA (JONES'S ESOPHAGUS).  NEGATIVE FOR   DYSPLASIA.        Femur fracture, left ([facility] 03/05/2021    Spinal stenosis, lumbar region, without neurogenic claudication 08/20/2020    Calculus of gallbladder and bile duct without cholecystitis or obstruction 06/12/2020    Nausea 06/12/2020    Multiple gastric ulcers 06/12/2020    Spondylosis of thoracic region without myelopathy or radiculopathy 06/11/2020    Lumbosacral spondylosis without myelopathy 05/21/2020    Spondylosis of lumbar spine 08/26/2019    Complete tear of right rotator cuff 03/07/2019    Anxiety 12/11/2017    Chronic tension-type headache, intractable 12/11/2017    Chronic, continuous use of opioids 12/11/2017     Overview Note:     She [facility] Minerva [facility] for [facility] management. 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 Status post resection 5/24/2016 at [facility] by Dr. Jennifer Castillo. 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 Jennifer Castillo at [facility].  Microalbuminuria 12/05/2015    Gastroesophageal reflux disease 12/03/2015    Peripheral neuropathy     Dizziness     H/O fall     Chronic sinusitis     Type 2 diabetes mellitus with microalbuminuria ([facility] [facility] 11/05/2015    Subacute sphenoidal sinusitis     Headache 08/30/2015     Overview Note:     She is seeing Dr. Michelle Awan (neurologist). He recommended weaning Neurontin and stopping Tramadol and Topamax.       Encounter for long-term (current) use of other medications 06/24/2015    Vitamin D deficiency 11/05/2014    Essential hypertension     Pulmonary hypertension ([facility]      Overview Note:     by echocardiogram      Shoulder pain, bilateral 08/21/2013    Cervical spine pain 08/21/2013    Knee pain, left 08/21/2013    Major depressive disorder, single episode, moderate (HCC) 01/13/2009    Refractory migraine 01/13/2009    Obsessive-compulsive disorders 01/13/2009         CURRENT MEDICATIONS      Current Outpatient Medications   Medication Sig Dispense Refill    lisinopril-hydroCHLOROthiazide (PRINZIDE;ZESTORETIC) 20-12.5 MG per tablet Take 1 tablet by mouth daily      albuterol sulfate  (90 Base) MCG/ACT inhaler 2 puffs every 4 hours and every 4 hours prn sob times 4 days then every 4 hours prn sob thereafter 18 g 0    promethazine (PHENERGAN) 12.5 MG tablet take 1 tablet by mouth every 6 hours for 30 DAYS      metoclopramide (REGLAN) 5 MG tablet Take 1 tablet by mouth 3 times daily 90 tablet 0    pantoprazole (PROTONIX) 40 MG tablet Take 1 tablet by mouth 2 times daily take 1 tablet by mouth daily (Patient taking differently: Take 40 mg by mouth 2 times daily take 1 tablet by mouth daily. Patient states she takes 2 pills at the same times in the morning instead of 1 pill twice a day.) 60 tablet 5    DULoxetine (CYMBALTA) 60 MG extended release capsule Take 1 capsule by mouth daily 90 capsule 1    pioglitazone-metFORMIN (ACTOPLUS MET)  MG per tablet TAKE 1 TABLET TWICE A DAY WITH MEALS (Patient taking differently: TAKE 1 TABLET TWICE A DAY WITH MEALS. Patient states she takes 2 pills in the morning.) 180 tablet 1    Cholecalciferol (VITAMIN D3) 1.25 MG (14594 UT) CAPS TAKE 1 CAPSULE ONCE A WEEK 13 capsule 1    atorvastatin (LIPITOR) 20 MG tablet Take 1 tablet by mouth daily 30 tablet 3    Misc. Devices [facility]) MISC 1 Device by Does not apply route daily as needed (gait difficulty) 1 each 0    Misc. Devices (WALKER) MISC 1 each by Does not apply route daily 1 each 0    HYDROcodone-acetaminophen (NORCO) 5-325 MG per tablet Indications: Patient is taking 1 tablet a day       Gabapentin (NEURONTIN PO) Take 600 mg by mouth 5 times daily Only taking 3 times a day.  Handicap Placard MISC by Does not apply route Issue parking placard for person with disability; Applicant meets the qualifying disability criteria. Length of time expected to have disability_____Lifetime  Other __x__. Prescription expires in 5 years from issuing date. 02/05/2024 1 each 0    Misc. Devices (ROLLATOR) MISC Use to reduce risk of falls when walking.  1 each 0     No current facility-administered medications for this visit.       REVIEW OF SYSTEMS     Constitutional: No fever, no chills, positive fatigue, generalized weakness-improving. HEENT:  No headache, otalgia, itchy eyes, nasal discharge or sore throat. Cardiac:  No chest pain, no dyspnea, orthopnea or PND. Chest:   Positive dry cough, no phlegm or wheezing or hemoptysis . Abdomen:  No abdominal pain, +ve chronic nausea, no vomiting. Positive loose stools. Neuro:  No focal weakness, abnormal movements or seizure like activity. Skin:   No rashes, no itching. :   No hematuria, no pyuria, no dysuria, no flank pain. Extremities:  No swelling or joint pains. ROS was otherwise negative except as mentioned in the [address] OBJECTIVE      There were no vitals filed for this visit. Wt Readings from Last 2 Encounters:   09/20/21 292 lb 9.6 oz (132.7 kg)   09/14/21 299 lb (135.6 kg)     There is no height or weight on file to calculate BMI. PHYSICAL EXAM      Physical exam not available due to this being a virtual visit, but patient did not report any swelling.      Physical exam from last visit:  \"GENERAL APPEARANCE:Awake, alert, in no acute distress  SKIN: warm and dry, no rash or erythema  EYES: conjunctivae normal and sclera anicteric  ENT: no thrush no pharyngeal congestion   NECK: supple    PULMONARY: clear to auscultation and no wheezing noted   CADRDIOVASCULAR: :Normal S1 & S2,  no murmur   ABDOMEN: soft, obese, nontender, bowel sounds, present, no organomegaly,  no ascites   EXTREMITIES: no cyanosis, clubbing or edema  NEURO: alert and oriented, no deficits\"    LABS    CBC:   Lab Results   Component Value Date    WBC 5.3 09/20/2021    HGB 9.5 09/20/2021    HCT 29.5 09/20/2021    MCV 92.2 09/20/2021    RDW 13.8 09/20/2021     09/20/2021    MPV 10.3 09/20/2021      BMP:   Lab Results   Component Value Date     09/20/2021    K 3.9 09/20/2021     09/20/2021    CO2 27 09/20/2021    BUN 27 09/20/2021    CREATININE 1.25 09/20/2021    GLUCOSE 133 09/20/2021    CALCIUM 8.9 09/20/2021      PHOSPHORUS:    Lab Results   Component Value Date    PHOS 3.3 07/21/2021     MAGNESIUM:   Lab Results   Component Value Date    MG 2.1 07/02/2020     ALBUMIN:   Lab Results   Component Value Date    LABALBU 3.0 09/20/2021     PTH: No components found for: PTHINTACT  VIT D3,25 HYDROXY: No results found for: VITD3     IRON:    Lab Results   Component Value Date    IRON 64 07/21/2021     IRON SATURATION:    Lab Results   Component Value Date    LABIRON 23 07/21/2021     TIBC:    Lab Results   Component Value Date    TIBC 283 07/21/2021     FERRITIN:    Lab Results   Component Value Date    FERRITIN 277 09/20/2021             BERT:   Lab Results   Component Value Date    BERT NEGATIVE 12/07/2020       SPEP:   Lab Results   Component Value Date    PROT 6.6 09/20/2021    ALBCAL 4.1 12/07/2020    ALBPCT 58 12/07/2020    LABALPH 0.2 12/07/2020    LABALPH 0.8 12/07/2020    A1PCT 3 12/07/2020    A2PCT 12 12/07/2020    LABBETA 1.0 12/07/2020    BETAPCT 14 12/07/2020    GAMGLOB 1.0 12/07/2020    GGPCT 13 12/07/2020    PATH ELECTRONICALLY SIGNED. Stepan Moran M.D. 12/07/2020    PATH ELECTRONICALLY SIGNED. Stepan Moran M.D. 12/07/2020     UPEP: No results found for: TPU   HEPBSAG:  Lab Results   Component Value Date    HEPBSAG NONREACTIVE 12/07/2020     HEPCAB:  Lab Results   Component Value Date    HEPCAB NONREACTIVE 12/07/2020     C3:   Lab Results   Component Value Date    C3 167 12/07/2020     C4:   Lab Results   Component Value Date    C4 27 12/07/2020     MPO ANCA:   Lab Results   Component Value Date    MPO 26 06/16/2017    .   PR3 ANCA:    Lab Results   Component Value Date    PR3 12 06/16/2017                      URINALYSIS/URINE CHEMISTRIES      URINE CREATININE:    Lab Results   Component Value Date    LABCREA 145.4 09/07/2021     URINE EOSINOPHILS : No results found for: UREO  URINE PROTEIN:  No results found for: TPU        RADIOLOGY      Results for orders placed during the hospital encounter of 12/07/20   US RENAL COMPLETE    Narrative EXAMINATION:  RETROPERITONEAL ULTRASOUND OF THE KIDNEYS    12/7/2020    COMPARISON:  None    HISTORY:  ORDERING SYSTEM PROVIDED HISTORY: Stage 3a chronic kidney disease  TECHNOLOGIST PROVIDED HISTORY:  Please check post void residual  For cortical thickness, renal size and corticomedullary differentiation. Reason for Exam: CKD STAGE 3  Acuity: Chronic  Type of Exam: Ongoing    FINDINGS:    Kidneys: The right kidney measures 7.5 cm in length and the left kidney measures 9.4  cm in length. Kidneys demonstrate normal cortical echogenicity. No evidence of  hydronephrosis or intrarenal stones. Mild bilateral cortical thinning noted. Bladder: The urinary bladder is empty and not well visualized. Impression Bilateral renal cortical thinning. No nephrolithiasis or urinary obstruction. Urinary bladder is empty and not well visualized. ASSESSMENT     1. CKD 3 likely due to diabetic and hypertensive nephrosclerosis. Her baseline creatinine seems to be around 1.1 to 1.3 mg/DL. Last creatinine was 1.25 mg/DL September 2021. Serology 12/7/2020: BERT, SPEP, immunofixation, hepatitis panel, complement level all negative. K/L 1.0. UPC 0.21 (12/9/2020). UA not available. Renal ultrasound 12/7/2020: Right kidney 7.5 cm left kidney 9.4 cm. Impression: Bilateral renal cortical thinning. No nephrolithiasis or urinary obstruction. Urinary bladder is empty and not well visualized. 2. Hypertension. BP Readings from Last 3 Encounters:   09/20/21 (!) 94/54   09/15/21 113/62   09/14/21 110/70    :     3. Diabetes mellitus at least 5-10 years. 4.  Diabetic neuropathy. 5.  Vitamin D deficiency. 6.  Morbid obesity. 7.  Pulmonary hypertension. 8.  History of gastric ulcers. 9.  Depression. 10.  History of migraines. 11.  h/o renal stone x 1 in early 2000's. PLAN     1.  Based on available labs patients renal function is stable. Patient's volume status is  acceptable. Importance of tight blood pressure, glycemic control, lipid control was outlined to patient . 2. Continue Lisinopril-hydrochlorothiazide 20-12.5 mg daily. 3. Keep well-hydrated. 4.  Patient explained that after weekly course of vitamin D finishes, she can start 2000 units over-the-counter daily. 5.  Follow up labs ordered : bmp, cbc, phos, intact pth, vitaminD 25 OH to be done in 1 week and 1 week before next appointment  6. Urine for random protein and creat to be done. 7. Follow up in the office in 4 months. Patient was asked to avoid NSAIDS. Please do not hesitate to call with questions. Electronically signed by Bruce William MD on 10/4/2021 at 9:42 AM  [facility]     This note is created with the assistance of a speech-recognition program. While intending to generate a document that actually reflects the content of the visit, no guarantees can be provided that every mistake has been identified and corrected by editing. Elsa Hester is a 77 y.o. y.o. patient being evaluated by a telephone  Visit encounter to address concerns as mentioned above. I personally discussed above issues with patient. This was a TeleHealth encounter (During IPIOY-85 pandemic, public health emergency).

## 2021-10-06 ENCOUNTER — TELEPHONE (OUTPATIENT)
Dept: FAMILY MEDICINE CLINIC | Age: 66
End: 2021-10-06

## 2021-10-06 DIAGNOSIS — L29.9 PRURITUS: Primary | ICD-10-CM

## 2021-10-06 NOTE — TELEPHONE ENCOUNTER
Patient had mentioned at her last Vv 09/29/2021 that she is having issue with generalized dry skin and was advised to use Claritin OTC and lotion. She is not seeing an improvement and is wanting to be referred to dermatology. Please advise.

## 2021-10-07 ENCOUNTER — CARE COORDINATION (OUTPATIENT)
Dept: CASE MANAGEMENT | Age: 66
End: 2021-10-07

## 2021-10-07 NOTE — CARE COORDINATION
[facility] Transitions Follow Up Call    10/7/2021    Patient: Yared Causey  Patient : 1955   MRN: [id number]  Reason for Admission: pneumonia d/t COVID-19, HTN, Chronic renal insufficiency   Discharge Date: 21 RARS: Readmission Risk Score: 24         Spoke with: Leila Squires who states she is feeling almost back to her normal self. Breathing is better, her bowel movement have returned to normal she is eating and drinking well. She has no concerns at present time. Care Transitions Follow Up Call    Needs to be reviewed by the provider   Additional needs identified to be addressed with provider: No  none             Method of communication with provider : none      Care Transition Nurse (CTN) contacted the patient by telephone to follow up after admission on 21. Verified name and  with patient as identifiers. Addressed changes since last contact: feels really good almost back to normal per patient  Discussed follow-up appointments. If no appointment was previously scheduled, appointment scheduling offered: Yes. Is follow up appointment scheduled within 7 days of discharge? Yes. Advance Care Planning:   Does patient have an Advance Directive: reviewed and current, reviewed and needs to be updated, not on file; education provided, patient declined education, decision maker updated and referral to internal ACP facilitator. CTN reviewed discharge instructions, medical action plan and red flags with patient and discussed any barriers to care and/or understanding of plan of care after discharge. Discussed appropriate site of care based on symptoms and resources available to patient including: PCP. The patient agrees to contact the PCP office for questions related to their healthcare.      Patients top risk factors for readmission: lack of knowledge about disease  Interventions to address risk factors: Obtained and reviewed discharge summary and/or continuity of care documents          CTN provided contact information for future needs. Plan for follow-up call in 7-10 days based on severity of symptoms and risk factors. Plan for next call: symptom management-routine        Care Transitions Subsequent and Final Call    Subsequent and Final Calls  Do you have any ongoing symptoms?: No  Have your medications changed?: No  Do you have any questions related to your medications?: No  Do you currently have any active services?: No  Do you have any needs or concerns that I can assist you with?: No  Identified Barriers: Lack of Education, Other  Care Transitions Interventions  Other Interventions:            Follow Up  Future Appointments   Date Time Provider Va Noel   11/16/2021 11:15 AM [facility] MAMMO ROOM MD MAMMO [place]   11/16/2021 11:30 AM [facility] DEXA ROOM [facility] DEXA [place] [place]   12/28/2021  1:40 PM Heraclio Marks MD [facility]   2/7/2022  9:50 AM [facility]   8/2/2022  1:00 PM Heraclio Marks MD [facility]       Amy Avila LPN

## 2021-10-14 ENCOUNTER — CARE COORDINATION (OUTPATIENT)
Dept: CASE MANAGEMENT | Age: 66
End: 2021-10-14

## 2021-10-14 NOTE — CARE COORDINATION
[facility] Transitions Follow Up Call- 1st attempt COVID risk follow up call       10/14/2021    Patient: Lulu Reed  Patient : 1955   MRN: [id number]  Reason for Admission: pneumonia d/t COVID-19, HTN, Chronic renal insufficiency    Discharge Date: 21 RARS: Readmission Risk Score: 24         Attempted to reach patient for subsequent transitional call.  left to return call to [phone]. Will attempt follow up at a later time.      Follow Up  Future Appointments   Date Time Provider Va Noel   2021 11:15 AM [facility] MAMMO ROOM MD MAMMO [facility] [place]   2021 11:30 AM [facility] DEXA ROOM MD DEXA [place] [place]   2021  1:40 PM Aiyana Ashby MD [facility]   2022  9:50 AM [facility]   2022  1:00 PM Aiyana Ashby MD [facility]       Gabriel Roth RN

## 2021-10-18 ENCOUNTER — HOSPITAL ENCOUNTER (OUTPATIENT)
Dept: LAB | Age: 66
Discharge: HOME | End: 2021-10-18
Payer: MEDICARE

## 2021-10-18 ENCOUNTER — IMMUNIZATION (OUTPATIENT)
Dept: LAB | Age: 66
End: 2021-10-18
Payer: MEDICARE

## 2021-10-18 DIAGNOSIS — E78.2 MIXED HYPERLIPIDEMIA: ICD-10-CM

## 2021-10-18 DIAGNOSIS — N18.31 STAGE 3A CHRONIC KIDNEY DISEASE (HCC): ICD-10-CM

## 2021-10-18 DIAGNOSIS — E55.9 VITAMIN D DEFICIENCY: ICD-10-CM

## 2021-10-18 LAB
ABSOLUTE EOS #: 0.24 K/UL (ref 0–0.44)
ABSOLUTE IMMATURE GRANULOCYTE: 0.07 K/UL (ref 0–0.3)
ABSOLUTE LYMPH #: 2.19 K/UL (ref 1.1–3.7)
ABSOLUTE MONO #: 0.72 K/UL (ref 0.1–1.2)
ANION GAP SERPL CALCULATED.3IONS-SCNC: 12 MMOL/L (ref 9–17)
BASOPHILS # BLD: 1 % (ref 0–2)
BASOPHILS ABSOLUTE: 0.06 K/UL (ref 0–0.2)
BUN BLDV-MCNC: 23 MG/DL (ref 8–23)
BUN/CREAT BLD: 19 (ref 9–20)
CALCIUM IONIZED: 1.27 MMOL/L (ref 1.13–1.33)
CALCIUM SERPL-MCNC: 10 MG/DL (ref 8.6–10.4)
CHLORIDE BLD-SCNC: 99 MMOL/L (ref 98–107)
CHOLESTEROL/HDL RATIO: 3.1
CHOLESTEROL: 135 MG/DL
CO2: 29 MMOL/L (ref 20–31)
CREAT SERPL-MCNC: 1.19 MG/DL (ref 0.5–0.9)
CREATININE URINE: 83.6 MG/DL (ref 28–217)
DIFFERENTIAL TYPE: ABNORMAL
EOSINOPHILS RELATIVE PERCENT: 3 % (ref 1–4)
GFR AFRICAN AMERICAN: 55 ML/MIN
GFR NON-AFRICAN AMERICAN: 45 ML/MIN
GFR SERPL CREATININE-BSD FRML MDRD: ABNORMAL ML/MIN/{1.73_M2}
GFR SERPL CREATININE-BSD FRML MDRD: ABNORMAL ML/MIN/{1.73_M2}
GLUCOSE BLD-MCNC: 118 MG/DL (ref 70–99)
HCT VFR BLD CALC: 36.1 % (ref 36.3–47.1)
HDLC SERPL-MCNC: 44 MG/DL
HEMOGLOBIN: 11.2 G/DL (ref 11.9–15.1)
IMMATURE GRANULOCYTES: 1 %
LDL CHOLESTEROL: 64 MG/DL (ref 0–130)
LYMPHOCYTES # BLD: 23 % (ref 24–43)
MCH RBC QN AUTO: 29.9 PG (ref 25.2–33.5)
MCHC RBC AUTO-ENTMCNC: 31 G/DL (ref 25.2–33.5)
MCV RBC AUTO: 96.5 FL (ref 82.6–102.9)
MONOCYTES # BLD: 8 % (ref 3–12)
NRBC AUTOMATED: 0 PER 100 WBC
PDW BLD-RTO: 15.9 % (ref 11.8–14.4)
PHOSPHORUS: 3.6 MG/DL (ref 2.6–4.5)
PLATELET # BLD: 242 K/UL (ref 138–453)
PLATELET ESTIMATE: ABNORMAL
PMV BLD AUTO: 10.6 FL (ref 8.1–13.5)
POTASSIUM SERPL-SCNC: 4.1 MMOL/L (ref 3.7–5.3)
PTH INTACT: 52.4 PG/ML (ref 15–65)
RBC # BLD: 3.74 M/UL (ref 3.95–5.11)
RBC # BLD: ABNORMAL 10*6/UL
SEG NEUTROPHILS: 64 % (ref 36–65)
SEGMENTED NEUTROPHILS ABSOLUTE COUNT: 6.13 K/UL (ref 1.5–8.1)
SODIUM BLD-SCNC: 140 MMOL/L (ref 135–144)
TOTAL PROTEIN, URINE: 11 MG/DL
TRIGL SERPL-MCNC: 135 MG/DL
URINE TOTAL PROTEIN CREATININE RATIO: 0.13 (ref 0–0.2)
VITAMIN D 25-HYDROXY: 46.8 NG/ML (ref 30–100)
VLDLC SERPL CALC-MCNC: NORMAL MG/DL (ref 1–30)
WBC # BLD: 9.4 K/UL (ref 3.5–11.3)
WBC # BLD: ABNORMAL 10*3/UL

## 2021-10-18 PROCEDURE — 84156 ASSAY OF PROTEIN URINE: CPT

## 2021-10-18 PROCEDURE — 82570 ASSAY OF URINE CREATININE: CPT

## 2021-10-18 PROCEDURE — 80048 BASIC METABOLIC PNL TOTAL CA: CPT

## 2021-10-18 PROCEDURE — G0008 ADMIN INFLUENZA VIRUS VAC: HCPCS | Performed by: FAMILY MEDICINE

## 2021-10-18 PROCEDURE — PBSHW INFLUENZA, QUADV, ADJUVANTED, 65 YRS +, IM, PF, PREFILL SYR, 0.5ML (FLUAD): Performed by: FAMILY MEDICINE

## 2021-10-18 PROCEDURE — 80061 LIPID PANEL: CPT

## 2021-10-18 PROCEDURE — 82306 VITAMIN D 25 HYDROXY: CPT

## 2021-10-18 PROCEDURE — 82330 ASSAY OF CALCIUM: CPT

## 2021-10-18 PROCEDURE — 85025 COMPLETE CBC W/AUTO DIFF WBC: CPT

## 2021-10-18 PROCEDURE — 83970 ASSAY OF PARATHORMONE: CPT

## 2021-10-18 PROCEDURE — 36415 COLL VENOUS BLD VENIPUNCTURE: CPT

## 2021-10-18 PROCEDURE — 84100 ASSAY OF PHOSPHORUS: CPT

## 2021-10-19 DIAGNOSIS — R11.0 NAUSEA: Primary | ICD-10-CM

## 2021-10-19 PROBLEM — E86.0 DEHYDRATION: Status: RESOLVED | Noted: 2021-09-19 | Resolved: 2021-10-19

## 2021-10-19 RX ORDER — ONDANSETRON 4 MG/1
4-8 TABLET, FILM COATED ORAL EVERY 8 HOURS PRN
Qty: 30 TABLET | Refills: 0 | Status: SHIPPED | OUTPATIENT
Start: 2021-10-19 | End: 2021-10-20 | Stop reason: SDUPTHER

## 2021-10-19 RX ORDER — PROMETHAZINE HYDROCHLORIDE 12.5 MG/1
TABLET ORAL
Qty: 30 TABLET | Refills: 0 | Status: CANCELLED | OUTPATIENT
Start: 2021-10-19

## 2021-10-19 NOTE — TELEPHONE ENCOUNTER
Mckenzie Stephens called requesting a refill of the below medication which has been pended for you:     Requested Prescriptions     Pending Prescriptions Disp Refills    promethazine (PHENERGAN) 12.5 MG tablet 30 tablet 0     Sig: take 1 tablet by mouth every 6 hours for 30 DAYS       Last Appointment Date: 9/29/2021  Next Appointment Date: 12/28/2021    Allergies   Allergen Reactions    Asa [Aspirin] Other (See Comments)     Stomach irritation

## 2021-10-19 NOTE — TELEPHONE ENCOUNTER
Patient saw a gastroenterologist in [place], but cannot remember the name. (no scanned media) She is scheduled for another endoscopy in December. Patient states her nausea really was talked about. She has complaints of \"severe\" nausea today.

## 2021-10-20 ENCOUNTER — TELEPHONE (OUTPATIENT)
Dept: FAMILY MEDICINE CLINIC | Age: 66
End: 2021-10-20

## 2021-10-20 DIAGNOSIS — R11.0 NAUSEA: ICD-10-CM

## 2021-10-20 RX ORDER — ONDANSETRON 4 MG/1
4-8 TABLET, FILM COATED ORAL EVERY 8 HOURS PRN
Qty: 30 TABLET | Refills: 0 | Status: SHIPPED | OUTPATIENT
Start: 2021-10-20 | End: 2022-01-06

## 2021-10-20 NOTE — TELEPHONE ENCOUNTER
Patient called to check on script. Please resend Zofran script to [facility]. Writer called pharmacy and they did not receive it.

## 2021-10-21 ENCOUNTER — HOSPITAL ENCOUNTER (OUTPATIENT)
Dept: LAB | Age: 66
Discharge: HOME | End: 2021-10-21
Payer: MEDICARE

## 2021-10-21 ENCOUNTER — CARE COORDINATION (OUTPATIENT)
Dept: CASE MANAGEMENT | Age: 66
End: 2021-10-21

## 2021-10-21 DIAGNOSIS — R11.0 NAUSEA: ICD-10-CM

## 2021-10-21 LAB
ALBUMIN SERPL-MCNC: 3.8 G/DL (ref 3.5–5.2)
ALBUMIN/GLOBULIN RATIO: 1.1 (ref 1–2.5)
ALP BLD-CCNC: 147 U/L (ref 35–104)
ALT SERPL-CCNC: <5 U/L (ref 5–33)
AST SERPL-CCNC: 12 U/L
BILIRUB SERPL-MCNC: 0.44 MG/DL (ref 0.3–1.2)
BILIRUBIN DIRECT: 0.09 MG/DL
BILIRUBIN, INDIRECT: 0.35 MG/DL (ref 0–1)
GLOBULIN: 3.6 G/DL (ref 1.5–3.8)
SEDIMENTATION RATE, ERYTHROCYTE: 65 MM (ref 0–30)
TOTAL PROTEIN: 7.4 G/DL (ref 6.4–8.3)

## 2021-10-21 PROCEDURE — 83520 IMMUNOASSAY QUANT NOS NONAB: CPT

## 2021-10-21 PROCEDURE — 80076 HEPATIC FUNCTION PANEL: CPT

## 2021-10-21 PROCEDURE — 85651 RBC SED RATE NONAUTOMATED: CPT

## 2021-10-21 PROCEDURE — 36415 COLL VENOUS BLD VENIPUNCTURE: CPT

## 2021-10-21 RX ORDER — PROMETHAZINE HYDROCHLORIDE 12.5 MG/1
TABLET ORAL
Status: CANCELLED | OUTPATIENT
Start: 2021-10-21

## 2021-10-21 RX ORDER — PROMETHAZINE HYDROCHLORIDE 12.5 MG/1
12.5-25 TABLET ORAL EVERY 6 HOURS PRN
Qty: 30 TABLET | Refills: 0 | Status: SHIPPED | OUTPATIENT
Start: 2021-10-21 | End: 2022-01-06

## 2021-10-21 NOTE — TELEPHONE ENCOUNTER
Patient had called 10/19/2021 requesting refill of Promethazine but was advised that since she is taking Reglan the two should not be taken together. Zofran was ordered. She has not taken Reglan for the last 3 weeks. Also the Zofran causes a strange taste to linger in her mouth after it is dissolved. She is requesting a reconsideration to refill Promethazine. Please advise.

## 2021-10-21 NOTE — TELEPHONE ENCOUNTER
Phenergan was e-prescribed to [address] [place] [address].   I also recommend that she follow up with her gastroenterologist.

## 2021-10-21 NOTE — CARE COORDINATION
[facility] Transitions Follow Up Call- final COVID risk follow up call    10/21/2021    Patient: Nighat Roberts  Patient : 1955   MRN: [id number]  Reason for Admission: pneumonia d/t COVID-19, HTN, Chronic renal insufficiency    Discharge Date: 21 RARS: Readmission Risk Score: 24         Spoke with: [place]     Call to pt who states she is doing okay  States she has recovered fully from [address] breathing well and has not needed inhaler  States diarrhea has stopped   Denies needs  Encouraged to call writer/ CTC or providers if questions or concerns- v/u     You Patient resolved from the Care Transitions episode on 10/21/2021  Discussed COVID-19 related testing which was available at this time. Test results were positive. Patient informed of results, if available? Yes    Patient/family has been provided the following resources and education related to COVID-19:                         Signs, symptoms and red flags related to COVID-19            CDC exposure and quarantine guidelines            [employer] exposure contact - [phone]            Contact for their local Department of Health                 Patient currently reports that the following symptoms have improved:  fatigue, cough, shortness of breath and no new/worsening symptoms     No further outreach scheduled with this CTN/ACM. Episode of Care resolved. Patient has this CTN/ACM contact information if future needs arise. Care Transitions Subsequent and Final Call    Subsequent and Final Calls  Do you have any ongoing symptoms?: No  Have your medications changed?: No  Do you have any questions related to your medications?: No  Do you currently have any active services?: No  Do you have any needs or concerns that I can assist you with?: No  Identified Barriers: Lack of Education  Care Transitions Interventions  Other Interventions:            Follow Up  Future Appointments   Date Time Provider Va Noel   2021 11:15 AM [facility] MAMMO ROOM [facility] MAMMO [facility] [place]   11/16/2021 11:30 AM [facility] DEXA ROOM [facility] DEXA [facility] [place]   12/28/2021  1:40 PM Markel Birch MD [facility]   2/7/2022  9:50 AM Soheila Ashley MD [facility]   8/2/2022  1:00 PM Markel Birch MD [facility]       Mandeep Flynn RN

## 2021-10-25 LAB — TRYPTASE: 10.6 UG/L

## 2021-11-19 PROBLEM — L30.8 SPONGIOTIC DERMATITIS: Status: ACTIVE | Noted: 2021-11-19

## 2022-01-06 ENCOUNTER — OFFICE VISIT (OUTPATIENT)
Dept: FAMILY MEDICINE CLINIC | Age: 67
End: 2022-01-06
Payer: MEDICARE

## 2022-01-06 VITALS
WEIGHT: 292 LBS | HEIGHT: 68 IN | HEART RATE: 73 BPM | TEMPERATURE: 98.4 F | OXYGEN SATURATION: 98 % | SYSTOLIC BLOOD PRESSURE: 112 MMHG | BODY MASS INDEX: 44.25 KG/M2 | DIASTOLIC BLOOD PRESSURE: 84 MMHG

## 2022-01-06 DIAGNOSIS — I10 ESSENTIAL HYPERTENSION: ICD-10-CM

## 2022-01-06 DIAGNOSIS — L29.9 PRURITUS: ICD-10-CM

## 2022-01-06 DIAGNOSIS — E66.01 OBESITY, CLASS III, BMI 40-49.9 (MORBID OBESITY) (HCC): ICD-10-CM

## 2022-01-06 DIAGNOSIS — F32.1 MAJOR DEPRESSIVE DISORDER, SINGLE EPISODE, MODERATE (HCC): ICD-10-CM

## 2022-01-06 DIAGNOSIS — N18.31 STAGE 3A CHRONIC KIDNEY DISEASE (HCC): ICD-10-CM

## 2022-01-06 DIAGNOSIS — R80.9 TYPE 2 DIABETES MELLITUS WITH MICROALBUMINURIA, WITHOUT LONG-TERM CURRENT USE OF INSULIN (HCC): Primary | ICD-10-CM

## 2022-01-06 DIAGNOSIS — E55.9 VITAMIN D DEFICIENCY: ICD-10-CM

## 2022-01-06 DIAGNOSIS — G44.201 INTRACTABLE TENSION-TYPE HEADACHE, UNSPECIFIED CHRONICITY PATTERN: ICD-10-CM

## 2022-01-06 DIAGNOSIS — E11.29 TYPE 2 DIABETES MELLITUS WITH MICROALBUMINURIA, WITHOUT LONG-TERM CURRENT USE OF INSULIN (HCC): Primary | ICD-10-CM

## 2022-01-06 PROCEDURE — 99214 OFFICE O/P EST MOD 30 MIN: CPT | Performed by: FAMILY MEDICINE

## 2022-01-06 PROCEDURE — 99213 OFFICE O/P EST LOW 20 MIN: CPT

## 2022-01-06 RX ORDER — DULOXETIN HYDROCHLORIDE 60 MG/1
60 CAPSULE, DELAYED RELEASE ORAL DAILY
Qty: 90 CAPSULE | Refills: 1 | Status: SHIPPED | OUTPATIENT
Start: 2022-01-06 | End: 2022-09-13 | Stop reason: SDUPTHER

## 2022-01-06 RX ORDER — CLONIDINE HYDROCHLORIDE 0.1 MG/1
0.1 TABLET ORAL 2 TIMES DAILY PRN
Qty: 30 TABLET | Refills: 0 | Status: SHIPPED | OUTPATIENT
Start: 2022-01-06 | End: 2022-05-05

## 2022-01-06 RX ORDER — BACLOFEN 5 MG/1
TABLET ORAL
COMMUNITY
Start: 2021-10-13 | End: 2022-05-05

## 2022-01-06 NOTE — PROGRESS NOTES
MATHIEU DAVISON [address], [address] [address], [address]                        Telephone [phone]             Fax [phone]       José Miguel Barba  :  1955  Age:  77 y.o. MRN:  [id number]  Date of visit:  2022       Assessment and Plan:    1. Type 2 diabetes mellitus with microalbuminuria, without long-term current use of insulin ([facility])  Labs were ordered to be done today:  - Hemoglobin A1C; Future  - Lipid Panel; Future  - Microalbumin, Ur; Future    She will be contacted when the results are available. 2. Essential hypertension  Her blood pressure is well-controlled today. (BP: 112/84)   She was advised to continue current medications. She states that she does not need a refill today. - Comprehensive Metabolic Panel; Future    3. Major depressive disorder, single episode, moderate ([facility] [address]  4. Intractable tension-type headache, unspecified chronicity pattern  She has been doing reasonably well with her current dose of Cymbalta; this was refilled:  - DULoxetine (CYMBALTA) 60 MG extended release capsule; Take 1 capsule by mouth daily  Dispense: 90 capsule; Refill: 1    5. Pruritus  Labs were ordered to be done today:  - TSH without Reflex; Future  - T4, Free; Future    She will be contacted when the results are available. 6. Stage 3a chronic kidney disease ([facility] [address]  She has orders for a comprehensive panel. She will be contacted when the results are available. 7. Vitamin D deficiency  Her 25-hydroxy Vitamin D level was within normal limits (46.8 ng/mL) on 10/18/2021. She was advised to continue with her current dose of Vitamin D.        8. Obesity, Class III, BMI 40-49.9 (morbid obesity) ([facility] [address]  Her weight is down 7 pounds in the last 3 months. 9.  Routine health maintenance  Health maintenance was reviewed with the patient. She has received three doses of the Pfizer Covid-19 vaccine.   She also had Covid-19 in September 2021. She has received an influenza vaccine this influenza season. She has orders for a mammogram and DEXA scan. She was encouraged to schedule these. Shingrix was recommended and declined. All other health maintenance, including Pneumovax, Prevnar-13, and Tdap, is up to date at this time. Follow up instructions were given to the patient:  Return in about 6 months (around 7/6/2022) for diabetes, hypertension. Subjective:    Sánchez Guzman is a 77 y.o. female who presents to [place] [address] today (1/6/2022) for follow up/evaluation of:  Diabetes (3 month follow up), Hyperlipidemia (3 month follow up), Other (itching in arms, neck and stomach. seen derm and was given lotion for senstivie skin. Would like thyroid levels checked), and Anxiety      She states that she is tolerating her medications well. She continues to have issues with pruritus. She saw dermatology, and a lotion was recommended. She has concerns that a thyroid problem may be the cause of the pruritus. She has received three doses of the Pfizer Covid-19 vaccine. She also had Covid-19 in September 2021.          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[address]    Type 2 diabetes mellitus with microalbuminuria, without long-term current use of insulin (HCC)    Morbid obesity with BMI of 40.0-44.9, adult (HCC)    Restrictive lung disease secondary to obesity    Calculus of gallbladder and bile duct without cholecystitis or obstruction    Nausea    Multiple gastric ulcers    Anxiety    Chronic tension-type headache, intractable    Chronic, continuous use of opioids    Complete tear of right rotator cuff    Depression    Fatigue    Lumbosacral spondylosis without myelopathy    Major depressive disorder, single episode, moderate (HCC)    Refractory migraine    Muscle spasms of neck    Obsessive-compulsive disorders    Spondylosis of lumbar spine    Spondylosis of thoracic region without myelopathy or radiculopathy    Spinal stenosis, lumbar region, without neurogenic claudication    Femur fracture, left (HCC)    Elizalde's esophagus without dysplasia    Therapeutic opioid-induced constipation (OIC)    Stage 3a chronic kidney disease ([facility] [address]    Pneumonia due to COVID-19 virus    Acute cystitis with hematuria    Spongiotic dermatitis        Current medications are:  Outpatient Medications Marked as Taking for the 1/6/22 encounter (Office Visit) with Mitzi Mariano MD   Medication Sig Dispense Refill    Baclofen (LIORESAL) 5 MG tablet take 1 tablet by mouth three times a day if needed for muscle spasm      lisinopril-hydroCHLOROthiazide (PRINZIDE;ZESTORETIC) 20-12.5 MG per tablet Take 1 tablet by mouth daily      pantoprazole (PROTONIX) 40 MG tablet Take 1 tablet by mouth 2 times daily take 1 tablet by mouth daily (Patient taking differently: Take 40 mg by mouth 2 times daily take 1 tablet by mouth daily. Patient states she takes 2 pills at the same times in the morning instead of 1 pill twice a day.) 60 tablet 5    DULoxetine (CYMBALTA) 60 MG extended release capsule Take 1 capsule by mouth daily 90 capsule 1    pioglitazone-metFORMIN (ACTOPLUS MET)  MG per tablet TAKE 1 TABLET TWICE A DAY WITH MEALS (Patient taking differently: TAKE 1 TABLET TWICE A DAY WITH MEALS.     Patient states she takes 2 pills in the morning.) 180 tablet 1  Cholecalciferol (VITAMIN D3) 1.25 MG (12468 UT) CAPS TAKE 1 CAPSULE ONCE A WEEK 13 capsule 1    atorvastatin (LIPITOR) 20 MG tablet Take 1 tablet by mouth daily 30 tablet 3    Misc. Devices [facility]) MISC 1 Device by Does not apply route daily as needed (gait difficulty) 1 each 0    Misc. Devices (WALKER) MISC 1 each by Does not apply route daily 1 each 0    HYDROcodone-acetaminophen (NORCO) 5-325 MG per tablet Indications: Patient is taking 1 tablet a day       Gabapentin (NEURONTIN PO) Take 600 mg by mouth 5 times daily Only taking 3 times a day.  Handicap Placard MISC by Does not apply route Issue parking placard for person with disability; Applicant meets the qualifying disability criteria. Length of time expected to have disability_____Lifetime  Other __x__. Prescription expires in 5 years from issuing date. 02/05/2024 1 each 0    Misc. Devices (ROLLATOR) MISC Use to reduce risk of falls when walking. 1 each 0       She is allergic to asa [aspirin]. She  reports that she has never smoked. She has never used smokeless tobacco.      Objective:    Vitals:    01/06/22 1103   BP: 112/84   Site: Right Upper Arm   Position: Sitting   Cuff Size: Large Adult   Pulse: 73   Temp: 98.4 °F (36.9 °C)   TempSrc: Tympanic   SpO2: 98%   Weight: 292 lb (132.5 kg)   Height: 5' 8\" (1.727 m)     Body mass index is 44.4 kg/m². Wt Readings from Last 3 Encounters:   01/06/22 292 lb (132.5 kg)   09/20/21 292 lb 9.6 oz (132.7 kg)   09/14/21 299 lb (135.6 kg)        Extremely obese female, healthy-appearing, alert, cooperative and in no acute distress. Neck supple. No adenopathy. Thyroid symmetric, normal size. Chest:  Normal expansion. Clear to auscultation. No rales, rhonchi, or wheezing. Heart sounds are normal.  Regular rate and rhythm without murmur, gallop or rub. Skin has no rashes. Lower extremities have no edema. Her feet were examined.   There were no areas of redness, ulceration, or skin

## 2022-01-27 ENCOUNTER — TELEPHONE (OUTPATIENT)
Dept: FAMILY MEDICINE CLINIC | Age: 67
End: 2022-01-27

## 2022-02-03 ENCOUNTER — TELEPHONE (OUTPATIENT)
Dept: FAMILY MEDICINE CLINIC | Age: 67
End: 2022-02-03

## 2022-02-09 ENCOUNTER — HOSPITAL ENCOUNTER (OUTPATIENT)
Dept: LAB | Age: 67
Discharge: HOME | End: 2022-02-09
Payer: MEDICARE

## 2022-02-09 DIAGNOSIS — R80.9 TYPE 2 DIABETES MELLITUS WITH MICROALBUMINURIA, WITHOUT LONG-TERM CURRENT USE OF INSULIN (HCC): ICD-10-CM

## 2022-02-09 DIAGNOSIS — L29.9 PRURITUS: ICD-10-CM

## 2022-02-09 DIAGNOSIS — I10 ESSENTIAL HYPERTENSION: ICD-10-CM

## 2022-02-09 DIAGNOSIS — E78.2 MIXED HYPERLIPIDEMIA: ICD-10-CM

## 2022-02-09 DIAGNOSIS — E11.29 TYPE 2 DIABETES MELLITUS WITH MICROALBUMINURIA, WITHOUT LONG-TERM CURRENT USE OF INSULIN (HCC): ICD-10-CM

## 2022-02-09 LAB
ALBUMIN SERPL-MCNC: 3.9 G/DL (ref 3.5–5.2)
ALBUMIN/GLOBULIN RATIO: 1 (ref 1–2.5)
ALP BLD-CCNC: 134 U/L (ref 35–104)
ALT SERPL-CCNC: <5 U/L (ref 5–33)
ANION GAP SERPL CALCULATED.3IONS-SCNC: 11 MMOL/L (ref 9–17)
AST SERPL-CCNC: 12 U/L
BILIRUB SERPL-MCNC: 0.45 MG/DL (ref 0.3–1.2)
BUN BLDV-MCNC: 21 MG/DL (ref 8–23)
BUN/CREAT BLD: 17 (ref 9–20)
CALCIUM SERPL-MCNC: 9.9 MG/DL (ref 8.6–10.4)
CHLORIDE BLD-SCNC: 99 MMOL/L (ref 98–107)
CHOLESTEROL/HDL RATIO: 2.9
CHOLESTEROL: 123 MG/DL
CO2: 30 MMOL/L (ref 20–31)
CREAT SERPL-MCNC: 1.23 MG/DL (ref 0.5–0.9)
ESTIMATED AVERAGE GLUCOSE: 160 MG/DL
GFR AFRICAN AMERICAN: 53 ML/MIN
GFR NON-AFRICAN AMERICAN: 44 ML/MIN
GFR SERPL CREATININE-BSD FRML MDRD: ABNORMAL ML/MIN/{1.73_M2}
GFR SERPL CREATININE-BSD FRML MDRD: ABNORMAL ML/MIN/{1.73_M2}
GLUCOSE BLD-MCNC: 179 MG/DL (ref 70–99)
HBA1C MFR BLD: 7.2 % (ref 4–6)
HDLC SERPL-MCNC: 43 MG/DL
LDL CHOLESTEROL: 58 MG/DL (ref 0–130)
POTASSIUM SERPL-SCNC: 4.2 MMOL/L (ref 3.7–5.3)
SODIUM BLD-SCNC: 140 MMOL/L (ref 135–144)
THYROXINE, FREE: 1.22 NG/DL (ref 0.93–1.7)
TOTAL PROTEIN: 7.7 G/DL (ref 6.4–8.3)
TRIGL SERPL-MCNC: 110 MG/DL
TSH SERPL DL<=0.05 MIU/L-ACNC: 3.5 MIU/L (ref 0.3–5)
VLDLC SERPL CALC-MCNC: NORMAL MG/DL (ref 1–30)

## 2022-02-09 PROCEDURE — 80053 COMPREHEN METABOLIC PANEL: CPT

## 2022-02-09 PROCEDURE — 80061 LIPID PANEL: CPT

## 2022-02-09 PROCEDURE — 36415 COLL VENOUS BLD VENIPUNCTURE: CPT

## 2022-02-09 PROCEDURE — 84439 ASSAY OF FREE THYROXINE: CPT

## 2022-02-09 PROCEDURE — 84443 ASSAY THYROID STIM HORMONE: CPT

## 2022-02-09 PROCEDURE — 83036 HEMOGLOBIN GLYCOSYLATED A1C: CPT

## 2022-02-15 ENCOUNTER — HOSPITAL ENCOUNTER (OUTPATIENT)
Dept: BONE DENSITY | Age: 67
Discharge: HOME | End: 2022-02-17
Payer: MEDICARE

## 2022-02-15 ENCOUNTER — HOSPITAL ENCOUNTER (OUTPATIENT)
Dept: MAMMOGRAPHY | Age: 67
Discharge: HOME | End: 2022-02-17
Payer: MEDICARE

## 2022-02-15 DIAGNOSIS — Z12.31 ENCOUNTER FOR SCREENING MAMMOGRAM FOR BREAST CANCER: ICD-10-CM

## 2022-02-15 DIAGNOSIS — N95.1 FEMALE CLIMACTERIC STATE: ICD-10-CM

## 2022-02-15 PROCEDURE — 77085 DXA BONE DENSITY AXL VRT FX: CPT

## 2022-02-15 PROCEDURE — 77063 BREAST TOMOSYNTHESIS BI: CPT

## 2022-02-17 PROBLEM — M85.80 OSTEOPENIA: Status: ACTIVE | Noted: 2022-02-17

## 2022-03-21 DIAGNOSIS — E11.29 TYPE 2 DIABETES MELLITUS WITH MICROALBUMINURIA, WITHOUT LONG-TERM CURRENT USE OF INSULIN (HCC): Primary | ICD-10-CM

## 2022-03-21 DIAGNOSIS — R80.9 TYPE 2 DIABETES MELLITUS WITH MICROALBUMINURIA, WITHOUT LONG-TERM CURRENT USE OF INSULIN (HCC): Primary | ICD-10-CM

## 2022-03-21 PROCEDURE — 3051F HG A1C>EQUAL 7.0%<8.0%: CPT | Performed by: FAMILY MEDICINE

## 2022-03-21 RX ORDER — GLUCOSAMINE HCL/CHONDROITIN SU 500-400 MG
CAPSULE ORAL
Qty: 100 STRIP | Refills: 1 | Status: SHIPPED | OUTPATIENT
Start: 2022-03-21

## 2022-03-21 NOTE — TELEPHONE ENCOUNTER
Lainey Leong called requesting a refill of the below medication which has been pended for you:     Requested Prescriptions     Pending Prescriptions Disp Refills    blood glucose monitor strips 100 strip 1     Sig: Test 1 times a day & as needed for symptoms of irregular blood glucose. Dispense sufficient amount for indicated testing frequency plus additional to accommodate PRN testing needs.        Last Appointment Date: 1/6/2022  Next Appointment Date: 7/7/2022    Allergies   Allergen Reactions    Asa [Aspirin] Other (See Comments)     Stomach irritation

## 2022-04-04 ENCOUNTER — HOSPITAL ENCOUNTER (OUTPATIENT)
Age: 67
Setting detail: SPECIMEN
Discharge: HOME | End: 2022-04-04
Payer: MEDICARE

## 2022-04-04 ENCOUNTER — HOSPITAL ENCOUNTER (EMERGENCY)
Age: 67
Discharge: HOME | End: 2022-04-04
Attending: EMERGENCY MEDICINE
Payer: MEDICARE

## 2022-04-04 ENCOUNTER — OFFICE VISIT (OUTPATIENT)
Dept: PRIMARY CARE CLINIC | Age: 67
End: 2022-04-04
Payer: MEDICARE

## 2022-04-04 ENCOUNTER — APPOINTMENT (OUTPATIENT)
Dept: CT IMAGING | Age: 67
End: 2022-04-04
Payer: MEDICARE

## 2022-04-04 VITALS
DIASTOLIC BLOOD PRESSURE: 75 MMHG | HEART RATE: 78 BPM | OXYGEN SATURATION: 100 % | HEIGHT: 68 IN | TEMPERATURE: 98.1 F | WEIGHT: 281 LBS | BODY MASS INDEX: 42.59 KG/M2 | RESPIRATION RATE: 17 BRPM | SYSTOLIC BLOOD PRESSURE: 129 MMHG

## 2022-04-04 VITALS
WEIGHT: 281 LBS | OXYGEN SATURATION: 98 % | HEART RATE: 90 BPM | SYSTOLIC BLOOD PRESSURE: 132 MMHG | DIASTOLIC BLOOD PRESSURE: 74 MMHG | BODY MASS INDEX: 42.73 KG/M2 | TEMPERATURE: 99 F

## 2022-04-04 DIAGNOSIS — R10.13 EPIGASTRIC PAIN: ICD-10-CM

## 2022-04-04 DIAGNOSIS — R11.2 NON-INTRACTABLE VOMITING WITH NAUSEA, UNSPECIFIED VOMITING TYPE: ICD-10-CM

## 2022-04-04 DIAGNOSIS — N39.0 URINARY TRACT INFECTION WITHOUT HEMATURIA, SITE UNSPECIFIED: Primary | ICD-10-CM

## 2022-04-04 DIAGNOSIS — R11.0 NAUSEA: ICD-10-CM

## 2022-04-04 DIAGNOSIS — R10.13 EPIGASTRIC PAIN: Primary | ICD-10-CM

## 2022-04-04 LAB
-: ABNORMAL
ABSOLUTE EOS #: 0.04 K/UL (ref 0–0.44)
ABSOLUTE IMMATURE GRANULOCYTE: 0.03 K/UL (ref 0–0.3)
ABSOLUTE LYMPH #: 0.91 K/UL (ref 1.1–3.7)
ABSOLUTE MONO #: 0.46 K/UL (ref 0.1–1.2)
ALBUMIN SERPL-MCNC: 4.4 G/DL (ref 3.5–5.2)
ALBUMIN/GLOBULIN RATIO: 1.1 (ref 1–2.5)
ALP BLD-CCNC: 146 U/L (ref 35–104)
ALT SERPL-CCNC: 5 U/L (ref 5–33)
ANION GAP SERPL CALCULATED.3IONS-SCNC: 10 MMOL/L (ref 9–17)
AST SERPL-CCNC: 14 U/L
BACTERIA: ABNORMAL
BASOPHILS # BLD: 0 % (ref 0–2)
BASOPHILS ABSOLUTE: 0.03 K/UL (ref 0–0.2)
BILIRUB SERPL-MCNC: 0.33 MG/DL (ref 0.3–1.2)
BILIRUBIN URINE: NEGATIVE
BUN BLDV-MCNC: 28 MG/DL (ref 8–23)
BUN/CREAT BLD: 16 (ref 9–20)
CALCIUM SERPL-MCNC: 9.9 MG/DL (ref 8.6–10.4)
CHLORIDE BLD-SCNC: 94 MMOL/L (ref 98–107)
CO2: 29 MMOL/L (ref 20–31)
CREAT SERPL-MCNC: 1.71 MG/DL (ref 0.5–0.9)
EOSINOPHILS RELATIVE PERCENT: 1 % (ref 1–4)
EPITHELIAL CELLS UA: ABNORMAL /HPF (ref 0–5)
GFR AFRICAN AMERICAN: 36 ML/MIN
GFR NON-AFRICAN AMERICAN: 30 ML/MIN
GFR SERPL CREATININE-BSD FRML MDRD: ABNORMAL ML/MIN/{1.73_M2}
GLUCOSE BLD-MCNC: 207 MG/DL (ref 70–99)
GLUCOSE URINE: NEGATIVE
HCT VFR BLD CALC: 36.2 % (ref 36.3–47.1)
HEMOGLOBIN: 11.8 G/DL (ref 11.9–15.1)
IMMATURE GRANULOCYTES: 0 %
KETONES, URINE: NEGATIVE
LEUKOCYTE ESTERASE, URINE: ABNORMAL
LIPASE: 27 U/L (ref 13–60)
LYMPHOCYTES # BLD: 11 % (ref 24–43)
MCH RBC QN AUTO: 29.6 PG (ref 25.2–33.5)
MCHC RBC AUTO-ENTMCNC: 32.6 G/DL (ref 25.2–33.5)
MCV RBC AUTO: 91 FL (ref 82.6–102.9)
MONOCYTES # BLD: 6 % (ref 3–12)
NITRITE, URINE: POSITIVE
NRBC AUTOMATED: 0 PER 100 WBC
OTHER OBSERVATIONS UA: ABNORMAL
PDW BLD-RTO: 13.9 % (ref 11.8–14.4)
PH UA: 8 (ref 5–6)
PLATELET # BLD: 254 K/UL (ref 138–453)
PMV BLD AUTO: 10.7 FL (ref 8.1–13.5)
POTASSIUM SERPL-SCNC: 4.1 MMOL/L (ref 3.7–5.3)
PROTEIN UA: NEGATIVE
RBC # BLD: 3.98 M/UL (ref 3.95–5.11)
RBC UA: ABNORMAL /HPF (ref 0–4)
SEG NEUTROPHILS: 82 % (ref 36–65)
SEGMENTED NEUTROPHILS ABSOLUTE COUNT: 6.93 K/UL (ref 1.5–8.1)
SODIUM BLD-SCNC: 133 MMOL/L (ref 135–144)
SPECIFIC GRAVITY UA: 1.01 (ref 1.01–1.02)
TOTAL PROTEIN: 8.3 G/DL (ref 6.4–8.3)
TROPONIN, HIGH SENSITIVITY: 20 NG/L (ref 0–14)
URINE HGB: ABNORMAL
UROBILINOGEN, URINE: NORMAL
WBC # BLD: 8.4 K/UL (ref 3.5–11.3)
WBC UA: >100 /HPF (ref 0–4)

## 2022-04-04 PROCEDURE — 83690 ASSAY OF LIPASE: CPT

## 2022-04-04 PROCEDURE — 87077 CULTURE AEROBIC IDENTIFY: CPT

## 2022-04-04 PROCEDURE — 81001 URINALYSIS AUTO W/SCOPE: CPT

## 2022-04-04 PROCEDURE — 96376 TX/PRO/DX INJ SAME DRUG ADON: CPT

## 2022-04-04 PROCEDURE — 36415 COLL VENOUS BLD VENIPUNCTURE: CPT

## 2022-04-04 PROCEDURE — 84484 ASSAY OF TROPONIN QUANT: CPT

## 2022-04-04 PROCEDURE — 6360000002 HC RX W HCPCS: Performed by: EMERGENCY MEDICINE

## 2022-04-04 PROCEDURE — 99213 OFFICE O/P EST LOW 20 MIN: CPT | Performed by: FAMILY MEDICINE

## 2022-04-04 PROCEDURE — 99284 EMERGENCY DEPT VISIT MOD MDM: CPT

## 2022-04-04 PROCEDURE — 87086 URINE CULTURE/COLONY COUNT: CPT

## 2022-04-04 PROCEDURE — 87186 SC STD MICRODIL/AGAR DIL: CPT

## 2022-04-04 PROCEDURE — PBSHW PBB SHADOW CHARGE: Performed by: FAMILY MEDICINE

## 2022-04-04 PROCEDURE — 74176 CT ABD & PELVIS W/O CONTRAST: CPT

## 2022-04-04 PROCEDURE — 85025 COMPLETE CBC W/AUTO DIFF WBC: CPT

## 2022-04-04 PROCEDURE — 99212 OFFICE O/P EST SF 10 MIN: CPT

## 2022-04-04 PROCEDURE — 2580000003 HC RX 258: Performed by: EMERGENCY MEDICINE

## 2022-04-04 PROCEDURE — 93005 ELECTROCARDIOGRAM TRACING: CPT | Performed by: EMERGENCY MEDICINE

## 2022-04-04 PROCEDURE — 96365 THER/PROPH/DIAG IV INF INIT: CPT

## 2022-04-04 PROCEDURE — 96375 TX/PRO/DX INJ NEW DRUG ADDON: CPT

## 2022-04-04 PROCEDURE — 80053 COMPREHEN METABOLIC PANEL: CPT

## 2022-04-04 RX ORDER — FENTANYL CITRATE 50 UG/ML
50 INJECTION, SOLUTION INTRAMUSCULAR; INTRAVENOUS ONCE
Status: COMPLETED | OUTPATIENT
Start: 2022-04-04 | End: 2022-04-04

## 2022-04-04 RX ORDER — ONDANSETRON 4 MG/1
4 TABLET, ORALLY DISINTEGRATING ORAL EVERY 8 HOURS PRN
Qty: 8 TABLET | Refills: 0 | Status: SHIPPED | OUTPATIENT
Start: 2022-04-04 | End: 2022-05-11 | Stop reason: SDUPTHER

## 2022-04-04 RX ORDER — ONDANSETRON 2 MG/ML
4 INJECTION INTRAMUSCULAR; INTRAVENOUS ONCE
Status: COMPLETED | OUTPATIENT
Start: 2022-04-04 | End: 2022-04-04

## 2022-04-04 RX ORDER — SODIUM CHLORIDE, SODIUM LACTATE, POTASSIUM CHLORIDE, AND CALCIUM CHLORIDE .6; .31; .03; .02 G/100ML; G/100ML; G/100ML; G/100ML
1000 INJECTION, SOLUTION INTRAVENOUS ONCE
Status: COMPLETED | OUTPATIENT
Start: 2022-04-04 | End: 2022-04-04

## 2022-04-04 RX ORDER — ONDANSETRON 4 MG/1
4 TABLET, ORALLY DISINTEGRATING ORAL ONCE
Status: COMPLETED | OUTPATIENT
Start: 2022-04-04 | End: 2022-04-04

## 2022-04-04 RX ORDER — NITROFURANTOIN 25; 75 MG/1; MG/1
100 CAPSULE ORAL 2 TIMES DAILY
Qty: 14 CAPSULE | Refills: 0 | Status: SHIPPED | OUTPATIENT
Start: 2022-04-04 | End: 2022-04-11

## 2022-04-04 RX ADMIN — CEFTRIAXONE 1000 MG: 1 INJECTION, POWDER, FOR SOLUTION INTRAMUSCULAR; INTRAVENOUS at 19:47

## 2022-04-04 RX ADMIN — FENTANYL CITRATE 50 MCG: 50 INJECTION, SOLUTION INTRAMUSCULAR; INTRAVENOUS at 16:52

## 2022-04-04 RX ADMIN — SODIUM CHLORIDE, POTASSIUM CHLORIDE, SODIUM LACTATE AND CALCIUM CHLORIDE 1000 ML: 600; 310; 30; 20 INJECTION, SOLUTION INTRAVENOUS at 16:54

## 2022-04-04 RX ADMIN — ONDANSETRON 4 MG: 2 INJECTION INTRAMUSCULAR; INTRAVENOUS at 16:52

## 2022-04-04 RX ADMIN — ONDANSETRON 4 MG: 2 INJECTION INTRAMUSCULAR; INTRAVENOUS at 20:48

## 2022-04-04 RX ADMIN — ONDANSETRON 4 MG: 2 INJECTION INTRAMUSCULAR; INTRAVENOUS at 19:24

## 2022-04-04 RX ADMIN — ONDANSETRON 4 MG: 4 TABLET, ORALLY DISINTEGRATING ORAL at 16:00

## 2022-04-04 ASSESSMENT — ENCOUNTER SYMPTOMS
ABDOMINAL PAIN: 1
BACK PAIN: 1
CONSTIPATION: 0
SHORTNESS OF BREATH: 1
EYES NEGATIVE: 1
VOMITING: 0
BLOOD IN STOOL: 0
VOMITING: 1
COUGH: 0
RHINORRHEA: 0
NAUSEA: 1
DIARRHEA: 0
RESPIRATORY NEGATIVE: 1
NAUSEA: 1
SORE THROAT: 0
CHEST TIGHTNESS: 0
ALLERGIC/IMMUNOLOGIC NEGATIVE: 1
ABDOMINAL PAIN: 1

## 2022-04-04 ASSESSMENT — PATIENT HEALTH QUESTIONNAIRE - PHQ9
1. LITTLE INTEREST OR PLEASURE IN DOING THINGS: 0
SUM OF ALL RESPONSES TO PHQ QUESTIONS 1-9: 0
SUM OF ALL RESPONSES TO PHQ9 QUESTIONS 1 & 2: 0
2. FEELING DOWN, DEPRESSED OR HOPELESS: 0

## 2022-04-04 ASSESSMENT — PAIN SCALES - GENERAL: PAINLEVEL_OUTOF10: 5

## 2022-04-04 NOTE — ED PROVIDER NOTES
[facility] ED  [address]  [place] [address] [place] [address]  Phone: [phone]        Pt Name: Temo Glover  MRN: [id number]  [place] 1955  Date of evaluation: 4/4/22      CHIEF COMPLAINT     Chief Complaint   Patient presents with    Nausea     pt reports chronic nausea, worse today with one episode of emesis PTA appearing of undigested food         HISTORY OF PRESENT ILLNESS  (Location/Symptom, Timing/Onset, Context/Setting, Quality, Duration, Modifying Factors, Severity.)    Temo Glover is a 77 y.o. female who presents with severe nausea. She states this is a chronic issue for her, and her usual medications are not helping. She had one episode of vomiting. She has been nauseated for 4 days. She has tried her phenergan tabs without relief. She took some this morning at 10:00 AM. She also takes vicodin twice daily for her back and that usually helps her nausea as well. She has mild abdominal pain over the epigastrium. The pain is aching in nature. It is constant. She denies dysuria, frequency or urgency. The patient states that she is unsure what causes her nausea. She is diabetic. She does have a known Elizalde's esophagus. REVIEW OF SYSTEMS    (2-9 systems for level 4, 10 or more for level 5)     Review of Systems   Constitutional: Positive for chills. Negative for fever. HENT: Negative for congestion, rhinorrhea and sore throat. Respiratory: Positive for shortness of breath. Negative for cough and chest tightness. Cardiovascular: Negative for chest pain and palpitations. Gastrointestinal: Positive for abdominal pain and nausea. Negative for vomiting. Genitourinary: Negative for dysuria, frequency and urgency. Musculoskeletal: Positive for back pain. Chronic   Skin: Negative for rash and wound. Neurological: Negative for dizziness, light-headedness and headaches. Hematological: Negative for adenopathy. Does not bruise/bleed easily.        PAST MEDICAL HISTORY has a past medical history of Cervical spine pain, Chronic headaches, Chronic sinusitis, Closed fracture of distal end of left femur with routine healing, COVID-19, Diabetes mellitus ([facility] [address] Dizziness, H/O fall, Hypertension, Knee pain, left, 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[facility] [address] Obesity, Peripheral neuropathy, Pneumonia, Pulmonary hypertension ([facility] [address] Shoulder pain, bilateral, Type 2 diabetes mellitus ([facility] [address] Unspecified essential hypertension, and Vitamin D deficiency. SURGICAL HISTORY      has a past surgical history that includes Hysterectomy (09/06/2005); Appendectomy; Upper gastrointestinal endoscopy; Knee arthroscopy (Left); Colonoscopy (05/03/2012); other surgical history (2011); Dental surgery (08/2015); Tonsillectomy; brain surgery (05/24/2016); Foreign Body Removal (05/28/2016); Total shoulder arthroplasty (Right, 10/18/2018); Shoulder arthroscopy (Right, 03/07/2019); Nerve Block (09/09/2019); Upper gastrointestinal endoscopy (05/27/2020); Upper gastrointestinal endoscopy (N/A, 12/23/2020); Colonoscopy (N/A, 12/23/2020); Back Injection (02/25/2021); Femur Closed Reduction (Left, 03/05/2020); polysomnography (11/30/2020); and Femur fracture surgery (Left, 03/06/2021). CURRENTMEDICATIONS       Previous Medications    ATORVASTATIN (LIPITOR) 20 MG TABLET    Take 1 tablet by mouth daily    BACLOFEN (LIORESAL) 5 MG TABLET    take 1 tablet by mouth three times a day if needed for muscle spasm    BLOOD GLUCOSE MONITOR STRIPS    Test 1 times a day & as needed for symptoms of irregular blood glucose. Dispense sufficient amount for indicated testing frequency plus additional to accommodate PRN testing needs.     CHOLECALCIFEROL (VITAMIN D3) 1.25 MG (06690 UT) CAPS    TAKE 1 CAPSULE ONCE A WEEK    CLONIDINE (CATAPRES) 0.1 MG TABLET    Take 1 tablet by mouth 2 times daily as needed for Other (withdrawal symptoms)    DULOXETINE (CYMBALTA) 60 MG EXTENDED RELEASE CAPSULE    Take 1 capsule by mouth daily    GABAPENTIN (NEURONTIN PO)    Take 600 mg by mouth 5 times daily Only taking 3 times a day. HANDICAP PLACARD MISC    by Does not apply route Issue parking placard for person with disability; Applicant meets the qualifying disability criteria. Length of time expected to have disability_____Lifetime  Other __x__. Prescription expires in 5 years from issuing date. 2024    HYDROCODONE-ACETAMINOPHEN (NORCO) 5-325 MG PER TABLET    Indications: Patient is taking 1 tablet a day     LISINOPRIL-HYDROCHLOROTHIAZIDE (PRINZIDE;ZESTORETIC) 20-12.5 MG PER TABLET    Take 1 tablet by mouth daily    MISC. DEVICES (ROLLATOR) MISC    Use to reduce risk of falls when walking. MISC. DEVICES (WALKER) MISC    1 each by Does not apply route daily    MISC. DEVICES (WHEELCHAIR) MISC    1 Device by Does not apply route daily as needed (gait difficulty)    PANTOPRAZOLE (PROTONIX) 40 MG TABLET    Take 1 tablet by mouth 2 times daily take 1 tablet by mouth daily    PIOGLITAZONE-METFORMIN (ACTOPLUS MET)  MG PER TABLET    TAKE 1 TABLET TWICE A DAY WITH MEALS    PROMETHAZINE HCL (PHENERGAN PO)    Take by mouth       ALLERGIES     is allergic to asa [aspirin]. FAMILY HISTORY     She indicated that her mother is . She indicated that her father is . She indicated that her sister is alive. She indicated that her brother is alive. She indicated that her maternal grandmother is . She indicated that her maternal grandfather is . She indicated that her paternal grandmother is . She indicated that her paternal grandfather is . She indicated that her daughter is alive. She indicated that her son is alive. family history includes Breast Cancer (age of onset: 52) in her mother; Cancer in her father and mother; Diabetes in her father; Hypertension in her father. SOCIAL HISTORY      reports that she has never smoked. She has never used smokeless tobacco. She reports current alcohol use.  She reports that she does not use drugs. PHYSICAL EXAM    (up to 7 for level 4, 8 or more for level 5)   INITIAL VITALS:  height is 5' 8\" (1.727 m) and weight is 281 lb (127.5 kg). Her temperature is 98.1 °F (36.7 °C). Her blood pressure is 149/99 (abnormal) and her pulse is 73. Her respiration is 18 and oxygen saturation is 100%. Physical Exam  Vitals reviewed. Constitutional:       Appearance: Normal appearance. She is obese. HENT:      Head: Normocephalic and atraumatic. Eyes:      Extraocular Movements: Extraocular movements intact. Pupils: Pupils are equal, round, and reactive to light. Cardiovascular:      Rate and Rhythm: Normal rate and regular rhythm. Pulses: Normal pulses. Heart sounds: Normal heart sounds. No murmur heard. No friction rub. No gallop. Pulmonary:      Effort: Pulmonary effort is normal.      Breath sounds: Normal breath sounds. No wheezing, rhonchi or rales. Abdominal:      General: Abdomen is flat. Bowel sounds are normal.      Palpations: Abdomen is soft. Tenderness: There is no abdominal tenderness. There is no guarding or rebound. Musculoskeletal:         General: Normal range of motion. Cervical back: Normal range of motion and neck supple. Right lower leg: No edema. Left lower leg: No edema. Skin:     General: Skin is warm and dry. Capillary Refill: Capillary refill takes less than 2 seconds. Neurological:      Mental Status: She is alert. DIFFERENTIAL DIAGNOSIS/ MDM:     28-year-old female here with nausea vomiting, and epigastric abdominal pain. Patient has chronic nausea and vomiting, but states none of her medications that she typically takes for this are working right now. Patient was found to have a urinary tract infection. Currently getting IV Rocephin.     DIAGNOSTIC RESULTS     EKG: All EKG's are interpreted by the Emergency Department Physician who either signs or Co-signs this chart in the absence of a cardiologist.    EKG Interpretation    Interpreted by emergency department physician    Rhythm: normal sinus   Rate: normal  Axis: normal  Ectopy: none  Conduction: normal  ST Segments: normal  T Waves: normal  Q Waves: none    Clinical Impression: normal EKG    Urvashi Grajeda MD        RADIOLOGY:        Interpretation per the Radiologist below, if available at the time of this note:    pending    LABS:  Results for orders placed or performed during the hospital encounter of 04/04/22   Urinalysis with Microscopic   Result Value Ref Range    Glucose, Ur NEGATIVE NEGATIVE    Bilirubin Urine NEGATIVE NEGATIVE    Ketones, Urine NEGATIVE NEGATIVE    Specific [place], UA 1.010 1.010 - 1.025    Urine Hgb TRACE (A) NEGATIVE    pH, UA 8.0 (H) 5.0 - 6.0    Protein, UA NEGATIVE NEGATIVE    Urobilinogen, Urine Normal Normal    Nitrite, Urine POSITIVE (A) NEGATIVE    Leukocyte Esterase, Urine 2+ (A) NEGATIVE    -          WBC, UA >100 0 - 4 /HPF    RBC, UA 0 TO 4 0 - 4 /HPF    Epithelial Cells UA 0 TO 4 0 - 5 /HPF    Bacteria, UA 4+ (A) None    Other Observations UA Specimen Cultured (A) NOT REQ. Troponin   Result Value Ref Range    Troponin, High Sensitivity 20 (H) 0 - 14 ng/L   EKG 12 Lead   Result Value Ref Range    Ventricular Rate 71 BPM    Atrial Rate 71 BPM    P-R Interval 172 ms    QRS Duration 96 ms    Q-T Interval 390 ms    QTc Calculation (Bazett) 423 ms    P Axis 57 degrees    R Axis 14 degrees    T Axis 32 degrees       UA with UTI    EMERGENCY DEPARTMENT COURSE:   Vitals:    Vitals:    04/04/22 1609   BP: (!) 149/99   Pulse: 73   Resp: 18   Temp: 98.1 °F (36.7 °C)   SpO2: 100%   Weight: 281 lb (127.5 kg)   Height: 5' 8\" (1.727 m)     -------------------------  BP: (!) 149/99, Temp: 98.1 °F (36.7 °C), Pulse: 73, Resp: 18      RE-EVALUATION:  7:45 PM EDT  Nausea improved. Patient updated on test results and plan of care. CONSULTS:  None     PROCEDURES:  None    FINAL IMPRESSION    No diagnosis found. DISPOSITION/PLAN   DISPOSITION        CONDITION ON DISPOSITION:   Stable     PATIENT REFERRED TO:  No follow-up provider specified.     DISCHARGE MEDICATIONS:  New Prescriptions    No medications on file       (Please note that portions of this note were completed with a voicerecognition program.  Efforts were made to edit the dictations but occasionally words are mis-transcribed.)    Donna Mccord MD  Attending Emergency Medicine Physician        Donna Mccord MD  04/04/22 6349

## 2022-04-04 NOTE — PROGRESS NOTES
Subjective:      Patient ID: Michelet Leong is a 77 y.o. female. HPI   Acute urgent care visit for persistent nausea and episodic vomiting in the last 3 days. Stools a little softer, not really liquid or runny. No perceived fever. Stomach ache, more epigastric/persistent. She estimates vomiting 3 x Saturday, none Sunday, and once today. No blood. Appetite reduced. Not really hungry. Drinking ok, not vomiting after drinking. No new medications. No ill exposures. Promethazine from old rx being used. Compliant with protonix bid .      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 Sandra Drake COLONOSCOPY  05/03/2012    diverticular disease    COLONOSCOPY N/A 12/23/2020    COLONOSCOPY WITH BIOPSY performed by Billie dAler MD at [address] [facility] 1 hyperplastic polyp, random biopsies negative    DENTAL SURGERY  08/2015    full dental extraction    FEMUR CLOSED REDUCTION Left 03/05/2020    has [facility]    FEMUR FRACTURE SURGERY Left 03/06/2021    left retrograde femoral nailing. Dr. Tamiko Prince, [place] REMOVAL  05/28/2016    left-sided temporal craniotomy wound reexploration with removal of small retained foreign body (plastic from Ruth clip from surgery 3 days prior), [facility], Dr. Elan Torrez  09/06/2005    supracervical hysterectomy bilateral salpingo oophectomy    KNEE ARTHROSCOPY Left     NERVE BLOCK  09/09/2019    right side L2 through L5 block nerve,facet joint,lumbar,medial branch per Dr Lindsay Rogers at [address]  2011    endoscopic mucosal resection of duodenal polyp    POLYSOMNOGRAPHY  11/30/2020    no significant sleep apnea    SHOULDER ARTHROPLASTY Right 10/18/2018    Harsh Jung MD; done at [address] [facility] ARTHROSCOPY Right 03/07/2019    revision arthroscopy with debridement,revision mini-open rotator cuff repair per Dr Janet Perez at [address] [facility]  05/27/2020    gastric ulcer, small hiatal hernia, Dr. Bettie Matthews, [facility] N/A 12/23/2020    EGD BIOPSY performed by Tia Jaramillo MD at [address] [address] Elizalde's esophagus     Current Outpatient Medications   Medication Sig Dispense Refill    Promethazine HCl (PHENERGAN PO) Take by mouth      blood glucose monitor strips Test 1 times a day & as needed for symptoms of irregular blood glucose. Dispense sufficient amount for indicated testing frequency plus additional to accommodate PRN testing needs.  100 strip 1    Baclofen (LIORESAL) 5 MG tablet take 1 tablet by mouth three times a day if needed for muscle spasm      DULoxetine (CYMBALTA) 60 MG extended release capsule Take 1 capsule by mouth daily 90 capsule 1    cloNIDine (CATAPRES) 0.1 MG tablet Take 1 tablet by mouth 2 times daily as needed for Other (withdrawal symptoms) 30 tablet 0    lisinopril-hydroCHLOROthiazide (PRINZIDE;ZESTORETIC) 20-12.5 MG per tablet Take 1 tablet by mouth daily      pantoprazole (PROTONIX) 40 MG tablet Take 1 tablet by mouth 2 times daily take 1 tablet by mouth daily (Patient taking differently: Take 40 mg by mouth 2 times daily take 1 tablet by mouth daily. Patient states she takes 2 pills at the same times in the morning instead of 1 pill twice a day.) 60 tablet 5    pioglitazone-metFORMIN (ACTOPLUS MET)  MG per tablet TAKE 1 TABLET TWICE A DAY WITH MEALS (Patient taking differently: TAKE 1 TABLET TWICE A DAY WITH MEALS. Patient states she takes 2 pills in the morning.) 180 tablet 1    Cholecalciferol (VITAMIN D3) 1.25 MG (44500 UT) CAPS TAKE 1 CAPSULE ONCE A WEEK 13 capsule 1    atorvastatin (LIPITOR) 20 MG tablet Take 1 tablet by mouth daily 30 tablet 3    Misc. Devices [facility]) MISC 1 Device by Does not apply route daily as needed (gait difficulty) 1 each 0    Misc. Devices (WALKER) MISC 1 each by Does not apply route daily 1 each 0    HYDROcodone-acetaminophen (NORCO) 5-325 MG per tablet Indications: Patient is taking 1 tablet a day       Gabapentin (NEURONTIN PO) Take 600 mg by mouth 5 times daily Only taking 3 times a day.  Handicap Placard MISC by Does not apply route Issue parking placard for person with disability; Applicant meets the qualifying disability criteria. Length of time expected to have disability_____Lifetime  Other __x__. Prescription expires in 5 years from issuing date. 02/05/2024 1 each 0    Misc. Devices (ROLLATOR) MISC Use to reduce risk of falls when walking. 1 each 0     No current facility-administered medications for this visit. Allergies   Allergen Reactions    Asa [Aspirin] Other (See Comments)     Stomach irritation       Review of Systems   Constitutional: Negative. HENT: Negative. Eyes: Negative. Respiratory: Negative.     Cardiovascular: Negative. Gastrointestinal: Positive for abdominal pain (epigastric, mild), nausea and vomiting. Negative for blood in stool, constipation and diarrhea (a little softer, not liquid). Endocrine: Negative. Genitourinary: Negative. Negative for dysuria and frequency. Musculoskeletal: Negative. Skin: Negative. Allergic/Immunologic: Negative. Neurological: Negative. Hematological: Negative. Psychiatric/Behavioral: Negative. Objective:   Physical Exam  Constitutional:       General: She is in acute distress (restless, moving and moaning with nausea). Appearance: She is well-developed. She is obese. HENT:      Head: Normocephalic and atraumatic. Right Ear: External ear normal.      Left Ear: External ear normal.      Mouth/Throat:      Pharynx: No oropharyngeal exudate. Eyes:      General: No scleral icterus. Conjunctiva/sclera: Conjunctivae normal.   Neck:      Thyroid: No thyromegaly. Cardiovascular:      Rate and Rhythm: Normal rate and regular rhythm. Heart sounds: Normal heart sounds. No murmur heard. Pulmonary:      Effort: Pulmonary effort is normal. No respiratory distress. Breath sounds: Normal breath sounds. No wheezing. Abdominal:      General: Bowel sounds are normal. There is no distension. Palpations: Abdomen is soft. Tenderness: There is abdominal tenderness (epigastric). There is no rebound. Musculoskeletal:         General: No tenderness. Normal range of motion. Cervical back: Neck supple. Skin:     General: Skin is warm and dry. Findings: No erythema or rash. Neurological:      Mental Status: She is alert and oriented to person, place, and time. Psychiatric:         Behavior: Behavior normal.         Thought Content:  Thought content normal.         Judgment: Judgment normal.       /74 (Site: Left Upper Arm, Position: Sitting, Cuff Size: Large Adult)   Pulse 90   Temp 99 °F (37.2 °C) (Tympanic)   Wt 281 lb (127.5 kg)   LMP 08/24/2010 (Approximate)   SpO2 98%   BMI 42.73 kg/m²     Assessment:      Encounter Diagnoses   Name Primary?  Epigastric pain Yes    Nausea     Non-intractable vomiting with nausea, unspecified vomiting type            Plan:      Flat plate  Cbc, cmp, lipase, ua  zofran odt x 1 now      Addendum. Patient not feeling well on the way to xray. She decided to transfer to the ER for ongoing work up and treatment.     Susy Pedro MD

## 2022-04-04 NOTE — PROGRESS NOTES
Taking pt to Xray per wheelchair pt requested to go to ER for evaluation due to inability to sit in chair well. pt to ER

## 2022-04-05 LAB
EKG ATRIAL RATE: 71 BPM
EKG P AXIS: 57 DEGREES
EKG P-R INTERVAL: 172 MS
EKG Q-T INTERVAL: 390 MS
EKG QRS DURATION: 96 MS
EKG QTC CALCULATION (BAZETT): 423 MS
EKG R AXIS: 14 DEGREES
EKG T AXIS: 32 DEGREES
EKG VENTRICULAR RATE: 71 BPM

## 2022-04-05 NOTE — ED PROVIDER NOTES
[facility] ED  [address] [facility]  Phone: [phone]        ADDENDUM:      Care of this patient was assumed from Dr. Nadege Browning the patient was seen for Nausea (pt reports chronic nausea, worse today with one episode of emesis PTA appearing of undigested food)  . The patient's initial evaluation and plan have been discussed with the prior provider who initially evaluated the patient. Nursing Notes, Past Medical Hx, Past Surgical Hx, Allergies, were all reviewed. 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 SURGICAL HISTORY      has a past surgical history that includes Hysterectomy (09/06/2005); Appendectomy; Upper gastrointestinal endoscopy; Knee arthroscopy (Left); Colonoscopy (05/03/2012); other surgical history (2011); Dental surgery (08/2015); Tonsillectomy; brain surgery (05/24/2016); Foreign Body Removal (05/28/2016); Total shoulder arthroplasty (Right, 10/18/2018); Shoulder arthroscopy (Right, 03/07/2019); Nerve Block (09/09/2019); Upper gastrointestinal endoscopy (05/27/2020); Upper gastrointestinal endoscopy (N/A, 12/23/2020); Colonoscopy (N/A, 12/23/2020); Back Injection (02/25/2021); Femur Closed Reduction (Left, 03/05/2020); polysomnography (11/30/2020); and Femur fracture surgery (Left, 03/06/2021).     CURRENT MEDICATIONS       Previous Medications    ATORVASTATIN (LIPITOR) 20 MG TABLET    Take 1 tablet by mouth daily    BACLOFEN (LIORESAL) 5 MG TABLET    take 1 tablet by mouth three times a day if needed for muscle spasm BLOOD GLUCOSE MONITOR STRIPS    Test 1 times a day & as needed for symptoms of irregular blood glucose. Dispense sufficient amount for indicated testing frequency plus additional to accommodate PRN testing needs. CHOLECALCIFEROL (VITAMIN D3) 1.25 MG (06699 UT) CAPS    TAKE 1 CAPSULE ONCE A WEEK    CLONIDINE (CATAPRES) 0.1 MG TABLET    Take 1 tablet by mouth 2 times daily as needed for Other (withdrawal symptoms)    DULOXETINE (CYMBALTA) 60 MG EXTENDED RELEASE CAPSULE    Take 1 capsule by mouth daily    GABAPENTIN (NEURONTIN PO)    Take 600 mg by mouth 5 times daily Only taking 3 times a day. HANDICAP PLACARD MISC    by Does not apply route Issue parking placard for person with disability; Applicant meets the qualifying disability criteria. Length of time expected to have disability_____Lifetime  Other __x__. Prescription expires in 5 years from issuing date. 02/05/2024    HYDROCODONE-ACETAMINOPHEN (NORCO) 5-325 MG PER TABLET    Indications: Patient is taking 1 tablet a day     LISINOPRIL-HYDROCHLOROTHIAZIDE (PRINZIDE;ZESTORETIC) 20-12.5 MG PER TABLET    Take 1 tablet by mouth daily    MISC. DEVICES (ROLLATOR) MISC    Use to reduce risk of falls when walking. MISC. DEVICES (WALKER) MISC    1 each by Does not apply route daily    MISC. DEVICES (WHEELCHAIR) MISC    1 Device by Does not apply route daily as needed (gait difficulty)    PANTOPRAZOLE (PROTONIX) 40 MG TABLET    Take 1 tablet by mouth 2 times daily take 1 tablet by mouth daily    PIOGLITAZONE-METFORMIN (ACTOPLUS MET)  MG PER TABLET    TAKE 1 TABLET TWICE A DAY WITH MEALS    PROMETHAZINE HCL (PHENERGAN PO)    Take by mouth       ALLERGIES     is allergic to asa [aspirin].       Diagnostic Results     EKG: All EKG's are interpreted by the Emergency Department Physician who either signs or Co-signs this chart in the [address] a cardiologist.        RADIOLOGY:    CT per radiologist shows no kidney stones no signs of infectious process    Interpretation per the Radiologist below, if available at the time of this note:        LABS:   Results for orders placed or performed during the hospital encounter of 04/04/22   Urinalysis with Microscopic   Result Value Ref Range    Glucose, Ur NEGATIVE NEGATIVE    Bilirubin Urine NEGATIVE NEGATIVE    Ketones, Urine NEGATIVE NEGATIVE    Specific [place], UA 1.010 1.010 - 1.025    Urine Hgb TRACE (A) NEGATIVE    pH, UA 8.0 (H) 5.0 - 6.0    Protein, UA NEGATIVE NEGATIVE    Urobilinogen, Urine Normal Normal    Nitrite, Urine POSITIVE (A) NEGATIVE    Leukocyte Esterase, Urine 2+ (A) NEGATIVE    -          WBC, UA >100 0 - 4 /HPF    RBC, UA 0 TO 4 0 - 4 /HPF    Epithelial Cells UA 0 TO 4 0 - 5 /HPF    Bacteria, UA 4+ (A) None    Other Observations UA Specimen Cultured (A) NOT REQ. Troponin   Result Value Ref Range    Troponin, High Sensitivity 20 (H) 0 - 14 ng/L   EKG 12 Lead   Result Value Ref Range    Ventricular Rate 71 BPM    Atrial Rate 71 BPM    P-R Interval 172 ms    QRS Duration 96 ms    Q-T Interval 390 ms    QTc Calculation (Bazett) 423 ms    P Axis 57 degrees    R Axis 14 degrees    T Axis 32 degrees       RADIOLOGY:  CT ABDOMEN PELVIS WO CONTRAST Additional Contrast? None   Final Result   No acute finding in the abdomen or pelvis. The gallbladder is moderately dilated. There are no calcified gallstones. Cholelithiasis was noted on a prior MRI study of 12/11/2020. No findings to   suggest acute cholecystitis. If clinically indicated, follow-up gallbladder ultrasound and or nuclear   medicine hepatobiliary imaging study may be helpful.       RECOMMENDATIONS:   Unavailable             RECENT VITALS:  BP: (!) 149/99, Temp: 98.1 °F (36.7 °C), Pulse: 73, Resp: 18     ED Course     The patient was given the following medications:  Orders Placed This Encounter   Medications    lactated ringers bolus    ondansetron (ZOFRAN) injection 4 mg    fentaNYL (SUBLIMAZE) injection 50 mcg    ondansetron (ZOFRAN) injection 4 mg    cefTRIAXone (ROCEPHIN) 1000 mg IVPB in 50 mL D5W minibag     Order Specific Question:   Antimicrobial Indications     Answer:   Urinary Tract Infection    nitrofurantoin, macrocrystal-monohydrate, (MACROBID) 100 MG capsule     Sig: Take 1 capsule by mouth 2 times daily for 7 days     Dispense:  14 capsule     Refill:  0       Medical Decision Making               EMERGENCY DEPARTMENT COURSE:   Vitals:    Vitals:    04/04/22 1609   BP: (!) 149/99   Pulse: 73   Resp: 18   Temp: 98.1 °F (36.7 °C)   SpO2: 100%   Weight: 281 lb (127.5 kg)   Height: 5' 8\" (1.727 m)     -------------------------  BP: (!) 149/99, Temp: 98.1 °F (36.7 °C), Pulse: 73, Resp: 18      RE-EVALUATION:  Patient is resting comfortably CT unremarkable labs show a slight elevation of BUN/creatinine with an obvious urinary tract infection she will be given a prescription for Macrodantin follow-up as directed return if worse    CONSULTS:      PROCEDURES:  None    FINAL IMPRESSION      1.  Urinary tract infection without hematuria, site unspecified          DISPOSITION/PLAN   DISPOSITION Decision To Discharge 04/04/2022 08:28:14 PM      CONDITION ON DISPOSITION:   Stable    PATIENT REFERRED TO:  Elisha Cruz MD  [address] [place] [address]  [phone]    In 2 days        DISCHARGE MEDICATIONS:  New Prescriptions    NITROFURANTOIN, MACROCRYSTAL-MONOHYDRATE, (MACROBID) 100 MG CAPSULE    Take 1 capsule by mouth 2 times daily for 7 days       (Please note that portions of this note were completed with a voicerecognition program.  Efforts were made to edit the dictations but occasionally words are mis-transcribed.)    Jose Martin Friedman MD  Attending Emergency Medicine Physician                    Jose Martin Friedman MD  04/04/22 2031

## 2022-04-06 LAB
CULTURE: ABNORMAL
SPECIMEN DESCRIPTION: ABNORMAL

## 2022-04-15 DIAGNOSIS — R80.9 TYPE 2 DIABETES MELLITUS WITH MICROALBUMINURIA, WITHOUT LONG-TERM CURRENT USE OF INSULIN (HCC): ICD-10-CM

## 2022-04-15 DIAGNOSIS — E11.29 TYPE 2 DIABETES MELLITUS WITH MICROALBUMINURIA, WITHOUT LONG-TERM CURRENT USE OF INSULIN (HCC): ICD-10-CM

## 2022-04-18 NOTE — TELEPHONE ENCOUNTER
Madi Brown called requesting a refill of the below medication which has been pended for you:     Requested Prescriptions     Pending Prescriptions Disp Refills    pioglitazone-metFORMIN (ACTOPLUS MET)  MG per tablet [Pharmacy Med Name: PIOGLITAZONE/METFORMIN TABS 15/500MG] 180 tablet 0     Sig: TAKE 1 TABLET TWICE A DAY WITH MEALS       Last Appointment Date: 1/6/2022  Next Appointment Date: 7/7/2022    Allergies   Allergen Reactions    Asa [Aspirin] Other (See Comments)     Stomach irritation

## 2022-04-19 RX ORDER — PIOGLITAZONE HCL AND METFORMIN HCL 500; 15 MG/1; MG/1
TABLET ORAL
Qty: 180 TABLET | Refills: 0 | Status: SHIPPED | OUTPATIENT
Start: 2022-04-19 | End: 2022-07-14

## 2022-05-02 ENCOUNTER — TELEPHONE (OUTPATIENT)
Dept: FAMILY MEDICINE CLINIC | Age: 67
End: 2022-05-02

## 2022-05-02 NOTE — TELEPHONE ENCOUNTER
----- Message from Lui Maryland sent at 5/2/2022  8:56 AM EDT -----  Subject: Message to Provider    QUESTIONS  Information for Provider? Patient was seen in ER last week with a urine   infection. She was given pills. Was calling to make appointment. Wanted   the soonest appointment she could have. Please call to advise.   ---------------------------------------------------------------------------  --------------  CALL BACK INFO  What is the best way for the office to contact you? OK to leave message on   voicemail  Preferred Call Back Phone Number? [phone]  ---------------------------------------------------------------------------  --------------  SCRIPT ANSWERS  Relationship to Patient? Self  Have you recently (14 days) seen a provider for this problem?  Yes

## 2022-05-05 ENCOUNTER — TELEPHONE (OUTPATIENT)
Dept: PRIMARY CARE CLINIC | Age: 67
End: 2022-05-05

## 2022-05-05 ENCOUNTER — OFFICE VISIT (OUTPATIENT)
Dept: PRIMARY CARE CLINIC | Age: 67
End: 2022-05-05
Payer: MEDICARE

## 2022-05-05 VITALS
HEART RATE: 53 BPM | DIASTOLIC BLOOD PRESSURE: 80 MMHG | OXYGEN SATURATION: 96 % | WEIGHT: 278 LBS | SYSTOLIC BLOOD PRESSURE: 110 MMHG | TEMPERATURE: 98.8 F | BODY MASS INDEX: 42.13 KG/M2 | HEIGHT: 68 IN

## 2022-05-05 DIAGNOSIS — N39.0 FREQUENT UTI: ICD-10-CM

## 2022-05-05 DIAGNOSIS — R11.0 CHRONIC NAUSEA: ICD-10-CM

## 2022-05-05 DIAGNOSIS — R11.0 NAUSEA: Primary | ICD-10-CM

## 2022-05-05 PROCEDURE — 99214 OFFICE O/P EST MOD 30 MIN: CPT | Performed by: FAMILY MEDICINE

## 2022-05-05 PROCEDURE — PBSHW PBB SHADOW CHARGE: Performed by: FAMILY MEDICINE

## 2022-05-05 PROCEDURE — 99212 OFFICE O/P EST SF 10 MIN: CPT | Performed by: FAMILY MEDICINE

## 2022-05-05 RX ORDER — ONDANSETRON 2 MG/ML
8 INJECTION INTRAMUSCULAR; INTRAVENOUS ONCE
Status: COMPLETED | OUTPATIENT
Start: 2022-05-05 | End: 2022-05-05

## 2022-05-05 RX ADMIN — ONDANSETRON 8 MG: 2 INJECTION INTRAMUSCULAR; INTRAVENOUS at 10:05

## 2022-05-05 ASSESSMENT — PATIENT HEALTH QUESTIONNAIRE - PHQ9
SUM OF ALL RESPONSES TO PHQ QUESTIONS 1-9: 0
SUM OF ALL RESPONSES TO PHQ QUESTIONS 1-9: 0
2. FEELING DOWN, DEPRESSED OR HOPELESS: 0
SUM OF ALL RESPONSES TO PHQ QUESTIONS 1-9: 0
SUM OF ALL RESPONSES TO PHQ QUESTIONS 1-9: 0

## 2022-05-05 NOTE — TELEPHONE ENCOUNTER
Patient scheduled with Urology for 6/15/22 at 8:20am. Patient aware and verbalizes understanding. GI already scheduled as well.

## 2022-05-05 NOTE — PROGRESS NOTES
[facility]             [address] [facility], [place], [address]                        Telephone [phone]             Fax [phone]       Maria Del Carmen Silvreman  :  1955  Age:  77 y.o. MRN:  [id number]  Date of visit:  2022       Assessment and Plan:    1. Nausea  A Zofran injection was given today for acute nausea:  - ondansetron (ZOFRAN) injection 8 mg    Printed information regarding Nausea and Vomiting was provided to the patient with the after visit summary. 2. Chronic nausea  I reviewed the results of the evaluation previously done. I recommended follow up with gastroenterology. She prefers to see a different gastroenterologist.  A referral was ordered:  - [facility]     Printed information regarding Nausea and Vomiting was provided to the patient with her after visit summary. She was advised to drink plenty of fluids and eat a bland diet. She was advised to follow up if symptoms worsen or do not resolve. 3. Frequent urinary tract infections  A referral to urology was ordered. Subjective:    Maria Del Carmen Silverman is a 77 y.o. female who presents to [facility] today (2022) for evaluation of: Other (nausea chronic. pt 1 1/2 weeks ago, been to er twice. now here to see Dr. Huber Dominguez . pt cannot eat . pt dx with UTI [address] pt seem uncomfortable she is rocking back and forth. )      She has a history of chronic nausea. She has been evaluated by gastroenterology. She had an abdominal MRI . She had and EGD and colonoscopy 2020. Her last office visit with gastroenterology was 2021. She states that the nausea has been more severe in the last week. She was seen at [facility] ED on 2022 with abdominal pain and nausea. She was diagnosed with a urinary tract infection. She was seen at [facility] ED on 2022 due to nausea. She was given IV fluids, Compazine, and Zofran. She was seen again at [facility] ED yesterday due to nausea and vomiting. She was diagnosed with a urinary tract infection. She was given Benadryl, Ativan, Zofran, and Compazine at the ED. She received IV fluids. She was prescribed Keflex, Phenergan, and Zofran. She states that the nausea was improved after the ED visit, but was worse this morning.         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[address]    Type 2 diabetes mellitus with microalbuminuria, without long-term current use of insulin (HCC)    Morbid obesity with BMI of 40.0-44.9, adult (HCC)    Restrictive lung disease secondary to obesity    Calculus of gallbladder and bile duct without cholecystitis or obstruction    Nausea    Multiple gastric ulcers    Anxiety    Chronic tension-type headache, intractable    Chronic, continuous use of opioids    Complete tear of right rotator cuff    Depression    Fatigue    Lumbosacral spondylosis without myelopathy    Major depressive disorder, single episode, moderate (HCC)    Refractory migraine    Muscle spasms of neck    Obsessive-compulsive disorders    Spondylosis of lumbar spine    Spondylosis of thoracic region without myelopathy or radiculopathy    Spinal stenosis, lumbar region, without neurogenic claudication    Femur fracture, left ([address]    Elizalde's esophagus without dysplasia    Therapeutic opioid-induced constipation (OIC)    Stage 3a chronic kidney disease (HCC)    Pneumonia due to COVID-19 virus    Acute cystitis with hematuria    Spongiotic dermatitis    Osteopenia        She has the following surgical history:  Past Surgical History:   Procedure Laterality Date    APPENDECTOMY      BACK INJECTION  02/25/2021    [facility] Right lumbar medial branch block using Fluroscopy    BRAIN SURGERY  05/24/2016    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 Tessie Camara COLONOSCOPY  05/03/2012    diverticular disease    COLONOSCOPY N/A 12/23/2020    COLONOSCOPY WITH BIOPSY performed by Genesis Malin MD at [address] [facility] 1 hyperplastic polyp, random biopsies negative   [facility] DENTAL SURGERY  08/2015    full dental extraction    FEMUR CLOSED REDUCTION Left 03/05/2020    has [facility]    FEMUR FRACTURE SURGERY Left 03/06/2021    left retrograde femoral nailing.  Dr. Smiley Weeks, [place] REMOVAL  05/28/2016    left-sided temporal craniotomy wound reexploration with removal of small retained foreign body (plastic from Ruth clip from surgery 3 days prior), [facility], Dr. Anna Alvarado  09/06/2005    supracervical hysterectomy bilateral salpingo oophectomy    KNEE ARTHROSCOPY Left     NERVE BLOCK  09/09/2019    right side L2 through L5 block nerve,facet joint,lumbar,medial branch per Dr Usama Andrew at [facility]  2011    endoscopic mucosal resection of duodenal polyp    POLYSOMNOGRAPHY  11/30/2020    no significant sleep apnea    SHOULDER ARTHROPLASTY Right 10/18/2018    Mahogany Ruiz MD; done at [address] [place] [address] ARTHROSCOPY Right 03/07/2019    revision arthroscopy with debridement,revision mini-open rotator cuff repair per Dr Lis Baum [facility] TONSILLECTOMY      UPPER GASTROINTESTINAL ENDOSCOPY      UPPER GASTROINTESTINAL ENDOSCOPY  05/27/2020    gastric ulcer, small hiatal hernia, Dr. Lucie Guzman, [facility] N/A 12/23/2020    EGD BIOPSY performed by Anne-Marie Jorge MD at [address] [facility] Elizalde's esophagus        Current medications are:     Current Outpatient Medications   Medication Sig Dispense Refill    pioglitazone-metFORMIN (ACTOPLUS MET)  MG per tablet TAKE 1 TABLET TWICE A DAY WITH MEALS 180 tablet 0    Promethazine HCl (PHENERGAN PO) Take by mouth      DULoxetine (CYMBALTA) 60 MG extended release capsule Take 1 capsule by mouth daily 90 capsule 1    lisinopril-hydroCHLOROthiazide (PRINZIDE;ZESTORETIC) 20-12.5 MG per tablet Take 1 tablet by mouth daily      pantoprazole (PROTONIX) 40 MG tablet Take 1 tablet by mouth 2 times daily take 1 tablet by mouth daily (Patient taking differently: Take 40 mg by mouth 2 times daily take 1 tablet by mouth daily. Patient states she takes 2 pills at the same times in the morning instead of 1 pill twice a day.) 60 tablet 5    Cholecalciferol (VITAMIN D3) 1.25 MG (22929 UT) CAPS TAKE 1 CAPSULE ONCE A WEEK 13 capsule 1    HYDROcodone-acetaminophen (NORCO) 5-325 MG per tablet Indications: Patient is taking 1 tablet a day       Gabapentin (NEURONTIN PO) Take 600 mg by mouth 5 times daily Only taking 3 times a day.  ondansetron (ZOFRAN ODT) 4 MG disintegrating tablet Take 1 tablet by mouth every 8 hours as needed for Nausea or Vomiting (Patient not taking: Reported on 5/5/2022) 8 tablet 0    blood glucose monitor strips Test 1 times a day & as needed for symptoms of irregular blood glucose. Dispense sufficient amount for indicated testing frequency plus additional to accommodate PRN testing needs. 100 strip 1    Misc. Devices [facility]) MISC 1 Device by Does not apply route daily as needed (gait difficulty) 1 each 0    Misc.  Devices [place]) MISC 1 each by Does not apply route daily 1 each 0    Handicap Placard MISC by Does not apply route Issue parking placard for person with disability; Applicant meets the qualifying disability criteria. Length of time expected to have disability_____Lifetime  Other __x__. Prescription expires in 5 years from issuing date. 02/05/2024 1 each 0    Misc. Devices (ROLLATOR) MISC Use to reduce risk of falls when walking. 1 each 0     No current facility-administered medications for this visit. She is allergic to asa [aspirin]. She  reports that she has never smoked. She has never used smokeless tobacco..      Objective:    Vitals:    05/05/22 0913   BP: 110/80   Site: Left Upper Arm   Position: Sitting   Cuff Size: Large Adult   Pulse: 53   Temp: 98.8 °F (37.1 °C)   TempSrc: Tympanic   SpO2: 96%   Weight: 278 lb (126.1 kg)   Height: 5' 8\" (1.727 m)     Body mass index is 42.27 kg/m². Well-nourished, well-developed extremely obese female, alert and cooperative. She appears uncomfortable. Mucus membranes are moist.  Neck is supple. There is no lymphadenopathy or thyromegaly. Chest is clear to auscultation bilaterally. Heart sounds are regular rate and rhythm without murmur. Abdomen is soft, nondistended. There are no masses. There is mild tenderness to palpation of the lower abdomen. The progress notes from the ED visits on 4/4/2022, 4/30/2022, and 5/3/2022 were reviewed. The progress notes from the gastroenterology visit on 11/4/2022 and 6/28/202 were reviewed. The results of the MRI, EGD, and colonoscopy were reviewed. She cancelled the gastroenterology appointment scheduled in October 2021.       (Please note that portions of this note were completed with a voice-recognition program. Efforts were made to edit the dictation but occasionally words are mis-transcribed.)

## 2022-05-05 NOTE — TELEPHONE ENCOUNTER
She was seen at Urgent Care today for nausea. She was seen at [facility] ED yesterday for nausea. She was diagnosed with a urinary tract infection at the ED visit. She has a history of frequent urinary tract infections. We discussed a referral to urology at the Urgent Care visit, but it was not ordered. Please contact the patient to schedule follow up with urology. A gastroenterology referral was ordered at the Urgent Care visit also.

## 2022-05-05 NOTE — PATIENT INSTRUCTIONS
Patient Education        Nausea and Vomiting: Care Instructions  Overview     When you are nauseated, you may feel weak and sweaty and notice a lot of saliva in your mouth. Nausea often leads to vomiting. Most of the time you do not needto worry about nausea and vomiting, but they can be signs of other illnesses. Two common causes of nausea and vomiting are a stomach infection and food poisoning. Nausea and vomiting from a viral stomach infection will usually start to improve within 24 hours. Nausea and vomiting from food poisoning maylast from 12 to 48 hours. The doctor has checked you carefully, but problems can develop later. If you notice any problems or new symptoms, get medical treatment right away. Follow-up care is a key part of your treatment and safety. Be sure to make and go to all appointments, and call your doctor if you are having problems. It's also a good idea to know your test results and keep alist of the medicines you take. How can you care for yourself at home?  To prevent dehydration, drink plenty of fluids. Choose water and other clear liquids until you feel better. If you have kidney, heart, or liver disease and have to limit fluids, talk with your doctor before you increase the amount of fluids you drink.  Rest in bed until you feel better.  When you are able to eat, try clear soups, mild foods, and liquids until all symptoms are gone for 12 to 48 hours. Other good choices include dry toast, crackers, cooked cereal, and gelatin dessert, such as Jell-O. When should you call for help? Call 911 anytime you think you may need emergency care. For example, call if:     You passed out (lost consciousness). Call your doctor now or seek immediate medical care if:     You have symptoms of dehydration, such as:  ? Dry eyes and a dry mouth. ? Passing only a little urine. ? Feeling thirstier than usual.      You have new or worsening belly pain.      You have a new or higher fever.    You vomit blood or what looks like coffee grounds. Watch closely for changes in your health, and be sure to contact your doctor if:     You have ongoing nausea and vomiting.      Your vomiting is getting worse.      Your vomiting lasts longer than 2 days.      You are not getting better as expected. Where can you learn more? Go to [email or web address] and sign in to your [employer] account. Enter [id number] in the [employer] box to learn more about \"Nausea and Vomiting: Care Instructions. \"     If you do not have an account, please click on the \"Sign Up Now\" link. Current as of: July 1, 2021               Content Version: 13.2  © 2006-2022 [employer]wise[employer] Inc[employer]. Care instructions adapted under license by [facility] ([facility]). If you have questions about a medical condition or this instruction, always ask your healthcare professional. [place] [address] any warranty or liability for your use of this information.

## 2022-05-11 ENCOUNTER — TELEPHONE (OUTPATIENT)
Dept: FAMILY MEDICINE CLINIC | Age: 67
End: 2022-05-11

## 2022-05-11 DIAGNOSIS — R11.0 NAUSEA: Primary | ICD-10-CM

## 2022-05-11 RX ORDER — ONDANSETRON 4 MG/1
4 TABLET, ORALLY DISINTEGRATING ORAL EVERY 8 HOURS PRN
Qty: 10 TABLET | Refills: 0 | Status: SHIPPED | OUTPATIENT
Start: 2022-05-11 | End: 2022-05-16 | Stop reason: SDUPTHER

## 2022-05-11 RX ORDER — CLONIDINE HYDROCHLORIDE 0.1 MG/1
0.1 TABLET ORAL 2 TIMES DAILY PRN
Qty: 30 TABLET | Refills: 0 | Status: SHIPPED | OUTPATIENT
Start: 2022-05-11 | End: 2022-06-15

## 2022-05-11 NOTE — TELEPHONE ENCOUNTER
Zofran and Clonidine were refilled. I also recommend evaluation at the [facility] to assist with withdrawal symptoms.

## 2022-05-11 NOTE — TELEPHONE ENCOUNTER
Patient seen in Urgent Care on 5/5/22 for similar symptoms. GI appt 6/7/22. Patient asking for a medication that has been prescribed in the past because she stopped taking Vicodin on 5/8/22. Patient reports having chills/nausea currently.     Last OV: 5/5/22   Next OV: 7/7/22

## 2022-05-12 RX ORDER — PROCHLORPERAZINE MALEATE 5 MG/1
5-10 TABLET ORAL EVERY 6 HOURS PRN
Qty: 40 TABLET | Refills: 0 | Status: SHIPPED | OUTPATIENT
Start: 2022-05-12 | End: 2022-06-16 | Stop reason: ALTCHOICE

## 2022-05-12 NOTE — TELEPHONE ENCOUNTER
Patient returned call. Patient states that she is continuing with severe nausea. Now reporting that she does not feel that Zofran is effective and is asking if there is anything else she can take. Please advise.

## 2022-05-12 NOTE — TELEPHONE ENCOUNTER
I can prescribe Compazine. This was sent to [facility]. If this is not effective, I recommend that she go to the ER for evaluation and possible IV fluids. Does she have an appointment scheduled with gastroenterology?

## 2022-05-12 NOTE — TELEPHONE ENCOUNTER
I agree with the documentation of Lucho Flores LPN. Electronically signed by Cheryle Sines, MD on 5/12/22 at 12:47 PM EDT.

## 2022-05-12 NOTE — TELEPHONE ENCOUNTER
I spoke to patient and she was in [facility] ER this am and she was given IV fluids and Reglan 10 mg. She was also given Reglan 10 mg for home use and to use compazine as back up (per ER note)  Next GI appt is 6/7/22. I spoke to [facility] and they were able to get her scheduled sooner 5/19/22 @ 3 pm.     Patient states she feels she needs to admitted. She reports no vomiting in 2-3 days but the nausea is unbearable. I advised patient of new appointment with GI and she verbalized understanding. I advised patient to go to ER or Urgent Care if she feels she continues to feel unwell. She also states she called [facility] and they do not help with nausea.

## 2022-05-16 DIAGNOSIS — R11.0 NAUSEA: Primary | ICD-10-CM

## 2022-05-16 RX ORDER — ONDANSETRON 4 MG/1
4 TABLET, ORALLY DISINTEGRATING ORAL EVERY 8 HOURS PRN
Qty: 10 TABLET | Refills: 0 | Status: SHIPPED | OUTPATIENT
Start: 2022-05-16 | End: 2022-05-20 | Stop reason: SDUPTHER

## 2022-05-16 NOTE — TELEPHONE ENCOUNTER
Yao Jimenez called requesting a refill of the below medication which has been pended for you:     RR is out of the office    Requested Prescriptions     Pending Prescriptions Disp Refills    ondansetron (ZOFRAN ODT) 4 MG disintegrating tablet 10 tablet 0     Sig: Take 1 tablet by mouth every 8 hours as needed for Nausea or Vomiting       Last Appointment Date: 5/5/2022  Next Appointment Date: 7/7/2022    Allergies   Allergen Reactions    Asa [Aspirin] Other (See Comments)     Stomach irritation

## 2022-05-18 ENCOUNTER — TELEPHONE (OUTPATIENT)
Dept: FAMILY MEDICINE CLINIC | Age: 67
End: 2022-05-18

## 2022-05-18 DIAGNOSIS — R11.0 NAUSEA: ICD-10-CM

## 2022-05-18 NOTE — TELEPHONE ENCOUNTER
Pt calling stating she's feeling antsy inside and questions if you can hint at something she can take for this, please advise.

## 2022-05-19 ENCOUNTER — OFFICE VISIT (OUTPATIENT)
Dept: GASTROENTEROLOGY | Age: 67
End: 2022-05-19
Payer: MEDICARE

## 2022-05-19 VITALS
BODY MASS INDEX: 42.27 KG/M2 | DIASTOLIC BLOOD PRESSURE: 54 MMHG | HEIGHT: 68 IN | HEART RATE: 68 BPM | SYSTOLIC BLOOD PRESSURE: 81 MMHG

## 2022-05-19 DIAGNOSIS — R11.2 NAUSEA AND VOMITING, INTRACTABILITY OF VOMITING NOT SPECIFIED, UNSPECIFIED VOMITING TYPE: Primary | ICD-10-CM

## 2022-05-19 PROCEDURE — 99213 OFFICE O/P EST LOW 20 MIN: CPT | Performed by: INTERNAL MEDICINE

## 2022-05-19 RX ORDER — DIPHENHYDRAMINE HCL 12.5MG/5ML
12.5 LIQUID (ML) ORAL NIGHTLY PRN
Qty: 118 ML | Refills: 2 | Status: SHIPPED | OUTPATIENT
Start: 2022-05-19

## 2022-05-19 RX ORDER — FAMOTIDINE 20 MG/1
20 TABLET, FILM COATED ORAL NIGHTLY PRN
Qty: 30 TABLET | Refills: 3 | Status: SHIPPED | OUTPATIENT
Start: 2022-05-19

## 2022-05-19 ASSESSMENT — ENCOUNTER SYMPTOMS
NAUSEA: 1
SHORTNESS OF BREATH: 0
TROUBLE SWALLOWING: 0
RECTAL PAIN: 0
ABDOMINAL DISTENTION: 0
BLOOD IN STOOL: 0
DIARRHEA: 1
VOMITING: 1
ABDOMINAL PAIN: 1
COUGH: 0
VOICE CHANGE: 0
CHOKING: 0
CONSTIPATION: 0
WHEEZING: 0
ANAL BLEEDING: 0

## 2022-05-19 NOTE — PROGRESS NOTES
GI FOLLOW UP    INTERVAL HISTORY:     Chronic nausea and dyspepsia symptoms  Patient currently taking Norco  Previous seen by other gastroenterologist  Intermittent constipation diarrhea  Upper endoscopy and colonoscopy performed  Possible Elizalde's esophagus      Chief Complaint   Patient presents with    Follow-up    Nausea     takes Zofran but not helping, requesting rx, one pill left. 1. Nausea and vomiting, intractability of vomiting not specified, unspecified vomiting type          HISTORY OF PRESENT ILLNESS: Ms.Nancy Nafisa Ho is a 77 y.o. female with a past history remarkable for history of cervical spine pain, on Norco, prior history of COVID-19, diabetes, dizziness, obesity, peripheral neuropathy, prior pulmonary hypertension, diabetes recent upper endoscopy and colonoscopy, prior hysterectomy, referred for evaluation of chronic nausea symptoms. Past Medical,Family, and Social History reviewed and does contribute to the patient presenting condition. Patient's PMH/PSH,SH,PSYCH Hx, MEDs, ALLERGIES, and ROS were all reviewed and updated in the appropriate sections.     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    Obesity     Peripheral neuropathy     Pneumonia 8/30/2015    Pulmonary hypertension ([address] 01/01/2012    by echocardiogram    Shoulder pain, bilateral 08/21/2013    Type 2 diabetes mellitus ([address]     Unspecified essential hypertension     Essential hypertension    Vitamin D deficiency 11/05/2014       Past Surgical History: Procedure Laterality Date    APPENDECTOMY      BACK INJECTION  02/25/2021    [facility] Right lumbar medial branch block using Fluroscopy    BRAIN SURGERY  05/24/2016    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 Philip  COLONOSCOPY  05/03/2012    diverticular disease    COLONOSCOPY N/A 12/23/2020    COLONOSCOPY WITH BIOPSY performed by Felix Marrero MD at [address] [facility] 1 hyperplastic polyp, random biopsies negative   Luci Guevara DENTAL SURGERY  08/2015    full dental extraction    FEMUR CLOSED REDUCTION Left 03/05/2020    has [facility]    FEMUR FRACTURE SURGERY Left 03/06/2021    left retrograde femoral nailing.  Dr. Kiana Christianson, [place] REMOVAL  05/28/2016    left-sided temporal craniotomy wound reexploration with removal of small retained foreign body (plastic from Ruth clip from surgery 3 days prior), [facility], Dr. Jackie Rod  09/06/2005    supracervical hysterectomy bilateral salpingo oophectomy    KNEE ARTHROSCOPY Left     NERVE BLOCK  09/09/2019    right side L2 through L5 block nerve,facet joint,lumbar,medial branch per Dr Víctor Naranjo at [address]  2011    endoscopic mucosal resection of duodenal polyp    POLYSOMNOGRAPHY  11/30/2020    no significant sleep apnea    SHOULDER ARTHROPLASTY Right 10/18/2018    Eboni Jung MD; done at [address] ARTHROSCOPY Right 03/07/2019    revision arthroscopy with debridement,revision mini-open rotator cuff repair per Dr Janina Valdes at [facility]  05/27/2020    gastric ulcer, small hiatal hernia, Dr. Marvin Franz, [facility] N/A 12/23/2020    EGD BIOPSY performed by Felix Marrero, MD at [id number]  [address] [facility] Elizalde's esophagus       CURRENT MEDICATIONS:    Current Outpatient Medications:     diphenhydrAMINE (BENADRYL) 12.5 MG/5ML elixir, Take 5 mLs by mouth nightly as needed for Itching or Allergies (can use of nausea as well.), Disp: 118 mL, Rfl: 2    famotidine (PEPCID) 20 MG tablet, Take 1 tablet by mouth nightly as needed (dyspepsia), Disp: 30 tablet, Rfl: 3    prochlorperazine (COMPAZINE) 5 MG tablet, Take 1-2 tablets by mouth every 6 hours as needed for Nausea, Disp: 40 tablet, Rfl: 0    cloNIDine (CATAPRES) 0.1 MG tablet, Take 1 tablet by mouth 2 times daily as needed for Other (withdrawl symptoms), Disp: 30 tablet, Rfl: 0    pioglitazone-metFORMIN (ACTOPLUS MET)  MG per tablet, TAKE 1 TABLET TWICE A DAY WITH MEALS, Disp: 180 tablet, Rfl: 0    Promethazine HCl (PHENERGAN PO), Take by mouth, Disp: , Rfl:     blood glucose monitor strips, Test 1 times a day & as needed for symptoms of irregular blood glucose. Dispense sufficient amount for indicated testing frequency plus additional to accommodate PRN testing needs. , Disp: 100 strip, Rfl: 1    DULoxetine (CYMBALTA) 60 MG extended release capsule, Take 1 capsule by mouth daily, Disp: 90 capsule, Rfl: 1    lisinopril-hydroCHLOROthiazide (PRINZIDE;ZESTORETIC) 20-12.5 MG per tablet, Take 1 tablet by mouth daily, Disp: , Rfl:     pantoprazole (PROTONIX) 40 MG tablet, Take 1 tablet by mouth 2 times daily take 1 tablet by mouth daily (Patient taking differently: Take 40 mg by mouth 2 times daily take 1 tablet by mouth daily. Patient states she takes 2 pills at the same times in the morning instead of 1 pill twice a day.), Disp: 60 tablet, Rfl: 5    Cholecalciferol (VITAMIN D3) 1.25 MG ([address] UT) CAPS, TAKE 1 CAPSULE ONCE A WEEK, Disp: 13 capsule, Rfl: 1    Misc. Devices [facility]) MISC, 1 Device by Does not apply route daily as needed (gait difficulty), Disp: 1 each, Rfl: 0    Misc.  Devices (WALKER) MISC, 1 each by Does not apply route daily, Disp: 1 each, Rfl: 0    HYDROcodone-acetaminophen (NORCO) 5-325 MG per tablet, Indications: Patient is taking 1 tablet a day , Disp: , Rfl:     Gabapentin (NEURONTIN PO), Take 600 mg by mouth 5 times daily Only taking 3 times a day., Disp: , Rfl:     Handicap Placard MISC, by Does not apply route Issue parking placard for person with disability; Applicant meets the qualifying disability criteria. Length of time expected to have disability_____Lifetime  Other __x__. Prescription expires in 5 years from issuing date. 02/05/2024, Disp: 1 each, Rfl: 0    Misc. Devices (ROLLATOR) MISC, Use to reduce risk of falls when walking., Disp: 1 each, Rfl: 0    ondansetron (ZOFRAN ODT) 4 MG disintegrating tablet, Take 1 tablet by mouth every 8 hours as needed for Nausea or Vomiting, Disp: 20 tablet, Rfl: 1    ALLERGIES:   Allergies   Allergen Reactions    Asa [Aspirin] Other (See Comments)     Stomach irritation       FAMILY HISTORY:       Problem Relation Age of Onset    Cancer Mother     Breast Cancer Mother 52    Hypertension Father     Diabetes Father     Cancer Father          SOCIAL HISTORY:   Social History     Socioeconomic History    Marital status:      Spouse name: Not on file    Number of children: Not on file    Years of education: Not on file    Highest education level: Not on file   Occupational History    Not on file   Tobacco Use    Smoking status: Never Smoker    Smokeless tobacco: Never Used   Vaping Use    Vaping Use: Never used   Substance and Sexual Activity    Alcohol use:  Yes     Alcohol/week: 0.0 standard drinks     Comment: Maybe twice a year, small amounts    Drug use: No    Sexual activity: Not on file   Other Topics Concern    Not on file   Social History Narrative    Not on file     Social Determinants of Health     Financial Resource Strain: Low Risk     Difficulty of Paying Living Expenses: Not hard at all   Food Insecurity: No Food Insecurity    Worried About [facility][1] in the Last Year: Never true    Emily of Food in the Last Year: Never true   Transportation Needs:     Lack of Transportation (Medical): Not on file    Lack of Transportation (Non-Medical): Not on file   Physical Activity:     Days of Exercise per Week: Not on file    Minutes of Exercise per Session: Not on file   Stress:     Feeling of Stress : Not on file   Social Connections:     Frequency of Communication with Friends and Family: Not on file    Frequency of Social Gatherings with Friends and Family: Not on file    Attends [employer]ism Services: Not on file    Active Member of [address] [place] [address] or Organizations: Not on file    Attends Club or Organization Meetings: Not on file    Marital Status: Not on file   Intimate Partner Violence:     Fear of Current or Ex-Partner: Not on file    Emotionally Abused: Not on file    Physically Abused: Not on file    Sexually Abused: Not on file   Housing Stability:     Unable to Pay for Housing in the Last Year: Not on file    Number of [place] in the Last Year: Not on file    Unstable Housing in the Last Year: Not on file       REVIEW OF SYSTEMS: A 12-point review of systems was obtained and pertinent positives and negatives were listed below. REVIEW OF SYSTEMS:     Constitutional: No fever, no chills, no lethargy, no weakness. HEENT:  No headache, otalgia, itchy eyes, nasal discharge or sore throat. Cardiac:  No chest pain, dyspnea, orthopnea or PND. Chest:   No cough, phlegm or wheezing. Abdomen:      Detailed by MA   Neuro:  No focal weakness, abnormal movements or seizure like activity. Skin:   No rashes, no itching. :   No hematuria, no pyuria, no dysuria, no flank pain. Extremities:  No swelling or joint pains. ROS was otherwise negative    Review of Systems   Constitutional: Positive for appetite change (loss of appetite) and unexpected weight change (20 lb loss in 3 mos). Negative for fatigue.    HENT: Negative for trouble swallowing and voice change. Eyes: Negative for visual disturbance. Respiratory: Negative for cough, choking, shortness of breath and wheezing. Cardiovascular: Negative for chest pain, palpitations and leg swelling. Gastrointestinal: Positive for abdominal pain, diarrhea, nausea and vomiting. Negative for abdominal distention, anal bleeding, blood in stool, constipation and rectal pain. Genitourinary: Negative for difficulty urinating. Neurological: Positive for dizziness. Negative for seizures, weakness, numbness and headaches. Hematological: Does not bruise/bleed easily. Psychiatric/Behavioral: Positive for confusion. Negative for sleep disturbance. The patient is nervous/anxious. PHYSICAL EXAMINATION: Vital signs reviewed per the nursing documentation. BP (!) 81/54   Pulse 68   Ht 5' 8\" (1.727 m)   LMP 08/24/2010 (Approximate)   BMI 42.27 kg/m²   Body mass index is 42.27 kg/m². Physical Exam    Physical Exam   Constitutional: Patient is oriented to person, place, and time. Patient appears well-developed and well-nourished. HENT:   Head: Normocephalic and atraumatic. Eyes: Pupils are equal, round, and reactive to light. EOM are normal.   Neck: Normal range of motion. Neck supple. No JVD present. No tracheal deviation present. No thyromegaly present. Cardiovascular: Normal rate, regular rhythm, normal heart sounds and intact distal pulses. Pulmonary/Chest: Effort normal and breath sounds normal. No stridor. No respiratory distress. He has no wheezes. He has no rales. He exhibits no tenderness. Abdominal: Soft. Bowel sounds are normal. He exhibits no distension and no mass. There is no tenderness. There is no rebound and no guarding. No hernia. Musculoskeletal: Normal range of motion. Lymphadenopathy:    Patient has no cervical adenopathy. Neurological: Patient is alert and oriented to person, place, and time.    Psychiatric: Patient has a normal mood and affect. Patient behavior is normal.       LABORATORY DATA: Reviewed  Lab Results   Component Value Date    WBC 8.4 04/04/2022    HGB 11.8 (L) 04/04/2022    HCT 36.2 (L) 04/04/2022    MCV 91.0 04/04/2022     04/04/2022     (L) 04/04/2022    K 4.1 04/04/2022    CL 94 (L) 04/04/2022    CO2 29 04/04/2022    BUN 28 (H) 04/04/2022    CREATININE 1.71 (H) 04/04/2022    LABALBU 4.4 04/04/2022    BILITOT 0.33 04/04/2022    ALKPHOS 146 (H) 04/04/2022    AST 14 04/04/2022    ALT 5 04/04/2022    INR 1.1 09/20/2021         Lab Results   Component Value Date    RBC 3.98 04/04/2022    HGB 11.8 (L) 04/04/2022    MCV 91.0 04/04/2022    MCH 29.6 04/04/2022    MCHC 32.6 04/04/2022    RDW 13.9 04/04/2022    MPV 10.7 04/04/2022    BASOPCT 0 04/04/2022    LYMPHSABS 0.91 (L) 04/04/2022    MONOSABS 0.46 04/04/2022    NEUTROABS 6.93 04/04/2022    EOSABS 0.04 04/04/2022    BASOSABS 0.03 04/04/2022         DIAGNOSTIC TESTING:     No results found. IMPRESSION: Ms.Nancy Nadine Robert is a 77 y.o. female with a past history remarkable for history of cervical spine pain, on Norco, prior history of COVID-19, diabetes, dizziness, obesity, peripheral neuropathy, prior pulmonary hypertension, diabetes recent upper endoscopy and colonoscopy, prior hysterectomy, referred for evaluation of chronic nausea symptoms. Patient is unable to identify any obvious dietary triggers. No significant mount of pruritus reported by the patient with excoriated lesions in the abdomen, patient will need to see a dermatologist for these changes. Etiology not fully understood. Her chronic nausea and vomiting appear to be temporally related to her use of narcotics and other medications. Advised to reduce her medications. Will provide Benadryl as an antiemetic correct for bedtime to prevent early morning symptoms along with Pepcid 20 mg nightly for her reflux symptoms. Assessment  1.  Nausea and vomiting, intractability of vomiting not specified, unspecified vomiting type        Angeles Jackson was seen today for follow-up and nausea. Diagnoses and all orders for this visit:    Nausea and vomiting, intractability of vomiting not specified, unspecified vomiting type    Other orders  -     diphenhydrAMINE (BENADRYL) 12.5 MG/5ML elixir; Take 5 mLs by mouth nightly as needed for Itching or Allergies (can use of nausea as well.)  -     famotidine (PEPCID) 20 MG tablet; Take 1 tablet by mouth nightly as needed (dyspepsia)         Excoriative lesions on [id number] with dermatology. RTC: 3 to 4 months. Additional comments: Thank you for allowing me to participate in the care of Ms. Homer Vital. For any further questions please do not hesitate to contact me. I have reviewed and agree with the ROS entered by the MA/LPN from today's encounter documented in a separate note. Crow Jones MD, MPH   Board Certified in Gastroenterology  Board Certified in [address] [place] [address] #: [phone]    this note is created with the assistance of a speech recognition program.  While intending to generate a document that actually reflects the content of the visit, the document can still have some errors including those of syntax and sound a like substitutions which may escape proof reading. It such instances, actual meaning can be extrapolated by contextual diversion.

## 2022-05-20 ENCOUNTER — TELEPHONE (OUTPATIENT)
Dept: GASTROENTEROLOGY | Age: 67
End: 2022-05-20

## 2022-05-20 RX ORDER — PROMETHAZINE HYDROCHLORIDE 12.5 MG/1
12.5 TABLET ORAL 4 TIMES DAILY PRN
Qty: 30 TABLET | Refills: 0 | Status: SHIPPED | OUTPATIENT
Start: 2022-05-20 | End: 2022-05-27

## 2022-05-20 RX ORDER — ONDANSETRON 4 MG/1
4 TABLET, ORALLY DISINTEGRATING ORAL EVERY 8 HOURS PRN
Qty: 20 TABLET | Refills: 1 | Status: SHIPPED | OUTPATIENT
Start: 2022-05-20 | End: 2022-06-01 | Stop reason: SDUPTHER

## 2022-05-20 NOTE — TELEPHONE ENCOUNTER
medication . Pt was seen in office yesterday 5/19/2020 She is requesting a daytime medication for her nausea.  States it is worse in the daytime

## 2022-05-20 NOTE — TELEPHONE ENCOUNTER
Noted.  The patient saw Dr. Nikhil Larson 5/19/2022 for these symptoms. I recommend that she see what he recommends for treatment of nausea.

## 2022-05-20 NOTE — TELEPHONE ENCOUNTER
Patient requesting ZOFRAN refill. Pended. Dr. Dedra Lovell is out of the office. Writer also talked to patient about her feeling \"antsy\". She declines coming in to Urgent care to see someone but was agreeable to coming in Wednesday 5/25 to discuss with Dr. Dedra Lovell. She is aware if her anxiousness gets worse or she would like to be seen before 5/25, that she can go to an Urgent Care near her. Patient verbalized understanding.

## 2022-05-20 NOTE — TELEPHONE ENCOUNTER
Patient is returning call and requesting a prescription of Zofran. Patient states that she only has 2 pills of the 10 pill of  Zofran  that was called in by [facility] on the 16th because she has nausea for 3 months and she is tired of feeling nauseated.  She is worried that it is the weekend and she will run out  Please call

## 2022-05-25 ENCOUNTER — TELEPHONE (OUTPATIENT)
Dept: GASTROENTEROLOGY | Age: 67
End: 2022-05-25

## 2022-05-25 ENCOUNTER — TELEPHONE (OUTPATIENT)
Dept: FAMILY MEDICINE CLINIC | Age: 67
End: 2022-05-25

## 2022-05-25 ENCOUNTER — HOSPITAL ENCOUNTER (OUTPATIENT)
Age: 67
Setting detail: SPECIMEN
Discharge: HOME | End: 2022-05-25
Payer: MEDICARE

## 2022-05-25 ENCOUNTER — IMMUNIZATION (OUTPATIENT)
Dept: LAB | Age: 67
End: 2022-05-25
Payer: MEDICARE

## 2022-05-25 ENCOUNTER — OFFICE VISIT (OUTPATIENT)
Dept: FAMILY MEDICINE CLINIC | Age: 67
End: 2022-05-25
Payer: MEDICARE

## 2022-05-25 VITALS
TEMPERATURE: 97.2 F | SYSTOLIC BLOOD PRESSURE: 130 MMHG | DIASTOLIC BLOOD PRESSURE: 82 MMHG | BODY MASS INDEX: 41.92 KG/M2 | WEIGHT: 276.6 LBS | HEART RATE: 77 BPM | HEIGHT: 68 IN | OXYGEN SATURATION: 98 %

## 2022-05-25 DIAGNOSIS — Z91.81 AT HIGH RISK FOR FALLS: ICD-10-CM

## 2022-05-25 DIAGNOSIS — R35.0 URINARY FREQUENCY: ICD-10-CM

## 2022-05-25 DIAGNOSIS — F41.9 ANXIETY: Primary | ICD-10-CM

## 2022-05-25 DIAGNOSIS — N39.0 URINARY TRACT INFECTION WITHOUT HEMATURIA, SITE UNSPECIFIED: Primary | ICD-10-CM

## 2022-05-25 LAB
-: ABNORMAL
BACTERIA: ABNORMAL
BILIRUBIN URINE: NEGATIVE
EPITHELIAL CELLS UA: ABNORMAL /HPF (ref 0–5)
GLUCOSE URINE: NEGATIVE
KETONES, URINE: NEGATIVE
LEUKOCYTE ESTERASE, URINE: ABNORMAL
NITRITE, URINE: POSITIVE
OTHER OBSERVATIONS UA: ABNORMAL
PH UA: 5 (ref 5–6)
PROTEIN UA: ABNORMAL
RBC UA: ABNORMAL /HPF (ref 0–4)
SPECIFIC GRAVITY UA: 1.03 (ref 1.01–1.02)
URINE HGB: ABNORMAL
UROBILINOGEN, URINE: NORMAL
WBC UA: ABNORMAL /HPF (ref 0–4)

## 2022-05-25 PROCEDURE — 87086 URINE CULTURE/COLONY COUNT: CPT

## 2022-05-25 PROCEDURE — 1123F ACP DISCUSS/DSCN MKR DOCD: CPT | Performed by: FAMILY MEDICINE

## 2022-05-25 PROCEDURE — 81001 URINALYSIS AUTO W/SCOPE: CPT

## 2022-05-25 PROCEDURE — 99212 OFFICE O/P EST SF 10 MIN: CPT

## 2022-05-25 PROCEDURE — 87186 SC STD MICRODIL/AGAR DIL: CPT

## 2022-05-25 PROCEDURE — 99214 OFFICE O/P EST MOD 30 MIN: CPT | Performed by: FAMILY MEDICINE

## 2022-05-25 PROCEDURE — PBSHW COVID-19, MODERNA BOOSTER VACCINE 0.25ML DOSE: Performed by: INTERNAL MEDICINE

## 2022-05-25 PROCEDURE — 87077 CULTURE AEROBIC IDENTIFY: CPT

## 2022-05-25 PROCEDURE — 90471 IMMUNIZATION ADMIN: CPT | Performed by: INTERNAL MEDICINE

## 2022-05-25 RX ORDER — CIPROFLOXACIN 500 MG/1
500 TABLET, FILM COATED ORAL 2 TIMES DAILY
Qty: 14 TABLET | Refills: 0 | Status: SHIPPED | OUTPATIENT
Start: 2022-05-25 | End: 2022-06-01

## 2022-05-25 ASSESSMENT — ANXIETY QUESTIONNAIRES
2. NOT BEING ABLE TO STOP OR CONTROL WORRYING: 3
6. BECOMING EASILY ANNOYED OR IRRITABLE: 0
GAD7 TOTAL SCORE: 10
3. WORRYING TOO MUCH ABOUT DIFFERENT THINGS: 3
4. TROUBLE RELAXING: 0
5. BEING SO RESTLESS THAT IT IS HARD TO SIT STILL: 3
IF YOU CHECKED OFF ANY PROBLEMS ON THIS QUESTIONNAIRE, HOW DIFFICULT HAVE THESE PROBLEMS MADE IT FOR YOU TO DO YOUR WORK, TAKE CARE OF THINGS AT HOME, OR GET ALONG WITH OTHER PEOPLE: VERY DIFFICULT
1. FEELING NERVOUS, ANXIOUS, OR ON EDGE: 1
7. FEELING AFRAID AS IF SOMETHING AWFUL MIGHT HAPPEN: 0

## 2022-05-25 NOTE — TELEPHONE ENCOUNTER
----- Message from Zayda Wagner MD sent at 5/25/2022  3:48 PM EDT -----  Please advise the patient that the urine sample is consistent with a urinary tract infection. Please pend Cipro 500 mg po BID x 7 days to her pharmacy. Thank you.

## 2022-05-25 NOTE — RESULT ENCOUNTER NOTE
Please advise the patient that the urine sample is consistent with a urinary tract infection. Please pend Cipro 500 mg po BID x 7 days to her pharmacy. Thank you.

## 2022-05-25 NOTE — PROGRESS NOTES
Patient declines Shingles vaccine. Patient is agreeable to 2nd covid booster. Patient is aware that she is due for diabetic eye exam this year.  Patient last had one done 10/2021

## 2022-05-25 NOTE — PATIENT INSTRUCTIONS
Patient Education        Preventing Falls: Care Instructions  Your Care Instructions     Getting around your home safely can be a challenge if you have injuries or health problems that make it easy for you to fall. Loose rugs and furniture in walkways are among the dangers for many older people who have problems walking or who have poor eyesight. People who have conditions such as arthritis,osteoporosis, or dementia also have to be careful not to fall. You can make your home safer with a few simple measures. Follow-up care is a key part of your treatment and safety. Be sure to make and go to all appointments, and call your doctor if you are having problems. It's also a good idea to know your test results and keep alist of the medicines you take. How can you care for yourself at home? Taking care of yourself   Exercise regularly to improve your strength, muscle tone, and balance. Walk if you can. Swimming may be a good choice if you cannot walk easily.  Have your vision and hearing checked each year or any time you notice a change. If you have trouble seeing and hearing, you might not be able to avoid objects and could lose your balance.  Know the side effects of the medicines you take. Ask your doctor or pharmacist whether the medicines you take can affect your balance. Sleeping pills or sedatives can affect your balance.  Limit the amount of alcohol you drink. Alcohol can impair your balance and other senses.  Ask your doctor whether calluses or corns on your feet need to be removed. If you wear loose-fitting shoes because of calluses or corns, you can lose your balance and fall.  Talk to your doctor if you have numbness in your feet.  You may get dizzy if you do not drink enough water. To prevent dehydration, drink plenty of fluids. Choose water and other clear liquids.  If you have kidney, heart, or liver disease and have to limit fluids, talk with your doctor before you increase the amount of sit while showering.  Get into a tub or shower by putting the weaker leg in first. Get out of a tub or shower with your strong side first.   Repair loose toilet seats and consider installing a raised toilet seat to make getting on and off the toilet easier.  Keep your bathroom door unlocked while you are in the shower. Where can you learn more? Go to https://ResultlypeMoment.Useweb.[employer]. org and sign in to your [employer] account. Enter [phone] in the [facility] box to learn more about \"Preventing Falls: Care Instructions. \"     If you do not have an account, please click on the \"Sign Up Now\" link. Current as of: September 8, 2021               Content Version: 13.2  © 9923-8161 [employer]. Care instructions adapted under license by [facility] ([facility]). If you have questions about a medical condition or this instruction, always ask your healthcare professional. Rande Peabody any warranty or liability for your use of this information.

## 2022-05-25 NOTE — TELEPHONE ENCOUNTER
Writer Called pt to let them know Medication was sent to their Pharmacy and can be picked up at anytime.

## 2022-05-25 NOTE — PROGRESS NOTES
MATHIEU DAVISON [address], [address] [address], [address]                        Telephone [phone]             Fax [phone]       Rafita Nolasco  :  1955  Age:  77 y.o. MRN:  [id number]  Date of visit:  2022       Assessment and Plan:    1. Anxiety  I discussed beginning medication for depression and anxiety. She prefers to avoid any additional medications. I recommended a referral for counseling. She was in agreement, and this was ordered:  - [facility]    2. Urinary frequency  - Urinalysis with Reflex to Culture; Future was ordered to be done today. She will be contacted when the results are available. 3. At high risk for falls  On the basis of positive falls risk screening, assessment and plan is as follows: home safety tips provided. Printed information regarding Preventing Falls was provided to the patient with the after visit summary. 4.  Routine health maintenance  Health maintenance was reviewed with the patient. She has received three doses of the Pfizer Covid-19 vaccine. She was advised that a fourth dose of the Covid-19 vaccine has been authorized for higher risk individuals, including those over age 48, if it has been more than four months since the previous dose. Shingrix was recommended and declined. She was advised that she will be due for a diabetic eye exam this fall. All other health maintenance, including Pneumovax, Prevnar-13, and Tdap, is up to date at this time. Follow up instructions were given to the patient:  She was advised to keep the appointment scheduled with me in July.      Addendum:  I reviewed the results of the urinalysis done today:  Hospital Outpatient Visit on 2022   Component Date Value Ref Range Status    Glucose, Ur 2022 NEGATIVE  NEGATIVE Final    Bilirubin Urine 2022 NEGATIVE  NEGATIVE Final    Ketones, Urine 05/25/2022 NEGATIVE  NEGATIVE Final    Specific Gravity, UA 05/25/2022 1.030* 1.010 - 1.025 Final    Urine Hgb 05/25/2022 1+* NEGATIVE Final    pH, UA 05/25/2022 5.0  5.0 - 6.0 Final    Protein, UA 05/25/2022 TRACE* NEGATIVE Final    Urobilinogen, Urine 05/25/2022 Normal  Normal Final    Nitrite, Urine 05/25/2022 POSITIVE* NEGATIVE Final    Leukocyte Esterase, Urine 05/25/2022 2+* NEGATIVE Final    - 05/25/2022        Final    WBC, UA 05/25/2022 25 TO 50  0 - 4 /HPF Final    RBC, UA 05/25/2022 5 TO 10  0 - 4 /HPF Final    Epithelial Cells UA 05/25/2022 10 TO 25  0 - 5 /HPF Final    Bacteria, UA 05/25/2022 1+* None Final    Other Observations UA 05/25/2022 Specimen Cultured* NOT REQ. Final    Specimen Description 05/25/2022 . CLEAN CATCH URINE   Final     Cipro 500 mg po BID x 7 days was prescribed. She will be contacted when the ID and sensitivity results are available. Subjective:    Rica Luna is a 77 y.o. female who presents to [facility] today (5/25/2022) for follow up/evaluation of:  Anxiety (Patient reports that she feels restless, fidgety frequent. Denies Shortness of breath or increase heart rate.), Nausea (Patient reports slight improvment. Sees gastroenterology on 8/18/22), and Dysuria (Patient went to Urgent Care at [facility] and had a positive urine. Patient was given antibiotics and completed it as ordered. Sees Urology on 6/3/22)      She is here today for evaluation of anxiety. She has a history of chronic nausea, and she recently saw gastroenterology for evaluation. She states that when she feels more anxious, this worsens the nausea. She reports increased anxiety and fidgeting. She states that she is sleeping well, except that she has to get up several times at night to urinate. She has an appointment scheduled with urology next week for evaluation of frequent urinary tract infections.   She was recently seen at [facility] and was treated for a urinary tract infection. She has received three doses of the Pfizer Covid-19 vaccine. The most recent dose was 11/10/2021. She also had Covid on 9/14/2021.         Immunization history was reviewed:  Immunization History   Administered Date(s) Administered    COVID-19, Moderna, Booster, PF, 50mcg/0.25ml 05/25/2022    COVID-19, Pfizer Purple top, DILUTE for use, 12+ yrs, 30mcg/0.3mL dose 02/27/2021, 03/20/2021, 11/10/2021    DTP 07/07/2011    Influenza, Quadv, IM, (6 mo and older Fluzone, Flulaval, Fluarix and 3 yrs and older Afluria) 01/23/2018    Influenza, Quadv, IM, PF (6 mo and older Fluzone, Flulaval, Fluarix, and 3 yrs and older Afluria) 02/12/2020    Influenza, Quadv, adjuvanted, 65 yrs +, IM, PF (Fluad) 10/20/2020, 10/18/2021    Pneumococcal Conjugate 13-valent ([id number]) 07/28/2021    Pneumococcal Polysaccharide ([id number]) 08/31/2015    Tdap (Boostrix, Adacel) 11/16/2012          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[address]    Type 2 diabetes mellitus with microalbuminuria, without long-term current use of insulin ([address]    Morbid obesity with BMI of 40.0-44.9, adult ([address]    Restrictive lung disease secondary to obesity    Calculus of gallbladder and bile duct without cholecystitis or obstruction    Nausea    Multiple gastric ulcers    Anxiety    Chronic tension-type headache, intractable    Chronic, continuous use of opioids    Complete tear of right rotator cuff    Depression    Fatigue    Lumbosacral spondylosis without myelopathy    Major depressive disorder, single episode, moderate (HCC)    Refractory migraine    Muscle spasms of neck    Obsessive-compulsive disorders    Spondylosis of lumbar spine    Spondylosis of thoracic region without myelopathy or radiculopathy    Spinal stenosis, lumbar region, without neurogenic claudication    Femur fracture, left (HCC)    Elizalde's esophagus without dysplasia    Therapeutic opioid-induced constipation (OIC)    Stage 3a chronic kidney disease ([facility]    Pneumonia due to COVID-19 virus    Acute cystitis with hematuria    Spongiotic dermatitis    Osteopenia        Current medications are:  Outpatient Medications Marked as Taking for the 5/25/22 encounter (Office Visit) with Ro Sullivan MD   Medication Sig Dispense Refill    ondansetron (ZOFRAN ODT) 4 MG disintegrating tablet Take 1 tablet by mouth every 8 hours as needed for Nausea or Vomiting 20 tablet 1    promethazine (PHENERGAN) 12.5 MG tablet Take 1 tablet by mouth 4 times daily as needed for Nausea 30 tablet 0    diphenhydrAMINE (BENADRYL) 12.5 MG/5ML elixir Take 5 mLs by mouth nightly as needed for Itching or Allergies (can use of nausea as well.) 118 mL 2    famotidine (PEPCID) 20 MG tablet Take 1 tablet by mouth nightly as needed (dyspepsia) 30 tablet 3    prochlorperazine (COMPAZINE) 5 MG tablet Take 1-2 tablets by mouth every 6 hours as needed for Nausea 40 tablet 0    cloNIDine (CATAPRES) 0.1 MG tablet Take 1 tablet by mouth 2 times daily as needed for Other (withdrawl symptoms) 30 tablet 0    pioglitazone-metFORMIN (ACTOPLUS MET)  MG per tablet TAKE 1 TABLET TWICE A DAY WITH MEALS 180 tablet 0    Promethazine HCl (PHENERGAN PO) Take by mouth      blood glucose monitor strips Test 1 times a day & as needed for symptoms of irregular blood glucose.  Dispense sufficient amount for indicated testing frequency plus additional to accommodate PRN testing needs. 100 strip 1    lisinopril-hydroCHLOROthiazide (PRINZIDE;ZESTORETIC) 20-12.5 MG per tablet Take 1 tablet by mouth daily      pantoprazole (PROTONIX) 40 MG tablet Take 1 tablet by mouth 2 times daily take 1 tablet by mouth daily (Patient taking differently: Take 40 mg by mouth 2 times daily take 1 tablet by mouth daily. Patient states she takes 2 pills at the same times in the morning instead of 1 pill twice a day.) 60 tablet 5    Cholecalciferol (VITAMIN D3) 1.25 MG (39954 UT) CAPS TAKE 1 CAPSULE ONCE A WEEK 13 capsule 1    Misc. Devices [facility]) MISC 1 Device by Does not apply route daily as needed (gait difficulty) 1 each 0    Misc. Devices (WALKER) MISC 1 each by Does not apply route daily 1 each 0    HYDROcodone-acetaminophen (NORCO) 5-325 MG per tablet Indications: Patient is taking 1 tablet a day       Gabapentin (NEURONTIN PO) Take 600 mg by mouth 5 times daily Only taking 3 times a day.  Handicap Placard MISC by Does not apply route Issue parking placard for person with disability; Applicant meets the qualifying disability criteria. Length of time expected to have disability_____Lifetime  Other __x__. Prescription expires in 5 years from issuing date. 02/05/2024 1 each 0    Misc. Devices (ROLLATOR) MISC Use to reduce risk of falls when walking. 1 each 0       She is allergic to asa [aspirin]. She  reports that she has never smoked. She has never used smokeless tobacco.      Objective:    Vitals:    05/25/22 1338   BP: 130/82   Site: Right Upper Arm   Position: Sitting   Cuff Size: Large Adult   Pulse: 77   Temp: 97.2 °F (36.2 °C)   TempSrc: Temporal   SpO2: 98%   Weight: 276 lb 9.6 oz (125.5 kg)   Height: 5' 8\" (1.727 m)     Body mass index is 42.06 kg/m². Extremely obese female, healthy-appearing, alert, cooperative and in no acute distress. Neck supple. No adenopathy.  Thyroid symmetric, normal size. Chest:  Normal expansion. Clear to auscultation. No rales, rhonchi, or wheezing. Heart sounds are normal.  Regular rate and rhythm without murmur, gallop or rub. Back has no costovertebral angle tenderness. Abdomen is soft, with mild lower abdominal tenderness to palpation. The progress note from the gastroenterology visit 5/19/2022 was reviewed.     (Please note that portions of this note were completed with a voice-recognition program. Efforts were made to edit the dictation but occasionally words are mis-transcribed.)

## 2022-05-27 LAB
CULTURE: ABNORMAL
SPECIMEN DESCRIPTION: ABNORMAL

## 2022-06-01 ENCOUNTER — TELEPHONE (OUTPATIENT)
Dept: GASTROENTEROLOGY | Age: 67
End: 2022-06-01

## 2022-06-01 DIAGNOSIS — R11.0 NAUSEA: ICD-10-CM

## 2022-06-01 RX ORDER — ONDANSETRON 4 MG/1
4 TABLET, ORALLY DISINTEGRATING ORAL EVERY 8 HOURS PRN
Qty: 20 TABLET | Refills: 1 | Status: SHIPPED | OUTPATIENT
Start: 2022-06-01 | End: 2022-06-15 | Stop reason: SDUPTHER

## 2022-06-01 RX ORDER — ONDANSETRON 4 MG/1
4 TABLET, ORALLY DISINTEGRATING ORAL EVERY 8 HOURS PRN
Qty: 20 TABLET | Refills: 1 | OUTPATIENT
Start: 2022-06-01

## 2022-06-01 NOTE — TELEPHONE ENCOUNTER
Arnold Whittington called requesting a refill of the below medication which has been pended for you:     Requested Prescriptions     Pending Prescriptions Disp Refills    ondansetron (ZOFRAN ODT) 4 MG disintegrating tablet 20 tablet 1     Sig: Take 1 tablet by mouth every 8 hours as needed for Nausea or Vomiting       Last Appointment Date: 5/25/2022  Next Appointment Date: 7/7/2022    Allergies   Allergen Reactions    Asa [Aspirin] Other (See Comments)     Stomach irritation

## 2022-06-01 NOTE — TELEPHONE ENCOUNTER
Patient is having nausea all the time. She would like Zofran called to [facility].   Please advise

## 2022-06-03 ENCOUNTER — TELEPHONE (OUTPATIENT)
Dept: NEPHROLOGY | Age: 67
End: 2022-06-03

## 2022-06-03 DIAGNOSIS — N18.31 TYPE 2 DIABETES MELLITUS WITH STAGE 3A CHRONIC KIDNEY DISEASE, WITHOUT LONG-TERM CURRENT USE OF INSULIN (HCC): ICD-10-CM

## 2022-06-03 DIAGNOSIS — E55.9 VITAMIN D DEFICIENCY: ICD-10-CM

## 2022-06-03 DIAGNOSIS — N18.31 STAGE 3A CHRONIC KIDNEY DISEASE (HCC): Primary | ICD-10-CM

## 2022-06-03 DIAGNOSIS — I10 HYPERTENSION, ESSENTIAL: ICD-10-CM

## 2022-06-03 DIAGNOSIS — E11.22 TYPE 2 DIABETES MELLITUS WITH STAGE 3A CHRONIC KIDNEY DISEASE, WITHOUT LONG-TERM CURRENT USE OF INSULIN (HCC): ICD-10-CM

## 2022-06-03 NOTE — TELEPHONE ENCOUNTER
Dr. Phillip Hester reviewed patient's chart and his schedule and moved patient's appointment to 8-1-22. Patient made aware and verbalized understanding. [FreeTextEntry1] : see dictation

## 2022-06-03 NOTE — TELEPHONE ENCOUNTER
Patient had to cancel appointment on 6-3-22 with Aileen Silverman due to being sick. She was booked for the next available appointment in October. Do you need to see her sooner?

## 2022-06-10 ENCOUNTER — TELEPHONE (OUTPATIENT)
Dept: GASTROENTEROLOGY | Age: 67
End: 2022-06-10

## 2022-06-10 RX ORDER — ONDANSETRON 4 MG/1
4 TABLET, ORALLY DISINTEGRATING ORAL EVERY 8 HOURS PRN
Qty: 20 TABLET | Refills: 0 | Status: SHIPPED | OUTPATIENT
Start: 2022-06-10 | End: 2022-06-15

## 2022-06-10 NOTE — TELEPHONE ENCOUNTER
Writer will contact patient. Spoke to patient and confirmed the following (insert from Dr. Aj Justin last note listed below). Patient states she has cut back on her other medication use (narcotics), is using the Benadryl and Pepcid as directed but still experiencing nausea. Will be going to [place] for the weekend and is hoping for a prescription of Zofran before she leaves. Writer informed Patient this will be discussed with Dr. Stephen Perez and Susie Davila will contact patient back as soon as possible. \"Her chronic nausea and vomiting appear to be temporally related to her use of narcotics and other medications. Advised to reduce her medications. Will provide Benadryl as an antiemetic correct for bedtime to prevent early morning symptoms along with Pepcid 20 mg nightly for her reflux symptoms. \" Per Dr. Aj Justin recent note.

## 2022-06-10 NOTE — TELEPHONE ENCOUNTER
Short term RX called into pharmacy per Dr. Marina Colon , patient notified and voiced understanding.

## 2022-06-15 ENCOUNTER — OFFICE VISIT (OUTPATIENT)
Dept: UROLOGY | Age: 67
End: 2022-06-15
Payer: MEDICARE

## 2022-06-15 ENCOUNTER — OFFICE VISIT (OUTPATIENT)
Dept: GASTROENTEROLOGY | Age: 67
End: 2022-06-15
Payer: MEDICARE

## 2022-06-15 VITALS
HEIGHT: 68 IN | WEIGHT: 276.2 LBS | SYSTOLIC BLOOD PRESSURE: 140 MMHG | DIASTOLIC BLOOD PRESSURE: 79 MMHG | BODY MASS INDEX: 41.86 KG/M2 | HEART RATE: 80 BPM

## 2022-06-15 VITALS
HEART RATE: 56 BPM | BODY MASS INDEX: 39.71 KG/M2 | DIASTOLIC BLOOD PRESSURE: 70 MMHG | RESPIRATION RATE: 20 BRPM | SYSTOLIC BLOOD PRESSURE: 122 MMHG | WEIGHT: 262 LBS | OXYGEN SATURATION: 95 % | HEIGHT: 68 IN

## 2022-06-15 DIAGNOSIS — K59.00 CONSTIPATION, UNSPECIFIED CONSTIPATION TYPE: Primary | ICD-10-CM

## 2022-06-15 DIAGNOSIS — R11.0 NAUSEA: ICD-10-CM

## 2022-06-15 DIAGNOSIS — N39.0 RECURRENT UTI: Primary | ICD-10-CM

## 2022-06-15 PROCEDURE — 1123F ACP DISCUSS/DSCN MKR DOCD: CPT | Performed by: INTERNAL MEDICINE

## 2022-06-15 PROCEDURE — 1123F ACP DISCUSS/DSCN MKR DOCD: CPT | Performed by: UROLOGY

## 2022-06-15 PROCEDURE — 99204 OFFICE O/P NEW MOD 45 MIN: CPT | Performed by: UROLOGY

## 2022-06-15 PROCEDURE — 99215 OFFICE O/P EST HI 40 MIN: CPT | Performed by: UROLOGY

## 2022-06-15 PROCEDURE — 99213 OFFICE O/P EST LOW 20 MIN: CPT | Performed by: INTERNAL MEDICINE

## 2022-06-15 RX ORDER — ONDANSETRON 4 MG/1
4 TABLET, ORALLY DISINTEGRATING ORAL EVERY 6 HOURS PRN
Qty: 30 TABLET | Refills: 1 | Status: SHIPPED | OUTPATIENT
Start: 2022-06-15 | End: 2022-07-07 | Stop reason: SDUPTHER

## 2022-06-15 RX ORDER — DOCUSATE SODIUM 100 MG/1
100 CAPSULE, LIQUID FILLED ORAL 2 TIMES DAILY
Qty: 60 CAPSULE | Refills: 0 | Status: SHIPPED | OUTPATIENT
Start: 2022-06-15 | End: 2022-07-15

## 2022-06-15 ASSESSMENT — ENCOUNTER SYMPTOMS
SHORTNESS OF BREATH: 1
ABDOMINAL DISTENTION: 0
WHEEZING: 0
VOMITING: 1
VOICE CHANGE: 0
CONSTIPATION: 1
ANAL BLEEDING: 0
NAUSEA: 1
ABDOMINAL PAIN: 0
RECTAL PAIN: 0
TROUBLE SWALLOWING: 0
CHOKING: 0
DIARRHEA: 0
BLOOD IN STOOL: 0
COUGH: 0

## 2022-06-15 NOTE — PROGRESS NOTES
GI FOLLOW UP    INTERVAL HISTORY:       Chronic nausea symptoms with intermittent constipation      Chief Complaint   Patient presents with    Follow-up    Nausea & Vomiting     x mos    Constipation     using fleet enema       1. Constipation, unspecified constipation type    2. Nausea          HISTORY OF PRESENT ILLNESS: Ms.Nancy Nadine Robert is a 77 y.o. female with a past history remarkable for history of cervical spine pain, on Norco, prior history of COVID-19, diabetes, dizziness, obesity, peripheral neuropathy, prior pulmonary hypertension, diabetes recent upper endoscopy and colonoscopy, prior hysterectomy, referred for evaluation of chronic nausea symptoms.   .    Past Medical,Family, and Social History reviewed and does contribute to the patient presenting condition. Patient's PMH/PSH,SH,PSYCH Hx, MEDs, ALLERGIES, and ROS were all reviewed and updated in the appropriate sections.     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    Obesity     Peripheral neuropathy     Pneumonia 8/30/2015    Pulmonary hypertension ([address] 01/01/2012    by echocardiogram    Shoulder pain, bilateral 08/21/2013    Type 2 diabetes mellitus ([address]     Unspecified essential hypertension     Essential hypertension    Vitamin D deficiency 11/05/2014       Past Surgical History:   Procedure Laterality Date    APPENDECTOMY      BACK INJECTION  02/25/2021    [facility] Right lumbar medial branch block using Fluroscopy    BRAIN SURGERY  05/24/2016    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 Yessi Nunez COLONOSCOPY  05/03/2012    diverticular disease    COLONOSCOPY N/A 12/23/2020    COLONOSCOPY WITH BIOPSY performed by Zenobia Lewis MD at [address] [facility], 1 hyperplastic polyp, random biopsies negative   [facility] DENTAL SURGERY  08/2015    full dental extraction    FEMUR CLOSED REDUCTION Left 03/05/2020    has [facility]    FEMUR FRACTURE SURGERY Left 03/06/2021    left retrograde femoral nailing.  Dr. Kristen Austin, [place] REMOVAL  05/28/2016    left-sided temporal craniotomy wound reexploration with removal of small retained foreign body (plastic from Ruth clip from surgery 3 days prior), [facility], Dr. Irizarry Paci ([address] [place] [address])  09/06/2005    supracervical hysterectomy bilateral salpingo oophectomy    KNEE ARTHROSCOPY Left     NERVE BLOCK  09/09/2019    right side L2 through L5 block nerve,facet joint,lumbar,medial branch per Dr Lasandra Cushing at . [facility]  2011    endoscopic mucosal resection of duodenal polyp    POLYSOMNOGRAPHY  11/30/2020    no significant sleep apnea    SHOULDER ARTHROPLASTY Right 10/18/2018    Harsh Jung MD; done at [facility] ARTHROSCOPY Right 03/07/2019    revision arthroscopy with debridement,revision mini-open rotator cuff repair per Dr Pineda Peres at [facility]  05/27/2020    gastric ulcer, small hiatal hernia, [facility] Yvette Rey[facility] 12/23/2020    EGD BIOPSY performed by Zenobia Lewis MD at [address] [facility] Elizalde's esophagus       CURRENT MEDICATIONS:    Current Outpatient Medications:     ondansetron (ZOFRAN ODT) 4 MG disintegrating tablet, Place 1 tablet under the tongue every 6 hours as needed for Nausea or Vomiting, Disp: 30 tablet, Rfl: 1    docusate sodium (COLACE) 100 MG capsule, Take 1 capsule by mouth 2 times daily, Disp: 60 capsule, Rfl: 0    diphenhydrAMINE (BENADRYL) 12.5 MG/5ML elixir, Take 5 mLs by mouth nightly as needed for Itching or Allergies (can use of nausea as well.), Disp: 118 mL, Rfl: 2    famotidine (PEPCID) 20 MG tablet, Take 1 tablet by mouth nightly as needed (dyspepsia), Disp: 30 tablet, Rfl: 3    pioglitazone-metFORMIN (ACTOPLUS MET)  MG per tablet, TAKE 1 TABLET TWICE A DAY WITH MEALS, Disp: 180 tablet, Rfl: 0    Promethazine HCl (PHENERGAN PO), Take by mouth, Disp: , Rfl:     blood glucose monitor strips, Test 1 times a day & as needed for symptoms of irregular blood glucose. Dispense sufficient amount for indicated testing frequency plus additional to accommodate PRN testing needs. , Disp: 100 strip, Rfl: 1    DULoxetine (CYMBALTA) 60 MG extended release capsule, Take 1 capsule by mouth daily, Disp: 90 capsule, Rfl: 1    lisinopril-hydroCHLOROthiazide (PRINZIDE;ZESTORETIC) 20-12.5 MG per tablet, Take 1 tablet by mouth daily, Disp: , Rfl:     pantoprazole (PROTONIX) 40 MG tablet, Take 1 tablet by mouth 2 times daily take 1 tablet by mouth daily (Patient taking differently: Take 40 mg by mouth 2 times daily take 1 tablet by mouth daily.   Patient states she takes 2 pills at the same times in the morning instead of 1 pill twice a day.), Disp: 60 tablet, Rfl: 5    Cholecalciferol (VITAMIN D3) 1.25 MG (11251 UT) CAPS, TAKE 1 CAPSULE ONCE A WEEK, Disp: 13 capsule, Rfl: 1    Gabapentin (NEURONTIN PO), Take 600 mg by mouth 5 times daily Only taking 3 times a day., Disp: , Rfl:     M-DRYL 12.5 MG/5ML liquid, take 5 milliliters by mouth at bedtime for itching or allergies, Disp: , Rfl:     ALLERGIES:   Allergies   Allergen Reactions    Asa [Aspirin] Other (See Comments)     Stomach irritation       FAMILY HISTORY: Problem Relation Age of Onset    Cancer Mother     Breast Cancer Mother 52    Hypertension Father     Diabetes Father     Cancer Father          SOCIAL HISTORY:   Social History     Socioeconomic History    Marital status:      Spouse name: Not on file    Number of children: Not on file    Years of education: Not on file    Highest education level: Not on file   Occupational History    Not on file   Tobacco Use    Smoking status: Never Smoker    Smokeless tobacco: Never Used   Vaping Use    Vaping Use: Never used   Substance and Sexual Activity    Alcohol use: Yes     Alcohol/week: 0.0 standard drinks     Comment: Maybe twice a year, small amounts    Drug use: No    Sexual activity: Not on file   Other Topics Concern    Not on file   Social History Narrative    Not on file     Social Determinants of Health     Financial Resource Strain: Low Risk     Difficulty of Paying Living Expenses: Not hard at all   Food Insecurity: No Food Insecurity    Worried About [address] in the Last Year: Never true    [facility] in the Last Year: Never true   Transportation Needs:     Lack of Transportation (Medical): Not on file    Lack of Transportation (Non-Medical):  Not on file   Physical Activity:     Days of Exercise per Week: Not on file    Minutes of Exercise per Session: Not on file   Stress:     Feeling of Stress : Not on file   Social Connections:     Frequency of Communication with Friends and Family: Not on file    Frequency of Social Gatherings with Friends and Family: Not on file    Attends Gnosticism Services: Not on file    Active Member of Clubs or Organizations: Not on file    Attends Club or Organization Meetings: Not on file    Marital Status: Not on file   Intimate Partner Violence:     Fear of Current or Ex-Partner: Not on file    Emotionally Abused: Not on file    Physically Abused: Not on file    Sexually Abused: Not on file   Housing Stability:     Unable to Pay for Housing in the Last Year: Not on file    Number of Places Lived in the Last Year: Not on file    Unstable Housing in the Last Year: Not on file       REVIEW OF SYSTEMS: A 12-point review of systems was obtained and pertinent positives and negatives were listed below. REVIEW OF SYSTEMS:     Constitutional: No fever, no chills, no lethargy, no weakness. HEENT:  No headache, otalgia, itchy eyes, nasal discharge or sore throat. Cardiac:  No chest pain, dyspnea, orthopnea or PND. Chest:   No cough, phlegm or wheezing. Abdomen:      Detailed by MA   Neuro:  No focal weakness, abnormal movements or seizure like activity. Skin:   No rashes, no itching. :   No hematuria, no pyuria, no dysuria, no flank pain. Extremities:  No swelling or joint pains. ROS was otherwise negative    Review of Systems   Constitutional: Positive for appetite change. Negative for fatigue and unexpected weight change (25 # x 3 months). HENT: Negative for trouble swallowing and voice change. Eyes: Negative for visual disturbance. Respiratory: Positive for shortness of breath (with exertion). Negative for cough, choking and wheezing. Cardiovascular: Negative for chest pain, palpitations and leg swelling. Gastrointestinal: Positive for constipation, nausea and vomiting. Negative for abdominal distention, abdominal pain, anal bleeding, blood in stool, diarrhea and rectal pain. Genitourinary: Negative for difficulty urinating. Neurological: Negative for dizziness, seizures, weakness, numbness and headaches. Hematological: Does not bruise/bleed easily. Psychiatric/Behavioral: Negative for confusion and sleep disturbance. The patient is nervous/anxious. PHYSICAL EXAMINATION: Vital signs reviewed per the nursing documentation. BP (!) 140/79   Pulse 80   Ht 5' 8\" (1.727 m)   Wt 276 lb 3.2 oz (125.3 kg)   LMP 08/24/2010 (Approximate)   BMI 42.00 kg/m²   Body mass index is 42 kg/m². Physical Exam    Physical Exam   Constitutional: Patient is oriented to person, place, and time. Patient appears well-developed and well-nourished. HENT:   Head: Normocephalic and atraumatic. Eyes: Pupils are equal, round, and reactive to light. EOM are normal.   Neck: Normal range of motion. Neck supple. No JVD present. No tracheal deviation present. No thyromegaly present. Cardiovascular: Normal rate, regular rhythm, normal heart sounds and intact distal pulses. Pulmonary/Chest: Effort normal and breath sounds normal. No stridor. No respiratory distress. He has no wheezes. He has no rales. He exhibits no tenderness. Abdominal: Soft. Bowel sounds are normal. He exhibits no distension and no mass. There is no tenderness. There is no rebound and no guarding. No hernia. Musculoskeletal: Normal range of motion. Lymphadenopathy:    Patient has no cervical adenopathy. Neurological: Patient is alert and oriented to person, place, and time. Psychiatric: Patient has a normal mood and affect.  Patient behavior is normal.       LABORATORY DATA: Reviewed  Lab Results   Component Value Date    WBC 8.4 04/04/2022    HGB 11.8 (L) 04/04/2022    HCT 36.2 (L) 04/04/2022    MCV 91.0 04/04/2022     04/04/2022     (L) 04/04/2022    K 4.1 04/04/2022    CL 94 (L) 04/04/2022    CO2 29 04/04/2022    BUN 28 (H) 04/04/2022    CREATININE 1.71 (H) 04/04/2022    LABALBU 4.4 04/04/2022    BILITOT 0.33 04/04/2022    ALKPHOS 146 (H) 04/04/2022    AST 14 04/04/2022    ALT 5 04/04/2022    INR 1.1 09/20/2021         Lab Results   Component Value Date    RBC 3.98 04/04/2022    HGB 11.8 (L) 04/04/2022    MCV 91.0 04/04/2022    MCH 29.6 04/04/2022    MCHC 32.6 04/04/2022    RDW 13.9 04/04/2022    MPV 10.7 04/04/2022    BASOPCT 0 04/04/2022    LYMPHSABS 0.91 (L) 04/04/2022    MONOSABS 0.46 04/04/2022    NEUTROABS 6.93 04/04/2022    EOSABS 0.04 04/04/2022    BASOSABS 0.03 04/04/2022         DIAGNOSTIC TESTING:     No results found. IMPRESSION:Ms. Antonia Millan is a 77 y.o. female with a past history remarkable for history of cervical spine pain, on Norco, prior history of COVID-19, diabetes, dizziness, obesity, peripheral neuropathy, prior pulmonary hypertension, diabetes recent upper endoscopy and colonoscopy, prior hysterectomy, referred for evaluation of chronic nausea symptoms. Patient is unable to identify any obvious dietary triggers. No significant mount of pruritus reported by the patient with excoriated lesions in the abdomen, patient will need to see a dermatologist for these changes. Etiology not fully understood.        Her chronic nausea and vomiting appear to be temporally related to her use of narcotics and other medications, although recently the patient had denied any excessive use of narcotics. .  Advised to reduce her medications. Will provide Benadryl as an antiemetic correct for bedtime to prevent early morning symptoms along with Pepcid 20 mg nightly for her reflux symptoms.         Assessment  1. Constipation, unspecified constipation type    2. Chan De La Fuente was seen today for follow-up, nausea & vomiting and constipation. Diagnoses and all orders for this visit:    Constipation, unspecified constipation type- we will optimize patient's bowel regimen, will place patient on Colace 100 mg 1 to 2 tablets daily. Increase oral hydration with minimum 64 ounces of water today. Consider prune juice as augmenting facilitator. Nausea- potentially related to poor colonic transit, intermittent use of narcotics. Will provide Zofran as needed. Consider EGD if no significant improvement. -     ondansetron (ZOFRAN ODT) 4 MG disintegrating tablet; Place 1 tablet under the tongue every 6 hours as needed for Nausea or Vomiting    Other orders  -     docusate sodium (COLACE) 100 MG capsule; Take 1 capsule by mouth 2 times daily             RTC: 3 months. Additional comments:     Thank you for allowing me to participate in the care of Ms. Lavinia Mtz. For any further questions please do not hesitate to contact me. I have reviewed and agree with the ROS entered by the MA/LPN from today's encounter documented in a separate note. Maria Teresa Velasco MD, MPH   Board Certified in Gastroenterology  Board Certified in [address] [place] [address] #: [phone]    this note is created with the assistance of a speech recognition program.  While intending to generate a document that actually reflects the content of the visit, the document can still have some errors including those of syntax and sound a like substitutions which may escape proof reading. It such instances, actual meaning can be extrapolated by contextual diversion.

## 2022-06-16 ENCOUNTER — TELEPHONE (OUTPATIENT)
Dept: UROLOGY | Age: 67
End: 2022-06-16

## 2022-06-16 ENCOUNTER — HOSPITAL ENCOUNTER (OUTPATIENT)
Dept: GENERAL RADIOLOGY | Age: 67
Discharge: HOME | End: 2022-06-18
Payer: MEDICARE

## 2022-06-16 ENCOUNTER — OFFICE VISIT (OUTPATIENT)
Dept: FAMILY MEDICINE CLINIC | Age: 67
End: 2022-06-16
Payer: MEDICARE

## 2022-06-16 VITALS
OXYGEN SATURATION: 99 % | WEIGHT: 275 LBS | DIASTOLIC BLOOD PRESSURE: 74 MMHG | HEART RATE: 89 BPM | RESPIRATION RATE: 16 BRPM | BODY MASS INDEX: 41.68 KG/M2 | HEIGHT: 68 IN | SYSTOLIC BLOOD PRESSURE: 132 MMHG

## 2022-06-16 DIAGNOSIS — M25.512 ACUTE PAIN OF LEFT SHOULDER: ICD-10-CM

## 2022-06-16 DIAGNOSIS — M25.512 ACUTE PAIN OF LEFT SHOULDER: Primary | ICD-10-CM

## 2022-06-16 PROCEDURE — PBSHW PBB SHADOW CHARGE: Performed by: NURSE PRACTITIONER

## 2022-06-16 PROCEDURE — 96374 THER/PROPH/DIAG INJ IV PUSH: CPT | Performed by: NURSE PRACTITIONER

## 2022-06-16 PROCEDURE — 99213 OFFICE O/P EST LOW 20 MIN: CPT

## 2022-06-16 PROCEDURE — 99214 OFFICE O/P EST MOD 30 MIN: CPT | Performed by: NURSE PRACTITIONER

## 2022-06-16 PROCEDURE — 73030 X-RAY EXAM OF SHOULDER: CPT

## 2022-06-16 PROCEDURE — 1123F ACP DISCUSS/DSCN MKR DOCD: CPT | Performed by: NURSE PRACTITIONER

## 2022-06-16 RX ORDER — DIPHENHYDRAMINE HYDROCHLORIDE 12.5 MG/5ML
LIQUID ORAL
COMMUNITY
Start: 2022-06-14

## 2022-06-16 RX ORDER — METHYLPREDNISOLONE SODIUM SUCCINATE 125 MG/2ML
125 INJECTION, POWDER, LYOPHILIZED, FOR SOLUTION INTRAMUSCULAR; INTRAVENOUS ONCE
Status: COMPLETED | OUTPATIENT
Start: 2022-06-16 | End: 2022-06-16

## 2022-06-16 RX ADMIN — METHYLPREDNISOLONE SODIUM SUCCINATE 125 MG: 125 INJECTION, POWDER, FOR SOLUTION INTRAMUSCULAR; INTRAVENOUS at 14:39

## 2022-06-16 ASSESSMENT — PATIENT HEALTH QUESTIONNAIRE - PHQ9
SUM OF ALL RESPONSES TO PHQ QUESTIONS 1-9: 0
1. LITTLE INTEREST OR PLEASURE IN DOING THINGS: 0
2. FEELING DOWN, DEPRESSED OR HOPELESS: 0
SUM OF ALL RESPONSES TO PHQ QUESTIONS 1-9: 0
SUM OF ALL RESPONSES TO PHQ QUESTIONS 1-9: 0
SUM OF ALL RESPONSES TO PHQ9 QUESTIONS 1 & 2: 0
SUM OF ALL RESPONSES TO PHQ QUESTIONS 1-9: 0

## 2022-06-16 NOTE — TELEPHONE ENCOUNTER
[facility]    Pre-Operative Evaluation/Consultation    Name:  Soren Almodovar                                         Age:  77 y.o. MRN:  [id number]       :  1955   Date:  2022         Sex: female    There were no encounter diagnoses. Surgeon:  Dr. Mary Jane Crane  Procedure (Planned):  Cystoscopy with bilateral retrograde  Date Scheduled surgery: 8/3/22    Attending : No att. providers found    Primary Physician: Cipriano Boland  Cardiologist: None    Type of Anesthesia Requested: Monitored Anesthesia Care    Patient Medical history:   Allergies   Allergen Reactions    Asa [Aspirin] Other (See Comments)     Stomach irritation     Patient Active Problem List   Diagnosis    Shoulder pain, bilateral    Cervical spine pain    Knee pain, left    Essential hypertension    Pulmonary hypertension ([address]    Vitamin D deficiency    Encounter for long-term (current) use of other medications    Headache    Subacute sphenoidal sinusitis    Type 2 diabetes mellitus with microalbuminuria ([address]    Peripheral neuropathy    Dizziness    H/O fall    Chronic sinusitis    Gastroesophageal reflux disease    Microalbuminuria    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[address]    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[address]    Type 2 diabetes mellitus with microalbuminuria, without long-term current use of insulin (HCC)    Morbid obesity with BMI of 40.0-44.9, adult (HCC)    Restrictive lung disease secondary to obesity    Calculus of gallbladder and bile duct without cholecystitis or obstruction    Nausea    Multiple gastric ulcers    Anxiety    Chronic tension-type headache, intractable    Chronic, continuous use of opioids    Complete tear of right rotator cuff    Depression    Fatigue    Lumbosacral spondylosis without myelopathy    Major depressive disorder, single episode, moderate (HCC)    Refractory migraine    Muscle spasms of neck    Obsessive-compulsive disorders    Spondylosis of lumbar spine    Spondylosis of thoracic region without myelopathy or radiculopathy    Spinal stenosis, lumbar region, without neurogenic claudication    Femur fracture, left (HCC)    Elizalde's esophagus without dysplasia    Therapeutic opioid-induced constipation (OIC)    Stage 3a chronic kidney disease ([address]    Pneumonia due to COVID-19 virus    Acute cystitis with hematuria    Spongiotic dermatitis    Osteopenia     Past Medical History:   Diagnosis Date    Cervical spine pain 08/21/2013    Chronic headaches     Chronic sinusitis     Closed fracture of distal end of left femur with routine healing 03/2021    COVID-19     Diabetes mellitus ([address]     Dizziness     H/O fall     Hypertension     Knee pain, left 08/21/2013    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    Obesity     Peripheral neuropathy     Pneumonia 8/30/2015    Pulmonary hypertension ([address] 01/01/2012    by echocardiogram    Shoulder pain, bilateral 08/21/2013    Type 2 diabetes mellitus ([address]     Unspecified essential hypertension     Essential hypertension    Vitamin D deficiency 11/05/2014     Past Surgical History:   Procedure Laterality Date    APPENDECTOMY      BACK INJECTION  02/25/2021    [facility] Right lumbar medial branch block using Fluroscopy    BRAIN SURGERY  05/24/2016    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 Bettyann Pump    COLONOSCOPY  05/03/2012    diverticular disease    COLONOSCOPY N/A 12/23/2020    COLONOSCOPY WITH BIOPSY performed by Jessica Ayala MD at [address] [facility] 1 hyperplastic polyp, random biopsies negative   Shabnam Sicard DENTAL SURGERY  08/2015    full dental extraction    FEMUR CLOSED REDUCTION Left 03/05/2020    has [facility]    FEMUR FRACTURE SURGERY Left 03/06/2021    left retrograde femoral nailing.  Dr. Alvaro Machado, [facility] REMOVAL  05/28/2016    left-sided temporal craniotomy wound reexploration with removal of small retained foreign body (plastic from Ruth clip from surgery 3 days prior), [facility], Dr. Barrett Weinstein ([address] [place] [address])  09/06/2005    supracervical hysterectomy bilateral salpingo oophectomy    KNEE ARTHROSCOPY Left     NERVE BLOCK  09/09/2019    right side L2 through L5 block nerve,facet joint,lumbar,medial branch [facility]  2011    endoscopic mucosal resection of duodenal polyp    POLYSOMNOGRAPHY  11/30/2020    no significant sleep apnea    SHOULDER ARTHROPLASTY Right 10/18/2018    Harsh Jung MD; done at [facility] ARTHROSCOPY Right 03/07/2019    revision arthroscopy with debridement,revision mini-open rotator cuff repair per Dr Curtis Min at [address] [facility]  05/27/2020    gastric ulcer, small hiatal hernia, Dr. Lucie Guzman, [facility] N/A 12/23/2020    EGD BIOPSY performed by Anne-Marie Jorge MD at [address] [facility], Elizalde's esophagus     Social History     Tobacco Use    Smoking status: Never Smoker    Smokeless tobacco: Never Used   Vaping Use    Vaping Use: Never used   Substance Use Topics    Alcohol use:  Yes     Alcohol/week: 0.0 standard drinks     Comment: Maybe twice a year, small amounts    Drug use: No     Medications:  Current Outpatient Medications   Medication Sig Dispense Refill    ondansetron (ZOFRAN ODT) 4 MG disintegrating tablet Place 1 tablet under the tongue every 6 hours as needed for Nausea or Vomiting 30 tablet 1    docusate sodium (COLACE) 100 MG capsule Take 1 capsule by mouth 2 times daily 60 capsule 0    diphenhydrAMINE (BENADRYL) 12.5 MG/5ML elixir Take 5 mLs by mouth nightly as needed for Itching or Allergies (can use of nausea as well.) 118 mL 2    famotidine (PEPCID) 20 MG tablet Take 1 tablet by mouth nightly as needed (dyspepsia) 30 tablet 3    prochlorperazine (COMPAZINE) 5 MG tablet Take 1-2 tablets by mouth every 6 hours as needed for Nausea (Patient not taking: Reported on 6/15/2022) 40 tablet 0    pioglitazone-metFORMIN (ACTOPLUS MET)  MG per tablet TAKE 1 TABLET TWICE A DAY WITH MEALS 180 tablet 0    Promethazine HCl (PHENERGAN PO) Take by mouth      blood glucose monitor strips Test 1 times a day & as needed for symptoms of irregular blood glucose. Dispense sufficient amount for indicated testing frequency plus additional to accommodate PRN testing needs. 100 strip 1    DULoxetine (CYMBALTA) 60 MG extended release capsule Take 1 capsule by mouth daily 90 capsule 1    lisinopril-hydroCHLOROthiazide (PRINZIDE;ZESTORETIC) 20-12.5 MG per tablet Take 1 tablet by mouth daily      pantoprazole (PROTONIX) 40 MG tablet Take 1 tablet by mouth 2 times daily take 1 tablet by mouth daily (Patient taking differently: Take 40 mg by mouth 2 times daily take 1 tablet by mouth daily. Patient states she takes 2 pills at the same times in the morning instead of 1 pill twice a day.) 60 tablet 5    Cholecalciferol (VITAMIN D3) 1.25 MG (60876 UT) CAPS TAKE 1 CAPSULE ONCE A WEEK 13 capsule 1    HYDROcodone-acetaminophen (NORCO) 5-325 MG per tablet Indications: Patient is taking 1 tablet a day       Gabapentin (NEURONTIN PO) Take 600 mg by mouth 5 times daily Only taking 3 times a day. No current facility-administered medications for this visit. Scheduled Meds:  Continuous Infusions:  PRN Meds:. Prior to Admission medications    Medication Sig Start Date End Date Taking?  Authorizing Provider   ondansetron (ZOFRAN ODT) 4 MG disintegrating tablet Place 1 tablet under the tongue every 6 hours as needed for Nausea or Vomiting 6/15/22   Chelly Gutierrez MD   docusate sodium (COLACE) 100 MG capsule Take 1 capsule by mouth 2 times daily 6/15/22 7/15/22  Lloyd Mylene Artist, MD   diphenhydrAMINE (BENADRYL) 12.5 MG/5ML elixir Take 5 mLs by mouth nightly as needed for Itching or Allergies (can use of nausea as well.) 5/19/22   Mario Aly MD   famotidine (PEPCID) 20 MG tablet Take 1 tablet by mouth nightly as needed (dyspepsia) 5/19/22   Mario Aly MD   prochlorperazine (COMPAZINE) 5 MG tablet Take 1-2 tablets by mouth every 6 hours as needed for Nausea  Patient not taking: Reported on 6/15/2022 5/12/22   Chrissy Soler MD   pioglitazone-metFORMIN (ACTOPLUS MET)  MG per tablet TAKE 1 TABLET TWICE A DAY WITH MEALS 4/19/22   Chrissy Soler MD   Promethazine HCl (PHENERGAN PO) Take by mouth    Historical Provider, MD   blood glucose monitor strips Test 1 times a day & as needed for symptoms of irregular blood glucose. Dispense sufficient amount for indicated testing frequency plus additional to accommodate PRN testing needs. 3/21/22   Chrissy Soler MD   DULoxetine (CYMBALTA) 60 MG extended release capsule Take 1 capsule by mouth daily 1/6/22   Chrissy Soler MD   lisinopril-hydroCHLOROthiazide (PRINZIDE;ZESTORETIC) 20-12.5 MG per tablet Take 1 tablet by mouth daily    Historical Provider, MD   pantoprazole (PROTONIX) 40 MG tablet Take 1 tablet by mouth 2 times daily take 1 tablet by mouth daily  Patient taking differently: Take 40 mg by mouth 2 times daily take 1 tablet by mouth daily. Patient states she takes 2 pills at the same times in the morning instead of 1 pill twice a day. 7/28/21   Chrissy Soler MD   Cholecalciferol (VITAMIN D3) 1.25 MG (65214 UT) CAPS TAKE 1 CAPSULE ONCE A WEEK 7/28/21   Chrissy Soler MD   HYDROcodone-acetaminophen ([address]) 5-325 MG per tablet Indications: Patient is taking 1 tablet a day  10/11/20   Historical Provider, MD   Gabapentin (NEURONTIN PO) Take 600 mg by mouth 5 times daily Only taking 3 times a day.  2/6/09   Historical Provider, MD     Vital Signs (Current) [unfilled]    Weight:   Wt Readings from Last 1 Encounters:   06/15/22 276 lb 3.2 oz (125.3 kg)     Height:   Ht Readings from Last 1 Encounters:   06/15/22 5' 8\" (1.727 m)      BMI:  There is no height or weight on file to calculate BMI. Estimated body mass index is 42 kg/m² as calculated from the following:    Height as of 6/15/22: 5' 8\" (1.727 m). Weight as of 6/15/22: 276 lb 3.2 oz (125.3 kg). body mass index is unknown because there is no height or weight on file. Cardiac Clearance: None   Medical Clearance:None   Appointment for surgery Clearance scheduled for:None     Preoperative Testing: These are the current and completed labs:  CBC:   Lab Results   Component Value Date    WBC 8.4 04/04/2022    RBC 3.98 04/04/2022    HGB 11.8 04/04/2022    HCT 36.2 04/04/2022    MCV 91.0 04/04/2022    RDW 13.9 04/04/2022     04/04/2022     CMP:   Lab Results   Component Value Date     04/04/2022    K 4.1 04/04/2022    CL 94 04/04/2022    CO2 29 04/04/2022    BUN 28 04/04/2022    CREATININE 1.71 04/04/2022    GFRAA 36 04/04/2022    LABGLOM 30 04/04/2022    GLUCOSE 207 04/04/2022    PROT 8.3 04/04/2022    CALCIUM 9.9 04/04/2022    BILITOT 0.33 04/04/2022    ALKPHOS 146 04/04/2022    AST 14 04/04/2022    ALT 5 04/04/2022     POC Tests: No results for input(s): POCGLU, POCNA, POCK, POCCL, POCBUN, POCHEMO, POCHCT in the last 72 hours.   Coags    Lab Results   Component Value Date    PROTIME 13.8 09/20/2021    INR 1.1 09/20/2021    APTT 32.7 07/35/5799     HCG (If Applicable) No results found for: PREGTESTUR, PREGSERUM, HCG, HCGQUANT   ABGs No results found for: PHART, PO2ART, BIF5HUS, OML8ZUB, BEART, [id number]   Type & Screen (If Applicable)  No results found for: Shantanu Semen    Additional ordered pre-operative testing:  []CBC    []ABG      [] BMP   []URINALYSIS   []CMP    []HCG   []COAGS PT/INR  []T&C  []LFTs   []TYPE AND SCREEN    [] EKG  [] Chest X-Ray  [] Other Radiology    [] Sent to Hospitalist None  [] Sent to Anesthesia for your review: None   [] Additional Orders: None     Comments:None   Requests: No Special requests    Signed: Jose Tony LPN 5/06/7906 5:47 AM

## 2022-06-16 NOTE — PROGRESS NOTES
recommend using that as well. Orders Placed This Encounter   Procedures    XR SHOULDER LEFT (MIN 2 VIEWS)     Standing Status:   Future     Number of Occurrences:   1     Standing Expiration Date:   6/16/2023     Order Specific Question:   Reason for exam:     Answer:   pain of left anterior shoulder, decrease ROM      Outpatient Encounter Medications as of 6/16/2022   Medication Sig Dispense Refill    M-DRYL 12.5 MG/5ML liquid take 5 milliliters by mouth at bedtime for itching or allergies      ondansetron (ZOFRAN ODT) 4 MG disintegrating tablet Place 1 tablet under the tongue every 6 hours as needed for Nausea or Vomiting 30 tablet 1    docusate sodium (COLACE) 100 MG capsule Take 1 capsule by mouth 2 times daily 60 capsule 0    diphenhydrAMINE (BENADRYL) 12.5 MG/5ML elixir Take 5 mLs by mouth nightly as needed for Itching or Allergies (can use of nausea as well.) 118 mL 2    famotidine (PEPCID) 20 MG tablet Take 1 tablet by mouth nightly as needed (dyspepsia) 30 tablet 3    pioglitazone-metFORMIN (ACTOPLUS MET)  MG per tablet TAKE 1 TABLET TWICE A DAY WITH MEALS 180 tablet 0    Promethazine HCl (PHENERGAN PO) Take by mouth      blood glucose monitor strips Test 1 times a day & as needed for symptoms of irregular blood glucose. Dispense sufficient amount for indicated testing frequency plus additional to accommodate PRN testing needs. 100 strip 1    DULoxetine (CYMBALTA) 60 MG extended release capsule Take 1 capsule by mouth daily 90 capsule 1    lisinopril-hydroCHLOROthiazide (PRINZIDE;ZESTORETIC) 20-12.5 MG per tablet Take 1 tablet by mouth daily      pantoprazole (PROTONIX) 40 MG tablet Take 1 tablet by mouth 2 times daily take 1 tablet by mouth daily (Patient taking differently: Take 40 mg by mouth 2 times daily take 1 tablet by mouth daily.     Patient states she takes 2 pills at the same times in the morning instead of 1 pill twice a day.) 60 tablet 5    Cholecalciferol (VITAMIN D3)

## 2022-06-17 ASSESSMENT — ENCOUNTER SYMPTOMS: SHORTNESS OF BREATH: 0

## 2022-06-20 ENCOUNTER — TELEPHONE (OUTPATIENT)
Dept: FAMILY MEDICINE CLINIC | Age: 67
End: 2022-06-20

## 2022-06-20 NOTE — TELEPHONE ENCOUNTER
Pt calling for xray results, informed of findings, pt questions if she could come in and get some type of shot to help with the pain, please advise.

## 2022-06-29 ENCOUNTER — OFFICE VISIT (OUTPATIENT)
Dept: FAMILY MEDICINE CLINIC | Age: 67
End: 2022-06-29
Payer: MEDICARE

## 2022-06-29 ENCOUNTER — OFFICE VISIT (OUTPATIENT)
Dept: BEHAVIORAL/MENTAL HEALTH | Age: 67
End: 2022-06-29
Payer: MEDICARE

## 2022-06-29 VITALS
OXYGEN SATURATION: 99 % | WEIGHT: 280 LBS | RESPIRATION RATE: 16 BRPM | DIASTOLIC BLOOD PRESSURE: 72 MMHG | BODY MASS INDEX: 42.44 KG/M2 | HEART RATE: 78 BPM | SYSTOLIC BLOOD PRESSURE: 114 MMHG | HEIGHT: 68 IN

## 2022-06-29 DIAGNOSIS — M19.012 PRIMARY OSTEOARTHRITIS OF LEFT SHOULDER: Primary | ICD-10-CM

## 2022-06-29 DIAGNOSIS — F43.22 ADJUSTMENT DISORDER WITH ANXIOUS MOOD: Primary | ICD-10-CM

## 2022-06-29 PROCEDURE — 90791 PSYCH DIAGNOSTIC EVALUATION: CPT | Performed by: COUNSELOR

## 2022-06-29 PROCEDURE — 1123F ACP DISCUSS/DSCN MKR DOCD: CPT | Performed by: NURSE PRACTITIONER

## 2022-06-29 PROCEDURE — 20610 DRAIN/INJ JOINT/BURSA W/O US: CPT | Performed by: NURSE PRACTITIONER

## 2022-06-29 PROCEDURE — PBSHW PBB SHADOW CHARGE: Performed by: NURSE PRACTITIONER

## 2022-06-29 PROCEDURE — 99212 OFFICE O/P EST SF 10 MIN: CPT

## 2022-06-29 PROCEDURE — 1123F ACP DISCUSS/DSCN MKR DOCD: CPT | Performed by: COUNSELOR

## 2022-06-29 RX ORDER — TRIAMCINOLONE ACETONIDE 40 MG/ML
40 INJECTION, SUSPENSION INTRA-ARTICULAR; INTRAMUSCULAR ONCE
Status: COMPLETED | OUTPATIENT
Start: 2022-06-29 | End: 2022-06-29

## 2022-06-29 RX ORDER — HYDROCODONE BITARTRATE AND ACETAMINOPHEN 5; 325 MG/1; MG/1
TABLET ORAL
COMMUNITY
Start: 2022-06-28

## 2022-06-29 RX ADMIN — TRIAMCINOLONE ACETONIDE 40 MG: 40 INJECTION, SUSPENSION INTRA-ARTICULAR; INTRAMUSCULAR at 12:13

## 2022-06-29 NOTE — PSYCHOTHERAPY
Had been feeling stressed / strung out by medical matters while meeting with PCP. Feeling much better now, nothing feeling particularly out of whack currently. Remembered used to skyla, started doing that and puzzles, hands have been more occupied and benefiting from something to focus on. Nausea improved spontaneously after 3 months of 24/7, has cropped back up again in a few days. Has been experiencing some constipation past few days as well. Sleep still disrupted because of recurrent waking from bladder concerns. Has only been to [facility] once, but will be going in for bladder check at beginning of August.    Reviewed information on mind-body link with anxiety, tricks for distraction, general self-care. Forecasted possible anxiety boosts during periods that nausea is elevated, and discussed boosting self-care / preventive coping during early onset if those times occur.

## 2022-06-29 NOTE — PROGRESS NOTES
Behavioral Health Consultation/Psychotherapy Note  Viviane Velazquez. Kate Rivera Psy.D. Visit Date:  6/29/2022    Patient:  Hardeep Branch  YOB: 1955  Chief Complaint:  Anxiety    Duration of session:  54 minutes      S:       Patient presented alone for appointment and engaged readily. Patient reviewed referral information provided by referring provider and confirmed contents. Patient provided additional information to clarify and/or elaborate upon provided referral info. Patient reported some improvement in symptoms since last contact, including general relief of primary anxiety symptoms experienced at time of referral. Patient also noted re-engagement in prior hobbies as a means of effective stress management for her. Patient reported continued difficulties with situational stress associated with medical condition / symptoms. Patient reviewed psychoeducational content associated with topics of session as appropriate, including information on general anxiety / stress management and review of basic principles of self-care and illness management/recovery. Patient engaged in thought challenging and cognitive restructuring tasks as needed. Patient reviewed recent use of self-care and active coping strategies and discussed areas for potential adjustments which might better suit patient's behavioral needs. Patient discussed current treatment needs and reviewed available options.      Topic areas discussed during session:   Specific discussion of new / existing symptoms   Evaluation for differential diagnosis   Recent medical appointments and associated challenges   Chronic health conditions and management   Behavioral skill-building and/or practice      O:    Appearance    Patient presents as alert, oriented, and cooperative  Appetite normal  Sleep disturbance Yes  Loss of pleasure intermittent but generally present  Speech    clear and understandable  Mood    Euthymic  Affect    normal affect  Thought Process linear and coherent  Insight    Good  Judgment    Intact  Memory    recent and remote memory intact  Suicide Assessment    no passive or active suicidal ideation / plan / intent       A:    1. Adjustment disorder with anxious mood        Patient presents for consultation regarding symptoms of anxiety associated with elevated situational stress and medical condition. Patient reports continued stable stress levels within home / medical environment(s) and no new areas of concern there at this time. Patient reports general improvement in symptoms from time of referral following the incorporation of various self-care activities. Patient diagnosis has been updated to account for information gathered during today's contact. Patient has been encouraged to continue regular use of self-care and active coping strategies as reviewed. Patient has been recommended to continue using previously provided information on CBT and relaxation strategies, to maintain medication compliance, to follow up with medical providers as directed, and to follow up with behavioral health as needed. Patient is in agreement with this plan and has requested follow-up on an as-needed basis at this time. Patient will make further scheduling arrangements when appropriate. Patient is aware that they are able to reach out as needed for additional information or support prior to the next scheduled contact.       P:    Discussed various factors related to the development and maintenance of  anxiety and stress (including biological, cognitive, behavioral, and environmental factors), Discussed potential treatments for  anxiety and stress, Discussed self-care (sleep, nutrition, rewarding activities, social support, exercise), Discussed and problem-solved barriers in adhering to behavioral change plan, Motivational Interviewing to increase patient confidence and compliance with adhering to behavioral change plan, Motivational Interviewing to determine importance and readiness for change, Discussed potential barriers to change, Established rapport, Conducted functional assessment, [place]-setting to identify pt's primary goals for [facility] [place] visit / overall health, Supportive techniques, Emphasized self-care as important for managing overall health and Provided Psychoeducation re: anxiety and mood changes    Patient Plan:  1) Review the attached information on sleep hygiene. Go through the worksheet at the end of it; are there things that make sense to try right now? If so, do them. 2) Make sure to eat regularly. If you haven't eaten in 3-4 hours, grab at least a small snack, even if you have no appetite. If your stomach is talking to you, listen. 3) Aim for at least 30-40 cumulative minutes of movement each day. This doesn't have to be all at once, or a workout. Just stretch or dance to a song you like. If you've been sitting for more than an hour or two, get up and move around for a few minutes. 4) Review the attached list of coping skills. Try 1-2 a week; do more if you're feeling frisky! 5) Check out the attached info on grounding techniques. These can be used to disrupt the hamster when it gets overly lively. The relief will only be temporary, so make sure that you follow up with something relaxing and/or distracting to prolong the relief you may get. 6) Review the attached information on deep breathing and relaxation. Use this to help manage intense emotions, or just to relax. Practice this daily, even if you're not feeling particularly stressed. 7) Are there things that you've stopped doing because of stress or your mood? If so, make note of them and consider some ways to reincorporate them into your life. This is also something that can be discussed in counseling. 8) Remember to be kind to yourself. This is a challenging experience, and you're doing the best you can. Patient to return as needed for follow-up.     All questions about treatment plan answered. Patient instructed to call the crisis line and/or proceed to emergency room if suicidal or homicidal ideations occur outside of clinic hours and crisis management skills do not provide relief. Patient stated understanding and is agreeable to treatment and crisis plan.     (Please note that portions of this note were completed with a voice recognition program. Efforts were made to edit the dictations but occasionally words are mis-transcribed.)    Provider Signature:  Electronically signed by Chinyere Smith PSYD on 6/29/2022 at 3:05 PM

## 2022-06-29 NOTE — PATIENT INSTRUCTIONS
1) Review the attached information on sleep hygiene. Go through the worksheet at the end of it; are there things that make sense to try right now? If so, do them. 2) Make sure to eat regularly. If you haven't eaten in 3-4 hours, grab at least a small snack, even if you have no appetite. If your stomach is talking to you, listen. 3) Aim for at least 30-40 cumulative minutes of movement each day. This doesn't have to be all at once, or a workout. Just stretch or dance to a song you like. If you've been sitting for more than an hour or two, get up and move around for a few minutes. 4) Review the attached list of coping skills. Try 1-2 a week; do more if you're feeling frisky! 5) Check out the attached info on grounding techniques. These can be used to disrupt the hamster when it gets overly lively. The relief will only be temporary, so make sure that you follow up with something relaxing and/or distracting to prolong the relief you may get. 6) Review the attached information on deep breathing and relaxation. Use this to help manage intense emotions, or just to relax. Practice this daily, even if you're not feeling particularly stressed. 7) Are there things that you've stopped doing because of stress or your mood? If so, make note of them and consider some ways to reincorporate them into your life. This is also something that can be discussed in counseling. 8) Remember to be kind to yourself. This is a challenging experience, and you're doing the best you can. Sleep Hygiene Guidelines    Good dental hygiene is important in determining the health of your teeth and gums. We all know we are supposed to brush and floss regularly. Those who do so are more likely to have strong, healthy gums and less cavities. Similarly, good sleep hygiene is important in determining the quality and quantity of your sleep. Below are guidelines for good sleep hygiene practices.   Review these guidelines and evaluate how well you practice good sleep hygiene. Caffeine:  Avoid Caffeine 6-8 Hours Before Bedtime       Caffeine disturbs sleep, even in people who do not think they experience a stimulation effect. Individuals with insomnia are often more sensitive to mild stimulants than are normal sleepers. Caffeine is found in items such as coffee, tea, soda, chocolate, and many over-the-counter medications (e.g., Excedrin). Thus, drinking caffeinated beverages should be avoided near bedtime and during the night. You might consider a trial period of no caffeine if you tend to be sensitive to its effects. Nicotine:  Avoid Nicotine Before Bedtime       Although some smokers claim that smoking helps them relax, but nicotine is a stimulant. The initial relaxing effects occur with the initial entry of the nicotine, but as the nicotine builds in the system it produces an effect similar to caffeine. Thus, smoking, dipping, or chewing tobacco should be avoided near bedtime and during the night. Dont smoke to get yourself back to sleep. Alcohol:  Avoid Alcohol After Dinner       Alcohol often promotes the onset of sleep, but as alcohol is metabolized sleep becomes disturbed and fragmented. Thus, a large amount of alcohol is a poor sleep aid and should not be used as such. Limit alcohol use to small quantities to moderate quantities. Sleeping Pills:  Sleep Medications are Effective Only Temporarily       Scientists have shown that sleep medications lose their effectiveness in about 2 - 4 weeks when taken regularly. Despite advertisements to the contrary, over-the-counter sleeping aids have little impact on sleep beyond the placebo effect. Over time, sleeping pills actually can make sleep problems worse. When sleeping pills have been used for a long period, withdrawal from the medication can lead to an insomnia rebound.   Thus, after long-term use, many individuals incorrectly conclude that they need sleeping pills in order to sleep normally. Keep use of sleep pills infrequent, but dont worry if you need t use one on an occasional basis. Regular Exercise       Get regular exercise, preferably 40 minutes each day of an activity that causes sweating. .  Exercise in the late afternoon or early evening seems to aid sleep, although the positive effect often takes several weeks to become noticeable. Exercising sporadically is not likely to improve sleep, and exercise within 2 hours of bedtime may elevate nervous system activity and interfere with sleep onset. Hot Baths  Spending 20 minutes in a tub of hot water an hour or two prior to bedtime may promote sleep and is strongly recommended. Bedroom Environment: Moderate Temperature, Quiet, and Dark       Extremes of heat or cold can disrupt sleep. A quiet environment is more sleep promoting than a noisy one. Noises can be masked with background white noise (such as the noise of a fan) or with earplugs. Bedrooms may be darkened with black-out shades or sleep masks can be worn. Position clocks out-of-sight since clock-watching can increase worry about the effects of lack of sleep. Be sure your mattress is not too soft or too firm and that your pillow is the right height and firmness. Eating       A light bedtime snack, such a glass of warm milk, cheese, or a bowl of cereal can promote sleep. You should avoid the following foods at bedtime:  any caffeinated foods (e.g., chocolate), peanuts, beans, most raw fruits and vegetables (since they may cause gas), and high-fat foods such as potato chips or corn chips. Avoid snacks in the middle of the nights since awakening may become associated with hunger. If you have trouble with regurgitation, be especially careful to avid heavy meals and spices in the evening. Do not go to bed too hungry or too full. It may help to elevate you head with some pillows.     Avoid Naps       Avoid naps, the sleep you obtain during the day takes away from you sleep need that night resulting in lighter, more restless sleep, difficulty falling asleep or early morning awakening. If you must nap, keep it brief, and take the nap about 8 hours after arising. It is best to set an alarm to ensure you dont sleep more than 10-15 minutes. Limit Your Time in Bed        Restrict your sleep period to the average number of hours you have actually slept per night during the preceding week. Quality of sleep is important. Too much time in bed can decrease the quality on subsequent night and contribute to the maintenance of existing sleep problems. Dont lay in bed for extended times not sleep. If you arent asleep in about 15-20 minutes go ahead and get up. Do something outside the bedroom that is relaxing. When you feel sleepy (i.e., yawning, head bobbing, eyes closing, concentration decreasing, then return to bed. Dont confuse tiredness with sleepiness, they are different. Tiredness doesnt lead to sleep, only sleepiness does. Regular Sleep Schedule       Keep a regular time each day, 7 days a week, to get out of bed. Keeping a regular awaking time helps set your circadian rhythm set so that your body learns to sleep at the desired time. Use the attached form to develop a plan for improving you sleep hygiene. It will take time for you sleep to get back in line so once you begin your sleep hygiene plan, stick with if for at least 6-8 weeks. Planned Improvements of My Sleep Hygiene    Check Those  That Apply  _____ Avoid Caffeine 6-8 Hours Before Bedtime. I will not have caffeine after ________ PM.    ____ Avoid Nicotine Before Bedtime. I will not have a cigarette after _________ PM.    ______  Limit Alcohol Use. I will not have more than _______ drinks in the evening.    ______ Avoid Use of Sleeping Pills. (If you are currently using them regularly, all changes should be   medical supervised by your medical provider).     ______ Louvella Castleman Exercise Regularly, But Not Within 2 Hours of Bedtime. I ________________ for ____   minutes, on the following days ____________________________________________________    ______ Ensure your Bedroom is a Comfortable Temperature, Quiet, and Dark and Your   Mattress and Pillow are good. I will make the  following changes to my bedroom   ____________________________________________________________________________    ______ Do Take a Hot Bath 1-2 Hours Prior to Bedtime. I will take a hot bath about ______ PM.    ______ Eat a Light Snack at Bedtime but Avoid Large or Problematic Foods. I will eat     __________________  or _____________________ or __________________ before bed.    ______ Avoid Naps. I try not to nap, if I must, I will limit it to _______ minutes, about 8 hours after I   awoke and will use alarm to limit my nap time. ______ Limit Time In Bed. I have been sleeping on average ______ hours per night, therefore I will   limit my time in bed to _____ hours (the same number). If Im not asleep in about 15 to 20   minutes I will get up and not return to bed until Im sleepy.    ______ Stay on a Regular Sleep Schedule  I will get up at _______ AM, 7 days a week, no matter   how poorly I slept that night. Relaxation:  Diaphragmatic Breathing             _____________________________________________________________________________    1. Sit in a comfortable position    2. Place one hand on your stomach and the other on your chest    3. Try to breathe so that only your stomach rises and falls    As you inhale, concentrate on your chest remaining relatively still while your stomach rises. It may be helpful for you to imagine that your pants are too big and you need to push your stomach out to hold them up. When exhaling, allow your stomach to fall in and the air to fully escape. Inhale slowly. You may choose to hold the air in for about a second. Exhale slowly.   Dont push the air out, but just let the natural pressure of your body slowly move it out. It is normal for this healthy method of breathing to feel a little awkward at first.  With practice, it will feel more natural.    4. Get your mind on your side    One other important factor in getting relaxed is your mind. Your mind and body are connected. The mind influences the body and the body influences the mind. What you do with your mind when you are trying to relax is very important. The key is to avoid thinking about stressful things. You can think about      Neutral things (e.g., counting, saying a word like calm or relax)   Pleasant things (e.g., imagining a pleasant place)    5. It is recommended that you practice 2 times per day, 10 minutes each time. Deep Breathing Exercises      Exercise 1:  The Stimulating Breath (also called the Vivi Breath)   Its aim is to raise energy level and increase alertness. - Inhale and exhale rapidly through your nose, keeping your mouth closed but relaxed. Your breaths in and out should be equal in duration, but as short as possible. This is a noisy breathing exercise. - Try for three in-and-out breath cycles per second. This produces a quick movement of the diaphragm, suggesting a vivi. Breathe normally after each cycle. - Do not do for more than 15 seconds on your first try. Each time you practice the Stimulating Breath, you can increase your time by five seconds or so, until you reach a full minute. If done properly, you may feel invigorated, comparable to the heightened awareness you feel after a good workout. You should feel the effort at the back of the neck, the diaphragm, the chest and the abdomen. Try this breathing exercise the next time you need an energy boost and feel yourself reaching for a cup of coffee.     Exercise 2:  The 4-7-8 (or Relaxing Breath) Exercise  This exercise is utterly simple, takes almost no time, requires no equipment and can be done anywhere. Although you can do the exercise in any position, sit with your back straight while learning the exercise. Place the tip of your tongue against the ridge of tissue just behind your upper front teeth, and keep it there through the entire exercise. You will be exhaling through your mouth around your tongue; try pursing your lips slightly if this seems awkward.  Exhale completely through your mouth, making a whoosh sound.  Close your mouth and inhale quietly through your nose to a mental count of four.  Hold your breath for a count of seven.  Exhale completely through your mouth, making a whoosh sound to a count of eight.  This is one breath. Now inhale again and repeat the cycle three more times for a total of four breaths. Note that you always inhale quietly through your nose and exhale audibly through your mouth. The tip of your tongue stays in position the whole time. Exhalation takes twice as long as inhalation. The absolute time you spend on each phase is not important; the ratio of 4:7:8 is important. If you have trouble holding your breath, speed the exercise up but keep to the ratio of 4:7:8 for the three phases. With practice you can slow it all down and get used to inhaling and exhaling more and more deeply. This exercise is a natural tranquilizer for the nervous system. Unlike tranquilizing drugs, which are often effective when you first take them but then lose their power over time, this exercise is subtle when you first try it but gains in power with repetition and practice. Do it at least twice a day. You cannot do it too frequently. Do NOT do more than 4 breaths at one time for the first month of practice. Later, if you wish, you can extend it to eight breaths. If you feel a little lightheaded when you first breathe this way, do not be concerned; it will pass.   Once you develop this technique by practicing it every day, it will be a very useful tool that you will always have with you. Use it whenever anything upsetting happens - before you react. Use it whenever you are aware of internal tension. Use it to help you fall asleep. This exercise cannot be recommended too highly. Everyone can benefit from it. Exercise 3: Meditation exercise  Breath Counting  If you want to get a feel for this challenging work, try your hand at breath counting, a deceptively simple technique. Sit in a comfortable position with the spine straight and head inclined slightly forward. Gently close your eyes and take a few deep breaths. Then let the breath come naturally without trying to influence it. Ideally it will be quiet and slow, but depth and rhythm may vary.  To begin the exercise, count \"one\" to yourself as you exhale.  The next time you exhale, count \"two,\" and so on up to [place]. \"   Then begin a new cycle, counting \"one\" on the next exhalation. Never count higher than \"five,\" and count only when you exhale. You will know your attention has wandered when you find yourself up to \"eight,\" \"12,\" even \"19. \"  Try to do 10 minutes of this form of meditation. 60+ Essential Positive Coping Skills    There are nearly infinite ways to cope. We all use different methods that suit our unique personalities and needs. You may find that what causes stress in one individual will help another to cope. Its not important whether you cope like everyone else; all that matters is that you find effective coping methods that will help you build resilience and thrive! That being said, dont beat yourself up if you just need a little distraction sometimes. Below, youll find Omkar Clemente (2016) master list of coping methods and skills organized into categories. Whatever you need in the moment, there is probably at least one activity mentioned below that will help!     Diversions:  Write, draw, paint, photography   Play an instrument, sing, dance, act   Take a shower or a bath   Garden   Take a walk, or go for a drive   Watch television or a movie   Watch cute kitten videos on [employer] a game   Go shopping   Clean or organize your environment   Read   Take a break or vacation    Social/Interpersonal Coping:  Talk to someone you trust   Set boundaries and say no   Write a note to someone you care about   Be assertive   Use humor  Spend time with friends and/or family   Serve someone in need   Care for or play with a pet   Role-play challenging situations with others   Encourage others    Cognitive Coping:  Make a gratitude list  Brainstorm solutions   Lower your expectations of the situation   Keep an inspirational quote with you   Be flexible   Write a list of goals   Take a class Act opposite of negative feelings   Write a list of pros and cons for decisions   Reward or pamper yourself when successful   Write a list of strengths   Accept a challenge with a positive attitude    Tension Releasers:  Exercise or play sports   Catharsis (yelling in the bathroom, punching a punching bag)   Cry   Laugh    Physical:  Get enough sleep   Eat healthy foods   Get into a good routine   Eat a little chocolate   Limit caffeine   Deep/slow breathing    Spiritual:  [place] or meditate   Enjoy nature   Get involved in a worthy cause    Limit Setting:  Drop some involvement   Prioritize important tasks   Use assertive communication   Schedule time for yourself    The 2021 Betzaida Jensen (TERI) website also lists some coping skills, some that are positive and encourage mental health, and others that are destructive or used to avoid your problems.     The positive coping skills include:   Meditation and relaxation techniques  Having time to yourself   Physical activity or exercise   Reading   Spending time with friends   Finding humor   Spending time on your hobbies   Spirituality   Quality time with your pets   Getting a good nights sleep   Eating healthy    Negative coping skills include:  Drugs   Excessive alcohol Self-mutilation   Ignoring or bottling up feelings   Taking sedatives   Taking stimulants   Working too much   Avoiding your problems   Denial    Aside from using the positive coping methods, the [facility] website also suggests ten tips you can put to use to strengthen your mental state and build resilience to lifes stressors:   Build up your confidence  Accept compliments when they are given to you   Be sure to make time for your loved ones   Give support to others when needed and accept support from others when needed   Create and stick to a realistic budget   Volunteer in your community   Find ways to manage your stress on a regular basis   Share your burdens with others, especially those who have been through the same things   Identify and address your shifting moods   Learn how to be at peace with yourself    While none of these tips are easy to follow, they are sure to provide you with the strength and resilience you need to navigate difficult times in your life (MHWW.org).

## 2022-06-29 NOTE — PROGRESS NOTES
procedure well. No orders of the defined types were placed in this encounter. Outpatient Encounter Medications as of 6/29/2022   Medication Sig Dispense Refill    HYDROcodone-acetaminophen (NORCO) 5-325 MG per tablet       M-DRYL 12.5 MG/5ML liquid take 5 milliliters by mouth at bedtime for itching or allergies      ondansetron (ZOFRAN ODT) 4 MG disintegrating tablet Place 1 tablet under the tongue every 6 hours as needed for Nausea or Vomiting 30 tablet 1    docusate sodium (COLACE) 100 MG capsule Take 1 capsule by mouth 2 times daily 60 capsule 0    diphenhydrAMINE (BENADRYL) 12.5 MG/5ML elixir Take 5 mLs by mouth nightly as needed for Itching or Allergies (can use of nausea as well.) 118 mL 2    famotidine (PEPCID) 20 MG tablet Take 1 tablet by mouth nightly as needed (dyspepsia) 30 tablet 3    pioglitazone-metFORMIN (ACTOPLUS MET)  MG per tablet TAKE 1 TABLET TWICE A DAY WITH MEALS 180 tablet 0    Promethazine HCl (PHENERGAN PO) Take by mouth      blood glucose monitor strips Test 1 times a day & as needed for symptoms of irregular blood glucose. Dispense sufficient amount for indicated testing frequency plus additional to accommodate PRN testing needs. 100 strip 1    DULoxetine (CYMBALTA) 60 MG extended release capsule Take 1 capsule by mouth daily 90 capsule 1    lisinopril-hydroCHLOROthiazide (PRINZIDE;ZESTORETIC) 20-12.5 MG per tablet Take 1 tablet by mouth daily      pantoprazole (PROTONIX) 40 MG tablet Take 1 tablet by mouth 2 times daily take 1 tablet by mouth daily (Patient taking differently: Take 40 mg by mouth 2 times daily take 1 tablet by mouth daily. Patient states she takes 2 pills at the same times in the morning instead of 1 pill twice a day.) 60 tablet 5    Cholecalciferol (VITAMIN D3) 1.25 MG (48008 UT) CAPS TAKE 1 CAPSULE ONCE A WEEK 13 capsule 1    Gabapentin (NEURONTIN PO) Take 600 mg by mouth 5 times daily Only taking 3 times a day.        No facility-administered encounter medications on file as of 6/29/2022.             Manjit Nava, SHANNA - CNP

## 2022-07-07 DIAGNOSIS — R11.0 NAUSEA: ICD-10-CM

## 2022-07-07 RX ORDER — ONDANSETRON 4 MG/1
4 TABLET, ORALLY DISINTEGRATING ORAL EVERY 6 HOURS PRN
Qty: 30 TABLET | Refills: 1 | Status: SHIPPED | OUTPATIENT
Start: 2022-07-07 | End: 2022-07-29 | Stop reason: SDUPTHER

## 2022-07-07 NOTE — TELEPHONE ENCOUNTER
Juan F Buckley is requesting a refill on Zofran.    Last  visit: 06/15/22  Next Office visit:  09/27/22  Last prescribing provider:  Leighann Howell  She already pick her last refill

## 2022-07-14 DIAGNOSIS — E11.29 TYPE 2 DIABETES MELLITUS WITH MICROALBUMINURIA, WITHOUT LONG-TERM CURRENT USE OF INSULIN (HCC): ICD-10-CM

## 2022-07-14 DIAGNOSIS — R80.9 TYPE 2 DIABETES MELLITUS WITH MICROALBUMINURIA, WITHOUT LONG-TERM CURRENT USE OF INSULIN (HCC): ICD-10-CM

## 2022-07-14 RX ORDER — PIOGLITAZONE HCL AND METFORMIN HCL 500; 15 MG/1; MG/1
TABLET ORAL
Qty: 180 TABLET | Refills: 0 | Status: SHIPPED | OUTPATIENT
Start: 2022-07-14 | End: 2022-10-12

## 2022-07-14 NOTE — TELEPHONE ENCOUNTER
Marquise Jesus called requesting a refill of the below medication which has been pended for you:     Requested Prescriptions     Pending Prescriptions Disp Refills    pioglitazone-metFORMIN (ACTOPLUS MET)  MG per tablet [Pharmacy Med Name: PIOGLITAZONE/METFORMIN TABS 15/500MG] 180 tablet 3     Sig: TAKE 1 TABLET TWICE A DAY WITH MEALS       Last Appointment Date: 5/25/2022  Next Appointment Date: 9/13/2022    Allergies   Allergen Reactions    Asa [Aspirin] Other (See Comments)     Stomach irritation

## 2022-07-25 ENCOUNTER — OFFICE VISIT (OUTPATIENT)
Dept: PRIMARY CARE CLINIC | Age: 67
End: 2022-07-25
Payer: MEDICARE

## 2022-07-25 VITALS
DIASTOLIC BLOOD PRESSURE: 68 MMHG | RESPIRATION RATE: 18 BRPM | BODY MASS INDEX: 42.13 KG/M2 | WEIGHT: 278 LBS | SYSTOLIC BLOOD PRESSURE: 102 MMHG | HEART RATE: 83 BPM | OXYGEN SATURATION: 98 % | HEIGHT: 68 IN | TEMPERATURE: 99.1 F

## 2022-07-25 DIAGNOSIS — R42 VERTIGO: Primary | ICD-10-CM

## 2022-07-25 DIAGNOSIS — R09.81 CONGESTION OF NASAL SINUS: ICD-10-CM

## 2022-07-25 DIAGNOSIS — R42 DIZZINESS: ICD-10-CM

## 2022-07-25 DIAGNOSIS — R51.9 SINUS HEADACHE: ICD-10-CM

## 2022-07-25 PROCEDURE — 1123F ACP DISCUSS/DSCN MKR DOCD: CPT | Performed by: NURSE PRACTITIONER

## 2022-07-25 PROCEDURE — 99213 OFFICE O/P EST LOW 20 MIN: CPT | Performed by: NURSE PRACTITIONER

## 2022-07-25 PROCEDURE — 99213 OFFICE O/P EST LOW 20 MIN: CPT

## 2022-07-25 RX ORDER — LORATADINE 10 MG/1
10 TABLET ORAL DAILY
Qty: 30 TABLET | Refills: 0 | Status: SHIPPED | OUTPATIENT
Start: 2022-07-25 | End: 2022-08-24

## 2022-07-25 RX ORDER — MECLIZINE HYDROCHLORIDE 25 MG/1
25 TABLET ORAL 3 TIMES DAILY PRN
Qty: 30 TABLET | Refills: 0 | Status: SHIPPED | OUTPATIENT
Start: 2022-07-25 | End: 2022-08-04

## 2022-07-25 RX ORDER — FLUTICASONE PROPIONATE 50 MCG
1 SPRAY, SUSPENSION (ML) NASAL 2 TIMES DAILY
Qty: 16 G | Refills: 0 | Status: SHIPPED | OUTPATIENT
Start: 2022-07-25 | End: 2022-09-13

## 2022-07-25 ASSESSMENT — ENCOUNTER SYMPTOMS
ABDOMINAL PAIN: 0
NAUSEA: 1
EYES NEGATIVE: 1
SORE THROAT: 0
RESPIRATORY NEGATIVE: 1
SINUS PAIN: 1
SINUS PRESSURE: 1

## 2022-07-25 NOTE — PATIENT INSTRUCTIONS
Meclizine/Antivert for dizziness as needed. Try drinking some Gatorade    Compression socks    Protein shakes (Ensure, Premier Protein, Boost)    Recommended over the counter medications/treatments: The use of an antihistamine (Claritin or Zyrtec) and a nasal steroid spray (Flonase or Nasacort) may help with sinus congestion and drainage. Mucinex will also help thin secretions and improve congestion. Works best if drinking plenty of water. Make sure to stay well hydrated by drinking plenty of water. Follow up with primary care provider in 1 to 2 days or as needed if no improvement or worsening of your symptoms.

## 2022-07-27 ENCOUNTER — TELEPHONE (OUTPATIENT)
Dept: GASTROENTEROLOGY | Age: 67
End: 2022-07-27

## 2022-07-27 ENCOUNTER — HOSPITAL ENCOUNTER (OUTPATIENT)
Dept: LAB | Age: 67
Discharge: HOME | End: 2022-07-27
Payer: MEDICARE

## 2022-07-27 DIAGNOSIS — I10 HYPERTENSION, ESSENTIAL: ICD-10-CM

## 2022-07-27 DIAGNOSIS — E55.9 VITAMIN D DEFICIENCY: ICD-10-CM

## 2022-07-27 DIAGNOSIS — N18.31 TYPE 2 DIABETES MELLITUS WITH STAGE 3A CHRONIC KIDNEY DISEASE, WITHOUT LONG-TERM CURRENT USE OF INSULIN (HCC): ICD-10-CM

## 2022-07-27 DIAGNOSIS — N18.31 STAGE 3A CHRONIC KIDNEY DISEASE (HCC): ICD-10-CM

## 2022-07-27 DIAGNOSIS — E11.22 TYPE 2 DIABETES MELLITUS WITH STAGE 3A CHRONIC KIDNEY DISEASE, WITHOUT LONG-TERM CURRENT USE OF INSULIN (HCC): ICD-10-CM

## 2022-07-27 LAB
ABSOLUTE EOS #: 0.1 K/UL (ref 0–0.44)
ABSOLUTE IMMATURE GRANULOCYTE: 0.04 K/UL (ref 0–0.3)
ABSOLUTE LYMPH #: 1.71 K/UL (ref 1.1–3.7)
ABSOLUTE MONO #: 0.57 K/UL (ref 0.1–1.2)
ANION GAP SERPL CALCULATED.3IONS-SCNC: 9 MMOL/L (ref 9–17)
BASOPHILS # BLD: 1 % (ref 0–2)
BASOPHILS ABSOLUTE: 0.06 K/UL (ref 0–0.2)
BUN BLDV-MCNC: 28 MG/DL (ref 8–23)
BUN/CREAT BLD: 19 (ref 9–20)
CALCIUM IONIZED: 1.27 MMOL/L (ref 1.13–1.33)
CALCIUM SERPL-MCNC: 9.9 MG/DL (ref 8.6–10.4)
CHLORIDE BLD-SCNC: 97 MMOL/L (ref 98–107)
CO2: 30 MMOL/L (ref 20–31)
CREAT SERPL-MCNC: 1.46 MG/DL (ref 0.5–0.9)
CREATININE URINE: 96.5 MG/DL (ref 28–217)
EOSINOPHILS RELATIVE PERCENT: 1 % (ref 1–4)
GFR AFRICAN AMERICAN: 43 ML/MIN
GFR NON-AFRICAN AMERICAN: 36 ML/MIN
GFR SERPL CREATININE-BSD FRML MDRD: ABNORMAL ML/MIN/{1.73_M2}
GLUCOSE BLD-MCNC: 135 MG/DL (ref 70–99)
HCT VFR BLD CALC: 35.3 % (ref 36.3–47.1)
HEMOGLOBIN: 11.4 G/DL (ref 11.9–15.1)
IMMATURE GRANULOCYTES: 1 %
LYMPHOCYTES # BLD: 21 % (ref 24–43)
MCH RBC QN AUTO: 29.7 PG (ref 25.2–33.5)
MCHC RBC AUTO-ENTMCNC: 32.3 G/DL (ref 25.2–33.5)
MCV RBC AUTO: 91.9 FL (ref 82.6–102.9)
MONOCYTES # BLD: 7 % (ref 3–12)
NRBC AUTOMATED: 0 PER 100 WBC
PDW BLD-RTO: 14.2 % (ref 11.8–14.4)
PHOSPHORUS: 3.3 MG/DL (ref 2.6–4.5)
PLATELET # BLD: 255 K/UL (ref 138–453)
PMV BLD AUTO: 10.2 FL (ref 8.1–13.5)
POTASSIUM SERPL-SCNC: 4 MMOL/L (ref 3.7–5.3)
PTH INTACT: 43.05 PG/ML (ref 15–65)
RBC # BLD: 3.84 M/UL (ref 3.95–5.11)
SEG NEUTROPHILS: 69 % (ref 36–65)
SEGMENTED NEUTROPHILS ABSOLUTE COUNT: 5.52 K/UL (ref 1.5–8.1)
SODIUM BLD-SCNC: 136 MMOL/L (ref 135–144)
TOTAL PROTEIN, URINE: 38 MG/DL
URINE TOTAL PROTEIN CREATININE RATIO: 0.39 (ref 0–0.2)
VITAMIN D 25-HYDROXY: 45.2 NG/ML
WBC # BLD: 8 K/UL (ref 3.5–11.3)

## 2022-07-27 PROCEDURE — 84156 ASSAY OF PROTEIN URINE: CPT

## 2022-07-27 PROCEDURE — 82570 ASSAY OF URINE CREATININE: CPT

## 2022-07-27 PROCEDURE — 80048 BASIC METABOLIC PNL TOTAL CA: CPT

## 2022-07-27 PROCEDURE — 82306 VITAMIN D 25 HYDROXY: CPT

## 2022-07-27 PROCEDURE — 85025 COMPLETE CBC W/AUTO DIFF WBC: CPT

## 2022-07-27 PROCEDURE — 36415 COLL VENOUS BLD VENIPUNCTURE: CPT

## 2022-07-27 PROCEDURE — 84100 ASSAY OF PHOSPHORUS: CPT

## 2022-07-27 PROCEDURE — 83970 ASSAY OF PARATHORMONE: CPT

## 2022-07-27 PROCEDURE — 82330 ASSAY OF CALCIUM: CPT

## 2022-07-27 NOTE — TELEPHONE ENCOUNTER
Writer called and spoke with patient regarding needed labs. States understanding and is planning to coming in next week to complete these. <--- Click to Launch [facility] for PreOp, PostOp and Procedure

## 2022-07-27 NOTE — TELEPHONE ENCOUNTER
Pt called requesting refill on Zofran.  Asks that it be called in to [facility])

## 2022-07-29 DIAGNOSIS — R11.0 NAUSEA: ICD-10-CM

## 2022-07-29 RX ORDER — ONDANSETRON 4 MG/1
4 TABLET, ORALLY DISINTEGRATING ORAL EVERY 6 HOURS PRN
Qty: 30 TABLET | Refills: 1 | Status: SHIPPED | OUTPATIENT
Start: 2022-07-29 | End: 2022-08-17 | Stop reason: SDUPTHER

## 2022-08-01 ENCOUNTER — OFFICE VISIT (OUTPATIENT)
Dept: NEPHROLOGY | Age: 67
End: 2022-08-01
Payer: MEDICARE

## 2022-08-01 VITALS
WEIGHT: 275 LBS | DIASTOLIC BLOOD PRESSURE: 82 MMHG | BODY MASS INDEX: 41.68 KG/M2 | HEART RATE: 84 BPM | SYSTOLIC BLOOD PRESSURE: 136 MMHG | HEIGHT: 68 IN

## 2022-08-01 DIAGNOSIS — N18.32 STAGE 3B CHRONIC KIDNEY DISEASE (HCC): Primary | ICD-10-CM

## 2022-08-01 DIAGNOSIS — I10 HYPERTENSION, ESSENTIAL: ICD-10-CM

## 2022-08-01 DIAGNOSIS — E66.01 CLASS 3 SEVERE OBESITY WITH BODY MASS INDEX (BMI) OF 40.0 TO 44.9 IN ADULT, UNSPECIFIED OBESITY TYPE, UNSPECIFIED WHETHER SERIOUS COMORBIDITY PRESENT (HCC): ICD-10-CM

## 2022-08-01 DIAGNOSIS — E11.22 TYPE 2 DIABETES MELLITUS WITH STAGE 3B CHRONIC KIDNEY DISEASE, WITHOUT LONG-TERM CURRENT USE OF INSULIN (HCC): ICD-10-CM

## 2022-08-01 DIAGNOSIS — N18.32 TYPE 2 DIABETES MELLITUS WITH STAGE 3B CHRONIC KIDNEY DISEASE, WITHOUT LONG-TERM CURRENT USE OF INSULIN (HCC): ICD-10-CM

## 2022-08-01 DIAGNOSIS — E55.9 VITAMIN D DEFICIENCY: ICD-10-CM

## 2022-08-01 DIAGNOSIS — N20.0 NEPHROLITHIASIS: ICD-10-CM

## 2022-08-01 PROCEDURE — 3051F HG A1C>EQUAL 7.0%<8.0%: CPT | Performed by: INTERNAL MEDICINE

## 2022-08-01 PROCEDURE — 99213 OFFICE O/P EST LOW 20 MIN: CPT | Performed by: INTERNAL MEDICINE

## 2022-08-01 PROCEDURE — 1123F ACP DISCUSS/DSCN MKR DOCD: CPT | Performed by: INTERNAL MEDICINE

## 2022-08-01 RX ORDER — LISINOPRIL AND HYDROCHLOROTHIAZIDE 20; 12.5 MG/1; MG/1
1 TABLET ORAL DAILY
Qty: 90 TABLET | Refills: 3 | Status: SHIPPED | OUTPATIENT
Start: 2022-08-01

## 2022-08-01 NOTE — PROGRESS NOTES
Nephrology Progress Note    Chaparro Browne MD      SUBJECTIVE      Chief Complaint   Patient presents with    Chronic Kidney Disease     Stage 3a chronic kidney disease     8/1/2022:  Patient is here for follow-up of her CKD 3 likely due to diabetic and hypertensive nephrosclerosis. Her baseline creatinine was around 1.1 to 1.3 mg/DL, now seems to have increased some. Her creatinine in April 2022 was 1.71 which did increase to 1.46 mg/dl in July 2022. Exact baseline yet to be determined. Patient doing well. No new issues. No fever, no chills, no CP, no SOB, no N/V/D, no abdomen pain, no urinary complaints, no LE edema. Patient is currently on lisinopril-hydrochlorothiazide 20-12.5 mg daily, vitamin D 50,000 units weekly as prescribed by PCP, also is on Metformin. She states she is only drinking about 20 oz/day. Last labs 7/27/2022: BUN/Cr 28/1.46, Na 136, K 4.0, Cl 97, Bicarb 30, Ca 9.9, Phos 3.3, PTH 43.05, Vit D 45.2, Hb 11.4, UPC 0.39. BP today 136/82, HR 84.     10/4/2021:  Patient was evaluated via telehealth visit today due to her recent history of COVID-19 infection. Patient follows up for her CKD 3 likely due to diabetic and hypertensive nephrosclerosis. Her baseline creatinine seems to be around 1.1 to 1.3 mg/DL. Patient was recently admitted on 9/20/2021 with symptoms of dry cough, nausea and fatigue and was found to have COVID-19 infection. Patient has been vaccinated for Covid as well. She was also found to have UTI and was dehydrated with mild elevation of creatinine and required IV fluids and labs were stable on discharge. She mentions being on antibiotics for 6 days for UTI, does not remember the name. Now reports feeling better. She still has some diarrhea - still loose stool but improving. Overall the symptoms are improving except some nausea which has been chronic issues and +ve fatigue still present. No fever or chills reported.   No dizziness, lightheadedness reported, appetite better. Does not report any urinary symptoms. Patient is currently on lisinopril-hydrochlorothiazide 20-12.5 mg daily, vitamin D 50,000 units weekly, also is on Metformin. Last labs 9/20/2021: BUN/Cr 27/1.25, Na 137, K 3.9, Cl 101, Bicarb 27, Ca 8.9, Hb 9.5.  7/21/21: Phos 3.3, PTH 46.71, Vit D 39.5,  UPC 0.23 (9/7/21). Patient does not have a blood pressure kit at home. 1/11/2021:  Patient is here for follow-up of her CKD 3 likely due to diabetic and hypertensive nephrosclerosis. Her baseline creatinine seems to be around 1.1 to 1.3 mg/DL. Patient doing well. No new issues. No fever, no chills, no CP, no SOB, +ve nausea, no vomiting, no diarrhea or constipation, no abdomen pain, no urinary complaints, no LE edema. She is working with her PCP and also saw GI s/p EGD and will be followings up. She did get a sleep study done and was negative for MILANA and no CPAP required as reported by patient. Patient is currently on lisinopril-hydrochlorothiazide 20-12.5 mg daily, vitamin D 50,000 units weekly, also is on Metformin. Serology 12/7/2020: BERT, SPEP, immunofixation, hepatitis panel, complement level all negative. K/L 1.0. UPC 0.21 (12/9/2020). UA not available. Renal ultrasound 12/7/2020: Right kidney 7.5 cm left kidney 9.4 cm. Impression: Bilateral renal cortical thinning. No nephrolithiasis or urinary obstruction. Urinary bladder is empty and not well visualized. She drinks only about glass of water/day, and 1-2 glass of Gatorade. Last labs 12/15/2020: BUN/Cr 26/1.12, Na 141, K 4.6, Cl 100, Bicarb 30, Ca 10.3, Hb 11.7 (12/7/2020), UPC 0.21 (12/9/2020). Blood pressure today 104/72, heart rate 72. History of Present Illness 11/2/2020: This is a 72 y.o. female who presents to the office for evaluation of elevated creatinine. Patient is here as she was referred by her PCP to establish nephrology care due to her elevated creatinine.   Patient has past medical history of diabetes type 2 since at least 5-10 years, hypertension since at least  3-4 years, pulmonary hypertension, vitamin D deficiency, peripheral neuropathy, morbid obesity, history of gastric ulcers, depression, migraines, h/o renal stone x 1 in early 2000's. History of EGD in 5/27/2020 showed gastric ulcer and small hiatal hernia patient is to follow-up with GI per PCP note. Last creatinine was 1.31 mg/DL in July 2020. Her baseline creatinine seems to be around 1.1 to 1.3 mg/DL. She does not smoke, no alcohol usage or any drug usage reported. She reports allergies to aspirin-stomach irritation. Family history: Lung cancer, HTN, DM in father and mother with breast cancer. No renal dx. She states she does not very much water does about 2 glasses per day. Tea 2 glasses per day, 1 can of pop per day. No NSAIDs usage reported. He is currently on lisinopril-hydrochlorothiazide 20-12.5 mg daily, vitamin D 50,000 units weekly, also is on Metformin. Pt denies any hx of heavy or prolonged NSAID use. There is no hx of jaundice or hepatitis or sexually transmitted disease. Pt has no hx of collagen vascular disease or vasculitis. No history of dysuria or frequency. No recent procedures involving IV contrast. There is no hx of paraprotein disease. Pt denies any hx of recurrent UTI , incontinence or nocturia or recurrent nephrolithiasis. No unusual skin rashes . No tea coloured urine. Medication reviewed and noted the use of ace/arb and diuretic.     Patient Active Problem List    Diagnosis Date Noted    Osteopenia 02/17/2022     Overview Note:     DEXA scan 2/15/2022, T-score -2.0      Spongiotic dermatitis 11/19/2021     Overview Note:     biopsy at [facility] 11/2/2021      Stage 3a chronic kidney disease ([facility] [address] [address] 09/19/2021    Pneumonia due to COVID-19 virus 09/19/2021    Acute cystitis with hematuria 09/19/2021    Therapeutic opioid-induced constipation (OIC) 07/06/2021    Elizalde's esophagus without dysplasia 05/29/2021     Overview Note:     EGD 12/23/2020 (Dr. Eris Snider):  [facility]:  NORMAL SQUAMOUS MUCOSA. GASTRIC MUCOSA   WITH MILD CHRONIC INACTIVE INFLAMMATION AND PROMINENT   INTESTINAL/GOBLET CELL METAPLASIA (JONES'S ESOPHAGUS). NEGATIVE FOR   DYSPLASIA. Femur fracture, left ([address] 03/05/2021    Spinal stenosis, lumbar region, without neurogenic claudication 08/20/2020    Calculus of gallbladder and bile duct without cholecystitis or obstruction 06/12/2020    Nausea 06/12/2020    Multiple gastric ulcers 06/12/2020    Spondylosis of thoracic region without myelopathy or radiculopathy 06/11/2020    Lumbosacral spondylosis without myelopathy 05/21/2020    Spondylosis of lumbar spine 08/26/2019    Complete tear of right rotator cuff 03/07/2019    Anxiety 12/11/2017    Chronic tension-type headache, intractable 12/11/2017    Chronic, continuous use of opioids 12/11/2017     Overview Note:     She sees Dr. Tammy Vick at [facility] for pain management. 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 Linden Gonzáles. 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 Linden Gonzáles at [facility].       Microalbuminuria 12/05/2015    Gastroesophageal reflux disease 12/03/2015    Peripheral neuropathy     Dizziness     H/O fall     Chronic sinusitis     Type 2 diabetes mellitus with microalbuminuria ([address] 11/05/2015    Subacute sphenoidal sinusitis Headache 08/30/2015     Overview Note:     She is seeing Dr. Justin Ruth (neurologist). He recommended weaning Neurontin and stopping Tramadol and Topamax. Encounter for long-term (current) use of other medications 06/24/2015    Vitamin D deficiency 11/05/2014    Essential hypertension     Pulmonary hypertension ([facility] [address]      Overview Note:     by echocardiogram      Shoulder pain, bilateral 08/21/2013    Cervical spine pain 08/21/2013    Knee pain, left 08/21/2013    Major depressive disorder, single episode, moderate (HCC) 01/13/2009    Refractory migraine 01/13/2009    Obsessive-compulsive disorders 01/13/2009         CURRENT MEDICATIONS      Current Outpatient Medications   Medication Sig Dispense Refill    ondansetron (ZOFRAN ODT) 4 MG disintegrating tablet Place 1 tablet under the tongue every 6 hours as needed for Nausea or Vomiting 30 tablet 1    meclizine (ANTIVERT) 25 MG tablet Take 1 tablet by mouth 3 times daily as needed for Dizziness or Nausea 30 tablet 0    loratadine (CLARITIN) 10 MG tablet Take 1 tablet by mouth in the morning. 30 tablet 0    fluticasone (FLONASE) 50 MCG/ACT nasal spray 1 spray by Each Nostril route in the morning and 1 spray in the evening. 16 g 0    pioglitazone-metFORMIN (ACTOPLUS MET)  MG per tablet TAKE 1 TABLET TWICE A DAY WITH MEALS 180 tablet 0    HYDROcodone-acetaminophen (NORCO) 5-325 MG per tablet       M-DRYL 12.5 MG/5ML liquid take 5 milliliters by mouth at bedtime for itching or allergies      diphenhydrAMINE (BENADRYL) 12.5 MG/5ML elixir Take 5 mLs by mouth nightly as needed for Itching or Allergies (can use of nausea as well.) 118 mL 2    famotidine (PEPCID) 20 MG tablet Take 1 tablet by mouth nightly as needed (dyspepsia) 30 tablet 3    Promethazine HCl (PHENERGAN PO) Take by mouth      blood glucose monitor strips Test 1 times a day & as needed for symptoms of irregular blood glucose.  Dispense sufficient amount for indicated testing frequency plus additional to accommodate PRN testing needs. 100 strip 1    DULoxetine (CYMBALTA) 60 MG extended release capsule Take 1 capsule by mouth daily 90 capsule 1    lisinopril-hydroCHLOROthiazide (PRINZIDE;ZESTORETIC) 20-12.5 MG per tablet Take 1 tablet by mouth daily      pantoprazole (PROTONIX) 40 MG tablet Take 1 tablet by mouth 2 times daily take 1 tablet by mouth daily (Patient taking differently: Take 40 mg by mouth in the morning and 40 mg before bedtime. take 1 tablet by mouth daily. Patient states she takes 2 pills at the same times in the morning instead of 1 pill twice a day. Bari Mercado ) 60 tablet 5    Cholecalciferol (VITAMIN D3) 1.25 MG (30526 UT) CAPS TAKE 1 CAPSULE ONCE A WEEK 13 capsule 1    Gabapentin (NEURONTIN PO) Take 600 mg by mouth 5 times daily Only taking 3 times a day. No current facility-administered medications for this visit. REVIEW OF SYSTEMS     Constitutional: No fever, no chills, positive fatigue, generalized weakness-improving. HEENT:  No headache, otalgia, itchy eyes, nasal discharge or sore throat. Cardiac:  No chest pain, no dyspnea, orthopnea or PND. Chest:   Positive dry cough, no phlegm or wheezing or hemoptysis . Abdomen:  No abdominal pain, +ve chronic nausea, no vomiting. Positive loose stools. Neuro:  No focal weakness, abnormal movements or seizure like activity. Skin:   No rashes, no itching. :   No hematuria, no pyuria, no dysuria, no flank pain. Extremities:  No swelling or joint pains. ROS was otherwise negative except as mentioned in the [address] OBJECTIVE      Vitals:    08/01/22 0939   BP: 136/82   Pulse: 84     Wt Readings from Last 2 Encounters:   08/01/22 275 lb (124.7 kg)   07/25/22 278 lb (126.1 kg)     Body mass index is 41.81 kg/m².      PHYSICAL EXAM      GENERAL APPEARANCE:Awake, alert, in no acute distress  SKIN: warm and dry, no rash or erythema  EYES: conjunctivae normal and sclera anicteric  ENT: no thrush no pharyngeal congestion   NECK: supple    PULMONARY: clear to auscultation and no wheezing noted   CADRDIOVASCULAR: normal S1 & S2,  no murmur   ABDOMEN: soft, obese, nontender, bowel sounds, present, no organomegaly,  no ascites   EXTREMITIES: no cyanosis, clubbing or edema  NEURO: alert and oriented, no deficits    LABS    CBC:   Lab Results   Component Value Date/Time    WBC 8.0 07/27/2022 08:15 AM    HGB 11.4 07/27/2022 08:15 AM    HCT 35.3 07/27/2022 08:15 AM    MCV 91.9 07/27/2022 08:15 AM    RDW 14.2 07/27/2022 08:15 AM     07/27/2022 08:15 AM    MPV 10.2 07/27/2022 08:15 AM      BMP:   Lab Results   Component Value Date/Time     07/27/2022 08:15 AM    K 4.0 07/27/2022 08:15 AM    CL 97 07/27/2022 08:15 AM    CO2 30 07/27/2022 08:15 AM    BUN 28 07/27/2022 08:15 AM    CREATININE 1.46 07/27/2022 08:15 AM    GLUCOSE 135 07/27/2022 08:15 AM    CALCIUM 9.9 07/27/2022 08:15 AM      PHOSPHORUS:    Lab Results   Component Value Date/Time    PHOS 3.3 07/27/2022 08:15 AM     MAGNESIUM:   Lab Results   Component Value Date/Time    MG 2.1 07/02/2020 10:03 AM     ALBUMIN:   Lab Results   Component Value Date/Time    LABALBU 4.4 04/04/2022 03:55 PM     PTH: No components found for: PTHINTACT  VIT D3,25 HYDROXY: No results found for: VITD3     IRON:    Lab Results   Component Value Date/Time    IRON 64 07/21/2021 08:01 AM     IRON SATURATION:    Lab Results   Component Value Date/Time    LABIRON 23 07/21/2021 08:01 AM     TIBC:    Lab Results   Component Value Date/Time    TIBC 283 07/21/2021 08:01 AM     FERRITIN:    Lab Results   Component Value Date/Time    FERRITIN 277 09/20/2021 05:04 AM             BERT:   Lab Results   Component Value Date    BERT NEGATIVE 12/07/2020       SPEP:   Lab Results   Component Value Date/Time    PROT 8.3 04/04/2022 03:55 PM    ALBCAL 4.1 12/07/2020 12:00 PM    ALBPCT 58 12/07/2020 12:00 PM    LABALPH 0.2 12/07/2020 12:00 PM    LABALPH 0.8 12/07/2020 12:00 PM    A1PCT 3 12/07/2020 12:00 PM    A2PCT 12 12/07/2020 12:00 PM    LABBETA 1.0 12/07/2020 12:00 PM    BETAPCT 14 12/07/2020 12:00 PM    GAMGLOB 1.0 12/07/2020 12:00 PM    GGPCT 13 12/07/2020 12:00 PM    PATH ELECTRONICALLY SIGNED. Lito Franco M.D. 12/07/2020 12:00 PM    PATH ELECTRONICALLY SIGNED. Lito Franco M.D. 12/07/2020 12:00 PM     UPEP: No results found for: TPU   HEPBSAG:  Lab Results   Component Value Date/Time    HEPBSAG NONREACTIVE 12/07/2020 12:00 PM     HEPCAB:  Lab Results   Component Value Date/Time    HEPCAB NONREACTIVE 12/07/2020 12:00 PM     C3:   Lab Results   Component Value Date    C3 167 12/07/2020     C4:   Lab Results   Component Value Date    C4 27 12/07/2020     MPO ANCA:   Lab Results   Component Value Date/Time    MPO 26 06/16/2017 02:00 PM    .  PR3 ANCA:    Lab Results   Component Value Date/Time    PR3 12 06/16/2017 02:00 PM                      URINALYSIS/URINE CHEMISTRIES      URINE CREATININE:    Lab Results   Component Value Date/Time    LABCREA 96.5 07/27/2022 08:31 AM     URINE EOSINOPHILS : No results found for: UREO  URINE PROTEIN:  No results found for: TPU        RADIOLOGY      Results for orders placed during the hospital encounter of 12/07/20   US RENAL COMPLETE    Narrative EXAMINATION:  RETROPERITONEAL ULTRASOUND OF THE KIDNEYS    12/7/2020    COMPARISON:  None    HISTORY:  ORDERING SYSTEM PROVIDED HISTORY: Stage 3a chronic kidney disease  TECHNOLOGIST PROVIDED HISTORY:  Please check post void residual  For cortical thickness, renal size and corticomedullary differentiation. Reason for Exam: CKD STAGE 3  Acuity: Chronic  Type of Exam: Ongoing    FINDINGS:    Kidneys: The right kidney measures 7.5 cm in length and the left kidney measures 9.4  cm in length. Kidneys demonstrate normal cortical echogenicity. No evidence of  hydronephrosis or intrarenal stones. Mild bilateral cortical thinning noted. Bladder: The urinary bladder is empty and not well visualized.       Impression Bilateral renal cortical thinning. No nephrolithiasis or urinary obstruction. Urinary bladder is empty and not well visualized. ASSESSMENT     1. CKD 3 likely due to diabetic and hypertensive nephrosclerosis. Her baseline creatinine was around 1.1 to 1.3 mg/DL, now seems to have increased some. Her creatinine in April 2022 was 1.71 which did increase to 1.46 mg/dl in July 2022. Exact baseline yet to be determined. Last creatinine was 1.46 mg/DL July 2022. Serology 12/7/2020: BERT, SPEP, immunofixation, hepatitis panel, complement level all negative. K/L 1.0. UPC 0.21 (12/9/2020). UA not available. Renal ultrasound 12/7/2020: Right kidney 7.5 cm left kidney 9.4 cm. Impression: Bilateral renal cortical thinning. No nephrolithiasis or urinary obstruction. Urinary bladder is empty and not well visualized. 2. Hypertension. BP Readings from Last 3 Encounters:   08/01/22 136/82   07/25/22 102/68   06/29/22 114/72    :  3.  Diabetes mellitus at least 5-10 years. 4.  Diabetic neuropathy. 5.  Vitamin D deficiency. 6.  Morbid obesity. 7.  Pulmonary hypertension. 8.  History of gastric ulcers. 9.  Depression. 10.  History of migraines. 11.  h/o renal stone x 1 in early 2000's. PLAN     1. Based on available labs patients renal function did increase some but seems to be improving. Patient's volume status is acceptable. Importance of tight blood pressure, glycemic control, lipid control was outlined to patient . 2. Continue Lisinopril-hydrochlorothiazide 20-12.5 mg daily. Refill provided. 3. Keep well-hydrated, increase water intake. 4.  Patient explained that after weekly course of vitamin D finishes, she can start 2000 units over-the-counter daily. 5.  Follow up labs ordered : bmp, cbc, phos, intact pth, vitaminD 25 OH to be done in 1 week and 1 week before next appointment  6. Urine for random protein and creat to be done. 7. Follow up in the office in 4 months.      Patient was asked to avoid NSAIDS. Please do not hesitate to call with questions.     Electronically signed by Toña Tirado MD on 8/1/2022 at 10:26 AM  [facility]

## 2022-08-03 ENCOUNTER — ANESTHESIA EVENT (OUTPATIENT)
Dept: OPERATING ROOM | Age: 67
End: 2022-08-03
Payer: MEDICARE

## 2022-08-03 ENCOUNTER — HOSPITAL ENCOUNTER (OUTPATIENT)
Age: 67
Setting detail: OUTPATIENT SURGERY
Discharge: HOME | End: 2022-08-03
Attending: UROLOGY | Admitting: UROLOGY
Payer: MEDICARE

## 2022-08-03 ENCOUNTER — ANESTHESIA (OUTPATIENT)
Dept: OPERATING ROOM | Age: 67
End: 2022-08-03
Payer: MEDICARE

## 2022-08-03 ENCOUNTER — APPOINTMENT (OUTPATIENT)
Dept: GENERAL RADIOLOGY | Age: 67
End: 2022-08-03
Attending: UROLOGY
Payer: MEDICARE

## 2022-08-03 VITALS
HEIGHT: 68 IN | BODY MASS INDEX: 41.68 KG/M2 | OXYGEN SATURATION: 99 % | WEIGHT: 275 LBS | RESPIRATION RATE: 16 BRPM | HEART RATE: 63 BPM | SYSTOLIC BLOOD PRESSURE: 128 MMHG | DIASTOLIC BLOOD PRESSURE: 67 MMHG | TEMPERATURE: 97.2 F

## 2022-08-03 PROCEDURE — 3600000013 HC SURGERY LEVEL 3 ADDTL 15MIN: Performed by: UROLOGY

## 2022-08-03 PROCEDURE — 2580000003 HC RX 258: Performed by: UROLOGY

## 2022-08-03 PROCEDURE — 2500000003 HC RX 250 WO HCPCS: Performed by: NURSE ANESTHETIST, CERTIFIED REGISTERED

## 2022-08-03 PROCEDURE — 6360000002 HC RX W HCPCS: Performed by: UROLOGY

## 2022-08-03 PROCEDURE — C1758 CATHETER, URETERAL: HCPCS | Performed by: UROLOGY

## 2022-08-03 PROCEDURE — 3700000001 HC ADD 15 MINUTES (ANESTHESIA): Performed by: UROLOGY

## 2022-08-03 PROCEDURE — 7100000001 HC PACU RECOVERY - ADDTL 15 MIN: Performed by: UROLOGY

## 2022-08-03 PROCEDURE — 3600000003 HC SURGERY LEVEL 3 BASE: Performed by: UROLOGY

## 2022-08-03 PROCEDURE — 3700000000 HC ANESTHESIA ATTENDED CARE: Performed by: UROLOGY

## 2022-08-03 PROCEDURE — 3209999900 FLUORO FOR SURGICAL PROCEDURES

## 2022-08-03 PROCEDURE — 6360000002 HC RX W HCPCS: Performed by: NURSE ANESTHETIST, CERTIFIED REGISTERED

## 2022-08-03 PROCEDURE — 2709999900 HC NON-CHARGEABLE SUPPLY: Performed by: UROLOGY

## 2022-08-03 PROCEDURE — 7100000000 HC PACU RECOVERY - FIRST 15 MIN: Performed by: UROLOGY

## 2022-08-03 PROCEDURE — 7100000011 HC PHASE II RECOVERY - ADDTL 15 MIN: Performed by: UROLOGY

## 2022-08-03 PROCEDURE — 6360000004 HC RX CONTRAST MEDICATION: Performed by: UROLOGY

## 2022-08-03 PROCEDURE — 7100000010 HC PHASE II RECOVERY - FIRST 15 MIN: Performed by: UROLOGY

## 2022-08-03 RX ORDER — SODIUM CHLORIDE 0.9 % (FLUSH) 0.9 %
5-40 SYRINGE (ML) INJECTION EVERY 12 HOURS SCHEDULED
Status: DISCONTINUED | OUTPATIENT
Start: 2022-08-03 | End: 2022-08-03 | Stop reason: HOSPADM

## 2022-08-03 RX ORDER — ONDANSETRON 2 MG/ML
INJECTION INTRAMUSCULAR; INTRAVENOUS PRN
Status: DISCONTINUED | OUTPATIENT
Start: 2022-08-03 | End: 2022-08-03 | Stop reason: SDUPTHER

## 2022-08-03 RX ORDER — LIDOCAINE HYDROCHLORIDE 20 MG/ML
INJECTION, SOLUTION EPIDURAL; INFILTRATION; INTRACAUDAL; PERINEURAL PRN
Status: DISCONTINUED | OUTPATIENT
Start: 2022-08-03 | End: 2022-08-03 | Stop reason: SDUPTHER

## 2022-08-03 RX ORDER — CEPHALEXIN 500 MG/1
500 CAPSULE ORAL 3 TIMES DAILY
Qty: 63 CAPSULE | Refills: 0 | Status: SHIPPED | OUTPATIENT
Start: 2022-08-03 | End: 2022-08-24

## 2022-08-03 RX ORDER — MIDAZOLAM HYDROCHLORIDE 1 MG/ML
INJECTION INTRAMUSCULAR; INTRAVENOUS PRN
Status: DISCONTINUED | OUTPATIENT
Start: 2022-08-03 | End: 2022-08-03 | Stop reason: SDUPTHER

## 2022-08-03 RX ORDER — SODIUM CHLORIDE 9 MG/ML
INJECTION, SOLUTION INTRAVENOUS PRN
Status: DISCONTINUED | OUTPATIENT
Start: 2022-08-03 | End: 2022-08-03 | Stop reason: HOSPADM

## 2022-08-03 RX ORDER — SODIUM CHLORIDE 0.9 % (FLUSH) 0.9 %
5-40 SYRINGE (ML) INJECTION PRN
Status: DISCONTINUED | OUTPATIENT
Start: 2022-08-03 | End: 2022-08-03 | Stop reason: HOSPADM

## 2022-08-03 RX ORDER — FENTANYL CITRATE 50 UG/ML
INJECTION, SOLUTION INTRAMUSCULAR; INTRAVENOUS PRN
Status: DISCONTINUED | OUTPATIENT
Start: 2022-08-03 | End: 2022-08-03 | Stop reason: SDUPTHER

## 2022-08-03 RX ORDER — PROPOFOL 10 MG/ML
INJECTION, EMULSION INTRAVENOUS PRN
Status: DISCONTINUED | OUTPATIENT
Start: 2022-08-03 | End: 2022-08-03 | Stop reason: SDUPTHER

## 2022-08-03 RX ORDER — PHENAZOPYRIDINE HYDROCHLORIDE 200 MG/1
200 TABLET, FILM COATED ORAL 3 TIMES DAILY PRN
Qty: 9 TABLET | Refills: 0 | Status: SHIPPED | OUTPATIENT
Start: 2022-08-03 | End: 2022-09-13

## 2022-08-03 RX ORDER — DEXAMETHASONE SODIUM PHOSPHATE 4 MG/ML
INJECTION, SOLUTION INTRA-ARTICULAR; INTRALESIONAL; INTRAMUSCULAR; INTRAVENOUS; SOFT TISSUE PRN
Status: DISCONTINUED | OUTPATIENT
Start: 2022-08-03 | End: 2022-08-03 | Stop reason: SDUPTHER

## 2022-08-03 RX ORDER — SODIUM CHLORIDE, SODIUM LACTATE, POTASSIUM CHLORIDE, CALCIUM CHLORIDE 600; 310; 30; 20 MG/100ML; MG/100ML; MG/100ML; MG/100ML
INJECTION, SOLUTION INTRAVENOUS CONTINUOUS
Status: DISCONTINUED | OUTPATIENT
Start: 2022-08-03 | End: 2022-08-03 | Stop reason: HOSPADM

## 2022-08-03 RX ADMIN — PROPOFOL 150 MG: 10 INJECTION, EMULSION INTRAVENOUS at 10:09

## 2022-08-03 RX ADMIN — FENTANYL CITRATE 25 MCG: 50 INJECTION, SOLUTION INTRAMUSCULAR; INTRAVENOUS at 10:09

## 2022-08-03 RX ADMIN — FENTANYL CITRATE 25 MCG: 50 INJECTION, SOLUTION INTRAMUSCULAR; INTRAVENOUS at 10:23

## 2022-08-03 RX ADMIN — MIDAZOLAM HYDROCHLORIDE 2 MG: 1 INJECTION, SOLUTION INTRAMUSCULAR; INTRAVENOUS at 10:06

## 2022-08-03 RX ADMIN — SODIUM CHLORIDE, SODIUM LACTATE, POTASSIUM CHLORIDE, AND CALCIUM CHLORIDE: .6; .31; .03; .02 INJECTION, SOLUTION INTRAVENOUS at 09:43

## 2022-08-03 RX ADMIN — FENTANYL CITRATE 25 MCG: 50 INJECTION, SOLUTION INTRAMUSCULAR; INTRAVENOUS at 10:14

## 2022-08-03 RX ADMIN — ONDANSETRON 4 MG: 2 INJECTION INTRAMUSCULAR; INTRAVENOUS at 10:06

## 2022-08-03 RX ADMIN — DEXAMETHASONE SODIUM PHOSPHATE 4 MG: 4 INJECTION, SOLUTION INTRAMUSCULAR; INTRAVENOUS at 10:15

## 2022-08-03 RX ADMIN — FENTANYL CITRATE 25 MCG: 50 INJECTION, SOLUTION INTRAMUSCULAR; INTRAVENOUS at 10:15

## 2022-08-03 RX ADMIN — Medication 3000 MG: at 10:15

## 2022-08-03 RX ADMIN — LIDOCAINE HYDROCHLORIDE 100 MG: 20 INJECTION, SOLUTION EPIDURAL; INFILTRATION; INTRACAUDAL; PERINEURAL at 10:09

## 2022-08-03 ASSESSMENT — PAIN DESCRIPTION - DESCRIPTORS: DESCRIPTORS: BURNING

## 2022-08-03 ASSESSMENT — PAIN SCALES - GENERAL
PAINLEVEL_OUTOF10: 0
PAINLEVEL_OUTOF10: 4
PAINLEVEL_OUTOF10: 0

## 2022-08-03 ASSESSMENT — PAIN DESCRIPTION - LOCATION: LOCATION: PERINEUM

## 2022-08-03 ASSESSMENT — PAIN - FUNCTIONAL ASSESSMENT: PAIN_FUNCTIONAL_ASSESSMENT: 0-10

## 2022-08-03 NOTE — OP NOTE
Operative Note      Patient: Jaylen Walsh  YOB: 1955  MRN: [id number]    Date of Procedure: 8/3/2022    Pre-Op Diagnosis: Recurrent UTI (urinary tract infection) [N39.0]    Post-Op Diagnosis: Same       Procedure(s):  CYSTO Bilateral Retrograde Pyelogram, with bladder washout     Surgeon(s):  Adrianna Laguna MD    Assistant:   * No surgical staff found *    Anesthesia: General    Estimated Blood Loss (mL): Minimal    Complications: None    Specimens:   * No specimens in log *    Implants:  * No implants in log *      Drains: * No LDAs found *    Findings: bladder inflammation, evidence of severe chronic cystitis, no tumors or stones, no upper tract abnormalities    Detailed Description of Procedure:         DETAILS OF THE PROCEDURE:  The patient was correctly identified in the preoperative holding area. The patient  was brought back to the operating room and placed in the dorsal lithotomy  position. Anesthesia was administered; antibiotics administered by Anesthesia. EPC cuffs  were on and functional. The patient was then prepped and draped in the usual sterile fashion. Once an appropriate time out had been performed, with all parties  consenting, a 25 Austrian cystoscope with a 30-degree lens was placed through  the urethra into the bladder. The right ureteral orifice was cannulated with a 5 Austrian ureteral catheter. Contrast was injected and the right ureter and renal pelvis were identified, with  no abnormalities or filling defects. The catheter was removed and placed in the left ureteral orifice. The procedure was repeated once again with  no abnormalities or filling defects present. A thorough and complete cystoscopy was performed with no abnormalities involving the bladder or mucosa. The bladder was drained and the cystoscope was removed. The patient tolerated the procedure well and was sent to the PACU for postoperative monitoring.       Plan:  The patient was discharged home in stable condition with instructions to  follow up in clinic.      Keflex x 3 weeks  Cranberry pills, D-mannose BID  Fu 3-4 months    Electronically signed by Neris Pizarro M.D, MD on 8/3/2022 at 1:06 PM

## 2022-08-03 NOTE — H&P
History and Physical    Patient:  Lashell Wheeler  MRN: [id number]  YOB: 1955    CHIEF COMPLAINT:  rec UTis    HISTORY OF PRESENT ILLNESS:   The patient is a 77 y.o. female who presents with as above, here for surgery    Patient's old records, notes and chart reviewed and summarized above. 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    Obesity     Peripheral neuropathy     Pneumonia 8/30/2015    Pulmonary hypertension ([address] 01/01/2012    by echocardiogram    Shoulder pain, bilateral 08/21/2013    Type 2 diabetes mellitus ([address]     Unspecified essential hypertension     Essential hypertension    Vitamin D deficiency 11/05/2014       Past Surgical History:    Past Surgical History:   Procedure Laterality Date    APPENDECTOMY      BACK INJECTION  02/25/2021    [facility] Right lumbar medial branch block using Fluroscopy    BRAIN SURGERY  05/24/2016    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 Arnaud Donahue    COLONOSCOPY  05/03/2012    diverticular disease    COLONOSCOPY N/A 12/23/2020    COLONOSCOPY WITH BIOPSY performed by Jeanetta Pallas, MD at [address] [facility] 1 hyperplastic polyp, random biopsies negative    DENTAL SURGERY  08/2015    full dental extraction    FEMUR CLOSED REDUCTION Left 03/05/2020    has [facility]    FEMUR FRACTURE SURGERY Left 03/06/2021    left retrograde femoral nailing.  Dr. David Dumont, [address] BODY REMOVAL  05/28/2016    left-sided temporal craniotomy wound reexploration with removal of small retained foreign body (plastic from Ruth clip from surgery 3 days prior), [facility], Dr. Felicitas Llanes ([facility])  09/06/2005    supracervical hysterectomy bilateral salpingo oophectomy    KNEE ARTHROSCOPY Left     NERVE BLOCK  09/09/2019    right side L2 through L5 block nerve,facet joint,lumbar,medial branch per Dr Melchor Kendall at [address]  2011    endoscopic mucosal resection of duodenal polyp    POLYSOMNOGRAPHY  11/30/2020    no significant sleep apnea    SHOULDER ARTHROPLASTY Right 10/18/2018    Harsh Jung MD; done at [address] [place] [address],[address] Right 03/07/2019    revision arthroscopy with debridement,revision mini-open rotator cuff repair per Dr Vanessa Forman [address]  05/27/2020    gastric ulcer, small hiatal hernia, Dr. Archana Jorgensen, [address] N/A 12/23/2020    EGD BIOPSY performed by Samia Prado MD at [address] [address] [facility] Elizalde's esophagus     Medications Prior to Admission:    Prior to Admission medications    Medication Sig Start Date End Date Taking? Authorizing Provider   lisinopril-hydroCHLOROthiazide (PRINZIDE;ZESTORETIC) 20-12.5 MG per tablet Take 1 tablet by mouth in the morning.  8/1/22   Amberly Kevin MD   ondansetron (ZOFRAN ODT) 4 MG disintegrating tablet Place 1 tablet under the tongue every 6 hours as needed for Nausea or Vomiting 7/29/22   Lazarus Sayers, MD   meclizine (ANTIVERT) 25 MG tablet Take 1 tablet by mouth 3 times daily as needed for Dizziness or Nausea 7/25/22 8/4/22  SHANNA Spivey CNP   loratadine (CLARITIN) 10 MG tablet Take 1 tablet by mouth in the morning. 7/25/22 8/24/22  SHANNA Spivey CNP   fluticasone (FLONASE) 50 MCG/ACT nasal spray 1 spray by Each Nostril route in the morning and 1 spray in the evening. 7/25/22   Fabio Maddox SHANNA Rizvi - CNP   pioglitazone-metFORMIN (ACTOPLUS MET)  MG per tablet TAKE 1 TABLET TWICE A DAY WITH MEALS 7/14/22   Dallas Jama MD   HYDROcodone-acetaminophen [facility]) 5-325 MG per tablet  6/28/22   Historical Provider, MD   M-DRYL 12.5 MG/5ML liquid take 5 milliliters by mouth at bedtime for itching or allergies 6/14/22   Historical Provider, MD   diphenhydrAMINE (BENADRYL) 12.5 MG/5ML elixir Take 5 mLs by mouth nightly as needed for Itching or Allergies (can use of nausea as well.) 5/19/22   Donna Gordon MD   famotidine (PEPCID) 20 MG tablet Take 1 tablet by mouth nightly as needed (dyspepsia) 5/19/22   Donna Gordon MD   Promethazine HCl (PHENERGAN PO) Take by mouth    Historical Provider, MD   blood glucose monitor strips Test 1 times a day & as needed for symptoms of irregular blood glucose. Dispense sufficient amount for indicated testing frequency plus additional to accommodate PRN testing needs. 3/21/22   Dallas Jama MD   DULoxetine (CYMBALTA) 60 MG extended release capsule Take 1 capsule by mouth daily 1/6/22   Dallas Jama MD   pantoprazole (PROTONIX) 40 MG tablet Take 1 tablet by mouth 2 times daily take 1 tablet by mouth daily  Patient taking differently: Take 40 mg by mouth in the morning and 40 mg before bedtime. take 1 tablet by mouth daily. Patient states she takes 2 pills at the same times in the morning instead of 1 pill twice a day. . 7/28/21   Dallas Jama MD   Cholecalciferol (VITAMIN D3) 1.25 MG (64603 UT) CAPS TAKE 1 CAPSULE ONCE A WEEK 7/28/21   Rosaura Patel MD   Gabapentin (NEURONTIN PO) Take 600 mg by mouth 5 times daily Only taking 3 times a day.  2/6/09   Historical Provider, MD       Allergies:  Celestine Race [aspirin]    Social History:    Social History     Socioeconomic History    Marital status:      Spouse name: Not on file    Number of children: Not on file    Years of education: Not on file    Highest education level: Not on file   Occupational History    Not on file   Tobacco Use    Smoking status: Never    Smokeless tobacco: Never   Vaping Use    Vaping Use: Never used   Substance and Sexual Activity    Alcohol use: Yes     Alcohol/week: 0.0 standard drinks     Comment: Maybe twice a year, small amounts    Drug use: No    Sexual activity: Not on file   Other Topics Concern    Not on file   Social History Narrative    Not on file     Social Determinants of Health     Financial Resource Strain: Low Risk     Difficulty of Paying Living Expenses: Not hard at all   Food Insecurity: No Food Insecurity    Worried About Running Out of Food in the Last Year: Never true    Ran Out of Food in the Last Year: Never true   Transportation Needs: Not on file   Physical Activity: Not on file   Stress: Not on file   Social Connections: Not on file   Intimate Partner Violence: Not on file   Housing Stability: Not on file       Family History:    Family History   Problem Relation Age of Onset    Cancer Mother     Breast Cancer Mother 52    Hypertension Father     Diabetes Father     Cancer Father        REVIEW OF SYSTEMS:  Constitutional: negative  Eyes: negative  Respiratory: negative  Cardiovascular: negative  Gastrointestinal: negative  Genitourinary: see HPI  Musculoskeletal: negative  Skin: negative   Neurological: negative  Hematological/Lymphatic: negative  Psychological: negative    Physical Exam:      Patient Vitals for the past 24 hrs:   BP Temp Pulse Resp SpO2 Height Weight   08/03/22 0924 (!) 118/58 97.1 °F (36.2 °C) 66 16 96 % 5' 8\" (1.727 m) 275 lb (124.7 kg)     Constitutional: Patient in no acute distress; Neuro: alert and oriented to person place and time. Psych: Mood and affect normal.  Skin: Normal  Lungs: Respiratory effort normal, CTA  Cardiovascular:  Normal peripheral pulses; no murmur  Abdomen: Soft, non-tender, non-distended with no CVA, flank pain, hepatosplenomegaly or hernia. Kidneys normal.  Bladder non-tender and not distended.         LABS:   No results for input(s): WBC, HGB, HCT, MCV, PLT in the last 72 hours. No results for input(s): NA, K, CL, CO2, PHOS, BUN, CREATININE, CA in the last 72 hours. No results found for: PSA        Urinalysis: No results for input(s): COLORU, PHUR, LABCAST, WBCUA, RBCUA, MUCUS, TRICHOMONAS, YEAST, BACTERIA, CLARITYU, SPECGRAV, LEUKOCYTESUR, UROBILINOGEN, Farzana Copas in the last 72 hours.     Invalid input(s): NITRATE, GLUCOSEUKETONESUAMORPHOUS     -----------------------------------------------------------------      Assessment and Plan   Impression:    Patient Active Problem List   Diagnosis    Shoulder pain, bilateral    Cervical spine pain    Knee pain, left    Essential hypertension    Pulmonary hypertension (HCC)    Vitamin D deficiency    Encounter for long-term (current) use of other medications    Headache    Subacute sphenoidal sinusitis    Type 2 diabetes mellitus with microalbuminuria (HCC)    Peripheral neuropathy    Dizziness    H/O fall    Chronic sinusitis    Gastroesophageal reflux disease    Microalbuminuria    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HCC)    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HCC)    Type 2 diabetes mellitus with microalbuminuria, without long-term current use of insulin (HCC)    Morbid obesity with BMI of 40.0-44.9, adult ([email or web address]    Restrictive lung disease secondary to obesity    Calculus of gallbladder and bile duct without cholecystitis or obstruction    Nausea    Multiple gastric ulcers    Anxiety    Chronic tension-type headache, intractable    Chronic, continuous use of opioids    Complete tear of right rotator cuff    Depression    Fatigue    Lumbosacral spondylosis without myelopathy    Major depressive disorder, single episode, moderate (HCC)    Refractory migraine    Muscle spasms of neck    Obsessive-compulsive disorders    Spondylosis of lumbar spine    Spondylosis of thoracic region without myelopathy or radiculopathy    Spinal stenosis, lumbar region, without neurogenic claudication    Femur fracture, left ([facility] Elizalde's esophagus without dysplasia    Therapeutic opioid-induced constipation (OIC)    Stage 3a chronic kidney disease ([facility]    Pneumonia due to COVID-19 virus    Acute cystitis with hematuria    Spongiotic dermatitis    Osteopenia       Plan:   Risks: I discussed all the risks, benefits, alternatives and possible complications or surgery as well as expectations and post-op recovery.       Consent obtained; Cystoscopy, bilateral retrograde pyelogram, under MAC in OR today    Suzanne Lam M.D, MD  9:58 AM 8/3/2022

## 2022-08-03 NOTE — ANESTHESIA PRE PROCEDURE
Department of Anesthesiology  Preprocedure Note       Name:  Loretta Garcias   Age:  77 y.o.  :  1955                                          MRN:  [id number]         Date:  8/3/2022      Surgeon: Shawanda Haynes):  Frankey Poster, MD    Procedure: Procedure(s):  CYSTO Bilateral Retrograde Pyelogram -32    Medications prior to admission:   Prior to Admission medications    Medication Sig Start Date End Date Taking? Authorizing Provider   lisinopril-hydroCHLOROthiazide (PRINZIDE;ZESTORETIC) 20-12.5 MG per tablet Take 1 tablet by mouth in the morning.  22   Joel Jaime MD   ondansetron (ZOFRAN ODT) 4 MG disintegrating tablet Place 1 tablet under the tongue every 6 hours as needed for Nausea or Vomiting 22   Edwige Martinez MD   meclizine (ANTIVERT) 25 MG tablet Take 1 tablet by mouth 3 times daily as needed for Dizziness or Nausea 22  Ruby Gracea, APRN - CNP   loratadine (CLARITIN) 10 MG tablet Take 1 tablet by mouth in the morning. 22  Yumikolan Prima, APRN - CNP   fluticasone (FLONASE) 50 MCG/ACT nasal spray 1 spray by Each Nostril route in the morning and 1 spray in the evening. 22   Ruby Jimenez APRN - CNP   pioglitazone-metFORMIN (ACTOPLUS MET)  MG per tablet TAKE 1 TABLET TWICE A DAY WITH MEALS 22   Leilani Pa MD   HYDROcodone-acetaminophen [facility]) 5-325 MG per tablet  22   Historical Provider, MD   M-DRYL 12.5 MG/5ML liquid take 5 milliliters by mouth at bedtime for itching or allergies 22   Historical Provider, MD   diphenhydrAMINE (BENADRYL) 12.5 MG/5ML elixir Take 5 mLs by mouth nightly as needed for Itching or Allergies (can use of nausea as well.) 22   Edwige Martinez MD   famotidine (PEPCID) 20 MG tablet Take 1 tablet by mouth nightly as needed (dyspepsia) 22   Edwige Martinez MD   Promethazine HCl (PHENERGAN PO) Take by mouth    Historical Provider, MD   blood glucose monitor strips Test 1 times a day & as needed for symptoms of without long-term current use of insulin ([facility]) E11.29, R80.9    Morbid obesity with BMI of 40.0-44.9, adult ([facility]) E66.01, Z68.41    Restrictive lung disease secondary to obesity J98.4, E66.9    Calculus of gallbladder and bile duct without cholecystitis or obstruction K80.70    Nausea R11.0    Multiple gastric ulcers K25.9    Anxiety F41.9    Chronic tension-type headache, intractable G44.221    Chronic, continuous use of opioids F11.90    Complete tear of right rotator cuff M75.121    Depression F32. A    Fatigue R53.83    Lumbosacral spondylosis without myelopathy M47.817    Major depressive disorder, single episode, moderate ([facility]) F32.1    Refractory migraine G43.919    Muscle spasms of neck M62.838    Obsessive-compulsive disorders F42.9    Spondylosis of lumbar spine M47.816    Spondylosis of thoracic region without myelopathy or radiculopathy M47.814    Spinal stenosis, lumbar region, without neurogenic claudication M48.061    Femur fracture, left ([facility]) S72. 80XA    Elizalde's esophagus without dysplasia K22.70    Therapeutic opioid-induced constipation (OIC) K59.03, T40.2X5A    Stage 3a chronic kidney disease ([facility]) N18.31    Pneumonia due to COVID-19 virus U07.1, J12.82    Acute cystitis with hematuria N30.01    Spongiotic dermatitis L30.8    Osteopenia M85.80       Past Medical History:        Diagnosis Date    Cervical spine pain 08/21/2013    Chronic headaches     Chronic sinusitis     Closed fracture of distal end of left femur with routine healing 03/2021    COVID-19     Diabetes mellitus ([address]     Dizziness     H/O fall     Hypertension     Knee pain, left 08/21/2013    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    Obesity     Peripheral neuropathy     Pneumonia 8/30/2015    Pulmonary hypertension ([address] 01/01/2012    by echocardiogram    Shoulder pain, bilateral 08/21/2013    Type 2 diabetes mellitus ([address]     Unspecified essential hypertension     Essential hypertension    Vitamin D deficiency 11/05/2014       Past Surgical History:        Procedure Laterality Date    APPENDECTOMY      BACK INJECTION  02/25/2021    [facility] Right lumbar medial branch block using Fluroscopy    BRAIN SURGERY  05/24/2016    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 Sae Streeter COLONOSCOPY  05/03/2012    diverticular disease    COLONOSCOPY N/A 12/23/2020    COLONOSCOPY WITH BIOPSY performed by Gaby Crews MD at [address] [facility] 1 hyperplastic polyp, random biopsies negative   [place] DENTAL SURGERY  08/2015    full dental extraction    FEMUR CLOSED REDUCTION Left 03/05/2020    has [facility]    FEMUR FRACTURE SURGERY Left 03/06/2021    left retrograde femoral nailing.  Dr. Kelle Vincent, [place] REMOVAL  05/28/2016    left-sided temporal craniotomy wound reexploration with removal of small retained foreign body (plastic from Ruth clip from surgery 3 days prior), [facility], Dr. David Townsend ([address] [place] [address])  09/06/2005    supracervical hysterectomy bilateral salpingo oophectomy    KNEE ARTHROSCOPY Left     NERVE BLOCK  09/09/2019    right side L2 through L5 block nerve,facet joint,lumbar,medial branch per Dr Lou Diaz at . [facility]  2011    endoscopic mucosal resection of duodenal polyp    POLYSOMNOGRAPHY  11/30/2020    no significant sleep apnea    SHOULDER ARTHROPLASTY Right 10/18/2018    Harsh Jung MD; done at [facility] ARTHROSCOPY Right 03/07/2019    revision arthroscopy with debridement,revision mini-open rotator cuff repair per Dr Sascha Ramirez at [address] [facility]  05/27/2020    gastric ulcer, small hiatal hernia, Dr. Olivia [facility]s [facility]    UPPER GASTROINTESTINAL ENDOSCOPY N/A 12/23/2020    EGD BIOPSY performed by Phil Jackman MD at [address] Elizalde's esophagus       Social History:    Social History     Tobacco Use    Smoking status: Never    Smokeless tobacco: Never   Substance Use Topics    Alcohol use: Yes     Alcohol/week: 0.0 standard drinks     Comment: Maybe twice a year, small amounts                                Counseling given: Not Answered      Vital Signs (Current):   Vitals:    08/03/22 0924   BP: (!) 118/58   Pulse: 66   Resp: 16   Temp: 36.2 °C (97.1 °F)   SpO2: 96%   Weight: 275 lb (124.7 kg)   Height: 5' 8\" (1.727 m)                                              BP Readings from Last 3 Encounters:   08/03/22 (!) 118/58   08/01/22 136/82   07/25/22 102/68       NPO Status: Time of last liquid consumption: 2000                        Time of last solid consumption: 2000                        Date of last liquid consumption: 08/02/22                        Date of last solid food consumption: 08/02/22    BMI:   Wt Readings from Last 3 Encounters:   08/03/22 275 lb (124.7 kg)   08/01/22 275 lb (124.7 kg)   07/25/22 278 lb (126.1 kg)     Body mass index is 41.81 kg/m².     CBC:   Lab Results   Component Value Date/Time    WBC 8.0 07/27/2022 08:15 AM    RBC 3.84 07/27/2022 08:15 AM    HGB 11.4 07/27/2022 08:15 AM    HCT 35.3 07/27/2022 08:15 AM    MCV 91.9 07/27/2022 08:15 AM    RDW 14.2 07/27/2022 08:15 AM     07/27/2022 08:15 AM       CMP:   Lab Results   Component Value Date/Time     07/27/2022 08:15 AM    K 4.0 07/27/2022 08:15 AM    CL 97 07/27/2022 08:15 AM    CO2 30 07/27/2022 08:15 AM    BUN 28 07/27/2022 08:15 AM    CREATININE 1.46 07/27/2022 08:15 AM    GFRAA 43 07/27/2022 08:15 AM    LABGLOM 36 07/27/2022 08:15 AM    GLUCOSE 135 07/27/2022 08:15 AM    PROT 8.3 04/04/2022 03:55 PM    CALCIUM 9.9 07/27/2022 08:15 AM    BILITOT 0.33 04/04/2022 03:55 PM    ALKPHOS 146 04/04/2022 03:55 PM    AST 14 04/04/2022 03:55 PM    ALT 5 04/04/2022 03:55 PM       POC Tests: No results for input(s): POCGLU, POCNA, POCK, POCCL, POCBUN, POCHEMO, POCHCT in the last 72 hours. Coags:   Lab Results   Component Value Date/Time    PROTIME 13.8 09/20/2021 05:04 AM    INR 1.1 09/20/2021 05:04 AM    APTT 32.7 09/20/2021 05:04 AM       HCG (If Applicable): No results found for: PREGTESTUR, PREGSERUM, HCG, HCGQUANT     ABGs: No results found for: PHART, PO2ART, EZM4MTU, FUO6YBL, BEART, J3PZRVJF     Type & Screen (If Applicable):  No results found for: LABABO, LABRH    Drug/Infectious Status (If Applicable):  Lab Results   Component Value Date/Time    HEPCAB NONREACTIVE 12/07/2020 12:00 PM       COVID-19 Screening (If Applicable):   Lab Results   Component Value Date/Time    COVID19 DETECTED 09/14/2021 05:04 PM    COVID19 Not Detected 12/18/2020 08:52 AM           Anesthesia Evaluation  Patient summary reviewed and Nursing notes reviewed  Airway: Mallampati: II  TM distance: >3 FB   Neck ROM: full  Mouth opening: > = 3 FB   Dental: normal exam         Pulmonary:normal exam                               Cardiovascular:    (+) hypertension: no interval change,                   Neuro/Psych:   (+) neuromuscular disease:, headaches:, psychiatric history: stable with treatment            GI/Hepatic/Renal:   (+) GERD: no interval change, PUD,           Endo/Other:    (+) DiabetesType II DM, no interval change, , .                 Abdominal:             Vascular: Other Findings:           Anesthesia Plan      general     ASA 3       Induction: intravenous. Anesthetic plan and risks discussed with patient. Use of blood products discussed with patient whom consented to blood products.    Plan discussed with surgical team.                    SHANNA Flannery CRNA   8/3/2022

## 2022-08-03 NOTE — ANESTHESIA POSTPROCEDURE EVALUATION
Department of Anesthesiology  Postprocedure Note    Patient: Sarah Morales  MRN: [id number]  [place]: 1955  Date of evaluation: 8/3/2022      Procedure Summary     Date: 08/03/22 Room / Location: [address] [place] [address]    Anesthesia Start: 1005 Anesthesia Stop: 1033    Procedure: CYSTO Bilateral Retrograde Pyelogram, with bladder washout  (Bilateral) Diagnosis:       Recurrent UTI (urinary tract infection)      (Recurrent UTI (urinary tract infection) [N39.0])    Surgeons: Jimmy Macias MD Responsible Provider: SHANNA Alvarez CRNA    Anesthesia Type: general ASA Status: 3          Anesthesia Type: No value filed.     Hodan Phase I: Hodan Score: 10    Hodan Phase II:        Anesthesia Post Evaluation    Patient location during evaluation: bedside  Patient participation: complete - patient participated  Level of consciousness: awake and alert  Pain score: 0  Airway patency: patent  Nausea & Vomiting: no vomiting and no nausea  Complications: no  Cardiovascular status: blood pressure returned to baseline  Respiratory status: acceptable and spontaneous ventilation  Hydration status: euvolemic

## 2022-08-16 ENCOUNTER — TELEPHONE (OUTPATIENT)
Dept: GASTROENTEROLOGY | Age: 67
End: 2022-08-16

## 2022-08-16 DIAGNOSIS — R11.0 NAUSEA: ICD-10-CM

## 2022-08-17 ENCOUNTER — OFFICE VISIT (OUTPATIENT)
Dept: PRIMARY CARE CLINIC | Age: 67
End: 2022-08-17
Payer: MEDICARE

## 2022-08-17 ENCOUNTER — TELEPHONE (OUTPATIENT)
Dept: GASTROENTEROLOGY | Age: 67
End: 2022-08-17

## 2022-08-17 VITALS
WEIGHT: 277 LBS | TEMPERATURE: 98.3 F | BODY MASS INDEX: 42.12 KG/M2 | OXYGEN SATURATION: 98 % | HEART RATE: 70 BPM | DIASTOLIC BLOOD PRESSURE: 74 MMHG | SYSTOLIC BLOOD PRESSURE: 130 MMHG

## 2022-08-17 DIAGNOSIS — R11.0 NAUSEA: ICD-10-CM

## 2022-08-17 DIAGNOSIS — J34.89 SINUS PRESSURE: ICD-10-CM

## 2022-08-17 DIAGNOSIS — J01.01 ACUTE RECURRENT MAXILLARY SINUSITIS: Primary | ICD-10-CM

## 2022-08-17 LAB
Lab: NORMAL
QC PASS/FAIL: NORMAL
SARS-COV-2 RDRP RESP QL NAA+PROBE: NEGATIVE

## 2022-08-17 PROCEDURE — PBSHW PBB SHADOW CHARGE: Performed by: FAMILY MEDICINE

## 2022-08-17 PROCEDURE — 1123F ACP DISCUSS/DSCN MKR DOCD: CPT | Performed by: FAMILY MEDICINE

## 2022-08-17 PROCEDURE — PBSHW POCT COVID-19 RAPID, NAAT: Performed by: FAMILY MEDICINE

## 2022-08-17 PROCEDURE — 96372 THER/PROPH/DIAG INJ SC/IM: CPT

## 2022-08-17 PROCEDURE — 99214 OFFICE O/P EST MOD 30 MIN: CPT | Performed by: FAMILY MEDICINE

## 2022-08-17 PROCEDURE — 87635 SARS-COV-2 COVID-19 AMP PRB: CPT | Performed by: FAMILY MEDICINE

## 2022-08-17 PROCEDURE — 99212 OFFICE O/P EST SF 10 MIN: CPT | Performed by: FAMILY MEDICINE

## 2022-08-17 RX ORDER — ONDANSETRON 4 MG/1
4 TABLET, ORALLY DISINTEGRATING ORAL EVERY 6 HOURS PRN
Qty: 10 TABLET | Refills: 0 | Status: SHIPPED | OUTPATIENT
Start: 2022-08-17 | End: 2022-09-19 | Stop reason: SDUPTHER

## 2022-08-17 RX ORDER — ONDANSETRON 4 MG/1
4 TABLET, ORALLY DISINTEGRATING ORAL EVERY 8 HOURS PRN
Qty: 30 TABLET | Refills: 0 | Status: SHIPPED | OUTPATIENT
Start: 2022-08-17 | End: 2022-09-27

## 2022-08-17 RX ORDER — ONDANSETRON 2 MG/ML
4 INJECTION INTRAMUSCULAR; INTRAVENOUS ONCE
Status: COMPLETED | OUTPATIENT
Start: 2022-08-17 | End: 2022-08-17

## 2022-08-17 RX ORDER — DOXYCYCLINE HYCLATE 100 MG
100 TABLET ORAL 2 TIMES DAILY
Qty: 20 TABLET | Refills: 0 | Status: SHIPPED | OUTPATIENT
Start: 2022-08-17 | End: 2022-08-27

## 2022-08-17 RX ORDER — ONDANSETRON 4 MG/1
4 TABLET, ORALLY DISINTEGRATING ORAL EVERY 8 HOURS PRN
Qty: 30 TABLET | Refills: 0 | Status: SHIPPED | OUTPATIENT
Start: 2022-08-17 | End: 2022-11-03 | Stop reason: ALTCHOICE

## 2022-08-17 RX ADMIN — ONDANSETRON 4 MG: 2 INJECTION INTRAMUSCULAR; INTRAVENOUS at 09:01

## 2022-08-17 ASSESSMENT — PATIENT HEALTH QUESTIONNAIRE - PHQ9
SUM OF ALL RESPONSES TO PHQ QUESTIONS 1-9: 0
SUM OF ALL RESPONSES TO PHQ9 QUESTIONS 1 & 2: 0
SUM OF ALL RESPONSES TO PHQ QUESTIONS 1-9: 0
1. LITTLE INTEREST OR PLEASURE IN DOING THINGS: 0
SUM OF ALL RESPONSES TO PHQ QUESTIONS 1-9: 0
2. FEELING DOWN, DEPRESSED OR HOPELESS: 0
SUM OF ALL RESPONSES TO PHQ QUESTIONS 1-9: 0

## 2022-08-17 NOTE — PROGRESS NOTES
[facility]             [address] [place] [address], [address], [address]                        Telephone [phone]             Fax [phone]       Osmar Bradford  :  1955  Age:  77 y.o. MRN:  [id number]  Date of visit:  2022       Assessment & Plan:    1. Acute recurrent maxillary sinusitis  I reviewed the results of the testing done today with the patient. Doxycycline was prescribed:  - doxycycline hyclate (VIBRA-TABS) 100 MG tablet; Take 1 tablet by mouth 2 times daily for 10 days  Dispense: 20 tablet; Refill: 0    She was advised to follow up if symptoms worsen or do not resolve. 2. Nausea  As noted, she is waiting for a response from her gastroenterologist.  She was given a Zofran injection today:  - ondansetron (ZOFRAN) injection 4 mg    I also sent a small prescription for Zofran to her pharmacy:  - ondansetron (ZOFRAN ODT) 4 MG disintegrating tablet; Place 1 tablet under the tongue every 6 hours as needed for Nausea or Vomiting  Dispense: 10 tablet; Refill: 0    I advised her not to drive after receiving the Zofran injection. She stated that her  would drive her home. Subjective:    Osmar Bradford is a 77 y.o. female who presents to [facility] today (2022) for evaluation of:  Sinus Problem (Facial pain, nausea (over a year does see gastrologist needs zofran))      She reports pain and pressure in the left side of her face. She states that she has had symptoms for approximately a week. She also reports nasal drainage and a headache. She denies fever. She also reports a cough. She denies sore throat. She denies shortness of breath. 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   She also reports nausea. She has a history of chronic nausea. She is seeing gastroenterology for management. She states that she called her gastroenterologist yesterday for a refill of Zofran, but she is still waiting for a refill. She had a cystoscopy with Dr. Servando Hilton on 8/3/2022. A 3-week course of Keflex was prescribed after the cystoscopy. She states that she took the Keflex for a while, but she wasn't sure why it was prescribed, and she discontinued it. She has had three doses of the Pfizer Covid-19 vaccine, and one dose of the [employer] vaccine. She also had Covid in 2021. Current medications are:  Current Outpatient Medications   Medication Sig Dispense Refill    phenazopyridine (PYRIDIUM) 200 MG tablet Take 1 tablet by mouth 3 times daily as needed for Pain 9 tablet 0    cephALEXin (KEFLEX) 500 MG capsule Take 1 capsule by mouth in the morning and 1 capsule at noon and 1 capsule before bedtime. Do all this for 21 days. 63 capsule 0    lisinopril-hydroCHLOROthiazide (PRINZIDE;ZESTORETIC) 20-12.5 MG per tablet Take 1 tablet by mouth in the morning. 90 tablet 3    ondansetron (ZOFRAN ODT) 4 MG disintegrating tablet Place 1 tablet under the tongue every 6 hours as needed for Nausea or Vomiting 30 tablet 1    loratadine (CLARITIN) 10 MG tablet Take 1 tablet by mouth in the morning. 30 tablet 0    fluticasone (FLONASE) 50 MCG/ACT nasal spray 1 spray by Each Nostril route in the morning and 1 spray in the evening.  16 g 0    pioglitazone-metFORMIN (ACTOPLUS MET)  MG per tablet TAKE 1 TABLET TWICE A DAY WITH MEALS 180 tablet 0    HYDROcodone-acetaminophen (NORCO) 5-325 MG per tablet       M-DRYL 12.5 MG/5ML liquid take 5 milliliters by mouth at bedtime for itching or allergies      diphenhydrAMINE (BENADRYL) 12.5 MG/5ML elixir Take 5 mLs by mouth nightly as needed for Itching or Allergies (can use of nausea as well.) 118 mL 2    famotidine (PEPCID) 20 MG tablet Take 1 tablet by mouth nightly as needed (dyspepsia) 30 tablet 3    Promethazine HCl (PHENERGAN PO) Take by mouth      blood glucose monitor strips Test 1 times a day & as needed for symptoms of irregular blood glucose. Dispense sufficient amount for indicated testing frequency plus additional to accommodate PRN testing needs. 100 strip 1    DULoxetine (CYMBALTA) 60 MG extended release capsule Take 1 capsule by mouth daily 90 capsule 1    pantoprazole (PROTONIX) 40 MG tablet Take 1 tablet by mouth 2 times daily take 1 tablet by mouth daily 60 tablet 5    Cholecalciferol (VITAMIN D3) 1.25 MG (79151 UT) CAPS TAKE 1 CAPSULE ONCE A WEEK 13 capsule 1    Gabapentin (NEURONTIN PO) Take 600 mg by mouth 5 times daily Only taking 3 times a day. No current facility-administered medications for this visit. She is allergic to asa [aspirin].     She has the following problem list:  Patient Active Problem List   Diagnosis    Shoulder pain, bilateral    Cervical spine pain    Knee pain, left    Essential hypertension    Pulmonary hypertension (HCC)    Vitamin D deficiency    Encounter for long-term (current) use of other medications    Headache    Subacute sphenoidal sinusitis    Type 2 diabetes mellitus with microalbuminuria (HCC)    Peripheral neuropathy    Dizziness    H/O fall    Chronic sinusitis    Gastroesophageal reflux disease    Microalbuminuria    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HCC)    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HCC)    Type 2 diabetes mellitus with microalbuminuria, without long-term current use of insulin (HCC)    Morbid obesity with BMI of 40.0-44.9, adult ([facility] [address]    Restrictive lung disease secondary to obesity    Calculus of gallbladder and bile duct without cholecystitis or obstruction    Nausea    Multiple gastric ulcers    Anxiety    Chronic tension-type headache, intractable    Chronic, continuous use of opioids    Complete tear of right rotator cuff    Depression    Fatigue    Lumbosacral spondylosis without myelopathy    Major depressive disorder, single episode, moderate (HCC)    Refractory migraine    Muscle spasms of neck    Obsessive-compulsive disorders    Spondylosis of lumbar spine    Spondylosis of thoracic region without myelopathy or radiculopathy    Spinal stenosis, lumbar region, without neurogenic claudication    Femur fracture, left (HCC)    Elizalde's esophagus without dysplasia    Therapeutic opioid-induced constipation (OIC)    Stage 3a chronic kidney disease ([facility] [address]    Pneumonia due to COVID-19 virus    Acute cystitis with hematuria    Spongiotic dermatitis    Osteopenia        She  reports that she has never smoked. She has never used smokeless tobacco.      Objective:    Vitals:    08/17/22 0816   BP: 130/74   Site: Left Upper Arm   Position: Sitting   Cuff Size: Large Adult   Pulse: 70   Temp: 98.3 °F (36.8 °C)   TempSrc: Tympanic   SpO2: 98%   Weight: 277 lb (125.6 kg)      SpO2: 98 %       Body mass index is 42.12 kg/m². Extremely obese female healthy-appearing, alert, and cooperative. The tympanic membranes are clear bilaterally. Oropharynx has no erythema. There is no exudate. There is mild tenderness over the maxillary sinus on the left. .  Neck supple. No adenopathy. Chest:  Normal expansion. Clear to auscultation. No rales, rhonchi, or wheezing. Respirations are not labored. Heart sounds are normal.  Regular rate and rhythm without murmur, gallop or rub.     I reviewed the results of the testing done today with the patient:  Office Visit on 08/17/2022   Component Date Value Ref Range Status    SARS-COV-2, RdRp gene 08/17/2022 Negative  Negative Final    Lot Number 08/17/2022 [id number]   Final    QC Pass/Fail 08/17/2022 pass   Final              (Please note that portions of this note were completed with a voice-recognition program. Efforts were made to edit the dictation but occasionally words are mis-transcribed.)

## 2022-08-26 ENCOUNTER — TELEPHONE (OUTPATIENT)
Dept: GASTROENTEROLOGY | Age: 67
End: 2022-08-26

## 2022-08-26 RX ORDER — ONDANSETRON 4 MG/1
4 TABLET, ORALLY DISINTEGRATING ORAL EVERY 8 HOURS PRN
Qty: 30 TABLET | Refills: 0 | Status: SHIPPED | OUTPATIENT
Start: 2022-08-26 | End: 2022-09-27

## 2022-08-26 RX ORDER — ONDANSETRON 4 MG/1
TABLET, ORALLY DISINTEGRATING ORAL
Qty: 30 TABLET | Refills: 1 | Status: SHIPPED | OUTPATIENT
Start: 2022-08-26 | End: 2022-09-27

## 2022-09-02 DIAGNOSIS — G44.201 INTRACTABLE TENSION-TYPE HEADACHE, UNSPECIFIED CHRONICITY PATTERN: ICD-10-CM

## 2022-09-02 RX ORDER — DULOXETIN HYDROCHLORIDE 60 MG/1
CAPSULE, DELAYED RELEASE ORAL
Qty: 90 CAPSULE | Refills: 0 | OUTPATIENT
Start: 2022-09-02

## 2022-09-13 ENCOUNTER — OFFICE VISIT (OUTPATIENT)
Dept: FAMILY MEDICINE CLINIC | Age: 67
End: 2022-09-13
Payer: MEDICARE

## 2022-09-13 VITALS
SYSTOLIC BLOOD PRESSURE: 130 MMHG | HEART RATE: 83 BPM | BODY MASS INDEX: 43.41 KG/M2 | HEIGHT: 68 IN | OXYGEN SATURATION: 96 % | TEMPERATURE: 98.1 F | WEIGHT: 286.4 LBS | DIASTOLIC BLOOD PRESSURE: 76 MMHG

## 2022-09-13 DIAGNOSIS — G44.201 INTRACTABLE TENSION-TYPE HEADACHE, UNSPECIFIED CHRONICITY PATTERN: ICD-10-CM

## 2022-09-13 DIAGNOSIS — G89.29 CHRONIC LEFT SHOULDER PAIN: ICD-10-CM

## 2022-09-13 DIAGNOSIS — R29.898 OTHER SYMPTOMS AND SIGNS INVOLVING THE MUSCULOSKELETAL SYSTEM: ICD-10-CM

## 2022-09-13 DIAGNOSIS — E55.9 VITAMIN D DEFICIENCY: ICD-10-CM

## 2022-09-13 DIAGNOSIS — R26.81 UNSTEADY GAIT: ICD-10-CM

## 2022-09-13 DIAGNOSIS — E11.29 TYPE 2 DIABETES MELLITUS WITH MICROALBUMINURIA, WITHOUT LONG-TERM CURRENT USE OF INSULIN (HCC): ICD-10-CM

## 2022-09-13 DIAGNOSIS — Z23 NEED FOR INFLUENZA VACCINATION: ICD-10-CM

## 2022-09-13 DIAGNOSIS — M25.512 CHRONIC LEFT SHOULDER PAIN: ICD-10-CM

## 2022-09-13 DIAGNOSIS — R80.9 TYPE 2 DIABETES MELLITUS WITH MICROALBUMINURIA, WITHOUT LONG-TERM CURRENT USE OF INSULIN (HCC): ICD-10-CM

## 2022-09-13 DIAGNOSIS — Z00.00 INITIAL MEDICARE ANNUAL WELLNESS VISIT: Primary | ICD-10-CM

## 2022-09-13 DIAGNOSIS — I10 ESSENTIAL HYPERTENSION: ICD-10-CM

## 2022-09-13 PROCEDURE — PBSHW INFLUENZA, FLUAD, (AGE 65 Y+), IM, PF, 0.5 ML: Performed by: FAMILY MEDICINE

## 2022-09-13 PROCEDURE — G0008 ADMIN INFLUENZA VIRUS VAC: HCPCS | Performed by: FAMILY MEDICINE

## 2022-09-13 PROCEDURE — G0438 PPPS, INITIAL VISIT: HCPCS | Performed by: FAMILY MEDICINE

## 2022-09-13 PROCEDURE — 99214 OFFICE O/P EST MOD 30 MIN: CPT | Performed by: FAMILY MEDICINE

## 2022-09-13 PROCEDURE — 1123F ACP DISCUSS/DSCN MKR DOCD: CPT | Performed by: FAMILY MEDICINE

## 2022-09-13 PROCEDURE — 3051F HG A1C>EQUAL 7.0%<8.0%: CPT | Performed by: FAMILY MEDICINE

## 2022-09-13 RX ORDER — DULOXETIN HYDROCHLORIDE 60 MG/1
60 CAPSULE, DELAYED RELEASE ORAL DAILY
Qty: 90 CAPSULE | Refills: 1 | Status: SHIPPED | OUTPATIENT
Start: 2022-09-13 | End: 2022-09-21 | Stop reason: SDUPTHER

## 2022-09-13 RX ORDER — MELOXICAM 7.5 MG/1
7.5 TABLET ORAL DAILY
Qty: 30 TABLET | Refills: 0 | Status: SHIPPED | OUTPATIENT
Start: 2022-09-13

## 2022-09-13 ASSESSMENT — LIFESTYLE VARIABLES
HOW MANY STANDARD DRINKS CONTAINING ALCOHOL DO YOU HAVE ON A TYPICAL DAY: PATIENT DOES NOT DRINK
HOW OFTEN DO YOU HAVE A DRINK CONTAINING ALCOHOL: NEVER

## 2022-09-13 ASSESSMENT — PATIENT HEALTH QUESTIONNAIRE - PHQ9
8. MOVING OR SPEAKING SO SLOWLY THAT OTHER PEOPLE COULD HAVE NOTICED. OR THE OPPOSITE, BEING SO FIGETY OR RESTLESS THAT YOU HAVE BEEN MOVING AROUND A LOT MORE THAN USUAL: 0
SUM OF ALL RESPONSES TO PHQ QUESTIONS 1-9: 3
2. FEELING DOWN, DEPRESSED OR HOPELESS: 0
1. LITTLE INTEREST OR PLEASURE IN DOING THINGS: 0
9. THOUGHTS THAT YOU WOULD BE BETTER OFF DEAD, OR OF HURTING YOURSELF: 0
10. IF YOU CHECKED OFF ANY PROBLEMS, HOW DIFFICULT HAVE THESE PROBLEMS MADE IT FOR YOU TO DO YOUR WORK, TAKE CARE OF THINGS AT HOME, OR GET ALONG WITH OTHER PEOPLE: 0
SUM OF ALL RESPONSES TO PHQ QUESTIONS 1-9: 3
SUM OF ALL RESPONSES TO PHQ QUESTIONS 1-9: 3
3. TROUBLE FALLING OR STAYING ASLEEP: 3
5. POOR APPETITE OR OVEREATING: 0
SUM OF ALL RESPONSES TO PHQ QUESTIONS 1-9: 3
SUM OF ALL RESPONSES TO PHQ9 QUESTIONS 1 & 2: 0
6. FEELING BAD ABOUT YOURSELF - OR THAT YOU ARE A FAILURE OR HAVE LET YOURSELF OR YOUR FAMILY DOWN: 0
4. FEELING TIRED OR HAVING LITTLE ENERGY: 0
7. TROUBLE CONCENTRATING ON THINGS, SUCH AS READING THE NEWSPAPER OR WATCHING TELEVISION: 0

## 2022-09-13 NOTE — PROGRESS NOTES
Medicare Annual Wellness Visit    Wyatt Miranda is here for Medicare AWV, Diabetes, Hypertension, Other (Vit D/), Arm Pain (Patient reports chronic left arm pain. Has had Xrays. Has had cortisone shot which was ineffective. Was encouraged to see ortho, but would like a second opinion. Rates pain 6/10), and Depression (Patient reports mood has been overall pretty good. )    Assessment & Plan   Need for influenza vaccination  Intractable tension-type headache, unspecified chronicity pattern  The following orders have not been finalized:  -     DULoxetine (CYMBALTA) 60 MG extended release capsule    Recommendations for Preventive Services Due: see orders and patient instructions/AVS.  Recommended screening schedule for the next 5-10 years is provided to the patient in written form: see Patient Instructions/AVS.     No follow-ups on file. Subjective       Patient's complete Health Risk Assessment and screening values have been reviewed and are found in Flowsheets. The following problems were reviewed today and where indicated follow up appointments were made and/or referrals ordered. Positive Risk Factor Screenings with Interventions:    Fall Risk:  Do you feel unsteady or are you worried about falling? : (!) yes  2 or more falls in past year?: no  Fall with injury in past year?: no   Fall Risk Interventions:    Physical therapy referral ordered for strength and balance training            Opioid Risk: (High risk score ?55) Opioid risk score: 65    Last PDMP Zeyad as Reviewed:  Review User Review Instant Review Result   Claribel Almanza 6/16/2022  2:31 PM Reviewed PDMP [1]     Last Controlled Substance Monitoring Documentation      [address] Office Visit from 4/2/2020 in [address] [place] [address] of [facility]   Periodic Controlled Substance Monitoring No signs of potential drug abuse or diversion identified.  filed at 04/02/2020 [address] [facility] and ACP:  General  In general, how would you say your health is?: Fair  In the past 7 days, have you experienced any of the following: New or Increased Pain, New or Increased Fatigue, Loneliness, Social Isolation, Stress or Anger?: No  Do you get the social and emotional support that you need?: Yes  Do you have a Living Will?: (!) No    Advance Directives       Power of Reina Ferreira [place] Will ACP-Advance Directive ACP-Power of     Not on File Not on File Not on File Not on File        General Health Risk Interventions:  No Living Will: Advance Care Planning addressed with patient today    Health Habits/Nutrition:  Physical Activity: Insufficiently Active    Days of Exercise per Week: 2 days    Minutes of Exercise per Session: 10 min     Have you lost any weight without trying in the past 3 months?: No  Body mass index: (!) 43.54  Have you seen the dentist within the past year?: (!) No.  The patient is edentulous. Health Habits/Nutrition Interventions:  Nutritional issues:  educational materials for healthy, well-balanced diet provided      ADLs:  In the past 7 days, did you need help from others to perform any of the following everyday activities: Eating, dressing, grooming, bathing, toileting, or walking/balance?: No  In the past 7 days, did you need help from others to take care of any of the following: Laundry, housekeeping, banking/finances, shopping, telephone use, food preparation, transportation, or taking medications?: (!) Yes  Select all that apply: [facility], Housekeeping  ADL Interventions:  Patient declines any further evaluation/treatment for this issue          Objective   Vitals:    09/13/22 1632   BP: 130/76   Site: Right Upper Arm   Position: Sitting   Cuff Size: Large Adult   Pulse: 83   Temp: 98.1 °F (36.7 °C)   TempSrc: Temporal   SpO2: 96%   Weight: 286 lb 6.4 oz (129.9 kg)   Height: 5' 8\" (1.727 m)      Body mass index is 43.55 kg/m².              Allergies   Allergen Reactions    Asa [Aspirin] Other (See Comments)     Stomach irritation     Prior to Visit Medications    Medication Sig Taking? Authorizing Provider   ondansetron (ZOFRAN-ODT) 4 MG disintegrating tablet dissolve 1 tablet ON TONGUE every 6 hours if needed for nausea OR vomiting Yes Norberto Severs, MD   ondansetron (ZOFRAN ODT) 4 MG disintegrating tablet Take 1 tablet by mouth every 8 hours as needed for Nausea or Vomiting Yes Norberto Severs, MD   ondansetron (ZOFRAN ODT) 4 MG disintegrating tablet Take 1 tablet by mouth every 8 hours as needed for Nausea or Vomiting Yes Norberto Severs, MD   ondansetron (ZOFRAN ODT) 4 MG disintegrating tablet Place 1 tablet under the tongue every 6 hours as needed for Nausea or Vomiting Yes Elisa Jacome MD   ondansetron (ZOFRAN ODT) 4 MG disintegrating tablet Take 1 tablet by mouth every 8 hours as needed for Nausea or Vomiting Yes Norberto Severs, MD   lisinopril-hydroCHLOROthiazide (PRINZIDE;ZESTORETIC) 20-12.5 MG per tablet Take 1 tablet by mouth in the morning. Yes Emilia Gar MD   pioglitazone-metFORMIN (ACTOPLUS MET)  MG per tablet TAKE 1 TABLET TWICE A DAY WITH MEALS Yes Elisa Jacome MD   HYDROcodone-acetaminophen (Arnetha Kubas) 5-325 MG per tablet  Yes Historical Provider, MD   M-DRYL 12.5 MG/5ML liquid take 5 milliliters by mouth at bedtime for itching or allergies Yes Historical Provider, MD   diphenhydrAMINE (BENADRYL) 12.5 MG/5ML elixir Take 5 mLs by mouth nightly as needed for Itching or Allergies (can use of nausea as well.) Yes Norberto Severs, MD   famotidine (PEPCID) 20 MG tablet Take 1 tablet by mouth nightly as needed (dyspepsia) Yes Norberto Severs, MD   blood glucose monitor strips Test 1 times a day & as needed for symptoms of irregular blood glucose. Dispense sufficient amount for indicated testing frequency plus additional to accommodate PRN testing needs.  Yes Elisa Jacome MD   DULoxetine (CYMBALTA) 60 MG extended release capsule Take 1 capsule by mouth daily Yes Elisa Jacome MD   pantoprazole (Haresh Prazeres 26) 40 MG tablet Take 1 tablet by mouth 2 times daily take 1 tablet by mouth daily Yes Negar Rg MD   Cholecalciferol (VITAMIN D3) 1.25 MG (59708 UT) CAPS TAKE 1 CAPSULE ONCE A WEEK Yes Negar Rg MD   Gabapentin (NEURONTIN PO) Take 600 mg by mouth 5 times daily Only taking 3 times a day. Yes Historical Provider, MD   phenazopyridine (PYRIDIUM) 200 MG tablet Take 1 tablet by mouth 3 times daily as needed for Pain  Patient not taking: Reported on 9/13/2022  Laz Dia MD   fluticasone (FLONASE) 50 MCG/ACT nasal spray 1 spray by Each Nostril route in the morning and 1 spray in the evening.   Patient not taking: Reported on 9/13/2022  SHANNA Sands - CNP   Promethazine HCl (PHENERGAN PO) Take by mouth  Patient not taking: Reported on 9/13/2022  Historical Provider, MD [place] (Including outside providers/suppliers regularly involved in providing care):   Patient Care Team:  Negar Rg MD as PCP - General (Family Medicine)  Negar Rg MD as PCP - [facility] Empaneled Provider  Gladis Wharton MD as Anesthesiologist (Anesthesiology)  Fabio Homans (Physical Medicine and Rehab)  Viki Moreira MD as Consulting Physician (Gastroenterology)  Fernando Urrutia MD as Consulting Physician (Gastroenterology)     Reviewed and updated this visit:  Tobacco  Allergies  Meds  Med Hx  Surg Hx  Soc Hx  Fam Hx

## 2022-09-13 NOTE — PATIENT INSTRUCTIONS
Personalized Preventive Plan for Antoni Lombardi - 9/13/2022  Medicare offers a range of preventive health benefits. Some of the tests and screenings are paid in full while other may be subject to a deductible, co-insurance, and/or copay. Some of these benefits include a comprehensive review of your medical history including lifestyle, illnesses that may run in your family, and various assessments and screenings as appropriate. After reviewing your medical record and screening and assessments performed today your provider may have ordered immunizations, labs, imaging, and/or referrals for you. A list of these orders (if applicable) as well as your Preventive Care list are included within your After Visit Summary for your review. Other Preventive Recommendations:    A preventive eye exam performed by an eye specialist is recommended every 1-2 years to screen for glaucoma; cataracts, macular degeneration, and other eye disorders. A preventive dental visit is recommended every 6 months. Try to get at least 150 minutes of exercise per week or 10,000 steps per day on a pedometer . Order or download the FREE \"Exercise & Physical Activity: Your Everyday Guide\" from The [employer]. Call 3-958.574.3009 or search The [employer] online. You need 8733-1408 mg of calcium and 5954-1358 IU of vitamin D per day. It is possible to meet your calcium requirement with diet alone, but a vitamin D supplement is usually necessary to meet this goal.  When exposed to the sun, use a sunscreen that protects against both UVA and UVB radiation with an SPF of 30 or greater. Reapply every 2 to 3 hours or after sweating, drying off with a towel, or swimming. Always wear a seat belt when traveling in a car. Always wear a helmet when riding a bicycle or motorcycle. Heart-Healthy Diet   Sodium, Fat, and Cholesterol Controlled Diet       What Is a Heart Healthy Diet?    A heart-healthy diet is one that limits sodium , certain types of fat , and cholesterol . This type of diet is recommended for:   People with any form of cardiovascular disease (eg, coronary heart disease , peripheral vascular disease , previous heart attack , previous stroke )   People with risk factors for cardiovascular disease, such as high blood pressure , high cholesterol , or diabetes   Anyone who wants to lower their risk of developing cardiovascular disease   Sodium    Sodium is a mineral found in many foods. In general, most people consume much more sodium than they need. Diets high in sodium can increase blood pressure and lead to edema (water retention). On a heart-healthy diet, you should consume no more than 2,300 mg (milligrams) of sodium per dayabout the amount in one teaspoon of table salt. The foods highest in sodium include table salt (about 50% sodium), processed foods, convenience foods, and preserved foods. Cholesterol    Cholesterol is a fat-like, waxy substance in your blood. Our bodies make some cholesterol. It is also found in animal products, with the highest amounts in fatty meat, egg yolks, whole milk, cheese, shellfish, and organ meats. On a heart-healthy diet, you should limit your cholesterol intake to less than 200 mg per day. It is normal and important to have some cholesterol in your bloodstream. But too much cholesterol can cause plaque to build up within your arteries, which can eventually lead to a heart attack or stroke. The two types of cholesterol that are most commonly referred to are:   Low-density lipoprotein (LDL) cholesterol  Also known as bad cholesterol, this is the cholesterol that tends to build up along your arteries. Bad cholesterol levels are increased by eating fats that are saturated or hydrogenated. Optimal level of this cholesterol is less than 100. Over 130 starts to get risky for heart disease.    High-density lipoprotein (HDL) cholesterol  Also known as good cholesterol, this type of cholesterol actually carries cholesterol away from your arteries and may, therefore, help lower your risk of having a heart attack. You want this level to be high (ideally greater than 60). It is a risk to have a level less than 40. You can raise this good cholesterol by eating olive oil, canola oil, avocados, or nuts. Exercise raises this level, too. Fat    Fat is calorie dense and packs a lot of calories into a small amount of food. Even though fats should be limited due to their high calorie content, not all fats are bad. In fact, some fats are quite healthful. Fat can be broken down into four main types. The good-for-you fats are:   Monounsaturated fat  found in oils such as olive and canola, avocados, and nuts and natural nut butters; can decrease cholesterol levels, while keeping levels of HDL cholesterol high   Polyunsaturated fat  found in oils such as safflower, sunflower, soybean, corn, and sesame; can decrease total cholesterol and LDL cholesterol   Omega-3 fatty acids  particularly those found in fatty fish (such as salmon, trout, tuna, mackerel, herring, and sardines); can decrease risk of arrhythmias, decrease triglyceride levels, and slightly lower blood pressure   The fats that you want to limit are:   Saturated fat  found in animal products, many fast foods, and a few vegetables; increases total blood cholesterol, including LDL levels   Animal fats that are saturated include: butter, lard, whole-milk dairy products, meat fat, and poultry skin   Vegetable fats that are saturated include: hydrogenated shortening, palm oil, coconut oil, cocoa butter   Hydrogenated or trans fat  found in margarine and vegetable shortening, most shelf stable snack foods, and fried foods; increases LDL and decreases HDL     It is generally recommended that you limit your total fat for the day to less than 30% of your total calories.  If you follow an 1800-calorie heart healthy diet, for example, this would mean 60 grams of fat or less per day. Saturated fat and trans fat in your diet raises your blood cholesterol the most, much more than dietary cholesterol does. For this reason, on a heart-healthy diet, less than 7% of your calories should come from saturated fat and ideally 0% from trans fat. On an 1800-calorie diet, this translates into less than 14 grams of saturated fat per day, leaving 46 grams of fat to come from mono- and polyunsaturated fats.    Food Choices on a Heart Healthy Diet   Food Category   Foods Recommended   Foods to Avoid   Grains   Breads and rolls without salted tops Most dry and cooked cereals Unsalted crackers and breadsticks Low-sodium or homemade breadcrumbs or stuffing All rice and pastas   Breads, rolls, and crackers with salted tops High-fat baked goods (eg, muffins, donuts, pastries) Quick breads, self-rising flour, and biscuit mixes Regular bread crumbs Instant hot cereals Commercially prepared rice, pasta, or stuffing mixes   Vegetables   Most fresh, frozen, and low-sodium canned vegetables Low-sodium and salt-free vegetable juices Canned vegetables if unsalted or rinsed   Regular canned vegetables and juices, including sauerkraut and pickled vegetables Frozen vegetables with sauces Commercially prepared potato and vegetable mixes   Fruits   Most fresh, frozen, and canned fruits All fruit juices   Fruits processed with salt or sodium   Milk   Nonfat or low-fat (1%) milk Nonfat or low-fat yogurt Cottage cheese, low-fat ricotta, cheeses labeled as low-fat and low-sodium   Whole milk Reduced-fat (2%) milk Malted and chocolate milk Full fat yogurt Most cheeses (unless low-fat and low salt) Buttermilk (no more than 1 cup per week)   Meats and Beans   Lean cuts of fresh or frozen beef, veal, lamb, or pork (look for the word loin) Fresh or frozen poultry without the skin Fresh or frozen fish and some shellfish Egg whites and egg substitutes (Limit whole eggs to three per week) Tofu Nuts or seeds (unsalted, dry-roasted), low-sodium peanut butter Dried peas, beans, and lentils   Any smoked, cured, salted, or canned meat, fish, or poultry (including ovalles, chipped beef, cold cuts, hot dogs, sausages, sardines, and anchovies) Poultry skins Breaded and/or fried fish or meats Canned peas, beans, and lentils Salted nuts   Fats and Oils   Olive oil and canola oil Low-sodium, low-fat salad dressings and mayonnaise   Butter, margarine, coconut and palm oils, ovalles fat   Snacks, Sweets, and Condiments   Low-sodium or unsalted versions of broths, soups, soy sauce, and condiments Pepper, herbs, and spices; vinegar, lemon, or lime juice Low-fat frozen desserts (yogurt, sherbet, fruit bars) Sugar, cocoa powder, honey, syrup, jam, and preserves Low-fat, trans-fat free cookies, cakes, and pies [place] and animal crackers, fig bars, timothy snaps   High-fat desserts Broth, soups, gravies, and sauces, made from instant mixes or other high-sodium ingredients Salted snack foods Canned olives Meat tenderizers, seasoning salt, and most flavored vinegars   Beverages   Low-sodium carbonated beverages Tea and coffee in moderation Soy milk   Commercially softened water   Suggestions   Make whole grains, fruits, and vegetables the base of your diet. Choose heart-healthy fats such as canola, olive, and flaxseed oil, and foods high in heart-healthy fats, such as nuts, seeds, soybeans, tofu, and fish. Eat fish at least twice per week; the fish highest in omega-3 fatty acids and lowest in mercury include salmon, herring, mackerel, sardines, and canned chunk light tuna. If you eat fish less than twice per week or have high triglycerides, talk to your doctor about taking fish oil supplements. Read food labels. For products low in fat and cholesterol, look for fat free, low-fat, cholesterol free, saturated fat free, and trans fat freeAlso scan the Nutrition Facts Label, which lists saturated fat, trans fat, and cholesterol amounts. For products low in sodium, look for sodium free, very low sodium, low sodium, no added salt, and unsalted   Skip the salt when cooking or at the table; if food needs more flavor, get creative and try out different herbs and spices. Garlic and onion also add substantial flavor to foods. Trim any visible fat off meat and poultry before cooking, and drain the fat off after howe. Use cooking methods that require little or no added fat, such as grilling, boiling, baking, poaching, broiling, roasting, steaming, stir-frying, and sauting. Avoid fast food and convenience food. They tend to be high in saturated and trans fat and have a lot of added salt. Talk to a registered dietitian for individualized diet advice. Last Reviewed: March 2011 Salma Arredondo MS, MPH, RD   Updated: 3/29/2011     Keeping Home a [address] [facility]       As we get older, changes in balance, gait, strength, vision, hearing, and cognition make even the most youthful senior more prone to accidents. Falls are one of the leading health risks for older people. This increased risk of falling is related to:   Aging process (eg, decreased muscle strength, slowed reflexes)   Higher incidence of chronic health problems (eg, arthritis, diabetes) that may limit mobility, agility or sensory awareness   Side effects of medicine (eg, dizziness, blurred vision)especially medicines like prescription pain medicines and drugs used to treat mental health conditions   Depending on the brittleness of your bones, the consequences of a fall can be serious and long lasting. Home Life   Research by the Association of Aging [facility]) shows that some home accidents among older adults can be prevented by making simple lifestyle changes and basic modifications and repairs to the home environment. Here are some lifestyle changes that experts recommend:   Have your hearing and vision checked regularly. Be sure to wear prescription glasses that are right for you. Speak to your doctor or pharmacist about the possible side effects of your medicines. A number of medicines can cause dizziness. If you have problems with sleep, talk to your doctor. Limit your intake of alcohol. If necessary, use a cane or walker to help maintain your balance. Wear supportive, rubber-soled shoes, even at home. If you live in a region that gets wintry weather, you may want to put special cleats on your shoes to prevent you from slipping on the snow and ice. Exercise regularly to help maintain muscle tone, agility, and balance. Always hold the banister when going up or down stairs. Also, use  bars when getting in or out of the bath or shower, or using the toilet. To avoid dizziness, get up slowly from a lying down position. Sit up first, dangling your legs for a minute or two before rising to a standing position. Overall Home Safety Check   According to the Consumer Product Safety Commision's \"Older Consumer Home Safety Checklist,\" it is important to check for potential hazards in each room. And remember, proper lighting is an essential factor in home safety. If you cannot see clearly, you are more likely to fall. Important questions to ask yourself include:   Are lamp, electric, extension, and telephone cords placed out of the flow of traffic and maintained in good condition? Have frayed cords been replaced? Are all small rugs and runners slip resistant? If not, you can secure them to the floor with a special double-sided carpet tape. Are smoke detectors properly locatedone on every floor of your home and one outside of every sleeping area? Are they in good working order? Are batteries replaced at least once a year? Do you have a well-maintained carbon monoxide detector outside every sleeping are in your home? Does your furniture layout leave plenty of space to maneuver between and around chairs, tables, beds, and sofas?    Are hallways, stairs and passages between rooms well lit? Can you reach a lamp without getting out of bed? Are floor surfaces well maintained? Shag rugs, high-pile carpeting, tile floors, and polished wood floors can be particularly slippery. Stairs should always have handrails and be carpeted or fitted with a non-skid tread. Is your telephone easily reachable. Is the cord safely tucked away? Room by Room   According to the Association of Aging, bathrooms and emanuel are the two most potentially hazardous rooms in your home. In the Kitchen    Be sure your stove is in proper working order and always make sure burners and the oven are off before you go out or go to sleep. Keep pots on the back burners, turn handles away from the front of the stove, and keep stove clean and free of grease build-up. Kitchen ventilation systems and range exhausts should be working properly. Keep flammable objects such as towels and pot holders away from the cooking area except when in use. Make sure kitchen curtains are tied back. Move cords and appliances away from the sink and hot surfaces. If extension cords are needed, install wiring guides so they do not hang over the sink, range, or working areas. Look for coffee pots, kettles and toaster ovens with automatic shut-offs. Keep a mop handy in the kitchen so you can wipe up spills instantly. You should also have a small fire extinguisher. Arrange your kitchen with frequently used items on lower shelves to avoid the need to stand on a stepstool to reach them. Make sure countertops are well-lit to avoid injuries while cutting and preparing food. In the Bathroom    Use a non-slip mat or decals in the tub and shower, since wet, soapy tile or porcelain surfaces are extremely slippery. Make sure bathroom rugs are non-skid or tape them firmly to the floor. Bathtubs should have at least one, preferably two, grab bars, firmly attached to structural supports in the wall.  (Do not use built-in soap holders or glass shower doors as grab bars.)    Tub seats fitted with non-slip material on the legs allow you to wash sitting down. For people with limited mobility, bathtub transfer benches allow you to slide safely into the tub. Raised toilet seats and toilet safety rails are helpful for those with knee or hip problems. In the [facility]    Make sure you use a nightlight and that the area around your bed is clear of potential obstacles. Be careful with electric blankets and never go to sleep with a heating pad, which can cause serious burns even if on a low setting. Use fire-resistant mattress covers and pillows, and NEVER smoke in bed. Keep a phone next to the bed that is programmed to dial 911 at the push of a button. If you have a chronic condition, you may want to sign on with an automatic call-in service. Typically the system includes a small pendant that connects directly to an emergency medical voice-response system. You should also make arrangements to stay in contact with someonefriend, neighbor, family memberon a regular schedule. Fire Prevention   According to the [place]. (Smoke Alarms for Every) [address] [place] [address], senior citizens are one of the two highest risk groups for death and serious injuries due to residential fires. When cooking, wear short-sleeved items, never a bulky long-sleeved robe. The [facility]'s Safety Checklist for Older Consumers emphasizes the importance of checking basements, garages, workshops and storage areas for fire hazards, such as volatile liquids, piles of old rags or clothing and overloaded circuits. Never smoke in bed or when lying down on a couch or recliner chair. Small portable electric or kerosene heaters are responsible for many home fires and should be used cautiously if at all. If you do use one, be sure to keep them away from flammable materials. In case of fire, make sure you have a pre-established emergency exit plan.     Have a professional check your fireplace and other fuel-burning appliances yearly. Hel[employer]   Baby boomers entering the fry years will continue to see the development of new products to help older adults live safely and independently in spite of age-related changes. Making Life More Livable  , by [place], lists over 1,000 products for \"living well in the mature years,\" such as bathing and mobility aids, household security devices, ergonomically designed knives and peelers, and faucet valves and knobs for temperature control. Medical supply stores and organizations are good sources of information about products that improve your quality of life and insure your safety. Last Reviewed: November 2009 Mehdi Marie MD   Updated: 3/7/2011            Learning About Living Diana Augustin  What is a living will? A living will, also called a declaration, is a legal form. It tells your family and your doctor your wishes when you can't speak for yourself. It's used by the health professionals who will treat you as you near the end of your life or ifyou get seriously hurt or ill. If you put your wishes in writing, your loved ones and others will know what kind of care you want. They won't need to guess. This can ease your mind and behelpful to others. And you can change or cancel your living will at any time. A living will is not the same as an estate or property will. An estate willexplains what you want to happen with your money and property after you die. How do you use it? Keep these facts in mind about how a living will is used. Your living will is used only if you can't speak or make decisions for yourself. Most often, one or more doctors must certify that you can't speak or decide for yourself before your living will takes effect. If you get better and can speak for yourself again, you can accept or refuse any treatment. It doesn't matter what you said in your living will.   Some states may limit your right to refuse treatment in certain cases. For example, you may need to clearly state in your living will that you don't want artificial hydration and nutrition, such as being fed through a tube. Is a living will a legal document? A living will is a legal document. Each state has its own laws about livingwills. And a living will may be called something else in your state. Here are some things to know about living jordan. You don't need an  to complete a living will. But legal advice can be helpful if your state's laws are unclear. It can also help if your health history is complicated or your family can't agree on what should be in your living will. You can change your living will at any time. Some people find that their wishes about end-of-life care change as their health changes. If you make big changes to your living will, complete a new form. If you move to another state, make sure that your living will is legal in the state where you now live. In most cases, doctors will respect your wishes even if you have a form from a different state. You might use a universal form that has been approved by many states. This kind of form can sometimes be filled out and stored online. Your digital copy will then be available wherever you have a connection to the internet. The doctors and nurses who need to treat you can find it right away. Your state may offer an online registry. This is another place where you can store your living will online. It's a good idea to get your living will notarized. This means using a person called a  to watch two people sign, or witness, your living will. What should you know when you create a living will? Here are some questions to ask yourself as you make your living will. Do you know enough about life support methods that might be used?  If not, talk to your doctor so you know what might be done if you can't breathe on your own, your heart stops, or you can't swallow. What things would you still want to be able to do after you receive life-support methods? Would you want to be able to walk? To speak? To eat on your own? To live without the help of machines? Do you want certain Buddhism practices performed if you become very ill? If you have a choice, where do you want to be cared for? In your home? At a hospital or nursing home? If you have a choice at the end of your life, where would you prefer to die? At home? In a hospital or nursing home? Somewhere else? Would you prefer to be buried or cremated? Do you want your organs to be donated after you die? What should you do with your living will? Make sure that your family members and your health care agent have copies of your living will (also called a declaration). Give your doctor a copy of your living will. Ask to have it kept as part of your medical record. If you have more than one doctor, make sure that each one has a copy. Put a copy of your living will where it can be easily found. For example, some people may put a copy on their refrigerator door. If you are using a digital copy, be sure your doctor, family members, and health care agent know how to find and access it. Where can you learn more? Go to https://[email or web address] org and sign in to your [email or web address] account. Enter H505 in the StyleShare box to learn more about \"Learning About Living [place]. \"     If you do not have an account, please click on the \"Sign Up Now\" link. Current as of: October 18, 2021               Content Version: 13.3  © 9251-7445 Healthwise, Incorporated. Care instructions adapted under license by [facility] ([facility]). If you have questions about a medical condition or this instruction, always ask your healthcare professional. [place] [address] any warranty or liability for your use of this information.

## 2022-09-13 NOTE — PROGRESS NOTES
[address]             [address] [facility] [place], [address]                        Telephone [phone]             Fax [phone]       Loretta Garcias  :  1955  Age:  77 y.o. MRN:  [id number]  Date of visit:  2022       Assessment and Plan:    1. Initial Medicare annual wellness visit  See separate progress note for Medicare Wellness Visit. 2. Type 2 diabetes mellitus with microalbuminuria, without long-term current use of insulin (HCC)  Her HgbA1c was 7.2 % on 2022, which is near goal.     She has orders for a HgbA1c. She was encouraged to have this drawn. Labs were also ordered to be done prior to her return visit in 6 months:   - Hemoglobin A1C; Future  - Lipid Panel; Future    3. Essential hypertension  Her blood pressure is adequately-controlled today. (BP: 130/76)   She was advised to continue current medications. She states that she does not need a refill today. She has orders for a comprehensive panel and lipid panel. She was encouraged to have these drawn.    - Comprehensive Metabolic Panel; Future was also ordered to be done prior to her return visit in 6 months. 4. Intractable tension-type headache, unspecified chronicity pattern  Cymbalta was refilled:  - DULoxetine (CYMBALTA) 60 MG extended release capsule; Take 1 capsule by mouth daily  Dispense: 90 capsule; Refill: 1    5. Unsteady gait  A referral was ordered to physical therapy:  - Ambulatory referral to Physical Therapy    6. Chronic left shoulder pain  7. Other symptoms and signs involving the musculoskeletal system   Radiculopathy vs. other  An EMG and x-rays of the cervical spine were ordered:  - EMG; Future  - XR CERVICAL SPINE (2-3 VIEWS); Future    She will be contacted when the results are available. Meloxicam was prescribed:  - meloxicam (MOBIC) 7.5 MG tablet; Take 1 tablet by mouth daily  Dispense: 30 tablet; Refill: 0    8. Vitamin D deficiency  Her 25-hydroxy Vitamin D level was within normal limits (45.2 ng/mL) on 7/27/2022. She was advised to continue with her current dose of Vitamin D.       - Vitamin D 25 Hydroxy; Future was ordered to be done prior to her return visit in 6 months. 9.  Routine health maintenance  Health maintenance was reviewed with the patient. She has received three doses of the Pfizer Covid-19 vaccine, and one dose of the [employer] vaccine. She was advised that an additional dose of a Covid vaccine is recommended this fall. Annual influenza vaccine was recommended. AUKLODT-11 and Shingrix were recommended. All other health maintenance, including Tdap, is up to date at this time. Follow up instructions were given to the patient:  Return in 6 months (on 3/13/2023) for diabetes, hypertension. Subjective:    Lui Pan is a 77 y.o. female who presents to [place] [address] today (9/13/2022) for follow up/evaluation of:  Medicare AWV, Diabetes, Hypertension, Other (Vit D/), Arm Pain (Patient reports chronic left arm pain. Has had Xrays. Has had cortisone shot which was ineffective. Was encouraged to see ortho, but would like a second opinion. Rates pain 6/10), and Depression (Patient reports mood has been overall pretty good. )      She is here today for a Medicare Wellness Visit, as well as follow up of type 2 diabetes and hypertension. She also has additional concerns. She reports pain in the left arm for over 3 months. She states that the pain began after she got a Moderna Covid-19 vaccine on 5/25/2022. She was seen in Urgent Care on 6/16/2022. X-rays showed mild AC degenerative change. She received an IM Solumedrol injection. She returned to Urgent Care on 6/29/2022, and she received a Kenalog injection in the left shoulder. She continues to have pain in the left shoulder.          She has received three doses of the [employer] vaccine, and one dose of Pfizer Covid-19 vaccine. The most recent dose was 5/25/2022. She also had Covid in September 2021.        Immunization history was reviewed:  Immunization History   Administered Date(s) Administered    COVID-19, MODERNA Booster BLUE border, (age 18y+), IM, 50mcg/0.25mL 05/25/2022    COVID-19, PFIZER PURPLE top, DILUTE for use, (age 15 y+), 30mcg/0.3mL 02/27/2021, 03/20/2021, 11/10/2021    DTP 07/07/2011    Influenza, AFLURIA (age 1 yrs+), FLUZONE, (age 10 mo+), MDV, 0.5mL 01/23/2018    Influenza, FLUAD, (age 72 y+), Adjuvanted, 0.5mL 10/20/2020, 10/18/2021    Influenza, FLUARIX, FLULAVAL, FLUZONE (age 10 mo+) AND AFLURIA, (age 1 y+), PF, 0.5mL 02/12/2020    Pneumococcal Conjugate 13-valent (Kovuhqc25) 07/28/2021    Pneumococcal Polysaccharide ([id number]) 08/31/2015    Tdap (Boostrix, Adacel) 11/16/2012          She has the following problem list:  Patient Active Problem List   Diagnosis    Shoulder pain, bilateral    Cervical spine pain    Knee pain, left    Essential hypertension    Pulmonary hypertension ([address]    Vitamin D deficiency    Encounter for long-term (current) use of other medications    Headache    Subacute sphenoidal sinusitis    Type 2 diabetes mellitus with microalbuminuria (HCC)    Peripheral neuropathy    Dizziness    H/O fall    Chronic sinusitis    Gastroesophageal reflux disease    Microalbuminuria    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HCC)    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[address]    Type 2 diabetes mellitus with microalbuminuria, without long-term current use of insulin ([address]    Morbid obesity with BMI of 40.0-44.9, adult ([address]    Restrictive lung disease secondary to obesity    Calculus of gallbladder and bile duct without cholecystitis or obstruction    Nausea    Multiple gastric ulcers    Anxiety    Chronic tension-type headache, intractable    Chronic, continuous use of opioids    Complete tear of right rotator cuff    Depression    Fatigue    Lumbosacral spondylosis without myelopathy Major depressive disorder, single episode, moderate (HCC)    Refractory migraine    Muscle spasms of neck    Obsessive-compulsive disorders    Spondylosis of lumbar spine    Spondylosis of thoracic region without myelopathy or radiculopathy    Spinal stenosis, lumbar region, without neurogenic claudication    Femur fracture, left (HCC)    Elizalde's esophagus without dysplasia    Therapeutic opioid-induced constipation (OIC)    Stage 3a chronic kidney disease ([facility]    Pneumonia due to COVID-19 virus    Acute cystitis with hematuria    Spongiotic dermatitis    Osteopenia        Current medications are:  Outpatient Medications Marked as Taking for the 9/13/22 encounter (Office Visit) with Devante Minor MD   Medication Sig Dispense Refill    ondansetron (ZOFRAN-ODT) 4 MG disintegrating tablet dissolve 1 tablet ON TONGUE every 6 hours if needed for nausea OR vomiting 30 tablet 1    ondansetron (ZOFRAN ODT) 4 MG disintegrating tablet Take 1 tablet by mouth every 8 hours as needed for Nausea or Vomiting 30 tablet 0    ondansetron (ZOFRAN ODT) 4 MG disintegrating tablet Take 1 tablet by mouth every 8 hours as needed for Nausea or Vomiting 30 tablet 0    ondansetron (ZOFRAN ODT) 4 MG disintegrating tablet Place 1 tablet under the tongue every 6 hours as needed for Nausea or Vomiting 10 tablet 0    ondansetron (ZOFRAN ODT) 4 MG disintegrating tablet Take 1 tablet by mouth every 8 hours as needed for Nausea or Vomiting 30 tablet 0    lisinopril-hydroCHLOROthiazide (PRINZIDE;ZESTORETIC) 20-12.5 MG per tablet Take 1 tablet by mouth in the morning.  90 tablet 3    pioglitazone-metFORMIN (ACTOPLUS MET)  MG per tablet TAKE 1 TABLET TWICE A DAY WITH MEALS 180 tablet 0    HYDROcodone-acetaminophen (NORCO) 5-325 MG per tablet       M-DRYL 12.5 MG/5ML liquid take 5 milliliters by mouth at bedtime for itching or allergies      diphenhydrAMINE (BENADRYL) 12.5 MG/5ML elixir Take 5 mLs by mouth nightly as needed for Itching or Allergies (can use of nausea as well.) 118 mL 2    famotidine (PEPCID) 20 MG tablet Take 1 tablet by mouth nightly as needed (dyspepsia) 30 tablet 3    blood glucose monitor strips Test 1 times a day & as needed for symptoms of irregular blood glucose. Dispense sufficient amount for indicated testing frequency plus additional to accommodate PRN testing needs. 100 strip 1    DULoxetine (CYMBALTA) 60 MG extended release capsule Take 1 capsule by mouth daily 90 capsule 1    pantoprazole (PROTONIX) 40 MG tablet Take 1 tablet by mouth 2 times daily take 1 tablet by mouth daily 60 tablet 5    Cholecalciferol (VITAMIN D3) 1.25 MG (37747 UT) CAPS TAKE 1 CAPSULE ONCE A WEEK 13 capsule 1    Gabapentin (NEURONTIN PO) Take 600 mg by mouth 5 times daily Only taking 3 times a day. She is allergic to asa [aspirin]. She  reports that she has never smoked. She has never used smokeless tobacco.      Objective:    Vitals:    09/13/22 1632   BP: 130/76   Site: Right Upper Arm   Position: Sitting   Cuff Size: Large Adult   Pulse: 83   Temp: 98.1 °F (36.7 °C)   TempSrc: Temporal   SpO2: 96%   Weight: 286 lb 6.4 oz (129.9 kg)   Height: 5' 8\" (1.727 m)     Body mass index is 43.55 kg/m². Extremely obese female, healthy-appearing, alert, cooperative, and in no acute distress. Neck supple. No adenopathy. Thyroid symmetric, normal size. There is good range of motion of the neck. There is mild tenderness to palpation of the neck on the left. Chest:  Normal expansion. Clear to auscultation. No rales, rhonchi, or wheezing. Heart sounds are normal.  Regular rate and rhythm without murmur, gallop or rub. Lower extremities have no edema. Strength and sensation are symmetric in the upper extremities bilaterally. Affect is bright. Thought processes are logical. There is no evidence of paranoia or delusions. Responses to questions are appropriate. Dress and grooming are appropriate. She has had no recent labs.     The urgent care progress notes from 6/16/2022 and 6/29/2022 were reviewed.            (Please note that portions of this note were completed with a voice-recognition program. Efforts were made to edit the dictation but occasionally words are mis-transcribed.)

## 2022-09-15 ENCOUNTER — HOSPITAL ENCOUNTER (OUTPATIENT)
Dept: GENERAL RADIOLOGY | Age: 67
Discharge: HOME | End: 2022-09-17
Payer: MEDICARE

## 2022-09-15 DIAGNOSIS — G89.29 CHRONIC LEFT SHOULDER PAIN: ICD-10-CM

## 2022-09-15 DIAGNOSIS — M25.512 CHRONIC LEFT SHOULDER PAIN: ICD-10-CM

## 2022-09-15 PROCEDURE — 72040 X-RAY EXAM NECK SPINE 2-3 VW: CPT

## 2022-09-19 DIAGNOSIS — R11.0 NAUSEA: ICD-10-CM

## 2022-09-19 RX ORDER — ONDANSETRON 4 MG/1
4 TABLET, ORALLY DISINTEGRATING ORAL EVERY 6 HOURS PRN
Qty: 10 TABLET | Refills: 1 | Status: SHIPPED | OUTPATIENT
Start: 2022-09-19 | End: 2022-09-27 | Stop reason: SDUPTHER

## 2022-09-19 NOTE — TELEPHONE ENCOUNTER
Patient reporting taking 4 zofran daily. Feels like she is nauseated all the time.      Pending for review

## 2022-09-21 DIAGNOSIS — G89.29 CHRONIC LEFT SHOULDER PAIN: Primary | ICD-10-CM

## 2022-09-21 DIAGNOSIS — M25.512 CHRONIC LEFT SHOULDER PAIN: Primary | ICD-10-CM

## 2022-09-21 DIAGNOSIS — M25.512 ACUTE PAIN OF LEFT SHOULDER: ICD-10-CM

## 2022-09-21 DIAGNOSIS — M19.012 PRIMARY OSTEOARTHRITIS OF LEFT SHOULDER: ICD-10-CM

## 2022-09-21 DIAGNOSIS — G44.201 INTRACTABLE TENSION-TYPE HEADACHE, UNSPECIFIED CHRONICITY PATTERN: ICD-10-CM

## 2022-09-21 RX ORDER — DULOXETIN HYDROCHLORIDE 60 MG/1
60 CAPSULE, DELAYED RELEASE ORAL DAILY
Qty: 10 CAPSULE | Refills: 0 | Status: CANCELLED | OUTPATIENT
Start: 2022-09-21 | End: 2022-10-01

## 2022-09-21 RX ORDER — DULOXETIN HYDROCHLORIDE 60 MG/1
60 CAPSULE, DELAYED RELEASE ORAL DAILY
Qty: 10 CAPSULE | Refills: 0 | Status: SHIPPED | OUTPATIENT
Start: 2022-09-21

## 2022-09-21 NOTE — TELEPHONE ENCOUNTER
----- Message from Heraclio Marks MD sent at 9/21/2022  1:21 PM EDT -----  Please advise the patient that the x-ray shows mild degenerative changes. Physical therapy may help. Please order a referral for physical therapy. Thank you.

## 2022-09-21 NOTE — TELEPHONE ENCOUNTER
----- Message from Navjot Nguyen MD sent at 9/21/2022  1:21 PM EDT -----  Please advise the patient that the x-ray shows mild degenerative changes. Physical therapy may help. Please order a referral for physical therapy. Thank you.

## 2022-09-22 ENCOUNTER — TELEPHONE (OUTPATIENT)
Dept: FAMILY MEDICINE CLINIC | Age: 67
End: 2022-09-22

## 2022-09-26 ENCOUNTER — HOSPITAL ENCOUNTER (OUTPATIENT)
Dept: PHYSICAL THERAPY | Age: 67
Setting detail: THERAPIES SERIES
Discharge: HOME | End: 2022-09-26
Payer: MEDICARE

## 2022-09-26 PROCEDURE — 97161 PT EVAL LOW COMPLEX 20 MIN: CPT

## 2022-09-26 ASSESSMENT — PAIN DESCRIPTION - LOCATION: LOCATION: SHOULDER

## 2022-09-26 ASSESSMENT — PAIN DESCRIPTION - PAIN TYPE: TYPE: CHRONIC PAIN

## 2022-09-26 ASSESSMENT — PAIN - FUNCTIONAL ASSESSMENT: PAIN_FUNCTIONAL_ASSESSMENT: PREVENTS OR INTERFERES WITH MANY ACTIVE NOT PASSIVE ACTIVITIES

## 2022-09-26 ASSESSMENT — PAIN DESCRIPTION - ORIENTATION: ORIENTATION: LEFT

## 2022-09-26 ASSESSMENT — PAIN DESCRIPTION - FREQUENCY: FREQUENCY: CONTINUOUS

## 2022-09-26 ASSESSMENT — PAIN DESCRIPTION - DESCRIPTORS: DESCRIPTORS: SHOOTING

## 2022-09-26 ASSESSMENT — PAIN SCALES - GENERAL: PAINLEVEL_OUTOF10: 6

## 2022-09-26 NOTE — PLAN OF CARE
[facility]    [x] [place]  Phone: [phone]  Fax: [phone]      [] [place]  Phone: [phone]  Fax: [phone]        To:  Aggie Rebollar MD      Patient: Wyatt Miranda  : 1955   MRN: [id number]  Evaluation Date: 2022      Diagnosis Information:  Diagnosis: M25.512, G89.29 (ICD-10-CM) - Chronic left shoulder pain  M19.012 (ICD-10-CM) - Primary osteoarthritis of left shoulder  M25.512 (ICD-10-CM) - Acute pain of left shoulder  R26.81 (ICD-10-CM) - Unsteady gait         Physical Therapy Certification    Dear Dr. Aggie Rebollar MD  The following patient has been evaluated for physical therapy services and for therapy to continue, Medicare requires monthly physician review of the treatment plan. Please review the attached evaluation and/or summary of the patient's plan of care, and verify that you agree therapy should continue by signing the attached document and sending it back to our office. Plan of Care/Treatment to date:  [x] Therapeutic Exercise    [x] Modalities:  [x] Therapeutic Activity     [x] Ultrasound  [x] Electrical Stimulation  [] Gait Training      [] Cervical Traction [] Lumbar Traction  [x] Neuromuscular Re-education    [x] Cold/hotpack [] Iontophoresis   [x] Instruction in HEP     Other:  [x] Manual Therapy      []             [] Aquatic Therapy      []                 Goals:  Short Term Goals  Time Frame for Short term goals: 2 week  Short term goal 1: Provide written HEP    Long Term Goals  Time Frame for Long term goals : 6 weeks  Long term goal 1: Patient will report no greater than 2-3/10 left shoulder pain to allow patient to complete her daily ADLs with greater ease. Long term goal 2: Patient will score greater than or = to 66/80 on the UEFI to allow patient to complete her daily housework with greater ease.   Long term goal 3: Patient will demonstrate improved AROM of left shoulder to 160 dergees of flexion and abduction, and IR to L1/2 to allow patient to reach overhead and behind her back with greater ease. Long term goal 4: Patient will demonstrate 4+/5 left shoulder strength to allow patient to lift and carry items with greater ease. Long term goal 5: Patient will demonstrate ability to hold bilateral tandem stance for 10 seconds to decrease risk of falls. Additional Goals?: Yes  Long term goal 6: Patient will report compliance with HEP for continued progression following PT. Frequency/Duration: 9/26/22 - 11/4/22  # Days per week: [] 1 day # Weeks: [] 1 week [] 5 weeks     [x] 2 days   [] 2 weeks [x] 6 weeks     [] 3 days   [] 3 weeks [] 7 weeks     [] 4 days   [] 4 weeks [] 8 weeks    Rehab Potential: [] Excellent [x] Good [] Fair  [] Poor     Electronically signed by:  Mitzy Gómez PT    If you have any questions or concerns, please don't hesitate to call.   Thank you for your referral.      Physician Signature:________________________________Date:__________________  By signing above, therapists plan is approved by physician

## 2022-09-26 NOTE — PROGRESS NOTES
Physical Therapy  Initial Assessment  Date: 2022  Patient Name: Lorene Carcamo  MRN: [id number]  : 1955    Referring Physician: MD Negar Eric MD   PCP: Negar Rg MD     Medical Diagnosis: Chronic left shoulder pain [M25.512, G89.29]  Primary osteoarthritis of left shoulder [M19.012]  Acute pain of left shoulder [M25.512] M25.512, G89.29 (ICD-10-CM) - Chronic left shoulder pain  M19.012 (ICD-10-CM) - Primary osteoarthritis of left shoulder  M25.512 (ICD-10-CM) - Acute pain of left shoulder  R26.81 (ICD-10-CM) - Unsteady gait  No data recorded    Insurance: Payor: Cory Blackwell / Plan: Cipriano Lang PPO / Product Type: Medicare /   Insurance ID: [id number] - (Medicare Managed)      Restrictions:       Subjective:   General  Chart Reviewed: Yes  Patient Assessed for Rehabilitation Services: Yes  History obtained from[de-identified] Patient, Chart Review  Family/Caregiver Present: No  Diagnosis: M25.512, G89.29 (ICD-10-CM) - Chronic left shoulder pain  M19.012 (ICD-10-CM) - Primary osteoarthritis of left shoulder  M25.512 (ICD-10-CM) - Acute pain of left shoulder  R26.81 (ICD-10-CM) - Unsteady gait  Referring Provider (secondary): Negar Rg MD  Follows Commands: Within Functional Limits  PT Visit Information  PT Insurance Information: AET[employer] MEDICARE  Subjective  Subjective: Patient reports that she has left shoulder pain which started in May 2022 right after she had the [employer] booster. Patient notes that she was also reaching down and lifting items with her left shoulder which could also be a contributing factor. Patient had an corticosteroid injection in the left shoulder and notes that she did not see any pain releid. Patient saw Dr. Dangelo Platt for a second opinion who ordered x-ray / EMG (), perscribed meloxicam, and ordered outpatient PT. Denies any numbness or tingling. Denies radiating pain into her L hand.  Notes that she has forgotten to take Meloxicam. Patient notes that she also has LBP which has been going on for a few years. Noting that last year she tripped when running to the bathroom and fractured her femur. Notes that she feels unsteady when walking noting that she has to use a walker or holds onto items around home otherwise she will lose her balance. Denies any dizziness. Prior diagnostic testing[de-identified] X-ray (Cervical spine:   Mild degenerative change. L shoulder: Mild AC degenerative  change.)  Previous treatments prior to current episode?: Injections  Dominant Hand: : Right  Pain Screening  Patient Currently in Pain: Yes  Pain Assessment: 0-10  Pain Level: 6  Best Pain Level: 4  Worst Pain Level: 9  Pain Type: Chronic pain  Pain Location: Shoulder  Pain Orientation: Left  Pain Radiating Towards: Denies radiating pain/numbness or tingling. Pain Descriptors: Shooting  Pain Frequency: Continuous  Functional Pain Assessment: Prevents or interferes with many active not passive activities  Aggravating factors: Reaching, Movement, Lifting, Carrying (reaching behind, reaching overhead)  Pain Management/Relieving Factors: Rest, Ice       Vision/Hearing:  Vision  Vision: Impaired  Visual Deficits: Wears glasses  Hearing  Hearing: Within functional limits    Orientation:  Orientation  Overall Orientation Status: Within Normal Limits  Follows Commands: Within Functional Limits    Social History:  Social History  Lives With: Spouse  Type of Home: House  Home Layout: Two level (level entry into garage. 6 stairs to front door, 6 stairs up to living room/bedroom/bathroom.  bilateral handrails with stairs.)  Bathroom Shower/Tub: Tub/Shower unit  Bathroom Toilet: Standard  Bathroom Equipment: Shower chair  Home Equipment: Rollator;Electric scooter    Functional Status:  Functional Status  Prior level of function: Independent  Occupation: Retired  Receives Help From: Family  ADL Assistance: Independent  Homemaking Assistance: Independent  Homemaking Responsibilities: Yes (helps with some, but  completes most)  Ambulation Assistance: Independent  Transfer Assistance: Independent  Active : Yes  Mode of Transportation: [facility]    Objective:          AROM RUE (degrees)  RUE AROM : Exceptions  R Shoulder Flexion 0-180: 160  R Shoulder ABduction 0-180: 160  R Shoulder Int Rotation  0-70: T10/11  R Shoulder Ext Rotation 0-90: T2/3  PROM LUE (degrees)  LUE PROM: Exceptions  L Shoulder Flex  0-170: 170  L Shoulder ABduction 0-140: 150  L Shoulder Int Rotation  0-70: 48  L Shoulder Ext Rotation  0-90: 65  AROM LUE (degrees)  LUE AROM : Exceptions  LUE General AROM: pain noted in all directions, but worse with IR and Abd  L Shoulder Flexion 0-180: 155  L Shoulder ABduction 0-180: 125  L Shoulder Int Rotation  0-70: L lateral buttock  L Shoulder Ext Rotation  0-90: T2/3  Spine  Cervical: F - WNL, E - approx 60, R rot - 60, L Rot -  60, R lat flex - 28 deg with slight pain noted, L lat flexion - 35 degrees (pain only noted with R lateral flexion)  Special Tests: - spurlings (no change noted), repeated protraction - slight increase in L shoulder pain, repeated retractino - slight pain in L shoulder but not as bad a protraction; pain with cross body adduction, increase pain with extension, + blue, + neer, - drop arm, - manual distraction - no change noted    Strength RUE  Strength RUE: Exception  R Shoulder Flexion: 4/5  R Shoulder Extension: 4+/5  R Shoulder ABduction: 4+/5  R Shoulder Internal Rotation: 4+/5  R Shoulder External Rotation: 4+/5  Strength LUE  Strength LUE: Exception  L Shoulder Flexion: 4-/5  L Shoulder Extension: 4+/5  L Shoulder ABduction: 4/5  L Shoulder Internal Rotation: 4+/5  L Shoulder External Rotation: 4/5             Assessment:    Conditions Requiring Skilled Therapeutic Intervention  Body Structures, Functions, Activity Limitations Requiring Skilled Therapeutic Intervention: Decreased functional mobility ; Decreased ADL status; Decreased ROM; Decreased strength;Decreased balance; Increased pain;Decreased posture  Therapy Prognosis: Good  Referring Provider (secondary): Shane Moore MD  Activity Tolerance  Activity Tolerance: Patient tolerated evaluation without incident  Activity Tolerance: Patient tolerated evaluation without incident         Plan:    Plan  Plan weeks: 6 weeks  Current Treatment Recommendations: Strengthening, ROM, Balance training, Functional mobility training, ADL/Self-care training, Manual Therapy - Soft Tissue Mobilization, Manual Therapy - Joint Manipulation, Pain management, Home exercise program, Safety education & training, Patient/Caregiver education & training, Equipment evaluation, education, & procurement, Modalities, Therapeutic activities        OutComes Score:   UEFI - 57/80                           Goals:  Short Term Goals  Time Frame for Short term goals: 2 week  Short term goal 1: Provide written HEP  STG Goal 1 Status[de-identified] New  Long Term Goals  Time Frame for Long term goals : 6 weeks  Long term goal 1: Patient will report no greater than 2-3/10 left shoulder pain to allow patient to complete her daily ADLs with greater ease. LTG Goal 1 Status[de-identified] New  Long term goal 2: Patient will score greater than or = to 66/80 on the UEFI to allow patient to complete her daily housework with greater ease. LTG Goal 2 Status[de-identified] New  Long term goal 3: Patient will demonstrate improved AROM of left shoulder to 160 degrees of flexion and abduction, and IR to L1/2 to allow patient to reach overhead and behind her back with greater ease. LTG Goal 3 Status[de-identified] New  Long term goal 4: Patient will demonstrate 4+/5 left shoulder strength to allow patient to lift and carry items with greater ease. LTG Goal 4 Status[de-identified] New  Long term goal 5: Patient will demonstrate ability to hold bilateral tandem stance for 10 seconds to decrease risk of falls.   LTG Goal 5 Status[de-identified] New  Additional Goals?: Yes  Long term goal 6: Patient will report compliance with HEP for continued progression following PT.        Therapy Time:   Individual Concurrent Group Co-treatment   Time In 3796         Time Out 1215         [place], [place]

## 2022-09-26 NOTE — PROGRESS NOTES
Physical Therapy    Physical Therapy Daily Treatment Note    Date:  2022    Patient Name:  Wyatt Miranda    :  1955  MRN: [id number]  Restrictions/Precautions:     Medical/Treatment Diagnosis Information:   Diagnosis: M25.512, G89.29 (ICD-10-CM) - Chronic left shoulder pain  M19.012 (ICD-10-CM) - Primary osteoarthritis of left shoulder  M25.512 (ICD-10-CM) - Acute pain of left shoulder  R26.81 (ICD-10-CM) - Unsteady gait  Insurance/Certification information:  PT Insurance Information: [address] [place] [address]  Physician Information:   Aggie Rebollar MD  Plan of care signed (Y/N):  N  Visit# / total visits:    Pain level: 6/10       Time In: 6733   Time Out: 379    Progress Note: [x]  Yes  []  No  Next due by: Visit #10  or by 22    Subjective:   see eval     Objective: see eval   Observation:   Test measurements:      Exercises:   Exercise/Equipment Resistance/Repetitions Other comments        Pulley's           Levator Scap Stretch      Upper Trap Stretch      Scapular Retractions           Supine cervical retraction     Supine cervical retraction with ext     Supine pec stretch           Doorway pec stretch     Shoulder ext stretch      IR  with strap stretch      Supine wand chest press          Supine Wand flexion AAROM      Supine Wand ER AAROM           Table Slides - flexion/scaption           TB rows/ext     PROM          Tandem stance          [] Provided verbal/tactile cueing for activities related to strengthening, flexibility, endurance, ROM. (45206)  [] Provided verbal/tactile cueing for activities related to improving balance, coordination, kinesthetic sense, posture, motor skill, proprioception. (08117)    Therapeutic Activities:     [] Therapeutic activities, direct (one-on-one) patient contact (use of dynamic activities to improve functional performance). (35702)    Gait:   [] Provided training and instruction to the patient for ambulation re-education.  (74302)    Self-Care/ADL's  [] Self-care/home management training and compensatory training, meal preparation, safety procedures, and instructions in use of assistive technology devices/adaptive equipment, direct one-on-one contact. (64522)    Home Exercise Program:     [] Reviewed/Progressed HEP activities related to strengthening, flexibility, endurance, ROM. (18625)  [] Reviewed/Progressed HEP activities related to improving balance, coordination, kinesthetic sense, posture, motor skill, proprioception.  (38165)    Manual Treatments:    [] Provided manual therapy to mobilize soft tissue/joints for the purpose of modulating pain, promoting relaxation,  increasing ROM, reducing/eliminating soft tissue swelling/inflammation/restriction, improving soft tissue extensibility. (91955)    Service Based Modalities:  37    Timed Code Treatment Minutes: Total Treatment Minutes:   37    Treatment/Activity Tolerance:  [x] Patient tolerated treatment well [] Patient limited by fatigue  [] Patient limited by pain  [] Patient limited by other medical complications  [] Other:     Prognosis: [x] Good [] Fair  [] Poor    Patient Requires Follow-up: [x] Yes  [] No      Goals:  Short Term Goals  Time Frame for Short term goals: 2 week  Short term goal 1: Provide written HEP    Long Term Goals  Time Frame for Long term goals : 6 weeks  Long term goal 1: Patient will report no greater than 2-3/10 left shoulder pain to allow patient to complete her daily ADLs with greater ease. Long term goal 2: Patient will score greater than or = to 66/80 on the UEFI to allow patient to complete her daily housework with greater ease. Long term goal 3: Patient will demonstrate improved AROM of left shoulder to 160 degrees of flexion and abduction, and IR to L1/2 to allow patient to reach overhead and behind her back with greater ease. Long term goal 4: Patient will demonstrate 4+/5 left shoulder strength to allow patient to lift and carry items with greater ease.   Long term goal 5: Patient will demonstrate ability to hold bilateral tandem stance for 10 seconds to decrease risk of falls. Long term goal 6: Patient will report compliance with HEP for continued progression following PT.       Plan:   [] Continue per plan of care [] Alter current plan (see comments)  [x] Plan of care initiated [] Hold pending MD visit [] Discharge    Plan for Next Session:  Provide written HEP    Electronically signed by:  Elver De La Cruz PT

## 2022-09-27 ENCOUNTER — OFFICE VISIT (OUTPATIENT)
Dept: GASTROENTEROLOGY | Age: 67
End: 2022-09-27
Payer: MEDICARE

## 2022-09-27 ENCOUNTER — TELEPHONE (OUTPATIENT)
Dept: GASTROENTEROLOGY | Age: 67
End: 2022-09-27

## 2022-09-27 VITALS
HEIGHT: 68 IN | OXYGEN SATURATION: 97 % | SYSTOLIC BLOOD PRESSURE: 130 MMHG | HEART RATE: 74 BPM | BODY MASS INDEX: 42.89 KG/M2 | WEIGHT: 283 LBS | DIASTOLIC BLOOD PRESSURE: 70 MMHG

## 2022-09-27 DIAGNOSIS — K21.9 GASTROESOPHAGEAL REFLUX DISEASE WITHOUT ESOPHAGITIS: Primary | ICD-10-CM

## 2022-09-27 DIAGNOSIS — R11.0 NAUSEA: ICD-10-CM

## 2022-09-27 PROCEDURE — 99213 OFFICE O/P EST LOW 20 MIN: CPT | Performed by: INTERNAL MEDICINE

## 2022-09-27 PROCEDURE — 1123F ACP DISCUSS/DSCN MKR DOCD: CPT | Performed by: INTERNAL MEDICINE

## 2022-09-27 RX ORDER — SUCRALFATE 1 G/1
1 TABLET ORAL 4 TIMES DAILY
Qty: 120 TABLET | Refills: 3 | Status: SHIPPED | OUTPATIENT
Start: 2022-09-27

## 2022-09-27 RX ORDER — ONDANSETRON 4 MG/1
4 TABLET, ORALLY DISINTEGRATING ORAL EVERY 6 HOURS PRN
Qty: 10 TABLET | Refills: 1 | Status: SHIPPED | OUTPATIENT
Start: 2022-09-27 | End: 2022-10-05 | Stop reason: SDUPTHER

## 2022-09-27 ASSESSMENT — ENCOUNTER SYMPTOMS
TROUBLE SWALLOWING: 0
RECTAL PAIN: 0
BLOOD IN STOOL: 0
WHEEZING: 0
SHORTNESS OF BREATH: 1
ABDOMINAL PAIN: 0
NAUSEA: 1
VOICE CHANGE: 0
ANAL BLEEDING: 0
CHOKING: 0
COUGH: 0
CONSTIPATION: 0
VOMITING: 0
DIARRHEA: 0
ABDOMINAL DISTENTION: 0

## 2022-09-27 NOTE — PROGRESS NOTES
GI FOLLOW UP    INTERVAL HISTORY:       Chronic nausea symptoms with intermittent constipation      No chief complaint on file. No diagnosis found. HISTORY OF PRESENT ILLNESS: Ms.Nancy Marcos Wolf is a 77 y.o. female with a past history remarkable for history of cervical spine pain, on Norco, prior history of COVID-19, diabetes, dizziness, obesity, peripheral neuropathy, prior pulmonary hypertension, diabetes recent upper endoscopy and colonoscopy, prior hysterectomy, referred for evaluation of chronic nausea symptoms. .    Past Medical,Family, and Social History reviewed and does contribute to the patient presenting condition. Patient's PMH/PSH,SH,PSYCH Hx, MEDs, ALLERGIES, and ROS were all reviewed and updated in the appropriate sections.     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    Obesity     Peripheral neuropathy     Pneumonia 8/30/2015    Pulmonary hypertension ([address] 01/01/2012    by echocardiogram    Shoulder pain, bilateral 08/21/2013    Type 2 diabetes mellitus ([address]     Unspecified essential hypertension     Essential hypertension    Vitamin D deficiency 11/05/2014       Past Surgical History:   Procedure Laterality Date    APPENDECTOMY      BACK INJECTION  02/25/2021    [facility] Right lumbar medial branch block using Fluroscopy    BRAIN SURGERY  05/24/2016    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, status post left temporal craniotomy for closure of capsule, Rfl: 0    ondansetron (ZOFRAN ODT) 4 MG disintegrating tablet, Place 1 tablet under the tongue every 6 hours as needed for Nausea or Vomiting, Disp: 10 tablet, Rfl: 1    meloxicam (MOBIC) 7.5 MG tablet, Take 1 tablet by mouth daily, Disp: 30 tablet, Rfl: 0    ondansetron (ZOFRAN-ODT) 4 MG disintegrating tablet, dissolve 1 tablet ON TONGUE every 6 hours if needed for nausea OR vomiting, Disp: 30 tablet, Rfl: 1    ondansetron (ZOFRAN ODT) 4 MG disintegrating tablet, Take 1 tablet by mouth every 8 hours as needed for Nausea or Vomiting, Disp: 30 tablet, Rfl: 0    ondansetron (ZOFRAN ODT) 4 MG disintegrating tablet, Take 1 tablet by mouth every 8 hours as needed for Nausea or Vomiting, Disp: 30 tablet, Rfl: 0    ondansetron (ZOFRAN ODT) 4 MG disintegrating tablet, Take 1 tablet by mouth every 8 hours as needed for Nausea or Vomiting, Disp: 30 tablet, Rfl: 0    lisinopril-hydroCHLOROthiazide (PRINZIDE;ZESTORETIC) 20-12.5 MG per tablet, Take 1 tablet by mouth in the morning., Disp: 90 tablet, Rfl: 3    pioglitazone-metFORMIN (ACTOPLUS MET)  MG per tablet, TAKE 1 TABLET TWICE A DAY WITH MEALS, Disp: 180 tablet, Rfl: 0    HYDROcodone-acetaminophen (NORCO) 5-325 MG per tablet, , Disp: , Rfl:     M-DRYL 12.5 MG/5ML liquid, take 5 milliliters by mouth at bedtime for itching or allergies, Disp: , Rfl:     diphenhydrAMINE (BENADRYL) 12.5 MG/5ML elixir, Take 5 mLs by mouth nightly as needed for Itching or Allergies (can use of nausea as well.), Disp: 118 mL, Rfl: 2    famotidine (PEPCID) 20 MG tablet, Take 1 tablet by mouth nightly as needed (dyspepsia), Disp: 30 tablet, Rfl: 3    blood glucose monitor strips, Test 1 times a day & as needed for symptoms of irregular blood glucose. Dispense sufficient amount for indicated testing frequency plus additional to accommodate PRN testing needs. , Disp: 100 strip, Rfl: 1    pantoprazole (PROTONIX) 40 MG tablet, Take 1 tablet by mouth 2 times daily take 1 tablet by mouth wheezing. Abdomen:      Detailed by MA   Neuro:  No focal weakness, abnormal movements or seizure like activity. Skin:   No rashes, no itching. :   No hematuria, no pyuria, no dysuria, no flank pain. Extremities:  No swelling or joint pains. ROS was otherwise negative    Review of Systems   Constitutional:  Negative for appetite change, fatigue and unexpected weight change (25 # x 3 months). HENT:  Negative for trouble swallowing and voice change. Eyes:  Negative for visual disturbance. Respiratory:  Positive for shortness of breath (with exertion). Negative for cough, choking and wheezing. Cardiovascular:  Negative for chest pain, palpitations and leg swelling. Gastrointestinal:  Positive for nausea. Negative for abdominal distention, abdominal pain, anal bleeding, blood in stool, constipation, diarrhea, rectal pain and vomiting. Genitourinary:  Negative for difficulty urinating. Neurological:  Negative for dizziness, seizures, weakness, numbness and headaches. Hematological:  Does not bruise/bleed easily. Psychiatric/Behavioral:  Negative for confusion and sleep disturbance. The patient is nervous/anxious. PHYSICAL EXAMINATION: Vital signs reviewed per the nursing documentation. LMP 08/24/2010 (Approximate)   There is no height or weight on file to calculate BMI. Physical Exam    Physical Exam   Constitutional: Patient is oriented to person, place, and time. Patient appears well-developed and well-nourished. HENT:   Head: Normocephalic and atraumatic. Eyes: Pupils are equal, round, and reactive to light. EOM are normal.   Neck: Normal range of motion. Neck supple. No JVD present. No tracheal deviation present. No thyromegaly present. Cardiovascular: Normal rate, regular rhythm, normal heart sounds and intact distal pulses. Pulmonary/Chest: Effort normal and breath sounds normal. No stridor. No respiratory distress. He has no wheezes. He has no rales.  He exhibits no tenderness. Abdominal: Soft. Bowel sounds are normal. He exhibits no distension and no mass. There is no tenderness. There is no rebound and no guarding. No hernia. Musculoskeletal: Normal range of motion. Lymphadenopathy:    Patient has no cervical adenopathy. Neurological: Patient is alert and oriented to person, place, and time. Psychiatric: Patient has a normal mood and affect. Patient behavior is normal.       LABORATORY DATA: Reviewed  Lab Results   Component Value Date    WBC 8.0 07/27/2022    HGB 11.4 (L) 07/27/2022    HCT 35.3 (L) 07/27/2022    MCV 91.9 07/27/2022     07/27/2022     07/27/2022    K 4.0 07/27/2022    CL 97 (L) 07/27/2022    CO2 30 07/27/2022    BUN 28 (H) 07/27/2022    CREATININE 1.46 (H) 07/27/2022    LABALBU 4.4 04/04/2022    BILITOT 0.33 04/04/2022    ALKPHOS 146 (H) 04/04/2022    AST 14 04/04/2022    ALT 5 04/04/2022    INR 1.1 09/20/2021         Lab Results   Component Value Date    RBC 3.84 (L) 07/27/2022    HGB 11.4 (L) 07/27/2022    MCV 91.9 07/27/2022    MCH 29.7 07/27/2022    MCHC 32.3 07/27/2022    RDW 14.2 07/27/2022    MPV 10.2 07/27/2022    BASOPCT 1 07/27/2022    LYMPHSABS 1.71 07/27/2022    MONOSABS 0.57 07/27/2022    NEUTROABS 5.52 07/27/2022    EOSABS 0.10 07/27/2022    BASOSABS 0.06 07/27/2022         DIAGNOSTIC TESTING:     No results found. IMPRESSION:Ms. Jeancarlos Liz is a 77 y.o. female with a past history remarkable for history of cervical spine pain, on Norco, prior history of COVID-19, diabetes, dizziness, obesity, peripheral neuropathy, prior pulmonary hypertension, diabetes recent upper endoscopy and colonoscopy, prior hysterectomy, referred for evaluation of chronic nausea symptoms. Patient is unable to identify any obvious dietary triggers. No significant mount of pruritus reported by the patient with excoriated lesions in the abdomen, patient will need to see a dermatologist for these changes. Etiology not fully understood. Her chronic nausea and vomiting appear to be temporally related to her use of narcotics and other medications, although recently the patient had denied any excessive use of narcotics. .  Advised to reduce her medications. Will provide Benadryl as an antiemetic correct for bedtime to prevent early morning symptoms along with Pepcid 20 mg nightly for her reflux symptoms. Assessment  No diagnosis found. Twila Marie was seen today for follow-up, nausea & vomiting and constipation. Diagnoses and all orders for this visit:    Constipation, unspecified constipation type- we will optimize patient's bowel regimen, will place patient on Colace 100 mg 1 to 2 tablets daily. Increase oral hydration with minimum 64 ounces of water today. Consider prune juice as augmenting facilitator. Nausea- potentially related to poor colonic transit, intermittent use of narcotics. Will provide Zofran as needed. Consider EGD if no significant improvement. -     ondansetron (ZOFRAN ODT) 4 MG disintegrating tablet; Place 1 tablet under the tongue every 6 hours as needed for Nausea or Vomiting    Other orders  -     docusate sodium (COLACE) 100 MG capsule; Take 1 capsule by mouth 2 times daily             RTC: 3 months. Additional comments: Thank you for allowing me to participate in the care of Ms. Eda Lu. For any further questions please do not hesitate to contact me. I have reviewed and agree with the ROS entered by the MA/LPN from today's encounter documented in a separate note. Antonio Justin MD, MPH   Board Certified in Gastroenterology  Board Certified in [address] [place] [address] #: [phone]    this note is created with the assistance of a speech recognition program.  While intending to generate a document that actually reflects the content of the visit, the document can still have some errors including those of syntax and sound a like substitutions which may escape proof reading.   It such instances, actual meaning can be extrapolated by contextual diversion.

## 2022-09-28 ENCOUNTER — HOSPITAL ENCOUNTER (OUTPATIENT)
Dept: PHYSICAL THERAPY | Age: 67
Setting detail: THERAPIES SERIES
Discharge: HOME | End: 2022-09-28
Payer: MEDICARE

## 2022-09-28 PROCEDURE — 97110 THERAPEUTIC EXERCISES: CPT

## 2022-09-28 NOTE — PROGRESS NOTES
Physical Therapy    Physical Therapy Daily Treatment Note    Date:  2022    Patient Name:  Loretta Garcias    :  1955  MRN: [id number]  Restrictions/Precautions:     Medical/Treatment Diagnosis Information:   Diagnosis: M25.512, G89.29 (ICD-10-CM) - Chronic left shoulder pain  M19.012 (ICD-10-CM) - Primary osteoarthritis of left shoulder  M25.512 (ICD-10-CM) - Acute pain of left shoulder  R26.81 (ICD-10-CM) - Unsteady gait  Insurance/Certification information:  PT Insurance Information: [address] [place] [address]  Physician Information:   Leilani Pa MD  Plan of care signed (Y/N):  y  Visit# / total visits:    Pain level: 5/10       Time In: 8344   Time Out: 5536    Progress Note: []  Yes  [x]  No  Next due by: Visit #10  or by 22    Subjective:   Pt reports was sore after the eval. Pt reports reaching backwards into extension hurts pretty bad. Pt reports since having to now use rollator shoulder pain has gotten worse. Pt biggest goal is to reduce shoulder pain. Objective: RASHID performed per flow sheet for increased mobility and strengthening for improved daily living activities. Verbal cueing for sequencing and proper form. Pt with most pain with IR stretch. HEP given with red Tband     Observation:   Test measurements:      Exercises:   Exercise/Equipment Resistance/Repetitions Other comments        Pulley's  5'         Levator Scap Stretch      Upper Trap Stretch  3x20\"    Scapular Retractions  10x         Supine cervical retraction 10x    Supine cervical retraction with ext 10x    Supine pec stretch  2'         Doorway pec stretch     Shoulder ext stretch      IR  with strap stretch  10x5\"    Supine wand chest press 10x         Supine Wand flexion AAROM  10x    Supine Wand ER AAROM  10x         Table Slides - flexion/scaption  10x         TB rows/ext     PROM 5'         Tandem stance          [] Provided verbal/tactile cueing for activities related to strengthening, flexibility, endurance, ROM. (72052)  [] Provided verbal/tactile cueing for activities related to improving balance, coordination, kinesthetic sense, posture, motor skill, proprioception. (40803)    Therapeutic Activities:     [] Therapeutic activities, direct (one-on-one) patient contact (use of dynamic activities to improve functional performance). (65363)    Gait:   [] Provided training and instruction to the patient for ambulation re-education. (57767)    Self-Care/ADL's  [] Self-care/home management training and compensatory training, meal preparation, safety procedures, and instructions in use of assistive technology devices/adaptive equipment, direct one-on-one contact. (04575)    Home Exercise Program:   shoulder impingement, seated LE exercises, Shoulder ROM  [x] Reviewed/Progressed HEP activities related to strengthening, flexibility, endurance, ROM. (04637)  [] Reviewed/Progressed HEP activities related to improving balance, coordination, kinesthetic sense, posture, motor skill, proprioception.  (39261)    Manual Treatments:    [] Provided manual therapy to mobilize soft tissue/joints for the purpose of modulating pain, promoting relaxation,  increasing ROM, reducing/eliminating soft tissue swelling/inflammation/restriction, improving soft tissue extensibility.  (28761)    Service Based Modalities:  8' %, 1.0 w/c, 1 MHz (NC)    Timed Code Treatment Minutes:   39' therex/ HEP    Total Treatment Minutes:   52'    Treatment/Activity Tolerance:  [x] Patient tolerated treatment well [] Patient limited by fatigue  [] Patient limited by pain  [] Patient limited by other medical complications  [] Other:     Prognosis: [x] Good [] Fair  [] Poor    Patient Requires Follow-up: [x] Yes  [] No      Goals:  Short Term Goals  Time Frame for Short term goals: 2 week  Short term goal 1: Provide written HEP    Long Term Goals  Time Frame for Long term goals : 6 weeks  Long term goal 1: Patient will report no greater than 2-3/10 left shoulder pain to allow patient to complete her daily ADLs with greater ease. Long term goal 2: Patient will score greater than or = to 66/80 on the UEFI to allow patient to complete her daily housework with greater ease. Long term goal 3: Patient will demonstrate improved AROM of left shoulder to 160 degrees of flexion and abduction, and IR to L1/2 to allow patient to reach overhead and behind her back with greater ease. Long term goal 4: Patient will demonstrate 4+/5 left shoulder strength to allow patient to lift and carry items with greater ease. Long term goal 5: Patient will demonstrate ability to hold bilateral tandem stance for 10 seconds to decrease risk of falls. Long term goal 6: Patient will report compliance with HEP for continued progression following PT.       Plan:   [] Continue per plan of care [] Alter current plan (see comments)  [x] Plan of care initiated [] Hold pending MD visit [] Discharge    Plan for Next Session:  Provide written HEP    Electronically signed by:  Kate Landa PTA

## 2022-10-04 ENCOUNTER — HOSPITAL ENCOUNTER (OUTPATIENT)
Dept: PHYSICAL THERAPY | Age: 67
Setting detail: THERAPIES SERIES
Discharge: HOME | End: 2022-10-04
Payer: MEDICARE

## 2022-10-04 PROCEDURE — 97110 THERAPEUTIC EXERCISES: CPT

## 2022-10-04 NOTE — PROGRESS NOTES
Physical Therapy    Physical Therapy Daily Treatment Note    Date:  10/4/2022    Patient Name:  Kiana Vásquez    :  1955  MRN: [id number]  Restrictions/Precautions:     Medical/Treatment Diagnosis Information:   Diagnosis: M25.512, G89.29 (ICD-10-CM) - Chronic left shoulder pain  M19.012 (ICD-10-CM) - Primary osteoarthritis of left shoulder  M25.512 (ICD-10-CM) - Acute pain of left shoulder  R26.81 (ICD-10-CM) - Unsteady gait  Insurance/Certification information:  PT Insurance Information: Marguerite Ash  Physician Information:   Joshua Green MD  Plan of care signed (Y/N):  Y  Visit# / total visits:  3/12  Pain level: 6/10       Time In: 1130   Time Out: 6902    Progress Note: []  Yes  [x]  No  Next due by: Visit #10  or by 22    Subjective:   Patient reports that she has increased left shoulder soreness this date noting 6/10 pain . Reports that she was crocheting all day yesterday which increased her pain. Noting that she had some soreness following the last session, but it only lasted for approx 1 hour afterwards. Reports that she has been completing her HEP     Objective: RASHID performed per flow sheet for increased mobility and strengthening for improved daily living activities. Verbal cueing for sequencing and proper form.      Observation:   Test measurements:      Exercises:   Exercise/Equipment Resistance/Repetitions Other comments   Pulley's  5'         Levator Scap Stretch      Upper Trap Stretch  3x20\"    Scapular Retractions  10x         Doorway pec stretch     Shoulder ext stretch  10x5\"    IR  with strap stretch  10x5\"         Supine cervical retraction 10x    Supine cervical retraction with ext 10x    Supine pec stretch 2'    Supine wand chest press 10x    Supine Wand flexion / Abduction / ER AAROM  10x    PROM 5'         Table Slides - flexion / scaption / ER 10x         TB rows/ext          Tandem stance     [x] Provided verbal/tactile cueing for activities related to strengthening, flexibility, endurance, ROM. (06452)  [] Provided verbal/tactile cueing for activities related to improving balance, coordination, kinesthetic sense, posture, motor skill, proprioception. (23761)    Therapeutic Activities:     [] Therapeutic activities, direct (one-on-one) patient contact (use of dynamic activities to improve functional performance). (18908)    Gait:   [] Provided training and instruction to the patient for ambulation re-education. (46224)    Self-Care/ADL's  [] Self-care/home management training and compensatory training, meal preparation, safety procedures, and instructions in use of assistive technology devices/adaptive equipment, direct one-on-one contact. (24992)    Home Exercise Program:   shoulder impingement, seated LE exercises, Shoulder ROM  [x] Reviewed/Progressed HEP activities related to strengthening, flexibility, endurance, ROM. (89091)  [] Reviewed/Progressed HEP activities related to improving balance, coordination, kinesthetic sense, posture, motor skill, proprioception.  (26358)    Manual Treatments:    [] Provided manual therapy to mobilize soft tissue/joints for the purpose of modulating pain, promoting relaxation,  increasing ROM, reducing/eliminating soft tissue swelling/inflammation/restriction, improving soft tissue extensibility.  (10616)    Service Based Modalities:  8' %, 1.0 w/c, 1 MHz (NC)    Timed Code Treatment Minutes:   36' therex/ HEP    Total Treatment Minutes:   50'    Treatment/Activity Tolerance:  [x] Patient tolerated treatment well [] Patient limited by fatigue  [] Patient limited by pain  [] Patient limited by other medical complications  [] Other:     Prognosis: [x] Good [] Fair  [] Poor    Patient Requires Follow-up: [x] Yes  [] No      Goals:  Short Term Goals  Time Frame for Short term goals: 2 week  Short term goal 1: Provide written HEP - met    Long Term Goals  Time Frame for Long term goals : 6 weeks  Long term goal 1: Patient will report no greater than 2-3/10 left shoulder pain to allow patient to complete her daily ADLs with greater ease. Long term goal 2: Patient will score greater than or = to 66/80 on the UEFI to allow patient to complete her daily housework with greater ease. Long term goal 3: Patient will demonstrate improved AROM of left shoulder to 160 degrees of flexion and abduction, and IR to L1/2 to allow patient to reach overhead and behind her back with greater ease. Long term goal 4: Patient will demonstrate 4+/5 left shoulder strength to allow patient to lift and carry items with greater ease. Long term goal 5: Patient will demonstrate ability to hold bilateral tandem stance for 10 seconds to decrease risk of falls. Long term goal 6: Patient will report compliance with HEP for continued progression following PT.       Plan:   [x] Continue per plan of care [] Alter current plan (see comments)  [] Plan of care initiated [] Hold pending MD visit [] Discharge    Plan for Next Session:  Continue to progress as tolerated     Electronically signed by:  Edwina Buerger, PT

## 2022-10-05 DIAGNOSIS — R11.0 NAUSEA: ICD-10-CM

## 2022-10-05 RX ORDER — ONDANSETRON 4 MG/1
4 TABLET, ORALLY DISINTEGRATING ORAL EVERY 6 HOURS PRN
Qty: 10 TABLET | Refills: 1 | Status: SHIPPED | OUTPATIENT
Start: 2022-10-05

## 2022-10-05 NOTE — TELEPHONE ENCOUNTER
Patient is asking for a larger amount of medication at at time,  She is reporting using 3 tabs daily. Pending as last prescribed. Patient stated that she is nauseated all the time and wants to know if there is something else that can be prescribed in addition to Ondansetron.

## 2022-10-06 ENCOUNTER — HOSPITAL ENCOUNTER (OUTPATIENT)
Dept: PHYSICAL THERAPY | Age: 67
Setting detail: THERAPIES SERIES
Discharge: HOME | End: 2022-10-06
Payer: MEDICARE

## 2022-10-06 ENCOUNTER — APPOINTMENT (OUTPATIENT)
Dept: PHYSICAL THERAPY | Age: 67
End: 2022-10-06
Payer: MEDICARE

## 2022-10-06 PROCEDURE — 97035 APP MDLTY 1+ULTRASOUND EA 15: CPT

## 2022-10-06 PROCEDURE — 97110 THERAPEUTIC EXERCISES: CPT

## 2022-10-06 NOTE — PROGRESS NOTES
Physical Therapy    Physical Therapy Daily Treatment Note    Date:  10/6/2022    Patient Name:  Cande Stone    :  1955  MRN: [id number]  Restrictions/Precautions:     Medical/Treatment Diagnosis Information:   Diagnosis: M25.512, G89.29 (ICD-10-CM) - Chronic left shoulder pain  M19.012 (ICD-10-CM) - Primary osteoarthritis of left shoulder  M25.512 (ICD-10-CM) - Acute pain of left shoulder  R26.81 (ICD-10-CM) - Unsteady gait  Insurance/Certification information:  PT Insurance Information: Anai Clark  Physician Information:   Rodrick Phelan MD  Plan of care signed (Y/N):  Y  Visit# / total visits:  3/12  Pain level: 6/10       Time In: 2:42   Time Out: 3:27    Progress Note: []  Yes  [x]  No  Next due by: Visit #10  or by 22    Subjective:   Pt reports shoulder is hurting this date. Pt reports has not taken any pain meds. Pt reports felt good after receiving US last session. Objective: RASHID performed per flow sheet for increased mobility and strengthening for improved daily living activities. Verbal cueing for sequencing and proper form.      Observation:   Test measurements:  PROM L shoulder abduction: 147 degrees    Exercises:   Exercise/Equipment Resistance/Repetitions Other comments   Pulley's  5'         Levator Scap Stretch      Upper Trap Stretch  3x20\"    Scapular Retractions  15x         Doorway pec stretch     Shoulder ext stretch  10x5\"    IR  with strap stretch  10x5\"         Supine cervical retraction 10x    Supine cervical retraction with ext 10x    Supine pec stretch 2'    Supine wand chest press 10x    Supine Wand flexion / Abduction / ER AAROM  10x    PROM 5'         Table Slides - flexion / scaption / ER 10x         TB rows/ext          Tandem stance     [x] Provided verbal/tactile cueing for activities related to strengthening, flexibility, endurance, ROM. (23419)  [] Provided verbal/tactile cueing for activities related to improving balance, coordination, kinesthetic sense, posture, motor skill, proprioception. (15486)    Therapeutic Activities:     [] Therapeutic activities, direct (one-on-one) patient contact (use of dynamic activities to improve functional performance). (04412)    Gait:   [] Provided training and instruction to the patient for ambulation re-education. (40036)    Self-Care/ADL's  [] Self-care/home management training and compensatory training, meal preparation, safety procedures, and instructions in use of assistive technology devices/adaptive equipment, direct one-on-one contact. (99441)    Home Exercise Program:   shoulder impingement, seated LE exercises, Shoulder ROM  [x] Reviewed/Progressed HEP activities related to strengthening, flexibility, endurance, ROM. (90514)  [] Reviewed/Progressed HEP activities related to improving balance, coordination, kinesthetic sense, posture, motor skill, proprioception.  (09221)    Manual Treatments:    [] Provided manual therapy to mobilize soft tissue/joints for the purpose of modulating pain, promoting relaxation,  increasing ROM, reducing/eliminating soft tissue swelling/inflammation/restriction, improving soft tissue extensibility. (63237)    Service Based Modalities:  8' %, 1.0 w/c, 1 MHz     Timed Code Treatment Minutes:   40' therex/ HEP    Total Treatment Minutes:   39'    Treatment/Activity Tolerance:  [x] Patient tolerated treatment well [] Patient limited by fatigue  [] Patient limited by pain  [] Patient limited by other medical complications  [] Other:     Prognosis: [x] Good [] Fair  [] Poor    Patient Requires Follow-up: [x] Yes  [] No      Goals:  Short Term Goals  Time Frame for Short term goals: 2 week  Short term goal 1: Provide written HEP - met    Long Term Goals  Time Frame for Long term goals : 6 weeks  Long term goal 1: Patient will report no greater than 2-3/10 left shoulder pain to allow patient to complete her daily ADLs with greater ease.   Long term goal 2: Patient will score greater than or = to 66/80 on the UEFI to allow patient to complete her daily housework with greater ease. Long term goal 3: Patient will demonstrate improved AROM of left shoulder to 160 degrees of flexion and abduction, and IR to L1/2 to allow patient to reach overhead and behind her back with greater ease. see above  Long term goal 4: Patient will demonstrate 4+/5 left shoulder strength to allow patient to lift and carry items with greater ease. Long term goal 5: Patient will demonstrate ability to hold bilateral tandem stance for 10 seconds to decrease risk of falls. Long term goal 6: Patient will report compliance with HEP for continued progression following PT.       Plan:   [x] Continue per plan of care [] Alter current plan (see comments)  [] Plan of care initiated [] Hold pending MD visit [] Discharge    Plan for Next Session:  Continue to progress as tolerated     Electronically signed by:  Marleny Noriega PTA

## 2022-10-11 ENCOUNTER — HOSPITAL ENCOUNTER (OUTPATIENT)
Dept: PHYSICAL THERAPY | Age: 67
Setting detail: THERAPIES SERIES
Discharge: HOME | End: 2022-10-11
Payer: MEDICARE

## 2022-10-11 PROCEDURE — 97035 APP MDLTY 1+ULTRASOUND EA 15: CPT | Performed by: PHYSICAL THERAPY ASSISTANT

## 2022-10-11 PROCEDURE — 97110 THERAPEUTIC EXERCISES: CPT | Performed by: PHYSICAL THERAPY ASSISTANT

## 2022-10-11 NOTE — PROGRESS NOTES
Physical Therapy    Physical Therapy Daily Treatment Note    Date:  10/11/2022    Patient Name:  [place] Oshea    :  1955  MRN: [id number]  Restrictions/Precautions:     Medical/Treatment Diagnosis Information:   Diagnosis: M25.512, G89.29 (ICD-10-CM) - Chronic left shoulder pain  M19.012 (ICD-10-CM) - Primary osteoarthritis of left shoulder  M25.512 (ICD-10-CM) - Acute pain of left shoulder  R26.81 (ICD-10-CM) - Unsteady gait  Insurance/Certification information:  PT Insurance Information: Genesis Perez  Physician Information:   Charles Hartley MD  Plan of care signed (Y/N):  Y  Visit# / total visits:    Pain level: 510       Time In: 218  Time Out: 300    Progress Note: []  Yes  [x]  No  Next due by: Visit #10  or by 22    Subjective:   Pt. Relates pain this date rated 5/10. Feels some improvement in ability to reach overhead. Objective: RASHID performed per flow sheet for increased mobility and strengthening for improved daily living activities. Manual stretching and GH inf, inf/post mobilizations to improve motion and decrease tightness and pain. Added, advanced and progressed several exercises to improve motion and stability. Observation: Pain decreased to 1-2/10 at conclusion of session. Test measurements:  PROM L shoulder abduction: 170 degrees      UEFI: 51/80    Exercises:   Exercise/Equipment Resistance/Repetitions Other comments   Pulley's  5'    US 1.2 w/cm, 100% 8' Lateral shoulder. Levator Scap Stretch      Upper Trap Stretch      Scapular Retractions  15x         Doorway pec stretch     Shoulder ext stretch  10x5\" Active motion with overpressure.     IR AROM 10x5\"         Supine cervical retraction     Supine cervical retraction with ext     Supine pec stretch 2'    Supine wand chest press     Supine ABC's / Ceiling punch 1x / 10x 2#   SLing ER / Abd 10x 2#              Standing Wand flexion / Abduction / ER AAROM      PROM 8'         Table Slides - flexion / scaption / ER 10x TB rows/ext 10x GR Initiated        Tandem stance     [x] Provided verbal/tactile cueing for activities related to strengthening, flexibility, endurance, ROM. (73987)  [] Provided verbal/tactile cueing for activities related to improving balance, coordination, kinesthetic sense, posture, motor skill, proprioception. (12842)    Therapeutic Activities:     [] Therapeutic activities, direct (one-on-one) patient contact (use of dynamic activities to improve functional performance). (93919)    Gait:   [] Provided training and instruction to the patient for ambulation re-education. (90308)    Self-Care/ADL's  [] Self-care/home management training and compensatory training, meal preparation, safety procedures, and instructions in use of assistive technology devices/adaptive equipment, direct one-on-one contact. (45900)    Home Exercise Program:   shoulder impingement, seated LE exercises, Shoulder ROM  [x] Reviewed/Progressed HEP activities related to strengthening, flexibility, endurance, ROM. (37309)  [] Reviewed/Progressed HEP activities related to improving balance, coordination, kinesthetic sense, posture, motor skill, proprioception.  (86161)    Manual Treatments:    [] Provided manual therapy to mobilize soft tissue/joints for the purpose of modulating pain, promoting relaxation,  increasing ROM, reducing/eliminating soft tissue swelling/inflammation/restriction, improving soft tissue extensibility.  (66054)    Service Based Modalities:      Timed Code Treatment Minutes:   29' therex/ HEP  8' %, 1.2 w/c, 1 MHz   Total Treatment Minutes:   43'    Treatment/Activity Tolerance:  [x] Patient tolerated treatment well [] Patient limited by fatigue  [] Patient limited by pain  [] Patient limited by other medical complications  [] Other:     Prognosis: [x] Good [] Fair  [] Poor    Patient Requires Follow-up: [x] Yes  [] No    Goals:  Short Term Goals  Time Frame for Short term goals: 2 week  Short term goal 1: Provide written HEP - met    Long Term Goals  Time Frame for Long term goals : 6 weeks  Long term goal 1: Patient will report no greater than 2-3/10 left shoulder pain to allow patient to complete her daily ADLs with greater ease. Long term goal 2: Patient will score greater than or = to 66/80 on the UEFI to allow patient to complete her daily housework with greater ease. Long term goal 3: Patient will demonstrate improved AROM of left shoulder to 160 degrees of flexion and abduction, and IR to L1/2 to allow patient to reach overhead and behind her back with greater ease. Long term goal 4: Patient will demonstrate 4+/5 left shoulder strength to allow patient to lift and carry items with greater ease. Long term goal 5: Patient will demonstrate ability to hold bilateral tandem stance for 10 seconds to decrease risk of falls. Long term goal 6: Patient will report compliance with HEP for continued progression following PT. Plan:   [x] Continue per plan of care [] Alter current plan (see comments)  [] Plan of care initiated [] Hold pending MD visit [] Discharge    Plan for Next Session:  Continue to progress as tolerated     Electronically signed by:   Katlyn Mendoza PTA

## 2022-10-12 DIAGNOSIS — E11.29 TYPE 2 DIABETES MELLITUS WITH MICROALBUMINURIA, WITHOUT LONG-TERM CURRENT USE OF INSULIN (HCC): ICD-10-CM

## 2022-10-12 DIAGNOSIS — R80.9 TYPE 2 DIABETES MELLITUS WITH MICROALBUMINURIA, WITHOUT LONG-TERM CURRENT USE OF INSULIN (HCC): ICD-10-CM

## 2022-10-12 RX ORDER — PIOGLITAZONE HCL AND METFORMIN HCL 500; 15 MG/1; MG/1
TABLET ORAL
Qty: 180 TABLET | Refills: 1 | Status: SHIPPED | OUTPATIENT
Start: 2022-10-12

## 2022-10-12 RX ORDER — ONDANSETRON 4 MG/1
4 TABLET, ORALLY DISINTEGRATING ORAL EVERY 8 HOURS PRN
Qty: 30 TABLET | Refills: 2 | Status: SHIPPED | OUTPATIENT
Start: 2022-10-12

## 2022-10-12 NOTE — TELEPHONE ENCOUNTER
Aaron Duenas called requesting a refill of the below medication which has been pended for you:     Requested Prescriptions     Pending Prescriptions Disp Refills    pioglitazone-metFORMIN (ACTOPLUS MET)  MG per tablet [Pharmacy Med Name: Erum Collins 15/500MG] 180 tablet 1     Sig: TAKE 1 TABLET TWICE A DAY WITH MEALS       Last Appointment Date: 9/13/2022  Next Appointment Date: 3/14/2023    Allergies   Allergen Reactions    Asa [Aspirin] Other (See Comments)     Stomach irritation

## 2022-10-13 ENCOUNTER — HOSPITAL ENCOUNTER (OUTPATIENT)
Dept: PHYSICAL THERAPY | Age: 67
Setting detail: THERAPIES SERIES
Discharge: HOME | End: 2022-10-13
Payer: MEDICARE

## 2022-10-13 PROCEDURE — 97035 APP MDLTY 1+ULTRASOUND EA 15: CPT

## 2022-10-13 PROCEDURE — 97110 THERAPEUTIC EXERCISES: CPT

## 2022-10-13 NOTE — PROGRESS NOTES
Physical Therapy    Physical Therapy Daily Treatment Note    Date:  10/13/2022    Patient Name:  Pk Gaston    :  1955  MRN: [id number]  Restrictions/Precautions:     Medical/Treatment Diagnosis Information:   Diagnosis: M25.512, G89.29 (ICD-10-CM) - Chronic left shoulder pain  M19.012 (ICD-10-CM) - Primary osteoarthritis of left shoulder  M25.512 (ICD-10-CM) - Acute pain of left shoulder  R26.81 (ICD-10-CM) - Unsteady gait  Insurance/Certification information:  PT Insurance Information: Irene Epps  Physician Information:   Sandra Cabrera MD  Plan of care signed (Y/N):  Y  Visit# / total visits:    Pain level: 5/10       Time In: 12:32  Time Out: 1:14    Progress Note: []  Yes  [x]  No  Next due by: Visit #10  or by 22    Subjective:  Pt reports shoulder still hurts but may be getting better. Pt reports strength Is improved and reaching into cupboards better. Objective: RASHID performed per flow sheet for increased mobility and strengthening for improved daily living activities. Manual stretching and GH inf, inf/post mobilizations to improve motion and decrease tightness and pain. Added, advanced and progressed several exercises to improve motion and stability. Observation: Pain decreased to 1-2/10 at conclusion of session. Test measurements:      Exercises:   Exercise/Equipment Resistance/Repetitions Other comments   Pulley's  5'    US 1.2 w/cm, 100% 8' Lateral shoulder. Levator Scap Stretch      Upper Trap Stretch      Scapular Retractions  15x         Doorway pec stretch     Shoulder ext stretch  10x5\" Active motion with overpressure.     IR AROM 10x5\"         Supine cervical retraction     Supine cervical retraction with ext     Supine pec stretch 2'    Supine wand chest press     Supine ABC's / Ceiling punch 1x / 15x 2#   SLing ER / Abd 10x 2#              Standing Wand flexion / Abduction / ER AAROM  10x    PROM  8'         Table Slides - flexion / scaption / ER 10x         TB rows/ext 10x GR         Tandem stance     [x] Provided verbal/tactile cueing for activities related to strengthening, flexibility, endurance, ROM. (95315)  [] Provided verbal/tactile cueing for activities related to improving balance, coordination, kinesthetic sense, posture, motor skill, proprioception. (52756)    Therapeutic Activities:     [] Therapeutic activities, direct (one-on-one) patient contact (use of dynamic activities to improve functional performance). (29321)    Gait:   [] Provided training and instruction to the patient for ambulation re-education. (52416)    Self-Care/ADL's  [] Self-care/home management training and compensatory training, meal preparation, safety procedures, and instructions in use of assistive technology devices/adaptive equipment, direct one-on-one contact. (77590)    Home Exercise Program:   shoulder impingement, seated LE exercises, Shoulder ROM  [x] Reviewed/Progressed HEP activities related to strengthening, flexibility, endurance, ROM. (84555)  [] Reviewed/Progressed HEP activities related to improving balance, coordination, kinesthetic sense, posture, motor skill, proprioception.  (55236)    Manual Treatments:    [] Provided manual therapy to mobilize soft tissue/joints for the purpose of modulating pain, promoting relaxation,  increasing ROM, reducing/eliminating soft tissue swelling/inflammation/restriction, improving soft tissue extensibility.  (25803)    Service Based Modalities:      Timed Code Treatment Minutes:   29' therex/ HEP  8' %, 1.2 w/c, 1 MHz   Total Treatment Minutes:   43'    Treatment/Activity Tolerance:  [x] Patient tolerated treatment well [] Patient limited by fatigue  [] Patient limited by pain  [] Patient limited by other medical complications  [] Other:     Prognosis: [x] Good [] Fair  [] Poor    Patient Requires Follow-up: [x] Yes  [] No    Goals:  Short Term Goals  Time Frame for Short term goals: 2 week  Short term goal 1: Provide written HEP - met    Long Term Goals  Time Frame for Long term goals : 6 weeks  Long term goal 1: Patient will report no greater than 2-3/10 left shoulder pain to allow patient to complete her daily ADLs with greater ease. Long term goal 2: Patient will score greater than or = to 66/80 on the UEFI to allow patient to complete her daily housework with greater ease. Long term goal 3: Patient will demonstrate improved AROM of left shoulder to 160 degrees of flexion and abduction, and IR to L1/2 to allow patient to reach overhead and behind her back with greater ease. Long term goal 4: Patient will demonstrate 4+/5 left shoulder strength to allow patient to lift and carry items with greater ease. Long term goal 5: Patient will demonstrate ability to hold bilateral tandem stance for 10 seconds to decrease risk of falls. Long term goal 6: Patient will report compliance with HEP for continued progression following PT.     Plan:   [x] Continue per plan of care [] Alter current plan (see comments)  [] Plan of care initiated [] Hold pending MD visit [] Discharge    Plan for Next Session:  Continue to progress as tolerated     Electronically signed by:  Petra Taveras PTA

## 2022-10-14 NOTE — DISCHARGE INSTRUCTIONS
Normal changes you may experience after a EGD:  Activity   You have had anesthesia today  Do not drive, operate heavy equipment, consume alcoholic beverages, or make any important decisions  for 24 hours   Take your time changing positions today. You may feel light headed or dizzy if you move too quickly. Rest for the next 24 hours. Diet   You can eat your normal diet when you feel well. You should start off with bland foods like chicken soup, toast, or yogurt. Then advance as tolerated. Drink plenty of fluids (unless your doctor tells you not to). Your urine should be very lightly colored without a strong odor. Medicines   Continue your home medications as ordered by your physician.      Call your doctor now or seek immediate medical care if: [phone]  You are passing blood rectally or vomiting blood (color of blood may be red or black)  You have coffee ground looking vomit  Severe abdominal pain or tenderness    You have a fever, chills or excessive sweating   You have persistent nausea or vomiting   Redness or swelling at the IV site

## 2022-10-17 ENCOUNTER — HOSPITAL ENCOUNTER (OUTPATIENT)
Dept: PHYSICAL THERAPY | Age: 67
Setting detail: THERAPIES SERIES
Discharge: HOME | End: 2022-10-17
Payer: MEDICARE

## 2022-10-17 ENCOUNTER — ANESTHESIA EVENT (OUTPATIENT)
Dept: OPERATING ROOM | Age: 67
End: 2022-10-17
Payer: MEDICARE

## 2022-10-17 PROCEDURE — 97110 THERAPEUTIC EXERCISES: CPT

## 2022-10-17 PROCEDURE — 97035 APP MDLTY 1+ULTRASOUND EA 15: CPT

## 2022-10-17 NOTE — PROGRESS NOTES
Physical Therapy    Physical Therapy Daily Treatment Note    Date:  10/17/2022    Patient Name:  Shayy Romano    :  1955  MRN: [id number]  Restrictions/Precautions:     Medical/Treatment Diagnosis Information:   Diagnosis: M25.512, G89.29 (ICD-10-CM) - Chronic left shoulder pain  M19.012 (ICD-10-CM) - Primary osteoarthritis of left shoulder  M25.512 (ICD-10-CM) - Acute pain of left shoulder  R26.81 (ICD-10-CM) - Unsteady gait  Insurance/Certification information:  PT Insurance Information: [id number] MEDICARE  Physician Information:   Kushal Morris MD  Plan of care signed (Y/N):  Y  Visit# / total visits:    Pain level: 4/10       Time In: 11:13  Time Out: 11:58    Progress Note: []  Yes  [x]  No  Next due by: Visit #10  or by 22    Subjective:  Pt reports shoulder still hurts but may be getting better. Pt reports strength Is improved and reaching into cupboards better. Objective: RASHID performed per flow sheet for increased mobility and strengthening for improved daily living activities. Manual stretching and GH inf, inf/post mobilizations to improve motion and decrease tightness and pain. Observation: Pain decreased to 1-2/10 at conclusion of session. Test measurements: AROM L shoulder flexion:  155 degrees                                                            Abduction: 130 degrees                                                                        IR: to L4    Exercises:   Exercise/Equipment Resistance/Repetitions Other comments   Pulley's  5'    US 1.2 w/cm, 100% 8' Lateral shoulder. Levator Scap Stretch      Upper Trap Stretch      Scapular Retractions  15x         Doorway pec stretch     Shoulder ext stretch  10x5\" Active motion with overpressure.     IR AROM 10x5\"    Scap to 90 10x    Flex to 90 10x         Supine cervical retraction     Supine cervical retraction with ext     Supine pec stretch 2'    Supine wand chest press     Supine ABC's / Ceiling punch 1x / 15x 2#   SLing ER / Abd 10x 2#              Standing Wand flexion / Abduction / ER AAROM  10x    PROM  8'         Table Slides - flexion / scaption / ER 10x         TB rows/ext 10x GR         Tandem stance     [x] Provided verbal/tactile cueing for activities related to strengthening, flexibility, endurance, ROM. (50347)  [] Provided verbal/tactile cueing for activities related to improving balance, coordination, kinesthetic sense, posture, motor skill, proprioception. (86466)    Therapeutic Activities:     [] Therapeutic activities, direct (one-on-one) patient contact (use of dynamic activities to improve functional performance). (15251)    Gait:   [] Provided training and instruction to the patient for ambulation re-education. (65215)    Self-Care/ADL's  [] Self-care/home management training and compensatory training, meal preparation, safety procedures, and instructions in use of assistive technology devices/adaptive equipment, direct one-on-one contact. (03675)    Home Exercise Program:   shoulder impingement, seated LE exercises, Shoulder ROM  [x] Reviewed/Progressed HEP activities related to strengthening, flexibility, endurance, ROM. (72216)  [] Reviewed/Progressed HEP activities related to improving balance, coordination, kinesthetic sense, posture, motor skill, proprioception.  (59927)    Manual Treatments:    [] Provided manual therapy to mobilize soft tissue/joints for the purpose of modulating pain, promoting relaxation,  increasing ROM, reducing/eliminating soft tissue swelling/inflammation/restriction, improving soft tissue extensibility.  (01306)    Service Based Modalities:      Timed Code Treatment Minutes:   40' therex/ HEP  8' %, 1.2 w/c, 1 MHz   Total Treatment Minutes:   39'    Treatment/Activity Tolerance:  [x] Patient tolerated treatment well [] Patient limited by fatigue  [] Patient limited by pain  [] Patient limited by other medical complications  [] Other:     Prognosis: [x] Good [] Fair  [] Poor    Patient Requires Follow-up: [x] Yes  [] No    Goals:  Short Term Goals  Time Frame for Short term goals: 2 week  Short term goal 1: Provide written HEP - met    Long Term Goals  Time Frame for Long term goals : 6 weeks  Long term goal 1: Patient will report no greater than 2-3/10 left shoulder pain to allow patient to complete her daily ADLs with greater ease. Long term goal 2: Patient will score greater than or = to 66/80 on the UEFI to allow patient to complete her daily housework with greater ease. Long term goal 3: Patient will demonstrate improved AROM of left shoulder to 160 degrees of flexion and abduction, and IR to L1/2 to allow patient to reach overhead and behind her back with greater ease. (See above)  Long term goal 4: Patient will demonstrate 4+/5 left shoulder strength to allow patient to lift and carry items with greater ease. Long term goal 5: Patient will demonstrate ability to hold bilateral tandem stance for 10 seconds to decrease risk of falls. Long term goal 6: Patient will report compliance with HEP for continued progression following PT.     Plan:   [x] Continue per plan of care [] Alter current plan (see comments)  [] Plan of care initiated [] Hold pending MD visit [] Discharge    Plan for Next Session:  Continue to progress as tolerated     Electronically signed by:  Nagi Mccallum PTA

## 2022-10-18 ENCOUNTER — ANESTHESIA (OUTPATIENT)
Dept: OPERATING ROOM | Age: 67
End: 2022-10-18
Payer: MEDICARE

## 2022-10-18 ENCOUNTER — HOSPITAL ENCOUNTER (OUTPATIENT)
Age: 67
Setting detail: OUTPATIENT SURGERY
Discharge: HOME | End: 2022-10-18
Attending: INTERNAL MEDICINE | Admitting: INTERNAL MEDICINE
Payer: MEDICARE

## 2022-10-18 VITALS
HEART RATE: 57 BPM | OXYGEN SATURATION: 100 % | TEMPERATURE: 96 F | WEIGHT: 285.38 LBS | RESPIRATION RATE: 14 BRPM | DIASTOLIC BLOOD PRESSURE: 91 MMHG | SYSTOLIC BLOOD PRESSURE: 116 MMHG | BODY MASS INDEX: 43.25 KG/M2 | HEIGHT: 68 IN

## 2022-10-18 DIAGNOSIS — R10.13 DYSPEPSIA: ICD-10-CM

## 2022-10-18 PROBLEM — K22.9 IRREGULAR Z LINE OF ESOPHAGUS: Status: ACTIVE | Noted: 2022-10-18

## 2022-10-18 PROBLEM — K29.70 GASTRITIS WITHOUT BLEEDING: Status: ACTIVE | Noted: 2022-10-18

## 2022-10-18 LAB — GLUCOSE BLD-MCNC: 120 MG/DL (ref 65–105)

## 2022-10-18 PROCEDURE — 88342 IMHCHEM/IMCYTCHM 1ST ANTB: CPT

## 2022-10-18 PROCEDURE — 82947 ASSAY GLUCOSE BLOOD QUANT: CPT

## 2022-10-18 PROCEDURE — 7100000011 HC PHASE II RECOVERY - ADDTL 15 MIN: Performed by: INTERNAL MEDICINE

## 2022-10-18 PROCEDURE — 88305 TISSUE EXAM BY PATHOLOGIST: CPT

## 2022-10-18 PROCEDURE — 2709999900 HC NON-CHARGEABLE SUPPLY: Performed by: INTERNAL MEDICINE

## 2022-10-18 PROCEDURE — 2500000003 HC RX 250 WO HCPCS: Performed by: NURSE ANESTHETIST, CERTIFIED REGISTERED

## 2022-10-18 PROCEDURE — 3700000000 HC ANESTHESIA ATTENDED CARE: Performed by: INTERNAL MEDICINE

## 2022-10-18 PROCEDURE — 6360000002 HC RX W HCPCS: Performed by: NURSE ANESTHETIST, CERTIFIED REGISTERED

## 2022-10-18 PROCEDURE — 3609012400 HC EGD TRANSORAL BIOPSY SINGLE/MULTIPLE: Performed by: INTERNAL MEDICINE

## 2022-10-18 PROCEDURE — 43239 EGD BIOPSY SINGLE/MULTIPLE: CPT | Performed by: INTERNAL MEDICINE

## 2022-10-18 PROCEDURE — 7100000010 HC PHASE II RECOVERY - FIRST 15 MIN: Performed by: INTERNAL MEDICINE

## 2022-10-18 PROCEDURE — 2580000003 HC RX 258: Performed by: ANESTHESIOLOGY

## 2022-10-18 RX ORDER — LIDOCAINE HYDROCHLORIDE 10 MG/ML
INJECTION, SOLUTION INFILTRATION; PERINEURAL PRN
Status: DISCONTINUED | OUTPATIENT
Start: 2022-10-18 | End: 2022-10-18 | Stop reason: SDUPTHER

## 2022-10-18 RX ORDER — PROPOFOL 10 MG/ML
INJECTION, EMULSION INTRAVENOUS PRN
Status: DISCONTINUED | OUTPATIENT
Start: 2022-10-18 | End: 2022-10-18 | Stop reason: SDUPTHER

## 2022-10-18 RX ORDER — MEPERIDINE HYDROCHLORIDE 50 MG/ML
12.5 INJECTION INTRAMUSCULAR; INTRAVENOUS; SUBCUTANEOUS ONCE
Status: DISCONTINUED | OUTPATIENT
Start: 2022-10-18 | End: 2022-10-18 | Stop reason: HOSPADM

## 2022-10-18 RX ORDER — ONDANSETRON 2 MG/ML
4 INJECTION INTRAMUSCULAR; INTRAVENOUS
Status: DISCONTINUED | OUTPATIENT
Start: 2022-10-18 | End: 2022-10-18 | Stop reason: HOSPADM

## 2022-10-18 RX ORDER — MORPHINE SULFATE 2 MG/ML
1 INJECTION, SOLUTION INTRAMUSCULAR; INTRAVENOUS EVERY 5 MIN PRN
Status: DISCONTINUED | OUTPATIENT
Start: 2022-10-18 | End: 2022-10-18 | Stop reason: HOSPADM

## 2022-10-18 RX ORDER — SODIUM CHLORIDE 0.9 % (FLUSH) 0.9 %
5-40 SYRINGE (ML) INJECTION EVERY 12 HOURS SCHEDULED
Status: DISCONTINUED | OUTPATIENT
Start: 2022-10-18 | End: 2022-10-18 | Stop reason: HOSPADM

## 2022-10-18 RX ORDER — SODIUM CHLORIDE 9 MG/ML
INJECTION, SOLUTION INTRAVENOUS PRN
Status: DISCONTINUED | OUTPATIENT
Start: 2022-10-18 | End: 2022-10-18 | Stop reason: HOSPADM

## 2022-10-18 RX ORDER — SODIUM CHLORIDE 9 MG/ML
25 INJECTION, SOLUTION INTRAVENOUS PRN
Status: DISCONTINUED | OUTPATIENT
Start: 2022-10-18 | End: 2022-10-18 | Stop reason: HOSPADM

## 2022-10-18 RX ORDER — LIDOCAINE HYDROCHLORIDE 10 MG/ML
1 INJECTION, SOLUTION INFILTRATION; PERINEURAL
Status: DISCONTINUED | OUTPATIENT
Start: 2022-10-18 | End: 2022-10-18 | Stop reason: HOSPADM

## 2022-10-18 RX ORDER — SODIUM CHLORIDE, SODIUM LACTATE, POTASSIUM CHLORIDE, CALCIUM CHLORIDE 600; 310; 30; 20 MG/100ML; MG/100ML; MG/100ML; MG/100ML
INJECTION, SOLUTION INTRAVENOUS CONTINUOUS
Status: DISCONTINUED | OUTPATIENT
Start: 2022-10-18 | End: 2022-10-18 | Stop reason: HOSPADM

## 2022-10-18 RX ORDER — DIPHENHYDRAMINE HYDROCHLORIDE 50 MG/ML
12.5 INJECTION INTRAMUSCULAR; INTRAVENOUS
Status: DISCONTINUED | OUTPATIENT
Start: 2022-10-18 | End: 2022-10-18 | Stop reason: HOSPADM

## 2022-10-18 RX ORDER — SODIUM CHLORIDE 0.9 % (FLUSH) 0.9 %
5-40 SYRINGE (ML) INJECTION PRN
Status: DISCONTINUED | OUTPATIENT
Start: 2022-10-18 | End: 2022-10-18 | Stop reason: HOSPADM

## 2022-10-18 RX ADMIN — LIDOCAINE HYDROCHLORIDE 80 MG: 10 INJECTION, SOLUTION INFILTRATION; PERINEURAL at 09:48

## 2022-10-18 RX ADMIN — SODIUM CHLORIDE: 9 INJECTION, SOLUTION INTRAVENOUS at 08:45

## 2022-10-18 RX ADMIN — PROPOFOL 120 MG: 10 INJECTION, EMULSION INTRAVENOUS at 09:48

## 2022-10-18 ASSESSMENT — PAIN - FUNCTIONAL ASSESSMENT: PAIN_FUNCTIONAL_ASSESSMENT: NONE - DENIES PAIN

## 2022-10-18 NOTE — ANESTHESIA PRE PROCEDURE
Department of Anesthesiology  Preprocedure Note       Name:  Figueroa Santana   Age:  79 y.o.  :  1955                                          MRN:  [id number]         Date:  10/18/2022      Surgeon: Sam Harvey):  Byron Godfrey MD    Procedure: Procedure(s):  EGD BIOPSY    Medications prior to admission:   Prior to Admission medications    Medication Sig Start Date End Date Taking? Authorizing Provider   ondansetron (ZOFRAN ODT) 4 MG disintegrating tablet Take 1 tablet by mouth every 8 hours as needed for Nausea or Vomiting 10/12/22   Byron Godfrey MD   pioglitazone-metFORMIN (ACTOPLUS MET)  MG per tablet TAKE 1 TABLET TWICE A DAY WITH MEALS 10/12/22   Suleman Patel MD   ondansetron (ZOFRAN ODT) 4 MG disintegrating tablet Place 1 tablet under the tongue every 6 hours as needed for Nausea or Vomiting 10/5/22   Byron Godfrey MD   sucralfate (CARAFATE) 1 GM tablet Take 1 tablet by mouth 4 times daily 22   Byron Godfrey MD   DULoxetine (CYMBALTA) 60 MG extended release capsule Take 1 capsule by mouth daily 22   Suleman Patel MD   meloxicam (MOBIC) 7.5 MG tablet Take 1 tablet by mouth daily 22   Kingsley Juan MD   ondansetron (ZOFRAN ODT) 4 MG disintegrating tablet Take 1 tablet by mouth every 8 hours as needed for Nausea or Vomiting 22   Byron Godfrey MD   lisinopril-hydroCHLOROthiazide (PRINZIDE;ZESTORETIC) 20-12.5 MG per tablet Take 1 tablet by mouth in the morning.  22   Paul Marks MD   HYDROcodone-acetaminophen [facility]) 5-325 MG per tablet  22   Historical Provider, MD   M-DRYL 12.5 MG/5ML liquid take 5 milliliters by mouth at bedtime for itching or allergies 22   Historical Provider, MD   diphenhydrAMINE (BENADRYL) 12.5 MG/5ML elixir Take 5 mLs by mouth nightly as needed for Itching or Allergies (can use of nausea as well.)  Patient not taking: Reported on 2022   Byron Godfrey MD   famotidine (PEPCID) 20 MG tablet Take 1 tablet by mouth nightly as needed Chronic sinusitis J32.9    Gastroesophageal reflux disease K21.9    Microalbuminuria R80.9    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    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    Type 2 diabetes mellitus with microalbuminuria, without long-term current use of insulin ([facility]) E11.29, R80.9    Morbid obesity with BMI of 40.0-44.9, adult ([facility]) E66.01, Z68.41    Restrictive lung disease secondary to obesity J98.4, E66.9    Calculus of gallbladder and bile duct without cholecystitis or obstruction K80.70    Nausea R11.0    Multiple gastric ulcers K25.9    Anxiety F41.9    Chronic tension-type headache, intractable G44.221    Chronic, continuous use of opioids F11.90    Complete tear of right rotator cuff M75.121    Depression F32. A    Fatigue R53.83    Lumbosacral spondylosis without myelopathy M47.817    Major depressive disorder, single episode, moderate ([facility]) F32.1    Refractory migraine G43.919    Muscle spasms of neck M62.838    Obsessive-compulsive disorders F42.9    Spondylosis of lumbar spine M47.816    Spondylosis of thoracic region without myelopathy or radiculopathy M47.814    Spinal stenosis, lumbar region, without neurogenic claudication M48.061    Femur fracture, left ([facility]) S72. 80XA    Elizalde's esophagus without dysplasia K22.70    Therapeutic opioid-induced constipation (OIC) K59.03, T40.2X5A    Stage 3a chronic kidney disease ([facility]) N18.31    Pneumonia due to COVID-19 virus U07.1, J12.82    Acute cystitis with hematuria N30.01    Spongiotic dermatitis L30.8    Osteopenia M85.80       Past Medical History:        Diagnosis Date    Cervical spine pain 08/21/2013    Chronic headaches     Chronic sinusitis     CKD (chronic kidney disease)     stage 3    Closed fracture of distal end of left femur with routine healing 03/2021    COVID-19     Diabetes mellitus [facility]     Dizziness     Gastroesophageal reflux disease     barretts    H/O fall     Hypertension     Knee pain, left 08/21/2013  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    Obesity     Peripheral neuropathy     Pneumonia 08/30/2015    Pulmonary hypertension ([facility] 01/01/2012    by echocardiogram    Shoulder pain, bilateral 08/21/2013    Spinal stenosis     Type 2 diabetes mellitus ([facility]     Unspecified essential hypertension     Essential hypertension    Vitamin D deficiency 11/05/2014       Past Surgical History:        Procedure Laterality Date    APPENDECTOMY      BACK INJECTION  02/25/2021    [facility] Right lumbar medial branch block using Fluroscopy    BRAIN SURGERY  05/24/2016    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 Juan Castro COLONOSCOPY  05/03/2012    diverticular disease    COLONOSCOPY N/A 12/23/2020    COLONOSCOPY WITH BIOPSY performed by Christina Gotti MD at [address] [place] [address]., 1 hyperplastic polyp, random biopsies negative    CYSTOSCOPY Bilateral 08/03/2022    CYSTO Bilateral Retrograde Pyelogram, with bladder washout  performed by Arlene Murry MD at [address] [place] [address]  08/2015    full dental extraction    FEMUR CLOSED REDUCTION Left 03/05/2020    has [facility]    FEMUR FRACTURE SURGERY Left 03/06/2021    left retrograde femoral nailing.  Dr. Mendez Main, [facility] BODY REMOVAL  05/28/2016    left-sided temporal craniotomy wound reexploration with removal of small retained foreign body (plastic from Ruth clip from surgery 3 days prior), [facility], Dr. Emily Burnette ([address] [place] [address])  09/06/2005    supracervical hysterectomy bilateral salpingo oophectomy    JOINT REPLACEMENT      KNEE ARTHROSCOPY Left     NERVE BLOCK  09/09/2019    right side L2 through L5 block nerve,facet joint,lumbar,medial branch per Dr Mathis Player at [address]  2011    endoscopic mucosal resection of duodenal polyp    POLYSOMNOGRAPHY  2020    no significant sleep apnea    SHOULDER ARTHROPLASTY Right 10/18/2018    Harsh Jung MD; done at [facility] ARTHROSCOPY Right 2019    revision arthroscopy with debridement,revision mini-open rotator cuff repair per Dr Rodriguez [place] at [address] [facility]      barretts, gastric ulcers    UPPER GASTROINTESTINAL ENDOSCOPY  2020    gastric ulcer, small hiatal hernia, Dr. Jimenez [facility] N/A 2020    EGD BIOPSY performed by Willie Negrete MD at [address] [facility], Elizalde's esophagus       Social History:    Social History     Tobacco Use    Smoking status: Never    Smokeless tobacco: Never   Substance Use Topics    Alcohol use: Yes     Alcohol/week: 0.0 standard drinks     Comment: Maybe twice a year, small amounts                                Counseling given: Not Answered      Vital Signs (Current): There were no vitals filed for this visit.                                            BP Readings from Last 3 Encounters:   22 130/70   22 130/76   22 130/74       NPO Status: Time of last liquid consumption: 0630 (sip of water with am meds)                        Time of last solid consumption:                         Date of last liquid consumption: 10/18/22                        Date of last solid food consumption: 10/17/22    BMI:   Wt Readings from Last 3 Encounters:   22 283 lb (128.4 kg)   22 286 lb 6.4 oz (129.9 kg)   22 277 lb (125.6 kg)     There is no height or weight on file to calculate BMI.    CBC:   Lab Results   Component Value Date/Time    WBC 8.0 2022 08:15 AM    RBC 3.84 2022 08:15 AM    HGB 11.4 2022 08:15 AM    HCT 35.3 2022 08:15 AM    MCV 91.9 2022 08:15 AM    RDW 14.2 2022 08:15 AM     2022 08:15 AM       CMP:   Lab Results   Component Value Date/Time     07/27/2022 08:15 AM    K 4.0 07/27/2022 08:15 AM    CL 97 07/27/2022 08:15 AM    CO2 30 07/27/2022 08:15 AM    BUN 28 07/27/2022 08:15 AM    CREATININE 1.46 07/27/2022 08:15 AM    GFRAA 43 07/27/2022 08:15 AM    LABGLOM 36 07/27/2022 08:15 AM    GLUCOSE 135 07/27/2022 08:15 AM    PROT 8.3 04/04/2022 03:55 PM    CALCIUM 9.9 07/27/2022 08:15 AM    BILITOT 0.33 04/04/2022 03:55 PM    ALKPHOS 146 04/04/2022 03:55 PM    AST 14 04/04/2022 03:55 PM    ALT 5 04/04/2022 03:55 PM       POC Tests: No results for input(s): POCGLU, POCNA, POCK, POCCL, POCBUN, POCHEMO, POCHCT in the last 72 hours.     Coags:   Lab Results   Component Value Date/Time    PROTIME 13.8 09/20/2021 05:04 AM    INR 1.1 09/20/2021 05:04 AM    APTT 32.7 09/20/2021 05:04 AM       HCG (If Applicable): No results found for: PREGTESTUR, PREGSERUM, HCG, HCGQUANT     ABGs: No results found for: PHART, PO2ART, UFX9MJD, PFE8KHV, BEART, H9MUQYMW     Type & Screen (If Applicable):  No results found for: LABABO, LABRH    Drug/Infectious Status (If Applicable):  Lab Results   Component Value Date/Time    HEPCAB NONREACTIVE 12/07/2020 12:00 PM       COVID-19 Screening (If Applicable):   Lab Results   Component Value Date/Time    COVID19 Negative 08/17/2022 09:13 AM    COVID19 DETECTED 09/14/2021 05:04 PM    COVID19 Not Detected 12/18/2020 08:52 AM           Anesthesia Evaluation  Patient summary reviewed and Nursing notes reviewed no history of anesthetic complications:   Airway: Mallampati: II  TM distance: >3 FB   Neck ROM: full  Mouth opening: > = 3 FB   Dental:    (+) edentulous      Pulmonary:normal exam  breath sounds clear to auscultation  (+) pneumonia: resolved,                             Cardiovascular:    (+) hypertension:, pulmonary hypertension: no interval change,         Rhythm: regular  Rate: normal                    Neuro/Psych:   (+) neuromuscular disease:, headaches:, psychiatric history: stable with treatmentdepression/anxiety              ROS comment: Peripheral neuropathy GI/Hepatic/Renal:   (+) GERD:, PUD, renal disease: CRI and no interval change, bowel prep,           Endo/Other:    (+) DiabetesType II DM, no interval change, , .                 Abdominal:   (+) obese,     Abdomen: soft. Vascular: negative vascular ROS. Other Findings:           Anesthesia Plan      MAC     ASA 3             Anesthetic plan and risks discussed with patient. Plan discussed with CRNA.                     Vasile Quesada MD   10/18/2022

## 2022-10-18 NOTE — OP NOTE
Operative Note      Patient: Coni Martinez  YOB: 1955  MRN: [id number]    Date of Procedure: 10/18/2022    Pre-Op Diagnosis: Dyspepsia [R10.13]    Post-Op Diagnosis: irregular z-line, hiatal hernia, gastritis       Procedure(s):  EGD BIOPSY    Surgeon(s):  De Kevin MD    Assistant:   * No surgical staff found *    Anesthesia: Monitor Anesthesia Care    Estimated Blood Loss (mL): Minimal    Complications: None    Specimens:   ID Type Source Tests Collected by Time Destination   A : STOMACH BIOPSY R/O H. PYLORI  Tissue Tissue SURGICAL PATHOLOGY De Kevin MD 10/18/2022 0950    B : G/E JUNCTION BIOPSY  Tissue Tissue SURGICAL PATHOLOGY De Kevin MD 10/18/2022 3682        Implants:  * No implants in log *      Drains: * No LDAs found *              [facility]    EGD    PROCEDURE DATE: 10/18/22    REFERRING PHYSICIAN: No ref. provider found     PRIMARY CARE PROVIDER: Denise Laura MD    ATTENDING PHYSICIAN: De Kevin MD     HISTORY: Ms. Coni Martinez is a 79 y.o. female who presents to the  Endoscopy unit for upper endoscopy. The patient's clinical history is remarkable for pulmonary HTN, obesity, chronic dyspepsia. She is currently medically stable and appropriate for the planned procedure. PREOPERATIVE DIAGNOSIS: Dyspepsia, chronic. PROCEDURES:   1) Transoral Upper Endoscopy with cold biopsy. POSTOPERATIVE DIAGNOSIS:     1) Slightly irregular z-line identified at 36 cm, possible Elizalde's extending to 38 cm vs hiatal hernia. No esophagitis or raised lesions  2) Mild gastritis, no ulcerations, no erosions. Cold biopsy taken to evaluate for H. Pylori   3) Normal appearing duodenal mucosa    MEDICATIONS:   MAC per anesthesia     EBL: <10cc    INSTRUMENT: Olympus GIF-H190  flexible Gastroscope. PREPARATION: The nature and character of the procedure as well as risks, benefits, and alternatives were discussed with the patient and informed consent was obtained. Complications were said to include, but were not limited to: medication allergy, medication reaction, cardiovascular and respiratory problems, bleeding, perforation, infection, and/or missed diagnosis. Following arrival in the endoscopy room, the patient was placed in the left lateral decubitus position and final time-out accomplished in the presence of the nursing staff. Baseline vital signs were obtained and reviewed, and IV sedation was subsequently initiated. FINDINGS:   Esophagus: The esophagus was inspected to the Z-line. The endoscopic exam showed slightly irregular z-line. Stomach: The stomach was inspected in both forward and retroflex fashion and was appropriately distensible. The cardia, fundus, incisura, antrum and pylorus were identified via direct visualization. The endoscopic exam showed mild gastritis. Duodenum: The proximal small bowel was inspected through the bulb, sweep, and second portion of the duodenum. The endoscopic exam showed normal appearing duodenal mucosa. IMPRESSION:    1) Slightly irregular z-line identified at 36 cm, possible Elizalde's extending to 38 cm vs hiatal hernia. No esophagitis or raised lesions  2) Mild gastritis, no ulcerations, no erosions. Cold biopsy taken to evaluate for H. Pylori   3) Normal appearing duodenal mucosa      RECOMMENDATIONS:   1) Follow up path in GI clinic. Continue with anti-reflux lifestyle and therapy         [facility]  Gastroenterology   10/18/22    this note is created with the assistance of a speech recognition program.  While intending to generate a document that actually reflects the content of the visit, the document can still have some errors including those of syntax and sound a like substitutions which may escape proof reading. It such instances, actual meaning can be extrapolated by contextual diversion.     The patient was counseled at length about the risks of libertad Covid-19 during their perioperative period and any recovery window from their procedure. The patient was made aware that libertad Covid-19  may worsen their prognosis for recovering from their procedure  and lend to a higher morbidity and/or mortality risk. All material risks, benefits, and reasonable alternatives including postponing the procedure were discussed. The patient DOES wish to proceed with the procedure at this time.      Electronically signed by Aspen Padilla MD on 10/18/2022 at 9:55 AM

## 2022-10-18 NOTE — ANESTHESIA POSTPROCEDURE EVALUATION
POST- ANESTHESIA EVALUATION       Pt Name: Shawna Alcantar  MRN: [id number]  [place]: 1955  Date of evaluation: 10/18/2022  Time:  12:02 PM      BP (!) 116/91   Pulse 57   Temp (!) 96 °F (35.6 °C)   Resp 14   Ht 5' 8\" (1.727 m)   Wt 285 lb 6 oz (129.4 kg)   LMP 08/24/2010 (Approximate)   SpO2 100%   BMI 43.39 kg/m²      Consciousness Level  Awake  Cardiopulmonary Status  Stable  Pain Adequately Treated YES  Nausea / Vomiting  NO  Adequate Hydration  YES  Anesthesia Related Complications NONE      Electronically signed by Marquise Yeung MD on 10/18/2022 at 12:02 PM       Department of Anesthesiology  Postprocedure Note    Patient: Shawna Alcantar  MRN: [id number]  [place]: 1955  Date of evaluation: 10/18/2022      Procedure Summary     Date: 10/18/22 Room / Location: [address]    Anesthesia Start: 0504 Anesthesia Stop: 0957    Procedure: EGD BIOPSY Diagnosis:       Dyspepsia      (Dyspepsia [R10.13])    Surgeons: Sung Eagle MD Responsible Provider: Marquise Yeung MD    Anesthesia Type: MAC ASA Status: 3          Anesthesia Type: No value filed.     Hodan Phase I: Hodan Score: 10    Hodan Phase II: Hodan Score: 10      Anesthesia Post Evaluation

## 2022-10-18 NOTE — H&P
Procedure History and Physical    Pre-Procedural Diagnosis:  Dyspepsia    Indications:  same    Procedure Planned: endoscopy     History Obtained From:  patient    HISTORY OF PRESENT ILLNESS:       The patient is a 79 y.o. female who presents for the above procedure.         Past Medical History:    Past Medical History:   Diagnosis Date    Cervical spine pain 08/21/2013    Chronic headaches     Chronic sinusitis     CKD (chronic kidney disease)     stage 3    Closed fracture of distal end of left femur with routine healing 03/2021    COVID-19     Diabetes mellitus ([address]     Dizziness     Gastroesophageal reflux disease     barretts    H/O fall     Hypertension     Knee pain, left 08/21/2013    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    Obesity     Peripheral neuropathy     Pneumonia 08/30/2015    Pulmonary hypertension ([address] 01/01/2012    by echocardiogram    Shoulder pain, bilateral 08/21/2013    Spinal stenosis     Type 2 diabetes mellitus ([address]     Unspecified essential hypertension     Essential hypertension    Vitamin D deficiency 11/05/2014       Past Surgical History:    Past Surgical History:   Procedure Laterality Date    APPENDECTOMY      BACK INJECTION  02/25/2021    [facility] Right lumbar medial branch block using Fluroscopy    BRAIN SURGERY  05/24/2016    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 Kieran Mcdonough    COLONOSCOPY  05/03/2012    diverticular disease    COLONOSCOPY N/A 12/23/2020    COLONOSCOPY WITH BIOPSY performed by Darion Tabares MD at [address] [facility], 1 hyperplastic polyp, random biopsies negative    CYSTOSCOPY Bilateral 08/03/2022    CYSTO Bilateral Retrograde Pyelogram, with bladder washout  performed by Alison Escoto MD at [address] [facility]  08/2015    full dental extraction    FEMUR CLOSED REDUCTION Left 03/05/2020    has [facility]    FEMUR FRACTURE SURGERY Left 03/06/2021    left retrograde femoral nailing.  Dr. Rita Scott, [address] [facility] BODY REMOVAL  05/28/2016    left-sided temporal craniotomy wound reexploration with removal of small retained foreign body (plastic from Ruth clip from surgery 3 days prior), [facility], Dr. Trey Landaverde ([facility])  09/06/2005    supracervical hysterectomy bilateral salpingo oophectomy    JOINT REPLACEMENT      KNEE ARTHROSCOPY Left     NERVE BLOCK  09/09/2019    right side L2 through L5 block nerve,facet joint,lumbar,medial branch per Dr Pietro Espinal at [address] [place] [address]  2011    endoscopic mucosal resection of duodenal polyp    POLYSOMNOGRAPHY  11/30/2020    no significant sleep apnea    SHOULDER ARTHROPLASTY Right 10/18/2018    Fly Pruett MD; done at [facility] ARTHROSCOPY Right 03/07/2019    revision arthroscopy with debridement,revision mini-open rotator cuff repair per Dr Ilsa Salinas at [facility]      mayank, gastric ulcers    UPPER GASTROINTESTINAL ENDOSCOPY  05/27/2020    gastric ulcer, small hiatal hernia, Dr. Sunitha Waite, [address] N/A 12/23/2020    EGD BIOPSY performed by Selina Panda MD at [address] Elizalde's esophagus       Medications:  Current Facility-Administered Medications   Medication Dose Route Frequency Provider Last Rate Last Admin    lidocaine 1 % injection 1 mL  1 mL IntraDERmal Once PRN Ozzy Schroeder MD        lactated ringers infusion   IntraVENous Continuous Ozzy Schroeder MD        sodium chloride flush 0.9 % injection 5-40 mL  5-40 mL IntraVENous 2 times per day Ozzy Schroeder MD        sodium chloride flush 0.9 % injection 5-40 mL  5-40 mL IntraVENous PRN Ozzy Schroeder MD        0.9 % sodium chloride infusion   IntraVENous PRN Keny Marybeth Noonan  mL/hr at 10/18/22 [address] Radha Yao at 10/18/22 0826       Allergies: Allergies   Allergen Reactions    Asa [Aspirin] Other (See Comments)     Stomach irritation                 Social   Social History     Tobacco Use    Smoking status: Never    Smokeless tobacco: Never   Substance Use Topics    Alcohol use: Yes     Alcohol/week: 0.0 standard drinks     Comment: Maybe twice a year, small amounts        PSYCH HISTORY:  Depression No  Anxiety No  Suicide No       Family History   Problem Relation Age of Onset    Cancer Mother     Breast Cancer Mother 52    Hypertension Father     Diabetes Father     Cancer Father       No family history of colon cancer, Crohn's disease, or ulcerative colitis    Problems with Sedation/Anesthesia in the past? no    REVIEW OF SYSTEMS:  12 point review of systems negative other than mentioned above.       PHYSICAL EXAM:    Vitals:  /64   Pulse 60   Temp (!) 95.6 °F (35.3 °C) (Infrared)   Resp 15   Ht 5' 8\" (1.727 m)   Wt 285 lb 6 oz (129.4 kg)   LMP 08/24/2010 (Approximate)   SpO2 100%   BMI 43.39 kg/m²     Focused Exam related to procedure:    General appearance: NAD, conversant   Eyes: anicteric sclerae, moist conjunctivae; no lid-lag; PERRLA   Lungs: CTA, with normal respiratory effort and no intercostal retractions   CV: RRR, no MRGs   Abdomen: Soft, non-tender; no masses or HSM   Skin: Normal temperature, turgor and texture; no rash, ulcers or subcutaneous nodules     DATA:  CBC:   Lab Results   Component Value Date    WBC 8.0 07/27/2022    HGB 11.4 (L) 07/27/2022    HCT 35.3 (L) 07/27/2022    MCV 91.9 07/27/2022     07/27/2022     BUN/Cr:   Lab Results   Component Value Date    BUN 28 (H) 07/27/2022   ,   Lab Results   Component Value Date    CREATININE 1.46 (H) 07/27/2022     Potassium:   Lab Results   Component Value Date    K 4.0 07/27/2022     PT/INR:   Lab Results   Component Value Date    INR 1.1 09/20/2021    INR 0.9 11/23/2015    INR 1.0 (L) 08/23/2015    PROTIME 13.8 09/20/2021    PROTIME 9.6 11/23/2015    PROTIME 10.2 08/23/2015       ASSESSMENT AND PLAN:       1. Patient is a 79 y.o. female with above specified procedure planned. Expected Sedation/Anesthesia Type: MAC    2. ASA ([address] Anesthesiology) Anesthesia Status: Class 2 - A normal healthy patient with mild systemic disease    3. Mallampati: II (soft palate, uvula, fauces visible)  4. Procedure options, risks and benefits reviewed with Patient. Patient expresses understanding.     5.  Consent has been signed:  Yes    Lady Jann MD

## 2022-10-19 ENCOUNTER — HOSPITAL ENCOUNTER (OUTPATIENT)
Dept: PHYSICAL THERAPY | Age: 67
Setting detail: THERAPIES SERIES
Discharge: HOME | End: 2022-10-19
Payer: MEDICARE

## 2022-10-19 LAB — SURGICAL PATHOLOGY REPORT: NORMAL

## 2022-10-19 PROCEDURE — 97110 THERAPEUTIC EXERCISES: CPT

## 2022-10-19 NOTE — PROGRESS NOTES
Physical Therapy    Physical Therapy Daily Treatment Note    Date:  10/19/2022    Patient Name:  Christine Mayberry    :  1955  MRN: [id number]  Restrictions/Precautions:     Medical/Treatment Diagnosis Information:   Diagnosis: M25.512, G89.29 (ICD-10-CM) - Chronic left shoulder pain  M19.012 (ICD-10-CM) - Primary osteoarthritis of left shoulder  M25.512 (ICD-10-CM) - Acute pain of left shoulder  R26.81 (ICD-10-CM) - Unsteady gait  Insurance/Certification information:  PT Insurance Information: [employer]ft  Physician Information:   Ash Keenan MD  Plan of care signed (Y/N):  Y  Visit# / total visits:    Pain level: 10 Shoulder and knees      Time In: 11:13  Time Out: 11:51    Progress Note: []  Yes  [x]  No  Next due by: Visit #10  or by 22    Subjective:  Pt reports Monday fell into coffee table however is okay just getting extra sore. Pt reports fll did not really affect the shoulder. Pt reports shoulder has been getting a lot better as ca now reach for things and not have pain    Objective: RASHID performed per flow sheet for increased mobility and strengthening for improved daily living activities. Manual stretching and GH inf, inf/post mobilizations to improve motion and decrease tightness and pain. Progressions made this date with initiation of exercises to address balance problem. US held this date to see if able to adjust without it. Pt instructed to use ice or heat if soreness begins     Observation:  Test measurements:     Exercises:   Exercise/Equipment Resistance/Repetitions Other comments   Pulley's  5'    US 1.2 w/cm, 100% 8' Lateral shoulder. Levator Scap Stretch      Upper Trap Stretch      Scapular Retractions  15x         Doorway pec stretch     Shoulder ext stretch  10x5\" Active motion with overpressure.     IR AROM 10x5\"    Scap to 90 10x    Flex to 90 10x         Supine cervical retraction     Supine cervical retraction with ext     Supine pec stretch 2'    Supine wand chest press     Supine ABC's / Ceiling punch 1x / 10x 3#   SLing ER / Abd 10x 3#              Standing Wand flexion / Abduction / ER AAROM  10x    PROM  8'         Table Slides - flexion / scaption / ER          TB rows/ext 15x GR    TB IR/ER 10x red         Tandem stance 2x20\"    Mini squats 10x    Counter exercises 10x HR/TR, HS curls    [x] Provided verbal/tactile cueing for activities related to strengthening, flexibility, endurance, ROM. (68507)  [] Provided verbal/tactile cueing for activities related to improving balance, coordination, kinesthetic sense, posture, motor skill, proprioception. (10903)    Therapeutic Activities:     [] Therapeutic activities, direct (one-on-one) patient contact (use of dynamic activities to improve functional performance). (58083)    Gait:   [] Provided training and instruction to the patient for ambulation re-education. (64942)    Self-Care/ADL's  [] Self-care/home management training and compensatory training, meal preparation, safety procedures, and instructions in use of assistive technology devices/adaptive equipment, direct one-on-one contact. (43473)    Home Exercise Program:   shoulder impingement, seated LE exercises, Shoulder ROM  [x] Reviewed/Progressed HEP activities related to strengthening, flexibility, endurance, ROM. (72917)  [] Reviewed/Progressed HEP activities related to improving balance, coordination, kinesthetic sense, posture, motor skill, proprioception.  (98370)    Manual Treatments:    [] Provided manual therapy to mobilize soft tissue/joints for the purpose of modulating pain, promoting relaxation,  increasing ROM, reducing/eliminating soft tissue swelling/inflammation/restriction, improving soft tissue extensibility.  (30186)    Service Based Modalities:      Timed Code Treatment Minutes:   45' therex/ HEP  Total Treatment Minutes:   45'    Treatment/Activity Tolerance:  [x] Patient tolerated treatment well [] Patient limited by fatigue  [] Patient limited by pain  [] Patient limited by other medical complications  [] Other:     Prognosis: [x] Good [] Fair  [] Poor    Patient Requires Follow-up: [x] Yes  [] No    Goals:  Short Term Goals  Time Frame for Short term goals: 2 week  Short term goal 1: Provide written HEP - met    Long Term Goals  Time Frame for Long term goals : 6 weeks  Long term goal 1: Patient will report no greater than 2-3/10 left shoulder pain to allow patient to complete her daily ADLs with greater ease. Long term goal 2: Patient will score greater than or = to 66/80 on the UEFI to allow patient to complete her daily housework with greater ease. Long term goal 3: Patient will demonstrate improved AROM of left shoulder to 160 degrees of flexion and abduction, and IR to L1/2 to allow patient to reach overhead and behind her back with greater ease. (See above)  Long term goal 4: Patient will demonstrate 4+/5 left shoulder strength to allow patient to lift and carry items with greater ease. Long term goal 5: Patient will demonstrate ability to hold bilateral tandem stance for 10 seconds to decrease risk of falls. Long term goal 6: Patient will report compliance with HEP for continued progression following PT.     Plan:   [x] Continue per plan of care [] Alter current plan (see comments)  [] Plan of care initiated [] Hold pending MD visit [] Discharge    Plan for Next Session:  Continue to progress as tolerated     Electronically signed by:  Susie Sykes PTA

## 2022-10-24 ENCOUNTER — HOSPITAL ENCOUNTER (OUTPATIENT)
Dept: PHYSICAL THERAPY | Age: 67
Setting detail: THERAPIES SERIES
Discharge: HOME | End: 2022-10-24
Payer: MEDICARE

## 2022-10-24 ENCOUNTER — TELEPHONE (OUTPATIENT)
Dept: GASTROENTEROLOGY | Age: 67
End: 2022-10-24

## 2022-10-24 PROCEDURE — 97110 THERAPEUTIC EXERCISES: CPT

## 2022-10-24 NOTE — PROGRESS NOTES
Physical Therapy    Physical Therapy Daily Treatment Note    Date:  10/24/2022    Patient Name:  Gabby Newton    :  1955  MRN: [id number]  Restrictions/Precautions:     Medical/Treatment Diagnosis Information:   Diagnosis: M25.512, G89.29 (ICD-10-CM) - Chronic left shoulder pain  M19.012 (ICD-10-CM) - Primary osteoarthritis of left shoulder  M25.512 (ICD-10-CM) - Acute pain of left shoulder  R26.81 (ICD-10-CM) - Unsteady gait  Insurance/Certification information:  PT Insurance Information: Valdez Pelaez  Physician Information:   Rosario Swift MD  Plan of care signed (Y/N):  Y  Visit# / total visits:    Pain level: 3/10 Shoulder and knees      Time In: 11:12  Time Out: 11:55    Progress Note: []  Yes  [x]  No  Next due by: Visit #10  or by 22    Subjective:  Pt forgot rollator this date. Pt reports LEs are fatigued this date as went to an event yesterday and had to do a lot of walking. Pt reports shoulder is doing well. Only position that is bothersome is extension. Objective: RASHID performed per flow sheet for increased mobility and strengthening for improved daily living activities. Manual stretching and GH inf, inf/post mobilizations to improve motion and decrease tightness and pain. Observation:  Test measurements:     Exercises:   Exercise/Equipment Resistance/Repetitions Other comments   Pulley's  5'    US 1.2 w/cm, 100%  Lateral shoulder. Levator Scap Stretch      Upper Trap Stretch      Scapular Retractions  15x         Doorway pec stretch     Shoulder ext stretch  10x5\" Active motion with overpressure.     IR AROM 10x5\"       Scap to 90 10x   1#    Flex to 90 10x   1#         Supine cervical retraction     Supine cervical retraction with ext     Supine pec stretch 2'    Supine wand chest press     Supine ABC's / Ceiling punch 1x / 10x 3#   SLing ER / Abd 10x 3#              Standing Wand flexion / Abduction / ER AAROM  10x    PROM  5'         Table Slides - flexion / scaption / ER TB rows/ext 15x GR    TB IR/ER 10x red         Hip abd/add 10x    red/ball    Tandem stance 2x20\"    Mini squats 10x    Counter exercises 10x HR/TR, HS curls    [x] Provided verbal/tactile cueing for activities related to strengthening, flexibility, endurance, ROM. (04601)  [] Provided verbal/tactile cueing for activities related to improving balance, coordination, kinesthetic sense, posture, motor skill, proprioception. (06537)    Therapeutic Activities:     [] Therapeutic activities, direct (one-on-one) patient contact (use of dynamic activities to improve functional performance). (68452)    Gait:   [] Provided training and instruction to the patient for ambulation re-education. (41155)    Self-Care/ADL's  [] Self-care/home management training and compensatory training, meal preparation, safety procedures, and instructions in use of assistive technology devices/adaptive equipment, direct one-on-one contact. (72804)    Home Exercise Program:   shoulder impingement, seated LE exercises, Shoulder ROM  [x] Reviewed/Progressed HEP activities related to strengthening, flexibility, endurance, ROM. (53295)  [] Reviewed/Progressed HEP activities related to improving balance, coordination, kinesthetic sense, posture, motor skill, proprioception.  (26586)    Manual Treatments:    [] Provided manual therapy to mobilize soft tissue/joints for the purpose of modulating pain, promoting relaxation,  increasing ROM, reducing/eliminating soft tissue swelling/inflammation/restriction, improving soft tissue extensibility.  (71165)    Service Based Modalities:      Timed Code Treatment Minutes:   37' therex/ HEP  Total Treatment Minutes:   37'    Treatment/Activity Tolerance:  [x] Patient tolerated treatment well [] Patient limited by fatigue  [] Patient limited by pain  [] Patient limited by other medical complications  [] Other:     Prognosis: [x] Good [] Fair  [] Poor    Patient Requires Follow-up: [x] Yes  [] No    Goals:  Short Term Goals  Time Frame for Short term goals: 2 week  Short term goal 1: Provide written HEP - met    Long Term Goals  Time Frame for Long term goals : 6 weeks  Long term goal 1: Patient will report no greater than 2-3/10 left shoulder pain to allow patient to complete her daily ADLs with greater ease. Long term goal 2: Patient will score greater than or = to 66/80 on the UEFI to allow patient to complete her daily housework with greater ease. Long term goal 3: Patient will demonstrate improved AROM of left shoulder to 160 degrees of flexion and abduction, and IR to L1/2 to allow patient to reach overhead and behind her back with greater ease. (See above)  Long term goal 4: Patient will demonstrate 4+/5 left shoulder strength to allow patient to lift and carry items with greater ease. Long term goal 5: Patient will demonstrate ability to hold bilateral tandem stance for 10 seconds to decrease risk of falls. Long term goal 6: Patient will report compliance with HEP for continued progression following PT.     Plan:   [x] Continue per plan of care [] Alter current plan (see comments)  [] Plan of care initiated [] Hold pending MD visit [] Discharge    Plan for Next Session:  Continue to progress as tolerated     Electronically signed by:  Antoni Rios PTA

## 2022-10-26 ENCOUNTER — HOSPITAL ENCOUNTER (OUTPATIENT)
Dept: PHYSICAL THERAPY | Age: 67
Setting detail: THERAPIES SERIES
Discharge: HOME | End: 2022-10-26
Payer: MEDICARE

## 2022-10-26 NOTE — PROGRESS NOTES
Physical Therapy  Outpatient Physical Therapy    [x] [place]  Phone: [phone]  Fax: [phone]      [] [place]  Phone: [phone]  Fax: [phone]    Physical Therapy  Cancellation/No-show Note  Patient Name:  Didier Guerrero  :  1955   Date:  10/26/2022  Cancelled visits to date: 1  No-shows to date: 0    For today's appointment patient:  [x]  Cancelled  []  Rescheduled appointment  []  No-show     Reason given by patient:  [x]  Patient ill  []  Conflicting appointment  []  No transportation    []  Conflict with work  []  No reason given  []  Other:     Comments:      Electronically signed by: Sia Kirk PTA

## 2022-11-01 ENCOUNTER — HOSPITAL ENCOUNTER (OUTPATIENT)
Dept: NEUROLOGY | Age: 67
Discharge: HOME | End: 2022-11-01
Payer: MEDICARE

## 2022-11-01 DIAGNOSIS — M25.512 CHRONIC LEFT SHOULDER PAIN: ICD-10-CM

## 2022-11-01 DIAGNOSIS — G89.29 CHRONIC LEFT SHOULDER PAIN: ICD-10-CM

## 2022-11-01 DIAGNOSIS — R29.898 OTHER SYMPTOMS AND SIGNS INVOLVING THE MUSCULOSKELETAL SYSTEM: ICD-10-CM

## 2022-11-01 PROCEDURE — 95886 MUSC TEST DONE W/N TEST COMP: CPT

## 2022-11-01 PROCEDURE — 95910 NRV CNDJ TEST 7-8 STUDIES: CPT

## 2022-11-01 NOTE — PROGRESS NOTES
EMG/NCS Left    upper Completed    PCP: Suleiman Cross MD    Ordering: Divya Duff. [facility]    Interpreting Physician: Divya Duff.  [facility]    Technician: Diana Rollins RCP

## 2022-11-01 NOTE — PROCEDURES
action  potentials of the left ulnar nerve shows low amplitude, normal distal  latency, low conduction velocities. Proximal conductions as measured by the F responses were prolonged in  the left median nerve, borderline in the left ulnar nerve. Nerve  Conductions   Results  Were  Personally  Reviewed and  Analysed. Abnormal  Nerve  Conductions  Were  Personally  Repeated,  Verified, reconfirmed  And  Updated as needed  appropriately. Please    See   Wave  Forms   And    Details  Of     Nerve  Conduction   Studies   For  Additional  Information             Extensive   Needle  Electromyography  Was personally  Performed  in the  Left        Upper Extremity   In  The  Following  Muscles :    Deltoid,  Biceps  Brachii,  Triceps . Pronator  Teres,  Flexor Carpi Ulnaris,    Ext. Digitorum Communis,  FDI (Hand)         Extensive  Needle  Electromyography  Shows  No   Abnormality with :       A) Normal  insertional  Activity. There  Is  Absence  Of   P  Waves,  Fibrillations,  Fasciculations and        Other  Spontaneous   Activity. B) Voluntary  Motor unit  Potentials    Show :    Normal  Effort,  Recruitment, amplitude,  Duration. No Polyphasia  Noted. IMPRESSION:      1. There is electrodiagnostic evidence of moderate median        mononeuropathy at the left wrist (carpal tunnel syndrome). 2.  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 3.  There is no definite electrodiagnostic evidence of active cervical        radiculopathy involving examined left upper extremity. Further clinical correlation and followup recommended. Cruz Bull MD, [address] [place] [address]     Board Certified in Neurology  & in  [id number] [address] [place] [address] [facility] ([facility]).             D: 11/01/2022 11:42:26       T: 11/01/2022 12:11:37     [id number]  Job#: [id number]     Doc#: [id number]    CC:  Beatriz Pierson Bahmanrs

## 2022-11-03 ENCOUNTER — OFFICE VISIT (OUTPATIENT)
Dept: GASTROENTEROLOGY | Age: 67
End: 2022-11-03
Payer: MEDICARE

## 2022-11-03 VITALS
HEART RATE: 72 BPM | WEIGHT: 288 LBS | HEIGHT: 68 IN | SYSTOLIC BLOOD PRESSURE: 99 MMHG | DIASTOLIC BLOOD PRESSURE: 64 MMHG | BODY MASS INDEX: 43.65 KG/M2 | OXYGEN SATURATION: 96 %

## 2022-11-03 DIAGNOSIS — R11.0 NAUSEA: Primary | ICD-10-CM

## 2022-11-03 DIAGNOSIS — K59.00 CONSTIPATION, UNSPECIFIED CONSTIPATION TYPE: ICD-10-CM

## 2022-11-03 DIAGNOSIS — K22.70 BARRETT'S ESOPHAGUS WITHOUT DYSPLASIA: ICD-10-CM

## 2022-11-03 PROCEDURE — 3078F DIAST BP <80 MM HG: CPT | Performed by: INTERNAL MEDICINE

## 2022-11-03 PROCEDURE — 99214 OFFICE O/P EST MOD 30 MIN: CPT | Performed by: INTERNAL MEDICINE

## 2022-11-03 PROCEDURE — 1123F ACP DISCUSS/DSCN MKR DOCD: CPT | Performed by: INTERNAL MEDICINE

## 2022-11-03 PROCEDURE — 3074F SYST BP LT 130 MM HG: CPT | Performed by: INTERNAL MEDICINE

## 2022-11-03 RX ORDER — ONDANSETRON 8 MG/1
8 TABLET, ORALLY DISINTEGRATING ORAL EVERY 8 HOURS PRN
Qty: 90 TABLET | Refills: 2 | Status: SHIPPED | OUTPATIENT
Start: 2022-11-03

## 2022-11-03 ASSESSMENT — ENCOUNTER SYMPTOMS
WHEEZING: 0
VOMITING: 0
VOICE CHANGE: 0
CHOKING: 0
TROUBLE SWALLOWING: 0
CONSTIPATION: 0
COUGH: 0
RECTAL PAIN: 0
ANAL BLEEDING: 0
BLOOD IN STOOL: 0
DIARRHEA: 0
ABDOMINAL DISTENTION: 0
ABDOMINAL PAIN: 0
SHORTNESS OF BREATH: 0
NAUSEA: 1

## 2022-11-03 NOTE — PROGRESS NOTES
GI FOLLOW UP    INTERVAL HISTORY:       Chronic nausea symptoms with intermittent constipation  Underwent upper endoscopy  Patient was identified to have possible Elizalde's in the past  Biopsy was negative for H. pylori      Chief Complaint   Patient presents with    Follow Up After Procedure     EGD 10/18/22         1. Nausea            HISTORY OF PRESENT ILLNESS: Ms.Nancy Jenna Acosta is a 77 y.o. female with a past history remarkable for history of cervical spine pain, on Norco, prior history of COVID-19, diabetes, dizziness, obesity, peripheral neuropathy, prior pulmonary hypertension, diabetes recent upper endoscopy and colonoscopy, prior hysterectomy, referred for evaluation of chronic nausea symptoms. .    Past Medical,Family, and Social History reviewed and does contribute to the patient presenting condition. Patient's PMH/PSH,SH,PSYCH Hx, MEDs, ALLERGIES, and ROS were all reviewed and updated in the appropriate sections.     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    Obesity     Peripheral neuropathy     Pneumonia 08/30/2015    Pulmonary hypertension ([address] 01/01/2012    by echocardiogram    Shoulder pain, bilateral 08/21/2013    Spinal stenosis     Type 2 diabetes mellitus ([address]     Unspecified essential hypertension     Essential hypertension    Vitamin D deficiency 11/05/2014       Past Surgical History:   Procedure Laterality Date    APPENDECTOMY      BACK INJECTION 02/25/2021    [facility] Right lumbar medial branch block using Fluroscopy    BRAIN SURGERY  05/24/2016    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 Johny Covington    COLONOSCOPY  05/03/2012    diverticular disease    COLONOSCOPY N/A 12/23/2020    COLONOSCOPY WITH BIOPSY performed by Andrzej Cuadra MD at [address] [facility], 1 hyperplastic polyp, random biopsies negative    CYSTOSCOPY Bilateral 08/03/2022    CYSTO Bilateral Retrograde Pyelogram, with bladder washout  performed by Abdullahi Peralta MD at [address] [facility]]  08/2015    full dental extraction    ESOPHAGOGASTRODUODENOSCOPY  10/18/2022    FEMUR CLOSED REDUCTION Left 03/05/2020    has [facility]    FEMUR FRACTURE SURGERY Left 03/06/2021    left retrograde femoral nailing.  Dr. Zach Avila, [address] [place] [address] BODY REMOVAL  05/28/2016    left-sided temporal craniotomy wound reexploration with removal of small retained foreign body (plastic from Ruth clip from surgery 3 days prior), [facility], Dr. Sisi Camargo ([address] [facility] [place])  09/06/2005    supracervical hysterectomy bilateral salpingo oophectomy    JOINT REPLACEMENT      KNEE ARTHROSCOPY Left     NERVE BLOCK  09/09/2019    right side L2 through L5 block nerve,facet joint,lumbar,medial branch per Dr James Lemos at [address]  2011    endoscopic mucosal resection of duodenal polyp    POLYSOMNOGRAPHY  11/30/2020    no significant sleep apnea    SHOULDER ARTHROPLASTY Right 10/18/2018    Sabina Jung MD; done at [address] [place] [address],[address] Right 03/07/2019    revision arthroscopy with debridement,revision mini-open rotator cuff repair per Dr Emma Pimentel at [address] [facility], [address]      barretts, gastric ulcers    UPPER GASTROINTESTINAL ENDOSCOPY 05/27/2020    gastric ulcer, small hiatal hernia, Dr. Paresh Gill, [address] N/A 12/23/2020    EGD BIOPSY performed by Mary Sams MD at [address] [facility] Elizalde's esophagus    UPPER GASTROINTESTINAL ENDOSCOPY N/A 10/18/2022    EGD BIOPSY performed by Usha Vora MD at [address]:    Current Outpatient Medications:     ondansetron (ZOFRAN-ODT) 8 MG TBDP disintegrating tablet, Place 1 tablet under the tongue every 8 hours as needed for Nausea or Vomiting, Disp: 90 tablet, Rfl: 2    ondansetron (ZOFRAN ODT) 4 MG disintegrating tablet, Take 1 tablet by mouth every 8 hours as needed for Nausea or Vomiting, Disp: 30 tablet, Rfl: 2    pioglitazone-metFORMIN (ACTOPLUS MET)  MG per tablet, TAKE 1 TABLET TWICE A DAY WITH MEALS, Disp: 180 tablet, Rfl: 1    DULoxetine (CYMBALTA) 60 MG extended release capsule, Take 1 capsule by mouth daily, Disp: 10 capsule, Rfl: 0    meloxicam (MOBIC) 7.5 MG tablet, Take 1 tablet by mouth daily, Disp: 30 tablet, Rfl: 0    lisinopril-hydroCHLOROthiazide (PRINZIDE;ZESTORETIC) 20-12.5 MG per tablet, Take 1 tablet by mouth in the morning., Disp: 90 tablet, Rfl: 3    HYDROcodone-acetaminophen (NORCO) 5-325 MG per tablet, , Disp: , Rfl:     M-DRYL 12.5 MG/5ML liquid, take 5 milliliters by mouth at bedtime for itching or allergies, Disp: , Rfl:     diphenhydrAMINE (BENADRYL) 12.5 MG/5ML elixir, Take 5 mLs by mouth nightly as needed for Itching or Allergies (can use of nausea as well.), Disp: 118 mL, Rfl: 2    famotidine (PEPCID) 20 MG tablet, Take 1 tablet by mouth nightly as needed (dyspepsia), Disp: 30 tablet, Rfl: 3    pantoprazole (PROTONIX) 40 MG tablet, Take 1 tablet by mouth 2 times daily take 1 tablet by mouth daily, Disp: 60 tablet, Rfl: 5    Cholecalciferol (VITAMIN D3) 1.25 MG (96332 UT) CAPS, TAKE 1 CAPSULE ONCE A WEEK, Disp: 13 capsule, Rfl: 1    Gabapentin (NEURONTIN PO), Take 600 mg by mouth 5 times daily Only taking 3 times a day., Disp: , Rfl:     ondansetron (ZOFRAN ODT) 4 MG disintegrating tablet, Place 1 tablet under the tongue every 6 hours as needed for Nausea or Vomiting (Patient not taking: Reported on 11/3/2022), Disp: 10 tablet, Rfl: 1    sucralfate (CARAFATE) 1 GM tablet, Take 1 tablet by mouth 4 times daily (Patient not taking: Reported on 11/3/2022), Disp: 120 tablet, Rfl: 3    blood glucose monitor strips, Test 1 times a day & as needed for symptoms of irregular blood glucose. Dispense sufficient amount for indicated testing frequency plus additional to accommodate PRN testing needs. (Patient not taking: No sig reported), Disp: 100 strip, Rfl: 1    ALLERGIES:   Allergies   Allergen Reactions    Asa [Aspirin] Other (See Comments)     Stomach irritation       FAMILY HISTORY:       Problem Relation Age of Onset    Cancer Mother     Breast Cancer Mother 52    Hypertension Father     Diabetes Father     Cancer Father          SOCIAL HISTORY:   Social History     Socioeconomic History    Marital status:      Spouse name: Not on file    Number of children: Not on file    Years of education: Not on file    Highest education level: Not on file   Occupational History    Not on file   Tobacco Use    Smoking status: Never    Smokeless tobacco: Never   Vaping Use    Vaping Use: Never used   Substance and Sexual Activity    Alcohol use:  Yes     Alcohol/week: 0.0 standard drinks     Comment: Maybe twice a year, small amounts    Drug use: No    Sexual activity: Yes     Partners: Male   Other Topics Concern    Not on file   Social History Narrative    Not on file     Social Determinants of Health     Financial Resource Strain: Not on file   Food Insecurity: Not on file   Transportation Needs: Not on file   Physical Activity: Insufficiently Active    Days of Exercise per Week: 2 days    Minutes of Exercise per Session: 10 min   Stress: Not on file   Social Connections: Not on file   Intimate Partner Violence: Not on file   Housing Stability: Not on file       REVIEW OF SYSTEMS: A 12-point review of systems was obtained and pertinent positives and negatives were listed below. REVIEW OF SYSTEMS:     Constitutional: No fever, no chills, no lethargy, no weakness. HEENT:  No headache, otalgia, itchy eyes, nasal discharge or sore throat. Cardiac:  No chest pain, dyspnea, orthopnea or PND. Chest:   No cough, phlegm or wheezing. Abdomen:      Detailed by MA   Neuro:  No focal weakness, abnormal movements or seizure like activity. Skin:   No rashes, no itching. :   No hematuria, no pyuria, no dysuria, no flank pain. Extremities:  No swelling or joint pains. ROS was otherwise negative    Review of Systems   Constitutional:  Negative for appetite change, fatigue and unexpected weight change (25 # x 3 months). HENT:  Negative for trouble swallowing and voice change. Eyes:  Negative for visual disturbance. Respiratory:  Negative for cough, choking, shortness of breath (with exertion) and wheezing. Cardiovascular:  Negative for chest pain, palpitations and leg swelling. Gastrointestinal:  Positive for nausea. Negative for abdominal distention, abdominal pain, anal bleeding, blood in stool, constipation, diarrhea, rectal pain and vomiting. Genitourinary:  Negative for difficulty urinating. Neurological:  Negative for dizziness, seizures, weakness, numbness and headaches. Hematological:  Does not bruise/bleed easily. Psychiatric/Behavioral:  Negative for confusion and sleep disturbance. The patient is nervous/anxious. PHYSICAL EXAMINATION: Vital signs reviewed per the nursing documentation. BP 99/64 (Site: Right Lower Arm)   Pulse 72   Ht 5' 8\" (1.727 m)   Wt 288 lb (130.6 kg)   LMP 08/24/2010 (Approximate)   SpO2 96%   BMI 43.79 kg/m²   Body mass index is 43.79 kg/m². Physical Exam    Physical Exam   Constitutional: Patient is oriented to person, place, and time.  Patient appears well-developed and well-nourished. HENT:   Head: Normocephalic and atraumatic. Eyes: Pupils are equal, round, and reactive to light. EOM are normal.   Neck: Normal range of motion. Neck supple. No JVD present. No tracheal deviation present. No thyromegaly present. Cardiovascular: Normal rate, regular rhythm, normal heart sounds and intact distal pulses. Pulmonary/Chest: Effort normal and breath sounds normal. No stridor. No respiratory distress. He has no wheezes. He has no rales. He exhibits no tenderness. Abdominal: Soft. Bowel sounds are normal. He exhibits no distension and no mass. There is no tenderness. There is no rebound and no guarding. No hernia. Musculoskeletal: Normal range of motion. Lymphadenopathy:    Patient has no cervical adenopathy. Neurological: Patient is alert and oriented to person, place, and time. Psychiatric: Patient has a normal mood and affect. Patient behavior is normal.       LABORATORY DATA: Reviewed  Lab Results   Component Value Date    WBC 8.0 07/27/2022    HGB 11.4 (L) 07/27/2022    HCT 35.3 (L) 07/27/2022    MCV 91.9 07/27/2022     07/27/2022     07/27/2022    K 4.0 07/27/2022    CL 97 (L) 07/27/2022    CO2 30 07/27/2022    BUN 28 (H) 07/27/2022    CREATININE 1.46 (H) 07/27/2022    LABALBU 4.4 04/04/2022    BILITOT 0.33 04/04/2022    ALKPHOS 146 (H) 04/04/2022    AST 14 04/04/2022    ALT 5 04/04/2022    INR 1.1 09/20/2021         Lab Results   Component Value Date    RBC 3.84 (L) 07/27/2022    HGB 11.4 (L) 07/27/2022    MCV 91.9 07/27/2022    MCH 29.7 07/27/2022    MCHC 32.3 07/27/2022    RDW 14.2 07/27/2022    MPV 10.2 07/27/2022    BASOPCT 1 07/27/2022    LYMPHSABS 1.71 07/27/2022    MONOSABS 0.57 07/27/2022    NEUTROABS 5.52 07/27/2022    EOSABS 0.10 07/27/2022    BASOSABS 0.06 07/27/2022         DIAGNOSTIC TESTING:     No results found. IMPRESSION:Ms. Zach Owens is a 77 y.o. female with a past history remarkable for history of cervical spine pain, on Norco, prior history of COVID-19, diabetes, dizziness, obesity, peripheral neuropathy, prior pulmonary hypertension, diabetes recent upper endoscopy and colonoscopy, prior hysterectomy, referred for evaluation of chronic nausea symptoms. Patient is unable to identify any obvious dietary triggers. No significant mount of pruritus reported by the patient with excoriated lesions in the abdomen, patient will need to see a dermatologist for these changes. Etiology not fully understood. Her chronic nausea and vomiting appear to be temporally related to her use of narcotics and other medications, although recently the patient had denied any excessive use of narcotics. .  Advised to reduce her medications. Will provide Benadryl as an antiemetic correct for bedtime to prevent early morning symptoms along with Pepcid 20 mg nightly for her reflux symptoms. Radha Pablo was seen today for follow-up, nausea & vomiting and constipation. Diagnoses and all orders for this visit:    Constipation, unspecified constipation type- we will optimize patient's bowel regimen, will place patient on Colace 100 mg 1 to 2 tablets daily. Increase oral hydration with minimum 64 ounces of water today. Consider prune juice as augmenting facilitator. Nausea- potentially related to poor colonic transit, intermittent use of narcotics. Will provide Zofran as needed. EGD failed to disclose any luminal cause of the patient's persistent nausea, no evidence of peptic ulcer disease. Other orders  -     docusate sodium (COLACE) 100 MG capsule; Take 1 capsule by mouth 2 times daily       Elizalde's esophagus-identified approximately 2 years ago, repeat EGD in 1 yr. reviewed biopsy failed to disclose any goblet cell metaplasia. Patient continue with PPI therapy with Protonix 40 mg twice daily for the time being. RTC: 3 months. Additional comments:     Thank you for allowing me to participate in the care of Ms. Prasanna Matson. For any further questions please do not hesitate to contact me. I have reviewed and agree with the ROS entered by the MA/LPN from today's encounter documented in a separate note. Lesly Goyal MD, MPH   Board Certified in Gastroenterology  Board Certified in [address] [place] [address] #: [phone]    this note is created with the assistance of a speech recognition program.  While intending to generate a document that actually reflects the content of the visit, the document can still have some errors including those of syntax and sound a like substitutions which may escape proof reading. It such instances, actual meaning can be extrapolated by contextual diversion.

## 2022-11-16 DIAGNOSIS — R80.9 TYPE 2 DIABETES MELLITUS WITH MICROALBUMINURIA, WITHOUT LONG-TERM CURRENT USE OF INSULIN (HCC): ICD-10-CM

## 2022-11-16 DIAGNOSIS — E11.29 TYPE 2 DIABETES MELLITUS WITH MICROALBUMINURIA, WITHOUT LONG-TERM CURRENT USE OF INSULIN (HCC): ICD-10-CM

## 2022-11-16 RX ORDER — PIOGLITAZONE HCL AND METFORMIN HCL 500; 15 MG/1; MG/1
TABLET ORAL
Qty: 180 TABLET | Refills: 1 | OUTPATIENT
Start: 2022-11-16

## 2022-11-16 NOTE — TELEPHONE ENCOUNTER
Pt calling back, states she is going to look in her closet, states she might have called in error and will only call back if she needs this filled.

## 2022-12-22 NOTE — DISCHARGE SUMMARY
[facility]    [x] [place]  Phone: [phone]  Fax: [phone]      [] [place]  Phone: [phone]  Fax: [phone]    Physical Therapy Discharge Note  Date: 2022        Patient Name:  Seema Hinojosa    :  1955  MRN: [id number]  Restrictions/Precautions:     Medical/Treatment Diagnosis Information:   Diagnosis: M25.512, G89.29 (ICD-10-CM) - Chronic left shoulder pain  M19.012 (ICD-10-CM) - Primary osteoarthritis of left shoulder  M25.512 (ICD-10-CM) - Acute pain of left shoulder  R26.81 (ICD-10-CM) - Unsteady gait  Insurance/Certification information:  PT Insurance Information: Emeka Edwards  Physician Information:   Hailee Yoon MD  Plan of care signed (Y/N):  Y  Visit# / total visits:    Pain level:      3/10     Shoulder and knees      Time Period for Report: 22 - 10/26/22   Cancels/No-shows to date:      Plan of Care/Treatment to date:  [x] Therapeutic Exercise    [x] Modalities:  [] Therapeutic Activity     [x] Ultrasound  [] Electrical Stimulation  [] Gait Training      [] Cervical Traction    [] Lumbar Traction  [] Neuromuscular Re-education  [] Cold/hotpack [] Iontophoresis  [x] Instruction in HEP      Other:  [] Manual Therapy       []    [] Aquatic Therapy       []                              Subjective:Pt forgot rollator this date. Pt reports LEs are fatigued this date as went to an event yesterday and had to do a lot of walking. Pt reports shoulder is doing well.  Only position that is bothersome is extension      From 10/19/22: Pt reports shoulder has been getting a lot better as ca now reach for things and not have pain     Objective:          From 10/17/22 visit:   AROM L shoulder flexion:  155 degrees  Abduction: 130 degrees  IR: to L4     On 10/11/22 - UEFI: 51/80      Assessment: Improvements in left shoulder AROM, functional ability, and reports of improved strength compared to eval. Patient did not schedule anymore visits following last visit on 10/26/22. Currently status is unknown/some goals are unknown. Plan: D/C          Goals:   Short Term Goals  Time Frame for Short term goals: 2 week  Short term goal 1: Provide written HEP - met     Long Term Goals  Time Frame for Long term goals : 6 weeks  Long term goal 1: Patient will report no greater than 2-3/10 left shoulder pain to allow patient to complete her daily ADLs with greater ease. Partially met   Long term goal 2: Patient will score greater than or = to 66/80 on the UEFI to allow patient to complete her daily housework with greater ease. Partially met  Long term goal 3: Patient will demonstrate improved AROM of left shoulder to 160 degrees of flexion and abduction, and IR to L1/2 to allow patient to reach overhead and behind her back with greater ease. Partially met (See above)  Long term goal 4: Patient will demonstrate 4+/5 left shoulder strength to allow patient to lift and carry items with greater ease. Partially met (reports shoulder is stronger)  Long term goal 5: Patient will demonstrate ability to hold bilateral tandem stance for 10 seconds to decrease risk of falls.  Unknown  Long term goal 6: Patient will report compliance with HEP for continued progression following PT. met         Percentage of Goals Met: 57.14% of known goals            Discharge Prognosis: [] Excellent [x] Good [x] Fair  [] Poor     Goal Status:  [] Achieved [x] Partially Achieved  [] Not Achieved       Electronically signed by:  Neymar Raymundo, PT

## 2022-12-29 DIAGNOSIS — K22.70 BARRETT'S ESOPHAGUS WITHOUT DYSPLASIA: ICD-10-CM

## 2023-01-03 RX ORDER — PANTOPRAZOLE SODIUM 40 MG/1
40 TABLET, DELAYED RELEASE ORAL 2 TIMES DAILY
Qty: 60 TABLET | Refills: 5 | Status: SHIPPED | OUTPATIENT
Start: 2023-01-03

## 2023-01-08 ENCOUNTER — HOSPITAL ENCOUNTER (EMERGENCY)
Age: 68
Discharge: HOME | End: 2023-01-08
Attending: EMERGENCY MEDICINE
Payer: MEDICARE

## 2023-01-08 ENCOUNTER — APPOINTMENT (OUTPATIENT)
Dept: CT IMAGING | Age: 68
End: 2023-01-08
Payer: MEDICARE

## 2023-01-08 VITALS
RESPIRATION RATE: 11 BRPM | DIASTOLIC BLOOD PRESSURE: 90 MMHG | SYSTOLIC BLOOD PRESSURE: 133 MMHG | HEART RATE: 73 BPM | WEIGHT: 285 LBS | TEMPERATURE: 98.6 F | OXYGEN SATURATION: 99 % | HEIGHT: 68 IN | BODY MASS INDEX: 43.19 KG/M2

## 2023-01-08 DIAGNOSIS — K21.9 GASTROESOPHAGEAL REFLUX DISEASE, UNSPECIFIED WHETHER ESOPHAGITIS PRESENT: Primary | ICD-10-CM

## 2023-01-08 LAB
ABSOLUTE EOS #: 0.13 K/UL (ref 0–0.44)
ABSOLUTE IMMATURE GRANULOCYTE: 0.05 K/UL (ref 0–0.3)
ABSOLUTE LYMPH #: 1.57 K/UL (ref 1.1–3.7)
ABSOLUTE MONO #: 0.73 K/UL (ref 0.1–1.2)
ALBUMIN SERPL-MCNC: 3.8 G/DL (ref 3.5–5.2)
ALBUMIN/GLOBULIN RATIO: 1.1 (ref 1–2.5)
ALP BLD-CCNC: 115 U/L (ref 35–104)
ALT SERPL-CCNC: <5 U/L (ref 5–33)
AMYLASE: 46 U/L (ref 28–100)
ANION GAP SERPL CALCULATED.3IONS-SCNC: 11 MMOL/L (ref 9–17)
AST SERPL-CCNC: 10 U/L
BASOPHILS # BLD: 1 % (ref 0–2)
BASOPHILS ABSOLUTE: 0.04 K/UL (ref 0–0.2)
BILIRUB SERPL-MCNC: 0.3 MG/DL (ref 0.3–1.2)
BILIRUBIN DIRECT: <0.1 MG/DL
BILIRUBIN, INDIRECT: ABNORMAL MG/DL (ref 0–1)
BUN BLDV-MCNC: 29 MG/DL (ref 8–23)
CALCIUM SERPL-MCNC: 9.2 MG/DL (ref 8.6–10.4)
CHLORIDE BLD-SCNC: 96 MMOL/L (ref 98–107)
CO2: 29 MMOL/L (ref 20–31)
CREAT SERPL-MCNC: 1.69 MG/DL (ref 0.5–0.9)
EOSINOPHILS RELATIVE PERCENT: 2 % (ref 1–4)
GFR SERPL CREATININE-BSD FRML MDRD: 33 ML/MIN/1.73M2
GLUCOSE BLD-MCNC: 137 MG/DL (ref 70–99)
HCT VFR BLD CALC: 33.2 % (ref 36.3–47.1)
HEMOGLOBIN: 10.7 G/DL (ref 11.9–15.1)
IMMATURE GRANULOCYTES: 1 %
LACTIC ACID: 1.7 MMOL/L (ref 0.5–2.2)
LIPASE: 25 U/L (ref 13–60)
LYMPHOCYTES # BLD: 18 % (ref 24–43)
MCH RBC QN AUTO: 28.4 PG (ref 25.2–33.5)
MCHC RBC AUTO-ENTMCNC: 32.2 G/DL (ref 25.2–33.5)
MCV RBC AUTO: 88.1 FL (ref 82.6–102.9)
MONOCYTES # BLD: 8 % (ref 3–12)
NRBC AUTOMATED: 0 PER 100 WBC
PDW BLD-RTO: 13.8 % (ref 11.8–14.4)
PLATELET # BLD: 222 K/UL (ref 138–453)
PMV BLD AUTO: 11.2 FL (ref 8.1–13.5)
POTASSIUM SERPL-SCNC: 4.4 MMOL/L (ref 3.7–5.3)
RBC # BLD: 3.77 M/UL (ref 3.95–5.11)
SEG NEUTROPHILS: 70 % (ref 36–65)
SEGMENTED NEUTROPHILS ABSOLUTE COUNT: 6.14 K/UL (ref 1.5–8.1)
SODIUM BLD-SCNC: 136 MMOL/L (ref 135–144)
TOTAL PROTEIN: 7.3 G/DL (ref 6.4–8.3)
TROPONIN, HIGH SENSITIVITY: 14 NG/L (ref 0–14)
WBC # BLD: 8.7 K/UL (ref 3.5–11.3)

## 2023-01-08 PROCEDURE — 74174 CTA ABD&PLVS W/CONTRAST: CPT

## 2023-01-08 PROCEDURE — 82248 BILIRUBIN DIRECT: CPT

## 2023-01-08 PROCEDURE — 83605 ASSAY OF LACTIC ACID: CPT

## 2023-01-08 PROCEDURE — 99285 EMERGENCY DEPT VISIT HI MDM: CPT

## 2023-01-08 PROCEDURE — 83690 ASSAY OF LIPASE: CPT

## 2023-01-08 PROCEDURE — 6370000000 HC RX 637 (ALT 250 FOR IP): Performed by: EMERGENCY MEDICINE

## 2023-01-08 PROCEDURE — 85025 COMPLETE CBC W/AUTO DIFF WBC: CPT

## 2023-01-08 PROCEDURE — 2580000003 HC RX 258: Performed by: EMERGENCY MEDICINE

## 2023-01-08 PROCEDURE — 84484 ASSAY OF TROPONIN QUANT: CPT

## 2023-01-08 PROCEDURE — 82150 ASSAY OF AMYLASE: CPT

## 2023-01-08 PROCEDURE — 6360000004 HC RX CONTRAST MEDICATION: Performed by: EMERGENCY MEDICINE

## 2023-01-08 PROCEDURE — 36415 COLL VENOUS BLD VENIPUNCTURE: CPT

## 2023-01-08 PROCEDURE — 96374 THER/PROPH/DIAG INJ IV PUSH: CPT

## 2023-01-08 PROCEDURE — 80053 COMPREHEN METABOLIC PANEL: CPT

## 2023-01-08 PROCEDURE — 93005 ELECTROCARDIOGRAM TRACING: CPT | Performed by: EMERGENCY MEDICINE

## 2023-01-08 PROCEDURE — 6360000002 HC RX W HCPCS: Performed by: EMERGENCY MEDICINE

## 2023-01-08 RX ORDER — 0.9 % SODIUM CHLORIDE 0.9 %
500 INTRAVENOUS SOLUTION INTRAVENOUS ONCE
Status: COMPLETED | OUTPATIENT
Start: 2023-01-08 | End: 2023-01-08

## 2023-01-08 RX ORDER — 0.9 % SODIUM CHLORIDE 0.9 %
1000 INTRAVENOUS SOLUTION INTRAVENOUS ONCE
Status: DISCONTINUED | OUTPATIENT
Start: 2023-01-08 | End: 2023-01-08

## 2023-01-08 RX ORDER — PROCHLORPERAZINE EDISYLATE 5 MG/ML
10 INJECTION INTRAMUSCULAR; INTRAVENOUS ONCE
Status: COMPLETED | OUTPATIENT
Start: 2023-01-08 | End: 2023-01-08

## 2023-01-08 RX ORDER — METHYLPREDNISOLONE SODIUM SUCCINATE 125 MG/2ML
125 INJECTION, POWDER, LYOPHILIZED, FOR SOLUTION INTRAMUSCULAR; INTRAVENOUS ONCE
Status: DISCONTINUED | OUTPATIENT
Start: 2023-01-08 | End: 2023-01-08

## 2023-01-08 RX ORDER — 0.9 % SODIUM CHLORIDE 0.9 %
1000 INTRAVENOUS SOLUTION INTRAVENOUS ONCE
Status: COMPLETED | OUTPATIENT
Start: 2023-01-08 | End: 2023-01-08

## 2023-01-08 RX ORDER — OMEPRAZOLE 20 MG/1
20 CAPSULE, DELAYED RELEASE ORAL
Qty: 180 CAPSULE | Refills: 1 | Status: SHIPPED | OUTPATIENT
Start: 2023-01-08

## 2023-01-08 RX ADMIN — IOPAMIDOL 100 ML: 755 INJECTION, SOLUTION INTRAVENOUS at 19:03

## 2023-01-08 RX ADMIN — SODIUM CHLORIDE 500 ML: 0.9 INJECTION, SOLUTION INTRAVENOUS at 19:48

## 2023-01-08 RX ADMIN — SODIUM CHLORIDE 1000 ML: 9 INJECTION, SOLUTION INTRAVENOUS at 18:24

## 2023-01-08 RX ADMIN — ALUMINUM HYDROXIDE, MAGNESIUM HYDROXIDE, AND SIMETHICONE: 200; 200; 20 SUSPENSION ORAL at 18:01

## 2023-01-08 RX ADMIN — PROCHLORPERAZINE EDISYLATE 10 MG: 5 INJECTION INTRAMUSCULAR; INTRAVENOUS at 18:24

## 2023-01-08 ASSESSMENT — ENCOUNTER SYMPTOMS
CONSTIPATION: 0
SHORTNESS OF BREATH: 0
VOMITING: 0
DIARRHEA: 0
BACK PAIN: 0
ABDOMINAL PAIN: 0
COUGH: 0
EYE PAIN: 0
BLOOD IN STOOL: 0
NAUSEA: 1

## 2023-01-08 ASSESSMENT — PAIN DESCRIPTION - LOCATION: LOCATION: ABDOMEN

## 2023-01-08 ASSESSMENT — PAIN - FUNCTIONAL ASSESSMENT
PAIN_FUNCTIONAL_ASSESSMENT: 0-10
PAIN_FUNCTIONAL_ASSESSMENT: NONE - DENIES PAIN

## 2023-01-08 ASSESSMENT — PAIN DESCRIPTION - DESCRIPTORS: DESCRIPTORS: BURNING

## 2023-01-08 ASSESSMENT — PAIN SCALES - GENERAL
PAINLEVEL_OUTOF10: 5
PAINLEVEL_OUTOF10: 4
PAINLEVEL_OUTOF10: 8

## 2023-01-08 ASSESSMENT — PAIN DESCRIPTION - PAIN TYPE: TYPE: ACUTE PAIN

## 2023-01-08 NOTE — ED PROVIDER NOTES
[facility]  eMERGENCY dEPARTMENT eNCOUnter      Pt Name: Christine Mayberry  MRN: [id number]  [place] 1955  Date of evaluation: 1/8/2023      CHIEF COMPLAINT       Chief Complaint   Patient presents with    Gastroesophageal Reflux     Pt states that she has been having indigestion since yesterday           HISTORY OF PRESENT ILLNESS    Christine Mayberry is a 79 y.o. female who presents patient presents with indigestion burning sensation in her chest is been going on since yesterday as she had spaghetti at the [employer] and thinks this may have set off she has a history of esophagitis and Elizalde's esophagus she has been taking Pepto-Bismol has not been helping and some burning sensation is no radiation no fevers no chills no cough she is been drinking alkaline water to help she is nauseated with this as well she says she has been nauseated almost constantly for the last year    There is been no fevers no chills no cough  REVIEW OF SYSTEMS         Review of Systems   Constitutional:  Positive for fatigue. Negative for chills and fever. HENT:  Negative for congestion and ear pain. Eyes:  Negative for pain and visual disturbance. Respiratory:  Negative for cough and shortness of breath. Cardiovascular:  Positive for chest pain. Negative for palpitations and leg swelling. Gastrointestinal:  Positive for nausea. Negative for abdominal pain, blood in stool, constipation, diarrhea and vomiting. Endocrine: Negative for polydipsia and polyuria. Genitourinary:  Negative for difficulty urinating, dysuria, frequency, vaginal bleeding and vaginal discharge. Musculoskeletal:  Negative for back pain, joint swelling, myalgias, neck pain and neck stiffness. Skin:  Negative for rash. Neurological:  Negative for dizziness, weakness and headaches. Hematological:  Negative for adenopathy. Does not bruise/bleed easily. Psychiatric/Behavioral:  Negative for confusion, self-injury and suicidal ideas. PAST MEDICAL HISTORY    has a past medical history of Cervical spine pain, Chronic headaches, Chronic sinusitis, CKD (chronic kidney disease), Closed fracture of distal end of left femur with routine healing, COVID-19, Diabetes mellitus ([address] Dizziness, Gastroesophageal reflux disease, H/O fall, Hypertension, Knee pain, left, 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[address] Obesity, Peripheral neuropathy, Pneumonia, Pulmonary hypertension ([address] Shoulder pain, bilateral, Spinal stenosis, Type 2 diabetes mellitus ([address] Unspecified essential hypertension, and Vitamin D deficiency. SURGICAL HISTORY      has a past surgical history that includes Hysterectomy (09/06/2005); Appendectomy; Upper gastrointestinal endoscopy; Knee arthroscopy (Left); Colonoscopy (05/03/2012); other surgical history (2011); Dental surgery (08/2015); Tonsillectomy; brain surgery (05/24/2016); Foreign Body Removal (05/28/2016); Total shoulder arthroplasty (Right, 10/18/2018); Shoulder arthroscopy (Right, 03/07/2019); Nerve Block (09/09/2019); Upper gastrointestinal endoscopy (05/27/2020); Upper gastrointestinal endoscopy (N/A, 12/23/2020); Colonoscopy (N/A, 12/23/2020); Back Injection (02/25/2021); Femur Closed Reduction (Left, 03/05/2020); polysomnography (11/30/2020); Femur fracture surgery (Left, 03/06/2021); Cystoscopy (Bilateral, 08/03/2022); joint replacement; Esophagogastroduodenoscopy (10/18/2022); and Upper gastrointestinal endoscopy (N/A, 10/18/2022).     CURRENT MEDICATIONS       Discharge Medication List as of 1/8/2023  8:34 PM        CONTINUE these medications which have NOT CHANGED    Details   pantoprazole (PROTONIX) 40 MG tablet Take 1 tablet by mouth 2 times daily take 1 tablet by mouth daily, Disp-60 tablet, R-5Normal      !! ondansetron (ZOFRAN-ODT) 8 MG TBDP disintegrating tablet Place 1 tablet under the tongue every 8 hours as needed for Nausea or Vomiting, Disp-90 tablet, R-2Normal      !! ondansetron (ZOFRAN ODT) 4 MG disintegrating tablet Take 1 tablet by mouth every 8 hours as needed for Nausea or Vomiting, Disp-30 tablet, R-2Normal      pioglitazone-metFORMIN (ACTOPLUS MET)  MG per tablet TAKE 1 TABLET TWICE A DAY WITH MEALS, Disp-180 tablet, R-1Normal      !! ondansetron (ZOFRAN ODT) 4 MG disintegrating tablet Place 1 tablet under the tongue every 6 hours as needed for Nausea or Vomiting, Disp-10 tablet, R-1Normal      DULoxetine (CYMBALTA) 60 MG extended release capsule Take 1 capsule by mouth daily, Disp-10 capsule, R-0Normal      meloxicam (MOBIC) 7.5 MG tablet Take 1 tablet by mouth daily, Disp-30 tablet, R-0Normal      lisinopril-hydroCHLOROthiazide (PRINZIDE;ZESTORETIC) 20-12.5 MG per tablet Take 1 tablet by mouth in the morning., Disp-90 tablet, R-3Normal      HYDROcodone-acetaminophen (NORCO) 5-325 MG per tablet Historical Med      M-DRYL 12.5 MG/5ML liquid take 5 milliliters by mouth at bedtime for itching or allergies, DAWHistorical Med      diphenhydrAMINE (BENADRYL) 12.5 MG/5ML elixir Take 5 mLs by mouth nightly as needed for Itching or Allergies (can use of nausea as well.), Disp-118 mL, R-2Normal      famotidine (PEPCID) 20 MG tablet Take 1 tablet by mouth nightly as needed (dyspepsia), Disp-30 tablet, R-3Normal      blood glucose monitor strips Test 1 times a day & as needed for symptoms of irregular blood glucose. Dispense sufficient amount for indicated testing frequency plus additional to accommodate PRN testing needs. , Disp-100 strip, R-1, Normal      Cholecalciferol (VITAMIN D3) 1.25 MG (30169 UT) CAPS TAKE 1 CAPSULE ONCE A WEEK, Disp-13 capsule, R-1Normal      Gabapentin (NEURONTIN PO) Take 600 mg by mouth 5 times daily Only taking 3 times a day. Historical Med       !! - Potential duplicate medications found. Please discuss with provider. ALLERGIES     is allergic to asa [aspirin]. FAMILY HISTORY     She indicated that her mother is . She indicated that her father is .  She indicated that her sister is alive. She indicated that her brother is alive. She indicated that her maternal grandmother is . She indicated that her maternal grandfather is . She indicated that her paternal grandmother is . She indicated that her paternal grandfather is . She indicated that her daughter is alive. She indicated that her son is alive. family history includes Breast Cancer (age of onset: 52) in her mother; Cancer in her father and mother; Diabetes in her father; Hypertension in her father. SOCIAL HISTORY      reports that she has never smoked. She has never used smokeless tobacco. She reports current alcohol use. She reports that she does not use drugs. PHYSICAL EXAM     INITIAL VITALS:  height is 5' 8\" (1.727 m) and weight is 285 lb (129.3 kg). Her temperature is 98.6 °F (37 °C). Her blood pressure is 133/90 (abnormal) and her pulse is 73. Her respiration is 11 and oxygen saturation is 99%. Physical Exam  Constitutional:       Appearance: She is well-developed. Comments: Uncomfortable feeling patient   HENT:      Head: Normocephalic and atraumatic. Right Ear: External ear normal.      Left Ear: External ear normal.   Eyes:      Conjunctiva/sclera: Conjunctivae normal.      Pupils: Pupils are equal, round, and reactive to light. Cardiovascular:      Rate and Rhythm: Normal rate and regular rhythm. Pulmonary:      Effort: Pulmonary effort is normal.      Breath sounds: Normal breath sounds. Abdominal:      General: Bowel sounds are normal.      Palpations: Abdomen is soft. Musculoskeletal:         General: No tenderness. Cervical back: Normal range of motion. Skin:     General: Skin is warm and dry. Neurological:      Mental Status: She is alert and oriented to person, place, and time.    Psychiatric:         Behavior: Behavior normal.         DIFFERENTIAL DIAGNOSIS/ MDM:   Differential diagnosis considered: Esophageal reflux gastritis ACS dissection pain seems mostly consistent with her esophagitis but we will work her up for esophagitis    Chronic Conditions affecting care (DM,HTN,CA, etc):   Elizalde's esophagitis  Social Determinants of Health affecting care (unable to care for self, lives alone, unemployed, homeless,etc): None    History source(s) (patient,spouse,parent,family,friend,EMS,etc): Patient and family member    Review of external sources (ECF,Hospital records,EMS report, radiology reports, etc):  Old charts reviewed    Tests considered but not ordered: Not applicable    Independent interpretation of tests (eg.  X-ray, CAT scan, Doppler studies, EKG): EKG shows nothing acute    Discussion of x-ray results with radiology: None    Consults:     Consideration for admission/observation (even if discharged):     Prescription considerations:     Sepsis considered: No evidence    Critical Care note written: Not applicable  DIAGNOSTIC RESULTS     EKG: All EKG's are interpreted by the Emergency Department Physician who either signs or Co-signs this chart in the absence of a cardiologist.  EKG shows normal sinus rhythm rate of 73 bpm parables 170 ms QRS durations 88 ms QT corrected 436 ms axis is 24 there is no acute ST or T wave changes seen      RADIOLOGY:   I directly visualized the following  images and reviewed the radiologist interpretations:          ED BEDSIDE ULTRASOUND:       LABS:  Labs Reviewed   CBC WITH AUTO DIFFERENTIAL - Abnormal; Notable for the following components:       Result Value    RBC 3.77 (*)     Hemoglobin 10.7 (*)     Hematocrit 33.2 (*)     Seg Neutrophils 70 (*)     Lymphocytes 18 (*)     Immature Granulocytes 1 (*)     All other components within normal limits   COMPREHENSIVE METABOLIC W/ BILI PROFILE W/ REFLEX TO MG - Abnormal; Notable for the following components:    Alkaline Phosphatase 115 (*)     ALT <5 (*)     BUN 29 (*)     Creatinine 1.69 (*)     Est, Glom Filt Rate 33 (*)     Glucose 137 (*)     Chloride 96 (*)     All other components within normal limits   LIPASE   LACTIC ACID   AMYLASE   TROPONIN           EMERGENCY DEPARTMENT COURSE:   Vitals:    Vitals:    01/08/23 1830 01/08/23 1845 01/08/23 1945 01/08/23 2030   BP: 114/61 120/62  (!) 133/90   Pulse: 73 69 76 73   Resp: 13 11 21 11   Temp:       SpO2: 99% 100% 99% 99%   Weight:       Height:         -------------------------  BP: (!) 133/90, Temp: 98.6 °F (37 °C), Heart Rate: 73, Resp: 11        Re-evaluation Notes        CRITICAL CARE:   None        CONSULTS:      PROCEDURES:  None    FINAL IMPRESSION      1. Gastroesophageal reflux disease, unspecified whether esophagitis present          DISPOSITION/PLAN   DISPOSITION Decision To Discharge    Condition on Disposition    Stable    PATIENT REFERRED TO:  Adele Chilel MD  [address] [place] [address]  [phone]    In 2 days      DISCHARGE MEDICATIONS:  Discharge Medication List as of 1/8/2023  8:34 PM        START taking these medications    Details   omeprazole (PRILOSEC) 20 MG delayed release capsule Take 1 capsule by mouth 2 times daily (before meals), Disp-180 capsule, R-1Normal             (Please note that portions of this note were completed with a voice recognition program.  Efforts were made to edit the dictations but occasionally words are mis-transcribed.)    Symone Jacome MD,, MD, F.A.A.E.M.   Attending Emergency Physician                           Symone Jacome MD  01/11/23 5522

## 2023-01-09 LAB
EKG ATRIAL RATE: 73 BPM
EKG P AXIS: 27 DEGREES
EKG P-R INTERVAL: 170 MS
EKG Q-T INTERVAL: 398 MS
EKG QRS DURATION: 88 MS
EKG QTC CALCULATION (BAZETT): 438 MS
EKG R AXIS: 24 DEGREES
EKG T AXIS: 42 DEGREES
EKG VENTRICULAR RATE: 73 BPM

## 2023-01-09 NOTE — ED PROVIDER NOTES
ATTENDING  ADDENDUM     Care of this patient was assumed from Dr. Kriss Serna- the patient was seen for Gastroesophageal Reflux (Pt states that she has been having indigestion since yesterday/)  . The patient's initial evaluation and plan have been discussed with the prior provider who initially evaluated the patient. Nursing Notes, Past Medical Hx, Past Surgical Hx, Social Hx, Allergies, and Family Hx were all reviewed. Reevaluation:  The patient was signed out to me I was waiting for the results of the patient's CAT scan and I also was reviewing her laboratory values. Laboratories were pretty unremarkable CAT scan does not show any problems with her aorta or pulmonary embolus. Patient can be discharged home to follow-up with her own doctors probable diagnosis of GERD. DIFFERENTIAL DIAGNOSIS/ MDM:           Follow Exit Care instructions closely. I have reviewed the disposition diagnosis with the patient and or their family/guardian. I have answered their questions and given discharge instructions. They voiced understanding of these instructions and did not have any further questions or complaints. DIAGNOSTIC RESULTS     RADIOLOGY:   Non-plain film images such as CT, Ultrasound and MRI are read by the radiologist. Plain radiographic images are visualized and preliminarily interpreted by the emergency physician with the below findings:  CTA CHEST ABDOMEN PELVIS W CONTRAST   Final Result   CT angiogram of the chest abdomen and pelvis: No acute aortic pathology   within the chest abdomen and pelvis. No pulmonary embolism. Diverticulosis with no evidence of diverticulitis. Fatty liver.       RECOMMENDATIONS:   Unavailable             LABS:  Results for orders placed or performed during the hospital encounter of 01/08/23   CBC with Auto Differential   Result Value Ref Range    WBC 8.7 3.5 - 11.3 k/uL    RBC 3.77 (L) 3.95 - 5.11 m/uL    Hemoglobin 10.7 (L) 11.9 - 15.1 g/dL    Hematocrit 33.2 (L) 36.3 - 47.1 %    MCV 88.1 82.6 - 102.9 fL    MCH 28.4 25.2 - 33.5 pg    MCHC 32.2 25.2 - 33.5 g/dL    RDW 13.8 11.8 - 14.4 %    Platelets 655 775 - 821 k/uL    MPV 11.2 8.1 - 13.5 fL    NRBC Automated 0.0 0.0 per 100 WBC    Seg Neutrophils 70 (H) 36 - 65 %    Lymphocytes 18 (L) 24 - 43 %    Monocytes 8 3 - 12 %    Eosinophils % 2 1 - 4 %    Basophils 1 0 - 2 %    Immature Granulocytes 1 (H) 0 %    Segs Absolute 6.14 1.50 - 8.10 k/uL    Absolute Lymph # 1.57 1.10 - 3.70 k/uL    Absolute Mono # 0.73 0.10 - 1.20 k/uL    Absolute Eos # 0.13 0.00 - 0.44 k/uL    Basophils Absolute 0.04 0.00 - 0.20 k/uL    Absolute Immature Granulocyte 0.05 0.00 - 0.30 k/uL   Comprehensive Metabolic w/ Bili Profile w/ Reflex to MG   Result Value Ref Range    Albumin 3.8 3.5 - 5.2 g/dL    Albumin/Globulin Ratio 1.1 1.0 - 2.5    Alkaline Phosphatase 115 (H) 35 - 104 U/L    ALT <5 (L) 5 - 33 U/L    AST 10 <32 U/L    Total Bilirubin 0.3 0.3 - 1.2 mg/dL    Bilirubin, Direct <0.1 <0.3 mg/dL    Bilirubin, Indirect Can not be calculated 0.0 - 1.0 mg/dL    BUN 29 (H) 8 - 23 mg/dL    Calcium 9.2 8.6 - 10.4 mg/dL    Creatinine 1.69 (H) 0.50 - 0.90 mg/dL    Est, Glom Filt Rate 33 (L) >60 mL/min/1.73m2    Glucose 137 (H) 70 - 99 mg/dL    Total Protein 7.3 6.4 - 8.3 g/dL    Sodium 136 135 - 144 mmol/L    Potassium 4.4 3.7 - 5.3 mmol/L    Chloride 96 (L) 98 - 107 mmol/L    CO2 29 20 - 31 mmol/L    Anion Gap 11 9 - 17 mmol/L   Lipase   Result Value Ref Range    Lipase 25 13 - 60 U/L   Lactic Acid   Result Value Ref Range    Lactic Acid 1.7 0.5 - 2.2 mmol/L   Amylase   Result Value Ref Range    Amylase 46 28 - 100 U/L   Troponin   Result Value Ref Range    Troponin, High Sensitivity 14 0 - 14 ng/L   EKG 12 Lead   Result Value Ref Range    Ventricular Rate 73 BPM    Atrial Rate 73 BPM    P-R Interval 170 ms    QRS Duration 88 ms    Q-T Interval 398 ms    QTc Calculation (Bazett) 438 ms    P Axis 27 degrees    R Axis 24 degrees    T Axis 42 degrees ABNORMAL LABS:  Labs Reviewed   CBC WITH AUTO DIFFERENTIAL - Abnormal; Notable for the following components:       Result Value    RBC 3.77 (*)     Hemoglobin 10.7 (*)     Hematocrit 33.2 (*)     Seg Neutrophils 70 (*)     Lymphocytes 18 (*)     Immature Granulocytes 1 (*)     All other components within normal limits   COMPREHENSIVE METABOLIC W/ BILI PROFILE W/ REFLEX TO MG - Abnormal; Notable for the following components:    Alkaline Phosphatase 115 (*)     ALT <5 (*)     BUN 29 (*)     Creatinine 1.69 (*)     Est, Glom Filt Rate 33 (*)     Glucose 137 (*)     Chloride 96 (*)     All other components within normal limits   LIPASE   LACTIC ACID   AMYLASE   TROPONIN        EKG:      EMERGENCY DEPARTMENT COURSE:   Vitals:    Vitals:    01/08/23 1830 01/08/23 1845 01/08/23 1945 01/08/23 2030   BP: 114/61 120/62  (!) 133/90   Pulse: 73 69 76 73   Resp: 13 11 21 11   Temp:       SpO2: 99% 100% 99% 99%   Weight:       Height:         -------------------------  BP: (!) 133/90, Temp: 98.6 °F (37 °C), Heart Rate: 73, Resp: 11          FINAL IMPRESSION      1. Gastroesophageal reflux disease, unspecified whether esophagitis present          DISPOSITION/PLAN   DISPOSITION Decision To Discharge 01/08/2023 08:31:50 PM    I have reviewed the disposition diagnosis with the patient and or their family/guardian. I have answered their questions and given discharge instructions. They voiced understanding of these instructions and did not have any further questions or complaints.       Condition on Disposition    improved    PATIENT REFERRED TO:  Denise Laura MD  [address] [place] [address]  [phone]    In 2 days      DISCHARGE MEDICATIONS:  Discharge Medication List as of 1/8/2023  8:34 PM        START taking these medications    Details   omeprazole (PRILOSEC) 20 MG delayed release capsule Take 1 capsule by mouth 2 times daily (before meals), Disp-180 capsule, R-1Normal             (Please note that portions of this note were completed with a voice recognition program.  Efforts were made to edit the dictations but occasionally words are mis-transcribed.)    Symone Jacome MD,, MD  Attending Emergency Physician        Nicole Golden MD  01/08/23 2031       Symone Jacome MD  01/11/23 [id number]

## 2023-01-09 NOTE — DISCHARGE INSTRUCTIONS
Your CT scan is negative for any acute pathology your aorta is fine. You are most likely experiencing gastroesophageal reflux disease and you can treat this with a medicine called omeprazole which is commonly called Prilosec. You can go ahead and take this twice a day you should avoid fried foods fatty foods and meat follow-up with your doctor in 1 to 2 days for further evaluation and referral to a gastroenterologist and return to the emergency department if you are worsening in any way.

## 2023-01-13 ENCOUNTER — TELEPHONE (OUTPATIENT)
Dept: FAMILY MEDICINE CLINIC | Age: 68
End: 2023-01-13

## 2023-01-13 NOTE — TELEPHONE ENCOUNTER
----- Message from Gordon Cooper sent at 1/13/2023  3:49 PM EST -----  Subject: Appointment Request    Reason for Call: Established Patient Appointment needed: Routine ED Follow   Up Visit    QUESTIONS    Reason for appointment request? Available appointments did not meet   patient need     Additional Information for Provider? Patient is wanting to schedule an ED   follow up She was in the ED on 1/8/23 for GERD symptoms. They prescribed   medicine for it and patient is feeling somewhat better, but would still   like to talk to Dr. Michael Hicks to discuss some options.  Thanks.  ---------------------------------------------------------------------------  --------------  Aaron Chambers [facility]  [phone]; OK to leave message on voicemail  ---------------------------------------------------------------------------  --------------  SCRIPT ANSWERS  COVID Screen: [place]

## 2023-01-20 ENCOUNTER — OFFICE VISIT (OUTPATIENT)
Dept: FAMILY MEDICINE CLINIC | Age: 68
End: 2023-01-20
Payer: MEDICARE

## 2023-01-20 VITALS
HEART RATE: 70 BPM | HEIGHT: 68 IN | DIASTOLIC BLOOD PRESSURE: 78 MMHG | OXYGEN SATURATION: 98 % | SYSTOLIC BLOOD PRESSURE: 132 MMHG | WEIGHT: 284 LBS | BODY MASS INDEX: 43.04 KG/M2

## 2023-01-20 DIAGNOSIS — K22.70 BARRETT'S ESOPHAGUS WITHOUT DYSPLASIA: Primary | ICD-10-CM

## 2023-01-20 DIAGNOSIS — R68.89 COLD INTOLERANCE: ICD-10-CM

## 2023-01-20 DIAGNOSIS — K21.9 GASTROESOPHAGEAL REFLUX DISEASE, UNSPECIFIED WHETHER ESOPHAGITIS PRESENT: ICD-10-CM

## 2023-01-20 DIAGNOSIS — R11.0 NAUSEA: ICD-10-CM

## 2023-01-20 PROCEDURE — G8427 DOCREV CUR MEDS BY ELIG CLIN: HCPCS | Performed by: FAMILY MEDICINE

## 2023-01-20 PROCEDURE — G8484 FLU IMMUNIZE NO ADMIN: HCPCS | Performed by: FAMILY MEDICINE

## 2023-01-20 PROCEDURE — 99213 OFFICE O/P EST LOW 20 MIN: CPT | Performed by: FAMILY MEDICINE

## 2023-01-20 PROCEDURE — 1123F ACP DISCUSS/DSCN MKR DOCD: CPT | Performed by: FAMILY MEDICINE

## 2023-01-20 PROCEDURE — 3075F SYST BP GE 130 - 139MM HG: CPT | Performed by: FAMILY MEDICINE

## 2023-01-20 PROCEDURE — 3017F COLORECTAL CA SCREEN DOC REV: CPT | Performed by: FAMILY MEDICINE

## 2023-01-20 PROCEDURE — G8417 CALC BMI ABV UP PARAM F/U: HCPCS | Performed by: FAMILY MEDICINE

## 2023-01-20 PROCEDURE — 3078F DIAST BP <80 MM HG: CPT | Performed by: FAMILY MEDICINE

## 2023-01-20 PROCEDURE — 1090F PRES/ABSN URINE INCON ASSESS: CPT | Performed by: FAMILY MEDICINE

## 2023-01-20 PROCEDURE — 1036F TOBACCO NON-USER: CPT | Performed by: FAMILY MEDICINE

## 2023-01-20 PROCEDURE — G8400 PT W/DXA NO RESULTS DOC: HCPCS | Performed by: FAMILY MEDICINE

## 2023-01-20 RX ORDER — SUCRALFATE 1 G/1
1 TABLET ORAL 4 TIMES DAILY
Qty: 120 TABLET | Refills: 0 | Status: SHIPPED | OUTPATIENT
Start: 2023-01-20

## 2023-01-20 SDOH — ECONOMIC STABILITY: FOOD INSECURITY: WITHIN THE PAST 12 MONTHS, THE FOOD YOU BOUGHT JUST DIDN'T LAST AND YOU DIDN'T HAVE MONEY TO GET MORE.: PATIENT DECLINED

## 2023-01-20 SDOH — ECONOMIC STABILITY: FOOD INSECURITY: WITHIN THE PAST 12 MONTHS, YOU WORRIED THAT YOUR FOOD WOULD RUN OUT BEFORE YOU GOT MONEY TO BUY MORE.: PATIENT DECLINED

## 2023-01-20 ASSESSMENT — PATIENT HEALTH QUESTIONNAIRE - PHQ9
9. THOUGHTS THAT YOU WOULD BE BETTER OFF DEAD, OR OF HURTING YOURSELF: 0
6. FEELING BAD ABOUT YOURSELF - OR THAT YOU ARE A FAILURE OR HAVE LET YOURSELF OR YOUR FAMILY DOWN: 0
7. TROUBLE CONCENTRATING ON THINGS, SUCH AS READING THE NEWSPAPER OR WATCHING TELEVISION: 0
8. MOVING OR SPEAKING SO SLOWLY THAT OTHER PEOPLE COULD HAVE NOTICED. OR THE OPPOSITE, BEING SO FIGETY OR RESTLESS THAT YOU HAVE BEEN MOVING AROUND A LOT MORE THAN USUAL: 0
4. FEELING TIRED OR HAVING LITTLE ENERGY: 0
3. TROUBLE FALLING OR STAYING ASLEEP: 0
SUM OF ALL RESPONSES TO PHQ QUESTIONS 1-9: 0
5. POOR APPETITE OR OVEREATING: 0
SUM OF ALL RESPONSES TO PHQ QUESTIONS 1-9: 0
10. IF YOU CHECKED OFF ANY PROBLEMS, HOW DIFFICULT HAVE THESE PROBLEMS MADE IT FOR YOU TO DO YOUR WORK, TAKE CARE OF THINGS AT HOME, OR GET ALONG WITH OTHER PEOPLE: 0
1. LITTLE INTEREST OR PLEASURE IN DOING THINGS: 0
2. FEELING DOWN, DEPRESSED OR HOPELESS: 0
SUM OF ALL RESPONSES TO PHQ9 QUESTIONS 1 & 2: 0
SUM OF ALL RESPONSES TO PHQ QUESTIONS 1-9: 0
SUM OF ALL RESPONSES TO PHQ QUESTIONS 1-9: 0

## 2023-01-20 ASSESSMENT — SOCIAL DETERMINANTS OF HEALTH (SDOH): HOW HARD IS IT FOR YOU TO PAY FOR THE VERY BASICS LIKE FOOD, HOUSING, MEDICAL CARE, AND HEATING?: PATIENT DECLINED

## 2023-01-20 NOTE — PROGRESS NOTES
Patient declines shingles vaccine and covid booster.  Patient states she has already had pneumonia vaccine and tdap

## 2023-01-20 NOTE — PROGRESS NOTES
[address], [facility] [address]                        Telephone [phone]             Fax [phone]       Eugenia Donato  :  1955  Age:  79 y.o. MRN:  [id number]  Date of visit:  2023       Assessment and Plan:    1. Elizalde's esophagus without dysplasia  2. Gastroesophageal reflux disease, unspecified whether esophagitis present  3. Nausea  She requests a referral to a different gastroenterologist.  A referral was ordered:  - External Referral To Gastroenterology    She was advised to discontinue Omeprazole and continue Protonix. Carafate was also ordered:  - sucralfate (CARAFATE) 1 GM tablet; Take 1 tablet by mouth 4 times daily  Dispense: 120 tablet; Refill: 0    4. Cold intolerance  - TSH With Reflex Ft4; Future was ordered to be done with her next labs. Follow up instructions were given to the patient:  She was advise        Subjective:    Eugenia Donato is a 79 y.o. female who presents to [facility] today (2023) for follow up/evaluation of: Follow-up (ER follow up for GERD 23- Negative CT chest and also GI cocktail- GERD improved. Prilosec prescribed)      She is here today for follow up after a ED visit on 2023 due to worsening GERD symptoms. The evaluation at the ED was unremarkable. Omeprazole was prescribed. She was already taking Protonix. She has a follow up appointment scheduled with gastroenterology, but she is interested in seeing a different gastroenterologist.         She has received four doses of the Rick Peter Covid-19 vaccine. The most recent dose was 2022.        Immunization history was reviewed:  Immunization History   Administered Date(s) Administered    COVID-19, MODERNA Booster BLUE border, (age 18y+), IM, 50mcg/0.25mL 2022    COVID-19, PFIZER PURPLE top, DILUTE for use, (age 15 y+), 30mcg/0.3mL 2021, 03/20/2021, 11/10/2021    DTP 07/07/2011    Influenza, AFLURIA (age 1 yrs+), FLUZONE, (age 10 mo+), MDV, 0.5mL 01/23/2018    Influenza, FLUAD, (age 72 y+), Adjuvanted, 0.5mL 10/20/2020, 10/18/2021, 09/13/2022    Influenza, FLUARIX, FLULAVAL, FLUZONE (age 10 mo+) AND AFLURIA, (age 1 y+), PF, 0.5mL 02/12/2020    Pneumococcal Conjugate 13-valent ([id number]) 07/28/2021    Pneumococcal Polysaccharide ([id number]) 08/31/2015    Tdap (Boostrix, Adacel) 11/16/2012          She has the following problem list:  Patient Active Problem List   Diagnosis    Shoulder pain, bilateral    Cervical spine pain    Knee pain, left    Essential hypertension    Pulmonary hypertension (HCC)    Vitamin D deficiency    Encounter for long-term (current) use of other medications    Headache    Subacute sphenoidal sinusitis    Type 2 diabetes mellitus with microalbuminuria (HCC)    Peripheral neuropathy    Dizziness    H/O fall    Chronic sinusitis    Gastroesophageal reflux disease    Microalbuminuria    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HCC)    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HCC)    Type 2 diabetes mellitus with microalbuminuria, without long-term current use of insulin (HCC)    Morbid obesity with BMI of 40.0-44.9, adult ([facility] [address]    Restrictive lung disease secondary to obesity    Calculus of gallbladder and bile duct without cholecystitis or obstruction    Nausea    Multiple gastric ulcers    Anxiety    Chronic tension-type headache, intractable    Chronic, continuous use of opioids    Complete tear of right rotator cuff    Depression    Fatigue    Lumbosacral spondylosis without myelopathy    Major depressive disorder, single episode, moderate (HCC)    Refractory migraine    Muscle spasms of neck    Obsessive-compulsive disorders    Spondylosis of lumbar spine    Spondylosis of thoracic region without myelopathy or radiculopathy    Spinal stenosis, lumbar region, without neurogenic claudication    Femur fracture, left (HCC)    Elizalde's esophagus without dysplasia Therapeutic opioid-induced constipation (OIC)    Stage 3a chronic kidney disease (HCC)    Pneumonia due to COVID-19 virus    Acute cystitis with hematuria    Spongiotic dermatitis    Osteopenia    Dyspepsia    Gastritis without bleeding    Irregular Z line of esophagus        Current medications are:  Outpatient Medications Marked as Taking for the 1/20/23 encounter (Office Visit) with Marcelle Ayala MD   Medication Sig Dispense Refill    omeprazole (PRILOSEC) 20 MG delayed release capsule Take 1 capsule by mouth 2 times daily (before meals) 180 capsule 1    pantoprazole (PROTONIX) 40 MG tablet Take 1 tablet by mouth 2 times daily take 1 tablet by mouth daily 60 tablet 5    ondansetron (ZOFRAN-ODT) 8 MG TBDP disintegrating tablet Place 1 tablet under the tongue every 8 hours as needed for Nausea or Vomiting 90 tablet 2    pioglitazone-metFORMIN (ACTOPLUS MET)  MG per tablet TAKE 1 TABLET TWICE A DAY WITH MEALS 180 tablet 1    DULoxetine (CYMBALTA) 60 MG extended release capsule Take 1 capsule by mouth daily 10 capsule 0    lisinopril-hydroCHLOROthiazide (PRINZIDE;ZESTORETIC) 20-12.5 MG per tablet Take 1 tablet by mouth in the morning. 90 tablet 3    blood glucose monitor strips Test 1 times a day & as needed for symptoms of irregular blood glucose. Dispense sufficient amount for indicated testing frequency plus additional to accommodate PRN testing needs. 100 strip 1    Cholecalciferol (VITAMIN D3) 1.25 MG (30981 UT) CAPS TAKE 1 CAPSULE ONCE A WEEK 13 capsule 1    Gabapentin (NEURONTIN PO) Take 600 mg by mouth 5 times daily Only taking 3 times a day. She is allergic to asa [aspirin]. She  reports that she has never smoked.  She has never used smokeless tobacco.      Objective:    Vitals:    01/20/23 1133   BP: 132/78   Site: Right Upper Arm   Position: Sitting   Cuff Size: Large Adult   Pulse: 70   SpO2: 98%   Weight: 284 lb (128.8 kg)   Height: 5' 8\" (1.727 m)     Body mass index is 43.18 kg/m². Well-nourished, well-developed female with severe obesity, healthy-appearing, alert, cooperative, and in no acute distress. Neck supple. No adenopathy. Thyroid symmetric, normal size. Chest:  Normal expansion. Clear to auscultation. No rales, rhonchi, or wheezing. Heart sounds are normal.  Regular rate and rhythm without murmur, gallop or rub. Abdomen is obese, soft, non-tender, non-distended. There are no masses palpated. Lower extremities have no edema.     Results of testing done 1/8/2023 were reviewed with the patient:   Admission on 01/08/2023, Discharged on 01/08/2023   Component Date Value Ref Range Status    Ventricular Rate 01/08/2023 73  BPM Final    Atrial Rate 01/08/2023 73  BPM Final    P-R Interval 01/08/2023 170  ms Final    QRS Duration 01/08/2023 88  ms Final    Q-T Interval 01/08/2023 398  ms Final    QTc Calculation (Bazett) 01/08/2023 438  ms Final    P Axis 01/08/2023 27  degrees Final    R Axis 01/08/2023 24  degrees Final    T Axis 01/08/2023 42  degrees Final    WBC 01/08/2023 8.7  3.5 - 11.3 k/uL Final    RBC 01/08/2023 3.77 (A)  3.95 - 5.11 m/uL Final    Hemoglobin 01/08/2023 10.7 (A)  11.9 - 15.1 g/dL Final    Hematocrit 01/08/2023 33.2 (A)  36.3 - 47.1 % Final    MCV 01/08/2023 88.1  82.6 - 102.9 fL Final    MCH 01/08/2023 28.4  25.2 - 33.5 pg Final    MCHC 01/08/2023 32.2  25.2 - 33.5 g/dL Final    RDW 01/08/2023 13.8  11.8 - 14.4 % Final    Platelets 02/20/6718 222  138 - 453 k/uL Final    MPV 01/08/2023 11.2  8.1 - 13.5 fL Final    NRBC Automated 01/08/2023 0.0  0.0 per 100 WBC Final    Seg Neutrophils 01/08/2023 70 (A)  36 - 65 % Final    Lymphocytes 01/08/2023 18 (A)  24 - 43 % Final    Monocytes 01/08/2023 8  3 - 12 % Final    Eosinophils % 01/08/2023 2  1 - 4 % Final    Basophils 01/08/2023 1  0 - 2 % Final    Immature Granulocytes 01/08/2023 1 (A)  0 % Final    Segs Absolute 01/08/2023 6.14  1.50 - 8.10 k/uL Final    Absolute Lymph # 01/08/2023 1.57  1.10 - 3.70 k/uL Final    Absolute Mono # 01/08/2023 0.73  0.10 - 1.20 k/uL Final    Absolute Eos # 01/08/2023 0.13  0.00 - 0.44 k/uL Final    Basophils Absolute 01/08/2023 0.04  0.00 - 0.20 k/uL Final    Absolute Immature Granulocyte 01/08/2023 0.05  0.00 - 0.30 k/uL Final    Albumin 01/08/2023 3.8  3.5 - 5.2 g/dL Final    Albumin/Globulin Ratio 01/08/2023 1.1  1.0 - 2.5 Final    Alkaline Phosphatase 01/08/2023 115 (A)  35 - 104 U/L Final    ALT 01/08/2023 <5 (A)  5 - 33 U/L Final    AST 01/08/2023 10  <32 U/L Final    Total Bilirubin 01/08/2023 0.3  0.3 - 1.2 mg/dL Final    Bilirubin, Direct 01/08/2023 <0.1  <0.3 mg/dL Final    Bilirubin, Indirect 01/08/2023 Can not be calculated  0.0 - 1.0 mg/dL Final    BUN 01/08/2023 29 (A)  8 - 23 mg/dL Final    Calcium 01/08/2023 9.2  8.6 - 10.4 mg/dL Final    Creatinine 01/08/2023 1.69 (A)  0.50 - 0.90 mg/dL Final    Est, Glom Filt Rate 01/08/2023 33 (A)  >60 mL/min/1.73m2 Final    Comment:       Effective Oct 3, 2022        These results are not intended for use in patients <25years of age. eGFR results are calculated without a race factor using the 2021 CKD-EPI equation. Careful clinical correlation is recommended, particularly when comparing to results   calculated using previous equations. The CKD-EPI equation is less accurate in patients with extremes of muscle mass, extra-renal   metabolism of creatine, excessive creatine ingestion, or following therapy that affects   renal tubular secretion.       Glucose 01/08/2023 137 (A)  70 - 99 mg/dL Final    Total Protein 01/08/2023 7.3  6.4 - 8.3 g/dL Final    Sodium 01/08/2023 136  135 - 144 mmol/L Final    Potassium 01/08/2023 4.4  3.7 - 5.3 mmol/L Final    Chloride 01/08/2023 96 (A)  98 - 107 mmol/L Final    CO2 01/08/2023 29  20 - 31 mmol/L Final    Anion Gap 01/08/2023 11  9 - 17 mmol/L Final    Lipase 01/08/2023 25  13 - 60 U/L Final    Lactic Acid 01/08/2023 1.7  0.5 - 2.2 mmol/L Final    Amylase 01/08/2023 46  28 - 100 U/L Final    Troponin, High Sensitivity 01/08/2023 14  0 - 14 ng/L Final    High Sensitivity Troponin values cannot be compared with other Troponin methodologies.                (Please note that portions of this note were completed with a voice-recognition program. Efforts were made to edit the dictation but occasionally words are mis-transcribed.)

## 2023-02-18 ENCOUNTER — APPOINTMENT (OUTPATIENT)
Dept: CT IMAGING | Age: 68
End: 2023-02-18
Payer: MEDICARE

## 2023-02-18 ENCOUNTER — HOSPITAL ENCOUNTER (EMERGENCY)
Age: 68
Discharge: HOME | End: 2023-02-18
Attending: EMERGENCY MEDICINE
Payer: MEDICARE

## 2023-02-18 VITALS
OXYGEN SATURATION: 97 % | HEIGHT: 68 IN | DIASTOLIC BLOOD PRESSURE: 79 MMHG | BODY MASS INDEX: 43.19 KG/M2 | WEIGHT: 285 LBS | SYSTOLIC BLOOD PRESSURE: 148 MMHG | TEMPERATURE: 98.5 F | HEART RATE: 74 BPM | RESPIRATION RATE: 20 BRPM

## 2023-02-18 DIAGNOSIS — R11.0 NAUSEA: Primary | ICD-10-CM

## 2023-02-18 LAB
ABSOLUTE EOS #: 0.08 K/UL (ref 0–0.44)
ABSOLUTE IMMATURE GRANULOCYTE: 0.04 K/UL (ref 0–0.3)
ABSOLUTE LYMPH #: 1.68 K/UL (ref 1.1–3.7)
ABSOLUTE MONO #: 0.75 K/UL (ref 0.1–1.2)
ALBUMIN SERPL-MCNC: 4.1 G/DL (ref 3.5–5.2)
ALBUMIN/GLOBULIN RATIO: 1.1 (ref 1–2.5)
ALP SERPL-CCNC: 151 U/L (ref 35–104)
ALT SERPL-CCNC: 6 U/L (ref 5–33)
ANION GAP SERPL CALCULATED.3IONS-SCNC: 11 MMOL/L (ref 9–17)
AST SERPL-CCNC: 11 U/L
BASOPHILS # BLD: 1 % (ref 0–2)
BASOPHILS ABSOLUTE: 0.06 K/UL (ref 0–0.2)
BILIRUB DIRECT SERPL-MCNC: <0.1 MG/DL
BILIRUB INDIRECT SERPL-MCNC: ABNORMAL MG/DL (ref 0–1)
BILIRUB SERPL-MCNC: 0.3 MG/DL (ref 0.3–1.2)
BUN SERPL-MCNC: 36 MG/DL (ref 8–23)
BUN/CREAT BLD: 20 (ref 9–20)
CALCIUM SERPL-MCNC: 9.6 MG/DL (ref 8.6–10.4)
CHLORIDE SERPL-SCNC: 95 MMOL/L (ref 98–107)
CO2 SERPL-SCNC: 28 MMOL/L (ref 20–31)
CREAT SERPL-MCNC: 1.83 MG/DL (ref 0.5–0.9)
EOSINOPHILS RELATIVE PERCENT: 1 % (ref 1–4)
GFR SERPL CREATININE-BSD FRML MDRD: 30 ML/MIN/1.73M2
GLOBULIN SER-MCNC: 3.7 G/DL (ref 1.5–3.8)
GLUCOSE SERPL-MCNC: 132 MG/DL (ref 70–99)
HCT VFR BLD AUTO: 39 % (ref 36.3–47.1)
HGB BLD-MCNC: 11.9 G/DL (ref 11.9–15.1)
IMMATURE GRANULOCYTES: 1 %
LIPASE SERPL-CCNC: 23 U/L (ref 13–60)
LYMPHOCYTES # BLD: 19 % (ref 24–43)
MCH RBC QN AUTO: 27.9 PG (ref 25.2–33.5)
MCHC RBC AUTO-ENTMCNC: 30.5 G/DL (ref 25.2–33.5)
MCV RBC AUTO: 91.3 FL (ref 82.6–102.9)
MONOCYTES # BLD: 9 % (ref 3–12)
NRBC AUTOMATED: 0 PER 100 WBC
PDW BLD-RTO: 14.4 % (ref 11.8–14.4)
PLATELET # BLD AUTO: 280 K/UL (ref 138–453)
PMV BLD AUTO: 11.6 FL (ref 8.1–13.5)
POTASSIUM SERPL-SCNC: 4.9 MMOL/L (ref 3.7–5.3)
PROT SERPL-MCNC: 7.8 G/DL (ref 6.4–8.3)
RBC # BLD: 4.27 M/UL (ref 3.95–5.11)
SEG NEUTROPHILS: 69 % (ref 36–65)
SEGMENTED NEUTROPHILS ABSOLUTE COUNT: 6.25 K/UL (ref 1.5–8.1)
SODIUM SERPL-SCNC: 134 MMOL/L (ref 135–144)
WBC # BLD AUTO: 8.9 K/UL (ref 3.5–11.3)

## 2023-02-18 PROCEDURE — 96375 TX/PRO/DX INJ NEW DRUG ADDON: CPT

## 2023-02-18 PROCEDURE — 96374 THER/PROPH/DIAG INJ IV PUSH: CPT

## 2023-02-18 PROCEDURE — 80076 HEPATIC FUNCTION PANEL: CPT

## 2023-02-18 PROCEDURE — 80048 BASIC METABOLIC PNL TOTAL CA: CPT

## 2023-02-18 PROCEDURE — 2580000003 HC RX 258: Performed by: EMERGENCY MEDICINE

## 2023-02-18 PROCEDURE — 99284 EMERGENCY DEPT VISIT MOD MDM: CPT

## 2023-02-18 PROCEDURE — 83690 ASSAY OF LIPASE: CPT

## 2023-02-18 PROCEDURE — 74176 CT ABD & PELVIS W/O CONTRAST: CPT

## 2023-02-18 PROCEDURE — 6360000002 HC RX W HCPCS: Performed by: EMERGENCY MEDICINE

## 2023-02-18 PROCEDURE — 85025 COMPLETE CBC W/AUTO DIFF WBC: CPT

## 2023-02-18 RX ORDER — LORAZEPAM 2 MG/ML
1 INJECTION INTRAMUSCULAR ONCE
Status: COMPLETED | OUTPATIENT
Start: 2023-02-18 | End: 2023-02-18

## 2023-02-18 RX ORDER — 0.9 % SODIUM CHLORIDE 0.9 %
1000 INTRAVENOUS SOLUTION INTRAVENOUS ONCE
Status: COMPLETED | OUTPATIENT
Start: 2023-02-18 | End: 2023-02-18

## 2023-02-18 RX ORDER — METOCLOPRAMIDE 10 MG/1
10 TABLET ORAL
Qty: 20 TABLET | Refills: 0 | Status: SHIPPED | OUTPATIENT
Start: 2023-02-18 | End: 2023-02-28

## 2023-02-18 RX ORDER — METOCLOPRAMIDE HYDROCHLORIDE 5 MG/ML
10 INJECTION INTRAMUSCULAR; INTRAVENOUS ONCE
Status: COMPLETED | OUTPATIENT
Start: 2023-02-18 | End: 2023-02-18

## 2023-02-18 RX ADMIN — SODIUM CHLORIDE 1000 ML: 9 INJECTION, SOLUTION INTRAVENOUS at 15:19

## 2023-02-18 RX ADMIN — LORAZEPAM 1 MG: 2 INJECTION INTRAMUSCULAR; INTRAVENOUS at 15:21

## 2023-02-18 RX ADMIN — METOCLOPRAMIDE 10 MG: 5 INJECTION, SOLUTION INTRAMUSCULAR; INTRAVENOUS at 15:21

## 2023-02-18 ASSESSMENT — PAIN - FUNCTIONAL ASSESSMENT: PAIN_FUNCTIONAL_ASSESSMENT: NONE - DENIES PAIN

## 2023-02-18 NOTE — ED PROVIDER NOTES
[facility] ED  EMERGENCY DEPARTMENT ENCOUNTER      Pt Name: Antoni Lombardi  MRN: [id number]  [place] 1955  Date of evaluation: 2/18/2023  Provider: Guille Cespedes MD    CHIEF COMPLAINT     Chief Complaint   Patient presents with    Nausea         HISTORY OF PRESENT ILLNESS   (Location/Symptom, Timing/Onset, Context/Setting,Quality, Duration, Modifying Factors, Severity)  Note limiting factors. Antoni Lombardi is a 79 y.o. female who presents to the emergency department complaining of debilitating nausea for the last 1 week. Patient underwent EGD in October of last year which showed Elizalde esophagus. She had a normal duodenum. She is scheduled to see a different gastroenterologist next month. She takes Pepcid at night and also omeprazole but her nausea is unrelieved. She also takes Zofran which is not helping currently. She does not report vomiting. Vague abdominal discomfort is also reported. She has undergone appendectomy and hysterectomy in the past.    The history is provided by the patient and medical records. Nursing Notes werereviewed. REVIEW OF SYSTEMS    (2-9 systems for level 4, 10 or more for level 5)     Review of Systems   All other systems reviewed and are negative. Except as noted above the remainder of the review of systems was reviewed and negative.        PAST MEDICAL HISTORY     Past Medical History:   Diagnosis Date    Elizalde's esophagus     Cervical spine pain 08/21/2013    Chronic headaches     Chronic sinusitis     CKD (chronic kidney disease)     stage 3    Closed fracture of distal end of left femur with routine healing 03/2021    COVID-19     Diabetes mellitus ([address]     Dizziness     Gastroesophageal reflux disease     barretts    H/O fall     Hypertension     Knee pain, left 08/21/2013    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    Obesity     Peripheral neuropathy     Pneumonia 08/30/2015    Pulmonary hypertension ([address] 01/01/2012    by echocardiogram Shoulder pain, bilateral 08/21/2013    Spinal stenosis     Type 2 diabetes mellitus ([facility]     Unspecified essential hypertension     Essential hypertension    Vitamin D deficiency 11/05/2014         SURGICALHISTORY       Past Surgical History:   Procedure Laterality Date    APPENDECTOMY      BACK INJECTION  02/25/2021    [facility] Right lumbar medial branch block using Fluroscopy    BRAIN SURGERY  05/24/2016    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 Lacho Garcia    COLONOSCOPY  05/03/2012    diverticular disease    COLONOSCOPY N/A 12/23/2020    COLONOSCOPY WITH BIOPSY performed by Mallory Miranda MD at [address] [facility] 1 hyperplastic polyp, random biopsies negative    CYSTOSCOPY Bilateral 08/03/2022    CYSTO Bilateral Retrograde Pyelogram, with bladder washout  performed by Timbo Taylor MD at [address] [facility]  08/2015    full dental extraction    ESOPHAGOGASTRODUODENOSCOPY  10/18/2022    FEMUR CLOSED REDUCTION Left 03/05/2020    has [facility]    FEMUR FRACTURE SURGERY Left 03/06/2021    left retrograde femoral nailing.  Dr. Gonzales [facility], [address] [place] [address] [place] BODY REMOVAL  05/28/2016    left-sided temporal craniotomy wound reexploration with removal of small retained foreign body (plastic from Ruth clip from surgery 3 days prior), [facility], Dr. Abby Garcia ([facility])  09/06/2005    supracervical hysterectomy bilateral salpingo oophectomy    JOINT REPLACEMENT      KNEE ARTHROSCOPY Left     NERVE BLOCK  09/09/2019    right side L2 through L5 block nerve,facet joint,lumbar,medial branch per Dr Zulema Singh at [address] [place] [address]  2011    endoscopic mucosal resection of duodenal polyp    POLYSOMNOGRAPHY  11/30/2020    no significant sleep apnea    SHOULDER ARTHROPLASTY Right 10/18/2018 Regulo Johnson MD; done at [facility] [address],[address] Right 03/07/2019    revision arthroscopy with debridement,revision mini-open rotator cuff repair per Dr Kenny Loera at [address] [facility],[address]      barretts, gastric ulcers    UPPER GASTROINTESTINAL ENDOSCOPY  05/27/2020    gastric ulcer, small hiatal hernia, Dr. Myra Dior, [address] N/A 12/23/2020    [facility] [address] Elizalde's esophagus    UPPER GASTROINTESTINAL ENDOSCOPY N/A 10/18/2022    EGD BIOPSY performed by Maci Carrasco MD at [facility]       Previous Medications    BLOOD GLUCOSE MONITOR STRIPS    Test 1 times a day & as needed for symptoms of irregular blood glucose. Dispense sufficient amount for indicated testing frequency plus additional to accommodate PRN testing needs. CHOLECALCIFEROL (VITAMIN D3) 1.25 MG ([address] UT) CAPS    TAKE 1 CAPSULE ONCE A WEEK    DULOXETINE (CYMBALTA) 60 MG EXTENDED RELEASE CAPSULE    Take 1 capsule by mouth daily    GABAPENTIN (NEURONTIN PO)    Take 600 mg by mouth 5 times daily Only taking 3 times a day. LISINOPRIL-HYDROCHLOROTHIAZIDE (PRINZIDE;ZESTORETIC) 20-12.5 MG PER TABLET    Take 1 tablet by mouth in the morning.     OMEPRAZOLE (PRILOSEC) 20 MG DELAYED RELEASE CAPSULE    Take 1 capsule by mouth 2 times daily (before meals)    ONDANSETRON (ZOFRAN-ODT) 8 MG TBDP DISINTEGRATING TABLET    Place 1 tablet under the tongue every 8 hours as needed for Nausea or Vomiting    PANTOPRAZOLE (PROTONIX) 40 MG TABLET    Take 1 tablet by mouth 2 times daily take 1 tablet by mouth daily    PIOGLITAZONE-METFORMIN (ACTOPLUS MET)  MG PER TABLET    TAKE 1 TABLET TWICE A DAY WITH MEALS    SUCRALFATE (CARAFATE) 1 GM TABLET    Take 1 tablet by mouth 4 times daily       ALLERGIES     Asa [aspirin]    FAMILY HISTORY       Family History   Problem Relation Age of Onset    Cancer Mother     Breast Cancer Mother 52    Hypertension Father     Diabetes Father     Cancer Father           SOCIAL HISTORY       Social History     Socioeconomic History    Marital status:      Spouse name: None    Number of children: None    Years of education: None    Highest education level: None   Tobacco Use    Smoking status: Never    Smokeless tobacco: Never   Vaping Use    Vaping Use: Never used   Substance and Sexual Activity    Alcohol use: Yes     Alcohol/week: 0.0 standard drinks     Comment: Maybe twice a year, small amounts    Drug use: No    Sexual activity: Yes     Partners: Male     Social Determinants of Health     Financial Resource Strain: Unknown    Difficulty of Paying Living Expenses: Patient refused   Food Insecurity: Unknown    Worried About Running Out of Food in the Last Year: Patient refused    Ran Out of Food in the Last Year: Patient refused   Physical Activity: Insufficiently Active    Days of Exercise per Week: 2 days    Minutes of Exercise per Session: 10 min       SCREENINGS    [place] Coma Scale  Eye Opening: Spontaneous  Best Verbal Response: Oriented  Best Motor Response: Obeys commands  [place] Coma Scale Score: 15        PHYSICAL EXAM    (up to 7 for level 4, 8 or more for level 5)     ED Triage Vitals [02/18/23 1432]   BP Temp Temp Source Heart Rate Resp SpO2 Height Weight   (!) 148/79 98.5 °F (36.9 °C) Tympanic 74 20 97 % 5' 8\" (1.727 m) 285 lb (129.3 kg)       Physical Exam  Vitals reviewed. Constitutional:       General: She is in acute distress. Appearance: She is obese. She is not ill-appearing. HENT:      Head: Normocephalic. Right Ear: External ear normal.      Left Ear: External ear normal.      Nose: Nose normal.      Mouth/Throat:      Mouth: Mucous membranes are moist.   Eyes:      General: No scleral icterus. Extraocular Movements: Extraocular movements intact.       Conjunctiva/sclera: Conjunctivae normal.   Cardiovascular: Rate and Rhythm: Normal rate and regular rhythm. Heart sounds: Normal heart sounds. Pulmonary:      Effort: Pulmonary effort is normal.      Breath sounds: Normal breath sounds. Abdominal:      General: Abdomen is protuberant. Bowel sounds are increased. There is no distension. Palpations: Abdomen is soft. Tenderness: There is generalized abdominal tenderness. There is no guarding or rebound. Negative signs include Lebron's sign and McBurney's sign. Musculoskeletal:      Cervical back: Neck supple. Skin:     General: Skin is warm and dry. Coloration: Skin is not pale. Neurological:      General: No focal deficit present. Mental Status: She is alert and oriented to person, place, and time. Mental status is at baseline. DIAGNOSTIC RESULTS     EKG: All EKG's are interpreted by the Emergency Department Physician who either signs orCo-signs this chart in the absence of a cardiologist.    RADIOLOGY:     Interpretation per the Radiologist below, ifavailable at the time of this note:    CT ABDOMEN PELVIS WO CONTRAST Additional Contrast? None    (Results Pending)         ED BEDSIDE ULTRASOUND:   Performed by ED Physician - none    LABS:  Labs Reviewed   CBC WITH AUTO DIFFERENTIAL - Abnormal; Notable for the following components:       Result Value    Seg Neutrophils 69 (*)     Lymphocytes 19 (*)     Immature Granulocytes 1 (*)     All other components within normal limits   BASIC METABOLIC PANEL   HEPATIC FUNCTION PANEL   LIPASE       All other labs were within normal range ornot returned as of this dictation. EMERGENCY DEPARTMENT COURSE and DIFFERENTIAL DIAGNOSIS/MDM:   Vitals:    Vitals:    02/18/23 1432   BP: (!) 148/79   Pulse: 74   Resp: 20   Temp: 98.5 °F (36.9 °C)   TempSrc: Tympanic   SpO2: 97%   Weight: 285 lb (129.3 kg)   Height: 5' 8\" (1.727 m)            CT scan of the abdomen is significant for an incidental finding of diverticulosis.   Patient has a normal white count, normal hemoglobin of 11.9. BUN/creatinine slightly higher than baseline and she is treated with a liter of IV saline. Symptoms are improved with IV Ativan 1 mg and Reglan 10 mg. She is switched from Zofran to Reglan 10 mg twice daily at home and is to contact her PCP after the weekend for further management. She may return anytime for worsening symptoms. CONSULTS:  None    PROCEDURES:  Unlessotherwise noted below, none     Procedures    FINAL IMPRESSION    No diagnosis found. DISPOSITION/PLAN   DISPOSITION        PATIENT REFERRED TO:  No follow-up provider specified. DISCHARGE MEDICATIONS:         Problem List:  Patient Active Problem List   Diagnosis Code    Shoulder pain, bilateral M25.511, M25.512    Cervical spine pain M54.2    Knee pain, left M25.562    Essential hypertension I10    Pulmonary hypertension ([facility] I27.20    Vitamin D deficiency E55.9    Encounter for long-term (current) use of other medications Z79.899    Headache R51.9    Subacute sphenoidal sinusitis J01.30    Type 2 diabetes mellitus with microalbuminuria ([facility]) E11.29, R80.9    Peripheral neuropathy G62.9    Dizziness R42    H/O fall Z91.81    Chronic sinusitis J32.9    Gastroesophageal reflux disease K21.9    Microalbuminuria R80.9    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[facility]) Q01.9    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[facility]) Q01.9    Type 2 diabetes mellitus with microalbuminuria, without long-term current use of insulin ([facility]) E11.29, R80.9    Morbid obesity with BMI of 40.0-44.9, adult ([facility]) E66.01, Z68.41    Restrictive lung disease secondary to obesity J98.4, E66.9    Calculus of gallbladder and bile duct without cholecystitis or obstruction K80.70    Nausea R11.0    Multiple gastric ulcers K25.9    Anxiety F41.9    Chronic tension-type headache, intractable G44.221    Chronic, continuous use of opioids F11.90    Complete tear of right rotator cuff M75.121    Depression F32. A    Fatigue R53.83    Lumbosacral spondylosis without myelopathy M47.817 Major depressive disorder, single episode, moderate ([facility]) F32.1    Refractory migraine G43.919    Muscle spasms of neck M62.838    Obsessive-compulsive disorders F42.9    Spondylosis of lumbar spine M47.816    Spondylosis of thoracic region without myelopathy or radiculopathy M47.814    Spinal stenosis, lumbar region, without neurogenic claudication M48.061    Femur fracture, left ([facility]) S72. 92XA    Elizalde's esophagus without dysplasia K22.70    Therapeutic opioid-induced constipation (OIC) K59.03, T40.2X5A    Stage 3a chronic kidney disease (HCC) N18.31    Pneumonia due to COVID-19 virus U07.1, J12.82    Acute cystitis with hematuria N30.01    Spongiotic dermatitis L30.8    Osteopenia M85.80    Dyspepsia R10.13    Gastritis without bleeding K29.70    Irregular Z line of esophagus K22.9           Summation      Patient Course: Discharged    ED Medicationsadministered this visit:    Medications   0.9 % sodium chloride bolus (has no administration in time range)   LORazepam (ATIVAN) injection 1 mg (has no administration in time range)   metoclopramide (REGLAN) injection 10 mg (has no administration in time range)       New Prescriptions from this visit:    New Prescriptions    No medications on file       Follow-up:  No follow-up provider specified. Final Impression: No diagnosis found.            (Please note that portions of this note were completed with a voice recognitionprogram.  Efforts were made to edit the dictations but occasionally words are mis-transcribed.)    Jessica Guillen MD (electronically signed)  Attending Emergency Physician            Jessica Guillen MD  02/18/23 6326 10 mg (10 mg IntraVENous Given 2/18/23 1521)       New Prescriptions from this visit:    Discharge Medication List as of 2/18/2023  4:28 PM        START taking these medications    Details   metoclopramide (REGLAN) 10 MG tablet Take 1 tablet by mouth 2 times daily (before meals) for 10 days, Disp-20 tablet, R-0Normal             Follow-up:  Joe De Jesus MD  [facility] [address] [id number] [phone]            Final Impression:   1.  Nausea               (Please note that portions of this note were completed with a voice recognitionprogram.  Efforts were made to edit the dictations but occasionally words are mis-transcribed.)    Emily Wise MD (electronically signed)  Attending Emergency Physician            Emily Wise MD  02/18/23 1200 [place] [facility] Gallito MD  02/20/23 0409

## 2023-02-21 DIAGNOSIS — N18.32 STAGE 3B CHRONIC KIDNEY DISEASE (HCC): ICD-10-CM

## 2023-02-21 DIAGNOSIS — I10 HYPERTENSION, ESSENTIAL: ICD-10-CM

## 2023-02-21 DIAGNOSIS — G44.201 INTRACTABLE TENSION-TYPE HEADACHE, UNSPECIFIED CHRONICITY PATTERN: ICD-10-CM

## 2023-02-21 RX ORDER — OMEPRAZOLE 20 MG/1
20 CAPSULE, DELAYED RELEASE ORAL
Qty: 180 CAPSULE | Refills: 0 | Status: SHIPPED | OUTPATIENT
Start: 2023-02-21

## 2023-02-21 RX ORDER — LISINOPRIL AND HYDROCHLOROTHIAZIDE 20; 12.5 MG/1; MG/1
1 TABLET ORAL DAILY
Qty: 90 TABLET | Refills: 3 | OUTPATIENT
Start: 2023-02-21

## 2023-02-21 RX ORDER — DULOXETIN HYDROCHLORIDE 60 MG/1
60 CAPSULE, DELAYED RELEASE ORAL DAILY
Qty: 90 CAPSULE | Refills: 0 | Status: SHIPPED | OUTPATIENT
Start: 2023-02-21

## 2023-02-21 NOTE — TELEPHONE ENCOUNTER
Pauletta Rabon called requesting a refill of the below medication which has been pended for you: \    Confirmed pharmacy with patient.     Requested Prescriptions     Pending Prescriptions Disp Refills    omeprazole (PRILOSEC) 20 MG delayed release capsule 180 capsule 0     Sig: Take 1 capsule by mouth 2 times daily (before meals)    DULoxetine (CYMBALTA) 60 MG extended release capsule 90 capsule 0     Sig: Take 1 capsule by mouth daily     Refused Prescriptions Disp Refills    lisinopril-hydroCHLOROthiazide (PRINZIDE;ZESTORETIC) 20-12.5 MG per tablet 90 tablet 3     Sig: Take 1 tablet by mouth daily       Last Appointment Date: 1/20/2023  Next Appointment Date: 3/14/2023    Allergies   Allergen Reactions    Asa [Aspirin] Other (See Comments)     Stomach irritation

## 2023-02-22 RX ORDER — GABAPENTIN 600 MG/1
600 TABLET ORAL
Qty: 150 TABLET | Refills: 2 | OUTPATIENT
Start: 2023-02-22 | End: 2023-05-23

## 2023-02-22 RX ORDER — LISINOPRIL AND HYDROCHLOROTHIAZIDE 20; 12.5 MG/1; MG/1
1 TABLET ORAL DAILY
Qty: 90 TABLET | Refills: 0 | Status: SHIPPED | OUTPATIENT
Start: 2023-02-22

## 2023-03-02 ENCOUNTER — TELEPHONE (OUTPATIENT)
Dept: FAMILY MEDICINE CLINIC | Age: 68
End: 2023-03-02

## 2023-03-02 DIAGNOSIS — R11.0 NAUSEA: Primary | ICD-10-CM

## 2023-03-02 RX ORDER — METOCLOPRAMIDE 10 MG/1
10 TABLET ORAL
Qty: 20 TABLET | Refills: 0 | Status: SHIPPED | OUTPATIENT
Start: 2023-03-02 | End: 2023-03-12

## 2023-03-02 NOTE — TELEPHONE ENCOUNTER
Pt is requesting reglan 10mg for nausea. Pt states that the ER doc prescribed her this med for 10 days and nausea isn't any better but the medication helps. Pt has an appt with Dr. Home Browning on the 14th.

## 2023-03-06 RX ORDER — FAMOTIDINE 20 MG/1
20 TABLET, FILM COATED ORAL NIGHTLY PRN
Qty: 60 TABLET | Refills: 3 | Status: SHIPPED | OUTPATIENT
Start: 2023-03-06

## 2023-03-09 ENCOUNTER — HOSPITAL ENCOUNTER (OUTPATIENT)
Age: 68
Discharge: HOME | End: 2023-03-09
Payer: MEDICARE

## 2023-03-09 ENCOUNTER — TELEPHONE (OUTPATIENT)
Dept: FAMILY MEDICINE CLINIC | Age: 68
End: 2023-03-09

## 2023-03-09 DIAGNOSIS — E11.29 TYPE 2 DIABETES MELLITUS WITH MICROALBUMINURIA, WITHOUT LONG-TERM CURRENT USE OF INSULIN (HCC): ICD-10-CM

## 2023-03-09 DIAGNOSIS — N18.32 STAGE 3B CHRONIC KIDNEY DISEASE (HCC): ICD-10-CM

## 2023-03-09 DIAGNOSIS — E55.9 VITAMIN D DEFICIENCY: ICD-10-CM

## 2023-03-09 DIAGNOSIS — R80.9 TYPE 2 DIABETES MELLITUS WITH MICROALBUMINURIA, WITHOUT LONG-TERM CURRENT USE OF INSULIN (HCC): ICD-10-CM

## 2023-03-09 DIAGNOSIS — R68.89 COLD INTOLERANCE: ICD-10-CM

## 2023-03-09 DIAGNOSIS — I10 ESSENTIAL HYPERTENSION: ICD-10-CM

## 2023-03-09 LAB
25(OH)D3 SERPL-MCNC: 37.1 NG/ML
25(OH)D3 SERPL-MCNC: 38.6 NG/ML
ABSOLUTE EOS #: 0.07 K/UL (ref 0–0.44)
ABSOLUTE IMMATURE GRANULOCYTE: 0.04 K/UL (ref 0–0.3)
ABSOLUTE LYMPH #: 1.81 K/UL (ref 1.1–3.7)
ABSOLUTE MONO #: 0.57 K/UL (ref 0.1–1.2)
ALBUMIN SERPL-MCNC: 3.8 G/DL (ref 3.5–5.2)
ALBUMIN/GLOBULIN RATIO: 1.1 (ref 1–2.5)
ALP SERPL-CCNC: 122 U/L (ref 35–104)
ALT SERPL-CCNC: <5 U/L (ref 5–33)
ANION GAP SERPL CALCULATED.3IONS-SCNC: 11 MMOL/L (ref 9–17)
ANION GAP SERPL CALCULATED.3IONS-SCNC: 11 MMOL/L (ref 9–17)
AST SERPL-CCNC: 10 U/L
BASOPHILS # BLD: 1 % (ref 0–2)
BASOPHILS ABSOLUTE: 0.04 K/UL (ref 0–0.2)
BILIRUB SERPL-MCNC: 0.3 MG/DL (ref 0.3–1.2)
BUN SERPL-MCNC: 24 MG/DL (ref 8–23)
BUN SERPL-MCNC: 24 MG/DL (ref 8–23)
BUN/CREAT BLD: 16 (ref 9–20)
BUN/CREAT BLD: 16 (ref 9–20)
CA-I BLD-SCNC: 1.28 MMOL/L (ref 1.13–1.33)
CALCIUM SERPL-MCNC: 9.8 MG/DL (ref 8.6–10.4)
CALCIUM SERPL-MCNC: 9.8 MG/DL (ref 8.6–10.4)
CHLORIDE SERPL-SCNC: 98 MMOL/L (ref 98–107)
CHLORIDE SERPL-SCNC: 98 MMOL/L (ref 98–107)
CHOLEST SERPL-MCNC: 184 MG/DL
CHOLESTEROL/HDL RATIO: 3.9
CO2 SERPL-SCNC: 29 MMOL/L (ref 20–31)
CO2 SERPL-SCNC: 29 MMOL/L (ref 20–31)
CREAT SERPL-MCNC: 1.53 MG/DL (ref 0.5–0.9)
CREAT SERPL-MCNC: 1.53 MG/DL (ref 0.5–0.9)
EOSINOPHILS RELATIVE PERCENT: 1 % (ref 1–4)
EST. AVERAGE GLUCOSE BLD GHB EST-MCNC: 148 MG/DL
GFR SERPL CREATININE-BSD FRML MDRD: 37 ML/MIN/1.73M2
GFR SERPL CREATININE-BSD FRML MDRD: 37 ML/MIN/1.73M2
GLUCOSE SERPL-MCNC: 206 MG/DL (ref 70–99)
GLUCOSE SERPL-MCNC: 206 MG/DL (ref 70–99)
HBA1C MFR BLD: 6.8 % (ref 4–6)
HCT VFR BLD AUTO: 36.7 % (ref 36.3–47.1)
HDLC SERPL-MCNC: 47 MG/DL
HGB BLD-MCNC: 11.6 G/DL (ref 11.9–15.1)
IMMATURE GRANULOCYTES: 1 %
LDLC SERPL CALC-MCNC: 110 MG/DL (ref 0–130)
LYMPHOCYTES # BLD: 24 % (ref 24–43)
MCH RBC QN AUTO: 28.1 PG (ref 25.2–33.5)
MCHC RBC AUTO-ENTMCNC: 31.6 G/DL (ref 25.2–33.5)
MCV RBC AUTO: 88.9 FL (ref 82.6–102.9)
MONOCYTES # BLD: 7 % (ref 3–12)
NRBC AUTOMATED: 0 PER 100 WBC
PDW BLD-RTO: 14.6 % (ref 11.8–14.4)
PHOSPHATE SERPL-MCNC: 3.4 MG/DL (ref 2.6–4.5)
PLATELET # BLD AUTO: 264 K/UL (ref 138–453)
PMV BLD AUTO: 11 FL (ref 8.1–13.5)
POTASSIUM SERPL-SCNC: 4 MMOL/L (ref 3.7–5.3)
POTASSIUM SERPL-SCNC: 4 MMOL/L (ref 3.7–5.3)
PROT SERPL-MCNC: 7.2 G/DL (ref 6.4–8.3)
PTH-INTACT SERPL-MCNC: 48.4 PG/ML (ref 14–72)
RBC # BLD: 4.13 M/UL (ref 3.95–5.11)
RBC # BLD: ABNORMAL 10*6/UL
SEG NEUTROPHILS: 66 % (ref 36–65)
SEGMENTED NEUTROPHILS ABSOLUTE COUNT: 5.16 K/UL (ref 1.5–8.1)
SODIUM SERPL-SCNC: 138 MMOL/L (ref 135–144)
SODIUM SERPL-SCNC: 138 MMOL/L (ref 135–144)
TRIGL SERPL-MCNC: 135 MG/DL
TSH SERPL-ACNC: 2.57 UIU/ML (ref 0.3–5)
WBC # BLD AUTO: 7.7 K/UL (ref 3.5–11.3)

## 2023-03-09 PROCEDURE — 84100 ASSAY OF PHOSPHORUS: CPT

## 2023-03-09 PROCEDURE — 82306 VITAMIN D 25 HYDROXY: CPT

## 2023-03-09 PROCEDURE — 80048 BASIC METABOLIC PNL TOTAL CA: CPT

## 2023-03-09 PROCEDURE — 82330 ASSAY OF CALCIUM: CPT

## 2023-03-09 PROCEDURE — 80053 COMPREHEN METABOLIC PANEL: CPT

## 2023-03-09 PROCEDURE — 85025 COMPLETE CBC W/AUTO DIFF WBC: CPT

## 2023-03-09 PROCEDURE — 36415 COLL VENOUS BLD VENIPUNCTURE: CPT

## 2023-03-09 PROCEDURE — 84443 ASSAY THYROID STIM HORMONE: CPT

## 2023-03-09 PROCEDURE — 83036 HEMOGLOBIN GLYCOSYLATED A1C: CPT

## 2023-03-09 PROCEDURE — 80061 LIPID PANEL: CPT

## 2023-03-09 PROCEDURE — 83970 ASSAY OF PARATHORMONE: CPT

## 2023-03-09 NOTE — TELEPHONE ENCOUNTER
Spoke to patient and she states she is not taking Omeprazole. She is only taking Prontonix 40  mg twice daily.  Patient aware of refill that was sent to pharmacy on 1/3/23

## 2023-03-09 NOTE — TELEPHONE ENCOUNTER
Pt calling stating her new ins will not cover Protonix 20mg bid but it will cover 40mg 1 a day, can you send new script to pended pharmacy, pt aware RR out until Tuesday.

## 2023-03-14 ENCOUNTER — HOSPITAL ENCOUNTER (OUTPATIENT)
Age: 68
Discharge: HOME | End: 2023-03-14
Payer: MEDICARE

## 2023-03-14 ENCOUNTER — OFFICE VISIT (OUTPATIENT)
Dept: FAMILY MEDICINE CLINIC | Age: 68
End: 2023-03-14
Payer: MEDICARE

## 2023-03-14 VITALS
HEART RATE: 76 BPM | BODY MASS INDEX: 43.04 KG/M2 | WEIGHT: 284 LBS | DIASTOLIC BLOOD PRESSURE: 86 MMHG | SYSTOLIC BLOOD PRESSURE: 138 MMHG | OXYGEN SATURATION: 99 % | HEIGHT: 68 IN | TEMPERATURE: 97 F

## 2023-03-14 DIAGNOSIS — Z12.31 SCREENING MAMMOGRAM FOR BREAST CANCER: ICD-10-CM

## 2023-03-14 DIAGNOSIS — E78.2 MIXED HYPERLIPIDEMIA: ICD-10-CM

## 2023-03-14 DIAGNOSIS — R45.1 RESTLESSNESS: ICD-10-CM

## 2023-03-14 DIAGNOSIS — R11.0 NAUSEA: ICD-10-CM

## 2023-03-14 DIAGNOSIS — R80.9 TYPE 2 DIABETES MELLITUS WITH MICROALBUMINURIA, WITHOUT LONG-TERM CURRENT USE OF INSULIN (HCC): Primary | ICD-10-CM

## 2023-03-14 DIAGNOSIS — E11.29 TYPE 2 DIABETES MELLITUS WITH MICROALBUMINURIA, WITHOUT LONG-TERM CURRENT USE OF INSULIN (HCC): Primary | ICD-10-CM

## 2023-03-14 PROCEDURE — 3044F HG A1C LEVEL LT 7.0%: CPT | Performed by: FAMILY MEDICINE

## 2023-03-14 PROCEDURE — 99214 OFFICE O/P EST MOD 30 MIN: CPT | Performed by: FAMILY MEDICINE

## 2023-03-14 PROCEDURE — G8427 DOCREV CUR MEDS BY ELIG CLIN: HCPCS | Performed by: FAMILY MEDICINE

## 2023-03-14 PROCEDURE — 2022F DILAT RTA XM EVC RTNOPTHY: CPT | Performed by: FAMILY MEDICINE

## 2023-03-14 PROCEDURE — 1036F TOBACCO NON-USER: CPT | Performed by: FAMILY MEDICINE

## 2023-03-14 PROCEDURE — 1090F PRES/ABSN URINE INCON ASSESS: CPT | Performed by: FAMILY MEDICINE

## 2023-03-14 PROCEDURE — G8400 PT W/DXA NO RESULTS DOC: HCPCS | Performed by: FAMILY MEDICINE

## 2023-03-14 PROCEDURE — 36415 COLL VENOUS BLD VENIPUNCTURE: CPT

## 2023-03-14 PROCEDURE — 3079F DIAST BP 80-89 MM HG: CPT | Performed by: FAMILY MEDICINE

## 2023-03-14 PROCEDURE — 3017F COLORECTAL CA SCREEN DOC REV: CPT | Performed by: FAMILY MEDICINE

## 2023-03-14 PROCEDURE — G8417 CALC BMI ABV UP PARAM F/U: HCPCS | Performed by: FAMILY MEDICINE

## 2023-03-14 PROCEDURE — 1123F ACP DISCUSS/DSCN MKR DOCD: CPT | Performed by: FAMILY MEDICINE

## 2023-03-14 PROCEDURE — 84630 ASSAY OF ZINC: CPT

## 2023-03-14 PROCEDURE — G8484 FLU IMMUNIZE NO ADMIN: HCPCS | Performed by: FAMILY MEDICINE

## 2023-03-14 PROCEDURE — 3075F SYST BP GE 130 - 139MM HG: CPT | Performed by: FAMILY MEDICINE

## 2023-03-14 RX ORDER — METOCLOPRAMIDE 10 MG/1
10 TABLET ORAL
Qty: 20 TABLET | Refills: 0 | Status: CANCELLED | OUTPATIENT
Start: 2023-03-14 | End: 2023-03-24

## 2023-03-14 RX ORDER — HYDROCODONE BITARTRATE AND ACETAMINOPHEN 5; 325 MG/1; MG/1
TABLET ORAL
COMMUNITY
Start: 2023-02-23

## 2023-03-14 SDOH — ECONOMIC STABILITY: FOOD INSECURITY: WITHIN THE PAST 12 MONTHS, THE FOOD YOU BOUGHT JUST DIDN'T LAST AND YOU DIDN'T HAVE MONEY TO GET MORE.: PATIENT DECLINED

## 2023-03-14 SDOH — ECONOMIC STABILITY: FOOD INSECURITY: WITHIN THE PAST 12 MONTHS, YOU WORRIED THAT YOUR FOOD WOULD RUN OUT BEFORE YOU GOT MONEY TO BUY MORE.: PATIENT DECLINED

## 2023-03-14 SDOH — ECONOMIC STABILITY: INCOME INSECURITY: HOW HARD IS IT FOR YOU TO PAY FOR THE VERY BASICS LIKE FOOD, HOUSING, MEDICAL CARE, AND HEATING?: PATIENT DECLINED

## 2023-03-14 SDOH — ECONOMIC STABILITY: HOUSING INSECURITY
IN THE LAST 12 MONTHS, WAS THERE A TIME WHEN YOU DID NOT HAVE A STEADY PLACE TO SLEEP OR SLEPT IN A SHELTER (INCLUDING NOW)?: PATIENT REFUSED

## 2023-03-14 NOTE — PROGRESS NOTES
MATHIEU DAVISON [address], [facility] [address]                        Telephone [phone]             Fax [phone]       Christiano Oates  :  1955  Age:  79 y.o. MRN:  [id number]  Date of visit:  3/14/2023       Assessment and Plan:    1. Type 2 diabetes mellitus with microalbuminuria, without long-term current use of insulin (HCC)  Her HgbA1c was 6.8 %, which is at goal.   She was advised to continue her current regimen. She states that she does not need a refill today. Labs were ordered to be done prior to her return visit in 6 months:   - Comprehensive Metabolic Panel; Future  - Hemoglobin A1C; Future    -  DIABETES FOOT EXAM    2. Mixed hyperlipidemia  Her lipid profile was worse on her recent lab work. Lifestyle changes to improve cholesterol were discussed with the patient. - Lipid Panel; Future was ordered to be done prior to her return visit in 6 months. Printed information regarding Learning About High Cholesterol was provided to the patient with the after visit summary. Printed information regarding Learning About the Mediterranean Diet was provided to the patient with the after visit summary. 3. Nausea  She was advised to discontinue Reglan due to possible side effects. She has an appointment scheduled with a gastroenterologist next month for a second opinion.    - Zinc; Future was ordered to be done today. She will be contacted when the results are available. 4.  Restlessness  Side effect of Reglan vs. restless legs vs. other  As noted above, she was advised to discontinue Reglan. 5. Routine health maintenance  Health maintenance was reviewed with the patient. She has received three doses of the [employer] vaccine, and one dose of the [employer] vaccine, including the updated bivalent vaccine. She has received an influenza vaccine this influenza season. Pneumonia vaccination was recommended.   Tdap and Shingrix were recommended.  She was advised that all recommended vaccines are now covered by Medicare part D without any co-pay.   Screening mammogram was recommended and ordered.        Follow up instructions were given to the patient:  No follow-ups on file.       Subjective:    Jyoti Warren is a 67 y.o. female who presents to [facility] today (3/14/2023) for follow up/evaluation of:  Diabetes (6 month follow up), Hypertension (6 month follow up), Other (Discuss Zinc usage. Patient also reports \"feeling like she is unable to sit still and has to move all of the time\"), and Nausea (Requesting Reglan for nausea that occurs every AM )      She continues to have issues with nausea.   The nausea is worse in the morning.  She had a recent ED visit for nausea.  She was given Reglan, which helped.  She questions whether her zinc level may be low.   She is trying to find a reason of the chronic nausea that she has.  She has seen a gastroenterologist, but she has another appointment scheduled for a second opinion.  She reports that she has a restless feeling when she has been sitting too long.          She has received three doses of the Pfizer Covid-19 vaccine, and one dose of the Moderna Covid-19 vaccine.  The most recent dose was 5/25/2022.  She also had Covid in September 2021.       Immunization history was reviewed:  Immunization History   Administered Date(s) Administered    COVID-19, MODERNA Booster BLUE border, (age 18y+), IM, 50mcg/0.25mL 05/25/2022    COVID-19, PFIZER PURPLE top, DILUTE for use, (age 12 y+), 30mcg/0.3mL 02/27/2021, 03/20/2021, 11/10/2021    DTP 07/07/2011    Influenza, AFLURIA (age 3 yrs+), FLUZONE, (age 6 mo+), MDV, 0.5mL 01/23/2018    Influenza, FLUAD, (age 65 y+), Adjuvanted, 0.5mL 10/20/2020, 10/18/2021, 09/13/2022    Influenza, FLUARIX, FLULAVAL, FLUZONE (age 6 mo+) AND AFLURIA, (age 3 y+), PF, 0.5mL  02/12/2020    Pneumococcal Conjugate 13-valent ([id number]) 07/28/2021    Pneumococcal Polysaccharide ([id number]) 08/31/2015    Tdap (Boostrix, Adacel) 11/16/2012          She has the following problem list:  Patient Active Problem List   Diagnosis    Shoulder pain, bilateral    Cervical spine pain    Knee pain, left    Essential hypertension    Pulmonary hypertension (HCC)    Vitamin D deficiency    Encounter for long-term (current) use of other medications    Headache    Subacute sphenoidal sinusitis    Type 2 diabetes mellitus with microalbuminuria (HCC)    Peripheral neuropathy    Dizziness    H/O fall    Chronic sinusitis    Gastroesophageal reflux disease    Microalbuminuria    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HCC)    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HCC)    Type 2 diabetes mellitus with microalbuminuria, without long-term current use of insulin (HCC)    Morbid obesity with BMI of 40.0-44.9, adult ([address]    Restrictive lung disease secondary to obesity    Calculus of gallbladder and bile duct without cholecystitis or obstruction    Nausea    Multiple gastric ulcers    Anxiety    Chronic tension-type headache, intractable    Chronic, continuous use of opioids    Complete tear of right rotator cuff    Depression    Fatigue    Lumbosacral spondylosis without myelopathy    Major depressive disorder, single episode, moderate (HCC)    Refractory migraine    Muscle spasms of neck    Obsessive-compulsive disorders    Spondylosis of lumbar spine    Spondylosis of thoracic region without myelopathy or radiculopathy    Spinal stenosis, lumbar region, without neurogenic claudication    Femur fracture, left (HCC)    Elizalde's esophagus without dysplasia    Therapeutic opioid-induced constipation (OIC)    Stage 3a chronic kidney disease ([address]    Pneumonia due to COVID-19 virus    Acute cystitis with hematuria    Spongiotic dermatitis    Osteopenia    Dyspepsia    Gastritis without bleeding    Irregular Z line of esophagus        Current medications are:  Outpatient Medications Marked as Taking for the 3/14/23 encounter (Office Visit) with Akil Ya MD   Medication Sig Dispense Refill    HYDROcodone-acetaminophen ([facility]) 5-325 MG per tablet take 1 tablet by mouth twice a day if needed for pain ( FILL ON 02/23/2023)      famotidine (PEPCID) 20 MG tablet Take 1 tablet by mouth nightly as needed (as needed) 60 tablet 3    metoclopramide (REGLAN) 10 MG tablet Take 1 tablet by mouth 2 times daily (before meals) for 10 days 20 tablet 0    lisinopril-hydroCHLOROthiazide (PRINZIDE;ZESTORETIC) 20-12.5 MG per tablet Take 1 tablet by mouth daily 90 tablet 0    DULoxetine (CYMBALTA) 60 MG extended release capsule Take 1 capsule by mouth daily 90 capsule 0    pantoprazole (PROTONIX) 40 MG tablet Take 1 tablet by mouth 2 times daily take 1 tablet by mouth daily 60 tablet 5    ondansetron (ZOFRAN-ODT) 8 MG TBDP disintegrating tablet Place 1 tablet under the tongue every 8 hours as needed for Nausea or Vomiting 90 tablet 2    pioglitazone-metFORMIN (ACTOPLUS MET)  MG per tablet TAKE 1 TABLET TWICE A DAY WITH MEALS 180 tablet 1    blood glucose monitor strips Test 1 times a day & as needed for symptoms of irregular blood glucose. Dispense sufficient amount for indicated testing frequency plus additional to accommodate PRN testing needs. 100 strip 1    Cholecalciferol (VITAMIN D3) 1.25 MG (79170 UT) CAPS TAKE 1 CAPSULE ONCE A WEEK 13 capsule 1    Gabapentin (NEURONTIN PO) Take 600 mg by mouth 5 times daily Only taking twice per day         She is allergic to asa [aspirin]. She  reports that she has never smoked. She has never used smokeless tobacco.      Objective:    Vitals:    03/14/23 1447   BP: 138/86   Site: Right Upper Arm   Position: Sitting   Cuff Size: Large Adult   Pulse: 76   Temp: 97 °F (36.1 °C)   TempSrc: Tympanic   SpO2: 99%   Weight: 284 lb (128.8 kg)   Height: 5' 8\" (1.727 m)     Body mass index is 43.18 kg/m².       Well-nourished, well-developed female with severe obesity, alert, cooperative, and in no acute distress. Neck supple. No adenopathy. Thyroid symmetric, normal size. Chest:  Normal expansion. Clear to auscultation. No rales, rhonchi, or wheezing. Heart sounds are normal.  Regular rate and rhythm without murmur, gallop or rub. Abdomen is soft, non-tender, non-distended. There are no masses palpated. Lower extremities have no edema. Results of labs done 3/9/2023 were reviewed with the patient:   Hospital Outpatient Visit on 03/09/2023   Component Date Value Ref Range Status    Vit D, 25-Hydroxy 03/09/2023 38.6  >29.9 ng/mL Final    Comment:    Reference Range:  Vitamin D status         Range   Deficiency              <20 ng/mL   Mild Deficiency       20-30 ng/mL   Sufficiency           ng/mL   Toxicity               >100 ng/mL      Pth Intact 03/09/2023 48.4  14.0 - 72.0 pg/mL Final    Comment: SAMPLES FROM PATIENTS ROUTINELY RECEIVING HIGH DOSE BIOTIN THERAPY MAY SHOW FALSELY   DEPRESSED RESULTS. ADDITIONAL INFORMATION MAY BE REQUIRED FOR DIAGNOSIS.       Calcium, Ionized 03/09/2023 1.28  1.13 - 1.33 mmol/L Final    Phosphorus 03/09/2023 3.4  2.6 - 4.5 mg/dL Final    WBC 03/09/2023 7.7  3.5 - 11.3 k/uL Final    RBC 03/09/2023 4.13  3.95 - 5.11 m/uL Final    Hemoglobin 03/09/2023 11.6 (A)  11.9 - 15.1 g/dL Final    Hematocrit 03/09/2023 36.7  36.3 - 47.1 % Final    MCV 03/09/2023 88.9  82.6 - 102.9 fL Final    MCH 03/09/2023 28.1  25.2 - 33.5 pg Final    MCHC 03/09/2023 31.6  25.2 - 33.5 g/dL Final    RDW 03/09/2023 14.6 (A)  11.8 - 14.4 % Final    Platelets 10/20/8576 264  138 - 453 k/uL Final    MPV 03/09/2023 11.0  8.1 - 13.5 fL Final    NRBC Automated 03/09/2023 0.0  0.0 per 100 WBC Final    Seg Neutrophils 03/09/2023 66 (A)  36 - 65 % Final    Lymphocytes 03/09/2023 24  24 - 43 % Final    Monocytes 03/09/2023 7  3 - 12 % Final    Eosinophils % 03/09/2023 1  1 - 4 % Final    Basophils 03/09/2023 1  0 - 2 % Final    Immature Granulocytes 03/09/2023 1 (A)  0 % Final    Segs Absolute 03/09/2023 5.16  1.50 - 8.10 k/uL Final    Absolute Lymph # 03/09/2023 1.81  1.10 - 3.70 k/uL Final    Absolute Mono # 03/09/2023 0.57  0.10 - 1.20 k/uL Final    Absolute Eos # 03/09/2023 0.07  0.00 - 0.44 k/uL Final    Basophils Absolute 03/09/2023 0.04  0.00 - 0.20 k/uL Final    Absolute Immature Granulocyte 03/09/2023 0.04  0.00 - 0.30 k/uL Final    RBC Morphology 03/09/2023 ANISOCYTOSIS PRESENT   Final    Glucose 03/09/2023 206 (A)  70 - 99 mg/dL Final    BUN 03/09/2023 24 (A)  8 - 23 mg/dL Final    Creatinine 03/09/2023 1.53 (A)  0.50 - 0.90 mg/dL Final    Est, Glom Filt Rate 03/09/2023 37 (A)  >60 mL/min/1.73m2 Final    Comment:       These results are not intended for use in patients <25years of age. eGFR results are calculated without a race factor using the 2021 CKD-EPI equation. Careful clinical correlation is recommended, particularly when comparing to results   calculated using previous equations. The CKD-EPI equation is less accurate in patients with extremes of muscle mass, extra-renal   metabolism of creatine, excessive creatine ingestion, or following therapy that affects   renal tubular secretion.       Bun/Cre Ratio 03/09/2023 16  9 - 20 Final    Calcium 03/09/2023 9.8  8.6 - 10.4 mg/dL Final    Sodium 03/09/2023 138  135 - 144 mmol/L Final    Potassium 03/09/2023 4.0  3.7 - 5.3 mmol/L Final    Chloride 03/09/2023 98  98 - 107 mmol/L Final    CO2 03/09/2023 29  20 - 31 mmol/L Final    Anion Gap 03/09/2023 11  9 - 17 mmol/L Final   Hospital Outpatient Visit on 03/09/2023   Component Date Value Ref Range Status    Vit D, 25-Hydroxy 03/09/2023 37.1  >29.9 ng/mL Final    Comment:    Reference Range:  Vitamin D status         Range   Deficiency              <20 ng/mL   Mild Deficiency       20-30 ng/mL   Sufficiency           ng/mL   Toxicity               >100 ng/mL      Cholesterol 03/09/2023 184  <200 mg/dL Final    Comment: Cholesterol Guidelines:      <200  Desirable   200-240  Borderline      >240  Undesirable         HDL 03/09/2023 47  >40 mg/dL Final    Comment:    HDL Guidelines:    <40     Undesirable   40-59    Borderline    >59     Desirable         LDL Cholesterol 03/09/2023 110  0 - 130 mg/dL Final    Comment:    LDL Guidelines:     <100    Desirable   100-129   Near to/above Desirable   130-159   Borderline      >159   Undesirable     Direct (measured) LDL and calculated LDL are not interchangeable tests. Chol/HDL Ratio 03/09/2023 3.9  <5 Final            Triglycerides 03/09/2023 135  <150 mg/dL Final    Comment:    Triglyceride Guidelines:     <150   Desirable   150-199  Borderline   200-499  High     >499   Very high   Based on AHA Guidelines for fasting triglyceride, October 2012. Glucose 03/09/2023 206 (A)  70 - 99 mg/dL Final    BUN 03/09/2023 24 (A)  8 - 23 mg/dL Final    Creatinine 03/09/2023 1.53 (A)  0.50 - 0.90 mg/dL Final    Est, Glom Filt Rate 03/09/2023 37 (A)  >60 mL/min/1.73m2 Final    Comment:       These results are not intended for use in patients <25years of age. eGFR results are calculated without a race factor using the 2021 CKD-EPI equation. Careful clinical correlation is recommended, particularly when comparing to results   calculated using previous equations. The CKD-EPI equation is less accurate in patients with extremes of muscle mass, extra-renal   metabolism of creatine, excessive creatine ingestion, or following therapy that affects   renal tubular secretion.       Bun/Cre Ratio 03/09/2023 16  9 - 20 Final    Calcium 03/09/2023 9.8  8.6 - 10.4 mg/dL Final    Sodium 03/09/2023 138  135 - 144 mmol/L Final    Potassium 03/09/2023 4.0  3.7 - 5.3 mmol/L Final    Chloride 03/09/2023 98  98 - 107 mmol/L Final    CO2 03/09/2023 29  20 - 31 mmol/L Final    Anion Gap 03/09/2023 11  9 - 17 mmol/L Final    Alkaline Phosphatase 03/09/2023 122 (A)  35 - 104 U/L Final    ALT 03/09/2023 <5 (A)  5 - 33 U/L Final    AST 03/09/2023 10  <32 U/L Final    Total Bilirubin 03/09/2023 0.3  0.3 - 1.2 mg/dL Final    Total Protein 03/09/2023 7.2  6.4 - 8.3 g/dL Final    Albumin 03/09/2023 3.8  3.5 - 5.2 g/dL Final    Albumin/Globulin Ratio 03/09/2023 1.1  1.0 - 2.5 Final    Hemoglobin A1C 03/09/2023 6.8 (A)  4.0 - 6.0 % Final    Estimated Avg Glucose 03/09/2023 148  mg/dL Final    Comment: The ADA and AACC recommend providing the estimated average glucose result to permit better   patient understanding of their HBA1c result.       TSH 03/09/2023 2.57  0.30 - 5.00 uIU/mL Final   Admission on 02/18/2023, Discharged on 02/18/2023   Component Date Value Ref Range Status    WBC 02/18/2023 8.9  3.5 - 11.3 k/uL Final    RBC 02/18/2023 4.27  3.95 - 5.11 m/uL Final    Hemoglobin 02/18/2023 11.9  11.9 - 15.1 g/dL Final    Hematocrit 02/18/2023 39.0  36.3 - 47.1 % Final    MCV 02/18/2023 91.3  82.6 - 102.9 fL Final    MCH 02/18/2023 27.9  25.2 - 33.5 pg Final    MCHC 02/18/2023 30.5  25.2 - 33.5 g/dL Final    RDW 02/18/2023 14.4  11.8 - 14.4 % Final    Platelets 94/04/6491 280  138 - 453 k/uL Final    MPV 02/18/2023 11.6  8.1 - 13.5 fL Final    NRBC Automated 02/18/2023 0.0  0.0 per 100 WBC Final    Seg Neutrophils 02/18/2023 69 (A)  36 - 65 % Final    Lymphocytes 02/18/2023 19 (A)  24 - 43 % Final    Monocytes 02/18/2023 9  3 - 12 % Final    Eosinophils % 02/18/2023 1  1 - 4 % Final    Basophils 02/18/2023 1  0 - 2 % Final    Immature Granulocytes 02/18/2023 1 (A)  0 % Final    Segs Absolute 02/18/2023 6.25  1.50 - 8.10 k/uL Final    Absolute Lymph # 02/18/2023 1.68  1.10 - 3.70 k/uL Final    Absolute Mono # 02/18/2023 0.75  0.10 - 1.20 k/uL Final    Absolute Eos # 02/18/2023 0.08  0.00 - 0.44 k/uL Final    Basophils Absolute 02/18/2023 0.06  0.00 - 0.20 k/uL Final    Absolute Immature Granulocyte 02/18/2023 0.04  0.00 - 0.30 k/uL Final    Glucose 02/18/2023 132 (A)  70 - 99 mg/dL Final    BUN 02/18/2023 36 (A)  8 - 23 mg/dL Final    Creatinine 02/18/2023 1.83 (A)  0.50 - 0.90 mg/dL Final    Est, Glom Filt Rate 02/18/2023 30 (A)  >60 mL/min/1.73m2 Final    Comment:       These results are not intended for use in patients <25years of age. eGFR results are calculated without a race factor using the 2021 CKD-EPI equation. Careful clinical correlation is recommended, particularly when comparing to results   calculated using previous equations. The CKD-EPI equation is less accurate in patients with extremes of muscle mass, extra-renal   metabolism of creatine, excessive creatine ingestion, or following therapy that affects   renal tubular secretion.       Bun/Cre Ratio 02/18/2023 20  9 - 20 Final    Calcium 02/18/2023 9.6  8.6 - 10.4 mg/dL Final    Sodium 02/18/2023 134 (A)  135 - 144 mmol/L Final    Potassium 02/18/2023 4.9  3.7 - 5.3 mmol/L Final    Chloride 02/18/2023 95 (A)  98 - 107 mmol/L Final    CO2 02/18/2023 28  20 - 31 mmol/L Final    Anion Gap 02/18/2023 11  9 - 17 mmol/L Final    Albumin 02/18/2023 4.1  3.5 - 5.2 g/dL Final    Alkaline Phosphatase 02/18/2023 151 (A)  35 - 104 U/L Final    ALT 02/18/2023 6  5 - 33 U/L Final    AST 02/18/2023 11  <32 U/L Final    Total Bilirubin 02/18/2023 0.3  0.3 - 1.2 mg/dL Final    Bilirubin, Direct 02/18/2023 <0.1  <0.3 mg/dL Final    Bilirubin, Indirect 02/18/2023 Can not be calculated  0.0 - 1.0 mg/dL Final    Total Protein 02/18/2023 7.8  6.4 - 8.3 g/dL Final    Globulin 02/18/2023 3.7  1.5 - 3.8 g/dL Final    Albumin/Globulin Ratio 02/18/2023 1.1  1.0 - 2.5 Final    Lipase 02/18/2023 23  13 - 60 U/L Final                (Please note that portions of this note were completed with a voice-recognition program. Efforts were made to edit the dictation but occasionally words are mis-transcribed.)

## 2023-03-17 LAB — ZINC: 57.1 UG/DL (ref 60–120)

## 2023-03-21 DIAGNOSIS — E60 LOW ZINC LEVEL: Primary | ICD-10-CM

## 2023-03-27 ENCOUNTER — HOSPITAL ENCOUNTER (OUTPATIENT)
Age: 68
Setting detail: SPECIMEN
Discharge: HOME | End: 2023-03-27
Payer: MEDICARE

## 2023-03-27 DIAGNOSIS — N18.32 STAGE 3B CHRONIC KIDNEY DISEASE (HCC): ICD-10-CM

## 2023-03-27 LAB
CREATININE URINE: 50.4 MG/DL (ref 28–217)
TOTAL PROTEIN, URINE: 8 MG/DL
URINE TOTAL PROTEIN CREATININE RATIO: 0.16 (ref 0–0.2)

## 2023-03-27 PROCEDURE — 84156 ASSAY OF PROTEIN URINE: CPT

## 2023-03-27 PROCEDURE — 82570 ASSAY OF URINE CREATININE: CPT

## 2023-04-03 ENCOUNTER — OFFICE VISIT (OUTPATIENT)
Dept: NEPHROLOGY | Age: 68
End: 2023-04-03
Payer: MEDICARE

## 2023-04-03 VITALS
BODY MASS INDEX: 41.83 KG/M2 | DIASTOLIC BLOOD PRESSURE: 72 MMHG | HEIGHT: 68 IN | WEIGHT: 276 LBS | HEART RATE: 78 BPM | SYSTOLIC BLOOD PRESSURE: 114 MMHG

## 2023-04-03 DIAGNOSIS — E55.9 VITAMIN D DEFICIENCY: ICD-10-CM

## 2023-04-03 DIAGNOSIS — N20.0 NEPHROLITHIASIS: ICD-10-CM

## 2023-04-03 DIAGNOSIS — N18.32 STAGE 3B CHRONIC KIDNEY DISEASE (HCC): Primary | ICD-10-CM

## 2023-04-03 DIAGNOSIS — E66.01 CLASS 3 SEVERE OBESITY WITH BODY MASS INDEX (BMI) OF 40.0 TO 44.9 IN ADULT, UNSPECIFIED OBESITY TYPE, UNSPECIFIED WHETHER SERIOUS COMORBIDITY PRESENT (HCC): ICD-10-CM

## 2023-04-03 DIAGNOSIS — I10 HYPERTENSION, ESSENTIAL: ICD-10-CM

## 2023-04-03 DIAGNOSIS — N18.32 TYPE 2 DIABETES MELLITUS WITH STAGE 3B CHRONIC KIDNEY DISEASE, WITHOUT LONG-TERM CURRENT USE OF INSULIN (HCC): ICD-10-CM

## 2023-04-03 DIAGNOSIS — E11.22 TYPE 2 DIABETES MELLITUS WITH STAGE 3B CHRONIC KIDNEY DISEASE, WITHOUT LONG-TERM CURRENT USE OF INSULIN (HCC): ICD-10-CM

## 2023-04-03 PROCEDURE — 3074F SYST BP LT 130 MM HG: CPT | Performed by: INTERNAL MEDICINE

## 2023-04-03 PROCEDURE — 99213 OFFICE O/P EST LOW 20 MIN: CPT | Performed by: INTERNAL MEDICINE

## 2023-04-03 PROCEDURE — 99214 OFFICE O/P EST MOD 30 MIN: CPT | Performed by: INTERNAL MEDICINE

## 2023-04-03 PROCEDURE — G8400 PT W/DXA NO RESULTS DOC: HCPCS | Performed by: INTERNAL MEDICINE

## 2023-04-03 PROCEDURE — G8427 DOCREV CUR MEDS BY ELIG CLIN: HCPCS | Performed by: INTERNAL MEDICINE

## 2023-04-03 PROCEDURE — 3044F HG A1C LEVEL LT 7.0%: CPT | Performed by: INTERNAL MEDICINE

## 2023-04-03 PROCEDURE — 2022F DILAT RTA XM EVC RTNOPTHY: CPT | Performed by: INTERNAL MEDICINE

## 2023-04-03 PROCEDURE — 99212 OFFICE O/P EST SF 10 MIN: CPT | Performed by: INTERNAL MEDICINE

## 2023-04-03 PROCEDURE — 1090F PRES/ABSN URINE INCON ASSESS: CPT | Performed by: INTERNAL MEDICINE

## 2023-04-03 PROCEDURE — G8417 CALC BMI ABV UP PARAM F/U: HCPCS | Performed by: INTERNAL MEDICINE

## 2023-04-03 PROCEDURE — 1036F TOBACCO NON-USER: CPT | Performed by: INTERNAL MEDICINE

## 2023-04-03 PROCEDURE — 3078F DIAST BP <80 MM HG: CPT | Performed by: INTERNAL MEDICINE

## 2023-04-03 PROCEDURE — 3017F COLORECTAL CA SCREEN DOC REV: CPT | Performed by: INTERNAL MEDICINE

## 2023-04-03 PROCEDURE — 1123F ACP DISCUSS/DSCN MKR DOCD: CPT | Performed by: INTERNAL MEDICINE

## 2023-04-03 RX ORDER — IBUPROFEN 800 MG/1
800 TABLET ORAL 3 TIMES DAILY
COMMUNITY
Start: 2023-03-22

## 2023-04-25 RX ORDER — FAMOTIDINE 20 MG/1
TABLET, FILM COATED ORAL
Qty: 60 TABLET | Refills: 5 | Status: SHIPPED | OUTPATIENT
Start: 2023-04-25

## 2023-04-27 DIAGNOSIS — E11.29 TYPE 2 DIABETES MELLITUS WITH MICROALBUMINURIA, WITHOUT LONG-TERM CURRENT USE OF INSULIN (HCC): ICD-10-CM

## 2023-04-27 DIAGNOSIS — R80.9 TYPE 2 DIABETES MELLITUS WITH MICROALBUMINURIA, WITHOUT LONG-TERM CURRENT USE OF INSULIN (HCC): ICD-10-CM

## 2023-04-28 RX ORDER — PIOGLITAZONE HCL AND METFORMIN HCL 500; 15 MG/1; MG/1
TABLET ORAL
Qty: 180 TABLET | Refills: 1 | Status: SHIPPED | OUTPATIENT
Start: 2023-04-28

## 2023-04-28 NOTE — TELEPHONE ENCOUNTER
Gage Owens called requesting a refill of the below medication which has been pended for you:     Requested Prescriptions     Pending Prescriptions Disp Refills    pioglitazone-metFORMIN (ACTOPLUS MET)  MG per tablet [Pharmacy Med Name: PIOGLIT  15MG 500MG  TAB  MET] 180 tablet 1     Sig: TAKE 1 TABLET TWICE A DAY WITH  MEALS       Last Appointment Date: 3/14/2023  Next Appointment Date: 9/14/2023    Allergies   Allergen Reactions    Asa [Aspirin] Other (See Comments)     Stomach irritation

## 2023-04-29 DIAGNOSIS — G44.201 INTRACTABLE TENSION-TYPE HEADACHE, UNSPECIFIED CHRONICITY PATTERN: ICD-10-CM

## 2023-04-30 DIAGNOSIS — N18.32 STAGE 3B CHRONIC KIDNEY DISEASE (HCC): ICD-10-CM

## 2023-04-30 DIAGNOSIS — I10 HYPERTENSION, ESSENTIAL: ICD-10-CM

## 2023-05-02 RX ORDER — LISINOPRIL AND HYDROCHLOROTHIAZIDE 20; 12.5 MG/1; MG/1
1 TABLET ORAL DAILY
Qty: 90 TABLET | Refills: 1 | Status: SHIPPED | OUTPATIENT
Start: 2023-05-02

## 2023-05-02 RX ORDER — DULOXETIN HYDROCHLORIDE 60 MG/1
60 CAPSULE, DELAYED RELEASE ORAL DAILY
Qty: 90 CAPSULE | Refills: 1 | Status: SHIPPED | OUTPATIENT
Start: 2023-05-02

## 2023-05-02 NOTE — TELEPHONE ENCOUNTER
Mariana Dickson called requesting a refill of the below medication which has been pended for you:     Requested Prescriptions     Pending Prescriptions Disp Refills    lisinopril-hydroCHLOROthiazide (PRINZIDE;ZESTORETIC) 20-12.5 MG per tablet [Pharmacy Med Name: Lisinopril-hydroCHLOROthiazide 20-12.5 MG Oral Tablet] 90 tablet 1     Sig: TAKE 1 TABLET BY MOUTH DAILY       Last Appointment Date: 3/14/2023  Next Appointment Date: 9/14/2023    Allergies   Allergen Reactions    Asa [Aspirin] Other (See Comments)     Stomach irritation

## 2023-05-02 NOTE — TELEPHONE ENCOUNTER
Ellene Loron called requesting a refill of the below medication which has been pended for you:     Requested Prescriptions     Pending Prescriptions Disp Refills    DULoxetine (CYMBALTA) 60 MG extended release capsule [Pharmacy Med Name: DULoxetine HCl 60 MG Oral Capsule Delayed Release Particles] 90 capsule 1     Sig: TAKE 1 CAPSULE BY MOUTH DAILY       Last Appointment Date: 3/14/2023  Next Appointment Date: 9/14/23  Allergies   Allergen Reactions    Asa [Aspirin] Other (See Comments)     Stomach irritation

## 2023-05-12 DIAGNOSIS — K22.70 BARRETT'S ESOPHAGUS WITHOUT DYSPLASIA: ICD-10-CM

## 2023-05-16 RX ORDER — PANTOPRAZOLE SODIUM 40 MG/1
TABLET, DELAYED RELEASE ORAL
Qty: 180 TABLET | Refills: 3 | Status: SHIPPED | OUTPATIENT
Start: 2023-05-16

## 2023-05-30 ENCOUNTER — OFFICE VISIT (OUTPATIENT)
Dept: FAMILY MEDICINE CLINIC | Age: 68
End: 2023-05-30
Payer: MEDICARE

## 2023-05-30 ENCOUNTER — NURSE ONLY (OUTPATIENT)
Dept: LAB | Age: 68
End: 2023-05-30
Payer: MEDICARE

## 2023-05-30 VITALS
HEART RATE: 70 BPM | OXYGEN SATURATION: 100 % | BODY MASS INDEX: 41.52 KG/M2 | HEIGHT: 68 IN | DIASTOLIC BLOOD PRESSURE: 80 MMHG | WEIGHT: 274 LBS | TEMPERATURE: 98.2 F | SYSTOLIC BLOOD PRESSURE: 112 MMHG

## 2023-05-30 DIAGNOSIS — R11.0 NAUSEA: ICD-10-CM

## 2023-05-30 DIAGNOSIS — F41.9 ANXIETY: ICD-10-CM

## 2023-05-30 DIAGNOSIS — K31.84 GASTROPARESIS: Primary | ICD-10-CM

## 2023-05-30 PROCEDURE — 99214 OFFICE O/P EST MOD 30 MIN: CPT

## 2023-05-30 PROCEDURE — 3017F COLORECTAL CA SCREEN DOC REV: CPT | Performed by: FAMILY MEDICINE

## 2023-05-30 PROCEDURE — PBSHW PBB SHADOW CHARGE: Performed by: FAMILY MEDICINE

## 2023-05-30 PROCEDURE — G8417 CALC BMI ABV UP PARAM F/U: HCPCS | Performed by: FAMILY MEDICINE

## 2023-05-30 PROCEDURE — 3079F DIAST BP 80-89 MM HG: CPT | Performed by: FAMILY MEDICINE

## 2023-05-30 PROCEDURE — 1036F TOBACCO NON-USER: CPT | Performed by: FAMILY MEDICINE

## 2023-05-30 PROCEDURE — 1090F PRES/ABSN URINE INCON ASSESS: CPT | Performed by: FAMILY MEDICINE

## 2023-05-30 PROCEDURE — 99215 OFFICE O/P EST HI 40 MIN: CPT | Performed by: FAMILY MEDICINE

## 2023-05-30 PROCEDURE — 1123F ACP DISCUSS/DSCN MKR DOCD: CPT | Performed by: FAMILY MEDICINE

## 2023-05-30 PROCEDURE — 3074F SYST BP LT 130 MM HG: CPT | Performed by: FAMILY MEDICINE

## 2023-05-30 PROCEDURE — G8427 DOCREV CUR MEDS BY ELIG CLIN: HCPCS | Performed by: FAMILY MEDICINE

## 2023-05-30 PROCEDURE — G8400 PT W/DXA NO RESULTS DOC: HCPCS | Performed by: FAMILY MEDICINE

## 2023-05-30 RX ORDER — BUSPIRONE HYDROCHLORIDE 5 MG/1
5 TABLET ORAL 2 TIMES DAILY
Qty: 60 TABLET | Refills: 0 | Status: SHIPPED | OUTPATIENT
Start: 2023-05-30 | End: 2023-06-29

## 2023-05-30 RX ORDER — ONDANSETRON 2 MG/ML
4 INJECTION INTRAMUSCULAR; INTRAVENOUS EVERY 6 HOURS PRN
Status: SHIPPED | OUTPATIENT
Start: 2023-05-30

## 2023-05-30 RX ADMIN — ONDANSETRON 4 MG: 2 INJECTION INTRAMUSCULAR; INTRAVENOUS at 16:12

## 2023-05-30 NOTE — PROGRESS NOTES
From: [address] [facility]  Result Received: 05/30/23 10:05   Troponin I  Specimen:  Blood - PLASMA   Ref Range & Units Today   Troponin I 0.00 - 0.04 ng/mL 0.01    Resulting [facility]   Specimen Collected: 05/30/23 08:50 Last Resulted: 05/30/23 09:16   Received From: [address] [facility]  Result Received: 05/30/23 10:05     I reviewed the progress note from the ED visit earlier today. On this date 5/30/2023 I have spent over 45 minutes reviewing previous notes, test results and face to face with the patient discussing the diagnosis and importance of compliance with the treatment plan as well as documenting on the day of the visit.        (Please note that portions of this note were completed with a voice-recognition program. Efforts were made to edit the dictation but occasionally words are mis-transcribed.)

## 2023-05-31 DIAGNOSIS — G44.201 INTRACTABLE TENSION-TYPE HEADACHE, UNSPECIFIED CHRONICITY PATTERN: ICD-10-CM

## 2023-05-31 DIAGNOSIS — R80.9 TYPE 2 DIABETES MELLITUS WITH MICROALBUMINURIA, WITHOUT LONG-TERM CURRENT USE OF INSULIN (HCC): ICD-10-CM

## 2023-05-31 DIAGNOSIS — E11.29 TYPE 2 DIABETES MELLITUS WITH MICROALBUMINURIA, WITHOUT LONG-TERM CURRENT USE OF INSULIN (HCC): ICD-10-CM

## 2023-05-31 RX ORDER — PIOGLITAZONE HCL AND METFORMIN HCL 500; 15 MG/1; MG/1
1 TABLET ORAL 2 TIMES DAILY WITH MEALS
Qty: 20 TABLET | Refills: 0 | Status: SHIPPED | OUTPATIENT
Start: 2023-05-31

## 2023-05-31 RX ORDER — DULOXETIN HYDROCHLORIDE 60 MG/1
60 CAPSULE, DELAYED RELEASE ORAL DAILY
Qty: 10 CAPSULE | Refills: 0 | Status: SHIPPED | OUTPATIENT
Start: 2023-05-31 | End: 2023-06-10

## 2023-05-31 NOTE — TELEPHONE ENCOUNTER
Halie Sumner called requesting a refill of the below medication which has been pended for you:     Requested Prescriptions     Pending Prescriptions Disp Refills    DULoxetine (CYMBALTA) 60 MG extended release capsule 10 capsule 0     Sig: Take 1 capsule by mouth daily for 10 days    pioglitazone-metFORMIN (ACTOPLUS MET)  MG per tablet 20 tablet 0     Sig: Take 1 tablet by mouth 2 times daily (with meals)       Last Appointment Date: 5/30/2023  Next Appointment Date: 6/27/2023    Allergies   Allergen Reactions    Asa [Aspirin] Other (See Comments)     Stomach irritation

## 2023-07-18 RX ORDER — GABAPENTIN 600 MG/1
600 TABLET ORAL
OUTPATIENT
Start: 2023-07-18

## 2023-07-18 NOTE — TELEPHONE ENCOUNTER
Spoke to patient is this is not currently filled by Dr. Stone Bedoya. Patients takes BID for headaches. Advised OV would be needed to discuss.   Patient is scheduled to see Dr. Stone Bedoya tomorrow to discuss

## 2023-07-19 ENCOUNTER — OFFICE VISIT (OUTPATIENT)
Dept: FAMILY MEDICINE CLINIC | Age: 68
End: 2023-07-19
Payer: MEDICARE

## 2023-07-19 VITALS
HEART RATE: 78 BPM | DIASTOLIC BLOOD PRESSURE: 68 MMHG | SYSTOLIC BLOOD PRESSURE: 132 MMHG | WEIGHT: 272 LBS | OXYGEN SATURATION: 99 % | BODY MASS INDEX: 41.22 KG/M2 | HEIGHT: 68 IN

## 2023-07-19 DIAGNOSIS — G44.221 CHRONIC TENSION-TYPE HEADACHE, INTRACTABLE: Primary | ICD-10-CM

## 2023-07-19 PROCEDURE — G8427 DOCREV CUR MEDS BY ELIG CLIN: HCPCS | Performed by: FAMILY MEDICINE

## 2023-07-19 PROCEDURE — G8417 CALC BMI ABV UP PARAM F/U: HCPCS | Performed by: FAMILY MEDICINE

## 2023-07-19 PROCEDURE — 3078F DIAST BP <80 MM HG: CPT | Performed by: FAMILY MEDICINE

## 2023-07-19 PROCEDURE — 99213 OFFICE O/P EST LOW 20 MIN: CPT | Performed by: FAMILY MEDICINE

## 2023-07-19 PROCEDURE — 1090F PRES/ABSN URINE INCON ASSESS: CPT | Performed by: FAMILY MEDICINE

## 2023-07-19 PROCEDURE — G8400 PT W/DXA NO RESULTS DOC: HCPCS | Performed by: FAMILY MEDICINE

## 2023-07-19 PROCEDURE — 3017F COLORECTAL CA SCREEN DOC REV: CPT | Performed by: FAMILY MEDICINE

## 2023-07-19 PROCEDURE — 1123F ACP DISCUSS/DSCN MKR DOCD: CPT | Performed by: FAMILY MEDICINE

## 2023-07-19 PROCEDURE — 3075F SYST BP GE 130 - 139MM HG: CPT | Performed by: FAMILY MEDICINE

## 2023-07-19 PROCEDURE — 1036F TOBACCO NON-USER: CPT | Performed by: FAMILY MEDICINE

## 2023-07-19 RX ORDER — GABAPENTIN 600 MG/1
600 TABLET ORAL 2 TIMES DAILY
Qty: 60 TABLET | Refills: 2 | Status: CANCELLED | OUTPATIENT
Start: 2023-07-19 | End: 2023-10-17

## 2023-07-19 RX ORDER — GABAPENTIN 400 MG/1
400 CAPSULE ORAL 2 TIMES DAILY
Qty: 60 CAPSULE | Refills: 1 | Status: SHIPPED | OUTPATIENT
Start: 2023-07-19 | End: 2023-09-17

## 2023-07-19 NOTE — PROGRESS NOTES
Pt declined shingles, DTaP, Pneumonia and Covid vaccines.    Pt aware she is due for a diabetic eye exam

## 2023-07-19 NOTE — PROGRESS NOTES
[facility] ([facility])             [address], [address]                        Telephone [phone]             Fax [phone]       Elena Zuniga  :  1955  Age:  79 y.o. MRN:  [id number]  Date of visit:  2023       Assessment and Plan:    Chronic tension-type headache, intractable  Controlled substances monitoring: No signs of potential drug abuse or diversion identified when the [place] report from [place], [place], and [place] was reviewed today. The activity on the report was consistent with the treatment plan. I discussed with the patient that I can refill the Neurontin, but if she has worsening of headaches, I will refer her back to neurology. She is in agreement with this plan. I also recommended a trial of decreasing the dose of Neurontin. She was advised to decrease from 600 mg BID to 400 mg BID:  - gabapentin (NEURONTIN) 400 MG capsule; Take 1 capsule by mouth in the morning and at bedtime for 60 days. Dispense: 60 capsule; Refill: 1    As she still has quite a few of the 600 mg Neurontin left, I advised her to begin taking 400 mg in the morning and 600 mg in the evening until she has used up the 600 mg bottle. Follow up instructions were given to the patient:  She was advised to keep the appointment she has scheduled in September. Subjective:    Elena Zuniga is a 79 y.o. female who presents to [facility] ([facility]) today (2023) for follow up/evaluation of:  Medication Refill (Needed a refill on gabapentin )      She is here today requesting a refill of Neurontin. Apparently, this was originally prescribed by a neurologist at [facility] for treatment of chronic headaches. Most recently, [facility] who prescribed Neurontin for her. She is no longer seeing neurology, and she would like me to refill Neurontin.    She states that her headaches have been

## 2023-08-17 ENCOUNTER — TELEPHONE (OUTPATIENT)
Dept: FAMILY MEDICINE CLINIC | Age: 68
End: 2023-08-17

## 2023-08-17 DIAGNOSIS — R53.1 GENERALIZED WEAKNESS: Primary | ICD-10-CM

## 2023-08-17 DIAGNOSIS — Z91.81 HISTORY OF FALL: ICD-10-CM

## 2023-08-17 NOTE — TELEPHONE ENCOUNTER
Patient returned call and she states she fell this AM and she had to call the ambulance to help pick her up. She declines going to the ER or an evaluation. She states her arms and legs feel weak. She is requesting a referral to Physical Therapy at [facility].  She states this is an ongoing issue and needs to strengthen her legs     Last OV: 7/19/23  Next OV: 9/14/23

## 2023-08-17 NOTE — TELEPHONE ENCOUNTER
----- Message from Cornell Garcia sent at 8/17/2023  9:20 AM EDT -----  Subject: Referral Request    Reason for referral request? PT  Provider patient wants to be referred to(if known):     Provider Phone Number(if known):     Additional Information for Provider? pt fell today and couldn't get up,   states she ahs no muscle strength in legs and arms  ---------------------------------------------------------------------------  --------------  Kiana Marker INFO    [phone]; OK to leave message on voicemail  ---------------------------------------------------------------------------  --------------

## 2023-08-17 NOTE — TELEPHONE ENCOUNTER
----- Message from Geri Gotti sent at 8/17/2023  9:20 AM EDT -----  Subject: Referral Request    Reason for referral request? PT  Provider patient wants to be referred to(if known):     Provider Phone Number(if known):     Additional Information for Provider? pt fell today and couldn't get up,   states she ahs no muscle strength in legs and arms  ---------------------------------------------------------------------------  --------------  Monie YBARRA    [phone]; OK to leave message on voicemail  ---------------------------------------------------------------------------  --------------

## 2023-08-17 NOTE — TELEPHONE ENCOUNTER
Writer returned call to patient and patient states she fell this AM and had a hard time getting back up.     Patients phone was cutting in and out and call was lost.       Writer attempted to reach patient but call went to VM

## 2023-08-18 ENCOUNTER — HOSPITAL ENCOUNTER (INPATIENT)
Age: 68
LOS: 2 days | Discharge: HOME | DRG: 690 | End: 2023-08-20
Attending: EMERGENCY MEDICINE | Admitting: INTERNAL MEDICINE
Payer: MEDICARE

## 2023-08-18 ENCOUNTER — APPOINTMENT (OUTPATIENT)
Dept: CT IMAGING | Age: 68
DRG: 690 | End: 2023-08-18
Payer: MEDICARE

## 2023-08-18 ENCOUNTER — APPOINTMENT (OUTPATIENT)
Dept: GENERAL RADIOLOGY | Age: 68
DRG: 690 | End: 2023-08-18
Payer: MEDICARE

## 2023-08-18 DIAGNOSIS — N39.0 URINARY TRACT INFECTION WITHOUT HEMATURIA, SITE UNSPECIFIED: Primary | ICD-10-CM

## 2023-08-18 DIAGNOSIS — R53.1 GENERAL WEAKNESS: ICD-10-CM

## 2023-08-18 PROBLEM — R68.81 EARLY SATIETY: Status: ACTIVE | Noted: 2023-03-31

## 2023-08-18 PROBLEM — M79.18 MYOFASCIAL PAIN: Status: ACTIVE | Noted: 2023-08-18

## 2023-08-18 PROBLEM — M54.50 CHRONIC LOW BACK PAIN: Status: ACTIVE | Noted: 2022-07-20

## 2023-08-18 PROBLEM — N17.9 ACUTE KIDNEY INJURY SUPERIMPOSED ON CKD (HCC): Status: ACTIVE | Noted: 2023-08-18

## 2023-08-18 PROBLEM — K31.84 GASTROPARESIS: Status: ACTIVE | Noted: 2023-04-24

## 2023-08-18 PROBLEM — N18.9 ACUTE KIDNEY INJURY SUPERIMPOSED ON CKD (HCC): Status: ACTIVE | Noted: 2023-08-18

## 2023-08-18 PROBLEM — G89.29 CHRONIC LOW BACK PAIN: Status: ACTIVE | Noted: 2022-07-20

## 2023-08-18 LAB
ALBUMIN SERPL-MCNC: 3.7 G/DL (ref 3.5–5.2)
ALBUMIN/GLOB SERPL: 1 {RATIO} (ref 1–2.5)
ALP SERPL-CCNC: 131 U/L (ref 35–104)
ALT SERPL-CCNC: 8 U/L (ref 5–33)
ANION GAP SERPL CALCULATED.3IONS-SCNC: 14 MMOL/L (ref 9–17)
AST SERPL-CCNC: 29 U/L
BACTERIA URNS QL MICRO: ABNORMAL
BASOPHILS # BLD: 0.07 K/UL (ref 0–0.2)
BASOPHILS NFR BLD: 1 % (ref 0–2)
BILIRUB DIRECT SERPL-MCNC: 0.1 MG/DL
BILIRUB INDIRECT SERPL-MCNC: 0.2 MG/DL (ref 0–1)
BILIRUB SERPL-MCNC: 0.3 MG/DL (ref 0.3–1.2)
BILIRUB UR QL STRIP: NEGATIVE
BUN SERPL-MCNC: 36 MG/DL (ref 8–23)
BUN/CREAT SERPL: 18 (ref 9–20)
CALCIUM SERPL-MCNC: 9.4 MG/DL (ref 8.6–10.4)
CHARACTER UR: ABNORMAL
CHLORIDE SERPL-SCNC: 97 MMOL/L (ref 98–107)
CLARITY UR: CLEAR
CO2 SERPL-SCNC: 24 MMOL/L (ref 20–31)
COLOR UR: YELLOW
CREAT SERPL-MCNC: 2 MG/DL (ref 0.5–0.9)
EOSINOPHIL # BLD: 0.12 K/UL (ref 0–0.44)
EOSINOPHILS RELATIVE PERCENT: 1 % (ref 1–4)
EPI CELLS #/AREA URNS HPF: ABNORMAL /HPF (ref 0–5)
ERYTHROCYTE [DISTWIDTH] IN BLOOD BY AUTOMATED COUNT: 14.5 % (ref 11.8–14.4)
GFR SERPL CREATININE-BSD FRML MDRD: 27 ML/MIN/1.73M2
GLOBULIN SER CALC-MCNC: 3.7 G/DL (ref 1.5–3.8)
GLUCOSE BLD-MCNC: 162 MG/DL (ref 65–105)
GLUCOSE SERPL-MCNC: 229 MG/DL (ref 70–99)
GLUCOSE UR STRIP-MCNC: NEGATIVE MG/DL
HCT VFR BLD AUTO: 33.3 % (ref 36.3–47.1)
HGB BLD-MCNC: 10.8 G/DL (ref 11.9–15.1)
HGB UR QL STRIP.AUTO: NEGATIVE
IMM GRANULOCYTES # BLD AUTO: 0.15 K/UL (ref 0–0.3)
IMM GRANULOCYTES NFR BLD: 1 %
KETONES UR STRIP-MCNC: ABNORMAL MG/DL
LEUKOCYTE ESTERASE UR QL STRIP: ABNORMAL
LYMPHOCYTES NFR BLD: 1.65 K/UL (ref 1.1–3.7)
LYMPHOCYTES RELATIVE PERCENT: 13 % (ref 24–43)
MCH RBC QN AUTO: 29.4 PG (ref 25.2–33.5)
MCHC RBC AUTO-ENTMCNC: 32.4 G/DL (ref 25.2–33.5)
MCV RBC AUTO: 90.7 FL (ref 82.6–102.9)
MONOCYTES NFR BLD: 1.29 K/UL (ref 0.1–1.2)
MONOCYTES NFR BLD: 10 % (ref 3–12)
NEUTROPHILS NFR BLD: 74 % (ref 36–65)
NEUTS SEG NFR BLD: 9.68 K/UL (ref 1.5–8.1)
NITRITE UR QL STRIP: NEGATIVE
NRBC BLD-RTO: 0 PER 100 WBC
PH UR STRIP: 5.5 [PH] (ref 5–6)
PLATELET # BLD AUTO: 378 K/UL (ref 138–453)
PMV BLD AUTO: 10 FL (ref 8.1–13.5)
POTASSIUM SERPL-SCNC: 4.3 MMOL/L (ref 3.7–5.3)
PROT SERPL-MCNC: 7.4 G/DL (ref 6.4–8.3)
PROT UR STRIP-MCNC: NEGATIVE MG/DL
RBC # BLD AUTO: 3.67 M/UL (ref 3.95–5.11)
RBC # BLD: ABNORMAL 10*6/UL
RBC #/AREA URNS HPF: ABNORMAL /HPF (ref 0–4)
SODIUM SERPL-SCNC: 135 MMOL/L (ref 135–144)
SP GR UR STRIP: 1.02 (ref 1.01–1.02)
T4 FREE SERPL-MCNC: 1.4 NG/DL (ref 0.9–1.7)
TROPONIN I SERPL HS-MCNC: 26 NG/L (ref 0–14)
TSH SERPL DL<=0.05 MIU/L-ACNC: 2.46 UIU/ML (ref 0.3–5)
UROBILINOGEN UR STRIP-ACNC: NORMAL EU/DL (ref 0–1)
WBC #/AREA URNS HPF: ABNORMAL /HPF (ref 0–4)
WBC OTHER # BLD: 13 K/UL (ref 3.5–11.3)

## 2023-08-18 PROCEDURE — 87077 CULTURE AEROBIC IDENTIFY: CPT

## 2023-08-18 PROCEDURE — 84439 ASSAY OF FREE THYROXINE: CPT

## 2023-08-18 PROCEDURE — 81001 URINALYSIS AUTO W/SCOPE: CPT

## 2023-08-18 PROCEDURE — 6360000002 HC RX W HCPCS: Performed by: EMERGENCY MEDICINE

## 2023-08-18 PROCEDURE — 99285 EMERGENCY DEPT VISIT HI MDM: CPT

## 2023-08-18 PROCEDURE — 82947 ASSAY GLUCOSE BLOOD QUANT: CPT

## 2023-08-18 PROCEDURE — 2060000000 HC ICU INTERMEDIATE R&B

## 2023-08-18 PROCEDURE — 87086 URINE CULTURE/COLONY COUNT: CPT

## 2023-08-18 PROCEDURE — 71101 X-RAY EXAM UNILAT RIBS/CHEST: CPT

## 2023-08-18 PROCEDURE — 80048 BASIC METABOLIC PNL TOTAL CA: CPT

## 2023-08-18 PROCEDURE — 70450 CT HEAD/BRAIN W/O DYE: CPT

## 2023-08-18 PROCEDURE — 2580000003 HC RX 258: Performed by: EMERGENCY MEDICINE

## 2023-08-18 PROCEDURE — 96365 THER/PROPH/DIAG IV INF INIT: CPT

## 2023-08-18 PROCEDURE — 84484 ASSAY OF TROPONIN QUANT: CPT

## 2023-08-18 PROCEDURE — 87186 SC STD MICRODIL/AGAR DIL: CPT

## 2023-08-18 PROCEDURE — 93005 ELECTROCARDIOGRAM TRACING: CPT | Performed by: EMERGENCY MEDICINE

## 2023-08-18 PROCEDURE — 6370000000 HC RX 637 (ALT 250 FOR IP)

## 2023-08-18 PROCEDURE — 83036 HEMOGLOBIN GLYCOSYLATED A1C: CPT

## 2023-08-18 PROCEDURE — 73080 X-RAY EXAM OF ELBOW: CPT

## 2023-08-18 PROCEDURE — 99222 1ST HOSP IP/OBS MODERATE 55: CPT

## 2023-08-18 PROCEDURE — 2580000003 HC RX 258

## 2023-08-18 PROCEDURE — 6360000002 HC RX W HCPCS

## 2023-08-18 PROCEDURE — 80076 HEPATIC FUNCTION PANEL: CPT

## 2023-08-18 PROCEDURE — 73562 X-RAY EXAM OF KNEE 3: CPT

## 2023-08-18 PROCEDURE — 85025 COMPLETE CBC W/AUTO DIFF WBC: CPT

## 2023-08-18 PROCEDURE — 84443 ASSAY THYROID STIM HORMONE: CPT

## 2023-08-18 RX ORDER — ONDANSETRON 2 MG/ML
4 INJECTION INTRAMUSCULAR; INTRAVENOUS EVERY 6 HOURS PRN
Status: DISCONTINUED | OUTPATIENT
Start: 2023-08-18 | End: 2023-08-20 | Stop reason: HOSPADM

## 2023-08-18 RX ORDER — FAMOTIDINE 20 MG/1
20 TABLET, FILM COATED ORAL NIGHTLY
Status: DISCONTINUED | OUTPATIENT
Start: 2023-08-18 | End: 2023-08-20 | Stop reason: HOSPADM

## 2023-08-18 RX ORDER — SODIUM CHLORIDE 0.9 % (FLUSH) 0.9 %
5-40 SYRINGE (ML) INJECTION EVERY 12 HOURS SCHEDULED
Status: DISCONTINUED | OUTPATIENT
Start: 2023-08-18 | End: 2023-08-20 | Stop reason: HOSPADM

## 2023-08-18 RX ORDER — ACETAMINOPHEN 325 MG/1
650 TABLET ORAL EVERY 6 HOURS PRN
Status: DISCONTINUED | OUTPATIENT
Start: 2023-08-18 | End: 2023-08-20 | Stop reason: HOSPADM

## 2023-08-18 RX ORDER — METOCLOPRAMIDE 10 MG/1
10 TABLET ORAL 4 TIMES DAILY
Status: DISCONTINUED | OUTPATIENT
Start: 2023-08-18 | End: 2023-08-20 | Stop reason: HOSPADM

## 2023-08-18 RX ORDER — INSULIN LISPRO 100 [IU]/ML
0-4 INJECTION, SOLUTION INTRAVENOUS; SUBCUTANEOUS
Status: DISCONTINUED | OUTPATIENT
Start: 2023-08-19 | End: 2023-08-20 | Stop reason: HOSPADM

## 2023-08-18 RX ORDER — HYDROCHLOROTHIAZIDE 12.5 MG/1
12.5 TABLET ORAL DAILY
Status: DISCONTINUED | OUTPATIENT
Start: 2023-08-19 | End: 2023-08-20 | Stop reason: HOSPADM

## 2023-08-18 RX ORDER — DEXTROSE MONOHYDRATE 100 MG/ML
INJECTION, SOLUTION INTRAVENOUS CONTINUOUS PRN
Status: DISCONTINUED | OUTPATIENT
Start: 2023-08-18 | End: 2023-08-20 | Stop reason: HOSPADM

## 2023-08-18 RX ORDER — ACETAMINOPHEN 650 MG/1
650 SUPPOSITORY RECTAL EVERY 6 HOURS PRN
Status: DISCONTINUED | OUTPATIENT
Start: 2023-08-18 | End: 2023-08-20 | Stop reason: HOSPADM

## 2023-08-18 RX ORDER — LISINOPRIL 20 MG/1
20 TABLET ORAL DAILY
Status: DISCONTINUED | OUTPATIENT
Start: 2023-08-19 | End: 2023-08-20 | Stop reason: HOSPADM

## 2023-08-18 RX ORDER — SODIUM CHLORIDE 9 MG/ML
INJECTION, SOLUTION INTRAVENOUS CONTINUOUS
Status: DISCONTINUED | OUTPATIENT
Start: 2023-08-18 | End: 2023-08-20 | Stop reason: HOSPADM

## 2023-08-18 RX ORDER — INSULIN GLARGINE 100 [IU]/ML
5 INJECTION, SOLUTION SUBCUTANEOUS NIGHTLY
Status: DISCONTINUED | OUTPATIENT
Start: 2023-08-18 | End: 2023-08-20 | Stop reason: HOSPADM

## 2023-08-18 RX ORDER — DULOXETIN HYDROCHLORIDE 30 MG/1
60 CAPSULE, DELAYED RELEASE ORAL DAILY
Status: DISCONTINUED | OUTPATIENT
Start: 2023-08-19 | End: 2023-08-20 | Stop reason: HOSPADM

## 2023-08-18 RX ORDER — POLYETHYLENE GLYCOL 3350 17 G/17G
17 POWDER, FOR SOLUTION ORAL DAILY PRN
Status: DISCONTINUED | OUTPATIENT
Start: 2023-08-18 | End: 2023-08-20 | Stop reason: HOSPADM

## 2023-08-18 RX ORDER — HYDROCODONE BITARTRATE AND ACETAMINOPHEN 5; 325 MG/1; MG/1
1 TABLET ORAL 2 TIMES DAILY
COMMUNITY
Start: 2023-07-23

## 2023-08-18 RX ORDER — OMEPRAZOLE 20 MG/1
20 CAPSULE, DELAYED RELEASE ORAL DAILY
Status: DISCONTINUED | OUTPATIENT
Start: 2023-08-19 | End: 2023-08-18

## 2023-08-18 RX ORDER — HYDROCODONE BITARTRATE AND ACETAMINOPHEN 5; 325 MG/1; MG/1
1 TABLET ORAL 2 TIMES DAILY
Status: DISCONTINUED | OUTPATIENT
Start: 2023-08-18 | End: 2023-08-20 | Stop reason: HOSPADM

## 2023-08-18 RX ORDER — ONDANSETRON 4 MG/1
4 TABLET, ORALLY DISINTEGRATING ORAL EVERY 8 HOURS PRN
Status: DISCONTINUED | OUTPATIENT
Start: 2023-08-18 | End: 2023-08-20 | Stop reason: HOSPADM

## 2023-08-18 RX ORDER — DULOXETIN HYDROCHLORIDE 20 MG/1
60 CAPSULE, DELAYED RELEASE ORAL DAILY
Status: ON HOLD | COMMUNITY
Start: 2020-09-01 | End: 2023-08-20 | Stop reason: HOSPADM

## 2023-08-18 RX ORDER — INSULIN LISPRO 100 [IU]/ML
0-4 INJECTION, SOLUTION INTRAVENOUS; SUBCUTANEOUS NIGHTLY
Status: DISCONTINUED | OUTPATIENT
Start: 2023-08-18 | End: 2023-08-20 | Stop reason: HOSPADM

## 2023-08-18 RX ORDER — ENOXAPARIN SODIUM 100 MG/ML
30 INJECTION SUBCUTANEOUS 2 TIMES DAILY
Status: DISCONTINUED | OUTPATIENT
Start: 2023-08-18 | End: 2023-08-20 | Stop reason: HOSPADM

## 2023-08-18 RX ORDER — 0.9 % SODIUM CHLORIDE 0.9 %
500 INTRAVENOUS SOLUTION INTRAVENOUS ONCE
Status: COMPLETED | OUTPATIENT
Start: 2023-08-18 | End: 2023-08-18

## 2023-08-18 RX ORDER — SODIUM CHLORIDE 0.9 % (FLUSH) 0.9 %
5-40 SYRINGE (ML) INJECTION PRN
Status: DISCONTINUED | OUTPATIENT
Start: 2023-08-18 | End: 2023-08-20 | Stop reason: HOSPADM

## 2023-08-18 RX ORDER — PANTOPRAZOLE SODIUM 40 MG/1
40 TABLET, DELAYED RELEASE ORAL 2 TIMES DAILY
Status: DISCONTINUED | OUTPATIENT
Start: 2023-08-18 | End: 2023-08-20 | Stop reason: HOSPADM

## 2023-08-18 RX ORDER — LISINOPRIL AND HYDROCHLOROTHIAZIDE 20; 12.5 MG/1; MG/1
1 TABLET ORAL DAILY
Status: DISCONTINUED | OUTPATIENT
Start: 2023-08-19 | End: 2023-08-18

## 2023-08-18 RX ORDER — SODIUM CHLORIDE 9 MG/ML
INJECTION, SOLUTION INTRAVENOUS PRN
Status: DISCONTINUED | OUTPATIENT
Start: 2023-08-18 | End: 2023-08-20 | Stop reason: HOSPADM

## 2023-08-18 RX ADMIN — SODIUM CHLORIDE, PRESERVATIVE FREE 10 ML: 5 INJECTION INTRAVENOUS at 23:23

## 2023-08-18 RX ADMIN — FAMOTIDINE 20 MG: 20 TABLET, FILM COATED ORAL at 23:20

## 2023-08-18 RX ADMIN — INSULIN GLARGINE 5 UNITS: 100 INJECTION, SOLUTION SUBCUTANEOUS at 23:41

## 2023-08-18 RX ADMIN — METOCLOPRAMIDE 10 MG: 10 TABLET ORAL at 23:21

## 2023-08-18 RX ADMIN — SODIUM CHLORIDE: 9 INJECTION, SOLUTION INTRAVENOUS at 23:27

## 2023-08-18 RX ADMIN — CEFTRIAXONE 1000 MG: 1 INJECTION, POWDER, FOR SOLUTION INTRAMUSCULAR; INTRAVENOUS at 17:36

## 2023-08-18 RX ADMIN — SODIUM CHLORIDE 500 ML: 9 INJECTION, SOLUTION INTRAVENOUS at 15:04

## 2023-08-18 RX ADMIN — ENOXAPARIN SODIUM 30 MG: 100 INJECTION SUBCUTANEOUS at 23:34

## 2023-08-18 RX ADMIN — HYDROCODONE BITARTRATE AND ACETAMINOPHEN 1 TABLET: 5; 325 TABLET ORAL at 23:20

## 2023-08-18 RX ADMIN — GABAPENTIN 400 MG: 100 CAPSULE ORAL at 23:20

## 2023-08-18 RX ADMIN — PANTOPRAZOLE SODIUM 40 MG: 40 TABLET, DELAYED RELEASE ORAL at 23:34

## 2023-08-18 ASSESSMENT — PAIN - FUNCTIONAL ASSESSMENT
PAIN_FUNCTIONAL_ASSESSMENT: 0-10
PAIN_FUNCTIONAL_ASSESSMENT: PREVENTS OR INTERFERES SOME ACTIVE ACTIVITIES AND ADLS

## 2023-08-18 ASSESSMENT — PAIN SCALES - GENERAL: PAINLEVEL_OUTOF10: 7

## 2023-08-18 ASSESSMENT — PAIN DESCRIPTION - FREQUENCY: FREQUENCY: CONTINUOUS

## 2023-08-18 ASSESSMENT — PAIN DESCRIPTION - LOCATION: LOCATION: GENERALIZED

## 2023-08-18 ASSESSMENT — PAIN DESCRIPTION - DESCRIPTORS: DESCRIPTORS: DISCOMFORT

## 2023-08-18 ASSESSMENT — PAIN DESCRIPTION - ONSET: ONSET: PROGRESSIVE

## 2023-08-18 ASSESSMENT — PAIN DESCRIPTION - PAIN TYPE: TYPE: ACUTE PAIN

## 2023-08-18 NOTE — TELEPHONE ENCOUNTER
Patient made aware per voicemail that PT order was faxed to [facility] in [facility].

## 2023-08-18 NOTE — ED PROVIDER NOTES
Jayashree      Pt Name: Samantha Chakraborty  MRN: [id number]  [address] [facility] 1955  Date of evaluation: 8/18/2023      CHIEF COMPLAINT       Chief Complaint   Patient presents with    Fatigue     C/O feeling weak x a few weeks. Reports multiple falls due to weakness. Denies SOB or CP but c/o right sided rib pain and bilateral lower extremity pain. Pt assisted to cot by  and assisted into gown by RN. Placed on full cardio-resp monitor. HISTORY OF PRESENT ILLNESS      The patient presents with generalized fatigue for the past couple of weeks. She has had multiple falls because she is so weak. When she stands she can barely walk without assistance. The patient did suffer a fall onto her right side and now has pain in her right elbow, right ribs, and right knee. She denies loss of consciousness. She denies chest pain or shortness of breath. She denies nausea, vomiting, or diarrhea. She denies hematochezia. She denies fever. She denies focal weakness or numbness on one side. She has not had mental status change or headache. She says that the weakness is not more prominent in the upper or lower extremities, and seems to be all over. REVIEW OF SYSTEMS       All systems reviewed and negative unless noted in HPI. The patient denies fever or constitutional symptoms. Denies vision change. Denies any sore throat or rhinorrhea. Denies any neck pain or stiffness. Denies chest pain or shortness of breath. No nausea,  vomiting or diarrhea. Denies any dysuria. Denies urinary frequency or hematuria. Trauma to right ribs, right elbow, and right knee. Denies any focal weakness, numbness or focal neurologic deficit. Reports generalized weakness with difficulty ambulating and multiple falls. Denies any skin rash or edema. No recent psychiatric issues. No easy bruising or bleeding. History of diabetes.        PAST MEDICAL HISTORY    has a

## 2023-08-19 LAB
ANION GAP SERPL CALCULATED.3IONS-SCNC: 11 MMOL/L (ref 9–17)
BASOPHILS # BLD: 0.05 K/UL (ref 0–0.2)
BASOPHILS NFR BLD: 1 % (ref 0–2)
BUN SERPL-MCNC: 30 MG/DL (ref 8–23)
BUN/CREAT SERPL: 19 (ref 9–20)
CALCIUM SERPL-MCNC: 9.2 MG/DL (ref 8.6–10.4)
CHLORIDE SERPL-SCNC: 99 MMOL/L (ref 98–107)
CO2 SERPL-SCNC: 26 MMOL/L (ref 20–31)
CREAT SERPL-MCNC: 1.6 MG/DL (ref 0.5–0.9)
EOSINOPHIL # BLD: 0.17 K/UL (ref 0–0.44)
EOSINOPHILS RELATIVE PERCENT: 2 % (ref 1–4)
ERYTHROCYTE [DISTWIDTH] IN BLOOD BY AUTOMATED COUNT: 14.6 % (ref 11.8–14.4)
GFR SERPL CREATININE-BSD FRML MDRD: 35 ML/MIN/1.73M2
GLUCOSE BLD-MCNC: 129 MG/DL (ref 65–105)
GLUCOSE BLD-MCNC: 145 MG/DL (ref 65–105)
GLUCOSE BLD-MCNC: 170 MG/DL (ref 65–105)
GLUCOSE SERPL-MCNC: 148 MG/DL (ref 70–99)
HCT VFR BLD AUTO: 31.4 % (ref 36.3–47.1)
HGB BLD-MCNC: 10.1 G/DL (ref 11.9–15.1)
IMM GRANULOCYTES # BLD AUTO: 0.09 K/UL (ref 0–0.3)
IMM GRANULOCYTES NFR BLD: 1 %
LYMPHOCYTES NFR BLD: 2.23 K/UL (ref 1.1–3.7)
LYMPHOCYTES RELATIVE PERCENT: 25 % (ref 24–43)
MCH RBC QN AUTO: 29.5 PG (ref 25.2–33.5)
MCHC RBC AUTO-ENTMCNC: 32.2 G/DL (ref 25.2–33.5)
MCV RBC AUTO: 91.8 FL (ref 82.6–102.9)
MONOCYTES NFR BLD: 0.99 K/UL (ref 0.1–1.2)
MONOCYTES NFR BLD: 11 % (ref 3–12)
NEUTROPHILS NFR BLD: 60 % (ref 36–65)
NEUTS SEG NFR BLD: 5.35 K/UL (ref 1.5–8.1)
NRBC BLD-RTO: 0 PER 100 WBC
PLATELET # BLD AUTO: 299 K/UL (ref 138–453)
PMV BLD AUTO: 10.8 FL (ref 8.1–13.5)
POTASSIUM SERPL-SCNC: 4.1 MMOL/L (ref 3.7–5.3)
RBC # BLD AUTO: 3.42 M/UL (ref 3.95–5.11)
RBC # BLD: ABNORMAL 10*6/UL
SODIUM SERPL-SCNC: 136 MMOL/L (ref 135–144)
WBC OTHER # BLD: 8.9 K/UL (ref 3.5–11.3)

## 2023-08-19 PROCEDURE — 2060000000 HC ICU INTERMEDIATE R&B

## 2023-08-19 PROCEDURE — 97161 PT EVAL LOW COMPLEX 20 MIN: CPT

## 2023-08-19 PROCEDURE — 6370000000 HC RX 637 (ALT 250 FOR IP)

## 2023-08-19 PROCEDURE — 80048 BASIC METABOLIC PNL TOTAL CA: CPT

## 2023-08-19 PROCEDURE — 82947 ASSAY GLUCOSE BLOOD QUANT: CPT

## 2023-08-19 PROCEDURE — 6360000002 HC RX W HCPCS

## 2023-08-19 PROCEDURE — 99222 1ST HOSP IP/OBS MODERATE 55: CPT | Performed by: FAMILY MEDICINE

## 2023-08-19 PROCEDURE — 2580000003 HC RX 258

## 2023-08-19 PROCEDURE — 36415 COLL VENOUS BLD VENIPUNCTURE: CPT

## 2023-08-19 PROCEDURE — 85025 COMPLETE CBC W/AUTO DIFF WBC: CPT

## 2023-08-19 RX ADMIN — PANTOPRAZOLE SODIUM 40 MG: 40 TABLET, DELAYED RELEASE ORAL at 08:12

## 2023-08-19 RX ADMIN — DULOXETINE HYDROCHLORIDE 60 MG: 30 CAPSULE, DELAYED RELEASE ORAL at 08:12

## 2023-08-19 RX ADMIN — LISINOPRIL 20 MG: 20 TABLET ORAL at 08:12

## 2023-08-19 RX ADMIN — FAMOTIDINE 20 MG: 20 TABLET, FILM COATED ORAL at 20:50

## 2023-08-19 RX ADMIN — CEFTRIAXONE 1000 MG: 1 INJECTION, POWDER, FOR SOLUTION INTRAMUSCULAR; INTRAVENOUS at 17:18

## 2023-08-19 RX ADMIN — METOCLOPRAMIDE 10 MG: 10 TABLET ORAL at 08:12

## 2023-08-19 RX ADMIN — INSULIN GLARGINE 5 UNITS: 100 INJECTION, SOLUTION SUBCUTANEOUS at 20:52

## 2023-08-19 RX ADMIN — GABAPENTIN 400 MG: 100 CAPSULE ORAL at 20:50

## 2023-08-19 RX ADMIN — HYDROCHLOROTHIAZIDE 12.5 MG: 12.5 TABLET ORAL at 08:12

## 2023-08-19 RX ADMIN — METOCLOPRAMIDE 10 MG: 10 TABLET ORAL at 12:49

## 2023-08-19 RX ADMIN — ENOXAPARIN SODIUM 30 MG: 100 INJECTION SUBCUTANEOUS at 08:13

## 2023-08-19 RX ADMIN — GABAPENTIN 400 MG: 100 CAPSULE ORAL at 08:12

## 2023-08-19 RX ADMIN — HYDROCODONE BITARTRATE AND ACETAMINOPHEN 1 TABLET: 5; 325 TABLET ORAL at 20:50

## 2023-08-19 RX ADMIN — SODIUM CHLORIDE: 9 INJECTION, SOLUTION INTRAVENOUS at 10:32

## 2023-08-19 RX ADMIN — PANTOPRAZOLE SODIUM 40 MG: 40 TABLET, DELAYED RELEASE ORAL at 20:50

## 2023-08-19 RX ADMIN — SODIUM CHLORIDE, PRESERVATIVE FREE 10 ML: 5 INJECTION INTRAVENOUS at 20:52

## 2023-08-19 RX ADMIN — HYDROCODONE BITARTRATE AND ACETAMINOPHEN 1 TABLET: 5; 325 TABLET ORAL at 08:13

## 2023-08-19 RX ADMIN — ENOXAPARIN SODIUM 30 MG: 100 INJECTION SUBCUTANEOUS at 20:51

## 2023-08-19 RX ADMIN — METOCLOPRAMIDE 10 MG: 10 TABLET ORAL at 20:50

## 2023-08-19 RX ADMIN — METOCLOPRAMIDE 10 MG: 10 TABLET ORAL at 17:14

## 2023-08-19 ASSESSMENT — PAIN SCALES - GENERAL: PAINLEVEL_OUTOF10: 0

## 2023-08-19 NOTE — DISCHARGE INSTR - DIET
Drink plenty of water to stay well hydrated. Follow a cardiac (low fat, low sodium, low cholesterol), diabetic (low sugar, low carbohydrate) diet  Good nutrition is important when healing from an illness, injury, or surgery. Follow any nutrition recommendations given to you during your hospital stay. If you were given an oral nutrition supplement while in the hospital, continue to take this supplement at home. You can take it with meals, in-between meals, and/or before bedtime. These supplements can be purchased at most local grocery stores, pharmacies, and [employer]-stores. If you have any questions about your diet or nutrition, call the hospital and ask for the dietitian.

## 2023-08-19 NOTE — H&P
HOSPITALIST ADMISSION H&P      REASON FOR ADMISSION:  UTI  ESTIMATED LENGTH OF STAY:> 2 midnights, 3-4 days    ATTENDING/ADMITTING PHYSICIAN: Kirby Durham MD  PCP: April Reed MD    HISTORY OF PRESENT ILLNESS:      The patient is a 79 y.o. female patient of April Reed MD who presents from ER with c/o weakness. Patient reports she has had a couple of falls the last couple of days from weakness. States she has been using her walker for the last week, and reports that she just falls, denies dizziness or lightheadedness. Patient reports she was found to have a urinary tract infection in ER, denies fever, chest pain, shortness of breath, constipation, diarrhea. Does report nausea due to \"some gastric issue\", denies vomiting-has an appointment with [facility] in October for this issue. DMII:  HgbA1C 6.8 on 3/9/23. Gastroparesis: Scheduled to see provider at [facility] in October. DEEPALI superimposed on CKD: Currently stage 4, typically stage 3. BUN 36, typically 24-31; Cr 2.0, typically 1.53-1.83, GFR 27, typically 30-37. In ER: Rocephin, IVF bolus 500ml. CT Head w/o:  FINDINGS:  BRAIN/VENTRICLES: There is no acute intracranial hemorrhage, mass effect or  midline shift. No abnormal extra-axial fluid collection. The gray-white  differentiation is maintained without evidence of an acute infarct. There is  no evidence of hydrocephalus. There has been prior left-sided craniotomy,  with associated postsurgical encephalomalacia involving the inferior left  temporal lobe. ORBITS: The visualized portion of the orbits demonstrate no acute abnormality. SINUSES: The visualized paranasal sinuses and mastoid air cells demonstrate  no acute abnormality. SOFT TISSUES/SKULL:  No acute abnormality of the visualized skull or soft  tissues. IMPRESSION:  No acute intracranial abnormality. Rt elbow xray:  IMPRESSION:  No acute osseous abnormality of the right elbow.     Rt knee

## 2023-08-19 NOTE — PLAN OF CARE
Problem: Discharge Planning  Goal: Discharge to home or other facility with appropriate resources  8/19/2023 1140 by Fadi Panda RN  Outcome: Progressing  8/19/2023 0308 by Lakeisha Ross RN  Outcome: Progressing  Flowsheets (Taken 8/18/2023 2022)  Discharge to home or other facility with appropriate resources:   Identify barriers to discharge with patient and caregiver   Arrange for needed discharge resources and transportation as appropriate   Identify discharge learning needs (meds, wound care, etc)   Refer to discharge planning if patient needs post-hospital services based on physician order or complex needs related to functional status, cognitive ability or social support system     Problem: Pain  Goal: Verbalizes/displays adequate comfort level or baseline comfort level  8/19/2023 1140 by Fadi Panda RN  Outcome: Progressing  8/19/2023 0308 by Lakeisha Ross RN  Outcome: Progressing     Problem: Safety - Adult  Goal: Free from fall injury  8/19/2023 1140 by Fadi Panda RN  Outcome: Progressing  8/19/2023 0308 by Lakeisha Ross RN  Outcome: Progressing     Problem: ABCDS Injury Assessment  Goal: Absence of physical injury  8/19/2023 1140 by Fadi Panda RN  Outcome: Progressing  8/19/2023 0308 by Lakeisha Ross RN  Outcome: Progressing

## 2023-08-20 VITALS
TEMPERATURE: 97.6 F | HEART RATE: 68 BPM | BODY MASS INDEX: 42.19 KG/M2 | WEIGHT: 278.4 LBS | RESPIRATION RATE: 16 BRPM | DIASTOLIC BLOOD PRESSURE: 75 MMHG | OXYGEN SATURATION: 98 % | HEIGHT: 68 IN | SYSTOLIC BLOOD PRESSURE: 146 MMHG

## 2023-08-20 LAB
ANION GAP SERPL CALCULATED.3IONS-SCNC: 10 MMOL/L (ref 9–17)
BASOPHILS # BLD: 0.04 K/UL (ref 0–0.2)
BASOPHILS NFR BLD: 1 % (ref 0–2)
BUN SERPL-MCNC: 26 MG/DL (ref 8–23)
BUN/CREAT SERPL: 19 (ref 9–20)
CALCIUM SERPL-MCNC: 9.3 MG/DL (ref 8.6–10.4)
CHLORIDE SERPL-SCNC: 102 MMOL/L (ref 98–107)
CO2 SERPL-SCNC: 25 MMOL/L (ref 20–31)
CREAT SERPL-MCNC: 1.4 MG/DL (ref 0.5–0.9)
EKG ATRIAL RATE: 83 BPM
EKG P AXIS: 45 DEGREES
EKG P-R INTERVAL: 156 MS
EKG Q-T INTERVAL: 374 MS
EKG QRS DURATION: 82 MS
EKG QTC CALCULATION (BAZETT): 439 MS
EKG R AXIS: 10 DEGREES
EKG T AXIS: 36 DEGREES
EKG VENTRICULAR RATE: 83 BPM
EOSINOPHIL # BLD: 0.15 K/UL (ref 0–0.44)
EOSINOPHILS RELATIVE PERCENT: 2 % (ref 1–4)
ERYTHROCYTE [DISTWIDTH] IN BLOOD BY AUTOMATED COUNT: 14.5 % (ref 11.8–14.4)
EST. AVERAGE GLUCOSE BLD GHB EST-MCNC: 137 MG/DL
GFR SERPL CREATININE-BSD FRML MDRD: 41 ML/MIN/1.73M2
GLUCOSE SERPL-MCNC: 141 MG/DL (ref 70–99)
HBA1C MFR BLD: 6.4 % (ref 4–6)
HCT VFR BLD AUTO: 34.5 % (ref 36.3–47.1)
HGB BLD-MCNC: 10.9 G/DL (ref 11.9–15.1)
IMM GRANULOCYTES # BLD AUTO: 0.05 K/UL (ref 0–0.3)
IMM GRANULOCYTES NFR BLD: 1 %
LYMPHOCYTES NFR BLD: 1.79 K/UL (ref 1.1–3.7)
LYMPHOCYTES RELATIVE PERCENT: 27 % (ref 24–43)
MCH RBC QN AUTO: 29.4 PG (ref 25.2–33.5)
MCHC RBC AUTO-ENTMCNC: 31.6 G/DL (ref 25.2–33.5)
MCV RBC AUTO: 93 FL (ref 82.6–102.9)
MONOCYTES NFR BLD: 0.8 K/UL (ref 0.1–1.2)
MONOCYTES NFR BLD: 12 % (ref 3–12)
NEUTROPHILS NFR BLD: 57 % (ref 36–65)
NEUTS SEG NFR BLD: 3.86 K/UL (ref 1.5–8.1)
NRBC BLD-RTO: 0 PER 100 WBC
PLATELET # BLD AUTO: 231 K/UL (ref 138–453)
PMV BLD AUTO: 10.4 FL (ref 8.1–13.5)
POTASSIUM SERPL-SCNC: 4 MMOL/L (ref 3.7–5.3)
RBC # BLD AUTO: 3.71 M/UL (ref 3.95–5.11)
RBC # BLD: ABNORMAL 10*6/UL
SODIUM SERPL-SCNC: 137 MMOL/L (ref 135–144)
WBC OTHER # BLD: 6.7 K/UL (ref 3.5–11.3)

## 2023-08-20 PROCEDURE — 85025 COMPLETE CBC W/AUTO DIFF WBC: CPT

## 2023-08-20 PROCEDURE — 80048 BASIC METABOLIC PNL TOTAL CA: CPT

## 2023-08-20 PROCEDURE — 97116 GAIT TRAINING THERAPY: CPT

## 2023-08-20 PROCEDURE — 94760 N-INVAS EAR/PLS OXIMETRY 1: CPT

## 2023-08-20 PROCEDURE — 97110 THERAPEUTIC EXERCISES: CPT

## 2023-08-20 PROCEDURE — 99238 HOSP IP/OBS DSCHRG MGMT 30/<: CPT | Performed by: FAMILY MEDICINE

## 2023-08-20 PROCEDURE — 6370000000 HC RX 637 (ALT 250 FOR IP)

## 2023-08-20 PROCEDURE — 2580000003 HC RX 258

## 2023-08-20 PROCEDURE — 36415 COLL VENOUS BLD VENIPUNCTURE: CPT

## 2023-08-20 PROCEDURE — 6360000002 HC RX W HCPCS

## 2023-08-20 RX ORDER — CEPHALEXIN 500 MG/1
500 CAPSULE ORAL 4 TIMES DAILY
Qty: 28 CAPSULE | Refills: 0 | Status: SHIPPED | OUTPATIENT
Start: 2023-08-20 | End: 2023-08-27

## 2023-08-20 RX ADMIN — PANTOPRAZOLE SODIUM 40 MG: 40 TABLET, DELAYED RELEASE ORAL at 08:01

## 2023-08-20 RX ADMIN — GABAPENTIN 400 MG: 100 CAPSULE ORAL at 08:00

## 2023-08-20 RX ADMIN — METOCLOPRAMIDE 10 MG: 10 TABLET ORAL at 08:01

## 2023-08-20 RX ADMIN — LISINOPRIL 20 MG: 20 TABLET ORAL at 08:00

## 2023-08-20 RX ADMIN — SODIUM CHLORIDE, PRESERVATIVE FREE 10 ML: 5 INJECTION INTRAVENOUS at 08:01

## 2023-08-20 RX ADMIN — HYDROCODONE BITARTRATE AND ACETAMINOPHEN 1 TABLET: 5; 325 TABLET ORAL at 08:00

## 2023-08-20 RX ADMIN — ENOXAPARIN SODIUM 30 MG: 100 INJECTION SUBCUTANEOUS at 08:00

## 2023-08-20 RX ADMIN — HYDROCHLOROTHIAZIDE 12.5 MG: 12.5 TABLET ORAL at 08:00

## 2023-08-20 RX ADMIN — SODIUM CHLORIDE: 9 INJECTION, SOLUTION INTRAVENOUS at 00:09

## 2023-08-20 RX ADMIN — DULOXETINE HYDROCHLORIDE 60 MG: 30 CAPSULE, DELAYED RELEASE ORAL at 08:01

## 2023-08-20 NOTE — DISCHARGE INSTRUCTIONS
Follow up with Dr. Chetan Guevara next week. Call [facility] on Monday 8/21 to schedule appointments.  [phone]

## 2023-08-20 NOTE — DISCHARGE SUMMARY
Continue taking this medication, and follow the directions you see here. CONTINUE taking these medications      famotidine 20 MG tablet  Commonly known as: PEPCID  TAKE 1 TABLET BY MOUTH AT  BEDTIME IF NEEDED     gabapentin 400 MG capsule  Commonly known as: Neurontin  Take 1 capsule by mouth in the morning and at bedtime for 60 days. HYDROcodone-acetaminophen 5-325 MG per tablet  Commonly known as: NORCO     lisinopril-hydroCHLOROthiazide 20-12.5 MG per tablet  Commonly known as: PRINZIDE;ZESTORETIC  TAKE 1 TABLET BY MOUTH DAILY     metoclopramide 10 MG tablet  Commonly known as: Reglan  Take 1 tablet by mouth 4 times daily WARNING:  May cause drowsiness. May impair ability to operate vehicles or machinery. Do not use in combination with alcohol. pantoprazole 40 MG tablet  Commonly known as: PROTONIX  TAKE 1 TABLET BY MOUTH TWICE  DAILY     pioglitazone-metFORMIN  MG per tablet  Commonly known as: ACTOPLUS MET  Take 1 tablet by mouth 2 times daily (with meals)     Vitamin D3 1.25 MG (72319 UT) Caps  TAKE 1 CAPSULE ONCE A WEEK            STOP taking these medications      OMEPRAZOLE PO               Where to Get Your Medications        These medications were sent to [address] [facility], [address] [facility]  [address] [facility], [address] [facility]      Phone: [phone]   cephALEXin 500 MG capsule         Follow up Visits: Follow-up with PCP. Outpatient PT.        Total Time spent with patient and coordinating care:  30  minutes    Saeed Murphy MD  8/20/2023  12:08 PM

## 2023-08-20 NOTE — PROGRESS NOTES
Physical Therapy  Facility/Department: [address] [place] [address]  PROGRESSIVE CARE  Daily Treatment Note  NAME: Yasmin Aquino  : 1955  MRN: [id number]    Date of Service: 2023    Discharge Recommendations:  Home with assist PRN, Home with Home health PT, Patient would benefit from continued therapy after discharge, Continue to assess pending progress        Patient Diagnosis(es): The primary encounter diagnosis was Urinary tract infection without hematuria, site unspecified. A diagnosis of General weakness was also pertinent to this visit. Assessment   Activity Tolerance: Patient tolerated treatment well     Plan    Physcial Therapy Plan  General Plan: 5-7 times per week  Current Treatment Recommendations: Strengthening;Balance training;Functional mobility training;Transfer training;ADL/Self-care training;IADL training; Endurance training;Gait training;Neuromuscular re-education;Home exercise program;Safety education & training;Patient/Caregiver education & training;Equipment evaluation, education, & procurement; Therapeutic activities     Restrictions  Restrictions/Precautions  Restrictions/Precautions: Fall Risk     Subjective    Subjective  Subjective: Pt in bed upon arrival. pt pleasant and agreeable to session. Pt requested HEP for home.  HEP given  Pain: denies  Orientation  Overall Orientation Status: Within Normal Limits     Objective   Vitals  O2 Device: None (Room air)  Bed Mobility Training  Bed Mobility Training: Yes  Overall Level of Assistance: Stand-by assistance;Contact-guard assistance  Rolling: Contact-guard assistance;Stand-by assistance  Supine to Sit: Stand-by assistance;Contact-guard assistance  Scooting: Stand-by assistance  Balance  Sitting: Intact  Standing: Intact (stood with no UE support 1 minute)  Transfer Training  Transfer Training: Yes  Overall Level of Assistance: Stand-by assistance  Sit to Stand: Stand-by assistance  Stand to Sit: Stand-by assistance  Gait Training  Gait Training:
(10/18/2022); and Upper gastrointestinal endoscopy (N/A, 10/18/2022). Assessment   Body Structures, Functions, Activity Limitations Requiring Skilled Therapeutic Intervention: Decreased functional mobility ; Decreased ADL status; Decreased strength;Decreased endurance;Decreased balance;Decreased posture  Therapy Prognosis: Good  Decision Making: Low Complexity  Activity Tolerance  Activity Tolerance: Patient tolerated evaluation without incident;Patient limited by endurance     Plan   Physcial Therapy Plan  General Plan: 5-7 times per week  Current Treatment Recommendations: Strengthening, Balance training, Functional mobility training, Transfer training, ADL/Self-care training, IADL training, Endurance training, Gait training, Neuromuscular re-education, Home exercise program, Safety education & training, Patient/Caregiver education & training, Equipment evaluation, education, & procurement, Therapeutic activities  Safety Devices  Type of Devices: Gait belt, Call light within reach, Left in bed, Nurse notified     Restrictions  Restrictions/Precautions  Restrictions/Precautions: Fall Risk     Subjective   General  Chart Reviewed: Yes  Patient assessed for rehabilitation services?: Yes  Family / Caregiver Present: No  Referring Practitioner: SHANNA Canas NP  Diagnosis: Generalized weakness  Follows Commands: Within Functional Limits  Subjective  Subjective: Nursing and patient agreeable to PT eval. Patient in bed upon arrival.         Social/Functional History  Social/Functional History  Lives With: Spouse  Type of Home: House  Home Layout: Two level (15 stairs with L HR for first 7 and bilateral HR for next 8 to second level.)  Home Access: Level entry  Bathroom Shower/Tub: Tub/Shower unit, Walk-in shower  Bathroom Toilet: Standard  Bathroom Equipment: Shower chair, Grab bars in shower  Home Equipment: Rollator, Walker, rolling  Has the patient had two or more falls in the past year or any fall with injury

## 2023-08-21 ENCOUNTER — TELEPHONE (OUTPATIENT)
Dept: FAMILY MEDICINE CLINIC | Age: 68
End: 2023-08-21

## 2023-08-21 ENCOUNTER — OFFICE VISIT (OUTPATIENT)
Dept: PRIMARY CARE CLINIC | Age: 68
End: 2023-08-21
Payer: MEDICARE

## 2023-08-21 VITALS
DIASTOLIC BLOOD PRESSURE: 70 MMHG | WEIGHT: 263 LBS | BODY MASS INDEX: 39.86 KG/M2 | SYSTOLIC BLOOD PRESSURE: 108 MMHG | OXYGEN SATURATION: 99 % | HEIGHT: 68 IN | HEART RATE: 91 BPM | TEMPERATURE: 98.2 F

## 2023-08-21 DIAGNOSIS — M62.838 MUSCLE SPASM: Primary | ICD-10-CM

## 2023-08-21 DIAGNOSIS — R07.89 CHEST WALL PAIN: ICD-10-CM

## 2023-08-21 LAB
MICROORGANISM SPEC CULT: ABNORMAL
SPECIMEN DESCRIPTION: ABNORMAL

## 2023-08-21 PROCEDURE — 1111F DSCHRG MED/CURRENT MED MERGE: CPT | Performed by: NURSE PRACTITIONER

## 2023-08-21 PROCEDURE — 99212 OFFICE O/P EST SF 10 MIN: CPT | Performed by: NURSE PRACTITIONER

## 2023-08-21 PROCEDURE — G8427 DOCREV CUR MEDS BY ELIG CLIN: HCPCS | Performed by: NURSE PRACTITIONER

## 2023-08-21 PROCEDURE — 99213 OFFICE O/P EST LOW 20 MIN: CPT | Performed by: NURSE PRACTITIONER

## 2023-08-21 PROCEDURE — G8400 PT W/DXA NO RESULTS DOC: HCPCS | Performed by: NURSE PRACTITIONER

## 2023-08-21 PROCEDURE — 1036F TOBACCO NON-USER: CPT | Performed by: NURSE PRACTITIONER

## 2023-08-21 PROCEDURE — 3017F COLORECTAL CA SCREEN DOC REV: CPT | Performed by: NURSE PRACTITIONER

## 2023-08-21 PROCEDURE — G8417 CALC BMI ABV UP PARAM F/U: HCPCS | Performed by: NURSE PRACTITIONER

## 2023-08-21 PROCEDURE — 1090F PRES/ABSN URINE INCON ASSESS: CPT | Performed by: NURSE PRACTITIONER

## 2023-08-21 PROCEDURE — 3078F DIAST BP <80 MM HG: CPT | Performed by: NURSE PRACTITIONER

## 2023-08-21 PROCEDURE — 1123F ACP DISCUSS/DSCN MKR DOCD: CPT | Performed by: NURSE PRACTITIONER

## 2023-08-21 PROCEDURE — 3074F SYST BP LT 130 MM HG: CPT | Performed by: NURSE PRACTITIONER

## 2023-08-21 RX ORDER — BACLOFEN 10 MG/1
10 TABLET ORAL 2 TIMES DAILY PRN
Qty: 20 TABLET | Refills: 0 | Status: SHIPPED | OUTPATIENT
Start: 2023-08-21 | End: 2023-08-31

## 2023-08-21 ASSESSMENT — ENCOUNTER SYMPTOMS
VOMITING: 0
NAUSEA: 0
SORE THROAT: 0
COUGH: 0
ABDOMINAL PAIN: 0

## 2023-08-21 NOTE — PROGRESS NOTES
[facility]             [address], [address]                        Telephone [phone]             Fax [phone]     Fish Castrejon  1955  FTY:[id number]   Date of visit:  8/21/2023    Subjective:    Fish Castrejon is a 79 y.o.  female who presents to [facility] today (8/21/2023) for evaluation of:    Chief Complaint   Patient presents with    Pain     Pt fell on Thursday and is in pain. Pt just Discharged from our Hospital yesterday. Pain  This is a new problem. The current episode started in the past 7 days (08/17/23). The problem occurs constantly. The problem has been unchanged. Pertinent negatives include no abdominal pain, chest pain, chills, congestion, coughing, fever, headaches, nausea, sore throat or vomiting. Associated symptoms comments: Right anterior rib pain worse last night with lying flat. She had a difficult time sleeping last night due to pain. She did not experience the rib pain while sleeping reclined in the hospital bed over the weekend. She fell on 08/17/23 and rib x-ray from ER was normal.. Exacerbated by: lying flat in bed. Treatments tried: ibuprofen; propped up on three pillows. The treatment provided no relief.      She has the following problem list:  Patient Active Problem List   Diagnosis    Shoulder pain, bilateral    Cervical spine pain    Knee pain, left    Essential hypertension    Pulmonary hypertension (HCC)    Vitamin D deficiency    Encounter for long-term (current) use of other medications    Headache    Subacute sphenoidal sinusitis    Type 2 diabetes mellitus with microalbuminuria (HCC)    Peripheral neuropathy    Dizziness    H/O fall    Chronic sinusitis    Gastroesophageal reflux disease    Microalbuminuria    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HCC)    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HCC)    Type 2 diabetes mellitus with microalbuminuria, without long-term current use of insulin

## 2023-08-21 NOTE — TELEPHONE ENCOUNTER
Patient discharged from  [facility] on 8/20/23  Diagnosis:Acute kidney injury superimposed on CKD

## 2023-08-22 NOTE — TELEPHONE ENCOUNTER
Care Transitions Initial Follow Up Call    Outreach made within 2 business days of discharge: Yes    Patient: Marciano Roman Patient : 1955   MRN: [id number]    Reason for Admission: UTI  Discharge Date: 23       Spoke with: Matt Santos    Discharge department/facility: [facility] 23    TCM Interactive Patient Contact:  Was patient able to fill all prescriptions: Yes  Was patient instructed to bring all medications to the follow-up visit: Yes  Is patient taking all medications as directed in the discharge summary?  Yes  Does patient understand their discharge instructions: Yes  Does patient have questions or concerns that need addressed prior to 7-14 day follow up office visit: no    Patient is scheduled for tomorrow at 11:20 AM    Follow Up  Future Appointments   Date Time Provider [address] [place] [address]   2023  1:50 PM Meka Lomas MD [facility]   2023  1:20 PM Katya Osuna MD [facility]       Stalin Alcocer LPN

## 2023-08-23 ENCOUNTER — OFFICE VISIT (OUTPATIENT)
Dept: FAMILY MEDICINE CLINIC | Age: 68
End: 2023-08-23

## 2023-08-23 VITALS
TEMPERATURE: 98.5 F | BODY MASS INDEX: 41.37 KG/M2 | SYSTOLIC BLOOD PRESSURE: 110 MMHG | DIASTOLIC BLOOD PRESSURE: 74 MMHG | HEART RATE: 88 BPM | OXYGEN SATURATION: 98 % | HEIGHT: 68 IN | WEIGHT: 273 LBS

## 2023-08-23 DIAGNOSIS — R53.1 WEAKNESS: ICD-10-CM

## 2023-08-23 DIAGNOSIS — A49.9 BACTERIAL UTI: Primary | ICD-10-CM

## 2023-08-23 DIAGNOSIS — Z91.81 HISTORY OF FALL: ICD-10-CM

## 2023-08-23 DIAGNOSIS — N39.0 BACTERIAL UTI: Primary | ICD-10-CM

## 2023-08-23 DIAGNOSIS — Z09 HOSPITAL DISCHARGE FOLLOW-UP: ICD-10-CM

## 2023-08-23 NOTE — PROGRESS NOTES
Transitional Care Office Visit    Date of Face-to-Face: 8/23/2023    Here for follow after hospitalization for: UTI    Persons at visit:  Kingsley    Medication changes during hospitalization:  Keflex 500 mg 4 times a day for 7 days . Procedures during hospitalization:  EKG , XRAY Right Ribs, XRAY right elbow, XRAY right knee. CT head    Activity: activity as tolerated. Any medication changes since post-hospitalization phone call? No.    Any treatment changes since post-hospitalization phone call? No.    Other follow-up appointments scheduled:  None.
bowel sounds, no masses or organomegaly      An electronic signature was used to authenticate this note.   --Daysi Patiño MD

## 2023-08-29 LAB — GLUCOSE BLD-MCNC: 142 MG/DL (ref 65–105)

## 2023-09-06 ENCOUNTER — HOSPITAL ENCOUNTER (OUTPATIENT)
Age: 68
Discharge: HOME | End: 2023-09-06
Payer: MEDICARE

## 2023-09-06 DIAGNOSIS — N18.32 STAGE 3B CHRONIC KIDNEY DISEASE (HCC): ICD-10-CM

## 2023-09-06 DIAGNOSIS — R80.9 TYPE 2 DIABETES MELLITUS WITH MICROALBUMINURIA, WITHOUT LONG-TERM CURRENT USE OF INSULIN (HCC): ICD-10-CM

## 2023-09-06 DIAGNOSIS — E60 LOW ZINC LEVEL: ICD-10-CM

## 2023-09-06 DIAGNOSIS — E11.29 TYPE 2 DIABETES MELLITUS WITH MICROALBUMINURIA, WITHOUT LONG-TERM CURRENT USE OF INSULIN (HCC): ICD-10-CM

## 2023-09-06 DIAGNOSIS — E78.2 MIXED HYPERLIPIDEMIA: ICD-10-CM

## 2023-09-06 LAB
25(OH)D3 SERPL-MCNC: 31.8 NG/ML
ALBUMIN SERPL-MCNC: 3.9 G/DL (ref 3.5–5.2)
ALBUMIN/GLOB SERPL: 1.2 {RATIO} (ref 1–2.5)
ALP SERPL-CCNC: 173 U/L (ref 35–104)
ALT SERPL-CCNC: 6 U/L (ref 5–33)
ANION GAP SERPL CALCULATED.3IONS-SCNC: 11 MMOL/L (ref 9–17)
ANION GAP SERPL CALCULATED.3IONS-SCNC: 11 MMOL/L (ref 9–17)
AST SERPL-CCNC: 10 U/L
BASOPHILS # BLD: 0.06 K/UL (ref 0–0.2)
BASOPHILS NFR BLD: 1 % (ref 0–2)
BILIRUB SERPL-MCNC: 0.3 MG/DL (ref 0.3–1.2)
BUN SERPL-MCNC: 34 MG/DL (ref 8–23)
BUN SERPL-MCNC: 34 MG/DL (ref 8–23)
BUN/CREAT SERPL: 21 (ref 9–20)
BUN/CREAT SERPL: 21 (ref 9–20)
CA-I BLD-SCNC: 1.2 MMOL/L (ref 1.13–1.33)
CALCIUM SERPL-MCNC: 9.6 MG/DL (ref 8.6–10.4)
CALCIUM SERPL-MCNC: 9.6 MG/DL (ref 8.6–10.4)
CHLORIDE SERPL-SCNC: 96 MMOL/L (ref 98–107)
CHLORIDE SERPL-SCNC: 96 MMOL/L (ref 98–107)
CHOLEST SERPL-MCNC: 165 MG/DL
CHOLESTEROL/HDL RATIO: 4
CO2 SERPL-SCNC: 30 MMOL/L (ref 20–31)
CO2 SERPL-SCNC: 30 MMOL/L (ref 20–31)
CREAT SERPL-MCNC: 1.6 MG/DL (ref 0.5–0.9)
CREAT SERPL-MCNC: 1.6 MG/DL (ref 0.5–0.9)
EOSINOPHIL # BLD: 0.14 K/UL (ref 0–0.44)
EOSINOPHILS RELATIVE PERCENT: 2 % (ref 1–4)
ERYTHROCYTE [DISTWIDTH] IN BLOOD BY AUTOMATED COUNT: 13.8 % (ref 11.8–14.4)
GFR SERPL CREATININE-BSD FRML MDRD: 35 ML/MIN/1.73M2
GFR SERPL CREATININE-BSD FRML MDRD: 35 ML/MIN/1.73M2
GLUCOSE SERPL-MCNC: 124 MG/DL (ref 70–99)
GLUCOSE SERPL-MCNC: 124 MG/DL (ref 70–99)
HCT VFR BLD AUTO: 33.2 % (ref 36.3–47.1)
HDLC SERPL-MCNC: 41 MG/DL
HGB BLD-MCNC: 10.5 G/DL (ref 11.9–15.1)
IMM GRANULOCYTES # BLD AUTO: 0.05 K/UL (ref 0–0.3)
IMM GRANULOCYTES NFR BLD: 1 %
LDLC SERPL CALC-MCNC: 97 MG/DL (ref 0–130)
LYMPHOCYTES NFR BLD: 1.94 K/UL (ref 1.1–3.7)
LYMPHOCYTES RELATIVE PERCENT: 25 % (ref 24–43)
MCH RBC QN AUTO: 29.1 PG (ref 25.2–33.5)
MCHC RBC AUTO-ENTMCNC: 31.6 G/DL (ref 25.2–33.5)
MCV RBC AUTO: 92 FL (ref 82.6–102.9)
MONOCYTES NFR BLD: 0.58 K/UL (ref 0.1–1.2)
MONOCYTES NFR BLD: 8 % (ref 3–12)
NEUTROPHILS NFR BLD: 63 % (ref 36–65)
NEUTS SEG NFR BLD: 4.89 K/UL (ref 1.5–8.1)
NRBC BLD-RTO: 0 PER 100 WBC
PHOSPHATE SERPL-MCNC: 3.5 MG/DL (ref 2.6–4.5)
PLATELET # BLD AUTO: 302 K/UL (ref 138–453)
PMV BLD AUTO: 10.7 FL (ref 8.1–13.5)
POTASSIUM SERPL-SCNC: 4.6 MMOL/L (ref 3.7–5.3)
POTASSIUM SERPL-SCNC: 4.6 MMOL/L (ref 3.7–5.3)
PROT SERPL-MCNC: 7.1 G/DL (ref 6.4–8.3)
PTH-INTACT SERPL-MCNC: 47.5 PG/ML (ref 14–72)
RBC # BLD AUTO: 3.61 M/UL (ref 3.95–5.11)
SODIUM SERPL-SCNC: 137 MMOL/L (ref 135–144)
SODIUM SERPL-SCNC: 137 MMOL/L (ref 135–144)
TRIGL SERPL-MCNC: 134 MG/DL
WBC OTHER # BLD: 7.7 K/UL (ref 3.5–11.3)

## 2023-09-06 PROCEDURE — 80053 COMPREHEN METABOLIC PANEL: CPT

## 2023-09-06 PROCEDURE — 82330 ASSAY OF CALCIUM: CPT

## 2023-09-06 PROCEDURE — 82306 VITAMIN D 25 HYDROXY: CPT

## 2023-09-06 PROCEDURE — 83970 ASSAY OF PARATHORMONE: CPT

## 2023-09-06 PROCEDURE — 36415 COLL VENOUS BLD VENIPUNCTURE: CPT

## 2023-09-06 PROCEDURE — 85025 COMPLETE CBC W/AUTO DIFF WBC: CPT

## 2023-09-06 PROCEDURE — 83036 HEMOGLOBIN GLYCOSYLATED A1C: CPT

## 2023-09-06 PROCEDURE — 80048 BASIC METABOLIC PNL TOTAL CA: CPT

## 2023-09-06 PROCEDURE — 84100 ASSAY OF PHOSPHORUS: CPT

## 2023-09-06 PROCEDURE — 80061 LIPID PANEL: CPT

## 2023-09-06 PROCEDURE — 84630 ASSAY OF ZINC: CPT

## 2023-09-07 LAB
EST. AVERAGE GLUCOSE BLD GHB EST-MCNC: 134 MG/DL
HBA1C MFR BLD: 6.3 % (ref 4–6)

## 2023-09-08 LAB — ZINC SERPL-MCNC: 77.5 UG/DL (ref 60–120)

## 2023-09-11 ENCOUNTER — OFFICE VISIT (OUTPATIENT)
Dept: NEPHROLOGY | Age: 68
End: 2023-09-11
Payer: MEDICARE

## 2023-09-11 VITALS
HEIGHT: 68 IN | BODY MASS INDEX: 41.52 KG/M2 | SYSTOLIC BLOOD PRESSURE: 124 MMHG | HEART RATE: 72 BPM | DIASTOLIC BLOOD PRESSURE: 68 MMHG | WEIGHT: 274 LBS

## 2023-09-11 DIAGNOSIS — N18.32 TYPE 2 DIABETES MELLITUS WITH STAGE 3B CHRONIC KIDNEY DISEASE, WITHOUT LONG-TERM CURRENT USE OF INSULIN (HCC): ICD-10-CM

## 2023-09-11 DIAGNOSIS — E66.01 CLASS 3 SEVERE OBESITY WITH BODY MASS INDEX (BMI) OF 40.0 TO 44.9 IN ADULT, UNSPECIFIED OBESITY TYPE, UNSPECIFIED WHETHER SERIOUS COMORBIDITY PRESENT (HCC): ICD-10-CM

## 2023-09-11 DIAGNOSIS — I10 HYPERTENSION, ESSENTIAL: ICD-10-CM

## 2023-09-11 DIAGNOSIS — E11.22 TYPE 2 DIABETES MELLITUS WITH STAGE 3B CHRONIC KIDNEY DISEASE, WITHOUT LONG-TERM CURRENT USE OF INSULIN (HCC): ICD-10-CM

## 2023-09-11 DIAGNOSIS — E55.9 VITAMIN D DEFICIENCY: ICD-10-CM

## 2023-09-11 DIAGNOSIS — N18.32 STAGE 3B CHRONIC KIDNEY DISEASE (HCC): Primary | ICD-10-CM

## 2023-09-11 DIAGNOSIS — N20.0 NEPHROLITHIASIS: ICD-10-CM

## 2023-09-11 PROCEDURE — G8427 DOCREV CUR MEDS BY ELIG CLIN: HCPCS | Performed by: INTERNAL MEDICINE

## 2023-09-11 PROCEDURE — 2022F DILAT RTA XM EVC RTNOPTHY: CPT | Performed by: INTERNAL MEDICINE

## 2023-09-11 PROCEDURE — 1123F ACP DISCUSS/DSCN MKR DOCD: CPT | Performed by: INTERNAL MEDICINE

## 2023-09-11 PROCEDURE — G8400 PT W/DXA NO RESULTS DOC: HCPCS | Performed by: INTERNAL MEDICINE

## 2023-09-11 PROCEDURE — 3074F SYST BP LT 130 MM HG: CPT | Performed by: INTERNAL MEDICINE

## 2023-09-11 PROCEDURE — 3078F DIAST BP <80 MM HG: CPT | Performed by: INTERNAL MEDICINE

## 2023-09-11 PROCEDURE — 99212 OFFICE O/P EST SF 10 MIN: CPT | Performed by: INTERNAL MEDICINE

## 2023-09-11 PROCEDURE — 1111F DSCHRG MED/CURRENT MED MERGE: CPT | Performed by: INTERNAL MEDICINE

## 2023-09-11 PROCEDURE — 3044F HG A1C LEVEL LT 7.0%: CPT | Performed by: INTERNAL MEDICINE

## 2023-09-11 PROCEDURE — 1090F PRES/ABSN URINE INCON ASSESS: CPT | Performed by: INTERNAL MEDICINE

## 2023-09-11 PROCEDURE — 99213 OFFICE O/P EST LOW 20 MIN: CPT | Performed by: INTERNAL MEDICINE

## 2023-09-11 PROCEDURE — 3017F COLORECTAL CA SCREEN DOC REV: CPT | Performed by: INTERNAL MEDICINE

## 2023-09-11 PROCEDURE — 1036F TOBACCO NON-USER: CPT | Performed by: INTERNAL MEDICINE

## 2023-09-11 PROCEDURE — G8417 CALC BMI ABV UP PARAM F/U: HCPCS | Performed by: INTERNAL MEDICINE

## 2023-09-11 NOTE — PROGRESS NOTES
Nephrology Progress Note    Marshall Osgood, MD      SUBJECTIVE      Chief Complaint   Patient presents with    Chronic Kidney Disease     [facility] follow up      9/11/2023:  Patient is here for follow-up of her CKD 3 likely due to diabetic and hypertensive nephrosclerosis. Her baseline creatinine now seems to be around 1.4-1.8 mg/dl. Patient doing well. No new issues. No fever, no chills, no CP, no SOB, no N/V/D, no abdomen pain, no urinary complaints, no LE edema. Patient is currently on lisinopril-hydrochlorothiazide 20-12.5 mg daily, vitamin D 50,000 units weekly as prescribed by PCP, also is on Metformin. She states she is trying to drink more, previously was only drinking about 20 oz/day. Last labs 9/6/2023: BUN/Cr 34/1.6, Na 137, K 4.6, Cl 96, Bicarb 30, Ca 9.6, Phos 3.5, PTH 47.5, Vit D 31.8, Hb 10.5, UPC not available. BP today 124/68, HR 72.     4/3/2023:  Patient is here for follow-up of her CKD 3 likely due to diabetic and hypertensive nephrosclerosis. Her baseline creatinine now seems to be around 1.4-1.8 mg/dl. Patient doing well. No new issues except had a fall few weeks ago and s/p R ankle fx-following up with Ortho. No fever, no chills, no CP, no SOB, no N/V/D, no abdomen pain, no urinary complaints, LE edema. Patient is currently on lisinopril-hydrochlorothiazide 20-12.5 mg daily, vitamin D 50,000 units weekly as prescribed by PCP, also is on Metformin. She states she is trying to drink more, previously was only drinking about 20 oz/day. Last labs 3/9/2023: BUN/Cr 24/1.53, Na 138, K 4.0, Cl 98, Bicarb 29, Ca 9.8, Phos 3.4, PTH 48.4, Vit D 37.1, Hb 11.6, UPC 0.16 (3/27/2023). BP today 114/72, HR 78.     8/1/2022:  Patient is here for follow-up of her CKD 3 likely due to diabetic and hypertensive nephrosclerosis. Her baseline creatinine was around 1.1 to 1.3 mg/DL, now seems to have increased some.   Her creatinine in April 2022 was 1.71 which did increase to 1.46 mg/dl

## 2023-09-14 ENCOUNTER — OFFICE VISIT (OUTPATIENT)
Dept: FAMILY MEDICINE CLINIC | Age: 68
End: 2023-09-14
Payer: MEDICARE

## 2023-09-14 VITALS
HEART RATE: 92 BPM | DIASTOLIC BLOOD PRESSURE: 74 MMHG | BODY MASS INDEX: 41.37 KG/M2 | WEIGHT: 273 LBS | SYSTOLIC BLOOD PRESSURE: 110 MMHG | HEIGHT: 68 IN | OXYGEN SATURATION: 98 %

## 2023-09-14 DIAGNOSIS — E11.29 TYPE 2 DIABETES MELLITUS WITH MICROALBUMINURIA, WITHOUT LONG-TERM CURRENT USE OF INSULIN (HCC): Primary | ICD-10-CM

## 2023-09-14 DIAGNOSIS — E78.2 MIXED HYPERLIPIDEMIA: ICD-10-CM

## 2023-09-14 DIAGNOSIS — R80.9 TYPE 2 DIABETES MELLITUS WITH MICROALBUMINURIA, WITHOUT LONG-TERM CURRENT USE OF INSULIN (HCC): Primary | ICD-10-CM

## 2023-09-14 DIAGNOSIS — G44.201 INTRACTABLE TENSION-TYPE HEADACHE, UNSPECIFIED CHRONICITY PATTERN: ICD-10-CM

## 2023-09-14 DIAGNOSIS — I10 ESSENTIAL HYPERTENSION: ICD-10-CM

## 2023-09-14 PROCEDURE — G8400 PT W/DXA NO RESULTS DOC: HCPCS | Performed by: FAMILY MEDICINE

## 2023-09-14 PROCEDURE — 2022F DILAT RTA XM EVC RTNOPTHY: CPT | Performed by: FAMILY MEDICINE

## 2023-09-14 PROCEDURE — 1123F ACP DISCUSS/DSCN MKR DOCD: CPT | Performed by: FAMILY MEDICINE

## 2023-09-14 PROCEDURE — 3074F SYST BP LT 130 MM HG: CPT | Performed by: FAMILY MEDICINE

## 2023-09-14 PROCEDURE — 1111F DSCHRG MED/CURRENT MED MERGE: CPT | Performed by: FAMILY MEDICINE

## 2023-09-14 PROCEDURE — G8417 CALC BMI ABV UP PARAM F/U: HCPCS | Performed by: FAMILY MEDICINE

## 2023-09-14 PROCEDURE — 3078F DIAST BP <80 MM HG: CPT | Performed by: FAMILY MEDICINE

## 2023-09-14 PROCEDURE — G8427 DOCREV CUR MEDS BY ELIG CLIN: HCPCS | Performed by: FAMILY MEDICINE

## 2023-09-14 PROCEDURE — 1036F TOBACCO NON-USER: CPT | Performed by: FAMILY MEDICINE

## 2023-09-14 PROCEDURE — 1090F PRES/ABSN URINE INCON ASSESS: CPT | Performed by: FAMILY MEDICINE

## 2023-09-14 PROCEDURE — 3044F HG A1C LEVEL LT 7.0%: CPT | Performed by: FAMILY MEDICINE

## 2023-09-14 PROCEDURE — 99214 OFFICE O/P EST MOD 30 MIN: CPT | Performed by: FAMILY MEDICINE

## 2023-09-14 PROCEDURE — 3017F COLORECTAL CA SCREEN DOC REV: CPT | Performed by: FAMILY MEDICINE

## 2023-09-14 RX ORDER — GABAPENTIN 400 MG/1
400 CAPSULE ORAL 2 TIMES DAILY
Qty: 60 CAPSULE | Refills: 1 | Status: SHIPPED | OUTPATIENT
Start: 2023-09-14 | End: 2023-11-13

## 2023-09-14 RX ORDER — DULOXETIN HYDROCHLORIDE 60 MG/1
60 CAPSULE, DELAYED RELEASE ORAL DAILY
Qty: 90 CAPSULE | Refills: 1 | Status: SHIPPED | OUTPATIENT
Start: 2023-09-14 | End: 2024-03-12

## 2023-09-14 NOTE — PATIENT INSTRUCTIONS
Remember to get a flu vaccine in the fall! The flu vaccine typically becomes available in September or October. An updated Covid vaccine is also available this fall. The CDC recommends that everyone over 10months of age get the updated vaccine. An RSV (respiratory syncytial virus) vaccine will be available after September 1. This is a new vaccine that has been approved for people ages 61 and above. You are also due for Tdap, Shingrix, Hepatitis B, and pneumonia vaccines.

## 2023-09-14 NOTE — PROGRESS NOTES
Patient declines shingles vaccine, hepatitis b vaccine, pneumonia vaccine, tdap vaccine and flu vaccine

## 2023-09-14 NOTE — PROGRESS NOTES
[facility]             [address] [facility], [address] [facility] [facility] [address] [facility]                        Telephone [phone]             Fax [phone]       Melanie Delaney  :  1955  Age:  79 y.o. MRN:  [id number]  Date of visit:  2023       Assessment and Plan:    1. Type 2 diabetes mellitus with microalbuminuria, without long-term current use of insulin (HCC)  Her HgbA1c was 6.3 %, which is at goal.   She was advised to continue her current regimen. She states that she does not need a refill today. Lorraine Bey was also prescribed:  - dapagliflozin (FARXIGA) 10 MG tablet; Take 1 tablet by mouth every morning  Dispense: 30 tablet; Refill: 5    Labs were ordered to be done prior to her return visit in 6 months:   - Comprehensive Metabolic Panel; Future  - Hemoglobin A1C; Future  - Microalbumin, Ur; Future    She was also given a prescription for a handicap placard:  - Handicap Placard MISC; by Does not apply route Expires 2028  Dispense: 1 each; Refill: 0    2. Essential hypertension  Her blood pressure is very well-controlled today. (BP: 110/74)   She was advised to continue current medications. She states that she does not need a refill today. 3. Mixed hyperlipidemia  Her lipid profile was improved on her recent lab work. - Lipid Panel; Future was ordered to be done prior to her return visit in 6 months. 4. Intractable tension-type headache, unspecified chronicity pattern  Neurontin and Cymbalta were refilled:  - gabapentin (NEURONTIN) 400 MG capsule; Take 1 capsule by mouth in the morning and at bedtime for 60 days. Dispense: 60 capsule; Refill: 1  - DULoxetine (CYMBALTA) 60 MG extended release capsule; Take 1 capsule by mouth daily  Dispense: 90 capsule; Refill: 1     5. Routine health maintenance  Health maintenance was reviewed with the patient.    She has received three doses of the Pfizer Covid-19 vaccine, and one dose of the [employer]

## 2023-09-15 ENCOUNTER — TELEMEDICINE (OUTPATIENT)
Dept: FAMILY MEDICINE CLINIC | Age: 68
End: 2023-09-15
Payer: MEDICARE

## 2023-09-15 DIAGNOSIS — Z00.00 MEDICARE ANNUAL WELLNESS VISIT, SUBSEQUENT: Primary | ICD-10-CM

## 2023-09-15 PROCEDURE — 1123F ACP DISCUSS/DSCN MKR DOCD: CPT | Performed by: FAMILY MEDICINE

## 2023-09-15 PROCEDURE — G0439 PPPS, SUBSEQ VISIT: HCPCS | Performed by: FAMILY MEDICINE

## 2023-09-15 PROCEDURE — 3017F COLORECTAL CA SCREEN DOC REV: CPT | Performed by: FAMILY MEDICINE

## 2023-09-15 ASSESSMENT — PATIENT HEALTH QUESTIONNAIRE - PHQ9
1. LITTLE INTEREST OR PLEASURE IN DOING THINGS: 0
3. TROUBLE FALLING OR STAYING ASLEEP: 0
SUM OF ALL RESPONSES TO PHQ QUESTIONS 1-9: 0
8. MOVING OR SPEAKING SO SLOWLY THAT OTHER PEOPLE COULD HAVE NOTICED. OR THE OPPOSITE, BEING SO FIGETY OR RESTLESS THAT YOU HAVE BEEN MOVING AROUND A LOT MORE THAN USUAL: 0
5. POOR APPETITE OR OVEREATING: 0
9. THOUGHTS THAT YOU WOULD BE BETTER OFF DEAD, OR OF HURTING YOURSELF: 0
SUM OF ALL RESPONSES TO PHQ QUESTIONS 1-9: 0
SUM OF ALL RESPONSES TO PHQ QUESTIONS 1-9: 0
4. FEELING TIRED OR HAVING LITTLE ENERGY: 0
SUM OF ALL RESPONSES TO PHQ QUESTIONS 1-9: 0
2. FEELING DOWN, DEPRESSED OR HOPELESS: 0
7. TROUBLE CONCENTRATING ON THINGS, SUCH AS READING THE NEWSPAPER OR WATCHING TELEVISION: 0
10. IF YOU CHECKED OFF ANY PROBLEMS, HOW DIFFICULT HAVE THESE PROBLEMS MADE IT FOR YOU TO DO YOUR WORK, TAKE CARE OF THINGS AT HOME, OR GET ALONG WITH OTHER PEOPLE: 0
SUM OF ALL RESPONSES TO PHQ9 QUESTIONS 1 & 2: 0
6. FEELING BAD ABOUT YOURSELF - OR THAT YOU ARE A FAILURE OR HAVE LET YOURSELF OR YOUR FAMILY DOWN: 0

## 2023-09-15 ASSESSMENT — LIFESTYLE VARIABLES
HOW OFTEN DO YOU HAVE A DRINK CONTAINING ALCOHOL: NEVER
HOW MANY STANDARD DRINKS CONTAINING ALCOHOL DO YOU HAVE ON A TYPICAL DAY: PATIENT DOES NOT DRINK

## 2023-09-15 NOTE — PATIENT INSTRUCTIONS
Personalized Preventive Plan for Kilo Moore - 9/15/2023  Medicare offers a range of preventive health benefits. Some of the tests and screenings are paid in full while other may be subject to a deductible, co-insurance, and/or copay. Some of these benefits include a comprehensive review of your medical history including lifestyle, illnesses that may run in your family, and various assessments and screenings as appropriate. After reviewing your medical record and screening and assessments performed today your provider may have ordered immunizations, labs, imaging, and/or referrals for you. A list of these orders (if applicable) as well as your Preventive Care list are included within your After Visit Summary for your review. Other Preventive Recommendations:    A preventive eye exam performed by an eye specialist is recommended every 1-2 years to screen for glaucoma; cataracts, macular degeneration, and other eye disorders. A preventive dental visit is recommended every 6 months. Try to get at least 150 minutes of exercise per week or 10,000 steps per day on a pedometer . Order or download the FREE \"Exercise & Physical Activity: Your Everyday Guide\" from [employer]. Call 3-580.411.5000 or search [employer] online. You need 2240-1141 mg of calcium and 9476-4720 IU of vitamin D per day. It is possible to meet your calcium requirement with diet alone, but a vitamin D supplement is usually necessary to meet this goal.  When exposed to the sun, use a sunscreen that protects against both UVA and UVB radiation with an SPF of 30 or greater. Reapply every 2 to 3 hours or after sweating, drying off with a towel, or swimming. Always wear a seat belt when traveling in a car. Always wear a helmet when riding a bicycle or motorcycle.

## 2023-09-15 NOTE — PROGRESS NOTES
Medicare Annual Wellness Visit    Flaca Julian is here for Medicare AWV    Assessment & Plan     Recommendations for Preventive Services Due: see orders and patient instructions/AVS.  Recommended screening schedule for the next 5-10 years is provided to the patient in written form: see Patient Instructions/AVS.     No follow-ups on file. Subjective       Patient's complete Health Risk Assessment and screening values have been reviewed and are found in Flowsheets. The following problems were reviewed today and where indicated follow up appointments were made and/or referrals ordered. Positive Risk Factor Screenings with Interventions:               Opioid Risk: (High risk score ?55) Opioid risk score: 62    Last PDMP Zeyad as Reviewed:  Review User Review Instant Review Result   Azalia Mckeon 8/19/2023  4:07 AM @   Reviewed PDMP [1]     Last Controlled Substance Monitoring Documentation      Two [facility] Office Visit from 7/19/2023 in [address] [place] [address] of [facility]   Periodic Controlled Substance Monitoring No signs of potential drug abuse or diversion identified. filed at 07/19/2023 6421                 Weight and Activity:  Physical Activity: Insufficiently Active (9/15/2023)    Exercise Vital Sign     Days of Exercise per Week: 4 days     Minutes of Exercise per Session: 20 min     On average, how many days per week do you engage in moderate to strenuous exercise (like a brisk walk)?: 4 days  Have you lost any weight without trying in the past 3 months?: No  There is no height or weight on file to calculate BMI.  (!) Abnormal    Obesity Interventions:  Patient declines any further evaluation or treatment          Dentist Screen:  Have you seen the dentist within the past year?: (!) No    Intervention:  Patient declines any further evaluation or treatment     Vision Screen:  Do you have difficulty driving, watching TV, or doing any of your daily activities because of

## 2023-09-18 PROBLEM — N39.0 UTI (URINARY TRACT INFECTION): Status: RESOLVED | Noted: 2023-08-18 | Resolved: 2023-09-18

## 2023-09-20 ENCOUNTER — TELEPHONE (OUTPATIENT)
Dept: FAMILY MEDICINE CLINIC | Age: 68
End: 2023-09-20
Payer: MEDICARE

## 2023-09-20 DIAGNOSIS — N39.0 URINARY TRACT INFECTION WITHOUT HEMATURIA, SITE UNSPECIFIED: Primary | ICD-10-CM

## 2023-09-20 DIAGNOSIS — I12.9 HYPERTENSIVE CHRONIC KIDNEY DISEASE, UNSPECIFIED CKD STAGE: ICD-10-CM

## 2023-09-20 DIAGNOSIS — B96.89 OTHER SPECIFIED BACTERIAL AGENTS AS THE CAUSE OF DISEASES CLASSIFIED ELSEWHERE: ICD-10-CM

## 2023-09-20 PROCEDURE — G0180 MD CERTIFICATION HHA PATIENT: HCPCS | Performed by: FAMILY MEDICINE

## 2023-09-20 NOTE — TELEPHONE ENCOUNTER
Home health certification and plan of care done 09/20/2023 on patient for date services 8/31/23-10/29/23. Verified medications. Physician time spent is 15 minutes for activities to coordinate services, documenting, medical decision making, and review of reports, treatment plans, and test results.

## 2023-09-20 NOTE — TELEPHONE ENCOUNTER
I agree with the documentation of Delicia Soler LPN. Electronically signed by Manuel Lockhart MD on 9/20/23 at 12:16 PM EDT.

## 2023-09-25 ENCOUNTER — TELEPHONE (OUTPATIENT)
Dept: FAMILY MEDICINE CLINIC | Age: 68
End: 2023-09-25

## 2023-09-25 DIAGNOSIS — R53.1 WEAKNESS: Primary | ICD-10-CM

## 2023-09-25 DIAGNOSIS — Z91.81 HISTORY OF FALL: ICD-10-CM

## 2023-09-25 NOTE — TELEPHONE ENCOUNTER
Abbi with [facility] calling to get an out patient PT order for pt to be faxed to [facility] rehab, any questions call Abbi at [phone]  [facility] aware RR out of office today.

## 2023-09-26 NOTE — TELEPHONE ENCOUNTER
Writer spoke to patient and she would like referral sent to [facility]. Referral pended.

## 2023-10-03 ENCOUNTER — TELEPHONE (OUTPATIENT)
Dept: FAMILY MEDICINE CLINIC | Age: 68
End: 2023-10-03

## 2023-10-03 NOTE — TELEPHONE ENCOUNTER
----- Message from Jan Worrell sent at 10/3/2023 10:23 AM EDT -----  Subject: Refill Request    QUESTIONS  Name of Medication? gabapentin (NEURONTIN) 400 MG capsule  Patient-reported dosage and instructions? 400 MG twice a day   How many days do you have left? 5  Preferred Pharmacy? [id number]  Pharmacy phone number (if available)? [phone]  ---------------------------------------------------------------------------  --------------  CALL BACK INFO  What is the best way for the office to contact you? OK to leave message on   voicemail  Preferred Call Back Phone Number? [phone]  ---------------------------------------------------------------------------  --------------  SCRIPT ANSWERS  Relationship to Patient?  Self

## 2023-10-03 NOTE — TELEPHONE ENCOUNTER
Per [facility]- last filled 9/10/23 #60/30 days (from 7/19/23 script.) New script was sent in 9/14/23 #60/1 refill. Refill should be available at pharmacy.      LM for patient to return call regarding this

## 2023-10-10 DIAGNOSIS — R80.9 TYPE 2 DIABETES MELLITUS WITH MICROALBUMINURIA, WITHOUT LONG-TERM CURRENT USE OF INSULIN (HCC): ICD-10-CM

## 2023-10-10 DIAGNOSIS — E11.29 TYPE 2 DIABETES MELLITUS WITH MICROALBUMINURIA, WITHOUT LONG-TERM CURRENT USE OF INSULIN (HCC): ICD-10-CM

## 2023-10-10 RX ORDER — PIOGLITAZONE HCL AND METFORMIN HCL 500; 15 MG/1; MG/1
1 TABLET ORAL 2 TIMES DAILY WITH MEALS
Qty: 180 TABLET | Refills: 1 | Status: SHIPPED | OUTPATIENT
Start: 2023-10-10

## 2023-10-10 NOTE — TELEPHONE ENCOUNTER
Cory Ferron called requesting a refill of the below medication which has been pended for you:     Requested Prescriptions     Pending Prescriptions Disp Refills    pioglitazone-metFORMIN (ACTOPLUS MET)  MG per tablet [Pharmacy Med Name: PIOGLIT  15MG 500MG  TAB  MET] 180 tablet 1     Sig: TAKE 1 TABLET BY MOUTH TWICE  DAILY WITH MEALS       Last Appointment Date: 9/15/2023  Next Appointment Date: 3/14/2024    Allergies   Allergen Reactions    Asa [Aspirin] Other (See Comments)     Stomach irritation    Pollen Extract

## 2023-10-26 ENCOUNTER — OFFICE VISIT (OUTPATIENT)
Dept: PRIMARY CARE CLINIC | Age: 68
End: 2023-10-26
Payer: MEDICARE

## 2023-10-26 ENCOUNTER — HOSPITAL ENCOUNTER (OUTPATIENT)
Dept: GENERAL RADIOLOGY | Age: 68
Discharge: HOME | End: 2023-10-28
Payer: MEDICARE

## 2023-10-26 ENCOUNTER — TELEPHONE (OUTPATIENT)
Dept: PAIN MANAGEMENT | Age: 68
End: 2023-10-26

## 2023-10-26 ENCOUNTER — HOSPITAL ENCOUNTER (OUTPATIENT)
Dept: CT IMAGING | Age: 68
Discharge: HOME | End: 2023-10-28
Payer: MEDICARE

## 2023-10-26 VITALS
OXYGEN SATURATION: 99 % | TEMPERATURE: 98.3 F | DIASTOLIC BLOOD PRESSURE: 80 MMHG | SYSTOLIC BLOOD PRESSURE: 126 MMHG | WEIGHT: 272 LBS | HEART RATE: 64 BPM | BODY MASS INDEX: 41.36 KG/M2

## 2023-10-26 DIAGNOSIS — S19.9XXA NECK INJURY, INITIAL ENCOUNTER: ICD-10-CM

## 2023-10-26 DIAGNOSIS — S22.43XA CLOSED FRACTURE OF MULTIPLE RIBS OF BOTH SIDES, INITIAL ENCOUNTER: ICD-10-CM

## 2023-10-26 DIAGNOSIS — Z91.81 HISTORY OF FALL: ICD-10-CM

## 2023-10-26 DIAGNOSIS — R07.81 RIB PAIN: ICD-10-CM

## 2023-10-26 DIAGNOSIS — S12.9XXA COMPRESSION FRACTURE OF C-SPINE, INITIAL ENCOUNTER (HCC): Primary | ICD-10-CM

## 2023-10-26 PROCEDURE — 3017F COLORECTAL CA SCREEN DOC REV: CPT | Performed by: FAMILY MEDICINE

## 2023-10-26 PROCEDURE — G8484 FLU IMMUNIZE NO ADMIN: HCPCS | Performed by: FAMILY MEDICINE

## 2023-10-26 PROCEDURE — G8427 DOCREV CUR MEDS BY ELIG CLIN: HCPCS | Performed by: FAMILY MEDICINE

## 2023-10-26 PROCEDURE — 3074F SYST BP LT 130 MM HG: CPT | Performed by: FAMILY MEDICINE

## 2023-10-26 PROCEDURE — 3079F DIAST BP 80-89 MM HG: CPT | Performed by: FAMILY MEDICINE

## 2023-10-26 PROCEDURE — G8417 CALC BMI ABV UP PARAM F/U: HCPCS | Performed by: FAMILY MEDICINE

## 2023-10-26 PROCEDURE — L0172 CERV COL SR FOAM 2PC PRE OTS: HCPCS | Performed by: FAMILY MEDICINE

## 2023-10-26 PROCEDURE — 72125 CT NECK SPINE W/O DYE: CPT

## 2023-10-26 PROCEDURE — 1090F PRES/ABSN URINE INCON ASSESS: CPT | Performed by: FAMILY MEDICINE

## 2023-10-26 PROCEDURE — 1036F TOBACCO NON-USER: CPT | Performed by: FAMILY MEDICINE

## 2023-10-26 PROCEDURE — 71111 X-RAY EXAM RIBS/CHEST4/> VWS: CPT

## 2023-10-26 PROCEDURE — 1123F ACP DISCUSS/DSCN MKR DOCD: CPT | Performed by: FAMILY MEDICINE

## 2023-10-26 PROCEDURE — PBSHW CERV COL SR FOAM 2PC PRE OTS: Performed by: FAMILY MEDICINE

## 2023-10-26 PROCEDURE — 99213 OFFICE O/P EST LOW 20 MIN: CPT | Performed by: FAMILY MEDICINE

## 2023-10-26 PROCEDURE — G8400 PT W/DXA NO RESULTS DOC: HCPCS | Performed by: FAMILY MEDICINE

## 2023-10-26 ASSESSMENT — PATIENT HEALTH QUESTIONNAIRE - PHQ9
1. LITTLE INTEREST OR PLEASURE IN DOING THINGS: 0
SUM OF ALL RESPONSES TO PHQ QUESTIONS 1-9: 0
SUM OF ALL RESPONSES TO PHQ9 QUESTIONS 1 & 2: 0
SUM OF ALL RESPONSES TO PHQ QUESTIONS 1-9: 0
2. FEELING DOWN, DEPRESSED OR HOPELESS: 0
SUM OF ALL RESPONSES TO PHQ QUESTIONS 1-9: 0
SUM OF ALL RESPONSES TO PHQ QUESTIONS 1-9: 0

## 2023-10-26 NOTE — TELEPHONE ENCOUNTER
Urgent care called to set up a new appointment for a compression fracture. There are no openings between Miguel or Dr. Renetta Morales until at least the 2nd week of November. What should I do?

## 2023-10-26 NOTE — PROGRESS NOTES
[address]             [address] [facility], [address], [address] [facility]                        Telephone [phone]             Fax [phone]     Ashlyn Gong  :  1955  Age:  76 y.o. MRN:  [id number]  Date of visit:  10/26/2023       Assessment and Plan:    1. Compression fracture of C-spine, initial encounter ([address])  2. Neck injury, initial encounter  3. History of fall  4. Rib pain  5. Closed fracture of multiple ribs of both sides, initial encounter  I reviewed the results of the imaging done today with the patient and her . She was referred to orthopedic surgery:  - Ambulatory referral to Orthopedic Surgery    - [address]; soft cervical collar  Dispense: 1 each; Refill: 0  - Cerv col sr foam 2pc pre ots      Printed information regarding Compression Fracture of the Spine was provided to the patient with the after visit summary. Printed information regarding Broken Rib was provided to the patient with the after visit summary. She was advised to follow up if symptoms worsen or do not resolve. Procedures    Cerv col sr foam 2pc pre ots     Patient was prescribed a Procare Rigid Cervical Collar. The cervical spine (neck) will require stabilization / immobilization from this orthosis. The orthosis will assist in protecting the affected area, provide functional support and facilitate healing. The patient was educated and fit by a healthcare professional with expert knowledge and specialization in brace application while under the direct supervision of the treating physician. Verbal and written instructions for the use of and application of this item were provided. They were instructed to contact the office immediately should the brace result in increased pain, decreased sensation, increased swelling or worsening of the condition.               Subjective:    Ashlyn Gong is a 76 y.o. female who presents to [facility])

## 2023-10-26 NOTE — TELEPHONE ENCOUNTER
CT scan reviewed, if candidate for a kyphoplasty, Dr. Miguel Angel Bal does not do C5.  Recommend they check with Dr. Vega Mons

## 2023-10-27 ENCOUNTER — HOSPITAL ENCOUNTER (OUTPATIENT)
Dept: GENERAL RADIOLOGY | Age: 68
DRG: 689 | End: 2023-10-27
Payer: MEDICARE

## 2023-10-27 ENCOUNTER — HOSPITAL ENCOUNTER (OUTPATIENT)
Dept: MRI IMAGING | Age: 68
DRG: 689 | End: 2023-10-27
Payer: MEDICARE

## 2023-10-27 ENCOUNTER — OFFICE VISIT (OUTPATIENT)
Dept: ORTHOPEDIC SURGERY | Age: 68
End: 2023-10-27
Payer: MEDICARE

## 2023-10-27 VITALS
DIASTOLIC BLOOD PRESSURE: 70 MMHG | WEIGHT: 272 LBS | SYSTOLIC BLOOD PRESSURE: 126 MMHG | HEIGHT: 68 IN | BODY MASS INDEX: 41.22 KG/M2

## 2023-10-27 DIAGNOSIS — S12.9XXA COMPRESSION FRACTURE OF C-SPINE, INITIAL ENCOUNTER (HCC): ICD-10-CM

## 2023-10-27 DIAGNOSIS — M54.2 CERVICAL SPINE PAIN: ICD-10-CM

## 2023-10-27 DIAGNOSIS — M54.2 CERVICAL SPINE PAIN: Primary | ICD-10-CM

## 2023-10-27 DIAGNOSIS — S22.43XA CLOSED FRACTURE OF MULTIPLE RIBS OF BOTH SIDES, INITIAL ENCOUNTER: ICD-10-CM

## 2023-10-27 PROCEDURE — 1036F TOBACCO NON-USER: CPT | Performed by: PHYSICIAN ASSISTANT

## 2023-10-27 PROCEDURE — 1090F PRES/ABSN URINE INCON ASSESS: CPT | Performed by: PHYSICIAN ASSISTANT

## 2023-10-27 PROCEDURE — G8417 CALC BMI ABV UP PARAM F/U: HCPCS | Performed by: PHYSICIAN ASSISTANT

## 2023-10-27 PROCEDURE — 72040 X-RAY EXAM NECK SPINE 2-3 VW: CPT

## 2023-10-27 PROCEDURE — G8400 PT W/DXA NO RESULTS DOC: HCPCS | Performed by: PHYSICIAN ASSISTANT

## 2023-10-27 PROCEDURE — 99214 OFFICE O/P EST MOD 30 MIN: CPT | Performed by: PHYSICIAN ASSISTANT

## 2023-10-27 PROCEDURE — 1123F ACP DISCUSS/DSCN MKR DOCD: CPT | Performed by: PHYSICIAN ASSISTANT

## 2023-10-27 PROCEDURE — 3078F DIAST BP <80 MM HG: CPT | Performed by: PHYSICIAN ASSISTANT

## 2023-10-27 PROCEDURE — 72141 MRI NECK SPINE W/O DYE: CPT

## 2023-10-27 PROCEDURE — 3074F SYST BP LT 130 MM HG: CPT | Performed by: PHYSICIAN ASSISTANT

## 2023-10-27 PROCEDURE — 99203 OFFICE O/P NEW LOW 30 MIN: CPT | Performed by: PHYSICIAN ASSISTANT

## 2023-10-27 PROCEDURE — G8484 FLU IMMUNIZE NO ADMIN: HCPCS | Performed by: PHYSICIAN ASSISTANT

## 2023-10-27 PROCEDURE — 3017F COLORECTAL CA SCREEN DOC REV: CPT | Performed by: PHYSICIAN ASSISTANT

## 2023-10-27 PROCEDURE — G8427 DOCREV CUR MEDS BY ELIG CLIN: HCPCS | Performed by: PHYSICIAN ASSISTANT

## 2023-10-27 RX ORDER — BACLOFEN 10 MG/1
10 TABLET ORAL 3 TIMES DAILY
Qty: 90 TABLET | Refills: 0 | Status: ON HOLD | OUTPATIENT
Start: 2023-10-27 | End: 2023-11-26

## 2023-10-27 NOTE — PROGRESS NOTES
noted. On examination of the chest no effusion or extrapleural air is noted. Vague density is present in the right lower hemithorax which may represent a contusion. Heart size is normal.  Mediastinal contours are unremarkable. 1.  Fractures right lateral 4th through 6th anterolateral ribs. 2.  Is left posterolateral 6th and 7th ribs. 3.  No effusion or extrapleural air. Appearance suggesting a right Merry contusion. The findings were sent to the Radiology Results [address] at 11:54 am on 10/26/2023 to be communicated to a licensed caregiver. CT CERVICAL SPINE WO CONTRAST    Result Date: 10/26/2023  EXAMINATION: CT OF THE CERVICAL SPINE WITHOUT CONTRAST 10/26/2023 10:16 am TECHNIQUE: CT of the cervical spine was performed without the administration of intravenous contrast. Multiplanar reformatted images are provided for review. Automated exposure control, iterative reconstruction, and/or weight based adjustment of the mA/kV was utilized to reduce the radiation dose to as low as reasonably achievable. COMPARISON: Radiographs 09/15/2022. chest CT 01/08/2023. HISTORY: ORDERING SYSTEM PROVIDED HISTORY: Neck injury, initial encounter TECHNOLOGIST PROVIDED HISTORY: pain in neck, history of fall 10/25/2023 Reason for Exam: fall FINDINGS: BONES/ALIGNMENT: Mild superior endplate deformity and anterior wedging of C5 with a discrete fracture line through the anterior superior corner. No evidence for involvement of the middle column. Mild superior endplate deformities of C7, T1 and T2 also appear new since prior chest CT imaging. No listhesis or evidence for facet widening. DEGENERATIVE CHANGES: Advanced multilevel facet arthropathy. Mild multilevel disc space narrowing. SOFT TISSUES: There is no prevertebral soft tissue swelling. Mild superior endplate fractures of C5, C7, T1 and T2. No traumatic malalignment.       Orders Placed This Encounter   Procedures    XR CERVICAL SPINE (2-3 VIEWS)

## 2023-10-28 ENCOUNTER — APPOINTMENT (OUTPATIENT)
Dept: GENERAL RADIOLOGY | Age: 68
DRG: 689 | End: 2023-10-28
Payer: MEDICARE

## 2023-10-28 ENCOUNTER — HOSPITAL ENCOUNTER (INPATIENT)
Age: 68
LOS: 4 days | Discharge: OTHER INSTITUTION | DRG: 689 | End: 2023-11-01
Attending: EMERGENCY MEDICINE | Admitting: FAMILY MEDICINE
Payer: MEDICARE

## 2023-10-28 ENCOUNTER — APPOINTMENT (OUTPATIENT)
Dept: CT IMAGING | Age: 68
DRG: 689 | End: 2023-10-28
Payer: MEDICARE

## 2023-10-28 DIAGNOSIS — N30.00 ACUTE CYSTITIS WITHOUT HEMATURIA: ICD-10-CM

## 2023-10-28 DIAGNOSIS — S12.9XXA COMPRESSION FRACTURE OF C-SPINE, INITIAL ENCOUNTER (HCC): ICD-10-CM

## 2023-10-28 DIAGNOSIS — R41.82 ALTERED MENTAL STATUS, UNSPECIFIED ALTERED MENTAL STATUS TYPE: Primary | ICD-10-CM

## 2023-10-28 LAB
ALBUMIN SERPL-MCNC: 3.8 G/DL (ref 3.5–5.2)
ALBUMIN/GLOB SERPL: 1 {RATIO} (ref 1–2.5)
ALP SERPL-CCNC: 124 U/L (ref 35–104)
ALT SERPL-CCNC: 7 U/L (ref 5–33)
AMMONIA PLAS-SCNC: <10 UMOL/L (ref 11–51)
AMPHET UR QL SCN: NEGATIVE
ANION GAP SERPL CALCULATED.3IONS-SCNC: 13 MMOL/L (ref 9–17)
APAP SERPL-MCNC: <5 UG/ML (ref 10–30)
AST SERPL-CCNC: 13 U/L
BACTERIA URNS QL MICRO: ABNORMAL
BARBITURATES UR QL SCN: NEGATIVE
BASOPHILS # BLD: 0.05 K/UL (ref 0–0.2)
BASOPHILS NFR BLD: 1 % (ref 0–2)
BENZODIAZ UR QL: NEGATIVE
BILIRUB SERPL-MCNC: 0.4 MG/DL (ref 0.3–1.2)
BILIRUB UR QL STRIP: NEGATIVE
BUN SERPL-MCNC: 31 MG/DL (ref 8–23)
BUN/CREAT SERPL: 19 (ref 9–20)
CALCIUM SERPL-MCNC: 9.7 MG/DL (ref 8.6–10.4)
CANNABINOIDS UR QL SCN: NEGATIVE
CHLORIDE SERPL-SCNC: 95 MMOL/L (ref 98–107)
CLARITY UR: ABNORMAL
CO2 SERPL-SCNC: 26 MMOL/L (ref 20–31)
COCAINE UR QL SCN: NEGATIVE
COLOR UR: YELLOW
CREAT SERPL-MCNC: 1.6 MG/DL (ref 0.5–0.9)
EOSINOPHIL # BLD: 0.11 K/UL (ref 0–0.44)
EOSINOPHILS RELATIVE PERCENT: 1 % (ref 1–4)
EPI CELLS #/AREA URNS HPF: ABNORMAL /HPF (ref 0–5)
ERYTHROCYTE [DISTWIDTH] IN BLOOD BY AUTOMATED COUNT: 13.7 % (ref 11.8–14.4)
ETHANOLAMINE SERPL-MCNC: <10 MG/DL
FENTANYL UR QL: NEGATIVE
GFR SERPL CREATININE-BSD FRML MDRD: 35 ML/MIN/1.73M2
GLUCOSE BLD-MCNC: 147 MG/DL (ref 65–105)
GLUCOSE BLD-MCNC: 207 MG/DL (ref 65–105)
GLUCOSE SERPL-MCNC: 177 MG/DL (ref 70–99)
GLUCOSE UR STRIP-MCNC: NEGATIVE MG/DL
HCO3 VENOUS: 29 MMOL/L (ref 22–29)
HCT VFR BLD AUTO: 32.8 % (ref 36.3–47.1)
HGB BLD-MCNC: 10.4 G/DL (ref 11.9–15.1)
HGB UR QL STRIP.AUTO: ABNORMAL
IMM GRANULOCYTES # BLD AUTO: 0.12 K/UL (ref 0–0.3)
IMM GRANULOCYTES NFR BLD: 1 %
INR PPP: 1
KETONES UR STRIP-MCNC: ABNORMAL MG/DL
LACTATE BLDV-SCNC: 1.2 MMOL/L (ref 0.5–1.9)
LEUKOCYTE ESTERASE UR QL STRIP: ABNORMAL
LYMPHOCYTES NFR BLD: 1.52 K/UL (ref 1.1–3.7)
LYMPHOCYTES RELATIVE PERCENT: 14 % (ref 24–43)
MCH RBC QN AUTO: 29.1 PG (ref 25.2–33.5)
MCHC RBC AUTO-ENTMCNC: 31.7 G/DL (ref 25.2–33.5)
MCV RBC AUTO: 91.6 FL (ref 82.6–102.9)
METHADONE UR QL: NEGATIVE
MONOCYTES NFR BLD: 1.05 K/UL (ref 0.1–1.2)
MONOCYTES NFR BLD: 10 % (ref 3–12)
NEUTROPHILS NFR BLD: 73 % (ref 36–65)
NEUTS SEG NFR BLD: 7.69 K/UL (ref 1.5–8.1)
NITRITE UR QL STRIP: POSITIVE
NRBC BLD-RTO: 0 PER 100 WBC
O2 SAT, VEN: 67 % (ref 60–85)
OPIATES UR QL SCN: POSITIVE
OXYCODONE UR QL SCN: NEGATIVE
PARTIAL THROMBOPLASTIN TIME: 25.9 SEC (ref 23.9–33.8)
PCO2, VEN: 46.3 MM HG (ref 41–51)
PCP UR QL SCN: NEGATIVE
PH UR STRIP: 5.5 [PH] (ref 5–6)
PH VENOUS: 7.41 (ref 7.32–7.43)
PLATELET # BLD AUTO: 288 K/UL (ref 138–453)
PMV BLD AUTO: 10.6 FL (ref 8.1–13.5)
PO2, VEN: 35.1 MM HG (ref 30–50)
POSITIVE BASE EXCESS, VEN: 3.6 MMOL/L (ref 0–3)
POTASSIUM SERPL-SCNC: 4.1 MMOL/L (ref 3.7–5.3)
PROT SERPL-MCNC: 7.6 G/DL (ref 6.4–8.3)
PROT UR STRIP-MCNC: NEGATIVE MG/DL
PROTHROMBIN TIME: 12.6 SEC (ref 11.5–14.2)
RBC # BLD AUTO: 3.58 M/UL (ref 3.95–5.11)
RBC #/AREA URNS HPF: ABNORMAL /HPF (ref 0–4)
SALICYLATES SERPL-MCNC: <1 MG/DL (ref 3–10)
SODIUM SERPL-SCNC: 134 MMOL/L (ref 135–144)
SP GR UR STRIP: 1.02 (ref 1.01–1.02)
TEST INFORMATION: ABNORMAL
TOXIC TRICYCLIC SC,BLOOD: NEGATIVE
TROPONIN I SERPL HS-MCNC: 20 NG/L (ref 0–14)
TSH SERPL DL<=0.05 MIU/L-ACNC: 3.2 UIU/ML (ref 0.3–5)
UROBILINOGEN UR STRIP-ACNC: NORMAL EU/DL (ref 0–1)
WBC #/AREA URNS HPF: ABNORMAL /HPF (ref 0–4)
WBC OTHER # BLD: 10.5 K/UL (ref 3.5–11.3)

## 2023-10-28 PROCEDURE — 80053 COMPREHEN METABOLIC PANEL: CPT

## 2023-10-28 PROCEDURE — 36415 COLL VENOUS BLD VENIPUNCTURE: CPT

## 2023-10-28 PROCEDURE — 85025 COMPLETE CBC W/AUTO DIFF WBC: CPT

## 2023-10-28 PROCEDURE — 2580000003 HC RX 258: Performed by: FAMILY MEDICINE

## 2023-10-28 PROCEDURE — 6360000002 HC RX W HCPCS: Performed by: FAMILY MEDICINE

## 2023-10-28 PROCEDURE — 2060000000 HC ICU INTERMEDIATE R&B

## 2023-10-28 PROCEDURE — 87086 URINE CULTURE/COLONY COUNT: CPT

## 2023-10-28 PROCEDURE — 83605 ASSAY OF LACTIC ACID: CPT

## 2023-10-28 PROCEDURE — 82140 ASSAY OF AMMONIA: CPT

## 2023-10-28 PROCEDURE — 87077 CULTURE AEROBIC IDENTIFY: CPT

## 2023-10-28 PROCEDURE — 72131 CT LUMBAR SPINE W/O DYE: CPT

## 2023-10-28 PROCEDURE — 80179 DRUG ASSAY SALICYLATE: CPT

## 2023-10-28 PROCEDURE — 93005 ELECTROCARDIOGRAM TRACING: CPT | Performed by: EMERGENCY MEDICINE

## 2023-10-28 PROCEDURE — 84443 ASSAY THYROID STIM HORMONE: CPT

## 2023-10-28 PROCEDURE — 87040 BLOOD CULTURE FOR BACTERIA: CPT

## 2023-10-28 PROCEDURE — 6360000002 HC RX W HCPCS: Performed by: EMERGENCY MEDICINE

## 2023-10-28 PROCEDURE — 80143 DRUG ASSAY ACETAMINOPHEN: CPT

## 2023-10-28 PROCEDURE — 99285 EMERGENCY DEPT VISIT HI MDM: CPT

## 2023-10-28 PROCEDURE — 87186 SC STD MICRODIL/AGAR DIL: CPT

## 2023-10-28 PROCEDURE — 82947 ASSAY GLUCOSE BLOOD QUANT: CPT

## 2023-10-28 PROCEDURE — 71045 X-RAY EXAM CHEST 1 VIEW: CPT

## 2023-10-28 PROCEDURE — 85610 PROTHROMBIN TIME: CPT

## 2023-10-28 PROCEDURE — 82803 BLOOD GASES ANY COMBINATION: CPT

## 2023-10-28 PROCEDURE — 2580000003 HC RX 258: Performed by: EMERGENCY MEDICINE

## 2023-10-28 PROCEDURE — 81001 URINALYSIS AUTO W/SCOPE: CPT

## 2023-10-28 PROCEDURE — 85730 THROMBOPLASTIN TIME PARTIAL: CPT

## 2023-10-28 PROCEDURE — G0480 DRUG TEST DEF 1-7 CLASSES: HCPCS

## 2023-10-28 PROCEDURE — 6370000000 HC RX 637 (ALT 250 FOR IP): Performed by: FAMILY MEDICINE

## 2023-10-28 PROCEDURE — 80307 DRUG TEST PRSMV CHEM ANLYZR: CPT

## 2023-10-28 PROCEDURE — 84484 ASSAY OF TROPONIN QUANT: CPT

## 2023-10-28 PROCEDURE — 70450 CT HEAD/BRAIN W/O DYE: CPT

## 2023-10-28 PROCEDURE — 72125 CT NECK SPINE W/O DYE: CPT

## 2023-10-28 RX ORDER — INSULIN LISPRO 100 [IU]/ML
0-4 INJECTION, SOLUTION INTRAVENOUS; SUBCUTANEOUS NIGHTLY
Status: DISCONTINUED | OUTPATIENT
Start: 2023-10-28 | End: 2023-11-01 | Stop reason: HOSPADM

## 2023-10-28 RX ORDER — INSULIN LISPRO 100 [IU]/ML
0-4 INJECTION, SOLUTION INTRAVENOUS; SUBCUTANEOUS
Status: DISCONTINUED | OUTPATIENT
Start: 2023-10-28 | End: 2023-11-01 | Stop reason: HOSPADM

## 2023-10-28 RX ORDER — PANTOPRAZOLE SODIUM 40 MG/1
40 TABLET, DELAYED RELEASE ORAL 2 TIMES DAILY
Status: DISCONTINUED | OUTPATIENT
Start: 2023-10-28 | End: 2023-11-01 | Stop reason: HOSPADM

## 2023-10-28 RX ORDER — 0.9 % SODIUM CHLORIDE 0.9 %
500 INTRAVENOUS SOLUTION INTRAVENOUS ONCE
Status: DISCONTINUED | OUTPATIENT
Start: 2023-10-28 | End: 2023-11-01 | Stop reason: HOSPADM

## 2023-10-28 RX ORDER — HYDROCODONE BITARTRATE AND ACETAMINOPHEN 5; 325 MG/1; MG/1
1 TABLET ORAL 2 TIMES DAILY
Status: DISCONTINUED | OUTPATIENT
Start: 2023-10-28 | End: 2023-11-01 | Stop reason: HOSPADM

## 2023-10-28 RX ORDER — LISINOPRIL AND HYDROCHLOROTHIAZIDE 20; 12.5 MG/1; MG/1
1 TABLET ORAL DAILY
Status: DISCONTINUED | OUTPATIENT
Start: 2023-10-28 | End: 2023-10-28 | Stop reason: RX

## 2023-10-28 RX ORDER — CEFTRIAXONE 500 MG/1
1000 INJECTION, POWDER, FOR SOLUTION INTRAMUSCULAR; INTRAVENOUS EVERY 24 HOURS
Status: DISCONTINUED | OUTPATIENT
Start: 2023-10-28 | End: 2023-10-28 | Stop reason: CLARIF

## 2023-10-28 RX ORDER — CHOLECALCIFEROL (VITAMIN D3) 1250 MCG
50000 CAPSULE ORAL WEEKLY
Status: DISCONTINUED | OUTPATIENT
Start: 2023-10-28 | End: 2023-11-01 | Stop reason: HOSPADM

## 2023-10-28 RX ORDER — HYDROCHLOROTHIAZIDE 12.5 MG/1
12.5 TABLET ORAL DAILY
Status: DISCONTINUED | OUTPATIENT
Start: 2023-10-28 | End: 2023-11-01 | Stop reason: HOSPADM

## 2023-10-28 RX ORDER — DULOXETIN HYDROCHLORIDE 30 MG/1
60 CAPSULE, DELAYED RELEASE ORAL DAILY
Status: DISCONTINUED | OUTPATIENT
Start: 2023-10-28 | End: 2023-11-01 | Stop reason: HOSPADM

## 2023-10-28 RX ORDER — ONDANSETRON 4 MG/1
4 TABLET, ORALLY DISINTEGRATING ORAL EVERY 8 HOURS PRN
Status: DISCONTINUED | OUTPATIENT
Start: 2023-10-28 | End: 2023-11-01 | Stop reason: HOSPADM

## 2023-10-28 RX ORDER — ACETAMINOPHEN 650 MG/1
650 SUPPOSITORY RECTAL EVERY 6 HOURS PRN
Status: DISCONTINUED | OUTPATIENT
Start: 2023-10-28 | End: 2023-11-01 | Stop reason: HOSPADM

## 2023-10-28 RX ORDER — PIOGLITAZONE HCL AND METFORMIN HCL 500; 15 MG/1; MG/1
1 TABLET ORAL 2 TIMES DAILY WITH MEALS
Status: DISCONTINUED | OUTPATIENT
Start: 2023-10-28 | End: 2023-10-28

## 2023-10-28 RX ORDER — ENOXAPARIN SODIUM 100 MG/ML
30 INJECTION SUBCUTANEOUS 2 TIMES DAILY
Status: DISCONTINUED | OUTPATIENT
Start: 2023-10-28 | End: 2023-11-01 | Stop reason: HOSPADM

## 2023-10-28 RX ORDER — ONDANSETRON 2 MG/ML
4 INJECTION INTRAMUSCULAR; INTRAVENOUS EVERY 6 HOURS PRN
Status: DISCONTINUED | OUTPATIENT
Start: 2023-10-28 | End: 2023-11-01 | Stop reason: HOSPADM

## 2023-10-28 RX ORDER — SODIUM CHLORIDE 9 MG/ML
INJECTION, SOLUTION INTRAVENOUS CONTINUOUS
Status: DISCONTINUED | OUTPATIENT
Start: 2023-10-28 | End: 2023-10-29

## 2023-10-28 RX ORDER — POLYETHYLENE GLYCOL 3350 17 G/17G
17 POWDER, FOR SOLUTION ORAL DAILY PRN
Status: DISCONTINUED | OUTPATIENT
Start: 2023-10-28 | End: 2023-11-01 | Stop reason: HOSPADM

## 2023-10-28 RX ORDER — LISINOPRIL 20 MG/1
20 TABLET ORAL DAILY
Status: DISCONTINUED | OUTPATIENT
Start: 2023-10-28 | End: 2023-11-01 | Stop reason: HOSPADM

## 2023-10-28 RX ORDER — SODIUM CHLORIDE 9 MG/ML
INJECTION, SOLUTION INTRAVENOUS CONTINUOUS
Status: DISCONTINUED | OUTPATIENT
Start: 2023-10-28 | End: 2023-11-01 | Stop reason: HOSPADM

## 2023-10-28 RX ORDER — PIOGLITAZONEHYDROCHLORIDE 15 MG/1
15 TABLET ORAL 2 TIMES DAILY WITH MEALS
Status: DISCONTINUED | OUTPATIENT
Start: 2023-10-28 | End: 2023-11-01 | Stop reason: HOSPADM

## 2023-10-28 RX ORDER — LANOLIN ALCOHOL/MO/W.PET/CERES
5 CREAM (GRAM) TOPICAL NIGHTLY PRN
COMMUNITY

## 2023-10-28 RX ORDER — ACETAMINOPHEN 325 MG/1
650 TABLET ORAL EVERY 6 HOURS PRN
Status: DISCONTINUED | OUTPATIENT
Start: 2023-10-28 | End: 2023-11-01 | Stop reason: HOSPADM

## 2023-10-28 RX ADMIN — INSULIN LISPRO 1 UNITS: 100 INJECTION, SOLUTION INTRAVENOUS; SUBCUTANEOUS at 18:44

## 2023-10-28 RX ADMIN — ENOXAPARIN SODIUM 30 MG: 100 INJECTION SUBCUTANEOUS at 12:30

## 2023-10-28 RX ADMIN — PANTOPRAZOLE SODIUM 40 MG: 40 TABLET, DELAYED RELEASE ORAL at 12:30

## 2023-10-28 RX ADMIN — ENOXAPARIN SODIUM 30 MG: 100 INJECTION SUBCUTANEOUS at 21:29

## 2023-10-28 RX ADMIN — DULOXETINE HYDROCHLORIDE 60 MG: 30 CAPSULE, DELAYED RELEASE ORAL at 12:30

## 2023-10-28 RX ADMIN — HYDROCHLOROTHIAZIDE 12.5 MG: 12.5 TABLET ORAL at 12:30

## 2023-10-28 RX ADMIN — HYDROCODONE BITARTRATE AND ACETAMINOPHEN 1 TABLET: 5; 325 TABLET ORAL at 12:30

## 2023-10-28 RX ADMIN — CEFTRIAXONE 1000 MG: 1 INJECTION, POWDER, FOR SOLUTION INTRAMUSCULAR; INTRAVENOUS at 10:20

## 2023-10-28 RX ADMIN — SODIUM CHLORIDE: 9 INJECTION, SOLUTION INTRAVENOUS at 23:36

## 2023-10-28 RX ADMIN — GABAPENTIN 400 MG: 300 CAPSULE ORAL at 12:30

## 2023-10-28 RX ADMIN — LISINOPRIL 20 MG: 20 TABLET ORAL at 12:30

## 2023-10-28 RX ADMIN — SODIUM CHLORIDE: 9 INJECTION, SOLUTION INTRAVENOUS at 12:15

## 2023-10-28 RX ADMIN — CEFTRIAXONE 1000 MG: 1 INJECTION, POWDER, FOR SOLUTION INTRAMUSCULAR; INTRAVENOUS at 23:40

## 2023-10-28 ASSESSMENT — PAIN DESCRIPTION - LOCATION
LOCATION: GENERALIZED
LOCATION: NECK

## 2023-10-28 ASSESSMENT — PAIN SCALES - GENERAL
PAINLEVEL_OUTOF10: 5
PAINLEVEL_OUTOF10: 0

## 2023-10-28 NOTE — ED PROVIDER NOTES
intravenous contrast. COMPARISON: None. HISTORY: ORDERING SYSTEM PROVIDED HISTORY: Cervical spine pain TECHNOLOGIST PROVIDED HISTORY: Reason for Exam: Patient fell down the stairs a couple of days ago. CT cervical spine showed fracture in cervical spine. Patient had difficulty towards the end of the scan holding still due to pain. Additional signs and symptoms: Cervical spine pain; Compression fracture of C-spine, initial encounter. Initial evaluation. FINDINGS: BONES/ALIGNMENT: Mild acute superior endplate depression fractures involving the C5, C7, T1 and T2 vertebral bodies. No significant bulging of the posterior cortex at these levels. The remaining vertebral body heights appear maintained. There straightening of the normal cervical lordosis without significant spondylolisthesis. Bone marrow edema is seen associated with left C3-C4 facet arthrosis. SPINAL CORD: No abnormal cord signal is seen. SOFT TISSUES: No paraspinal mass identified. C2-C3: There is a disc bulge with buckling of the ligamentum flavum. No significant spinal canal stenosis. No significant neural foraminal narrowing. There is a tiny right perineural root sleeve cyst. C3-C4: There is a disc bulge with buckling of the ligamentum flavum. Mild spinal canal stenosis. No significant neural foraminal narrowing. C4-C5: There is a posterior disc osteophyte complex with buckling of the ligamentum flavum. Mild-to-moderate spinal canal stenosis. Uncovertebral joint and facet arthrosis contributes to moderate to severe left neural foraminal narrowing. No significant right neural foraminal narrowing. C5-C6: There is no significant disc bulge or spinal canal stenosis. Uncovertebral joint and facet arthrosis contribute to mild left neural foraminal narrowing. No significant right neural foraminal narrowing. C6-C7: There is a disc bulge with slight buckling of the ligamentum flavum. No significant spinal canal stenosis.   Uncovertebral joint and

## 2023-10-28 NOTE — H&P
with alcohol. 4/9/23   Florin Lao MD   blood glucose monitor strips Test 1 times a day & as needed for symptoms of irregular blood glucose. Dispense sufficient amount for indicated testing frequency plus additional to accommodate PRN testing needs. 3/21/22   Conchita Patel MD   Cholecalciferol (VITAMIN D3) 1.25 MG (64966 UT) CAPS TAKE 1 CAPSULE ONCE A WEEK 7/28/21   Conchita Patel MD       Current meds  Scheduled Meds:   Vitamin D3  50,000 Units Oral Weekly    DULoxetine  60 mg Oral Daily    gabapentin  400 mg Oral BID    HYDROcodone-acetaminophen  1 tablet Oral BID    pantoprazole  40 mg Oral BID    enoxaparin  30 mg SubCUTAneous BID    sodium chloride  500 mL IntraVENous Once    [START ON 10/29/2023] cefTRIAXone (ROCEPHIN) IV  1,000 mg IntraVENous Q24H    lisinopril  20 mg Oral Daily    And    hydroCHLOROthiazide  12.5 mg Oral Daily    pioglitazone  15 mg Oral BID with meals    And    metFORMIN  500 mg Oral BID WC     Continuous Infusions:   sodium chloride 75 mL/hr at 10/28/23 1215    sodium chloride       PRN Meds:.ondansetron **OR** ondansetron, polyethylene glycol, acetaminophen **OR** acetaminophen    Allergies:  Asa [aspirin] and Pollen extract    Social History:   TOBACCO:   reports that she has never smoked. She has never used smokeless tobacco.  ETOH:   reports current alcohol use.   OCCUPATION:      Family History:       Problem Relation Age of Onset    Cancer Mother     Breast Cancer Mother 52    Hypertension Father     Diabetes Father     Cancer Father        REVIEW OF SYSTEMS:  Constitutional:  negative for  fevers, chills, sweats and weight loss  HEENT:  negative for  hearing loss, ear drainage, nasal congestion, snoring, hoarseness and voice change  Neck:  No swollen glands and No h/o goiter or thyroid disease  Cardiac:  negative for  chest pain, dyspnea, palpitations, orthopnea, PND, edema  Respiratory:  negative for  dry cough, cough with sputum, dyspnea, wheezing and

## 2023-10-28 NOTE — ED NOTES
Right mid abd with an open area approx dime size, with scabbing starting. Patient and spouse unsure of what happened states it has been there.      Elias Calvo RN  10/28/23 8041

## 2023-10-28 NOTE — ED NOTES
Dr. Graham Teague speaking with Hospitalists regarding admission     Agatha Kelley RN  10/28/23 0391

## 2023-10-28 NOTE — ED NOTES
Attempt to call [facility] RN for report, but she is unavailable and will call when able.      Nel Pittman, PRINCESS  10/28/23 0417

## 2023-10-29 PROBLEM — U07.1 PNEUMONIA DUE TO COVID-19 VIRUS: Status: RESOLVED | Noted: 2021-09-19 | Resolved: 2023-10-29

## 2023-10-29 PROBLEM — S72.92XA FEMUR FRACTURE, LEFT (HCC): Status: RESOLVED | Noted: 2021-03-05 | Resolved: 2023-10-29

## 2023-10-29 PROBLEM — N30.01 ACUTE CYSTITIS WITH HEMATURIA: Status: RESOLVED | Noted: 2021-09-19 | Resolved: 2023-10-29

## 2023-10-29 PROBLEM — J12.82 PNEUMONIA DUE TO COVID-19 VIRUS: Status: RESOLVED | Noted: 2021-09-19 | Resolved: 2023-10-29

## 2023-10-29 LAB
ANION GAP SERPL CALCULATED.3IONS-SCNC: 11 MMOL/L (ref 9–17)
BASOPHILS # BLD: 0.03 K/UL (ref 0–0.2)
BASOPHILS NFR BLD: 0 % (ref 0–2)
BUN SERPL-MCNC: 27 MG/DL (ref 8–23)
BUN/CREAT SERPL: 21 (ref 9–20)
CALCIUM SERPL-MCNC: 9.1 MG/DL (ref 8.6–10.4)
CHLORIDE SERPL-SCNC: 100 MMOL/L (ref 98–107)
CO2 SERPL-SCNC: 28 MMOL/L (ref 20–31)
CREAT SERPL-MCNC: 1.3 MG/DL (ref 0.5–0.9)
EOSINOPHIL # BLD: 0.05 K/UL (ref 0–0.44)
EOSINOPHILS RELATIVE PERCENT: 1 % (ref 1–4)
ERYTHROCYTE [DISTWIDTH] IN BLOOD BY AUTOMATED COUNT: 13.7 % (ref 11.8–14.4)
GFR SERPL CREATININE-BSD FRML MDRD: 45 ML/MIN/1.73M2
GLUCOSE BLD-MCNC: 162 MG/DL (ref 65–105)
GLUCOSE BLD-MCNC: 172 MG/DL (ref 65–105)
GLUCOSE BLD-MCNC: 189 MG/DL (ref 65–105)
GLUCOSE SERPL-MCNC: 170 MG/DL (ref 70–99)
HCT VFR BLD AUTO: 29.3 % (ref 36.3–47.1)
HGB BLD-MCNC: 9.5 G/DL (ref 11.9–15.1)
IMM GRANULOCYTES # BLD AUTO: 0.07 K/UL (ref 0–0.3)
IMM GRANULOCYTES NFR BLD: 1 %
LYMPHOCYTES NFR BLD: 1.15 K/UL (ref 1.1–3.7)
LYMPHOCYTES RELATIVE PERCENT: 17 % (ref 24–43)
MCH RBC QN AUTO: 28.8 PG (ref 25.2–33.5)
MCHC RBC AUTO-ENTMCNC: 32.4 G/DL (ref 25.2–33.5)
MCV RBC AUTO: 88.8 FL (ref 82.6–102.9)
MICROORGANISM SPEC CULT: ABNORMAL
MONOCYTES NFR BLD: 0.66 K/UL (ref 0.1–1.2)
MONOCYTES NFR BLD: 10 % (ref 3–12)
NEUTROPHILS NFR BLD: 72 % (ref 36–65)
NEUTS SEG NFR BLD: 4.97 K/UL (ref 1.5–8.1)
NRBC BLD-RTO: 0 PER 100 WBC
PLATELET # BLD AUTO: 230 K/UL (ref 138–453)
PMV BLD AUTO: 10.2 FL (ref 8.1–13.5)
POTASSIUM SERPL-SCNC: 3.9 MMOL/L (ref 3.7–5.3)
RBC # BLD AUTO: 3.3 M/UL (ref 3.95–5.11)
SODIUM SERPL-SCNC: 139 MMOL/L (ref 135–144)
SPECIMEN DESCRIPTION: ABNORMAL
WBC OTHER # BLD: 6.9 K/UL (ref 3.5–11.3)

## 2023-10-29 PROCEDURE — 2060000000 HC ICU INTERMEDIATE R&B

## 2023-10-29 PROCEDURE — 36415 COLL VENOUS BLD VENIPUNCTURE: CPT

## 2023-10-29 PROCEDURE — 6370000000 HC RX 637 (ALT 250 FOR IP): Performed by: FAMILY MEDICINE

## 2023-10-29 PROCEDURE — 6360000002 HC RX W HCPCS: Performed by: FAMILY MEDICINE

## 2023-10-29 PROCEDURE — 2500000003 HC RX 250 WO HCPCS

## 2023-10-29 PROCEDURE — 99232 SBSQ HOSP IP/OBS MODERATE 35: CPT | Performed by: FAMILY MEDICINE

## 2023-10-29 PROCEDURE — 82947 ASSAY GLUCOSE BLOOD QUANT: CPT

## 2023-10-29 PROCEDURE — 80048 BASIC METABOLIC PNL TOTAL CA: CPT

## 2023-10-29 PROCEDURE — 85025 COMPLETE CBC W/AUTO DIFF WBC: CPT

## 2023-10-29 RX ORDER — LUBIPROSTONE 8 UG/1
8 CAPSULE ORAL 2 TIMES DAILY WITH MEALS
Status: DISCONTINUED | OUTPATIENT
Start: 2023-10-29 | End: 2023-11-01 | Stop reason: HOSPADM

## 2023-10-29 RX ORDER — LUBIPROSTONE 8 UG/1
CAPSULE ORAL
COMMUNITY
Start: 2023-10-04

## 2023-10-29 RX ORDER — DEXTROSE MONOHYDRATE 100 MG/ML
INJECTION, SOLUTION INTRAVENOUS CONTINUOUS PRN
Status: DISCONTINUED | OUTPATIENT
Start: 2023-10-29 | End: 2023-11-01 | Stop reason: HOSPADM

## 2023-10-29 RX ADMIN — ACETAMINOPHEN 650 MG: 325 TABLET ORAL at 09:52

## 2023-10-29 RX ADMIN — GABAPENTIN 400 MG: 300 CAPSULE ORAL at 20:35

## 2023-10-29 RX ADMIN — PANTOPRAZOLE SODIUM 40 MG: 40 TABLET, DELAYED RELEASE ORAL at 20:35

## 2023-10-29 RX ADMIN — HYDROCODONE BITARTRATE AND ACETAMINOPHEN 1 TABLET: 5; 325 TABLET ORAL at 14:00

## 2023-10-29 RX ADMIN — MICONAZOLE NITRATE: 20 POWDER TOPICAL at 20:40

## 2023-10-29 RX ADMIN — ENOXAPARIN SODIUM 30 MG: 100 INJECTION SUBCUTANEOUS at 09:41

## 2023-10-29 RX ADMIN — PIOGLITAZONE 15 MG: 15 TABLET ORAL at 18:00

## 2023-10-29 RX ADMIN — MICONAZOLE NITRATE: 20 POWDER TOPICAL at 03:23

## 2023-10-29 RX ADMIN — METFORMIN HYDROCHLORIDE 500 MG: 500 TABLET ORAL at 18:00

## 2023-10-29 RX ADMIN — LUBIPROSTONE 8 MCG: 8 CAPSULE ORAL at 18:00

## 2023-10-29 RX ADMIN — ENOXAPARIN SODIUM 30 MG: 100 INJECTION SUBCUTANEOUS at 20:34

## 2023-10-29 RX ADMIN — HYDROCODONE BITARTRATE AND ACETAMINOPHEN 1 TABLET: 5; 325 TABLET ORAL at 20:35

## 2023-10-29 ASSESSMENT — PAIN - FUNCTIONAL ASSESSMENT: PAIN_FUNCTIONAL_ASSESSMENT: ACTIVITIES ARE NOT PREVENTED

## 2023-10-29 ASSESSMENT — PAIN DESCRIPTION - DESCRIPTORS
DESCRIPTORS: DISCOMFORT
DESCRIPTORS: DISCOMFORT
DESCRIPTORS: OTHER (COMMENT)

## 2023-10-29 ASSESSMENT — PAIN SCALES - GENERAL
PAINLEVEL_OUTOF10: 3
PAINLEVEL_OUTOF10: 10
PAINLEVEL_OUTOF10: 6

## 2023-10-29 ASSESSMENT — PAIN DESCRIPTION - LOCATION
LOCATION: NECK

## 2023-10-30 LAB
ANION GAP SERPL CALCULATED.3IONS-SCNC: 9 MMOL/L (ref 9–17)
BASOPHILS # BLD: 0.04 K/UL (ref 0–0.2)
BASOPHILS NFR BLD: 1 % (ref 0–2)
BUN SERPL-MCNC: 22 MG/DL (ref 8–23)
BUN/CREAT SERPL: 20 (ref 9–20)
CALCIUM SERPL-MCNC: 8.7 MG/DL (ref 8.6–10.4)
CHLORIDE SERPL-SCNC: 103 MMOL/L (ref 98–107)
CO2 SERPL-SCNC: 26 MMOL/L (ref 20–31)
CREAT SERPL-MCNC: 1.1 MG/DL (ref 0.5–0.9)
EKG ATRIAL RATE: 79 BPM
EKG P AXIS: 54 DEGREES
EKG P-R INTERVAL: 152 MS
EKG Q-T INTERVAL: 400 MS
EKG QRS DURATION: 90 MS
EKG QTC CALCULATION (BAZETT): 458 MS
EKG R AXIS: 20 DEGREES
EKG T AXIS: 30 DEGREES
EKG VENTRICULAR RATE: 79 BPM
EOSINOPHIL # BLD: 0.06 K/UL (ref 0–0.44)
EOSINOPHILS RELATIVE PERCENT: 1 % (ref 1–4)
ERYTHROCYTE [DISTWIDTH] IN BLOOD BY AUTOMATED COUNT: 13.7 % (ref 11.8–14.4)
GFR SERPL CREATININE-BSD FRML MDRD: 55 ML/MIN/1.73M2
GLUCOSE BLD-MCNC: 169 MG/DL (ref 65–105)
GLUCOSE BLD-MCNC: 172 MG/DL (ref 65–105)
GLUCOSE BLD-MCNC: 194 MG/DL (ref 65–105)
GLUCOSE SERPL-MCNC: 147 MG/DL (ref 70–99)
HCT VFR BLD AUTO: 28.6 % (ref 36.3–47.1)
HGB BLD-MCNC: 9.2 G/DL (ref 11.9–15.1)
IMM GRANULOCYTES # BLD AUTO: 0.07 K/UL (ref 0–0.3)
IMM GRANULOCYTES NFR BLD: 1 %
LYMPHOCYTES NFR BLD: 1.76 K/UL (ref 1.1–3.7)
LYMPHOCYTES RELATIVE PERCENT: 27 % (ref 24–43)
MCH RBC QN AUTO: 29 PG (ref 25.2–33.5)
MCHC RBC AUTO-ENTMCNC: 32.2 G/DL (ref 25.2–33.5)
MCV RBC AUTO: 90.2 FL (ref 82.6–102.9)
MONOCYTES NFR BLD: 0.71 K/UL (ref 0.1–1.2)
MONOCYTES NFR BLD: 11 % (ref 3–12)
NEUTROPHILS NFR BLD: 59 % (ref 36–65)
NEUTS SEG NFR BLD: 3.83 K/UL (ref 1.5–8.1)
NRBC BLD-RTO: 0 PER 100 WBC
PLATELET # BLD AUTO: 233 K/UL (ref 138–453)
PMV BLD AUTO: 10.5 FL (ref 8.1–13.5)
POTASSIUM SERPL-SCNC: 4 MMOL/L (ref 3.7–5.3)
RBC # BLD AUTO: 3.17 M/UL (ref 3.95–5.11)
SODIUM SERPL-SCNC: 138 MMOL/L (ref 135–144)
WBC OTHER # BLD: 6.5 K/UL (ref 3.5–11.3)

## 2023-10-30 PROCEDURE — 2580000003 HC RX 258: Performed by: FAMILY MEDICINE

## 2023-10-30 PROCEDURE — 99231 SBSQ HOSP IP/OBS SF/LOW 25: CPT | Performed by: INTERNAL MEDICINE

## 2023-10-30 PROCEDURE — 2060000000 HC ICU INTERMEDIATE R&B

## 2023-10-30 PROCEDURE — 85025 COMPLETE CBC W/AUTO DIFF WBC: CPT

## 2023-10-30 PROCEDURE — 97165 OT EVAL LOW COMPLEX 30 MIN: CPT | Performed by: OCCUPATIONAL THERAPIST

## 2023-10-30 PROCEDURE — 36415 COLL VENOUS BLD VENIPUNCTURE: CPT

## 2023-10-30 PROCEDURE — 82947 ASSAY GLUCOSE BLOOD QUANT: CPT

## 2023-10-30 PROCEDURE — 97161 PT EVAL LOW COMPLEX 20 MIN: CPT

## 2023-10-30 PROCEDURE — 6370000000 HC RX 637 (ALT 250 FOR IP): Performed by: FAMILY MEDICINE

## 2023-10-30 PROCEDURE — 80048 BASIC METABOLIC PNL TOTAL CA: CPT

## 2023-10-30 PROCEDURE — 6360000002 HC RX W HCPCS: Performed by: FAMILY MEDICINE

## 2023-10-30 RX ADMIN — MICONAZOLE NITRATE: 20 POWDER TOPICAL at 22:06

## 2023-10-30 RX ADMIN — LUBIPROSTONE 8 MCG: 8 CAPSULE ORAL at 16:59

## 2023-10-30 RX ADMIN — PANTOPRAZOLE SODIUM 40 MG: 40 TABLET, DELAYED RELEASE ORAL at 21:12

## 2023-10-30 RX ADMIN — HYDROCODONE BITARTRATE AND ACETAMINOPHEN 1 TABLET: 5; 325 TABLET ORAL at 21:11

## 2023-10-30 RX ADMIN — SODIUM CHLORIDE: 9 INJECTION, SOLUTION INTRAVENOUS at 18:06

## 2023-10-30 RX ADMIN — LISINOPRIL 20 MG: 20 TABLET ORAL at 08:26

## 2023-10-30 RX ADMIN — MICONAZOLE NITRATE: 20 POWDER TOPICAL at 08:27

## 2023-10-30 RX ADMIN — DULOXETINE HYDROCHLORIDE 60 MG: 30 CAPSULE, DELAYED RELEASE ORAL at 08:25

## 2023-10-30 RX ADMIN — GABAPENTIN 400 MG: 300 CAPSULE ORAL at 08:26

## 2023-10-30 RX ADMIN — PIOGLITAZONE 15 MG: 15 TABLET ORAL at 08:25

## 2023-10-30 RX ADMIN — ENOXAPARIN SODIUM 30 MG: 100 INJECTION SUBCUTANEOUS at 08:24

## 2023-10-30 RX ADMIN — ENOXAPARIN SODIUM 30 MG: 100 INJECTION SUBCUTANEOUS at 21:12

## 2023-10-30 RX ADMIN — PIOGLITAZONE 15 MG: 15 TABLET ORAL at 16:59

## 2023-10-30 RX ADMIN — CEFTRIAXONE 1000 MG: 1 INJECTION, POWDER, FOR SOLUTION INTRAMUSCULAR; INTRAVENOUS at 01:10

## 2023-10-30 RX ADMIN — CEFTRIAXONE 1000 MG: 1 INJECTION, POWDER, FOR SOLUTION INTRAMUSCULAR; INTRAVENOUS at 23:36

## 2023-10-30 RX ADMIN — SODIUM CHLORIDE: 9 INJECTION, SOLUTION INTRAVENOUS at 03:10

## 2023-10-30 RX ADMIN — PANTOPRAZOLE SODIUM 40 MG: 40 TABLET, DELAYED RELEASE ORAL at 08:25

## 2023-10-30 RX ADMIN — HYDROCHLOROTHIAZIDE 12.5 MG: 12.5 TABLET ORAL at 08:26

## 2023-10-30 RX ADMIN — GABAPENTIN 400 MG: 300 CAPSULE ORAL at 21:11

## 2023-10-30 RX ADMIN — HYDROCODONE BITARTRATE AND ACETAMINOPHEN 1 TABLET: 5; 325 TABLET ORAL at 08:25

## 2023-10-30 ASSESSMENT — PAIN DESCRIPTION - DESCRIPTORS
DESCRIPTORS: OTHER (COMMENT)
DESCRIPTORS: DISCOMFORT

## 2023-10-30 ASSESSMENT — PAIN SCALES - GENERAL
PAINLEVEL_OUTOF10: 9
PAINLEVEL_OUTOF10: 9

## 2023-10-30 ASSESSMENT — PAIN - FUNCTIONAL ASSESSMENT: PAIN_FUNCTIONAL_ASSESSMENT: ACTIVITIES ARE NOT PREVENTED

## 2023-10-30 ASSESSMENT — PAIN DESCRIPTION - LOCATION
LOCATION: NECK;RIB CAGE
LOCATION: NECK;RIB CAGE

## 2023-10-30 NOTE — FLOWSHEET NOTE
rounding in PCU. Assessment: Patient shared about how she had a bad fall the previous week due to a cloudy head that resulted in many broken bones. Patient has the support of her  of 40 years (Kingsley), son Steve Dallas, daughter Sarbjit Collier), three grandchildren and one great grandson, and is an active member of [address]. Patient plans to stay at a nursing facility for rehab after being discharged from the hospital.    Intervention: Engaged in conversation and active listening. Prayed with Patient. Outcome: Patient expressed appreciation for visit and offer of continued prayer. Plan: Chaplains are available on site or on call 24/7 for spiritual and emotional support.     Electronically signed by Mitchel Bernard on 10/30/2023 at 3:02 PM

## 2023-10-30 NOTE — CARE COORDINATION
[facility] unable to take pt due to no room available. Pt et  would like to check [facility] in [facility]. Voicemail left for admissions to return call ([phone]).

## 2023-10-30 NOTE — CARE COORDINATION
[place] has no beds, [facility] out of network.  Info faxed to [facility] [place] per family request.

## 2023-10-30 NOTE — CARE COORDINATION
Patient/Family/Responsible party discussed discharge planning, provided with list of SNF/Home health agencies. [facility] [place]. Information called to [facility], info and faxed. No bed today but will hopefully have discharges. [facility] will update us daily on bed status.

## 2023-10-30 NOTE — CARE COORDINATION
Case Management Assessment  Initial Evaluation    Date/Time of Evaluation: 10/30/2023 9:34 AM  Assessment Completed by: Kar Nguyễn RN    If patient is discharged prior to next notation, then this note serves as note for discharge by case management. Patient Name: Elder Byrnes                   YOB: 1955  Diagnosis: Acute cystitis without hematuria [N30.00]  Altered mental status, unspecified altered mental status type [R41.82]  AMS (altered mental status) [R41.82]                   Date / Time: 10/28/2023  7:35 AM    Patient Admission Status: Inpatient   Readmission Risk (Low < 19, Mod (19-27), High > 27): Readmission Risk Score: 17.2    Current PCP: Ze Jaeger MD  PCP verified by ? Yes    Chart Reviewed: Yes      History Provided by: Patient  Patient Orientation: Alert and Oriented    Patient Cognition: Alert    Hospitalization in the last 30 days (Readmission):  No    If yes, Readmission Assessment in  Navigator will be completed. Advance Directives:      Code Status: Full Code   Patient's Primary Decision Maker is:        Discharge Planning:    Patient lives with: Spouse/Significant Other Type of Home: House  Primary Care Giver: Spouse  Patient Support Systems include: Family Members, Spouse/Significant Other   Current Financial resources: Medicare  Current community resources: None  Current services prior to admission: None            Current DME:              Type of Home Care services:  Skilled Therapy    ADLS  Prior functional level: Dressing, Cooking, Housework, Shopping  Current functional level: Dressing, Cooking, Housework    PT AM-PAC:   /24  OT AM-PAC:   /24    Family can provide assistance at DC: No  Would you like Case Management to discuss the discharge plan with any other family members/significant others, and if so, who?  No  Plans to Return to Present Housing: No  Other Identified Issues/Barriers to RETURNING to current housing: yes, pt needing more assistance with

## 2023-10-31 LAB
ANION GAP SERPL CALCULATED.3IONS-SCNC: 8 MMOL/L (ref 9–17)
BASOPHILS # BLD: 0.04 K/UL (ref 0–0.2)
BASOPHILS NFR BLD: 1 % (ref 0–2)
BUN SERPL-MCNC: 21 MG/DL (ref 8–23)
BUN/CREAT SERPL: 19 (ref 9–20)
CALCIUM SERPL-MCNC: 8.8 MG/DL (ref 8.6–10.4)
CHLORIDE SERPL-SCNC: 104 MMOL/L (ref 98–107)
CO2 SERPL-SCNC: 27 MMOL/L (ref 20–31)
CREAT SERPL-MCNC: 1.1 MG/DL (ref 0.5–0.9)
EOSINOPHIL # BLD: 0.1 K/UL (ref 0–0.44)
EOSINOPHILS RELATIVE PERCENT: 2 % (ref 1–4)
ERYTHROCYTE [DISTWIDTH] IN BLOOD BY AUTOMATED COUNT: 13.9 % (ref 11.8–14.4)
GFR SERPL CREATININE-BSD FRML MDRD: 55 ML/MIN/1.73M2
GLUCOSE BLD-MCNC: 142 MG/DL (ref 65–105)
GLUCOSE BLD-MCNC: 146 MG/DL (ref 65–105)
GLUCOSE BLD-MCNC: 158 MG/DL (ref 65–105)
GLUCOSE BLD-MCNC: 174 MG/DL (ref 65–105)
GLUCOSE SERPL-MCNC: 146 MG/DL (ref 70–99)
HCT VFR BLD AUTO: 28.6 % (ref 36.3–47.1)
HGB BLD-MCNC: 9.1 G/DL (ref 11.9–15.1)
IMM GRANULOCYTES # BLD AUTO: 0.07 K/UL (ref 0–0.3)
IMM GRANULOCYTES NFR BLD: 1 %
LYMPHOCYTES NFR BLD: 1.76 K/UL (ref 1.1–3.7)
LYMPHOCYTES RELATIVE PERCENT: 26 % (ref 24–43)
MCH RBC QN AUTO: 29.1 PG (ref 25.2–33.5)
MCHC RBC AUTO-ENTMCNC: 31.8 G/DL (ref 25.2–33.5)
MCV RBC AUTO: 91.4 FL (ref 82.6–102.9)
MONOCYTES NFR BLD: 0.66 K/UL (ref 0.1–1.2)
MONOCYTES NFR BLD: 10 % (ref 3–12)
NEUTROPHILS NFR BLD: 60 % (ref 36–65)
NEUTS SEG NFR BLD: 4.26 K/UL (ref 1.5–8.1)
NRBC BLD-RTO: 0 PER 100 WBC
PLATELET # BLD AUTO: 233 K/UL (ref 138–453)
PMV BLD AUTO: 10.6 FL (ref 8.1–13.5)
POTASSIUM SERPL-SCNC: 3.9 MMOL/L (ref 3.7–5.3)
RBC # BLD AUTO: 3.13 M/UL (ref 3.95–5.11)
SODIUM SERPL-SCNC: 139 MMOL/L (ref 135–144)
WBC OTHER # BLD: 6.9 K/UL (ref 3.5–11.3)

## 2023-10-31 PROCEDURE — 85025 COMPLETE CBC W/AUTO DIFF WBC: CPT

## 2023-10-31 PROCEDURE — 97116 GAIT TRAINING THERAPY: CPT

## 2023-10-31 PROCEDURE — 99231 SBSQ HOSP IP/OBS SF/LOW 25: CPT | Performed by: INTERNAL MEDICINE

## 2023-10-31 PROCEDURE — 2580000003 HC RX 258: Performed by: FAMILY MEDICINE

## 2023-10-31 PROCEDURE — 80048 BASIC METABOLIC PNL TOTAL CA: CPT

## 2023-10-31 PROCEDURE — 2060000000 HC ICU INTERMEDIATE R&B

## 2023-10-31 PROCEDURE — 94760 N-INVAS EAR/PLS OXIMETRY 1: CPT

## 2023-10-31 PROCEDURE — 36415 COLL VENOUS BLD VENIPUNCTURE: CPT

## 2023-10-31 PROCEDURE — 97535 SELF CARE MNGMENT TRAINING: CPT

## 2023-10-31 PROCEDURE — 6360000002 HC RX W HCPCS: Performed by: FAMILY MEDICINE

## 2023-10-31 PROCEDURE — 82947 ASSAY GLUCOSE BLOOD QUANT: CPT

## 2023-10-31 PROCEDURE — 6370000000 HC RX 637 (ALT 250 FOR IP): Performed by: FAMILY MEDICINE

## 2023-10-31 RX ADMIN — PANTOPRAZOLE SODIUM 40 MG: 40 TABLET, DELAYED RELEASE ORAL at 21:39

## 2023-10-31 RX ADMIN — HYDROCODONE BITARTRATE AND ACETAMINOPHEN 1 TABLET: 5; 325 TABLET ORAL at 09:12

## 2023-10-31 RX ADMIN — ENOXAPARIN SODIUM 30 MG: 100 INJECTION SUBCUTANEOUS at 09:33

## 2023-10-31 RX ADMIN — CEFTRIAXONE 1000 MG: 1 INJECTION, POWDER, FOR SOLUTION INTRAMUSCULAR; INTRAVENOUS at 23:55

## 2023-10-31 RX ADMIN — ACETAMINOPHEN 650 MG: 325 TABLET ORAL at 05:43

## 2023-10-31 RX ADMIN — SODIUM CHLORIDE: 9 INJECTION, SOLUTION INTRAVENOUS at 09:38

## 2023-10-31 RX ADMIN — DULOXETINE HYDROCHLORIDE 60 MG: 30 CAPSULE, DELAYED RELEASE ORAL at 09:12

## 2023-10-31 RX ADMIN — HYDROCODONE BITARTRATE AND ACETAMINOPHEN 1 TABLET: 5; 325 TABLET ORAL at 21:39

## 2023-10-31 RX ADMIN — GABAPENTIN 400 MG: 300 CAPSULE ORAL at 21:41

## 2023-10-31 RX ADMIN — ACETAMINOPHEN 650 MG: 325 TABLET ORAL at 17:56

## 2023-10-31 RX ADMIN — MICONAZOLE NITRATE: 20 POWDER TOPICAL at 09:30

## 2023-10-31 RX ADMIN — PANTOPRAZOLE SODIUM 40 MG: 40 TABLET, DELAYED RELEASE ORAL at 09:12

## 2023-10-31 RX ADMIN — ENOXAPARIN SODIUM 30 MG: 100 INJECTION SUBCUTANEOUS at 21:41

## 2023-10-31 RX ADMIN — MICONAZOLE NITRATE: 20 POWDER TOPICAL at 21:44

## 2023-10-31 RX ADMIN — LISINOPRIL 20 MG: 20 TABLET ORAL at 09:13

## 2023-10-31 RX ADMIN — HYDROCHLOROTHIAZIDE 12.5 MG: 12.5 TABLET ORAL at 09:11

## 2023-10-31 RX ADMIN — PIOGLITAZONE 15 MG: 15 TABLET ORAL at 09:11

## 2023-10-31 RX ADMIN — PIOGLITAZONE 15 MG: 15 TABLET ORAL at 17:56

## 2023-10-31 RX ADMIN — LUBIPROSTONE 8 MCG: 8 CAPSULE ORAL at 17:58

## 2023-10-31 RX ADMIN — GABAPENTIN 400 MG: 300 CAPSULE ORAL at 09:11

## 2023-10-31 ASSESSMENT — PAIN - FUNCTIONAL ASSESSMENT
PAIN_FUNCTIONAL_ASSESSMENT: ACTIVITIES ARE NOT PREVENTED

## 2023-10-31 ASSESSMENT — PAIN SCALES - GENERAL
PAINLEVEL_OUTOF10: 7
PAINLEVEL_OUTOF10: 6
PAINLEVEL_OUTOF10: 7

## 2023-10-31 ASSESSMENT — PAIN DESCRIPTION - DESCRIPTORS
DESCRIPTORS: SHARP
DESCRIPTORS: ACHING
DESCRIPTORS: OTHER (COMMENT)
DESCRIPTORS: ACHING

## 2023-10-31 ASSESSMENT — PAIN DESCRIPTION - LOCATION
LOCATION: HEAD
LOCATION: RIB CAGE
LOCATION: HEAD
LOCATION: NECK
LOCATION: RIB CAGE

## 2023-10-31 ASSESSMENT — PAIN DESCRIPTION - ORIENTATION: ORIENTATION: LEFT

## 2023-10-31 NOTE — CARE COORDINATION
DISCHARGE BARRIERS       Reason for Referral:  SW completed a Psychosocial Assessment for evaluation of patient's mental health, social status, and functional capacity within the community. Charles Billings is a 76 y.o. female admitted due to AMS (altered mental status). Patient alone. SW provided supportive listening while patient discussed past medical history and events leading up to hospitalization. Mental Status:  Alert, oriented, and engaging during assessment. Decision Making:  Makes own decisions. Family/Social/Home Environment: lives with their spouse    Support: Discussed a good social support network     Current Services:  [employer]. Current DMEs: walker, rollator, shower chair, grab bars. PCP: Karla Mansfield MD and repots no issues affording medication.  status:   None     ADLs and means of transportation: Assisted in ADLs prior to hospitalization and unable to transport self. Food insecurity or needed financial assistance: Denies any food insecurity or financial concerns at this time. ACP and Code Status:  SW discussed an Advance Directive which included the patient's choices for care and treatment in the case of a health event that adversely affects decision-making abilities. SW provided education and resources. Charles Billings has no questions at this time and has agreed to keep me up-to-date should anything change. Charles Billings is a Full Code status and has NO advanced directive - not interested in additional information. Collaborative List of SNF/ECF/HH were provided: offered, accepted No discharge order at this time. Anticipated Needs/Discharge Plan:  Spoke with patient/family/representative about discharge plan. Patient/Family/Representative verbalizes understanding of the plan of care and denies discharge needs or further services at this time. SW provided business card.  SW will continue to monitor needs and assist as

## 2023-11-01 VITALS
SYSTOLIC BLOOD PRESSURE: 128 MMHG | TEMPERATURE: 98.3 F | OXYGEN SATURATION: 98 % | HEIGHT: 68 IN | DIASTOLIC BLOOD PRESSURE: 64 MMHG | BODY MASS INDEX: 43.8 KG/M2 | RESPIRATION RATE: 16 BRPM | WEIGHT: 289 LBS | HEART RATE: 68 BPM

## 2023-11-01 LAB
ANION GAP SERPL CALCULATED.3IONS-SCNC: 7 MMOL/L (ref 9–17)
BASOPHILS # BLD: 0.04 K/UL (ref 0–0.2)
BASOPHILS NFR BLD: 1 % (ref 0–2)
BUN SERPL-MCNC: 22 MG/DL (ref 8–23)
BUN/CREAT SERPL: 22 (ref 9–20)
CALCIUM SERPL-MCNC: 9 MG/DL (ref 8.6–10.4)
CHLORIDE SERPL-SCNC: 107 MMOL/L (ref 98–107)
CO2 SERPL-SCNC: 28 MMOL/L (ref 20–31)
CREAT SERPL-MCNC: 1 MG/DL (ref 0.5–0.9)
EOSINOPHIL # BLD: 0.19 K/UL (ref 0–0.44)
EOSINOPHILS RELATIVE PERCENT: 3 % (ref 1–4)
ERYTHROCYTE [DISTWIDTH] IN BLOOD BY AUTOMATED COUNT: 13.9 % (ref 11.8–14.4)
GFR SERPL CREATININE-BSD FRML MDRD: >60 ML/MIN/1.73M2
GLUCOSE BLD-MCNC: 140 MG/DL (ref 65–105)
GLUCOSE SERPL-MCNC: 151 MG/DL (ref 70–99)
HCT VFR BLD AUTO: 29.5 % (ref 36.3–47.1)
HGB BLD-MCNC: 9.2 G/DL (ref 11.9–15.1)
IMM GRANULOCYTES # BLD AUTO: 0.07 K/UL (ref 0–0.3)
IMM GRANULOCYTES NFR BLD: 1 %
LYMPHOCYTES NFR BLD: 1.67 K/UL (ref 1.1–3.7)
LYMPHOCYTES RELATIVE PERCENT: 25 % (ref 24–43)
MCH RBC QN AUTO: 28.8 PG (ref 25.2–33.5)
MCHC RBC AUTO-ENTMCNC: 31.2 G/DL (ref 25.2–33.5)
MCV RBC AUTO: 92.5 FL (ref 82.6–102.9)
MONOCYTES NFR BLD: 0.62 K/UL (ref 0.1–1.2)
MONOCYTES NFR BLD: 9 % (ref 3–12)
NEUTROPHILS NFR BLD: 61 % (ref 36–65)
NEUTS SEG NFR BLD: 3.98 K/UL (ref 1.5–8.1)
NRBC BLD-RTO: 0 PER 100 WBC
PLATELET # BLD AUTO: 238 K/UL (ref 138–453)
PMV BLD AUTO: 10.5 FL (ref 8.1–13.5)
POTASSIUM SERPL-SCNC: 3.9 MMOL/L (ref 3.7–5.3)
RBC # BLD AUTO: 3.19 M/UL (ref 3.95–5.11)
SODIUM SERPL-SCNC: 142 MMOL/L (ref 135–144)
WBC OTHER # BLD: 6.6 K/UL (ref 3.5–11.3)

## 2023-11-01 PROCEDURE — 85025 COMPLETE CBC W/AUTO DIFF WBC: CPT

## 2023-11-01 PROCEDURE — 6360000002 HC RX W HCPCS: Performed by: FAMILY MEDICINE

## 2023-11-01 PROCEDURE — 99238 HOSP IP/OBS DSCHRG MGMT 30/<: CPT | Performed by: INTERNAL MEDICINE

## 2023-11-01 PROCEDURE — 94760 N-INVAS EAR/PLS OXIMETRY 1: CPT

## 2023-11-01 PROCEDURE — 80048 BASIC METABOLIC PNL TOTAL CA: CPT

## 2023-11-01 PROCEDURE — 2580000003 HC RX 258: Performed by: FAMILY MEDICINE

## 2023-11-01 PROCEDURE — 36415 COLL VENOUS BLD VENIPUNCTURE: CPT

## 2023-11-01 PROCEDURE — 82947 ASSAY GLUCOSE BLOOD QUANT: CPT

## 2023-11-01 PROCEDURE — 6370000000 HC RX 637 (ALT 250 FOR IP): Performed by: FAMILY MEDICINE

## 2023-11-01 PROCEDURE — 97116 GAIT TRAINING THERAPY: CPT

## 2023-11-01 RX ORDER — BACLOFEN 10 MG/1
10 TABLET ORAL 3 TIMES DAILY PRN
Qty: 9 TABLET | Refills: 0 | Status: SHIPPED | OUTPATIENT
Start: 2023-11-01 | End: 2023-11-04

## 2023-11-01 RX ORDER — METOCLOPRAMIDE 10 MG/1
10 TABLET ORAL 4 TIMES DAILY PRN
Qty: 20 TABLET | Refills: 0
Start: 2023-11-01

## 2023-11-01 RX ADMIN — PANTOPRAZOLE SODIUM 40 MG: 40 TABLET, DELAYED RELEASE ORAL at 08:41

## 2023-11-01 RX ADMIN — PIOGLITAZONE 15 MG: 15 TABLET ORAL at 08:41

## 2023-11-01 RX ADMIN — HYDROCHLOROTHIAZIDE 12.5 MG: 12.5 TABLET ORAL at 08:41

## 2023-11-01 RX ADMIN — LISINOPRIL 20 MG: 20 TABLET ORAL at 08:41

## 2023-11-01 RX ADMIN — MICONAZOLE NITRATE: 20 POWDER TOPICAL at 08:45

## 2023-11-01 RX ADMIN — ENOXAPARIN SODIUM 30 MG: 100 INJECTION SUBCUTANEOUS at 08:41

## 2023-11-01 RX ADMIN — SODIUM CHLORIDE: 9 INJECTION, SOLUTION INTRAVENOUS at 02:28

## 2023-11-01 RX ADMIN — ACETAMINOPHEN 650 MG: 325 TABLET ORAL at 10:04

## 2023-11-01 RX ADMIN — DULOXETINE HYDROCHLORIDE 60 MG: 30 CAPSULE, DELAYED RELEASE ORAL at 08:41

## 2023-11-01 RX ADMIN — GABAPENTIN 400 MG: 300 CAPSULE ORAL at 08:41

## 2023-11-01 RX ADMIN — HYDROCODONE BITARTRATE AND ACETAMINOPHEN 1 TABLET: 5; 325 TABLET ORAL at 08:41

## 2023-11-01 ASSESSMENT — PAIN - FUNCTIONAL ASSESSMENT
PAIN_FUNCTIONAL_ASSESSMENT: PREVENTS OR INTERFERES SOME ACTIVE ACTIVITIES AND ADLS
PAIN_FUNCTIONAL_ASSESSMENT: ACTIVITIES ARE NOT PREVENTED

## 2023-11-01 ASSESSMENT — PAIN SCALES - WONG BAKER
WONGBAKER_NUMERICALRESPONSE: 2
WONGBAKER_NUMERICALRESPONSE: 0

## 2023-11-01 ASSESSMENT — PAIN DESCRIPTION - LOCATION
LOCATION: HEAD
LOCATION: NECK

## 2023-11-01 ASSESSMENT — PAIN SCALES - GENERAL
PAINLEVEL_OUTOF10: 6
PAINLEVEL_OUTOF10: 7

## 2023-11-01 ASSESSMENT — PAIN DESCRIPTION - DESCRIPTORS
DESCRIPTORS: ACHING;DULL
DESCRIPTORS: ACHING

## 2023-11-01 NOTE — DISCHARGE INSTR - DIET

## 2023-11-01 NOTE — PLAN OF CARE
Primary nurse entered pt room to give pt ordered medicine, metformin and actos. Pt was awake but not alert. Writer tried to stimulate the pt, pt did not respond. Writer Offered a sip of water to pt before giving meds, pt did not drink or suck. Primary nurse tried several times to arouse the pt, Pt stayed the same. At this time Writer did not feel safe giving meds to pt. Primary nurse called Dr. Fidencio Liy because pt could not take pills and pt needed coverage for blood glucose. Dr. Arely Otero to hold gabapentin and Norco for now. No insulin sliding scale orders given.
Problem: Discharge Planning  Goal: Discharge to home or other facility with appropriate resources  10/28/2023 2343 by Vidal Frank RN  Outcome: Progressing  10/28/2023 1412 by Leonidas Rodriguez RN  Outcome: Progressing     Problem: Safety - Adult  Goal: Free from fall injury  10/28/2023 2343 by Vidal Frank RN  Outcome: Progressing  10/28/2023 1412 by Leonidas Rodriguez RN  Outcome: Progressing     Problem: Pain  Goal: Verbalizes/displays adequate comfort level or baseline comfort level  10/28/2023 2343 by Vidal Frank RN  Outcome: Progressing  10/28/2023 1412 by Leonidas Rodriguez RN  Outcome: Progressing     Problem: ABCDS Injury Assessment  Goal: Absence of physical injury  10/28/2023 2343 by Vidal Frank RN  Outcome: Progressing  10/28/2023 1412 by Leonidas Rodriguez RN  Outcome: Progressing     Problem: Skin/Tissue Integrity  Goal: Absence of new skin breakdown  Description: 1. Monitor for areas of redness and/or skin breakdown  2. Assess vascular access sites hourly  3. Every 4-6 hours minimum:  Change oxygen saturation probe site  4. Every 4-6 hours:  If on nasal continuous positive airway pressure, respiratory therapy assess nares and determine need for appliance change or resting period. 10/28/2023 2343 by Vidal Frank RN  Outcome: Progressing  10/28/2023 1412 by Leonidas Rodriguez RN  Outcome: Progressing     Problem: Confusion  Goal: Confusion, delirium, dementia, or psychosis is improved or at baseline  Description: INTERVENTIONS:  1. Assess for possible contributors to thought disturbance, including medications, impaired vision or hearing, underlying metabolic abnormalities, dehydration, psychiatric diagnoses, and notify attending LIP  2. [place] high risk fall precautions, as indicated  3. Provide frequent short contacts to provide reality reorientation, refocusing and direction  4. Decrease environmental stimuli, including noise as appropriate  5.  Monitor and intervene to maintain adequate nutrition, hydration,
Problem: Discharge Planning  Goal: Discharge to home or other facility with appropriate resources  10/31/2023 0951 by Sadie Alegria RN  Outcome: Progressing  10/30/2023 2147 by Swapna Diaz RN  Outcome: Progressing     Problem: Safety - Adult  Goal: Free from fall injury  10/31/2023 0951 by Sadie Alegria RN  Outcome: Progressing  10/30/2023 2147 by Swapna Diaz RN  Outcome: Progressing     Problem: Pain  Goal: Verbalizes/displays adequate comfort level or baseline comfort level  10/31/2023 0951 by Sadie Alegria RN  Outcome: Progressing  10/30/2023 2147 by Swapna Diaz RN  Outcome: Progressing     Problem: ABCDS Injury Assessment  Goal: Absence of physical injury  10/31/2023 0951 by Sadie Alegria RN  Outcome: Progressing  10/30/2023 2147 by Swapna Diaz RN  Outcome: Progressing
Problem: Discharge Planning  Goal: Discharge to home or other facility with appropriate resources  10/31/2023 2114 by Vidal Frank RN  Outcome: Progressing  Flowsheets (Taken 10/31/2023 1505 by Jeff Velez RN)  Discharge to home or other facility with appropriate resources:   Identify barriers to discharge with patient and caregiver   Arrange for needed discharge resources and transportation as appropriate   Identify discharge learning needs (meds, wound care, etc)   Refer to discharge planning if patient needs post-hospital services based on physician order or complex needs related to functional status, cognitive ability or social support system  10/31/2023 0951 by Marian Boswell RN  Outcome: Progressing     Problem: Safety - Adult  Goal: Free from fall injury  10/31/2023 2114 by Vidal Frank RN  Outcome: Progressing  10/31/2023 0951 by Marian Boswell RN  Outcome: Progressing     Problem: Pain  Goal: Verbalizes/displays adequate comfort level or baseline comfort level  10/31/2023 2114 by Vidal Frank RN  Outcome: Progressing  10/31/2023 0951 by Marian Boswell RN  Outcome: Progressing     Problem: ABCDS Injury Assessment  Goal: Absence of physical injury  10/31/2023 2114 by Vidal Frank RN  Outcome: Progressing  10/31/2023 0951 by Marian Boswell RN  Outcome: Progressing     Problem: Skin/Tissue Integrity  Goal: Absence of new skin breakdown  Description: 1. Monitor for areas of redness and/or skin breakdown  2. Assess vascular access sites hourly  3. Every 4-6 hours minimum:  Change oxygen saturation probe site  4. Every 4-6 hours:  If on nasal continuous positive airway pressure, respiratory therapy assess nares and determine need for appliance change or resting period.   10/31/2023 2114 by Vidal Frank RN  Outcome: Progressing  10/31/2023 0951 by Marian Boswell RN  Outcome: Progressing     Problem: Confusion  Goal: Confusion, delirium, dementia, or psychosis is improved or at
Problem: Discharge Planning  Goal: Discharge to home or other facility with appropriate resources  11/1/2023 1213 by Gianluca Christianson RN  Outcome: Adequate for Discharge  11/1/2023 1020 by Gianluca Christianson RN  Outcome: Progressing     Problem: Safety - Adult  Goal: Free from fall injury  11/1/2023 1213 by Gianluca Christianson RN  Outcome: Adequate for Discharge  11/1/2023 1020 by Gianluca Christianson RN  Outcome: Progressing     Problem: Pain  Goal: Verbalizes/displays adequate comfort level or baseline comfort level  11/1/2023 1213 by Gianluca Christianson RN  Outcome: Adequate for Discharge  11/1/2023 1020 by Gianluca Christianson RN  Outcome: Progressing     Problem: ABCDS Injury Assessment  Goal: Absence of physical injury  11/1/2023 1213 by Gianluca Christianson RN  Outcome: Adequate for Discharge  11/1/2023 1020 by Gianluca Christianson RN  Outcome: Progressing     Problem: Skin/Tissue Integrity  Goal: Absence of new skin breakdown  Description: 1. Monitor for areas of redness and/or skin breakdown  2. Assess vascular access sites hourly  3. Every 4-6 hours minimum:  Change oxygen saturation probe site  4. Every 4-6 hours:  If on nasal continuous positive airway pressure, respiratory therapy assess nares and determine need for appliance change or resting period. 11/1/2023 1213 by Gianluca Christianson RN  Outcome: Adequate for Discharge  11/1/2023 1020 by Gianluca Christianson RN  Outcome: Progressing     Problem: Confusion  Goal: Confusion, delirium, dementia, or psychosis is improved or at baseline  Description: INTERVENTIONS:  1. Assess for possible contributors to thought disturbance, including medications, impaired vision or hearing, underlying metabolic abnormalities, dehydration, psychiatric diagnoses, and notify attending LIP  2. [place] high risk fall precautions, as indicated  3. Provide frequent short contacts to provide reality reorientation, refocusing and direction  4. Decrease environmental stimuli, including noise as appropriate  5.  Monitor and intervene to
Problem: Discharge Planning  Goal: Discharge to home or other facility with appropriate resources  Outcome: Progressing     Problem: Safety - Adult  Goal: Free from fall injury  Outcome: Progressing     Problem: Pain  Goal: Verbalizes/displays adequate comfort level or baseline comfort level  Outcome: Progressing     Problem: ABCDS Injury Assessment  Goal: Absence of physical injury  Outcome: Progressing     Problem: Skin/Tissue Integrity  Goal: Absence of new skin breakdown  Description: 1. Monitor for areas of redness and/or skin breakdown  2. Assess vascular access sites hourly  3. Every 4-6 hours minimum:  Change oxygen saturation probe site  4. Every 4-6 hours:  If on nasal continuous positive airway pressure, respiratory therapy assess nares and determine need for appliance change or resting period.   Outcome: Progressing
Problem: Discharge Planning  Goal: Discharge to home or other facility with appropriate resources  Outcome: Progressing     Problem: Safety - Adult  Goal: Free from fall injury  Outcome: Progressing     Problem: Pain  Goal: Verbalizes/displays adequate comfort level or baseline comfort level  Outcome: Progressing     Problem: ABCDS Injury Assessment  Goal: Absence of physical injury  Outcome: Progressing     Problem: Skin/Tissue Integrity  Goal: Absence of new skin breakdown  Description: 1. Monitor for areas of redness and/or skin breakdown  2. Assess vascular access sites hourly  3. Every 4-6 hours minimum:  Change oxygen saturation probe site  4. Every 4-6 hours:  If on nasal continuous positive airway pressure, respiratory therapy assess nares and determine need for appliance change or resting period. Outcome: Progressing     Problem: Confusion  Goal: Confusion, delirium, dementia, or psychosis is improved or at baseline  Description: INTERVENTIONS:  1. Assess for possible contributors to thought disturbance, including medications, impaired vision or hearing, underlying metabolic abnormalities, dehydration, psychiatric diagnoses, and notify attending LIP  2. [place] high risk fall precautions, as indicated  3. Provide frequent short contacts to provide reality reorientation, refocusing and direction  4. Decrease environmental stimuli, including noise as appropriate  5. Monitor and intervene to maintain adequate nutrition, hydration, elimination, sleep and activity  6. If unable to ensure safety without constant attention obtain sitter and review sitter guidelines with assigned personnel  7.  Initiate Psychosocial CNS and Spiritual Care consult, as indicated  Outcome: Progressing     Problem: Chronic Conditions and Co-morbidities  Goal: Patient's chronic conditions and co-morbidity symptoms are monitored and maintained or improved  Outcome: Progressing
Problem: Discharge Planning  Goal: Discharge to home or other facility with appropriate resources  Outcome: Progressing     Problem: Safety - Adult  Goal: Free from fall injury  Outcome: Progressing     Problem: Pain  Goal: Verbalizes/displays adequate comfort level or baseline comfort level  Outcome: Progressing     Problem: ABCDS Injury Assessment  Goal: Absence of physical injury  Outcome: Progressing     Problem: Skin/Tissue Integrity  Goal: Absence of new skin breakdown  Description: 1. Monitor for areas of redness and/or skin breakdown  2. Assess vascular access sites hourly  3. Every 4-6 hours minimum:  Change oxygen saturation probe site  4. Every 4-6 hours:  If on nasal continuous positive airway pressure, respiratory therapy assess nares and determine need for appliance change or resting period. Outcome: Progressing     Problem: Confusion  Goal: Confusion, delirium, dementia, or psychosis is improved or at baseline  Description: INTERVENTIONS:  1. Assess for possible contributors to thought disturbance, including medications, impaired vision or hearing, underlying metabolic abnormalities, dehydration, psychiatric diagnoses, and notify attending LIP  2. [place] high risk fall precautions, as indicated  3. Provide frequent short contacts to provide reality reorientation, refocusing and direction  4. Decrease environmental stimuli, including noise as appropriate  5. Monitor and intervene to maintain adequate nutrition, hydration, elimination, sleep and activity  6. If unable to ensure safety without constant attention obtain sitter and review sitter guidelines with assigned personnel  7.  Initiate Psychosocial CNS and Spiritual Care consult, as indicated  Outcome: Progressing
deterioration, or improvement   Update acute care plan with appropriate goals if chronic or comorbid symptoms are exacerbated and prevent overall improvement and discharge     Problem: Nutrition Deficit:  Goal: Optimize nutritional status  Outcome: Progressing

## 2023-11-02 LAB
MICROORGANISM SPEC CULT: NORMAL
MICROORGANISM SPEC CULT: NORMAL
SERVICE CMNT-IMP: NORMAL
SERVICE CMNT-IMP: NORMAL
SPECIMEN DESCRIPTION: NORMAL
SPECIMEN DESCRIPTION: NORMAL

## 2023-11-07 ENCOUNTER — TELEPHONE (OUTPATIENT)
Dept: FAMILY MEDICINE CLINIC | Age: 68
End: 2023-11-07

## 2023-11-07 DIAGNOSIS — I10 HYPERTENSION, ESSENTIAL: ICD-10-CM

## 2023-11-07 DIAGNOSIS — N18.32 STAGE 3B CHRONIC KIDNEY DISEASE (HCC): ICD-10-CM

## 2023-11-07 RX ORDER — LISINOPRIL AND HYDROCHLOROTHIAZIDE 20; 12.5 MG/1; MG/1
1 TABLET ORAL DAILY
Qty: 90 TABLET | Refills: 1 | Status: SHIPPED | OUTPATIENT
Start: 2023-11-07

## 2023-11-07 NOTE — TELEPHONE ENCOUNTER
Tay Rhoades called requesting a refill of the below medication which has been pended for you:     Requested Prescriptions     Pending Prescriptions Disp Refills    lisinopril-hydroCHLOROthiazide (PRINZIDE;ZESTORETIC) 20-12.5 MG per tablet [Pharmacy Med Name: Lisinopril-hydroCHLOROthiazide 20-12.5 MG Oral Tablet] 90 tablet 1     Sig: TAKE 1 TABLET BY MOUTH DAILY       Last Appointment Date: 10/26/2023  Next Appointment Date: 3/14/2024    Allergies   Allergen Reactions    Asa [Aspirin] Other (See Comments)     Stomach irritation    Pollen Extract

## 2023-11-07 NOTE — TELEPHONE ENCOUNTER
Patient was admitted into the hospital 10/28-11/1 for altered mental status. She was to be discharged to [facility] in [facility], however she went home instead due to the distance away the nursing home is from [facility]. She would like to go to the [facility] for therapy. She is calling the [facility] to see if any specific order is needed to be admitted.

## 2023-11-08 ENCOUNTER — TELEPHONE (OUTPATIENT)
Dept: FAMILY MEDICINE CLINIC | Age: 68
End: 2023-11-08

## 2023-11-08 NOTE — TELEPHONE ENCOUNTER
Patient was seen by ortho on 10/27/23. (Following UC visit on 10/26 for a fall)  Cervical spine pain, Compression fracture of C-Spine and closed fracture of multiple ribs. MRI ordered and completed 10/27     Patent was admitted into [facility] 10/28-11/1 for UTI and fall. Patient would like to go back to outpatient therapy at [facility] however will need clearance first.     Please advise.

## 2023-11-08 NOTE — TELEPHONE ENCOUNTER
Pt calling stating she was going to PT prior to her ribs being fractured and now for her to go back she needs a note stating pt is healed enough to resume therapy, pt is doing this at [facility], please advise pt at above number regarding this.

## 2023-11-08 NOTE — TELEPHONE ENCOUNTER
Patient states she is no longer interested in going to the [address] [place] [address].  She would like to resume going to [facility]

## 2023-11-10 ENCOUNTER — HOSPITAL ENCOUNTER (OUTPATIENT)
Dept: GENERAL RADIOLOGY | Age: 68
End: 2023-11-10
Payer: MEDICARE

## 2023-11-10 ENCOUNTER — OFFICE VISIT (OUTPATIENT)
Dept: ORTHOPEDIC SURGERY | Age: 68
End: 2023-11-10
Payer: MEDICARE

## 2023-11-10 VITALS
HEIGHT: 68 IN | BODY MASS INDEX: 43.8 KG/M2 | WEIGHT: 289 LBS | HEART RATE: 74 BPM | SYSTOLIC BLOOD PRESSURE: 130 MMHG | DIASTOLIC BLOOD PRESSURE: 80 MMHG

## 2023-11-10 DIAGNOSIS — M54.2 CERVICAL SPINE PAIN: Primary | ICD-10-CM

## 2023-11-10 DIAGNOSIS — S12.9XXA COMPRESSION FRACTURE OF C-SPINE, INITIAL ENCOUNTER (HCC): ICD-10-CM

## 2023-11-10 DIAGNOSIS — S22.020D CLOSED WEDGE COMPRESSION FRACTURE OF T2 VERTEBRA WITH ROUTINE HEALING, SUBSEQUENT ENCOUNTER: ICD-10-CM

## 2023-11-10 DIAGNOSIS — S12.690D COMPRESSION FRACTURE OF C7 VERTEBRA WITH ROUTINE HEALING, SUBSEQUENT ENCOUNTER: ICD-10-CM

## 2023-11-10 DIAGNOSIS — M54.2 CERVICAL SPINE PAIN: ICD-10-CM

## 2023-11-10 DIAGNOSIS — S22.010D CLOSED WEDGE COMPRESSION FRACTURE OF T1 VERTEBRA WITH ROUTINE HEALING, SUBSEQUENT ENCOUNTER: ICD-10-CM

## 2023-11-10 DIAGNOSIS — S12.490D COMPRESSION FRACTURE OF C5 VERTEBRA WITH ROUTINE HEALING, SUBSEQUENT ENCOUNTER: ICD-10-CM

## 2023-11-10 PROCEDURE — 72050 X-RAY EXAM NECK SPINE 4/5VWS: CPT

## 2023-11-10 PROCEDURE — 99214 OFFICE O/P EST MOD 30 MIN: CPT | Performed by: ORTHOPAEDIC SURGERY

## 2023-11-10 RX ORDER — HYDROCODONE BITARTRATE AND ACETAMINOPHEN 5; 325 MG/1; MG/1
1 TABLET ORAL EVERY 6 HOURS PRN
Qty: 28 TABLET | Refills: 0 | Status: SHIPPED | OUTPATIENT
Start: 2023-11-10 | End: 2023-11-17

## 2023-11-10 NOTE — PROGRESS NOTES
tobacco: Never   Vaping Use    Vaping Use: Never used   Substance and Sexual Activity    Alcohol use: Yes     Alcohol/week: 0.0 standard drinks of alcohol     Comment: Maybe twice a year, small amounts    Drug use: No    Sexual activity: Yes     Partners: Male   Other Topics Concern    Not on file   Social History Narrative    Not on file     Social Determinants of Health     Financial Resource Strain: Unknown (3/14/2023)    Overall Financial Resource Strain (CARDIA)     Difficulty of Paying Living Expenses: Patient refused   Food Insecurity: Unknown (3/14/2023)    Hunger Vital Sign     Worried About Running Out of Food in the Last Year: Patient refused     [address] in the Last Year: Patient refused   Transportation Needs: Unknown (3/14/2023)    PRAPARE - Transportation     Lack of Transportation (Medical):  Not on file     Lack of Transportation (Non-Medical): Patient refused   Physical Activity: Insufficiently Active (9/15/2023)    Exercise Vital Sign     Days of Exercise per Week: 4 days     Minutes of Exercise per Session: 20 min   Stress: Not on file   Social Connections: Not on file   Intimate Partner Violence: Not on file   Housing Stability: Unknown (3/14/2023)    Housing Stability Vital Sign     Unable to Pay for Housing in the Last Year: Not on file     Number of [address] in the Last Year: Not on file     Unstable Housing in the Last Year: Patient refused     Past Medical History:   Diagnosis Date    Acute cystitis with hematuria 9/19/2021    Elizalde's esophagus     Cervical spine pain 08/21/2013    Chronic headaches     Chronic sinusitis     CKD (chronic kidney disease)     stage 3    Closed fracture of distal end of left femur with routine healing 03/2021    COVID-19     Diabetes mellitus ([address])     Dizziness     Femur fracture, left ([address]) 3/5/2021    Gastroesophageal reflux disease     barretts    H/O fall     Hypertension     Knee pain, left 08/21/2013    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

## 2023-11-10 NOTE — TELEPHONE ENCOUNTER
See telephone encounter 11/8/2023. She was advised to follow up with orthopedic surgery to determine appropriateness for therapy.

## 2023-11-15 DIAGNOSIS — S12.490D COMPRESSION FRACTURE OF C5 VERTEBRA WITH ROUTINE HEALING, SUBSEQUENT ENCOUNTER: Primary | ICD-10-CM

## 2023-11-17 ENCOUNTER — HOSPITAL ENCOUNTER (OUTPATIENT)
Dept: GENERAL RADIOLOGY | Age: 68
End: 2023-11-17
Attending: ORTHOPAEDIC SURGERY
Payer: MEDICARE

## 2023-11-17 ENCOUNTER — OFFICE VISIT (OUTPATIENT)
Dept: ORTHOPEDIC SURGERY | Age: 68
End: 2023-11-17
Attending: ORTHOPAEDIC SURGERY
Payer: MEDICARE

## 2023-11-17 VITALS
HEIGHT: 68 IN | SYSTOLIC BLOOD PRESSURE: 120 MMHG | BODY MASS INDEX: 43.8 KG/M2 | DIASTOLIC BLOOD PRESSURE: 74 MMHG | WEIGHT: 289 LBS | HEART RATE: 68 BPM

## 2023-11-17 DIAGNOSIS — M54.2 CERVICAL SPINE PAIN: Primary | ICD-10-CM

## 2023-11-17 DIAGNOSIS — S22.010D CLOSED WEDGE COMPRESSION FRACTURE OF T1 VERTEBRA WITH ROUTINE HEALING, SUBSEQUENT ENCOUNTER: ICD-10-CM

## 2023-11-17 DIAGNOSIS — S22.43XA CLOSED FRACTURE OF MULTIPLE RIBS OF BOTH SIDES, INITIAL ENCOUNTER: ICD-10-CM

## 2023-11-17 DIAGNOSIS — S12.690D COMPRESSION FRACTURE OF C7 VERTEBRA WITH ROUTINE HEALING, SUBSEQUENT ENCOUNTER: ICD-10-CM

## 2023-11-17 DIAGNOSIS — S12.9XXA COMPRESSION FRACTURE OF C-SPINE, INITIAL ENCOUNTER (HCC): ICD-10-CM

## 2023-11-17 DIAGNOSIS — S12.490D COMPRESSION FRACTURE OF C5 VERTEBRA WITH ROUTINE HEALING, SUBSEQUENT ENCOUNTER: ICD-10-CM

## 2023-11-17 DIAGNOSIS — S22.020D CLOSED WEDGE COMPRESSION FRACTURE OF T2 VERTEBRA WITH ROUTINE HEALING, SUBSEQUENT ENCOUNTER: ICD-10-CM

## 2023-11-17 PROCEDURE — 3074F SYST BP LT 130 MM HG: CPT | Performed by: ORTHOPAEDIC SURGERY

## 2023-11-17 PROCEDURE — 72040 X-RAY EXAM NECK SPINE 2-3 VW: CPT

## 2023-11-17 PROCEDURE — 3017F COLORECTAL CA SCREEN DOC REV: CPT | Performed by: ORTHOPAEDIC SURGERY

## 2023-11-17 PROCEDURE — 99214 OFFICE O/P EST MOD 30 MIN: CPT | Performed by: ORTHOPAEDIC SURGERY

## 2023-11-17 PROCEDURE — G8484 FLU IMMUNIZE NO ADMIN: HCPCS | Performed by: ORTHOPAEDIC SURGERY

## 2023-11-17 PROCEDURE — G8400 PT W/DXA NO RESULTS DOC: HCPCS | Performed by: ORTHOPAEDIC SURGERY

## 2023-11-17 PROCEDURE — 1123F ACP DISCUSS/DSCN MKR DOCD: CPT | Performed by: ORTHOPAEDIC SURGERY

## 2023-11-17 PROCEDURE — 99213 OFFICE O/P EST LOW 20 MIN: CPT | Performed by: ORTHOPAEDIC SURGERY

## 2023-11-17 PROCEDURE — G8427 DOCREV CUR MEDS BY ELIG CLIN: HCPCS | Performed by: ORTHOPAEDIC SURGERY

## 2023-11-17 PROCEDURE — G8417 CALC BMI ABV UP PARAM F/U: HCPCS | Performed by: ORTHOPAEDIC SURGERY

## 2023-11-17 PROCEDURE — 1090F PRES/ABSN URINE INCON ASSESS: CPT | Performed by: ORTHOPAEDIC SURGERY

## 2023-11-17 PROCEDURE — 3078F DIAST BP <80 MM HG: CPT | Performed by: ORTHOPAEDIC SURGERY

## 2023-11-17 PROCEDURE — 1111F DSCHRG MED/CURRENT MED MERGE: CPT | Performed by: ORTHOPAEDIC SURGERY

## 2023-11-17 PROCEDURE — 1036F TOBACCO NON-USER: CPT | Performed by: ORTHOPAEDIC SURGERY

## 2023-11-17 NOTE — PROGRESS NOTES
apnea    SHOULDER ARTHROPLASTY Right 10/18/2018    Harsh Jung MD; done at [address] [facility] Right 03/07/2019    revision arthroscopy with debridement,revision mini-open rotator cuff repair per Dr Roman Albert at [address]      barretts, gastric ulcers    UPPER GASTROINTESTINAL ENDOSCOPY  05/27/2020    gastric ulcer, small hiatal hernia, Dr. Tosin Desir, [address] N/A 12/23/2020    EGD BIOPSY performed by Farida Tam MD at [address], Elizalde's esophagus    UPPER GASTROINTESTINAL ENDOSCOPY N/A 10/18/2022    EGD BIOPSY performed by Lukas Parry MD at [address]     Family History   Problem Relation Age of Onset    Cancer Mother     Breast Cancer Mother 52    Hypertension Father     Diabetes Father     Cancer Father         Physical Exam:  Vitals signs and nursing note reviewed. Constitutional:       Appearance: well-developed. HENT:      Head: Normocephalic and atraumatic. Nose: Nose normal.   Eyes:      Conjunctiva/sclera: Conjunctivae normal.   Neck:      Musculoskeletal: Normal range of motion and neck supple. Pulmonary:      Effort: Pulmonary effort is normal. No respiratory distress. Musculoskeletal:      Comments: Normal gait     Skin:     General: Skin is warm and dry. Neurological:      Mental Status: Alert and oriented to person, place, and time. Sensory: No sensory deficit. Psychiatric:         Behavior: Behavior normal.         Thought Content: Thought content normal.        Provider Attestation:  Andres Veliz, personally performed the services described in this documentation. All medical record entries made by the scribe were at my direction and in my presence. I have reviewed the chart and discharge instructions and agree that the records reflect my personal performance and is accurate and complete. Akanksha Pal MD 11/17/23       Ronaldibmarcella

## 2023-11-20 ENCOUNTER — TELEPHONE (OUTPATIENT)
Dept: PHARMACY | Facility: CLINIC | Age: 68
End: 2023-11-20

## 2023-11-20 ENCOUNTER — HOSPITAL ENCOUNTER (EMERGENCY)
Age: 68
Discharge: HOME | End: 2023-11-20
Attending: EMERGENCY MEDICINE
Payer: MEDICARE

## 2023-11-20 ENCOUNTER — APPOINTMENT (OUTPATIENT)
Dept: GENERAL RADIOLOGY | Age: 68
End: 2023-11-20
Payer: MEDICARE

## 2023-11-20 ENCOUNTER — APPOINTMENT (OUTPATIENT)
Dept: CT IMAGING | Age: 68
End: 2023-11-20
Payer: MEDICARE

## 2023-11-20 VITALS
HEIGHT: 60 IN | TEMPERATURE: 97.7 F | WEIGHT: 280 LBS | HEART RATE: 74 BPM | OXYGEN SATURATION: 96 % | BODY MASS INDEX: 54.97 KG/M2 | DIASTOLIC BLOOD PRESSURE: 77 MMHG | SYSTOLIC BLOOD PRESSURE: 156 MMHG | RESPIRATION RATE: 14 BRPM

## 2023-11-20 DIAGNOSIS — N39.0 ACUTE UTI: ICD-10-CM

## 2023-11-20 DIAGNOSIS — R41.82 ALTERED MENTAL STATUS, UNSPECIFIED ALTERED MENTAL STATUS TYPE: Primary | ICD-10-CM

## 2023-11-20 LAB
ALBUMIN SERPL-MCNC: 4.1 G/DL (ref 3.5–5.2)
ALBUMIN/GLOB SERPL: 1.2 {RATIO} (ref 1–2.5)
ALP SERPL-CCNC: 203 U/L (ref 35–104)
ALT SERPL-CCNC: <5 U/L (ref 5–33)
AMYLASE SERPL-CCNC: 41 U/L (ref 28–100)
ANION GAP SERPL CALCULATED.3IONS-SCNC: 12 MMOL/L (ref 9–17)
AST SERPL-CCNC: 9 U/L
BACTERIA URNS QL MICRO: ABNORMAL
BASOPHILS # BLD: 0.03 K/UL (ref 0–0.2)
BASOPHILS NFR BLD: 0 % (ref 0–2)
BILIRUB DIRECT SERPL-MCNC: 0.1 MG/DL
BILIRUB INDIRECT SERPL-MCNC: 0.2 MG/DL (ref 0–1)
BILIRUB SERPL-MCNC: 0.3 MG/DL (ref 0.3–1.2)
BILIRUB UR QL STRIP: NEGATIVE
BNP SERPL-MCNC: 444 PG/ML
BUN SERPL-MCNC: 18 MG/DL (ref 8–23)
BUN/CREAT SERPL: 15 (ref 9–20)
CALCIUM SERPL-MCNC: 9.9 MG/DL (ref 8.6–10.4)
CHARACTER UR: ABNORMAL
CHLORIDE SERPL-SCNC: 95 MMOL/L (ref 98–107)
CLARITY UR: CLEAR
CO2 SERPL-SCNC: 28 MMOL/L (ref 20–31)
COLOR UR: YELLOW
CREAT SERPL-MCNC: 1.2 MG/DL (ref 0.5–0.9)
EOSINOPHIL # BLD: 0.08 K/UL (ref 0–0.44)
EOSINOPHILS RELATIVE PERCENT: 1 % (ref 1–4)
EPI CELLS #/AREA URNS HPF: ABNORMAL /HPF (ref 0–5)
ERYTHROCYTE [DISTWIDTH] IN BLOOD BY AUTOMATED COUNT: 14.4 % (ref 11.8–14.4)
GFR SERPL CREATININE-BSD FRML MDRD: 49 ML/MIN/1.73M2
GLOBULIN SER CALC-MCNC: 3.5 G/DL (ref 1.5–3.8)
GLUCOSE BLD-MCNC: 159 MG/DL (ref 65–105)
GLUCOSE SERPL-MCNC: 162 MG/DL (ref 70–99)
GLUCOSE UR STRIP-MCNC: NEGATIVE MG/DL
HCT VFR BLD AUTO: 34.4 % (ref 36.3–47.1)
HGB BLD-MCNC: 10.9 G/DL (ref 11.9–15.1)
HGB UR QL STRIP.AUTO: ABNORMAL
IMM GRANULOCYTES # BLD AUTO: 0.06 K/UL (ref 0–0.3)
IMM GRANULOCYTES NFR BLD: 1 %
KETONES UR STRIP-MCNC: NEGATIVE MG/DL
LACTATE BLDV-SCNC: 1.9 MMOL/L (ref 0.5–1.9)
LEUKOCYTE ESTERASE UR QL STRIP: ABNORMAL
LIPASE SERPL-CCNC: 18 U/L (ref 13–60)
LYMPHOCYTES NFR BLD: 1.25 K/UL (ref 1.1–3.7)
LYMPHOCYTES RELATIVE PERCENT: 16 % (ref 24–43)
MCH RBC QN AUTO: 29.4 PG (ref 25.2–33.5)
MCHC RBC AUTO-ENTMCNC: 31.7 G/DL (ref 25.2–33.5)
MCV RBC AUTO: 92.7 FL (ref 82.6–102.9)
MONOCYTES NFR BLD: 0.64 K/UL (ref 0.1–1.2)
MONOCYTES NFR BLD: 8 % (ref 3–12)
NEUTROPHILS NFR BLD: 74 % (ref 36–65)
NEUTS SEG NFR BLD: 5.92 K/UL (ref 1.5–8.1)
NITRITE UR QL STRIP: POSITIVE
NRBC BLD-RTO: 0 PER 100 WBC
PH UR STRIP: 5.5 [PH] (ref 5–6)
PLATELET # BLD AUTO: 297 K/UL (ref 138–453)
PMV BLD AUTO: 10.4 FL (ref 8.1–13.5)
POTASSIUM SERPL-SCNC: 3.8 MMOL/L (ref 3.7–5.3)
PROT SERPL-MCNC: 7.6 G/DL (ref 6.4–8.3)
PROT UR STRIP-MCNC: NEGATIVE MG/DL
RBC # BLD AUTO: 3.71 M/UL (ref 3.95–5.11)
RBC #/AREA URNS HPF: ABNORMAL /HPF (ref 0–4)
SODIUM SERPL-SCNC: 135 MMOL/L (ref 135–144)
SP GR UR STRIP: 1.01 (ref 1.01–1.02)
TROPONIN I SERPL HS-MCNC: 16 NG/L (ref 0–14)
UROBILINOGEN UR STRIP-ACNC: NORMAL EU/DL (ref 0–1)
WBC #/AREA URNS HPF: ABNORMAL /HPF (ref 0–4)
WBC OTHER # BLD: 8 K/UL (ref 3.5–11.3)

## 2023-11-20 PROCEDURE — 87086 URINE CULTURE/COLONY COUNT: CPT

## 2023-11-20 PROCEDURE — 2580000003 HC RX 258: Performed by: EMERGENCY MEDICINE

## 2023-11-20 PROCEDURE — 83690 ASSAY OF LIPASE: CPT

## 2023-11-20 PROCEDURE — 93005 ELECTROCARDIOGRAM TRACING: CPT | Performed by: EMERGENCY MEDICINE

## 2023-11-20 PROCEDURE — 83605 ASSAY OF LACTIC ACID: CPT

## 2023-11-20 PROCEDURE — 6360000002 HC RX W HCPCS: Performed by: EMERGENCY MEDICINE

## 2023-11-20 PROCEDURE — 70450 CT HEAD/BRAIN W/O DYE: CPT

## 2023-11-20 PROCEDURE — 87077 CULTURE AEROBIC IDENTIFY: CPT

## 2023-11-20 PROCEDURE — 81001 URINALYSIS AUTO W/SCOPE: CPT

## 2023-11-20 PROCEDURE — 71045 X-RAY EXAM CHEST 1 VIEW: CPT

## 2023-11-20 PROCEDURE — 80048 BASIC METABOLIC PNL TOTAL CA: CPT

## 2023-11-20 PROCEDURE — 99285 EMERGENCY DEPT VISIT HI MDM: CPT

## 2023-11-20 PROCEDURE — 96365 THER/PROPH/DIAG IV INF INIT: CPT

## 2023-11-20 PROCEDURE — 83880 ASSAY OF NATRIURETIC PEPTIDE: CPT

## 2023-11-20 PROCEDURE — 82947 ASSAY GLUCOSE BLOOD QUANT: CPT

## 2023-11-20 PROCEDURE — 84484 ASSAY OF TROPONIN QUANT: CPT

## 2023-11-20 PROCEDURE — 80076 HEPATIC FUNCTION PANEL: CPT

## 2023-11-20 PROCEDURE — 82150 ASSAY OF AMYLASE: CPT

## 2023-11-20 PROCEDURE — 85025 COMPLETE CBC W/AUTO DIFF WBC: CPT

## 2023-11-20 PROCEDURE — 87186 SC STD MICRODIL/AGAR DIL: CPT

## 2023-11-20 RX ORDER — NITROFURANTOIN 25; 75 MG/1; MG/1
100 CAPSULE ORAL 2 TIMES DAILY
Qty: 14 CAPSULE | Refills: 0 | Status: SHIPPED | OUTPATIENT
Start: 2023-11-20 | End: 2023-11-27

## 2023-11-20 RX ADMIN — CEFTRIAXONE 1000 MG: 1 INJECTION, POWDER, FOR SOLUTION INTRAMUSCULAR; INTRAVENOUS at 16:33

## 2023-11-20 ASSESSMENT — PAIN DESCRIPTION - LOCATION: LOCATION: NECK

## 2023-11-20 ASSESSMENT — PAIN SCALES - GENERAL: PAINLEVEL_OUTOF10: 5

## 2023-11-20 ASSESSMENT — PAIN - FUNCTIONAL ASSESSMENT: PAIN_FUNCTIONAL_ASSESSMENT: 0-10

## 2023-11-20 NOTE — DISCHARGE INSTRUCTIONS
Take medications as prescribed    Return immediately if any worsening symptoms or any other concerns    Tell us how we did visit: [email or web address]   and let us know about your experience

## 2023-11-21 ENCOUNTER — TELEPHONE (OUTPATIENT)
Dept: FAMILY MEDICINE CLINIC | Age: 68
End: 2023-11-21

## 2023-11-21 NOTE — TELEPHONE ENCOUNTER
Lanette is calling stating that she is an NP and did a medical annual wellness on patient. She is reporting that patient was positive for PVD. Left leg was 0.70 and right leg was 0.76. if you have any questions please call her at [phone].  please advise.

## 2023-11-22 LAB
EKG ATRIAL RATE: 64 BPM
EKG P AXIS: 88 DEGREES
EKG P-R INTERVAL: 178 MS
EKG Q-T INTERVAL: 436 MS
EKG QRS DURATION: 86 MS
EKG QTC CALCULATION (BAZETT): 449 MS
EKG R AXIS: 17 DEGREES
EKG T AXIS: 24 DEGREES
EKG VENTRICULAR RATE: 64 BPM

## 2023-11-23 LAB
MICROORGANISM SPEC CULT: ABNORMAL
SPECIMEN DESCRIPTION: ABNORMAL

## 2023-11-28 PROBLEM — N39.0 UTI (URINARY TRACT INFECTION): Status: RESOLVED | Noted: 2023-08-18 | Resolved: 2023-11-28

## 2023-11-28 NOTE — TELEPHONE ENCOUNTER
Dr. Lissy Cid MD,      Patient's insurance has identified statin use in persons with diabetes (SUPD) care gap:   67yo female with Type 2 DM, LDL 97, ASCVD risk score 26.1% - Would patient benefit from high-intensity statin therapy? Patient used to be on atorvastatin - unknown why discontinued. Consider re-starting atorvastatin, if no side effects occurred, or try another statin like rosuvastatin, if patient agreeable. I can reach out to patient to discuss statin therapy, if you'd like. Last visit: 10/26/2023, Next visit: 3/14/2024     See encounter note(s) below for complete details. Please let me know if you have any questions.       Thank you,  Jourdan Dyson, PharmD  PGY1 Pharmacy Resident  [facility], toll free: [phone], option 1    =======================================================    For Pharmacy Admin Tracking Only  Program: [place] in place:  No  Recommendation Provided To: Provider: 1 via Note to Provider  Intervention Detail: New Rx: 1, reason: Needs Additional Therapy  Intervention Accepted By: Provider: 0  Gap Closed?: No   Time Spent (min): 60
(>/=2.0 mg/L)     Patient has the following diabetes-specific risk enhancers independent of other risk factors in DM per ACC:   Long duration (>/=10 years for type 2 DM or >/=20 years for type 1 DM)   eGFR <60 mL/min/1.73 m^2   Neuropathy     2018 ACC/AHA and/or 2023 ADA guidelines:  Adults 40-76yo who have LDL  mg/dL who have a 10-year ASCVD risk 7.5% or higher and CKD not treated with dialysis or kidney transplantation is a risk-enhancing factor and initiation of a moderate-intensity statin or moderate-intensity statins combined with ezetimibe can be useful    LDL target goal:   <70 mg/dL - Aged 37-79 yo with DM with high risk for ASCVD (>20%) and reduce LDL by >/=50%    Per ADA 2023 Guidelines, the patient is a candidate for a high-intensity statin. According to the 2018 AHA/ACC Guideline on the Management of Blood Cholesterol, in patients who experienced statin associated side effects that were not severe, it is recommended to reassess and rechallenge to achieve maximal LDL-C reduction using a modified dosing regimen, an alternate statin (such as a hydrophilic statin), or a combination of a statin with non-statin therapy. Additionally, some strategies to help tolerate statins could be three time weekly dosing and while the 2018 AHA/ACC Guideline on the Management of Blood Cholesterol makes no recommendation for using coenzyme Q10 with statins or ensuring patient is not vitamin D deficient, there is some data that suggest these could be beneficial. It is also important to avoid any DDIs with statins and patient's other medications. Plan  The following are interventions that have been identified:  Statin Gap (Diabetes)    Patient's LDL >70, ASCVD risk score 26.1%, age 37-78 years, LFTs WNL, no allergies/intolerances to statins, has DM. Recommend starting high intensity statin.     Yanira Diggs, PharmD  PGY1 Pharmacy Resident  [facility], toll free: [phone],

## 2023-12-04 DIAGNOSIS — S12.690D COMPRESSION FRACTURE OF C7 VERTEBRA WITH ROUTINE HEALING, SUBSEQUENT ENCOUNTER: ICD-10-CM

## 2023-12-04 DIAGNOSIS — S12.490D COMPRESSION FRACTURE OF C5 VERTEBRA WITH ROUTINE HEALING, SUBSEQUENT ENCOUNTER: ICD-10-CM

## 2023-12-04 DIAGNOSIS — S12.9XXA COMPRESSION FRACTURE OF C-SPINE, INITIAL ENCOUNTER (HCC): Primary | ICD-10-CM

## 2023-12-08 DIAGNOSIS — G44.201 INTRACTABLE TENSION-TYPE HEADACHE, UNSPECIFIED CHRONICITY PATTERN: Primary | ICD-10-CM

## 2023-12-08 RX ORDER — GABAPENTIN 400 MG/1
400 CAPSULE ORAL 2 TIMES DAILY
Qty: 60 CAPSULE | Refills: 2 | Status: SHIPPED | OUTPATIENT
Start: 2023-12-08 | End: 2024-03-07

## 2023-12-08 NOTE — TELEPHONE ENCOUNTER
----- Message from Sil Rivas sent at 12/8/2023 12:22 PM EST -----  Subject: Refill Request    QUESTIONS  Name of Medication? gabapentin (NEURONTIN) 400 MG capsule  Patient-reported dosage and instructions? 400 MG  twice daily  How many days do you have left? 3  Preferred Pharmacy? [facility] [id number]  Pharmacy phone number (if available)? [phone]  Additional Information for Provider? Patient will only have enough to make   it till Tuesday morning. Patient requests a 90 day supply  ---------------------------------------------------------------------------  --------------  CALL BACK INFO  What is the best way for the office to contact you? OK to leave message on   voicemail  Preferred Call Back Phone Number? [phone]  ---------------------------------------------------------------------------  --------------  SCRIPT ANSWERS  Relationship to Patient?  Self

## 2023-12-08 NOTE — TELEPHONE ENCOUNTER
Marce Hatchet called requesting a refill of the below medication which has been pended for you:     OARRS from [place], [place], and [place] reviewed. Gabapentin 400 mg last filled 11/13/23 #60/30 days    Requested Prescriptions     Pending Prescriptions Disp Refills    gabapentin (NEURONTIN) 400 MG capsule 60 capsule 0     Sig: Take 1 capsule by mouth in the morning and at bedtime for 60 days.        Last Appointment Date: 10/26/2023  Next Appointment Date: 3/14/2024    Allergies   Allergen Reactions    Asa [Aspirin] Other (See Comments)     Stomach irritation    Pollen Extract

## 2023-12-08 NOTE — PROGRESS NOTES
dysuria or frequency. No recent procedures involving IV contrast. There is no hx of paraprotein disease. Pt denies any hx of recurrent UTI , incontinence or nocturia or recurrent nephrolithiasis. No unusual skin rashes . No tea coloured urine. Medication reviewed and noted the use of ace/arb and diuretic. Patient Active Problem List    Diagnosis Date Noted    Dyspepsia 10/18/2022     Priority: Medium    Gastritis without bleeding 10/18/2022     Priority: Medium    Irregular Z line of esophagus 10/18/2022     Priority: Medium    Osteopenia 02/17/2022     Overview Note:     DEXA scan 2/15/2022, T-score -2.0      Spongiotic dermatitis 11/19/2021     Overview Note:     biopsy at [facility] 11/2/2021      Stage 3a chronic kidney disease ([address] 09/19/2021    Pneumonia due to COVID-19 virus 09/19/2021    Acute cystitis with hematuria 09/19/2021    Therapeutic opioid-induced constipation (OIC) 07/06/2021    Jones's esophagus without dysplasia 05/29/2021     Overview Note:     EGD 12/23/2020 (Dr. Yenni Ovalle):  [facility]:  NORMAL SQUAMOUS MUCOSA. GASTRIC MUCOSA   WITH MILD CHRONIC INACTIVE INFLAMMATION AND PROMINENT   INTESTINAL/GOBLET CELL METAPLASIA (JONES'S ESOPHAGUS). NEGATIVE FOR   DYSPLASIA.        Femur fracture, left ([address] 03/05/2021    Spinal stenosis, lumbar region, without neurogenic claudication 08/20/2020    Calculus of gallbladder and bile duct without cholecystitis or obstruction 06/12/2020    Nausea 06/12/2020    Multiple gastric ulcers 06/12/2020    Spondylosis of thoracic region without myelopathy or radiculopathy 06/11/2020    Lumbosacral spondylosis without myelopathy 05/21/2020    Spondylosis of lumbar spine 08/26/2019    Complete tear of right rotator cuff 03/07/2019    Anxiety 12/11/2017    Chronic tension-type headache, intractable 12/11/2017    Chronic, continuous use of opioids 12/11/2017     Overview Note:     She sees Dr. Akshat Thayer at [facility] for pain no

## 2023-12-15 ENCOUNTER — OFFICE VISIT (OUTPATIENT)
Dept: ORTHOPEDIC SURGERY | Age: 68
End: 2023-12-15
Payer: MEDICARE

## 2023-12-15 ENCOUNTER — HOSPITAL ENCOUNTER (OUTPATIENT)
Dept: GENERAL RADIOLOGY | Age: 68
End: 2023-12-15
Payer: MEDICARE

## 2023-12-15 VITALS
BODY MASS INDEX: 54.97 KG/M2 | WEIGHT: 280 LBS | DIASTOLIC BLOOD PRESSURE: 74 MMHG | SYSTOLIC BLOOD PRESSURE: 130 MMHG | HEIGHT: 60 IN

## 2023-12-15 DIAGNOSIS — S12.690D COMPRESSION FRACTURE OF C7 VERTEBRA WITH ROUTINE HEALING, SUBSEQUENT ENCOUNTER: ICD-10-CM

## 2023-12-15 DIAGNOSIS — S12.490D COMPRESSION FRACTURE OF C5 VERTEBRA WITH ROUTINE HEALING, SUBSEQUENT ENCOUNTER: Primary | ICD-10-CM

## 2023-12-15 DIAGNOSIS — S12.490D COMPRESSION FRACTURE OF C5 VERTEBRA WITH ROUTINE HEALING, SUBSEQUENT ENCOUNTER: ICD-10-CM

## 2023-12-15 DIAGNOSIS — S12.9XXA COMPRESSION FRACTURE OF C-SPINE, INITIAL ENCOUNTER (HCC): ICD-10-CM

## 2023-12-15 DIAGNOSIS — M54.2 CERVICAL SPINE PAIN: ICD-10-CM

## 2023-12-15 PROCEDURE — 1090F PRES/ABSN URINE INCON ASSESS: CPT | Performed by: PHYSICIAN ASSISTANT

## 2023-12-15 PROCEDURE — 3078F DIAST BP <80 MM HG: CPT | Performed by: PHYSICIAN ASSISTANT

## 2023-12-15 PROCEDURE — G8484 FLU IMMUNIZE NO ADMIN: HCPCS | Performed by: PHYSICIAN ASSISTANT

## 2023-12-15 PROCEDURE — 3075F SYST BP GE 130 - 139MM HG: CPT | Performed by: PHYSICIAN ASSISTANT

## 2023-12-15 PROCEDURE — G8427 DOCREV CUR MEDS BY ELIG CLIN: HCPCS | Performed by: PHYSICIAN ASSISTANT

## 2023-12-15 PROCEDURE — 72040 X-RAY EXAM NECK SPINE 2-3 VW: CPT

## 2023-12-15 PROCEDURE — 1123F ACP DISCUSS/DSCN MKR DOCD: CPT | Performed by: PHYSICIAN ASSISTANT

## 2023-12-15 PROCEDURE — G8417 CALC BMI ABV UP PARAM F/U: HCPCS | Performed by: PHYSICIAN ASSISTANT

## 2023-12-15 PROCEDURE — 99215 OFFICE O/P EST HI 40 MIN: CPT | Performed by: PHYSICIAN ASSISTANT

## 2023-12-15 PROCEDURE — 99213 OFFICE O/P EST LOW 20 MIN: CPT | Performed by: PHYSICIAN ASSISTANT

## 2023-12-15 PROCEDURE — 1036F TOBACCO NON-USER: CPT | Performed by: PHYSICIAN ASSISTANT

## 2023-12-15 PROCEDURE — G8400 PT W/DXA NO RESULTS DOC: HCPCS | Performed by: PHYSICIAN ASSISTANT

## 2023-12-15 PROCEDURE — 3017F COLORECTAL CA SCREEN DOC REV: CPT | Performed by: PHYSICIAN ASSISTANT

## 2023-12-15 NOTE — PROGRESS NOTES
Patient ID: Fish Castrejon is a 76 y.o. female    Chief Compliant:  Chief Complaint   Patient presents with    Neck Pain     Cervical spine pain      HPI:  This is a 76 y.o. female who presents to the clinic today for fol follow-up of neck pain. Patient has C5 compression fracture as well as compression deformities at C7, T1-T2. Patient reports that she is doing relatively well at this point she states her pain is improved since her previous office visit on 11/17/2023. She denies any new onset of symptoms or recent falls trauma or injury. Review of Systems   All other systems reviewed and are negative. Past History:    Current Outpatient Medications:     gabapentin (NEURONTIN) 400 MG capsule, Take 1 capsule by mouth in the morning and at bedtime for 90 days. , Disp: 60 capsule, Rfl: 2    lisinopril-hydroCHLOROthiazide (PRINZIDE;ZESTORETIC) 20-12.5 MG per tablet, TAKE 1 TABLET BY MOUTH DAILY, Disp: 90 tablet, Rfl: 1    miconazole (MICOTIN) 2 % powder, Apply topically 2 times daily. Apply to perineal area ., Disp: 45 g, Rfl: 1    metoclopramide (REGLAN) 10 MG tablet, Take 1 tablet by mouth 4 times daily as needed (GI distress) WARNING:  May cause drowsiness. May impair ability to operate vehicles or machinery.   Do not use in combination with alcohol., Disp: 20 tablet, Rfl: 0    lubiprostone (AMITIZA) 8 MCG CAPS capsule, take 1 capsule by mouth twice a day with meals, Disp: , Rfl:     melatonin 3 MG TABS tablet, Take 5 mg by mouth nightly as needed, Disp: , Rfl:     UNABLE TO FIND, soft cervical collar, Disp: 1 each, Rfl: 0    pioglitazone-metFORMIN (ACTOPLUS MET)  MG per tablet, TAKE 1 TABLET BY MOUTH TWICE  DAILY WITH MEALS, Disp: 180 tablet, Rfl: 1    Handicap Placard MISC, by Does not apply route Expires 9/14/2028, Disp: 1 each, Rfl: 0    DULoxetine (CYMBALTA) 60 MG extended release capsule, Take 1 capsule by mouth daily, Disp: 90 capsule, Rfl: 1    pantoprazole (PROTONIX) 40 MG tablet, TAKE 1

## 2024-01-29 ENCOUNTER — HOSPITAL ENCOUNTER (OUTPATIENT)
Age: 69
Discharge: HOME | End: 2024-01-29
Payer: MEDICARE

## 2024-01-29 DIAGNOSIS — N18.32 STAGE 3B CHRONIC KIDNEY DISEASE (HCC): ICD-10-CM

## 2024-01-29 LAB
25(OH)D3 SERPL-MCNC: 30.9 NG/ML (ref 30–100)
ANION GAP SERPL CALCULATED.3IONS-SCNC: 11 MMOL/L (ref 9–17)
BASOPHILS # BLD: 0.04 K/UL (ref 0–0.2)
BASOPHILS NFR BLD: 1 % (ref 0–2)
BUN SERPL-MCNC: 28 MG/DL (ref 8–23)
BUN/CREAT SERPL: 22 (ref 9–20)
CA-I BLD-SCNC: 1.23 MMOL/L (ref 1.13–1.33)
CALCIUM SERPL-MCNC: 9.5 MG/DL (ref 8.6–10.4)
CHLORIDE SERPL-SCNC: 97 MMOL/L (ref 98–107)
CO2 SERPL-SCNC: 31 MMOL/L (ref 20–31)
CREAT SERPL-MCNC: 1.3 MG/DL (ref 0.5–0.9)
EOSINOPHIL # BLD: 0.1 K/UL (ref 0–0.44)
EOSINOPHILS RELATIVE PERCENT: 1 % (ref 1–4)
ERYTHROCYTE [DISTWIDTH] IN BLOOD BY AUTOMATED COUNT: 13.8 % (ref 11.8–14.4)
GFR SERPL CREATININE-BSD FRML MDRD: 45 ML/MIN/1.73M2
GLUCOSE SERPL-MCNC: 145 MG/DL (ref 70–99)
HCT VFR BLD AUTO: 34.6 % (ref 36.3–47.1)
HGB BLD-MCNC: 10.7 G/DL (ref 11.9–15.1)
IMM GRANULOCYTES # BLD AUTO: 0.03 K/UL (ref 0–0.3)
IMM GRANULOCYTES NFR BLD: 0 %
LYMPHOCYTES NFR BLD: 1.79 K/UL (ref 1.1–3.7)
LYMPHOCYTES RELATIVE PERCENT: 23 % (ref 24–43)
MAGNESIUM SERPL-MCNC: 1.8 MG/DL (ref 1.6–2.6)
MCH RBC QN AUTO: 28.4 PG (ref 25.2–33.5)
MCHC RBC AUTO-ENTMCNC: 30.9 G/DL (ref 25.2–33.5)
MCV RBC AUTO: 91.8 FL (ref 82.6–102.9)
MONOCYTES NFR BLD: 0.59 K/UL (ref 0.1–1.2)
MONOCYTES NFR BLD: 8 % (ref 3–12)
NEUTROPHILS NFR BLD: 67 % (ref 36–65)
NEUTS SEG NFR BLD: 5.13 K/UL (ref 1.5–8.1)
NRBC BLD-RTO: 0 PER 100 WBC
PHOSPHATE SERPL-MCNC: 3.3 MG/DL (ref 2.6–4.5)
PLATELET # BLD AUTO: 311 K/UL (ref 138–453)
PMV BLD AUTO: 10.2 FL (ref 8.1–13.5)
POTASSIUM SERPL-SCNC: 4.6 MMOL/L (ref 3.7–5.3)
PTH-INTACT SERPL-MCNC: 46.9 PG/ML (ref 14–72)
RBC # BLD AUTO: 3.77 M/UL (ref 3.95–5.11)
SODIUM SERPL-SCNC: 139 MMOL/L (ref 135–144)
WBC OTHER # BLD: 7.7 K/UL (ref 3.5–11.3)

## 2024-01-29 PROCEDURE — 80048 BASIC METABOLIC PNL TOTAL CA: CPT

## 2024-01-29 PROCEDURE — 84100 ASSAY OF PHOSPHORUS: CPT

## 2024-01-29 PROCEDURE — 83970 ASSAY OF PARATHORMONE: CPT

## 2024-01-29 PROCEDURE — 83735 ASSAY OF MAGNESIUM: CPT

## 2024-01-29 PROCEDURE — 36415 COLL VENOUS BLD VENIPUNCTURE: CPT

## 2024-01-29 PROCEDURE — 82330 ASSAY OF CALCIUM: CPT

## 2024-01-29 PROCEDURE — 82306 VITAMIN D 25 HYDROXY: CPT

## 2024-01-29 PROCEDURE — 85025 COMPLETE CBC W/AUTO DIFF WBC: CPT

## 2024-01-30 ENCOUNTER — HOSPITAL ENCOUNTER (OUTPATIENT)
Age: 69
Setting detail: SPECIMEN
Discharge: HOME | End: 2024-01-30
Payer: MEDICARE

## 2024-01-30 DIAGNOSIS — N18.32 STAGE 3B CHRONIC KIDNEY DISEASE (HCC): ICD-10-CM

## 2024-01-30 LAB
CREAT UR-MCNC: 55.6 MG/DL (ref 28–217)
TOTAL PROTEIN, URINE: 12 MG/DL
URINE TOTAL PROTEIN CREATININE RATIO: 0.22 (ref 0–0.2)

## 2024-01-30 PROCEDURE — 82570 ASSAY OF URINE CREATININE: CPT

## 2024-01-30 PROCEDURE — 84156 ASSAY OF PROTEIN URINE: CPT

## 2024-02-05 ENCOUNTER — OFFICE VISIT (OUTPATIENT)
Dept: NEPHROLOGY | Age: 69
End: 2024-02-05
Payer: MEDICARE

## 2024-02-05 VITALS
RESPIRATION RATE: 18 BRPM | HEIGHT: 60 IN | HEART RATE: 82 BPM | SYSTOLIC BLOOD PRESSURE: 128 MMHG | OXYGEN SATURATION: 100 % | DIASTOLIC BLOOD PRESSURE: 78 MMHG | BODY MASS INDEX: 55.09 KG/M2 | WEIGHT: 280.6 LBS

## 2024-02-05 DIAGNOSIS — N20.0 NEPHROLITHIASIS: ICD-10-CM

## 2024-02-05 DIAGNOSIS — N18.32 TYPE 2 DIABETES MELLITUS WITH STAGE 3B CHRONIC KIDNEY DISEASE, WITHOUT LONG-TERM CURRENT USE OF INSULIN (HCC): ICD-10-CM

## 2024-02-05 DIAGNOSIS — N18.32 STAGE 3B CHRONIC KIDNEY DISEASE (HCC): Primary | ICD-10-CM

## 2024-02-05 DIAGNOSIS — E55.9 VITAMIN D DEFICIENCY: ICD-10-CM

## 2024-02-05 DIAGNOSIS — E83.42 HYPOMAGNESEMIA: ICD-10-CM

## 2024-02-05 DIAGNOSIS — I10 HYPERTENSION, ESSENTIAL: ICD-10-CM

## 2024-02-05 DIAGNOSIS — E11.22 TYPE 2 DIABETES MELLITUS WITH STAGE 3B CHRONIC KIDNEY DISEASE, WITHOUT LONG-TERM CURRENT USE OF INSULIN (HCC): ICD-10-CM

## 2024-02-05 DIAGNOSIS — E66.01 CLASS 3 SEVERE OBESITY WITH BODY MASS INDEX (BMI) OF 40.0 TO 44.9 IN ADULT, UNSPECIFIED OBESITY TYPE, UNSPECIFIED WHETHER SERIOUS COMORBIDITY PRESENT (HCC): ICD-10-CM

## 2024-02-05 PROCEDURE — 99213 OFFICE O/P EST LOW 20 MIN: CPT | Performed by: INTERNAL MEDICINE

## 2024-02-05 PROCEDURE — 1090F PRES/ABSN URINE INCON ASSESS: CPT | Performed by: INTERNAL MEDICINE

## 2024-02-05 PROCEDURE — G8400 PT W/DXA NO RESULTS DOC: HCPCS | Performed by: INTERNAL MEDICINE

## 2024-02-05 PROCEDURE — G8484 FLU IMMUNIZE NO ADMIN: HCPCS | Performed by: INTERNAL MEDICINE

## 2024-02-05 PROCEDURE — 2022F DILAT RTA XM EVC RTNOPTHY: CPT | Performed by: INTERNAL MEDICINE

## 2024-02-05 PROCEDURE — 3074F SYST BP LT 130 MM HG: CPT | Performed by: INTERNAL MEDICINE

## 2024-02-05 PROCEDURE — G8417 CALC BMI ABV UP PARAM F/U: HCPCS | Performed by: INTERNAL MEDICINE

## 2024-02-05 PROCEDURE — 1036F TOBACCO NON-USER: CPT | Performed by: INTERNAL MEDICINE

## 2024-02-05 PROCEDURE — 3017F COLORECTAL CA SCREEN DOC REV: CPT | Performed by: INTERNAL MEDICINE

## 2024-02-05 PROCEDURE — 3046F HEMOGLOBIN A1C LEVEL >9.0%: CPT | Performed by: INTERNAL MEDICINE

## 2024-02-05 PROCEDURE — G8427 DOCREV CUR MEDS BY ELIG CLIN: HCPCS | Performed by: INTERNAL MEDICINE

## 2024-02-05 PROCEDURE — 3078F DIAST BP <80 MM HG: CPT | Performed by: INTERNAL MEDICINE

## 2024-02-05 PROCEDURE — 1123F ACP DISCUSS/DSCN MKR DOCD: CPT | Performed by: INTERNAL MEDICINE

## 2024-02-05 RX ORDER — HYDROCODONE BITARTRATE AND ACETAMINOPHEN 5; 325 MG/1; MG/1
1 TABLET ORAL 2 TIMES DAILY
COMMUNITY
Start: 2024-01-26

## 2024-02-05 NOTE — PROGRESS NOTES
Jardiance 10mg daily, as Farxiga was not covered by insurance per patient. She mentions she has an upcoming appointment with her PCP in few weeks and will discuss with PCP and start then.   6. Follow up labs ordered : bmp, cbc, phos, intact pth, vitaminD 25 OH.  7. Urine for random protein and creat to be done.  8. Follow up in the office in 4-5 months.     Patient was asked to avoid NSAIDS.    Please do not hesitate to call with questions.    Electronically signed by Sav Swenson MD on 2/5/2024 at 2:54 PM  [facility]

## 2024-02-22 ENCOUNTER — OFFICE VISIT (OUTPATIENT)
Dept: FAMILY MEDICINE CLINIC | Age: 69
End: 2024-02-22

## 2024-02-22 VITALS
DIASTOLIC BLOOD PRESSURE: 82 MMHG | HEART RATE: 90 BPM | HEIGHT: 68 IN | SYSTOLIC BLOOD PRESSURE: 126 MMHG | BODY MASS INDEX: 41.98 KG/M2 | OXYGEN SATURATION: 98 % | WEIGHT: 277 LBS | TEMPERATURE: 98.3 F

## 2024-02-22 DIAGNOSIS — E11.29 TYPE 2 DIABETES MELLITUS WITH MICROALBUMINURIA, WITHOUT LONG-TERM CURRENT USE OF INSULIN (HCC): ICD-10-CM

## 2024-02-22 DIAGNOSIS — R80.9 MICROALBUMINURIA: ICD-10-CM

## 2024-02-22 DIAGNOSIS — R80.9 TYPE 2 DIABETES MELLITUS WITH MICROALBUMINURIA, WITHOUT LONG-TERM CURRENT USE OF INSULIN (HCC): ICD-10-CM

## 2024-02-22 DIAGNOSIS — M25.562 CHRONIC PAIN OF LEFT KNEE: ICD-10-CM

## 2024-02-22 DIAGNOSIS — G89.29 CHRONIC PAIN OF LEFT KNEE: ICD-10-CM

## 2024-02-22 DIAGNOSIS — M25.562 LEFT KNEE PAIN, UNSPECIFIED CHRONICITY: Primary | ICD-10-CM

## 2024-02-22 ASSESSMENT — PATIENT HEALTH QUESTIONNAIRE - PHQ9
4. FEELING TIRED OR HAVING LITTLE ENERGY: 0
10. IF YOU CHECKED OFF ANY PROBLEMS, HOW DIFFICULT HAVE THESE PROBLEMS MADE IT FOR YOU TO DO YOUR WORK, TAKE CARE OF THINGS AT HOME, OR GET ALONG WITH OTHER PEOPLE: 0
SUM OF ALL RESPONSES TO PHQ QUESTIONS 1-9: 0
8. MOVING OR SPEAKING SO SLOWLY THAT OTHER PEOPLE COULD HAVE NOTICED. OR THE OPPOSITE, BEING SO FIGETY OR RESTLESS THAT YOU HAVE BEEN MOVING AROUND A LOT MORE THAN USUAL: 0
1. LITTLE INTEREST OR PLEASURE IN DOING THINGS: 0
SUM OF ALL RESPONSES TO PHQ QUESTIONS 1-9: 0
2. FEELING DOWN, DEPRESSED OR HOPELESS: 0
9. THOUGHTS THAT YOU WOULD BE BETTER OFF DEAD, OR OF HURTING YOURSELF: 0
6. FEELING BAD ABOUT YOURSELF - OR THAT YOU ARE A FAILURE OR HAVE LET YOURSELF OR YOUR FAMILY DOWN: 0
3. TROUBLE FALLING OR STAYING ASLEEP: 0
SUM OF ALL RESPONSES TO PHQ QUESTIONS 1-9: 0
SUM OF ALL RESPONSES TO PHQ QUESTIONS 1-9: 0
7. TROUBLE CONCENTRATING ON THINGS, SUCH AS READING THE NEWSPAPER OR WATCHING TELEVISION: 0
SUM OF ALL RESPONSES TO PHQ9 QUESTIONS 1 & 2: 0
5. POOR APPETITE OR OVEREATING: 0

## 2024-02-22 NOTE — PROGRESS NOTES
[facility]             [address] [address]                        Telephone [phone]             Fax [phone]       Jyoti Warren  :  1955  Age:  68 y.o.   MRN:  [id number]  Date of visit:  2024       Assessment and Plan:    1. Left knee pain, unspecified chronicity  2. Chronic pain of left knee  An x-ray was ordered:  - XR KNEE LEFT (3 VIEWS); Future    She was referred to orthopedic surgery:  - [facility]    She was given a prescription for a wheelchair:  - UNABLE TO FIND; wheelchair  Dispense: 1 each; Refill: 0     3. Microalbuminuria  4. Type 2 diabetes mellitus with microalbuminuria, without long-term current use of insulin (HCC)  Her HgbA1c was 6.3% on 2024.  She has labs ordered to be done before her appointment next month also.  Jardiance was refilled:  - empagliflozin (JARDIANCE) 10 MG tablet; Take 1 tablet by mouth daily  Dispense: 90 tablet; Refill: 1       Follow up instructions were given to the patient:  She was advised to keep the appointment she has scheduled next month.            Subjective:    Jyoti Warren is a 68 y.o. female who presents to [facility] today (2024) for follow up/evaluation of:  Knee Pain (Chronic left knee pain- requesting referral to ortho)      She has concerns regarding pain in the left knee.   She has had the pain for some time, but the pain has been getting worse.   The pain is worse with standing and walking.  She requests a referral to ortho surgery.  She also requests a refill of Jardiance.       She has received the Covid-19 vaccine, including the  Covid vaccine.           Immunization history was reviewed:  Immunization History   Administered Date(s) Administered    COVID-19, MODERNA Booster BLUE border, (age 18y+), IM, 50mcg/0.25mL 2022    COVID-19, PFIZER PURPLE top, DILUTE for use, (age 12 y+),

## 2024-02-28 ENCOUNTER — HOSPITAL ENCOUNTER (OUTPATIENT)
Dept: GENERAL RADIOLOGY | Age: 69
Discharge: HOME | End: 2024-03-01
Payer: MEDICARE

## 2024-02-28 ENCOUNTER — HOSPITAL ENCOUNTER (OUTPATIENT)
Age: 69
Discharge: HOME | End: 2024-02-28
Payer: MEDICARE

## 2024-02-28 ENCOUNTER — TELEPHONE (OUTPATIENT)
Dept: FAMILY MEDICINE CLINIC | Age: 69
End: 2024-02-28

## 2024-02-28 DIAGNOSIS — G89.29 CHRONIC PAIN OF LEFT KNEE: ICD-10-CM

## 2024-02-28 DIAGNOSIS — R80.9 TYPE 2 DIABETES MELLITUS WITH MICROALBUMINURIA, WITHOUT LONG-TERM CURRENT USE OF INSULIN (HCC): ICD-10-CM

## 2024-02-28 DIAGNOSIS — G89.29 CHRONIC PAIN OF LEFT KNEE: Primary | ICD-10-CM

## 2024-02-28 DIAGNOSIS — E11.29 TYPE 2 DIABETES MELLITUS WITH MICROALBUMINURIA, WITHOUT LONG-TERM CURRENT USE OF INSULIN (HCC): ICD-10-CM

## 2024-02-28 DIAGNOSIS — M25.562 CHRONIC PAIN OF LEFT KNEE: Primary | ICD-10-CM

## 2024-02-28 DIAGNOSIS — M25.562 CHRONIC PAIN OF LEFT KNEE: ICD-10-CM

## 2024-02-28 DIAGNOSIS — E78.2 MIXED HYPERLIPIDEMIA: ICD-10-CM

## 2024-02-28 LAB
ALBUMIN SERPL-MCNC: 3.7 G/DL (ref 3.5–5.2)
ALBUMIN/GLOB SERPL: 1.2 {RATIO} (ref 1–2.5)
ALP SERPL-CCNC: 121 U/L (ref 35–104)
ALT SERPL-CCNC: <5 U/L (ref 5–33)
ANION GAP SERPL CALCULATED.3IONS-SCNC: 8 MMOL/L (ref 9–17)
AST SERPL-CCNC: 8 U/L
BILIRUB SERPL-MCNC: 0.3 MG/DL (ref 0.3–1.2)
BUN SERPL-MCNC: 32 MG/DL (ref 8–23)
BUN/CREAT SERPL: 25 (ref 9–20)
CALCIUM SERPL-MCNC: 9.1 MG/DL (ref 8.6–10.4)
CHLORIDE SERPL-SCNC: 100 MMOL/L (ref 98–107)
CHOLEST SERPL-MCNC: 155 MG/DL
CHOLESTEROL/HDL RATIO: 3.7
CO2 SERPL-SCNC: 31 MMOL/L (ref 20–31)
CREAT SERPL-MCNC: 1.3 MG/DL (ref 0.5–0.9)
CREAT UR-MCNC: 86 MG/DL (ref 28–217)
EST. AVERAGE GLUCOSE BLD GHB EST-MCNC: 143 MG/DL
GFR SERPL CREATININE-BSD FRML MDRD: 45 ML/MIN/1.73M2
GLUCOSE SERPL-MCNC: 151 MG/DL (ref 70–99)
HBA1C MFR BLD: 6.6 % (ref 4–6)
HDLC SERPL-MCNC: 42 MG/DL
LDLC SERPL CALC-MCNC: 87 MG/DL (ref 0–130)
MICROALBUMIN UR-MCNC: <12 MG/L
MICROALBUMIN/CREAT UR-RTO: NORMAL MCG/MG CREAT
POTASSIUM SERPL-SCNC: 4.5 MMOL/L (ref 3.7–5.3)
PROT SERPL-MCNC: 6.8 G/DL (ref 6.4–8.3)
SODIUM SERPL-SCNC: 139 MMOL/L (ref 135–144)
TRIGL SERPL-MCNC: 132 MG/DL

## 2024-02-28 PROCEDURE — 80061 LIPID PANEL: CPT

## 2024-02-28 PROCEDURE — 82570 ASSAY OF URINE CREATININE: CPT

## 2024-02-28 PROCEDURE — 80053 COMPREHEN METABOLIC PANEL: CPT

## 2024-02-28 PROCEDURE — 82043 UR ALBUMIN QUANTITATIVE: CPT

## 2024-02-28 PROCEDURE — 73562 X-RAY EXAM OF KNEE 3: CPT

## 2024-02-28 PROCEDURE — 36415 COLL VENOUS BLD VENIPUNCTURE: CPT

## 2024-02-28 PROCEDURE — 83036 HEMOGLOBIN GLYCOSYLATED A1C: CPT

## 2024-02-28 NOTE — TELEPHONE ENCOUNTER
Radiology needs an xray placed asap. The one in the patients chart is . Please advise. Patient is here at the clinic right now.

## 2024-02-28 NOTE — TELEPHONE ENCOUNTER
Patient seen 24. Left knee xray placed but order  24.  RR out of the office.   OK for left knee xray given by provider on call Dr. Camarena

## 2024-03-04 DIAGNOSIS — S12.490D COMPRESSION FRACTURE OF C5 VERTEBRA WITH ROUTINE HEALING, SUBSEQUENT ENCOUNTER: Primary | ICD-10-CM

## 2024-03-05 ENCOUNTER — TELEPHONE (OUTPATIENT)
Dept: FAMILY MEDICINE CLINIC | Age: 69
End: 2024-03-05

## 2024-03-05 ENCOUNTER — HOSPITAL ENCOUNTER (OUTPATIENT)
Age: 69
Setting detail: SPECIMEN
Discharge: HOME | End: 2024-03-05
Payer: MEDICARE

## 2024-03-05 ENCOUNTER — OFFICE VISIT (OUTPATIENT)
Dept: ORTHOPEDIC SURGERY | Age: 69
End: 2024-03-05
Payer: MEDICARE

## 2024-03-05 ENCOUNTER — HOSPITAL ENCOUNTER (OUTPATIENT)
Age: 69
Setting detail: OBSERVATION
Discharge: HOME | End: 2024-03-06
Attending: EMERGENCY MEDICINE | Admitting: INTERNAL MEDICINE
Payer: MEDICARE

## 2024-03-05 ENCOUNTER — APPOINTMENT (OUTPATIENT)
Dept: GENERAL RADIOLOGY | Age: 69
End: 2024-03-05
Attending: ORTHOPAEDIC SURGERY
Payer: MEDICARE

## 2024-03-05 ENCOUNTER — OFFICE VISIT (OUTPATIENT)
Dept: PRIMARY CARE CLINIC | Age: 69
End: 2024-03-05
Payer: MEDICARE

## 2024-03-05 VITALS
SYSTOLIC BLOOD PRESSURE: 128 MMHG | BODY MASS INDEX: 42.73 KG/M2 | HEART RATE: 74 BPM | TEMPERATURE: 98 F | DIASTOLIC BLOOD PRESSURE: 74 MMHG | WEIGHT: 281 LBS | OXYGEN SATURATION: 95 %

## 2024-03-05 VITALS
HEIGHT: 68 IN | DIASTOLIC BLOOD PRESSURE: 74 MMHG | WEIGHT: 277 LBS | HEART RATE: 72 BPM | SYSTOLIC BLOOD PRESSURE: 143 MMHG | BODY MASS INDEX: 41.98 KG/M2 | OXYGEN SATURATION: 98 %

## 2024-03-05 DIAGNOSIS — N17.9 ACUTE KIDNEY INJURY SUPERIMPOSED ON CKD (HCC): ICD-10-CM

## 2024-03-05 DIAGNOSIS — M25.562 PAIN IN BOTH KNEES, UNSPECIFIED CHRONICITY: Primary | ICD-10-CM

## 2024-03-05 DIAGNOSIS — R80.9 TYPE 2 DIABETES MELLITUS WITH MICROALBUMINURIA, WITHOUT LONG-TERM CURRENT USE OF INSULIN (HCC): ICD-10-CM

## 2024-03-05 DIAGNOSIS — R78.81 BACTEREMIA: ICD-10-CM

## 2024-03-05 DIAGNOSIS — E11.29 TYPE 2 DIABETES MELLITUS WITH MICROALBUMINURIA, WITHOUT LONG-TERM CURRENT USE OF INSULIN (HCC): ICD-10-CM

## 2024-03-05 DIAGNOSIS — M25.561 PAIN IN BOTH KNEES, UNSPECIFIED CHRONICITY: Primary | ICD-10-CM

## 2024-03-05 DIAGNOSIS — R42 DIZZY: ICD-10-CM

## 2024-03-05 DIAGNOSIS — R82.71 BACTERIA IN URINE: ICD-10-CM

## 2024-03-05 DIAGNOSIS — N18.9 ACUTE KIDNEY INJURY SUPERIMPOSED ON CKD (HCC): ICD-10-CM

## 2024-03-05 DIAGNOSIS — G89.29 CHRONIC PAIN OF LEFT KNEE: ICD-10-CM

## 2024-03-05 DIAGNOSIS — M25.562 CHRONIC PAIN OF LEFT KNEE: ICD-10-CM

## 2024-03-05 DIAGNOSIS — M17.12 TRICOMPARTMENT OSTEOARTHRITIS OF LEFT KNEE: Primary | ICD-10-CM

## 2024-03-05 DIAGNOSIS — R82.71 BACTERIA IN URINE: Primary | ICD-10-CM

## 2024-03-05 DIAGNOSIS — M25.562 LEFT KNEE PAIN, UNSPECIFIED CHRONICITY: ICD-10-CM

## 2024-03-05 DIAGNOSIS — N39.0 URINARY TRACT INFECTION WITHOUT HEMATURIA, SITE UNSPECIFIED: Primary | ICD-10-CM

## 2024-03-05 LAB
BACTERIA URNS QL MICRO: ABNORMAL
BILIRUB UR QL STRIP: ABNORMAL
CHARACTER UR: ABNORMAL
CLARITY UR: CLEAR
COLOR UR: YELLOW
EPI CELLS #/AREA URNS HPF: ABNORMAL /HPF (ref 0–5)
GLUCOSE UR STRIP-MCNC: NEGATIVE MG/DL
HGB UR QL STRIP.AUTO: NEGATIVE
KETONES UR STRIP-MCNC: ABNORMAL MG/DL
LEUKOCYTE ESTERASE UR QL STRIP: ABNORMAL
NITRITE UR QL STRIP: POSITIVE
PH UR STRIP: 5.5 [PH] (ref 5–6)
PROT UR STRIP-MCNC: NEGATIVE MG/DL
RBC #/AREA URNS HPF: ABNORMAL /HPF (ref 0–4)
SP GR UR STRIP: 1.02 (ref 1.01–1.02)
UROBILINOGEN UR STRIP-ACNC: NORMAL EU/DL (ref 0–1)
WBC #/AREA URNS HPF: ABNORMAL /HPF (ref 0–4)

## 2024-03-05 PROCEDURE — G8417 CALC BMI ABV UP PARAM F/U: HCPCS | Performed by: FAMILY MEDICINE

## 2024-03-05 PROCEDURE — 87040 BLOOD CULTURE FOR BACTERIA: CPT

## 2024-03-05 PROCEDURE — 1036F TOBACCO NON-USER: CPT | Performed by: FAMILY MEDICINE

## 2024-03-05 PROCEDURE — G8417 CALC BMI ABV UP PARAM F/U: HCPCS | Performed by: PHYSICIAN ASSISTANT

## 2024-03-05 PROCEDURE — G8484 FLU IMMUNIZE NO ADMIN: HCPCS | Performed by: PHYSICIAN ASSISTANT

## 2024-03-05 PROCEDURE — 87205 SMEAR GRAM STAIN: CPT

## 2024-03-05 PROCEDURE — 87077 CULTURE AEROBIC IDENTIFY: CPT

## 2024-03-05 PROCEDURE — 99214 OFFICE O/P EST MOD 30 MIN: CPT | Performed by: PHYSICIAN ASSISTANT

## 2024-03-05 PROCEDURE — 20610 DRAIN/INJ JOINT/BURSA W/O US: CPT | Performed by: PHYSICIAN ASSISTANT

## 2024-03-05 PROCEDURE — 99213 OFFICE O/P EST LOW 20 MIN: CPT | Performed by: FAMILY MEDICINE

## 2024-03-05 PROCEDURE — 1090F PRES/ABSN URINE INCON ASSESS: CPT | Performed by: PHYSICIAN ASSISTANT

## 2024-03-05 PROCEDURE — G8400 PT W/DXA NO RESULTS DOC: HCPCS | Performed by: PHYSICIAN ASSISTANT

## 2024-03-05 PROCEDURE — 3017F COLORECTAL CA SCREEN DOC REV: CPT | Performed by: FAMILY MEDICINE

## 2024-03-05 PROCEDURE — 87086 URINE CULTURE/COLONY COUNT: CPT

## 2024-03-05 PROCEDURE — 85025 COMPLETE CBC W/AUTO DIFF WBC: CPT

## 2024-03-05 PROCEDURE — G8484 FLU IMMUNIZE NO ADMIN: HCPCS | Performed by: FAMILY MEDICINE

## 2024-03-05 PROCEDURE — G8427 DOCREV CUR MEDS BY ELIG CLIN: HCPCS | Performed by: FAMILY MEDICINE

## 2024-03-05 PROCEDURE — 1090F PRES/ABSN URINE INCON ASSESS: CPT | Performed by: FAMILY MEDICINE

## 2024-03-05 PROCEDURE — 1123F ACP DISCUSS/DSCN MKR DOCD: CPT | Performed by: FAMILY MEDICINE

## 2024-03-05 PROCEDURE — G8400 PT W/DXA NO RESULTS DOC: HCPCS | Performed by: FAMILY MEDICINE

## 2024-03-05 PROCEDURE — 87186 SC STD MICRODIL/AGAR DIL: CPT

## 2024-03-05 PROCEDURE — 99285 EMERGENCY DEPT VISIT HI MDM: CPT

## 2024-03-05 PROCEDURE — 3077F SYST BP >= 140 MM HG: CPT | Performed by: PHYSICIAN ASSISTANT

## 2024-03-05 PROCEDURE — 1036F TOBACCO NON-USER: CPT | Performed by: PHYSICIAN ASSISTANT

## 2024-03-05 PROCEDURE — 99203 OFFICE O/P NEW LOW 30 MIN: CPT | Performed by: PHYSICIAN ASSISTANT

## 2024-03-05 PROCEDURE — 81001 URINALYSIS AUTO W/SCOPE: CPT

## 2024-03-05 PROCEDURE — PBSHW PBB SHADOW CHARGE: Performed by: PHYSICIAN ASSISTANT

## 2024-03-05 PROCEDURE — 87154 CUL TYP ID BLD PTHGN 6+ TRGT: CPT

## 2024-03-05 PROCEDURE — G8427 DOCREV CUR MEDS BY ELIG CLIN: HCPCS | Performed by: PHYSICIAN ASSISTANT

## 2024-03-05 PROCEDURE — 36415 COLL VENOUS BLD VENIPUNCTURE: CPT

## 2024-03-05 PROCEDURE — 3078F DIAST BP <80 MM HG: CPT | Performed by: PHYSICIAN ASSISTANT

## 2024-03-05 PROCEDURE — 3074F SYST BP LT 130 MM HG: CPT | Performed by: FAMILY MEDICINE

## 2024-03-05 PROCEDURE — 3017F COLORECTAL CA SCREEN DOC REV: CPT | Performed by: PHYSICIAN ASSISTANT

## 2024-03-05 PROCEDURE — 1123F ACP DISCUSS/DSCN MKR DOCD: CPT | Performed by: PHYSICIAN ASSISTANT

## 2024-03-05 PROCEDURE — 80048 BASIC METABOLIC PNL TOTAL CA: CPT

## 2024-03-05 PROCEDURE — 3078F DIAST BP <80 MM HG: CPT | Performed by: FAMILY MEDICINE

## 2024-03-05 PROCEDURE — 83605 ASSAY OF LACTIC ACID: CPT

## 2024-03-05 RX ORDER — TRIAMCINOLONE ACETONIDE 40 MG/ML
80 INJECTION, SUSPENSION INTRA-ARTICULAR; INTRAMUSCULAR ONCE
Status: COMPLETED | OUTPATIENT
Start: 2024-03-05 | End: 2024-03-05

## 2024-03-05 RX ORDER — BUPIVACAINE HYDROCHLORIDE 5 MG/ML
5 INJECTION, SOLUTION PERINEURAL ONCE
Status: COMPLETED | OUTPATIENT
Start: 2024-03-05 | End: 2024-03-05

## 2024-03-05 RX ORDER — CIPROFLOXACIN 250 MG/1
250 TABLET, FILM COATED ORAL 2 TIMES DAILY
Qty: 14 TABLET | Refills: 0 | Status: SHIPPED | OUTPATIENT
Start: 2024-03-05 | End: 2024-03-12

## 2024-03-05 RX ADMIN — BUPIVACAINE HYDROCHLORIDE 25 MG: 5 INJECTION, SOLUTION PERINEURAL at 10:53

## 2024-03-05 RX ADMIN — BUPIVACAINE HYDROCHLORIDE 25 MG: 5 INJECTION, SOLUTION PERINEURAL at 10:54

## 2024-03-05 RX ADMIN — TRIAMCINOLONE ACETONIDE 80 MG: 200 INJECTION, SUSPENSION INTRA-ARTICULAR; INTRAMUSCULAR at 10:55

## 2024-03-05 ASSESSMENT — PAIN - FUNCTIONAL ASSESSMENT: PAIN_FUNCTIONAL_ASSESSMENT: NONE - DENIES PAIN

## 2024-03-05 NOTE — PROGRESS NOTES
Orthopaedic Progress Note      CHIEF COMPLAINT: Bilateral knee    HISTORY OF PRESENT ILLNESS:       Ms. Warren  is a 68 y.o. female  who presents with a long history of bilateral knee pain.  She states the left is significantly worse than the right.  She was seen in this office about 4 years ago.  She apparently had steroid injections in both her knees at some point in the past.  She recently had a fall.  She states that she is in therapy rehabbing from a chronic back condition.  Medical history is a little bit vague at this time.  She is accompanied by her .  In recent years she has had another fall sustaining a supracondylar left distal femur fracture that was treated with a retrograde nail.  Femur has gone on to union.  Now she has pain in both of her knees she states this is causing her a lot of difficulty getting around.  She is here today for evaluation.      Past Medical History:    Past Medical History:   Diagnosis Date    Acute cystitis with hematuria 9/19/2021    Elizalde's esophagus     Cervical spine pain 08/21/2013    Chronic headaches     Chronic sinusitis     CKD (chronic kidney disease)     stage 3    Closed fracture of distal end of left femur with routine healing 03/2021    COVID-19     Diabetes mellitus (HCC)     Dizziness     Femur fracture, left (HCC) 3/5/2021    Gastroesophageal reflux disease     barretts    H/O fall     Hypertension     Knee pain, left 08/21/2013    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HCC)     left temporal    Obesity     Peripheral neuropathy     Pneumonia 08/30/2015    Pneumonia due to COVID-19 virus 9/19/2021    Pulmonary hypertension (HCC) 01/01/2012    by echocardiogram    Shoulder pain, bilateral 08/21/2013    Spinal stenosis     Type 2 diabetes mellitus (HCC)     Unspecified essential hypertension     Essential hypertension    Vitamin D deficiency 11/05/2014       Past Surgical History:    Past Surgical History:   Procedure Laterality Date    APPENDECTOMY      BACK

## 2024-03-05 NOTE — PROGRESS NOTES
in combination with alcohol. 20 tablet 0    melatonin 3 MG TABS tablet Take 5 mg by mouth nightly as needed      pioglitazone-metFORMIN (ACTOPLUS MET)  MG per tablet TAKE 1 TABLET BY MOUTH TWICE  DAILY WITH MEALS 180 tablet 1    Handicap Placard MISC by Does not apply route Expires 9/14/2028 1 each 0    DULoxetine (CYMBALTA) 60 MG extended release capsule Take 1 capsule by mouth daily 90 capsule 1    pantoprazole (PROTONIX) 40 MG tablet TAKE 1 TABLET BY MOUTH TWICE  DAILY 180 tablet 3    blood glucose monitor strips Test 1 times a day & as needed for symptoms of irregular blood glucose. Dispense sufficient amount for indicated testing frequency plus additional to accommodate PRN testing needs. 100 strip 1    Cholecalciferol (VITAMIN D3) 1.25 MG (94126 UT) CAPS TAKE 1 CAPSULE ONCE A WEEK 13 capsule 1     No current facility-administered medications for this visit.         She is allergic to asa [aspirin] and pollen extract.    She  reports that she has never smoked. She has never used smokeless tobacco.      Objective:  Vitals:    03/05/24 1447   BP: 128/74   Site: Right Upper Arm   Position: Sitting   Cuff Size: Large Adult   Pulse: 74   Temp: 98 °F (36.7 °C)   TempSrc: Tympanic   SpO2: 95%   Weight: 127.5 kg (281 lb)         Body mass index is 42.73 kg/m².    Well-nourished, well-developed female with severe obesity, alert, cooperative, and in no acute distress.  Back has no costovertebral angle tenderness.  Abdomen is soft, with no lower abdominal tenderness to palpation.    Results of the urinalysis done today were reviewed with the patient and her :  Hospital Outpatient Visit on 03/05/2024   Component Date Value Ref Range Status    Color, UA 03/05/2024 Yellow  Yellow Final    Turbidity UA 03/05/2024 Clear  Clear Final    Glucose, Ur 03/05/2024 NEGATIVE  NEGATIVE mg/dL Final    Bilirubin Urine 03/05/2024 NEGATIVE  Verified by ictotest. (A)  NEGATIVE Final    Ketones, Urine 03/05/2024 1+ (A)  NEGATIVE

## 2024-03-05 NOTE — TELEPHONE ENCOUNTER
[facility] calling stating they got a script for pt but none of the diagnosis are good, pain does not work, they need to know what is causing the pain, please send script with new diagnosis along with chart notes to [facility].

## 2024-03-05 NOTE — TELEPHONE ENCOUNTER
New script pended for wheelchair.   Left knee xray completed on 2/28 with the following findings:      Narrative & Impression  EXAMINATION:  THREE XRAY VIEWS OF THE LEFT KNEE     2/28/2024 6:11 am     COMPARISON:  07/21/2020     HISTORY:  ORDERING SYSTEM PROVIDED HISTORY: Chronic pain of left knee  TECHNOLOGIST PROVIDED HISTORY:  Chronic pain of left knee  Reason for Exam: Lt knee pain     FINDINGS:  Partially visualized intramedullary nail in the femur with four distal  interlocking screws.  The visualized portion of the hardware appears intact  without periprosthetic fracture or lucency.  Corticated irregularity of the  distal femoral metadiaphysis from remote prior fracture.  Tricompartmental  osteoarthritis at the knee which is at least moderate in the medial and  patellofemoral compartments and mild in the lateral femoral tibial  compartment.  No knee effusion.     IMPRESSION:  Tricompartmental OA which is at least moderate in the medial and  patellofemoral compartments, mildly progressed from 2020.     Partially visualized open reduction internal fixation hardware of the distal  femur without gross radiographic complication involving the visualized  portion of the hardware.      Writer updated diagnosis on wheelchair script.   Patient seen ortho today regarding knee pain,

## 2024-03-05 NOTE — PATIENT INSTRUCTIONS
We will contact you when the results of your culture are available.  This usually takes at least 48 hours.  Call if you have not been contacted by Friday 3/8.

## 2024-03-06 VITALS
BODY MASS INDEX: 43.19 KG/M2 | SYSTOLIC BLOOD PRESSURE: 135 MMHG | DIASTOLIC BLOOD PRESSURE: 59 MMHG | WEIGHT: 285 LBS | HEIGHT: 68 IN | TEMPERATURE: 98.6 F | OXYGEN SATURATION: 99 % | HEART RATE: 78 BPM | RESPIRATION RATE: 16 BRPM

## 2024-03-06 DIAGNOSIS — N39.0 RECURRENT UTI: Primary | ICD-10-CM

## 2024-03-06 PROBLEM — R79.89 ELEVATED LACTIC ACID LEVEL: Status: ACTIVE | Noted: 2024-03-06

## 2024-03-06 PROBLEM — N30.00 ACUTE CYSTITIS WITHOUT HEMATURIA: Status: ACTIVE | Noted: 2021-09-19

## 2024-03-06 LAB
ANION GAP SERPL CALCULATED.3IONS-SCNC: 12 MMOL/L (ref 9–17)
ANION GAP SERPL CALCULATED.3IONS-SCNC: 15 MMOL/L (ref 9–17)
BASOPHILS # BLD: 0 K/UL (ref 0–0.2)
BASOPHILS # BLD: <0.03 K/UL (ref 0–0.2)
BASOPHILS NFR BLD: 0 % (ref 0–1)
BASOPHILS NFR BLD: 0 % (ref 0–2)
BUN SERPL-MCNC: 33 MG/DL (ref 8–23)
BUN SERPL-MCNC: 36 MG/DL (ref 8–23)
BUN/CREAT SERPL: 21 (ref 9–20)
BUN/CREAT SERPL: 24 (ref 9–20)
CALCIUM SERPL-MCNC: 9 MG/DL (ref 8.6–10.4)
CALCIUM SERPL-MCNC: 9.5 MG/DL (ref 8.6–10.4)
CHLORIDE SERPL-SCNC: 95 MMOL/L (ref 98–107)
CHLORIDE SERPL-SCNC: 99 MMOL/L (ref 98–107)
CO2 SERPL-SCNC: 24 MMOL/L (ref 20–31)
CO2 SERPL-SCNC: 24 MMOL/L (ref 20–31)
CREAT SERPL-MCNC: 1.4 MG/DL (ref 0.5–0.9)
CREAT SERPL-MCNC: 1.7 MG/DL (ref 0.5–0.9)
EOSINOPHIL # BLD: 0 K/UL (ref 0–0.4)
EOSINOPHIL # BLD: <0.03 K/UL (ref 0–0.44)
EOSINOPHILS RELATIVE PERCENT: 0 % (ref 1–4)
EOSINOPHILS RELATIVE PERCENT: 0 % (ref 1–7)
ERYTHROCYTE [DISTWIDTH] IN BLOOD BY AUTOMATED COUNT: 14 % (ref 11.8–14.4)
ERYTHROCYTE [DISTWIDTH] IN BLOOD BY AUTOMATED COUNT: 14.1 % (ref 11.8–14.4)
GFR SERPL CREATININE-BSD FRML MDRD: 32 ML/MIN/1.73M2
GFR SERPL CREATININE-BSD FRML MDRD: 41 ML/MIN/1.73M2
GLUCOSE BLD-MCNC: 165 MG/DL (ref 65–105)
GLUCOSE BLD-MCNC: 206 MG/DL (ref 65–105)
GLUCOSE SERPL-MCNC: 215 MG/DL (ref 70–99)
GLUCOSE SERPL-MCNC: 253 MG/DL (ref 70–99)
HCT VFR BLD AUTO: 31.6 % (ref 36.3–47.1)
HCT VFR BLD AUTO: 32.8 % (ref 36.3–47.1)
HGB BLD-MCNC: 10.3 G/DL (ref 11.9–15.1)
HGB BLD-MCNC: 10.8 G/DL (ref 11.9–15.1)
IMM GRANULOCYTES # BLD AUTO: 0 K/UL (ref 0–0.3)
IMM GRANULOCYTES # BLD AUTO: 0.08 K/UL (ref 0–0.3)
IMM GRANULOCYTES NFR BLD: 0 %
IMM GRANULOCYTES NFR BLD: 1 %
LACTATE BLDV-SCNC: 1.2 MMOL/L (ref 0.5–2.2)
LACTATE BLDV-SCNC: 3.1 MMOL/L (ref 0.5–2.2)
LYMPHOCYTES NFR BLD: 0.37 K/UL (ref 1–4.8)
LYMPHOCYTES NFR BLD: 0.7 K/UL (ref 1.1–3.7)
LYMPHOCYTES RELATIVE PERCENT: 4 % (ref 16–46)
LYMPHOCYTES RELATIVE PERCENT: 7 % (ref 24–43)
MCH RBC QN AUTO: 29.1 PG (ref 25.2–33.5)
MCH RBC QN AUTO: 29.3 PG (ref 25.2–33.5)
MCHC RBC AUTO-ENTMCNC: 32.6 G/DL (ref 25.2–33.5)
MCHC RBC AUTO-ENTMCNC: 32.9 G/DL (ref 25.2–33.5)
MCV RBC AUTO: 88.9 FL (ref 82.6–102.9)
MCV RBC AUTO: 89.3 FL (ref 82.6–102.9)
MONOCYTES NFR BLD: 0.09 K/UL (ref 0.1–1.2)
MONOCYTES NFR BLD: 0.34 K/UL (ref 0.1–1.2)
MONOCYTES NFR BLD: 1 % (ref 4–11)
MONOCYTES NFR BLD: 3 % (ref 3–12)
MORPHOLOGY: ABNORMAL
MORPHOLOGY: ABNORMAL
NEUTROPHILS NFR BLD: 89 % (ref 36–65)
NEUTROPHILS NFR BLD: 95 % (ref 43–77)
NEUTS SEG NFR BLD: 8.74 K/UL (ref 1.5–8.1)
NEUTS SEG NFR BLD: 9.47 K/UL (ref 1.5–8.1)
NRBC BLD-RTO: 0 PER 100 WBC
NRBC BLD-RTO: 0 PER 100 WBC
PLATELET # BLD AUTO: 253 K/UL (ref 138–453)
PLATELET # BLD AUTO: 289 K/UL (ref 138–453)
PMV BLD AUTO: 10.2 FL (ref 8.1–13.5)
PMV BLD AUTO: 10.5 FL (ref 8.1–13.5)
POTASSIUM SERPL-SCNC: 4.3 MMOL/L (ref 3.7–5.3)
POTASSIUM SERPL-SCNC: 4.4 MMOL/L (ref 3.7–5.3)
RBC # BLD AUTO: 3.54 M/UL (ref 3.95–5.11)
RBC # BLD AUTO: 3.69 M/UL (ref 3.95–5.11)
SODIUM SERPL-SCNC: 134 MMOL/L (ref 135–144)
SODIUM SERPL-SCNC: 135 MMOL/L (ref 135–144)
WBC OTHER # BLD: 10.6 K/UL (ref 3.5–11.3)
WBC OTHER # BLD: 9.2 K/UL (ref 3.5–11.3)

## 2024-03-06 PROCEDURE — G0378 HOSPITAL OBSERVATION PER HR: HCPCS

## 2024-03-06 PROCEDURE — APPSS30 APP SPLIT SHARED TIME 16-30 MINUTES

## 2024-03-06 PROCEDURE — 6360000002 HC RX W HCPCS: Performed by: EMERGENCY MEDICINE

## 2024-03-06 PROCEDURE — 96365 THER/PROPH/DIAG IV INF INIT: CPT

## 2024-03-06 PROCEDURE — 99238 HOSP IP/OBS DSCHRG MGMT 30/<: CPT | Performed by: INTERNAL MEDICINE

## 2024-03-06 PROCEDURE — 51798 US URINE CAPACITY MEASURE: CPT

## 2024-03-06 PROCEDURE — 96361 HYDRATE IV INFUSION ADD-ON: CPT

## 2024-03-06 PROCEDURE — 2580000003 HC RX 258: Performed by: EMERGENCY MEDICINE

## 2024-03-06 PROCEDURE — 6360000002 HC RX W HCPCS

## 2024-03-06 PROCEDURE — 97161 PT EVAL LOW COMPLEX 20 MIN: CPT

## 2024-03-06 PROCEDURE — 36415 COLL VENOUS BLD VENIPUNCTURE: CPT

## 2024-03-06 PROCEDURE — 83605 ASSAY OF LACTIC ACID: CPT

## 2024-03-06 PROCEDURE — 2580000003 HC RX 258

## 2024-03-06 PROCEDURE — 6370000000 HC RX 637 (ALT 250 FOR IP)

## 2024-03-06 PROCEDURE — 96372 THER/PROPH/DIAG INJ SC/IM: CPT

## 2024-03-06 PROCEDURE — 85025 COMPLETE CBC W/AUTO DIFF WBC: CPT

## 2024-03-06 PROCEDURE — 82947 ASSAY GLUCOSE BLOOD QUANT: CPT

## 2024-03-06 PROCEDURE — 80048 BASIC METABOLIC PNL TOTAL CA: CPT

## 2024-03-06 RX ORDER — 0.9 % SODIUM CHLORIDE 0.9 %
2000 INTRAVENOUS SOLUTION INTRAVENOUS ONCE
Status: COMPLETED | OUTPATIENT
Start: 2024-03-06 | End: 2024-03-06

## 2024-03-06 RX ORDER — LISINOPRIL AND HYDROCHLOROTHIAZIDE 20; 12.5 MG/1; MG/1
1 TABLET ORAL DAILY
Status: DISCONTINUED | OUTPATIENT
Start: 2024-03-06 | End: 2024-03-06

## 2024-03-06 RX ORDER — CHOLECALCIFEROL (VITAMIN D3) 125 MCG
1 CAPSULE ORAL 3 TIMES DAILY
Qty: 30 CAPSULE | Refills: 0 | COMMUNITY
Start: 2024-03-06 | End: 2024-03-16

## 2024-03-06 RX ORDER — DULOXETIN HYDROCHLORIDE 30 MG/1
60 CAPSULE, DELAYED RELEASE ORAL DAILY
Status: DISCONTINUED | OUTPATIENT
Start: 2024-03-06 | End: 2024-03-06 | Stop reason: HOSPADM

## 2024-03-06 RX ORDER — POLYETHYLENE GLYCOL 3350 17 G/17G
17 POWDER, FOR SOLUTION ORAL DAILY PRN
Status: DISCONTINUED | OUTPATIENT
Start: 2024-03-06 | End: 2024-03-06 | Stop reason: HOSPADM

## 2024-03-06 RX ORDER — INSULIN LISPRO 100 [IU]/ML
0-4 INJECTION, SOLUTION INTRAVENOUS; SUBCUTANEOUS NIGHTLY
Status: DISCONTINUED | OUTPATIENT
Start: 2024-03-06 | End: 2024-03-06 | Stop reason: HOSPADM

## 2024-03-06 RX ORDER — SODIUM CHLORIDE 9 MG/ML
INJECTION, SOLUTION INTRAVENOUS PRN
Status: DISCONTINUED | OUTPATIENT
Start: 2024-03-06 | End: 2024-03-06 | Stop reason: HOSPADM

## 2024-03-06 RX ORDER — ACETAMINOPHEN 325 MG/1
650 TABLET ORAL EVERY 6 HOURS PRN
Status: DISCONTINUED | OUTPATIENT
Start: 2024-03-06 | End: 2024-03-06 | Stop reason: HOSPADM

## 2024-03-06 RX ORDER — INSULIN LISPRO 100 [IU]/ML
0-4 INJECTION, SOLUTION INTRAVENOUS; SUBCUTANEOUS
Status: DISCONTINUED | OUTPATIENT
Start: 2024-03-06 | End: 2024-03-06 | Stop reason: HOSPADM

## 2024-03-06 RX ORDER — SODIUM CHLORIDE 0.9 % (FLUSH) 0.9 %
5-40 SYRINGE (ML) INJECTION EVERY 12 HOURS SCHEDULED
Status: DISCONTINUED | OUTPATIENT
Start: 2024-03-06 | End: 2024-03-06 | Stop reason: HOSPADM

## 2024-03-06 RX ORDER — HYDROCHLOROTHIAZIDE 12.5 MG/1
12.5 CAPSULE, GELATIN COATED ORAL EVERY MORNING
Qty: 30 CAPSULE | Refills: 0 | Status: SHIPPED | OUTPATIENT
Start: 2024-03-06 | End: 2024-04-05

## 2024-03-06 RX ORDER — ACETAMINOPHEN 650 MG/1
650 SUPPOSITORY RECTAL EVERY 6 HOURS PRN
Status: DISCONTINUED | OUTPATIENT
Start: 2024-03-06 | End: 2024-03-06 | Stop reason: HOSPADM

## 2024-03-06 RX ORDER — HYDROCODONE BITARTRATE AND ACETAMINOPHEN 5; 325 MG/1; MG/1
1 TABLET ORAL 2 TIMES DAILY
Status: DISCONTINUED | OUTPATIENT
Start: 2024-03-06 | End: 2024-03-06 | Stop reason: HOSPADM

## 2024-03-06 RX ORDER — PANTOPRAZOLE SODIUM 40 MG/1
40 TABLET, DELAYED RELEASE ORAL 2 TIMES DAILY
Status: DISCONTINUED | OUTPATIENT
Start: 2024-03-06 | End: 2024-03-06 | Stop reason: HOSPADM

## 2024-03-06 RX ORDER — DEXTROSE MONOHYDRATE 100 MG/ML
INJECTION, SOLUTION INTRAVENOUS CONTINUOUS PRN
Status: DISCONTINUED | OUTPATIENT
Start: 2024-03-06 | End: 2024-03-06 | Stop reason: HOSPADM

## 2024-03-06 RX ORDER — METOCLOPRAMIDE 10 MG/1
10 TABLET ORAL 4 TIMES DAILY PRN
Status: DISCONTINUED | OUTPATIENT
Start: 2024-03-06 | End: 2024-03-06 | Stop reason: HOSPADM

## 2024-03-06 RX ORDER — SODIUM CHLORIDE 9 MG/ML
INJECTION, SOLUTION INTRAVENOUS CONTINUOUS
Status: DISCONTINUED | OUTPATIENT
Start: 2024-03-06 | End: 2024-03-06 | Stop reason: HOSPADM

## 2024-03-06 RX ORDER — ONDANSETRON 2 MG/ML
4 INJECTION INTRAMUSCULAR; INTRAVENOUS EVERY 6 HOURS PRN
Status: DISCONTINUED | OUTPATIENT
Start: 2024-03-06 | End: 2024-03-06 | Stop reason: HOSPADM

## 2024-03-06 RX ORDER — AMLODIPINE BESYLATE 10 MG/1
10 TABLET ORAL DAILY
Qty: 30 TABLET | Refills: 0 | Status: SHIPPED | OUTPATIENT
Start: 2024-03-06 | End: 2024-04-05

## 2024-03-06 RX ORDER — ENOXAPARIN SODIUM 100 MG/ML
30 INJECTION SUBCUTANEOUS 2 TIMES DAILY
Status: DISCONTINUED | OUTPATIENT
Start: 2024-03-06 | End: 2024-03-06 | Stop reason: HOSPADM

## 2024-03-06 RX ORDER — GLIMEPIRIDE 2 MG/1
2 TABLET ORAL EVERY MORNING
Qty: 30 TABLET | Refills: 0 | Status: SHIPPED | OUTPATIENT
Start: 2024-03-06

## 2024-03-06 RX ORDER — SODIUM CHLORIDE 0.9 % (FLUSH) 0.9 %
5-40 SYRINGE (ML) INJECTION PRN
Status: DISCONTINUED | OUTPATIENT
Start: 2024-03-06 | End: 2024-03-06 | Stop reason: HOSPADM

## 2024-03-06 RX ORDER — LISINOPRIL 20 MG/1
20 TABLET ORAL DAILY
Status: DISCONTINUED | OUTPATIENT
Start: 2024-03-06 | End: 2024-03-06 | Stop reason: HOSPADM

## 2024-03-06 RX ORDER — ONDANSETRON 4 MG/1
4 TABLET, ORALLY DISINTEGRATING ORAL EVERY 8 HOURS PRN
Status: DISCONTINUED | OUTPATIENT
Start: 2024-03-06 | End: 2024-03-06 | Stop reason: HOSPADM

## 2024-03-06 RX ORDER — HYDROCHLOROTHIAZIDE 12.5 MG/1
12.5 TABLET ORAL DAILY
Status: DISCONTINUED | OUTPATIENT
Start: 2024-03-06 | End: 2024-03-06 | Stop reason: HOSPADM

## 2024-03-06 RX ORDER — INSULIN GLARGINE 100 [IU]/ML
5 INJECTION, SOLUTION SUBCUTANEOUS NIGHTLY
Status: DISCONTINUED | OUTPATIENT
Start: 2024-03-06 | End: 2024-03-06 | Stop reason: HOSPADM

## 2024-03-06 RX ORDER — GLUCAGON 1 MG/ML
1 KIT INJECTION PRN
Status: DISCONTINUED | OUTPATIENT
Start: 2024-03-06 | End: 2024-03-06 | Stop reason: HOSPADM

## 2024-03-06 RX ORDER — PIOGLITAZONE HCL AND METFORMIN HCL 500; 15 MG/1; MG/1
1 TABLET ORAL 2 TIMES DAILY WITH MEALS
Qty: 180 TABLET | Refills: 0 | Status: SHIPPED | OUTPATIENT
Start: 2024-03-06

## 2024-03-06 RX ADMIN — ENOXAPARIN SODIUM 30 MG: 100 INJECTION SUBCUTANEOUS at 09:06

## 2024-03-06 RX ADMIN — DULOXETINE HYDROCHLORIDE 60 MG: 30 CAPSULE, DELAYED RELEASE ORAL at 09:01

## 2024-03-06 RX ADMIN — GABAPENTIN 400 MG: 300 CAPSULE ORAL at 09:01

## 2024-03-06 RX ADMIN — ENOXAPARIN SODIUM 30 MG: 100 INJECTION SUBCUTANEOUS at 02:57

## 2024-03-06 RX ADMIN — HYDROCODONE BITARTRATE AND ACETAMINOPHEN 1 TABLET: 5; 325 TABLET ORAL at 09:02

## 2024-03-06 RX ADMIN — SODIUM CHLORIDE 2000 ML: 9 INJECTION, SOLUTION INTRAVENOUS at 00:50

## 2024-03-06 RX ADMIN — LISINOPRIL 20 MG: 20 TABLET ORAL at 09:05

## 2024-03-06 RX ADMIN — SODIUM CHLORIDE: 9 INJECTION, SOLUTION INTRAVENOUS at 03:12

## 2024-03-06 RX ADMIN — INSULIN LISPRO 1 UNITS: 100 INJECTION, SOLUTION INTRAVENOUS; SUBCUTANEOUS at 09:06

## 2024-03-06 RX ADMIN — PANTOPRAZOLE SODIUM 40 MG: 40 TABLET, DELAYED RELEASE ORAL at 09:02

## 2024-03-06 RX ADMIN — CEFTRIAXONE 1000 MG: 1 INJECTION, POWDER, FOR SOLUTION INTRAMUSCULAR; INTRAVENOUS at 00:51

## 2024-03-06 RX ADMIN — EMPAGLIFLOZIN 10 MG: 10 TABLET, FILM COATED ORAL at 09:02

## 2024-03-06 RX ADMIN — HYDROCHLOROTHIAZIDE 12.5 MG: 12.5 TABLET ORAL at 09:02

## 2024-03-06 ASSESSMENT — PAIN DESCRIPTION - DESCRIPTORS
DESCRIPTORS: ACHING;STABBING
DESCRIPTORS: ACHING

## 2024-03-06 ASSESSMENT — PAIN DESCRIPTION - ORIENTATION
ORIENTATION: MID;LOWER
ORIENTATION: MID;LOWER

## 2024-03-06 ASSESSMENT — PAIN SCALES - GENERAL
PAINLEVEL_OUTOF10: 0
PAINLEVEL_OUTOF10: 2

## 2024-03-06 NOTE — PROGRESS NOTES
Physical Therapy  Facility/Department: [facility]  PROGRESSIVE CARE  Physical Therapy Initial Assessment    Name: Jyoti Warren  : 1955  MRN: [id number]  Date of Service: 3/6/2024    Discharge Recommendations:  Outpatient PT          Patient Diagnosis(es): The primary encounter diagnosis was Urinary tract infection without hematuria, site unspecified. A diagnosis of Bacteremia was also pertinent to this visit.  Past Medical History:  has a past medical history of Acute cystitis with hematuria, Elizalde's esophagus, Cervical spine pain, Chronic headaches, Chronic sinusitis, CKD (chronic kidney disease), Closed fracture of distal end of left femur with routine healing, COVID-19, Diabetes mellitus (HCC), Dizziness, Femur fracture, left (HCC), Gastroesophageal reflux disease, H/O fall, Hypertension, Knee pain, left, 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HCC), Obesity, Peripheral neuropathy, Pneumonia, Pneumonia due to COVID-19 virus, Pulmonary hypertension (HCC), Shoulder pain, bilateral, Spinal stenosis, Type 2 diabetes mellitus (HCC), Unspecified essential hypertension, and Vitamin D deficiency.  Past Surgical History:  has a past surgical history that includes Hysterectomy (2005); Appendectomy; Upper gastrointestinal endoscopy; Knee arthroscopy (Left); Colonoscopy (2012); other surgical history (); Dental surgery (2015); Tonsillectomy; brain surgery (2016); Foreign Body Removal (2016); Total shoulder arthroplasty (Right, 10/18/2018); Shoulder arthroscopy (Right, 2019); Nerve Block (2019); Upper gastrointestinal endoscopy (2020); Upper gastrointestinal endoscopy (N/A, 2020); Colonoscopy (N/A, 2020); Back Injection (2021); Femur Closed Reduction (Left, 2020); polysomnography (2020); Femur fracture surgery (Left, 2021); Cystoscopy (Bilateral, 2022); joint replacement; Esophagogastroduodenoscopy (10/18/2022); and Upper gastrointestinal endoscopy

## 2024-03-06 NOTE — PROGRESS NOTES
rounding on PCU    Assessment: Patient is having lunch with her  present for support. She expects to be discharged shortly.    Intervention: Engaged in conversation. Patient expressed appreciation for visit and offer of continued prayer.    Plan: Chaplains are available on site or on call 24/7 for spiritual and emotional support.   03/06/24 1224   Encounter Summary   Encounter Overview/Reason  Spiritual/Emotional Needs   Service Provided For: Patient and family together   Referral/Consult From: Rounding   Support System Family members;Spouse   Last Encounter  03/06/24   Complexity of Encounter Low   Begin Time 1200   End Time  1205   Total Time Calculated 5 min   Assessment/Intervention/Outcome   Assessment Calm   Intervention Active listening;Prayer (assurance of)/Bertha   Outcome Acceptance;Engaged in conversation;Expressed Gratitude

## 2024-03-06 NOTE — PROGRESS NOTES
CLINICAL PHARMACY NOTE: MEDS TO BEDS    Total # of Prescriptions Filled: 3   The following medications were delivered to the patient:  Glimepiride 2mg  Hydrochlorothiazide 12.5mg  Amlodipine 10mg    Additional Documentation: patient was discharge prior to delivery and came to pharmacy for medications

## 2024-03-06 NOTE — PLAN OF CARE
Problem: Discharge Planning  Goal: Discharge to home or other facility with appropriate resources  3/6/2024 1515 by Ava Martin RN  Outcome: Completed  3/6/2024 0259 by Violette Damon RN  Outcome: Progressing     Problem: Safety - Adult  Goal: Free from fall injury  3/6/2024 1515 by Ava Martin RN  Outcome: Completed  3/6/2024 0259 by Violette Damon RN  Outcome: Progressing     Problem: Skin/Tissue Integrity  Goal: Absence of new skin breakdown  Description: 1.  Monitor for areas of redness and/or skin breakdown  2.  Assess vascular access sites hourly  3.  Every 4-6 hours minimum:  Change oxygen saturation probe site  4.  Every 4-6 hours:  If on nasal continuous positive airway pressure, respiratory therapy assess nares and determine need for appliance change or resting period.  3/6/2024 1515 by Ava Martin RN  Outcome: Completed  3/6/2024 0259 by Violette Damon RN  Outcome: Progressing     Problem: Musculoskeletal - Adult  Goal: Return mobility to safest level of function  3/6/2024 1515 by Ava Martin RN  Outcome: Completed  3/6/2024 0259 by Violette Damon RN  Outcome: Progressing     Problem: Genitourinary - Adult  Goal: Absence of urinary retention  3/6/2024 1515 by Ava Martin RN  Outcome: Completed  3/6/2024 0259 by Violette Damon RN  Outcome: Progressing     Problem: Infection - Adult  Goal: Absence of infection at discharge  3/6/2024 1515 by Ava Martin RN  Outcome: Completed  3/6/2024 0259 by Violette Damon RN  Outcome: Progressing     Problem: Skin/Tissue Integrity - Adult  Goal: Skin integrity remains intact  Outcome: Completed     Problem: Chronic Conditions and Co-morbidities  Goal: Patient's chronic conditions and co-morbidity symptoms are monitored and maintained or improved  Outcome: Completed     Problem: Pain  Goal: Verbalizes/displays adequate comfort level or baseline comfort level  Outcome: Completed

## 2024-03-06 NOTE — H&P
HOSPITALIST ADMISSION H&P      REASON FOR ADMISSION:  UTI, Elevated lactic acid.  ESTIMATED LENGTH OF STAY: <2 midnights, 1-2 days    ATTENDING/ADMITTING PHYSICIAN: Manuel Sarabia MD  PCP: Nia Patel MD    HISTORY OF PRESENT ILLNESS:      The patient is a 68 y.o. female patient of Nia Patel MD who presents from ER with c/o UTI, weakness. Per patient chart was seen by her PCP in the walk-in clinic for dizziness, which is a symptom she has had in the past when she has had a urinary tract infection, UA was completed and C&S pending, was sent home on Cipro 250 mg po bid for 7 days.  Patient reports she got up to go to the bathroom and started to fall, states she caught herself but yelled for her  who patient states is unable to pick her up if she falls, patient states he steadied her and she started to fall again. Reports her \"weakness has been worsening all day\", states she is alert but has difficulty saying things, has been \"shaky and jerky\". Patient reports she has \"a UT infection\" and started her antibiotics last night, reports she has taken 1 dose so far. Patient c/o SOB with activity, denies chest pain, nausea, vomiting, constipation, diarrhea, pain or burning with urination but does state she does not think she is emptying her bladder when she urinates.    Hx frequent UTI's:   11/20/23 Klebsiella Pneumoniae-MDRO, ESBL- sensitive to Nitrofurantoin, Imipenem, and Meropenem.  10/28/23 Klebsiella Oxytoca- sensitive to nitrofurantoin, levaquin, rocephin, bactrim, and zosyn.  8/18/23 Ecoli- sensitivity similar to 10/28/23 specimen.    DMII: HgbA1c 6.6 on 2/28/24.    Hypertension: Controlled.    In ER: Rocephin, IVF bolus x2L    Wounds and LDAs present prior to admission: None     See below for additional PMH.    Patient zngs-lxhbvrehsk-[id number]-available records reviewed, including, but not limited to ER records, imaging results, lab results, office records, personal records, and OARRS -- no signs of

## 2024-03-06 NOTE — TELEPHONE ENCOUNTER
Jyoti called requesting a refill of the below medication which has been pended for you:     Requested Prescriptions     Pending Prescriptions Disp Refills    pioglitazone-metFORMIN (ACTOPLUS MET)  MG per tablet [Pharmacy Med Name: PIOGLIT  15MG 500MG  TAB  MET] 180 tablet 0     Sig: TAKE 1 TABLET BY MOUTH TWICE  DAILY WITH MEALS       Last Appointment Date: 3/5/2024  Next Appointment Date: 3/14/2024    Allergies   Allergen Reactions    Asa [Aspirin] Other (See Comments)     Stomach irritation    Pollen Extract

## 2024-03-06 NOTE — ED PROVIDER NOTES
eMERGENCY dEPARTMENT eNCOUnter      Pt Name: Jyoti Warren  MRN: [id number]  Birthdate 1955  Date of evaluation: 3/5/2024      CHIEF COMPLAINT       Chief Complaint   Patient presents with    Other     Increased weakness and confusion         HISTORY OF PRESENT ILLNESS    Jyoti Warren is a 68 y.o. female who presents with generalized weakness.  Patient states that over the last 24+ hours she has been having increasing weakness was seen in urgent care was diagnosed with urinary tract infection was started on antibiotics states she is getting progressively weak is unable to stand no fever chills no chest pain shortness of breath no abdominal pain  says that she is actually getting more confused        REVIEW OF SYSTEMS       Review of systems are all reviewed and negative except stated above in the HPI    PAST MEDICAL HISTORY    has a past medical history of Acute cystitis with hematuria, Elizalde's esophagus, Cervical spine pain, Chronic headaches, Chronic sinusitis, CKD (chronic kidney disease), Closed fracture of distal end of left femur with routine healing, COVID-19, Diabetes mellitus (HCC), Dizziness, Femur fracture, left (HCC), Gastroesophageal reflux disease, H/O fall, Hypertension, Knee pain, left, 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HCC), Obesity, Peripheral neuropathy, Pneumonia, Pneumonia due to COVID-19 virus, Pulmonary hypertension (HCC), Shoulder pain, bilateral, Spinal stenosis, Type 2 diabetes mellitus (HCC), Unspecified essential hypertension, and Vitamin D deficiency.    SURGICAL HISTORY      has a past surgical history that includes Hysterectomy (09/06/2005); Appendectomy; Upper gastrointestinal endoscopy; Knee arthroscopy (Left); Colonoscopy (05/03/2012); other surgical history (2011); Dental surgery (08/2015); Tonsillectomy; brain surgery (05/24/2016); Foreign Body Removal (05/28/2016); Total shoulder arthroplasty (Right, 10/18/2018); Shoulder arthroscopy (Right, 03/07/2019); Nerve Block (09/09/2019);

## 2024-03-06 NOTE — PROGRESS NOTES
Shift assessment complete 0745, HR 69, apical, regular rate and rhythm, RR 15, some struggle in breathing, /72,  Temp 98.4 temporal, Pain 0/10, 0,  Patient A&O x 4. Pupils equal, round and reactive to light (PERRLA), Mucus membranes moist, pink, no teeth no dentures. Patient able to swallow without difficulty. Chest unremarkable no skin abrasions or lesions noted. Lung sounds clear and equal bilaterally. IV noted in right hand with fluids running, dressing clean, dry and intact. No signs of infections or infiltration noted. Bilateral hand grasps equal, no arm drift noted, radial pulses strong bilaterally. Bowel sounds hyperactive X 4, abdomen soft, flat, non-tender. Pedal pulses strong and regular, no leg drift noted bilaterally. Patient supported by two pillows, call light within reach bedside table in use.

## 2024-03-06 NOTE — DISCHARGE INSTRUCTIONS
Follow up with Dr. Patel in 1 week    Urology will arrange for an outpatient cystocopy and follow up    Schedule outpatient renal ultrasound    BMP Monday 3/11/24

## 2024-03-06 NOTE — DISCHARGE INSTR - DIET

## 2024-03-06 NOTE — PLAN OF CARE
Problem: Discharge Planning  Goal: Discharge to home or other facility with appropriate resources  Outcome: Progressing     Problem: Safety - Adult  Goal: Free from fall injury  Outcome: Progressing     Problem: Skin/Tissue Integrity  Goal: Absence of new skin breakdown  Description: 1.  Monitor for areas of redness and/or skin breakdown  2.  Assess vascular access sites hourly  3.  Every 4-6 hours minimum:  Change oxygen saturation probe site  4.  Every 4-6 hours:  If on nasal continuous positive airway pressure, respiratory therapy assess nares and determine need for appliance change or resting period.  Outcome: Progressing     Problem: Musculoskeletal - Adult  Goal: Return mobility to safest level of function  Outcome: Progressing     Problem: Genitourinary - Adult  Goal: Absence of urinary retention  Outcome: Progressing     Problem: Infection - Adult  Goal: Absence of infection at discharge  Outcome: Progressing

## 2024-03-06 NOTE — CARE COORDINATION
Case Management Assessment  Initial Evaluation    Date/Time of Evaluation: 3/6/2024 1:58 PM  Assessment Completed by: Thais Cavazos RN    If patient is discharged prior to next notation, then this note serves as note for discharge by case management.    Patient Name: Jyoti Warren                   YOB: 1955  Diagnosis: Urinary tract infection, site not specified [N39.0]  Bacteremia [R78.81]  Urinary tract infection without hematuria, site unspecified [N39.0]                   Date / Time: 3/5/2024 11:24 PM    Patient Admission Status: Observation   Readmission Risk (Low < 19, Mod (19-27), High > 27): Readmission Risk Score: 17.4    Current PCP: Nia Patel MD  PCP verified by CM? Yes    Chart Reviewed: Yes      History Provided by: Patient  Patient Orientation: Alert and Oriented    Patient Cognition: Alert    Hospitalization in the last 30 days (Readmission):  No    If yes, Readmission Assessment in  Navigator will be completed.    Advance Directives:      Code Status: Full Code   Patient's Primary Decision Maker is: Legal Next of Kin      Discharge Planning:    Patient lives with: Spouse/Significant Other Type of Home: House  Primary Care Giver: Self  Patient Support Systems include: Spouse/Significant Other   Current Financial resources: Medicare  Current community resources: None  Current services prior to admission: Durable Medical Equipment            Current DME: Bedside [employer]            Type of Home Care services:  None    ADLS  Prior functional level: Independent in ADLs/IADLs  Current functional level: Independent in ADLs/IADLs    PT AM-PAC:   /24  OT AM-PAC:   /24    Family can provide assistance at DC: Yes  Would you like Case Management to discuss the discharge plan with any other family members/significant others, and if so, who? No  Plans to Return to Present Housing: Yes  Other Identified Issues/Barriers to RETURNING to current housing: yes  Potential Assistance needed at

## 2024-03-06 NOTE — CONSULTS
Kristen Camargo, Scar, Kenyatta, Marcie, Sherita, Martha, & Brain   Urology Consultation      Patient:  Jyoti Warren  MRN: [id number]  YOB: 1955    CHIEF COMPLAINT:  Rec UTIs    HISTORY OF PRESENT ILLNESS:   The patient is a 68 y.o. female who was seen by her PCP in the walk-in clinic for dizziness, which is a symptom she has had in the past when she has had a urinary tract infection, UA was completed and C&S pending, was sent home on Cipro 250 mg po bid for 7 days.  Patient reports she got up to go to the bathroom and started to fall, states she caught herself but yelled for her  who patient states is unable to pick her up if she falls, patient states he steadied her and she started to fall again.    CT 2023 negative for stones.         Patient's old records, notes and chart reviewed and summarized above.    Past Medical History:    Past Medical History:   Diagnosis Date    Acute cystitis with hematuria 9/19/2021    Elizalde's esophagus     Cervical spine pain 08/21/2013    Chronic headaches     Chronic sinusitis     CKD (chronic kidney disease)     stage 3    Closed fracture of distal end of left femur with routine healing 03/2021    COVID-19     Diabetes mellitus (HCC)     Dizziness     Femur fracture, left (HCC) 3/5/2021    Gastroesophageal reflux disease     barretts    H/O fall     Hypertension     Knee pain, left 08/21/2013    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HCC)     left temporal    Obesity     Peripheral neuropathy     Pneumonia 08/30/2015    Pneumonia due to COVID-19 virus 9/19/2021    Pulmonary hypertension (HCC) 01/01/2012    by echocardiogram    Shoulder pain, bilateral 08/21/2013    Spinal stenosis     Type 2 diabetes mellitus (HCC)     Unspecified essential hypertension     Essential hypertension    Vitamin D deficiency 11/05/2014       Past Surgical History:    Past Surgical History:   Procedure Laterality Date    APPENDECTOMY      BACK INJECTION  02/25/2021    [facility] Right lumbar medial

## 2024-03-07 ENCOUNTER — TELEPHONE (OUTPATIENT)
Dept: FAMILY MEDICINE CLINIC | Age: 69
End: 2024-03-07

## 2024-03-07 ENCOUNTER — FOLLOWUP TELEPHONE ENCOUNTER (OUTPATIENT)
Dept: INPATIENT UNIT | Age: 69
End: 2024-03-07

## 2024-03-07 LAB
MICROORGANISM SPEC CULT: ABNORMAL
MICROORGANISM SPEC CULT: ABNORMAL
SPECIMEN DESCRIPTION: ABNORMAL

## 2024-03-07 NOTE — TELEPHONE ENCOUNTER
PCU calling office. Patient d/c yesterday but had a positive blood culture gram positive. Please follow up.

## 2024-03-07 NOTE — TELEPHONE ENCOUNTER
Patient was seen in UC with Dr. Patel and prescribed Cipro for UTI.  Patient went to the ER and was admitted on 3/5/24 for UTI and discharged 3/6/24  Patient is currently taking Cipro that was prescribed during UC visit now that she is home.   She states she is feeling better.     Please advise on blood culture.   Urine culture is also available.  Dr. Patel is out of the office.

## 2024-03-07 NOTE — TELEPHONE ENCOUNTER
Care Transitions Initial Follow Up Call    Outreach made within 2 business days of discharge: Yes    Patient: Jyoti Warren   Patient : 1955   MRN: [id number]    Reason for Admission: UTI  Discharge Date: 3/6/24       Spoke with: Jero    Discharge department/facility: [facility]    TCM Interactive Patient Contact:  Was patient able to fill all prescriptions: Yes  Was patient instructed to bring all medications to the follow-up visit: Yes  Is patient taking all medications as directed in the discharge summary? Yes  Does patient understand their discharge instructions: Yes  Does patient have questions or concerns that need addressed prior to 7-14 day follow up office visit: no    Scheduled appointment with PCP within 7-14 days    Follow Up  Future Appointments   Date Time Provider Department Center   3/14/2024 10:00 AM [facility]   3/15/2024 10:30 AM Debra De Souza PA [facility]   [facility]   2024  1:00 PM Suleiman Jack MD [facility]   2024  3:30 PM Sav Swenson MD [facility]       Esha Perales LPN

## 2024-03-07 NOTE — DISCHARGE SUMMARY
[address] [place] [address]                            DISCHARGE SUMMARY      PATIENT NAME: FARHAN SCHNEIDER                 : 1955  MED REC NO: [id number]                         ROOM: 0215  ACCOUNT NO: [id number]                       ADMIT DATE: 2024  PROVIDER: Manuel Sarabia MD      PERSONAL PHYSICIAN:  Nia Patel MD, [facility], [facility].    DIAGNOSES:    1. Urinary tract infection, present on admission, 2024.  2. Elevated lactic acid level, 2024.  3. Weakness, dizziness, shakiness, 2024.  4. Prior urinary tract infections variously including but not limited to extended-spectrum beta-lactamase Klebsiella pneumoniae on 2023, multi-drug resistant organism Klebsiella oxytoca on 10/28/2023, Escherichia coli on 2023.  5. Hypertension.  6. Pulmonary hypertension.  7. Diabetes mellitus, type 2.  8. Chronic kidney disease, stage IIIB.  9. Gastroesophageal reflux disease, Elizalde's esophagus.  10. Anemia.  11. Obesity.  12. Other medical problems set forth in the progress note of 2024 incorporated for reference herein.  The patient is also noted to have had recent steroid injections of the bilateral knees on 2024 and has had frequent falls at home.    HISTORY OF PRESENT ILLNESS AND HOSPITAL COURSE:  The patient is a 68-year-old white female, patient of Dr. Nia Patel, [facility].  The patient presented with a urinary tract infection, yet recurrent on 2024.  The patient had an elevated lactic acid level at 3.1, subsequently down to 1.2 after hydration.  The patient's initial BUN was 36, subsequently 33; creatinine 1.7, ultimately 1.4 around the time of discharge with GFR going from 32 up to 41.  Accordingly, given this presentation, patient's diabetic medications, which had included pioglitazone for problem with fluid retention, metformin, issues of

## 2024-03-07 NOTE — TELEPHONE ENCOUNTER
Becca in PCU notified and she will notify Dr. Sarabia.     LM for patient to return call regarding urine culture results and to continue Cipro.     Message also routed to Dr. Patel

## 2024-03-07 NOTE — TELEPHONE ENCOUNTER
Pt was discharged on 03/06/24 from [facility]       DX  Acute cystitis without hematuria

## 2024-03-07 NOTE — TELEPHONE ENCOUNTER
Only one blood culture is back and is a preliminary result.  Second blood culture is negative thus far.  Would wait on final culture to see if they feel it is more of a skin contaminant since both are not positive.  Would just continue on the cipro for now as the urine culture shows that it should be sensitive.

## 2024-03-08 ENCOUNTER — HOSPITAL ENCOUNTER (OUTPATIENT)
Age: 69
Discharge: HOME | End: 2024-03-08
Payer: MEDICARE

## 2024-03-08 DIAGNOSIS — N18.9 ACUTE KIDNEY INJURY SUPERIMPOSED ON CKD (HCC): ICD-10-CM

## 2024-03-08 DIAGNOSIS — N17.9 ACUTE KIDNEY INJURY SUPERIMPOSED ON CKD (HCC): ICD-10-CM

## 2024-03-08 LAB
ANION GAP SERPL CALCULATED.3IONS-SCNC: 11 MMOL/L (ref 9–17)
BUN SERPL-MCNC: 29 MG/DL (ref 8–23)
BUN/CREAT SERPL: 24 (ref 9–20)
CO2 SERPL-SCNC: 28 MMOL/L (ref 20–31)
CREAT SERPL-MCNC: 1.2 MG/DL (ref 0.5–0.9)
GFR SERPL CREATININE-BSD FRML MDRD: 49 ML/MIN/1.73M2
GLUCOSE SERPL-MCNC: 107 MG/DL (ref 70–99)
MICROORGANISM SPEC CULT: ABNORMAL
POTASSIUM SERPL-SCNC: 4 MMOL/L (ref 3.7–5.3)
SERVICE CMNT-IMP: ABNORMAL
SODIUM SERPL-SCNC: 139 MMOL/L (ref 135–144)
SPECIMEN DESCRIPTION: ABNORMAL

## 2024-03-08 PROCEDURE — 80048 BASIC METABOLIC PNL TOTAL CA: CPT

## 2024-03-08 PROCEDURE — 36415 COLL VENOUS BLD VENIPUNCTURE: CPT

## 2024-03-08 NOTE — TELEPHONE ENCOUNTER
Spoke with the patient and advised to continue the Cipro as prescribed. Patient verbalized understanding. Patient states that she is feeling a little better

## 2024-03-10 LAB
MICROORGANISM SPEC CULT: NORMAL
SERVICE CMNT-IMP: NORMAL
SPECIMEN DESCRIPTION: NORMAL

## 2024-03-11 LAB
MICROORGANISM SPEC CULT: NORMAL
SERVICE CMNT-IMP: NORMAL
SPECIMEN DESCRIPTION: NORMAL

## 2024-03-14 ENCOUNTER — HOSPITAL ENCOUNTER (OUTPATIENT)
Age: 69
Discharge: HOME | End: 2024-03-14
Payer: MEDICARE

## 2024-03-14 ENCOUNTER — OFFICE VISIT (OUTPATIENT)
Dept: FAMILY MEDICINE CLINIC | Age: 69
End: 2024-03-14

## 2024-03-14 VITALS
SYSTOLIC BLOOD PRESSURE: 130 MMHG | OXYGEN SATURATION: 99 % | HEIGHT: 68 IN | DIASTOLIC BLOOD PRESSURE: 72 MMHG | BODY MASS INDEX: 41.52 KG/M2 | WEIGHT: 274 LBS | TEMPERATURE: 97.4 F | HEART RATE: 74 BPM

## 2024-03-14 DIAGNOSIS — R80.9 TYPE 2 DIABETES MELLITUS WITH MICROALBUMINURIA, WITHOUT LONG-TERM CURRENT USE OF INSULIN (HCC): ICD-10-CM

## 2024-03-14 DIAGNOSIS — R79.89 ELEVATED SERUM CREATININE: ICD-10-CM

## 2024-03-14 DIAGNOSIS — G44.201 INTRACTABLE TENSION-TYPE HEADACHE, UNSPECIFIED CHRONICITY PATTERN: ICD-10-CM

## 2024-03-14 DIAGNOSIS — Z23 NEED FOR PNEUMOCOCCAL VACCINATION: ICD-10-CM

## 2024-03-14 DIAGNOSIS — E11.29 TYPE 2 DIABETES MELLITUS WITH MICROALBUMINURIA, WITHOUT LONG-TERM CURRENT USE OF INSULIN (HCC): ICD-10-CM

## 2024-03-14 DIAGNOSIS — N39.0 URINARY TRACT INFECTION WITHOUT HEMATURIA, SITE UNSPECIFIED: ICD-10-CM

## 2024-03-14 DIAGNOSIS — Z09 HOSPITAL DISCHARGE FOLLOW-UP: Primary | ICD-10-CM

## 2024-03-14 DIAGNOSIS — I10 ESSENTIAL HYPERTENSION: ICD-10-CM

## 2024-03-14 LAB
ANION GAP SERPL CALCULATED.3IONS-SCNC: 12 MMOL/L (ref 9–17)
BUN SERPL-MCNC: 32 MG/DL (ref 8–23)
BUN/CREAT SERPL: 29 (ref 9–20)
CALCIUM SERPL-MCNC: 9.5 MG/DL (ref 8.6–10.4)
CHLORIDE SERPL-SCNC: 99 MMOL/L (ref 98–107)
CO2 SERPL-SCNC: 29 MMOL/L (ref 20–31)
CREAT SERPL-MCNC: 1.1 MG/DL (ref 0.5–0.9)
GFR SERPL CREATININE-BSD FRML MDRD: 55 ML/MIN/1.73M2
GLUCOSE SERPL-MCNC: 99 MG/DL (ref 70–99)
POTASSIUM SERPL-SCNC: 3.5 MMOL/L (ref 3.7–5.3)
SODIUM SERPL-SCNC: 140 MMOL/L (ref 135–144)

## 2024-03-14 PROCEDURE — 80048 BASIC METABOLIC PNL TOTAL CA: CPT

## 2024-03-14 PROCEDURE — 36415 COLL VENOUS BLD VENIPUNCTURE: CPT

## 2024-03-14 RX ORDER — GABAPENTIN 400 MG/1
400 CAPSULE ORAL 2 TIMES DAILY
Qty: 60 CAPSULE | Refills: 0 | Status: SHIPPED | OUTPATIENT
Start: 2024-03-14 | End: 2024-04-13

## 2024-03-14 RX ORDER — GLIMEPIRIDE 2 MG/1
2 TABLET ORAL EVERY MORNING
Qty: 90 TABLET | Refills: 1 | Status: SHIPPED | OUTPATIENT
Start: 2024-03-14

## 2024-03-14 RX ORDER — AMLODIPINE BESYLATE 10 MG/1
10 TABLET ORAL DAILY
Qty: 90 TABLET | Refills: 1 | Status: SHIPPED | OUTPATIENT
Start: 2024-03-14 | End: 2024-09-10

## 2024-03-14 RX ORDER — DULOXETIN HYDROCHLORIDE 60 MG/1
60 CAPSULE, DELAYED RELEASE ORAL DAILY
Qty: 90 CAPSULE | Refills: 1 | Status: SHIPPED | OUTPATIENT
Start: 2024-03-14 | End: 2024-09-10

## 2024-03-14 SDOH — ECONOMIC STABILITY: FOOD INSECURITY: WITHIN THE PAST 12 MONTHS, THE FOOD YOU BOUGHT JUST DIDN'T LAST AND YOU DIDN'T HAVE MONEY TO GET MORE.: PATIENT DECLINED

## 2024-03-14 SDOH — ECONOMIC STABILITY: INCOME INSECURITY: HOW HARD IS IT FOR YOU TO PAY FOR THE VERY BASICS LIKE FOOD, HOUSING, MEDICAL CARE, AND HEATING?: PATIENT DECLINED

## 2024-03-14 SDOH — ECONOMIC STABILITY: HOUSING INSECURITY
IN THE LAST 12 MONTHS, WAS THERE A TIME WHEN YOU DID NOT HAVE A STEADY PLACE TO SLEEP OR SLEPT IN A SHELTER (INCLUDING NOW)?: PATIENT DECLINED

## 2024-03-14 SDOH — ECONOMIC STABILITY: FOOD INSECURITY: WITHIN THE PAST 12 MONTHS, YOU WORRIED THAT YOUR FOOD WOULD RUN OUT BEFORE YOU GOT MONEY TO BUY MORE.: PATIENT DECLINED

## 2024-03-14 NOTE — PROGRESS NOTES
Transitional Care Office Visit    Date of Face-to-Face: 3/14/2024    Here for follow after hospitalization for: UTI    Persons at visit:     Medication changes during hospitalization:  Stopped ACTOPLUS MET, Jardiance 10 mg, lisinopril-hydrochlorothiazide,    ADDED glimepiride 2 mg daily, amlodipine 10  mg, hydrochlorothiazide 12.5 mg  .    Procedures during hospitalization: None.    Activity: activity as tolerated.    Any medication changes since post-hospitalization phone call?  No.    Any treatment changes since post-hospitalization phone call?  No.    Other follow-up appointments scheduled:  None.

## 2024-03-14 NOTE — PROGRESS NOTES
Post-Discharge Transitional Care  Follow Up      Jyoti Warren   YOB: 1955    Date of Office Visit:  3/14/2024  Date of Hospital Admission: 3/5/24  Date of Hospital Discharge: 3/6/24  Risk of hospital readmission (high >=14%. Medium >=10%) :Readmission Risk Score: 17.4      Care management risk score Rising risk (score 2-5) and Complex Care (Scores >=6): No Risk Score On File     Non face to face  following discharge, date last encounter closed (first attempt may have been earlier): 03/07/2024    Call initiated 2 business days of discharge: Yes    ASSESSMENT/PLAN:   Hospital discharge follow-up  -     [facility] DISCHARGE MEDS RECONCILED W/ CURRENT OUTPATIENT MED LIST  Urinary tract infection without hematuria, site unspecified    Type 2 diabetes mellitus with microalbuminuria, without long-term current use of insulin (HCC)  Her HgbA1c was 6.6% on 2/28/2024, which is excellent.   As noted, medications were changed during her recent hospitalization.  Glimepiride was refilled:   -     glimepiride (AMARYL) 2 MG tablet; Take 1 tablet by mouth every morning, Disp-90 tablet, R-1Normal    Labs were ordered to be done prior to her return visit in 4 months:   -     Lipid Panel; Future  -     Comprehensive Metabolic Panel; Future  -     Hemoglobin A1C; Future    Essential hypertension  Her blood pressure is adequately-controlled today.  (BP: 130/72)   As noted, medications were changed during her recent hospitalization.  Amlodipine was refilled:   -     amLODIPine (NORVASC) 10 MG tablet; Take 1 tablet by mouth daily, Disp-90 tablet, R-1Normal    Intractable tension-type headache, unspecified chronicity pattern  Controlled substances monitoring: No signs of potential drug abuse or diversion identified when the [place] report from [place], [place], and [place] was reviewed today.  The activity on the report was consistent with the treatment plan.   Gabapentin and Cymbalta were refilled:  -     gabapentin (NEURONTIN) 400 MG  Never

## 2024-03-15 ENCOUNTER — OFFICE VISIT (OUTPATIENT)
Dept: ORTHOPEDIC SURGERY | Age: 69
End: 2024-03-15
Payer: MEDICARE

## 2024-03-15 ENCOUNTER — HOSPITAL ENCOUNTER (OUTPATIENT)
Dept: GENERAL RADIOLOGY | Age: 69
End: 2024-03-15
Payer: MEDICARE

## 2024-03-15 VITALS — HEIGHT: 68 IN | BODY MASS INDEX: 41.98 KG/M2 | WEIGHT: 277 LBS

## 2024-03-15 DIAGNOSIS — M54.2 CERVICAL SPINE PAIN: ICD-10-CM

## 2024-03-15 DIAGNOSIS — S12.490D COMPRESSION FRACTURE OF C5 VERTEBRA WITH ROUTINE HEALING, SUBSEQUENT ENCOUNTER: Primary | ICD-10-CM

## 2024-03-15 DIAGNOSIS — S12.490D COMPRESSION FRACTURE OF C5 VERTEBRA WITH ROUTINE HEALING, SUBSEQUENT ENCOUNTER: ICD-10-CM

## 2024-03-15 PROCEDURE — G8417 CALC BMI ABV UP PARAM F/U: HCPCS | Performed by: PHYSICIAN ASSISTANT

## 2024-03-15 PROCEDURE — 3017F COLORECTAL CA SCREEN DOC REV: CPT | Performed by: PHYSICIAN ASSISTANT

## 2024-03-15 PROCEDURE — G8484 FLU IMMUNIZE NO ADMIN: HCPCS | Performed by: PHYSICIAN ASSISTANT

## 2024-03-15 PROCEDURE — 99213 OFFICE O/P EST LOW 20 MIN: CPT | Performed by: PHYSICIAN ASSISTANT

## 2024-03-15 PROCEDURE — 72040 X-RAY EXAM NECK SPINE 2-3 VW: CPT

## 2024-03-15 PROCEDURE — 1090F PRES/ABSN URINE INCON ASSESS: CPT | Performed by: PHYSICIAN ASSISTANT

## 2024-03-15 PROCEDURE — G8427 DOCREV CUR MEDS BY ELIG CLIN: HCPCS | Performed by: PHYSICIAN ASSISTANT

## 2024-03-15 PROCEDURE — 1123F ACP DISCUSS/DSCN MKR DOCD: CPT | Performed by: PHYSICIAN ASSISTANT

## 2024-03-15 PROCEDURE — 1036F TOBACCO NON-USER: CPT | Performed by: PHYSICIAN ASSISTANT

## 2024-03-15 PROCEDURE — 99214 OFFICE O/P EST MOD 30 MIN: CPT | Performed by: PHYSICIAN ASSISTANT

## 2024-03-15 PROCEDURE — G8400 PT W/DXA NO RESULTS DOC: HCPCS | Performed by: PHYSICIAN ASSISTANT

## 2024-03-15 NOTE — PROGRESS NOTES
Patient ID: Jyoti Warren is a 68 y.o. female    Chief Compliant:  Chief Complaint   Patient presents with    Follow-up     3 month re ck cervical      HPI:  This is a 68 y.o. female who presents to the clinic today for follow-up of C5 superior endplate compression fracture as well as multiple other compression fracture deformities at C7-T1 and T12.  Patient was doing functionally well on her last visit with us on 12 5/15/2023.  She continues to do exceptionally well at this time without any complaints of her neck.  She recently had a injections to her bilateral knees done by Dimas Nunez PA-C on 3/5/2024 and states that this really helped with her neck pain..  She has no other complaints today      Review of Systems   All other systems reviewed and are negative.    Past History:    Current Outpatient Medications:     gabapentin (NEURONTIN) 400 MG capsule, Take 1 capsule by mouth in the morning and at bedtime for 30 days., Disp: 60 capsule, Rfl: 0    glimepiride (AMARYL) 2 MG tablet, Take 1 tablet by mouth every morning, Disp: 90 tablet, Rfl: 1    DULoxetine (CYMBALTA) 60 MG extended release capsule, Take 1 capsule by mouth daily, Disp: 90 capsule, Rfl: 1    amLODIPine (NORVASC) 10 MG tablet, Take 1 tablet by mouth daily, Disp: 90 tablet, Rfl: 1    hydroCHLOROthiazide 12.5 MG capsule, Take 1 capsule by mouth every morning, Disp: 30 capsule, Rfl: 0    Lactobacillus (PROBIOTIC ACIDOPHILUS) CAPS, Take 1 tablet by mouth in the morning, at noon, and at bedtime for 10 days, Disp: 30 capsule, Rfl: 0    UNABLE TO FIND, wheelchair, Disp: 1 each, Rfl: 0    HYDROcodone-acetaminophen (NORCO) 5-325 MG per tablet, Take 1 tablet by mouth 2 times daily., Disp: , Rfl:     miconazole (MICOTIN) 2 % powder, Apply topically 2 times daily.Apply to perineal area ., Disp: 45 g, Rfl: 1    metoclopramide (REGLAN) 10 MG tablet, Take 1 tablet by mouth 4 times daily as needed (GI distress) WARNING:  May cause drowsiness.  May impair ability

## 2024-03-20 ENCOUNTER — HOSPITAL ENCOUNTER (OUTPATIENT)
Dept: ULTRASOUND IMAGING | Age: 69
Discharge: HOME | End: 2024-03-22
Attending: UROLOGY
Payer: MEDICARE

## 2024-03-20 DIAGNOSIS — N39.0 RECURRENT UTI: ICD-10-CM

## 2024-03-20 PROCEDURE — 76770 US EXAM ABDO BACK WALL COMP: CPT

## 2024-03-25 DIAGNOSIS — R79.89 ELEVATED SERUM CREATININE: Primary | ICD-10-CM

## 2024-04-03 DIAGNOSIS — K22.70 BARRETT'S ESOPHAGUS WITHOUT DYSPLASIA: ICD-10-CM

## 2024-04-03 RX ORDER — HYDROCHLOROTHIAZIDE 12.5 MG/1
12.5 CAPSULE, GELATIN COATED ORAL EVERY MORNING
Qty: 90 CAPSULE | Refills: 1 | Status: SHIPPED | OUTPATIENT
Start: 2024-04-03 | End: 2024-09-30

## 2024-04-03 RX ORDER — PANTOPRAZOLE SODIUM 40 MG/1
40 TABLET, DELAYED RELEASE ORAL 2 TIMES DAILY
Qty: 180 TABLET | Refills: 1 | Status: SHIPPED | OUTPATIENT
Start: 2024-04-03

## 2024-04-03 NOTE — TELEPHONE ENCOUNTER
Jyoti called requesting a refill of the below medication which has been pended for you:     Requested Prescriptions     Pending Prescriptions Disp Refills    pantoprazole (PROTONIX) 40 MG tablet 180 tablet 1     Sig: Take 1 tablet by mouth 2 times daily       Last Appointment Date: 3/14/2024  Next Appointment Date: 7/17/2024    Allergies   Allergen Reactions    Asa [Aspirin] Other (See Comments)     Stomach irritation    Pollen Extract

## 2024-04-03 NOTE — TELEPHONE ENCOUNTER
Jyoti called requesting a refill of the below medication which has been pended for you:     Requested Prescriptions     Pending Prescriptions Disp Refills    hydroCHLOROthiazide 12.5 MG capsule 90 capsule 0     Sig: Take 1 capsule by mouth every morning       Last Appointment Date: 3/14/2024  Next Appointment Date: 7/17/24    Allergies   Allergen Reactions    Asa [Aspirin] Other (See Comments)     Stomach irritation    Pollen Extract

## 2024-04-09 DIAGNOSIS — G44.201 INTRACTABLE TENSION-TYPE HEADACHE, UNSPECIFIED CHRONICITY PATTERN: Primary | ICD-10-CM

## 2024-04-09 RX ORDER — GABAPENTIN 400 MG/1
400 CAPSULE ORAL 2 TIMES DAILY
Qty: 60 CAPSULE | Refills: 2 | Status: SHIPPED | OUTPATIENT
Start: 2024-04-09 | End: 2024-07-08

## 2024-04-09 NOTE — TELEPHONE ENCOUNTER
Jyoti called requesting a refill of the below medication which has been pended for you:     Requested Prescriptions     Pending Prescriptions Disp Refills    gabapentin (NEURONTIN) 400 MG capsule 60 capsule 0     Sig: Take 1 capsule by mouth in the morning and at bedtime for 30 days.       Last Appointment Date: 3/14/2024  Next Appointment Date: 7/17/2024    Allergies   Allergen Reactions    Asa [Aspirin] Other (See Comments)     Stomach irritation    Pollen Extract

## 2024-04-11 ENCOUNTER — OFFICE VISIT (OUTPATIENT)
Dept: PRIMARY CARE CLINIC | Age: 69
End: 2024-04-11
Payer: MEDICARE

## 2024-04-11 ENCOUNTER — HOSPITAL ENCOUNTER (OUTPATIENT)
Dept: INTERVENTIONAL RADIOLOGY/VASCULAR | Age: 69
Discharge: HOME | End: 2024-04-13
Payer: MEDICARE

## 2024-04-11 VITALS
DIASTOLIC BLOOD PRESSURE: 80 MMHG | SYSTOLIC BLOOD PRESSURE: 148 MMHG | RESPIRATION RATE: 20 BRPM | BODY MASS INDEX: 41.52 KG/M2 | OXYGEN SATURATION: 99 % | HEART RATE: 94 BPM | WEIGHT: 274 LBS | HEIGHT: 68 IN | TEMPERATURE: 98.5 F

## 2024-04-11 DIAGNOSIS — M79.662 PAIN OF LEFT CALF: ICD-10-CM

## 2024-04-11 DIAGNOSIS — M79.662 PAIN OF LEFT CALF: Primary | ICD-10-CM

## 2024-04-11 PROCEDURE — 1036F TOBACCO NON-USER: CPT | Performed by: FAMILY MEDICINE

## 2024-04-11 PROCEDURE — 99213 OFFICE O/P EST LOW 20 MIN: CPT | Performed by: FAMILY MEDICINE

## 2024-04-11 PROCEDURE — G8417 CALC BMI ABV UP PARAM F/U: HCPCS | Performed by: FAMILY MEDICINE

## 2024-04-11 PROCEDURE — 3078F DIAST BP <80 MM HG: CPT | Performed by: FAMILY MEDICINE

## 2024-04-11 PROCEDURE — G8400 PT W/DXA NO RESULTS DOC: HCPCS | Performed by: FAMILY MEDICINE

## 2024-04-11 PROCEDURE — G8427 DOCREV CUR MEDS BY ELIG CLIN: HCPCS | Performed by: FAMILY MEDICINE

## 2024-04-11 PROCEDURE — 1090F PRES/ABSN URINE INCON ASSESS: CPT | Performed by: FAMILY MEDICINE

## 2024-04-11 PROCEDURE — 1123F ACP DISCUSS/DSCN MKR DOCD: CPT | Performed by: FAMILY MEDICINE

## 2024-04-11 PROCEDURE — 93971 EXTREMITY STUDY: CPT

## 2024-04-11 PROCEDURE — 3077F SYST BP >= 140 MM HG: CPT | Performed by: FAMILY MEDICINE

## 2024-04-11 PROCEDURE — 3017F COLORECTAL CA SCREEN DOC REV: CPT | Performed by: FAMILY MEDICINE

## 2024-04-11 NOTE — PROGRESS NOTES
None. HISTORY: ORDERING SYSTEM PROVIDED HISTORY: Pain left calf FINDINGS: The visualized veins of the left lower extremity are patent and free of echogenic thrombus. The veins demonstrate good compressibility with normal color flow study and spectral analysis.     No evidence of DVT in the left lower extremity.               (Please note that portions of this note were completed with a voice-recognition program. Efforts were made to edit the dictation but occasionally words are mis-transcribed.)

## 2024-04-17 ENCOUNTER — PROCEDURE VISIT (OUTPATIENT)
Dept: UROLOGY | Age: 69
End: 2024-04-17
Payer: MEDICARE

## 2024-04-17 VITALS
BODY MASS INDEX: 41.52 KG/M2 | WEIGHT: 274 LBS | DIASTOLIC BLOOD PRESSURE: 72 MMHG | HEIGHT: 68 IN | OXYGEN SATURATION: 97 % | SYSTOLIC BLOOD PRESSURE: 124 MMHG | HEART RATE: 96 BPM | RESPIRATION RATE: 16 BRPM

## 2024-04-17 DIAGNOSIS — N39.0 RECURRENT UTI: Primary | ICD-10-CM

## 2024-04-17 PROCEDURE — G8400 PT W/DXA NO RESULTS DOC: HCPCS | Performed by: UROLOGY

## 2024-04-17 PROCEDURE — 52000 CYSTOURETHROSCOPY: CPT | Performed by: UROLOGY

## 2024-04-17 PROCEDURE — 99214 OFFICE O/P EST MOD 30 MIN: CPT | Performed by: UROLOGY

## 2024-04-17 PROCEDURE — G8427 DOCREV CUR MEDS BY ELIG CLIN: HCPCS | Performed by: UROLOGY

## 2024-04-17 PROCEDURE — 1123F ACP DISCUSS/DSCN MKR DOCD: CPT | Performed by: UROLOGY

## 2024-04-17 PROCEDURE — 1090F PRES/ABSN URINE INCON ASSESS: CPT | Performed by: UROLOGY

## 2024-04-17 PROCEDURE — 3017F COLORECTAL CA SCREEN DOC REV: CPT | Performed by: UROLOGY

## 2024-04-17 PROCEDURE — 1036F TOBACCO NON-USER: CPT | Performed by: UROLOGY

## 2024-04-17 PROCEDURE — 3074F SYST BP LT 130 MM HG: CPT | Performed by: UROLOGY

## 2024-04-17 PROCEDURE — G8417 CALC BMI ABV UP PARAM F/U: HCPCS | Performed by: UROLOGY

## 2024-04-17 PROCEDURE — 3078F DIAST BP <80 MM HG: CPT | Performed by: UROLOGY

## 2024-04-17 RX ORDER — CIPROFLOXACIN 500 MG/1
500 TABLET, FILM COATED ORAL 2 TIMES DAILY
Qty: 6 TABLET | Refills: 0 | Status: SHIPPED | OUTPATIENT
Start: 2024-04-17 | End: 2024-04-20

## 2024-04-17 NOTE — PROGRESS NOTES
Humberto Storz Cystourethroscope model number [id number]; Serial Number [id number] scope #2 used for procedure today, was removed from sterile container after visual inspection.  
daily as needed (GI distress) WARNING:  May cause drowsiness.  May impair ability to operate vehicles or machinery.  Do not use in combination with alcohol. 20 tablet 0    melatonin 3 MG TABS tablet Take 5 mg by mouth nightly as needed      blood glucose monitor strips Test 1 times a day & as needed for symptoms of irregular blood glucose. Dispense sufficient amount for indicated testing frequency plus additional to accommodate PRN testing needs. 100 strip 1    Cholecalciferol (VITAMIN D3) 1.25 MG (69875 UT) CAPS TAKE 1 CAPSULE ONCE A WEEK 13 capsule 1       Asa [aspirin] and Pollen extract  Social History     Tobacco Use   Smoking Status Never   Smokeless Tobacco Never       Social History     Substance and Sexual Activity   Alcohol Use Yes    Alcohol/week: 0.0 standard drinks of alcohol    Comment: Maybe twice a year, small amounts       REVIEW OF SYSTEMS:  Constitutional: negative  Eyes: negative  Respiratory: negative  Cardiovascular: negative  Gastrointestinal: negative  Genitourinary: negative except for what is in HPI  Musculoskeletal: negative  Skin: negative   Neurological: negative  Hematological/Lymphatic: negative  Psychological: negative    Physical Exam:    This a 68 y.o. female      Vitals:    04/17/24 0906   BP: 124/72   Pulse: 96   Resp: 16   SpO2: 97%     Constitutional: Patient in no acute distress.  Neuro: Alert and oriented to person, place and time.      Cystoscopy Operative Note    Findings:   The patient was prepped and draped in the usual sterile fashion.  The flexible cystoscope was advanced through the urethra and into the bladder.  The bladder was thoroughly inspected and the following was noted:    Vagina: normal appearing vagina with normal color and discharge, no lesions  Residual Urine: none  Urethra: normal appearing urethra with no masses, tenderness or lesions  Bladder: No tumors or CIS noted.  No bladder diverticulum.  none trabeculation noted.  See below  Ureters: Clear efflux

## 2024-05-07 DIAGNOSIS — E11.29 TYPE 2 DIABETES MELLITUS WITH MICROALBUMINURIA, WITHOUT LONG-TERM CURRENT USE OF INSULIN (HCC): ICD-10-CM

## 2024-05-07 DIAGNOSIS — R80.9 TYPE 2 DIABETES MELLITUS WITH MICROALBUMINURIA, WITHOUT LONG-TERM CURRENT USE OF INSULIN (HCC): ICD-10-CM

## 2024-05-08 RX ORDER — PIOGLITAZONE HCL AND METFORMIN HCL 500; 15 MG/1; MG/1
1 TABLET ORAL 2 TIMES DAILY WITH MEALS
Qty: 180 TABLET | Refills: 3 | OUTPATIENT
Start: 2024-05-08

## 2024-05-21 ENCOUNTER — TELEPHONE (OUTPATIENT)
Dept: FAMILY MEDICINE CLINIC | Age: 69
End: 2024-05-21

## 2024-05-21 DIAGNOSIS — R53.1 WEAKNESS: Primary | ICD-10-CM

## 2024-05-21 DIAGNOSIS — R53.1 GENERALIZED WEAKNESS: ICD-10-CM

## 2024-05-21 DIAGNOSIS — Z91.81 HISTORY OF FALL: ICD-10-CM
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
## 2024-05-21 NOTE — TELEPHONE ENCOUNTER
Dr. Patel is out of the office.   Can you address?   Please advise.    Last OV 3/14/24  Next OV 7/17/24

## 2024-06-04 ENCOUNTER — OFFICE VISIT (OUTPATIENT)
Dept: ORTHOPEDIC SURGERY | Age: 69
End: 2024-06-04
Payer: MEDICARE

## 2024-06-04 VITALS
HEIGHT: 68 IN | SYSTOLIC BLOOD PRESSURE: 134 MMHG | WEIGHT: 274 LBS | OXYGEN SATURATION: 98 % | HEART RATE: 80 BPM | DIASTOLIC BLOOD PRESSURE: 86 MMHG | BODY MASS INDEX: 41.52 KG/M2 | RESPIRATION RATE: 16 BRPM

## 2024-06-04 DIAGNOSIS — M17.12 LOCALIZED OSTEOARTHRITIS OF LEFT KNEE: ICD-10-CM

## 2024-06-04 DIAGNOSIS — M25.562 LEFT KNEE PAIN, UNSPECIFIED CHRONICITY: Primary | ICD-10-CM

## 2024-06-04 PROCEDURE — 99213 OFFICE O/P EST LOW 20 MIN: CPT | Performed by: NURSE PRACTITIONER

## 2024-06-04 PROCEDURE — 20610 DRAIN/INJ JOINT/BURSA W/O US: CPT | Performed by: NURSE PRACTITIONER

## 2024-06-04 RX ORDER — BUPIVACAINE HYDROCHLORIDE 5 MG/ML
5 INJECTION, SOLUTION PERINEURAL ONCE
Status: COMPLETED | OUTPATIENT
Start: 2024-06-04 | End: 2024-06-04

## 2024-06-04 RX ORDER — TRIAMCINOLONE ACETONIDE 40 MG/ML
80 INJECTION, SUSPENSION INTRA-ARTICULAR; INTRAMUSCULAR ONCE
Status: COMPLETED | OUTPATIENT
Start: 2024-06-04 | End: 2024-06-04

## 2024-06-04 RX ADMIN — TRIAMCINOLONE ACETONIDE 80 MG: 200 INJECTION, SUSPENSION INTRA-ARTICULAR; INTRAMUSCULAR at 15:15

## 2024-06-04 RX ADMIN — BUPIVACAINE HYDROCHLORIDE 25 MG: 5 INJECTION, SOLUTION PERINEURAL at 15:14

## 2024-06-04 NOTE — PROGRESS NOTES
COLONOSCOPY WITH BIOPSY performed by Estela Dutton MD at [facility] OR, 1 hyperplastic polyp, random biopsies negative    CYSTOSCOPY Bilateral 08/03/2022    CYSTO Bilateral Retrograde Pyelogram, with bladder washout  performed by Suleiman Jack MD at [facility]    DENTAL SURGERY  08/2015    full dental extraction    ESOPHAGOGASTRODUODENOSCOPY  10/18/2022    FEMUR CLOSED REDUCTION Left 03/05/2020    has dhara [facility]    FEMUR FRACTURE SURGERY Left 03/06/2021    left retrograde femoral nailing. Dr. Thompson, [facility]    FOREIGN BODY REMOVAL  05/28/2016    left-sided temporal craniotomy wound reexploration with removal of small retained foreign body (plastic from Ruth clip from surgery 3 days prior), [facility], Dr. Noel    HYSTERECTOMY (CERVIX STATUS UNKNOWN)  09/06/2005    supracervical hysterectomy bilateral salpingo oophectomy    JOINT REPLACEMENT      KNEE ARTHROSCOPY Left     NERVE BLOCK  09/09/2019    right side L2 through L5 block nerve,facet joint,lumbar,medial branch per Dr João Parker at [facility]    OTHER SURGICAL HISTORY  2011    endoscopic mucosal resection of duodenal polyp    POLYSOMNOGRAPHY  11/30/2020    no significant sleep apnea    SHOULDER ARTHROPLASTY Right 10/18/2018    Harsh Jung MD; done at [facility]    SHOULDER ARTHROSCOPY Right 03/07/2019    revision arthroscopy with debridement,revision mini-open rotator cuff repair per Dr Jung at [facility]    TONSILLECTOMY      UPPER GASTROINTESTINAL ENDOSCOPY      barretts, gastric ulcers    UPPER GASTROINTESTINAL ENDOSCOPY  05/27/2020    gastric ulcer, small hiatal hernia, Dr. oBjorquez, [facility]    UPPER GASTROINTESTINAL ENDOSCOPY N/A 12/23/2020    EGD BIOPSY performed by Estela Dutton MD at [facility], Elizalde's esophagus    UPPER GASTROINTESTINAL ENDOSCOPY N/A 10/18/2022    EGD BIOPSY performed by Lloyd Dimas MD at [facility]         Current  Medications:  Current Outpatient Medications   Medication

## 2024-06-18 ENCOUNTER — TELEPHONE (OUTPATIENT)
Dept: FAMILY MEDICINE CLINIC | Age: 69
End: 2024-06-18

## 2024-06-18 NOTE — TELEPHONE ENCOUNTER
Patient called stating she had a procedure done by her pain management doctor and they checked her sugar and her BS was 300. States they rechecked it and it went down to the 240's. Patient denies any s/s of hyperglycemia. Advised patient to keep a log of her BS, take a fasting one every morning and check her levels before eating, and 2 hours after eating, and call the office on Friday with the results. Patient is aware that if blood sugars go over 400 to go to the ER.       Will call the facility to get the blood test results as well.

## 2024-06-20 NOTE — TELEPHONE ENCOUNTER
YENNIFER        Spoke with patient to see how he blood sugars have been. Patient states BS have been 150's when she wakes up and the highest it will go is up to 230 a few hours after eating. States BS do come back down to the 150 range. Writer asked patient if she wanted to come in sooner to discuss her BS readings. Patient denied and stated that she will wait until her July 17th appt to discuss with PCP. Patient is aware to continue to monitor BS readings and call the office if BS readings main over 200. Patient denies any s/s of hyperglycemia (blurred vision, fatigue, thirst, etc.).

## 2024-06-26 ENCOUNTER — HOSPITAL ENCOUNTER (OUTPATIENT)
Age: 69
Discharge: HOME | End: 2024-06-26
Payer: MEDICARE

## 2024-06-26 ENCOUNTER — TELEPHONE (OUTPATIENT)
Dept: NEPHROLOGY | Age: 69
End: 2024-06-26

## 2024-06-26 ENCOUNTER — TELEPHONE (OUTPATIENT)
Dept: FAMILY MEDICINE CLINIC | Age: 69
End: 2024-06-26

## 2024-06-26 ENCOUNTER — HOSPITAL ENCOUNTER (EMERGENCY)
Age: 69
Discharge: HOME | End: 2024-06-26
Attending: EMERGENCY MEDICINE
Payer: MEDICARE

## 2024-06-26 VITALS
BODY MASS INDEX: 41.22 KG/M2 | RESPIRATION RATE: 18 BRPM | TEMPERATURE: 98.6 F | HEIGHT: 68 IN | WEIGHT: 272 LBS | HEART RATE: 88 BPM | OXYGEN SATURATION: 96 % | SYSTOLIC BLOOD PRESSURE: 159 MMHG | DIASTOLIC BLOOD PRESSURE: 77 MMHG

## 2024-06-26 DIAGNOSIS — E83.42 HYPOMAGNESEMIA: ICD-10-CM

## 2024-06-26 DIAGNOSIS — E87.6 HYPOKALEMIA: Primary | ICD-10-CM

## 2024-06-26 DIAGNOSIS — R79.89 ELEVATED SERUM CREATININE: ICD-10-CM

## 2024-06-26 DIAGNOSIS — N18.32 STAGE 3B CHRONIC KIDNEY DISEASE (HCC): ICD-10-CM

## 2024-06-26 DIAGNOSIS — E11.29 TYPE 2 DIABETES MELLITUS WITH MICROALBUMINURIA, WITHOUT LONG-TERM CURRENT USE OF INSULIN (HCC): ICD-10-CM

## 2024-06-26 DIAGNOSIS — R80.9 TYPE 2 DIABETES MELLITUS WITH MICROALBUMINURIA, WITHOUT LONG-TERM CURRENT USE OF INSULIN (HCC): ICD-10-CM

## 2024-06-26 LAB
25(OH)D3 SERPL-MCNC: 35.6 NG/ML (ref 30–100)
ALBUMIN SERPL-MCNC: 3.7 G/DL (ref 3.5–5.2)
ALBUMIN/GLOB SERPL: 1 {RATIO} (ref 1–2.5)
ALP SERPL-CCNC: 134 U/L (ref 35–104)
ALT SERPL-CCNC: 10 U/L (ref 5–33)
ANION GAP SERPL CALCULATED.3IONS-SCNC: 15 MMOL/L (ref 9–17)
ANION GAP SERPL CALCULATED.3IONS-SCNC: 15 MMOL/L (ref 9–17)
AST SERPL-CCNC: 15 U/L
BASOPHILS # BLD: 0.04 K/UL (ref 0–0.2)
BASOPHILS NFR BLD: 0 % (ref 0–2)
BILIRUB SERPL-MCNC: 0.4 MG/DL (ref 0.3–1.2)
BUN SERPL-MCNC: 23 MG/DL (ref 8–23)
BUN SERPL-MCNC: 23 MG/DL (ref 8–23)
BUN/CREAT SERPL: 15 (ref 9–20)
BUN/CREAT SERPL: 15 (ref 9–20)
CA-I BLD-SCNC: 1.19 MMOL/L (ref 1.13–1.33)
CALCIUM SERPL-MCNC: 9.7 MG/DL (ref 8.6–10.4)
CALCIUM SERPL-MCNC: 9.7 MG/DL (ref 8.6–10.4)
CHLORIDE SERPL-SCNC: 94 MMOL/L (ref 98–107)
CHLORIDE SERPL-SCNC: 94 MMOL/L (ref 98–107)
CO2 SERPL-SCNC: 29 MMOL/L (ref 20–31)
CO2 SERPL-SCNC: 29 MMOL/L (ref 20–31)
CREAT SERPL-MCNC: 1.5 MG/DL (ref 0.5–0.9)
CREAT SERPL-MCNC: 1.5 MG/DL (ref 0.5–0.9)
EOSINOPHIL # BLD: 0.03 K/UL (ref 0–0.44)
EOSINOPHILS RELATIVE PERCENT: 0 % (ref 1–4)
ERYTHROCYTE [DISTWIDTH] IN BLOOD BY AUTOMATED COUNT: 15.2 % (ref 11.8–14.4)
EST. AVERAGE GLUCOSE BLD GHB EST-MCNC: 160 MG/DL
GFR, ESTIMATED: 38 ML/MIN/1.73M2
GFR, ESTIMATED: 38 ML/MIN/1.73M2
GLUCOSE SERPL-MCNC: 156 MG/DL (ref 70–99)
GLUCOSE SERPL-MCNC: 156 MG/DL (ref 70–99)
HBA1C MFR BLD: 7.2 % (ref 4–6)
HCT VFR BLD AUTO: 39.9 % (ref 36.3–47.1)
HGB BLD-MCNC: 12.7 G/DL (ref 11.9–15.1)
IMM GRANULOCYTES # BLD AUTO: 0.12 K/UL (ref 0–0.3)
IMM GRANULOCYTES NFR BLD: 1 %
LYMPHOCYTES NFR BLD: 1.56 K/UL (ref 1.1–3.7)
LYMPHOCYTES RELATIVE PERCENT: 10 % (ref 24–43)
MAGNESIUM SERPL-MCNC: 1.9 MG/DL (ref 1.6–2.6)
MCH RBC QN AUTO: 28 PG (ref 25.2–33.5)
MCHC RBC AUTO-ENTMCNC: 31.8 G/DL (ref 25.2–33.5)
MCV RBC AUTO: 87.9 FL (ref 82.6–102.9)
MONOCYTES NFR BLD: 0.94 K/UL (ref 0.1–1.2)
MONOCYTES NFR BLD: 6 % (ref 3–12)
NEUTROPHILS NFR BLD: 83 % (ref 36–65)
NEUTS SEG NFR BLD: 13.07 K/UL (ref 1.5–8.1)
NRBC BLD-RTO: 0 PER 100 WBC
PHOSPHATE SERPL-MCNC: 2.9 MG/DL (ref 2.6–4.5)
PLATELET # BLD AUTO: 349 K/UL (ref 138–453)
PMV BLD AUTO: 10 FL (ref 8.1–13.5)
POTASSIUM SERPL-SCNC: 2.9 MMOL/L (ref 3.7–5.3)
POTASSIUM SERPL-SCNC: 2.9 MMOL/L (ref 3.7–5.3)
PROT SERPL-MCNC: 7.4 G/DL (ref 6.4–8.3)
PTH-INTACT SERPL-MCNC: 64 PG/ML (ref 15–65)
RBC # BLD AUTO: 4.54 M/UL (ref 3.95–5.11)
RBC # BLD: ABNORMAL 10*6/UL
SODIUM SERPL-SCNC: 138 MMOL/L (ref 135–144)
SODIUM SERPL-SCNC: 138 MMOL/L (ref 135–144)
WBC OTHER # BLD: 15.8 K/UL (ref 3.5–11.3)

## 2024-06-26 PROCEDURE — 80053 COMPREHEN METABOLIC PANEL: CPT

## 2024-06-26 PROCEDURE — 83036 HEMOGLOBIN GLYCOSYLATED A1C: CPT

## 2024-06-26 PROCEDURE — 99283 EMERGENCY DEPT VISIT LOW MDM: CPT

## 2024-06-26 PROCEDURE — 83735 ASSAY OF MAGNESIUM: CPT

## 2024-06-26 PROCEDURE — 36415 COLL VENOUS BLD VENIPUNCTURE: CPT

## 2024-06-26 PROCEDURE — 82330 ASSAY OF CALCIUM: CPT

## 2024-06-26 PROCEDURE — 85025 COMPLETE CBC W/AUTO DIFF WBC: CPT

## 2024-06-26 PROCEDURE — 83970 ASSAY OF PARATHORMONE: CPT

## 2024-06-26 PROCEDURE — 82306 VITAMIN D 25 HYDROXY: CPT

## 2024-06-26 PROCEDURE — 6370000000 HC RX 637 (ALT 250 FOR IP): Performed by: EMERGENCY MEDICINE

## 2024-06-26 PROCEDURE — 84100 ASSAY OF PHOSPHORUS: CPT

## 2024-06-26 RX ADMIN — POTASSIUM BICARBONATE 40 MEQ: 782 TABLET, EFFERVESCENT ORAL at 16:17

## 2024-06-26 ASSESSMENT — ENCOUNTER SYMPTOMS
COUGH: 0
EYE PAIN: 0
BLOOD IN STOOL: 0
BACK PAIN: 0
SHORTNESS OF BREATH: 0
VOMITING: 0
NAUSEA: 0
ABDOMINAL PAIN: 0
DIARRHEA: 0
CONSTIPATION: 0

## 2024-06-26 ASSESSMENT — PAIN - FUNCTIONAL ASSESSMENT: PAIN_FUNCTIONAL_ASSESSMENT: NONE - DENIES PAIN

## 2024-06-26 NOTE — ED PROVIDER NOTES
External ear normal.      Left Ear: External ear normal.   Eyes:      Conjunctiva/sclera: Conjunctivae normal.      Pupils: Pupils are equal, round, and reactive to light.   Cardiovascular:      Rate and Rhythm: Normal rate and regular rhythm.   Pulmonary:      Effort: Pulmonary effort is normal.      Breath sounds: Normal breath sounds.   Abdominal:      General: Bowel sounds are normal.      Palpations: Abdomen is soft.   Musculoskeletal:         General: No tenderness. Normal range of motion.      Cervical back: Normal range of motion.      Right lower leg: No edema.      Left lower leg: No edema.   Skin:     General: Skin is warm and dry.   Neurological:      Mental Status: She is alert and oriented to person, place, and time.   Psychiatric:         Mood and Affect: Mood normal.         Behavior: Behavior normal.           DIFFERENTIAL DIAGNOSIS/ MDM:     Asymptomatic hypokalemia may be secondary to diuretic use will give her a dose here and will give her a couple days with a prescription and have her follow-up with her primary for repeat potassium at the end of the week  DIAGNOSTIC RESULTS     EKG: All EKG's are interpreted by the Emergency Department Physician who either signs or Co-signs this chart in the absence of a cardiologist.    Normal sinus rhythm with PACs and rate of 86 parables 154 ms QS durations 86 ms QT corrected 473 axis is -2 there is no acute ST or T wave changes    RADIOLOGY:   No orders to display      I directly visualized the following  images and reviewed the radiologist interpretations:          ED BEDSIDE ULTRASOUND:       LABS:  Reviewed the outpatient lab    EMERGENCY DEPARTMENT COURSE:   Vitals:    Vitals:    06/26/24 1553   BP: (!) 159/77   Pulse: 88   Resp: 18   Temp: 98.6 °F (37 °C)   TempSrc: Tympanic   SpO2: 96%   Weight: 123.4 kg (272 lb)   Height: 1.727 m (5' 8\")     -------------------------  BP: (!) 159/77, Temp: 98.6 °F (37 °C), Pulse: 88, Respirations:

## 2024-06-26 NOTE — TELEPHONE ENCOUNTER
LM for patient and patients emergency contact regarding lab results and ER evaluation recommendation .

## 2024-06-26 NOTE — TELEPHONE ENCOUNTER
[facility] lab called with a critical lab value from today on Jyoti her potassium was 2.9 GFR , Bun/creatinine ratio  of 29. I did page nephrology and Dr. Allen returned my call as Dr. Swenson is on vacation. He reviewed the labs and ask me to call Jyoti that he would like her to go to [facility] ER to be evaluated.  The first two calls I got voicemail only, the third call Jyoti did answer and stated she was at the [facility]  Emergency Room due to a call she received from Dr. Patel office.   So she is currently being evaluated at [facility] Emergency Room

## 2024-06-27 ENCOUNTER — TELEPHONE (OUTPATIENT)
Dept: FAMILY MEDICINE CLINIC | Age: 69
End: 2024-06-27

## 2024-06-27 ENCOUNTER — HOSPITAL ENCOUNTER (OUTPATIENT)
Age: 69
Setting detail: SPECIMEN
Discharge: HOME | End: 2024-06-27
Payer: MEDICARE

## 2024-06-27 DIAGNOSIS — N18.32 STAGE 3B CHRONIC KIDNEY DISEASE (HCC): ICD-10-CM

## 2024-06-27 DIAGNOSIS — E87.6 HYPOKALEMIA: Primary | ICD-10-CM

## 2024-06-27 LAB
CREAT UR-MCNC: 140.2 MG/DL (ref 28–217)
EKG ATRIAL RATE: 86 BPM
EKG P AXIS: 55 DEGREES
EKG P-R INTERVAL: 154 MS
EKG Q-T INTERVAL: 396 MS
EKG QRS DURATION: 86 MS
EKG QTC CALCULATION (BAZETT): 473 MS
EKG R AXIS: -2 DEGREES
EKG T AXIS: 37 DEGREES
EKG VENTRICULAR RATE: 86 BPM
TOTAL PROTEIN, URINE: 19 MG/DL
URINE TOTAL PROTEIN CREATININE RATIO: 0.14 (ref 0–0.2)

## 2024-06-27 PROCEDURE — 82570 ASSAY OF URINE CREATININE: CPT

## 2024-06-27 PROCEDURE — 84156 ASSAY OF PROTEIN URINE: CPT

## 2024-06-27 NOTE — TELEPHONE ENCOUNTER
A basic metabolic panel was ordered to be done tomorrow.  Please advised the patient to come in the morning or early afternoon, so the results and any medication changes can be communicated to her.

## 2024-06-27 NOTE — TELEPHONE ENCOUNTER
Patient is calling stating that she did go to the ER due to low potassium and they told her to call PCP to have orders placed to have it checked again. Please advise.

## 2024-06-28 ENCOUNTER — HOSPITAL ENCOUNTER (OUTPATIENT)
Age: 69
Discharge: HOME | End: 2024-06-28
Payer: MEDICARE

## 2024-06-28 DIAGNOSIS — E87.6 HYPOKALEMIA: ICD-10-CM

## 2024-06-28 DIAGNOSIS — E87.6 HYPOKALEMIA: Primary | ICD-10-CM

## 2024-06-28 LAB
ANION GAP SERPL CALCULATED.3IONS-SCNC: 10 MMOL/L (ref 9–17)
BUN SERPL-MCNC: 19 MG/DL (ref 8–23)
BUN/CREAT SERPL: 17 (ref 9–20)
CALCIUM SERPL-MCNC: 9.2 MG/DL (ref 8.6–10.4)
CHLORIDE SERPL-SCNC: 98 MMOL/L (ref 98–107)
CO2 SERPL-SCNC: 33 MMOL/L (ref 20–31)
CREAT SERPL-MCNC: 1.1 MG/DL (ref 0.5–0.9)
GFR, ESTIMATED: 55 ML/MIN/1.73M2
GLUCOSE SERPL-MCNC: 244 MG/DL (ref 70–99)
POTASSIUM SERPL-SCNC: 3.5 MMOL/L (ref 3.7–5.3)
SODIUM SERPL-SCNC: 141 MMOL/L (ref 135–144)

## 2024-06-28 PROCEDURE — 80048 BASIC METABOLIC PNL TOTAL CA: CPT

## 2024-06-28 PROCEDURE — 36415 COLL VENOUS BLD VENIPUNCTURE: CPT

## 2024-06-28 NOTE — TELEPHONE ENCOUNTER
Patient notified and verbalized understanding. She will need a refill of potassium sent to [facility].

## 2024-06-28 NOTE — TELEPHONE ENCOUNTER
----- Message from Nia Patel MD sent at 6/28/2024 10:56 AM EDT -----  Please advise Jyoti that the potassium level is improved.  I recommend that she continue Potassium bicarbonate 25 mEQ twice daily, and repeat a basic metabolic panel on 7/1/2024.  Please pend the prescription to her pharmacy if she needs a refill, and order a basic metabolic panel.  Thank you.

## 2024-07-01 ENCOUNTER — HOSPITAL ENCOUNTER (OUTPATIENT)
Age: 69
Discharge: HOME | End: 2024-07-01
Payer: MEDICARE

## 2024-07-01 DIAGNOSIS — E87.6 HYPOKALEMIA: ICD-10-CM

## 2024-07-01 DIAGNOSIS — R80.9 TYPE 2 DIABETES MELLITUS WITH MICROALBUMINURIA, WITHOUT LONG-TERM CURRENT USE OF INSULIN (HCC): ICD-10-CM

## 2024-07-01 DIAGNOSIS — E11.29 TYPE 2 DIABETES MELLITUS WITH MICROALBUMINURIA, WITHOUT LONG-TERM CURRENT USE OF INSULIN (HCC): ICD-10-CM

## 2024-07-01 LAB
ANION GAP SERPL CALCULATED.3IONS-SCNC: 9 MMOL/L (ref 9–17)
BUN SERPL-MCNC: 16 MG/DL (ref 8–23)
BUN/CREAT SERPL: 13 (ref 9–20)
CALCIUM SERPL-MCNC: 9.5 MG/DL (ref 8.6–10.4)
CHLORIDE SERPL-SCNC: 98 MMOL/L (ref 98–107)
CHOLEST SERPL-MCNC: 149 MG/DL (ref 0–199)
CHOLESTEROL/HDL RATIO: 3
CO2 SERPL-SCNC: 33 MMOL/L (ref 20–31)
CREAT SERPL-MCNC: 1.2 MG/DL (ref 0.5–0.9)
GFR, ESTIMATED: 49 ML/MIN/1.73M2
GLUCOSE SERPL-MCNC: 255 MG/DL (ref 70–99)
HDLC SERPL-MCNC: 58 MG/DL
LDLC SERPL CALC-MCNC: 71 MG/DL (ref 0–100)
POTASSIUM SERPL-SCNC: 3.9 MMOL/L (ref 3.7–5.3)
SODIUM SERPL-SCNC: 140 MMOL/L (ref 135–144)
TRIGL SERPL-MCNC: 103 MG/DL
VLDLC SERPL CALC-MCNC: 21 MG/DL

## 2024-07-01 PROCEDURE — 80061 LIPID PANEL: CPT

## 2024-07-01 PROCEDURE — 80048 BASIC METABOLIC PNL TOTAL CA: CPT

## 2024-07-01 PROCEDURE — 36415 COLL VENOUS BLD VENIPUNCTURE: CPT

## 2024-07-12 DIAGNOSIS — G44.201 INTRACTABLE TENSION-TYPE HEADACHE, UNSPECIFIED CHRONICITY PATTERN: ICD-10-CM

## 2024-07-12 RX ORDER — GABAPENTIN 400 MG/1
400 CAPSULE ORAL 2 TIMES DAILY
Qty: 60 CAPSULE | Refills: 0 | Status: SHIPPED | OUTPATIENT
Start: 2024-07-12 | End: 2024-08-11

## 2024-07-12 NOTE — TELEPHONE ENCOUNTER
Jyoti called requesting a refill of the below medication which has been pended for you:     OARRS from [place], [place], and [place] reviewed. Gabapentin 400 mg last filled 6/11/24 #60/30 days.  Dr. Patel is out of the office.   Requested Prescriptions     Pending Prescriptions Disp Refills    gabapentin (NEURONTIN) 400 MG capsule 60 capsule 0     Sig: Take 1 capsule by mouth in the morning and at bedtime for 30 days.       Last Appointment Date: 4/11/2024  Next Appointment Date: 7/17/2024    Allergies   Allergen Reactions    Asa [Aspirin] Other (See Comments)     Stomach irritation    Pollen Extract

## 2024-07-15 ENCOUNTER — OFFICE VISIT (OUTPATIENT)
Dept: NEPHROLOGY | Age: 69
End: 2024-07-15

## 2024-07-15 VITALS
SYSTOLIC BLOOD PRESSURE: 132 MMHG | DIASTOLIC BLOOD PRESSURE: 70 MMHG | WEIGHT: 261 LBS | HEIGHT: 68 IN | HEART RATE: 60 BPM | BODY MASS INDEX: 39.56 KG/M2

## 2024-07-15 DIAGNOSIS — E87.6 HYPOKALEMIA: ICD-10-CM

## 2024-07-15 DIAGNOSIS — E83.42 HYPOMAGNESEMIA: ICD-10-CM

## 2024-07-15 DIAGNOSIS — N20.0 NEPHROLITHIASIS: ICD-10-CM

## 2024-07-15 DIAGNOSIS — E11.22 TYPE 2 DIABETES MELLITUS WITH STAGE 3B CHRONIC KIDNEY DISEASE, WITHOUT LONG-TERM CURRENT USE OF INSULIN (HCC): ICD-10-CM

## 2024-07-15 DIAGNOSIS — N18.32 TYPE 2 DIABETES MELLITUS WITH STAGE 3B CHRONIC KIDNEY DISEASE, WITHOUT LONG-TERM CURRENT USE OF INSULIN (HCC): ICD-10-CM

## 2024-07-15 DIAGNOSIS — N18.32 STAGE 3B CHRONIC KIDNEY DISEASE (HCC): Primary | ICD-10-CM

## 2024-07-15 DIAGNOSIS — E55.9 VITAMIN D DEFICIENCY: ICD-10-CM

## 2024-07-15 DIAGNOSIS — I10 HYPERTENSION, ESSENTIAL: ICD-10-CM

## 2024-07-15 RX ORDER — LISINOPRIL 5 MG/1
5 TABLET ORAL DAILY
Qty: 90 TABLET | Refills: 3 | Status: SHIPPED | OUTPATIENT
Start: 2024-07-15

## 2024-07-15 NOTE — PROGRESS NOTES
Nephrology Progress Note    Jyoti Patel, Nia DIXON MD      SUBJECTIVE      Chief Complaint   Patient presents with    Chronic Kidney Disease     5 month follow up     7/15/2024:  Patient is here for follow-up of her CKD 3 likely due to diabetic and hypertensive nephrosclerosis.  Her baseline creatinine now seems to be around 1.4-1.8 mg/dl.    Patient was recently found to have low potassium levels and was sent to ED in June 2024 started on potassium replacements.  Now seems to doing better  Patient doing well. No new issues. No fever, no chills, no CP, no SOB, no N/V/D, no abdomen pain, no urinary complaints, +ve some chronic LE edema.  Patient is currently on amlodipine 10 mg daily, hydrochlorothiazide 12.5 mg daily, she used to on Lisinopril 20 mg daily (was dced in the hospital visit), potassium bicarbonate 1 tablet 2 times daily, vitamin D 50,000 units weekly as prescribed by PCP, also is on Metformin.  She states she is trying to drink more, previously was only drinking about 20 oz/day.   Last labs 7/1/2024: BUN/Cr 16/1.2, Na 140, K 3.9, Cl 98, Bicarb 33, Ca 9.5  6/26/2024: Mg 1.9, Phos 2.9, PTH 64.0, Vit D 35.6, Hb 12.7, UPC 0.14.  BP today 132/70, HR 60.     2/5/2024:  Patient is here for follow-up of her CKD 3 likely due to diabetic and hypertensive nephrosclerosis.  Her baseline creatinine now seems to be around 1.4-1.8 mg/dl.    Patient doing well. No new issues. No fever, no chills, no CP, no SOB, no N/V/D, no abdomen pain, no urinary complaints, +ve some chronic LE edema.  Patient is currently on lisinopril-hydrochlorothiazide 20-12.5 mg daily, vitamin D 50,000 units weekly as prescribed by PCP, also is on Metformin.  She states she is trying to drink more, previously was only drinking about 20 oz/day.   Last labs 1/29/2024: BUN/Cr 28/1.3, Na 139, K 4.6, Cl 97, Bicarb 31, Ca 9.5, Phos 3.3, PTH 46.9, Vit D 30.9, Hb 10.7, UPC 0.22.  BP today 128/78, HR 82.     9/11/2023:  Patient is here for

## 2024-07-16 ENCOUNTER — TELEPHONE (OUTPATIENT)
Dept: PHARMACY | Facility: CLINIC | Age: 69
End: 2024-07-16

## 2024-07-16 NOTE — TELEPHONE ENCOUNTER
Dr. Nia Patel MD,      Patient's insurance has identified statin use in persons with diabetes (SUPD) care gap:   67yo female with Type 2 DM, LDL 71 - Would patient benefit from statin therapy?   Patient was prescribed atorvastatin in 4806-5996, only filled medication once each year. Unknown why discontinued.   Recommend discussion with patient to consider restarting atorvastatin, if no side effects occurred, or try rosuvastatin.      Last visit: 04/11/2024, Next visit: 07/17/2024     See encounter note(s) below for complete details. Please let me know if you have any questions.      Thank you,  Bg Luna, PharmD, BCACP, BCGP  Population Health Pharmacist   [facility] Clinical Pharmacy  Department, toll free: [phone], option 1    =======================================================    [facility] CLINICAL PHARMACY: STATIN THERAPY REVIEW  Identified statin use in persons with diabetes (SUPD) care gap per [employer] Records dated: 7/16/24    Allergies   Allergen Reactions    Asa [Aspirin] Other (Stomach irritation)    Pollen Extract      STATIN GAP IDENTIFIED    Per Reconcile Dispense History as of 07/16/2024:   Atorvastatin filled in 2021, 2022    Lipid Panel, LFTs   Component Value Date/Time    CHOL 149 07/01/2024 09:49 AM    TRIG 103 07/01/2024 09:49 AM    HDL 58 07/01/2024 09:49 AM    LDL 71 07/01/2024 09:49 AM    ALT 10 06/26/2024 01:46 PM    AST 15 06/26/2024 01:46 PM      The ASCVD Risk score (Ne DK, et al., 2019) failed to calculate for the following reasons:    The patient has a prior MI or stroke diagnosis     BP Readings from Last 3 Encounters:   07/15/24 132/70   06/26/24 (!) 159/77   06/04/24 134/86     Hemoglobin A1c   Component Value Date    LABA1C 7.2 (H) 06/26/2024    LABA1C 6.6 (H) 02/28/2024    LABA1C 6.3 (H) 09/06/2023     Renal Function   Component Value Date    LABGLOM  49 07/01/2024    CREATININE 1.2 (H) 07/01/2024   Estimated Creatinine Clearance: 61 mL/min (A)

## 2024-07-17 ENCOUNTER — OFFICE VISIT (OUTPATIENT)
Dept: FAMILY MEDICINE CLINIC | Age: 69
End: 2024-07-17

## 2024-07-17 VITALS
BODY MASS INDEX: 39.4 KG/M2 | HEART RATE: 96 BPM | SYSTOLIC BLOOD PRESSURE: 112 MMHG | WEIGHT: 260 LBS | OXYGEN SATURATION: 96 % | HEIGHT: 68 IN | RESPIRATION RATE: 18 BRPM | DIASTOLIC BLOOD PRESSURE: 72 MMHG

## 2024-07-17 DIAGNOSIS — Z12.31 ENCOUNTER FOR SCREENING MAMMOGRAM FOR BREAST CANCER: ICD-10-CM

## 2024-07-17 DIAGNOSIS — I10 ESSENTIAL HYPERTENSION: ICD-10-CM

## 2024-07-17 DIAGNOSIS — G44.201 INTRACTABLE TENSION-TYPE HEADACHE, UNSPECIFIED CHRONICITY PATTERN: ICD-10-CM

## 2024-07-17 DIAGNOSIS — R11.0 NAUSEA: ICD-10-CM

## 2024-07-17 DIAGNOSIS — R80.9 TYPE 2 DIABETES MELLITUS WITH MICROALBUMINURIA, WITHOUT LONG-TERM CURRENT USE OF INSULIN (HCC): Primary | ICD-10-CM

## 2024-07-17 DIAGNOSIS — K31.84 GASTROPARESIS: ICD-10-CM

## 2024-07-17 DIAGNOSIS — K22.70 BARRETT'S ESOPHAGUS WITHOUT DYSPLASIA: ICD-10-CM

## 2024-07-17 DIAGNOSIS — E87.6 HYPOKALEMIA: ICD-10-CM

## 2024-07-17 DIAGNOSIS — E11.29 TYPE 2 DIABETES MELLITUS WITH MICROALBUMINURIA, WITHOUT LONG-TERM CURRENT USE OF INSULIN (HCC): Primary | ICD-10-CM

## 2024-07-17 DIAGNOSIS — E78.2 MIXED HYPERLIPIDEMIA: ICD-10-CM

## 2024-07-17 RX ORDER — GLIMEPIRIDE 2 MG/1
2 TABLET ORAL EVERY MORNING
Qty: 90 TABLET | Refills: 0 | Status: SHIPPED | OUTPATIENT
Start: 2024-07-17

## 2024-07-17 RX ORDER — AMLODIPINE BESYLATE 10 MG/1
10 TABLET ORAL DAILY
Qty: 90 TABLET | Refills: 0 | Status: SHIPPED | OUTPATIENT
Start: 2024-07-17 | End: 2025-01-13

## 2024-07-17 RX ORDER — HYDROCHLOROTHIAZIDE 12.5 MG/1
12.5 CAPSULE, GELATIN COATED ORAL EVERY MORNING
Qty: 90 CAPSULE | Refills: 0 | Status: SHIPPED | OUTPATIENT
Start: 2024-07-17 | End: 2025-01-13

## 2024-07-17 RX ORDER — DULOXETIN HYDROCHLORIDE 60 MG/1
60 CAPSULE, DELAYED RELEASE ORAL DAILY
Qty: 90 CAPSULE | Refills: 0 | Status: SHIPPED | OUTPATIENT
Start: 2024-07-17 | End: 2025-01-13

## 2024-07-17 RX ORDER — PANTOPRAZOLE SODIUM 40 MG/1
40 TABLET, DELAYED RELEASE ORAL 2 TIMES DAILY
Qty: 180 TABLET | Refills: 0 | Status: SHIPPED | OUTPATIENT
Start: 2024-07-17

## 2024-07-17 RX ORDER — METOCLOPRAMIDE 10 MG/1
10 TABLET ORAL 4 TIMES DAILY PRN
Qty: 180 TABLET | Refills: 1 | Status: SHIPPED | OUTPATIENT
Start: 2024-07-17

## 2024-07-17 ASSESSMENT — PATIENT HEALTH QUESTIONNAIRE - PHQ9
8. MOVING OR SPEAKING SO SLOWLY THAT OTHER PEOPLE COULD HAVE NOTICED. OR THE OPPOSITE, BEING SO FIGETY OR RESTLESS THAT YOU HAVE BEEN MOVING AROUND A LOT MORE THAN USUAL: NOT AT ALL
4. FEELING TIRED OR HAVING LITTLE ENERGY: NOT AT ALL
SUM OF ALL RESPONSES TO PHQ QUESTIONS 1-9: 0
1. LITTLE INTEREST OR PLEASURE IN DOING THINGS: NOT AT ALL
SUM OF ALL RESPONSES TO PHQ QUESTIONS 1-9: 0
SUM OF ALL RESPONSES TO PHQ9 QUESTIONS 1 & 2: 0
5. POOR APPETITE OR OVEREATING: NOT AT ALL
3. TROUBLE FALLING OR STAYING ASLEEP: NOT AT ALL
2. FEELING DOWN, DEPRESSED OR HOPELESS: NOT AT ALL
6. FEELING BAD ABOUT YOURSELF - OR THAT YOU ARE A FAILURE OR HAVE LET YOURSELF OR YOUR FAMILY DOWN: NOT AT ALL
7. TROUBLE CONCENTRATING ON THINGS, SUCH AS READING THE NEWSPAPER OR WATCHING TELEVISION: NOT AT ALL
10. IF YOU CHECKED OFF ANY PROBLEMS, HOW DIFFICULT HAVE THESE PROBLEMS MADE IT FOR YOU TO DO YOUR WORK, TAKE CARE OF THINGS AT HOME, OR GET ALONG WITH OTHER PEOPLE: NOT DIFFICULT AT ALL
9. THOUGHTS THAT YOU WOULD BE BETTER OFF DEAD, OR OF HURTING YOURSELF: NOT AT ALL

## 2024-07-17 NOTE — PROGRESS NOTES
>499   Very high   Based on AHA Guidelines for fasting triglyceride, October 2012.         VLDL 07/01/2024 21  mg/dL Final   Hospital Outpatient Visit on 06/28/2024   Component Date Value Ref Range Status    Sodium 06/28/2024 141  135 - 144 mmol/L Final    Potassium 06/28/2024 3.5 (L)  3.7 - 5.3 mmol/L Final    Chloride 06/28/2024 98  98 - 107 mmol/L Final    CO2 06/28/2024 33 (H)  20 - 31 mmol/L Final    Anion Gap 06/28/2024 10  9 - 17 mmol/L Final    Glucose 06/28/2024 244 (H)  70 - 99 mg/dL Final    BUN 06/28/2024 19  8 - 23 mg/dL Final    Creatinine 06/28/2024 1.1 (H)  0.5 - 0.9 mg/dL Final    Est, Glom Filt Rate 06/28/2024 55 (L)  >60 mL/min/1.73m2 Final    Comment:       These results are not intended for use in patients <18 years of age.        eGFR results are calculated without a race factor using the 2021 CKD-EPI equation.  Careful clinical correlation is recommended, particularly when comparing to results   calculated using previous equations.  The CKD-EPI equation is less accurate in patients with extremes of muscle mass, extra-renal   metabolism of creatine, excessive creatine ingestion, or following therapy that affects   renal tubular secretion.      BUN/Creatinine Ratio 06/28/2024 17  9 - 20 Final    Calcium 06/28/2024 9.2  8.6 - 10.4 mg/dL Final   Hospital Outpatient Visit on 06/27/2024   Component Date Value Ref Range Status    Total Protein, Urine 06/27/2024 19  mg/dL Final    Creatinine, Ur 06/27/2024 140.2  28.0 - 217.0 mg/dL Final    Urine Total Protein Creatinine Rat* 06/27/2024 0.14  0.00 - 0.20 Final   Admission on 06/26/2024, Discharged on 06/26/2024   Component Date Value Ref Range Status    Ventricular Rate 06/26/2024 86  BPM Final    Atrial Rate 06/26/2024 86  BPM Final    P-R Interval 06/26/2024 154  ms Final    QRS Duration 06/26/2024 86  ms Final    Q-T Interval 06/26/2024 396  ms Final    QTc Calculation (Bazett) 06/26/2024 473  ms Final    P Axis 06/26/2024 55  degrees Final    R

## 2024-07-18 ENCOUNTER — TELEPHONE (OUTPATIENT)
Dept: GASTROENTEROLOGY | Age: 69
End: 2024-07-18

## 2024-07-29 ENCOUNTER — HOSPITAL ENCOUNTER (OUTPATIENT)
Age: 69
Discharge: HOME | End: 2024-07-29
Payer: MEDICARE

## 2024-07-29 DIAGNOSIS — N18.32 STAGE 3B CHRONIC KIDNEY DISEASE (HCC): ICD-10-CM

## 2024-07-29 LAB
ANION GAP SERPL CALCULATED.3IONS-SCNC: 10 MMOL/L (ref 9–17)
BUN SERPL-MCNC: 20 MG/DL (ref 8–23)
BUN/CREAT SERPL: 17 (ref 9–20)
CALCIUM SERPL-MCNC: 9 MG/DL (ref 8.6–10.4)
CHLORIDE SERPL-SCNC: 96 MMOL/L (ref 98–107)
CO2 SERPL-SCNC: 29 MMOL/L (ref 20–31)
CREAT SERPL-MCNC: 1.2 MG/DL (ref 0.5–0.9)
GFR, ESTIMATED: 49 ML/MIN/1.73M2
GLUCOSE SERPL-MCNC: 212 MG/DL (ref 70–99)
POTASSIUM SERPL-SCNC: 4 MMOL/L (ref 3.7–5.3)
SODIUM SERPL-SCNC: 135 MMOL/L (ref 135–144)

## 2024-07-29 PROCEDURE — 80048 BASIC METABOLIC PNL TOTAL CA: CPT

## 2024-07-29 PROCEDURE — 36415 COLL VENOUS BLD VENIPUNCTURE: CPT

## 2024-08-07 DIAGNOSIS — G44.201 INTRACTABLE TENSION-TYPE HEADACHE, UNSPECIFIED CHRONICITY PATTERN: Primary | ICD-10-CM

## 2024-08-07 RX ORDER — GABAPENTIN 400 MG/1
400 CAPSULE ORAL 2 TIMES DAILY
Qty: 60 CAPSULE | Refills: 2 | Status: SHIPPED | OUTPATIENT
Start: 2024-08-07 | End: 2024-11-05

## 2024-08-07 NOTE — TELEPHONE ENCOUNTER
Jyoti called requesting a refill of the below medication which has been pended for you:     OARRS from [place], and [place] reviewed. Gabapentin 400 mg last filled 7/13/24 #60/30 days    Requested Prescriptions     Pending Prescriptions Disp Refills    gabapentin (NEURONTIN) 400 MG capsule 60 capsule 0     Sig: Take 1 capsule by mouth in the morning and at bedtime for 30 days.       Last Appointment Date: 7/17/2024  Next Appointment Date: 1/21/2025    Allergies   Allergen Reactions    Asa [Aspirin] Other (See Comments)     Stomach irritation    Pollen Extract

## 2024-08-21 ENCOUNTER — OFFICE VISIT (OUTPATIENT)
Dept: UROLOGY | Age: 69
End: 2024-08-21
Payer: MEDICARE

## 2024-08-21 VITALS
HEIGHT: 68 IN | HEART RATE: 85 BPM | BODY MASS INDEX: 39.4 KG/M2 | OXYGEN SATURATION: 98 % | SYSTOLIC BLOOD PRESSURE: 124 MMHG | DIASTOLIC BLOOD PRESSURE: 66 MMHG | RESPIRATION RATE: 16 BRPM | WEIGHT: 260 LBS

## 2024-08-21 DIAGNOSIS — N39.0 RECURRENT UTI: Primary | ICD-10-CM

## 2024-08-21 PROCEDURE — 1123F ACP DISCUSS/DSCN MKR DOCD: CPT | Performed by: UROLOGY

## 2024-08-21 PROCEDURE — 3074F SYST BP LT 130 MM HG: CPT | Performed by: UROLOGY

## 2024-08-21 PROCEDURE — 3078F DIAST BP <80 MM HG: CPT | Performed by: UROLOGY

## 2024-08-21 PROCEDURE — G8417 CALC BMI ABV UP PARAM F/U: HCPCS | Performed by: UROLOGY

## 2024-08-21 PROCEDURE — G8427 DOCREV CUR MEDS BY ELIG CLIN: HCPCS | Performed by: UROLOGY

## 2024-08-21 PROCEDURE — 99214 OFFICE O/P EST MOD 30 MIN: CPT | Performed by: UROLOGY

## 2024-08-21 PROCEDURE — 99213 OFFICE O/P EST LOW 20 MIN: CPT | Performed by: UROLOGY

## 2024-08-21 PROCEDURE — 1036F TOBACCO NON-USER: CPT | Performed by: UROLOGY

## 2024-08-21 PROCEDURE — G8400 PT W/DXA NO RESULTS DOC: HCPCS | Performed by: UROLOGY

## 2024-08-21 PROCEDURE — 3017F COLORECTAL CA SCREEN DOC REV: CPT | Performed by: UROLOGY

## 2024-08-21 PROCEDURE — 1090F PRES/ABSN URINE INCON ASSESS: CPT | Performed by: UROLOGY

## 2024-08-21 NOTE — PROGRESS NOTES
Suleiman Jack MD  Urology Clinic office visit      Patient:  Jyoti Warren  YOB: 1955  Date: 8/21/2024    HISTORY OF PRESENT ILLNESS:   The patient is a 68 y.o. female who presents today for evaluation of the following problems:      1. Recurrent UTI           Overall the problem(s) : are worsening.  Associated Symptoms: No dysuria, gross hematuria.  Pain Severity:      Summary of old records: N/A  (Patient's old records, notes and chart reviewed and summarized above.)      Onset years  Ucx eneterobacter 5/25/2022; klebsiella before that  Severity is described as mild-moderate.   The course of symptoms over time is chronic and recurrent.  Alleviating factors: abx  Worsening factors: none  Lower urinary tract symptoms: frequency;dysuria with infections    CT 4/4/2022  The liver, spleen, pancreas, adrenal glands and kidneys are grossly normal   with the limitations of a noncontrast study.         Urinalysis today:  No results found for this visit on 08/21/24.    Last BUN and creatinine:  Lab Results   Component Value Date    BUN 20 07/29/2024     Lab Results   Component Value Date    CREATININE 1.2 (H) 07/29/2024       Imaging Reviewed during this Office Visit:   (results were independently reviewed by physician and radiology report verified)  I independently reviewed and verified the images and reports from:    No results found.      PAST MEDICAL, FAMILY AND SOCIAL HISTORY:  Past Medical History:   Diagnosis Date    Acute cystitis with hematuria 9/19/2021    Elizalde's esophagus     Cervical spine pain 08/21/2013    Chronic headaches     Chronic sinusitis     CKD (chronic kidney disease)     stage 3    Closed fracture of distal end of left femur with routine healing 03/2021    COVID-19     Diabetes mellitus (HCC)     Dizziness     Femur fracture, left (HCC) 3/5/2021    Gastroesophageal reflux disease     barretts    H/O fall     Hypertension     Knee pain, left 08/21/2013    Meningoencephalocele

## 2024-09-19 ENCOUNTER — TELEPHONE (OUTPATIENT)
Dept: FAMILY MEDICINE CLINIC | Age: 69
End: 2024-09-19

## 2024-09-27 ENCOUNTER — OFFICE VISIT (OUTPATIENT)
Dept: PRIMARY CARE CLINIC | Age: 69
End: 2024-09-27
Payer: MEDICARE

## 2024-09-27 ENCOUNTER — HOSPITAL ENCOUNTER (EMERGENCY)
Age: 69
Discharge: ANOTHER HOSPITAL | End: 2024-09-27
Attending: EMERGENCY MEDICINE
Payer: MEDICARE

## 2024-09-27 ENCOUNTER — HOSPITAL ENCOUNTER (OUTPATIENT)
Dept: GENERAL RADIOLOGY | Age: 69
Discharge: HOME | End: 2024-09-29
Payer: MEDICARE

## 2024-09-27 ENCOUNTER — APPOINTMENT (OUTPATIENT)
Dept: CT IMAGING | Age: 69
End: 2024-09-27
Payer: MEDICARE

## 2024-09-27 VITALS
BODY MASS INDEX: 39.4 KG/M2 | OXYGEN SATURATION: 94 % | SYSTOLIC BLOOD PRESSURE: 119 MMHG | DIASTOLIC BLOOD PRESSURE: 63 MMHG | RESPIRATION RATE: 16 BRPM | HEIGHT: 68 IN | HEART RATE: 66 BPM | TEMPERATURE: 97.7 F | WEIGHT: 260 LBS

## 2024-09-27 VITALS
SYSTOLIC BLOOD PRESSURE: 138 MMHG | OXYGEN SATURATION: 95 % | TEMPERATURE: 98 F | HEART RATE: 74 BPM | WEIGHT: 260 LBS | BODY MASS INDEX: 39.53 KG/M2 | DIASTOLIC BLOOD PRESSURE: 88 MMHG

## 2024-09-27 DIAGNOSIS — J94.2 HEMOTHORAX: ICD-10-CM
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
## 2024-09-27 DIAGNOSIS — S29.9XXA TRAUMATIC INJURY OF RIB: ICD-10-CM

## 2024-09-27 DIAGNOSIS — S22.41XA CLOSED FRACTURE OF MULTIPLE RIBS OF RIGHT SIDE, INITIAL ENCOUNTER: Primary | ICD-10-CM
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
## 2024-09-27 DIAGNOSIS — S27.0XXA TRAUMATIC PNEUMOTHORAX, INITIAL ENCOUNTER: ICD-10-CM

## 2024-09-27 DIAGNOSIS — N17.9 ACUTE KIDNEY INJURY (HCC): ICD-10-CM

## 2024-09-27 LAB
ANION GAP SERPL CALCULATED.3IONS-SCNC: 13 MMOL/L (ref 9–17)
BASOPHILS # BLD: 0.06 K/UL (ref 0–0.2)
BASOPHILS NFR BLD: 0 % (ref 0–2)
BUN SERPL-MCNC: 30 MG/DL (ref 8–23)
BUN/CREAT SERPL: 12 (ref 9–20)
CALCIUM SERPL-MCNC: 9.2 MG/DL (ref 8.6–10.4)
CHLORIDE SERPL-SCNC: 92 MMOL/L (ref 98–107)
CO2 SERPL-SCNC: 26 MMOL/L (ref 20–31)
CREAT SERPL-MCNC: 2.6 MG/DL (ref 0.5–0.9)
EOSINOPHIL # BLD: 0.17 K/UL (ref 0–0.44)
EOSINOPHILS RELATIVE PERCENT: 1 % (ref 1–4)
ERYTHROCYTE [DISTWIDTH] IN BLOOD BY AUTOMATED COUNT: 15.2 % (ref 11.8–14.4)
GFR, ESTIMATED: 19 ML/MIN/1.73M2
GLUCOSE SERPL-MCNC: 243 MG/DL (ref 70–99)
HCT VFR BLD AUTO: 34.7 % (ref 36.3–47.1)
HGB BLD-MCNC: 11.1 G/DL (ref 11.9–15.1)
IMM GRANULOCYTES # BLD AUTO: 0.21 K/UL (ref 0–0.3)
IMM GRANULOCYTES NFR BLD: 1 %
LYMPHOCYTES NFR BLD: 1.76 K/UL (ref 1.1–3.7)
LYMPHOCYTES RELATIVE PERCENT: 10 % (ref 24–43)
MCH RBC QN AUTO: 28.5 PG (ref 25.2–33.5)
MCHC RBC AUTO-ENTMCNC: 32 G/DL (ref 25.2–33.5)
MCV RBC AUTO: 89 FL (ref 82.6–102.9)
MONOCYTES NFR BLD: 1.02 K/UL (ref 0.1–1.2)
MONOCYTES NFR BLD: 6 % (ref 3–12)
NEUTROPHILS NFR BLD: 82 % (ref 36–65)
NEUTS SEG NFR BLD: 14.12 K/UL (ref 1.5–8.1)
NRBC BLD-RTO: 0 PER 100 WBC
PLATELET # BLD AUTO: 422 K/UL (ref 138–453)
PMV BLD AUTO: 9.7 FL (ref 8.1–13.5)
POTASSIUM SERPL-SCNC: 4.4 MMOL/L (ref 3.7–5.3)
RBC # BLD AUTO: 3.9 M/UL (ref 3.95–5.11)
RBC # BLD: ABNORMAL 10*6/UL
SODIUM SERPL-SCNC: 131 MMOL/L (ref 135–144)
WBC OTHER # BLD: 17.3 K/UL (ref 3.5–11.3)

## 2024-09-27 PROCEDURE — 36415 COLL VENOUS BLD VENIPUNCTURE: CPT

## 2024-09-27 PROCEDURE — 6370000000 HC RX 637 (ALT 250 FOR IP): Performed by: EMERGENCY MEDICINE

## 2024-09-27 PROCEDURE — 6360000002 HC RX W HCPCS: Performed by: EMERGENCY MEDICINE

## 2024-09-27 PROCEDURE — 2580000003 HC RX 258: Performed by: EMERGENCY MEDICINE

## 2024-09-27 PROCEDURE — 96376 TX/PRO/DX INJ SAME DRUG ADON: CPT

## 2024-09-27 PROCEDURE — 96374 THER/PROPH/DIAG INJ IV PUSH: CPT

## 2024-09-27 PROCEDURE — 85025 COMPLETE CBC W/AUTO DIFF WBC: CPT

## 2024-09-27 PROCEDURE — 94640 AIRWAY INHALATION TREATMENT: CPT

## 2024-09-27 PROCEDURE — 70450 CT HEAD/BRAIN W/O DYE: CPT

## 2024-09-27 PROCEDURE — 6810039001 HC L1 TRAUMA PRIORITY

## 2024-09-27 PROCEDURE — 99212 OFFICE O/P EST SF 10 MIN: CPT | Performed by: NURSE PRACTITIONER

## 2024-09-27 PROCEDURE — 80048 BASIC METABOLIC PNL TOTAL CA: CPT

## 2024-09-27 PROCEDURE — 96361 HYDRATE IV INFUSION ADD-ON: CPT

## 2024-09-27 PROCEDURE — 74176 CT ABD & PELVIS W/O CONTRAST: CPT

## 2024-09-27 PROCEDURE — 72125 CT NECK SPINE W/O DYE: CPT

## 2024-09-27 PROCEDURE — 96372 THER/PROPH/DIAG INJ SC/IM: CPT

## 2024-09-27 PROCEDURE — 99285 EMERGENCY DEPT VISIT HI MDM: CPT

## 2024-09-27 PROCEDURE — 71101 X-RAY EXAM UNILAT RIBS/CHEST: CPT

## 2024-09-27 RX ORDER — METOCLOPRAMIDE 10 MG/1
10 TABLET ORAL ONCE
Status: COMPLETED | OUTPATIENT
Start: 2024-09-27 | End: 2024-09-27

## 2024-09-27 RX ORDER — IOPAMIDOL 755 MG/ML
80 INJECTION, SOLUTION INTRAVASCULAR
Status: DISCONTINUED | OUTPATIENT
Start: 2024-09-27 | End: 2024-09-27

## 2024-09-27 RX ORDER — KETOROLAC TROMETHAMINE 30 MG/ML
30 INJECTION, SOLUTION INTRAMUSCULAR; INTRAVENOUS ONCE
Status: COMPLETED | OUTPATIENT
Start: 2024-09-27 | End: 2024-09-27

## 2024-09-27 RX ORDER — 0.9 % SODIUM CHLORIDE 0.9 %
1000 INTRAVENOUS SOLUTION INTRAVENOUS ONCE
Status: COMPLETED | OUTPATIENT
Start: 2024-09-27 | End: 2024-09-27

## 2024-09-27 RX ORDER — MORPHINE SULFATE 4 MG/ML
4 INJECTION, SOLUTION INTRAMUSCULAR; INTRAVENOUS ONCE
Status: COMPLETED | OUTPATIENT
Start: 2024-09-27 | End: 2024-09-27

## 2024-09-27 RX ORDER — IOPAMIDOL 755 MG/ML
75 INJECTION, SOLUTION INTRAVASCULAR
Status: DISCONTINUED | OUTPATIENT
Start: 2024-09-27 | End: 2024-09-27

## 2024-09-27 RX ADMIN — METOCLOPRAMIDE 10 MG: 10 TABLET ORAL at 21:02

## 2024-09-27 RX ADMIN — MORPHINE SULFATE 4 MG: 4 INJECTION, SOLUTION INTRAMUSCULAR; INTRAVENOUS at 22:25

## 2024-09-27 RX ADMIN — MORPHINE SULFATE 4 MG: 4 INJECTION, SOLUTION INTRAMUSCULAR; INTRAVENOUS at 16:56

## 2024-09-27 RX ADMIN — SODIUM CHLORIDE 1000 ML: 9 INJECTION, SOLUTION INTRAVENOUS at 18:00

## 2024-09-27 RX ADMIN — GABAPENTIN 400 MG: 300 CAPSULE ORAL at 21:02

## 2024-09-27 RX ADMIN — KETOROLAC TROMETHAMINE 30 MG: 30 INJECTION, SOLUTION INTRAMUSCULAR; INTRAVENOUS at 14:44

## 2024-09-27 ASSESSMENT — PATIENT HEALTH QUESTIONNAIRE - PHQ9
SUM OF ALL RESPONSES TO PHQ QUESTIONS 1-9: 0
2. FEELING DOWN, DEPRESSED OR HOPELESS: NOT AT ALL
SUM OF ALL RESPONSES TO PHQ QUESTIONS 1-9: 0
SUM OF ALL RESPONSES TO PHQ QUESTIONS 1-9: 0
SUM OF ALL RESPONSES TO PHQ9 QUESTIONS 1 & 2: 0
1. LITTLE INTEREST OR PLEASURE IN DOING THINGS: NOT AT ALL
SUM OF ALL RESPONSES TO PHQ QUESTIONS 1-9: 0

## 2024-09-27 ASSESSMENT — PAIN DESCRIPTION - DESCRIPTORS
DESCRIPTORS: SHARP
DESCRIPTORS: THROBBING;TIGHTNESS

## 2024-09-27 ASSESSMENT — PAIN DESCRIPTION - ORIENTATION
ORIENTATION: RIGHT
ORIENTATION: RIGHT

## 2024-09-27 ASSESSMENT — PAIN SCALES - GENERAL
PAINLEVEL_OUTOF10: 8
PAINLEVEL_OUTOF10: 7
PAINLEVEL_OUTOF10: 8
PAINLEVEL_OUTOF10: 2

## 2024-09-27 ASSESSMENT — ENCOUNTER SYMPTOMS
BACK PAIN: 1
SHORTNESS OF BREATH: 1

## 2024-09-27 ASSESSMENT — PAIN DESCRIPTION - PAIN TYPE: TYPE: ACUTE PAIN

## 2024-09-27 ASSESSMENT — PAIN DESCRIPTION - LOCATION
LOCATION: RIB CAGE
LOCATION: RIB CAGE

## 2024-09-27 ASSESSMENT — PAIN - FUNCTIONAL ASSESSMENT: PAIN_FUNCTIONAL_ASSESSMENT: 0-10

## 2024-09-27 NOTE — ED PROVIDER NOTES
[facility] [place] [place] [facility]  [address] [address]  Phone: [phone]  EMERGENCY DEPARTMENT ENCOUNTER      Pt Name: Jyoti Warren  MRN: [id number]  Birthdate 1955  Date of evaluation: 9/27/2024    CHIEF COMPLAINT       Chief Complaint   Patient presents with    broken ribs       HISTORY OF PRESENT ILLNESS    Jyoti Warren is a 68 y.o. female who presents to the emergency department complaining of broken ribs.  Patient states she fell on Wednesday hit the right side of her chest.  Hit her head did not have loss of consciousness.  Was seen and evaluated at [facility] and had x-rays of her ribs and thoracic spine which were negative.  Patient was discharged home.  She is complaining of increased pain and shortness of breath.  She went to the urgent care today chest x-ray was repeated and the rib series found multiple rib fractures with a small hemothorax.  Patient was sent to the emergency department for evaluation.  Patient does not have a history of lung disease.  She denies any other fall.  She denies any nausea, vomiting, diarrhea, constipation, abdominal pain.  She denies any fever, chills, or cough.  She does not have an incentive spirometer at home.  She denies any headache, visual disturbance or speech difficulties.  She denies any urinary, bowel incontinence, retention.  She does not take blood thinners.  Patient does have a history of stage III renal disease.  Her nephrologist is Dr. Swenson.    REVIEW OF SYSTEMS     Review of Systems   All other systems reviewed and are negative.    PAST MEDICAL HISTORY    has a past medical history of Acute cystitis with hematuria, Elizalde's esophagus, Cervical spine pain, Chronic headaches, Chronic sinusitis, CKD (chronic kidney disease), Closed fracture of distal end of left femur with routine healing, COVID-19, Diabetes mellitus (HCC), Dizziness, Femur fracture, left (HCC), Gastroesophageal reflux disease, H/O fall, Hypertension, Knee pain, left,

## 2024-09-28 ENCOUNTER — APPOINTMENT (OUTPATIENT)
Dept: GENERAL RADIOLOGY | Age: 69
DRG: 200 | End: 2024-09-28
Payer: MEDICARE

## 2024-09-28 ENCOUNTER — HOSPITAL ENCOUNTER (INPATIENT)
Age: 69
LOS: 4 days | Discharge: HOME HEALTH | DRG: 200 | End: 2024-10-02
Attending: EMERGENCY MEDICINE | Admitting: SURGERY
Payer: MEDICARE

## 2024-09-28 DIAGNOSIS — S27.2XXA TRAUMATIC PNEUMOHEMOTHORAX, INITIAL ENCOUNTER: ICD-10-CM
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
## 2024-09-28 DIAGNOSIS — S22.41XA CLOSED FRACTURE OF MULTIPLE RIBS OF RIGHT SIDE, INITIAL ENCOUNTER: ICD-10-CM

## 2024-09-28 DIAGNOSIS — S22.41XA MULTIPLE FRACTURES OF RIBS, RIGHT SIDE, INITIAL ENCOUNTER FOR CLOSED FRACTURE: ICD-10-CM

## 2024-09-28 LAB
25(OH)D3 SERPL-MCNC: 28.1 NG/ML (ref 30–100)
ABO + RH BLD: NORMAL
ALBUMIN SERPL-MCNC: 3.4 G/DL (ref 3.5–5.2)
ARM BAND NUMBER: NORMAL
BLOOD BANK SAMPLE EXPIRATION: NORMAL
BLOOD GROUP ANTIBODIES SERPL: NEGATIVE
EST. AVERAGE GLUCOSE BLD GHB EST-MCNC: 154 MG/DL
FOLATE SERPL-MCNC: 2.7 NG/ML (ref 4.8–24.2)
GLUCOSE BLD-MCNC: 191 MG/DL (ref 65–105)
GLUCOSE BLD-MCNC: 192 MG/DL (ref 65–105)
GLUCOSE BLD-MCNC: 214 MG/DL (ref 65–105)
HBA1C MFR BLD: 7 % (ref 4–6)
OSMOLALITY SERPL: 294 MOSM/KG (ref 275–295)
SODIUM SERPL-SCNC: 130 MMOL/L (ref 136–145)
T4 FREE SERPL-MCNC: 1.3 NG/DL (ref 0.92–1.68)
TSH SERPL DL<=0.05 MIU/L-ACNC: 3.63 UIU/ML (ref 0.27–4.2)
VIT B12 SERPL-MCNC: 377 PG/ML (ref 232–1245)

## 2024-09-28 PROCEDURE — 97530 THERAPEUTIC ACTIVITIES: CPT

## 2024-09-28 PROCEDURE — 83930 ASSAY OF BLOOD OSMOLALITY: CPT

## 2024-09-28 PROCEDURE — 82947 ASSAY GLUCOSE BLOOD QUANT: CPT

## 2024-09-28 PROCEDURE — 82306 VITAMIN D 25 HYDROXY: CPT

## 2024-09-28 PROCEDURE — 51798 US URINE CAPACITY MEASURE: CPT

## 2024-09-28 PROCEDURE — 86900 BLOOD TYPING SEROLOGIC ABO: CPT

## 2024-09-28 PROCEDURE — 84295 ASSAY OF SERUM SODIUM: CPT

## 2024-09-28 PROCEDURE — 83036 HEMOGLOBIN GLYCOSYLATED A1C: CPT

## 2024-09-28 PROCEDURE — 82746 ASSAY OF FOLIC ACID SERUM: CPT

## 2024-09-28 PROCEDURE — 2700000000 HC OXYGEN THERAPY PER DAY

## 2024-09-28 PROCEDURE — 6370000000 HC RX 637 (ALT 250 FOR IP): Performed by: NURSE PRACTITIONER

## 2024-09-28 PROCEDURE — 2580000003 HC RX 258: Performed by: NURSE PRACTITIONER

## 2024-09-28 PROCEDURE — 51701 INSERT BLADDER CATHETER: CPT

## 2024-09-28 PROCEDURE — 86901 BLOOD TYPING SEROLOGIC RH(D): CPT

## 2024-09-28 PROCEDURE — 97166 OT EVAL MOD COMPLEX 45 MIN: CPT

## 2024-09-28 PROCEDURE — 6370000000 HC RX 637 (ALT 250 FOR IP)

## 2024-09-28 PROCEDURE — 97162 PT EVAL MOD COMPLEX 30 MIN: CPT

## 2024-09-28 PROCEDURE — 92523 SPEECH SOUND LANG COMPREHEN: CPT

## 2024-09-28 PROCEDURE — 99222 1ST HOSP IP/OBS MODERATE 55: CPT | Performed by: SURGERY

## 2024-09-28 PROCEDURE — 86850 RBC ANTIBODY SCREEN: CPT

## 2024-09-28 PROCEDURE — 2580000003 HC RX 258

## 2024-09-28 PROCEDURE — 97535 SELF CARE MNGMENT TRAINING: CPT

## 2024-09-28 PROCEDURE — 82607 VITAMIN B-12: CPT

## 2024-09-28 PROCEDURE — 6370000000 HC RX 637 (ALT 250 FOR IP): Performed by: SURGERY

## 2024-09-28 PROCEDURE — 84443 ASSAY THYROID STIM HORMONE: CPT

## 2024-09-28 PROCEDURE — 99222 1ST HOSP IP/OBS MODERATE 55: CPT | Performed by: NURSE PRACTITIONER

## 2024-09-28 PROCEDURE — 2060000000 HC ICU INTERMEDIATE R&B

## 2024-09-28 PROCEDURE — 94761 N-INVAS EAR/PLS OXIMETRY MLT: CPT

## 2024-09-28 PROCEDURE — 36415 COLL VENOUS BLD VENIPUNCTURE: CPT

## 2024-09-28 PROCEDURE — 82040 ASSAY OF SERUM ALBUMIN: CPT

## 2024-09-28 PROCEDURE — 71045 X-RAY EXAM CHEST 1 VIEW: CPT

## 2024-09-28 PROCEDURE — 84439 ASSAY OF FREE THYROXINE: CPT

## 2024-09-28 RX ORDER — LANOLIN ALCOHOL/MO/W.PET/CERES
3 CREAM (GRAM) TOPICAL NIGHTLY PRN
Status: DISCONTINUED | OUTPATIENT
Start: 2024-09-28 | End: 2024-10-02 | Stop reason: HOSPADM

## 2024-09-28 RX ORDER — AMLODIPINE BESYLATE 10 MG/1
10 TABLET ORAL DAILY
Status: DISCONTINUED | OUTPATIENT
Start: 2024-09-28 | End: 2024-10-02 | Stop reason: HOSPADM

## 2024-09-28 RX ORDER — DEXTROSE MONOHYDRATE 100 MG/ML
INJECTION, SOLUTION INTRAVENOUS CONTINUOUS PRN
Status: DISCONTINUED | OUTPATIENT
Start: 2024-09-28 | End: 2024-09-28 | Stop reason: SDUPTHER

## 2024-09-28 RX ORDER — POLYETHYLENE GLYCOL 3350 17 G/17G
17 POWDER, FOR SOLUTION ORAL DAILY
Status: DISCONTINUED | OUTPATIENT
Start: 2024-09-28 | End: 2024-10-02 | Stop reason: HOSPADM

## 2024-09-28 RX ORDER — PANTOPRAZOLE SODIUM 40 MG/1
40 TABLET, DELAYED RELEASE ORAL 2 TIMES DAILY
Status: DISCONTINUED | OUTPATIENT
Start: 2024-09-28 | End: 2024-10-02 | Stop reason: HOSPADM

## 2024-09-28 RX ORDER — INSULIN LISPRO 100 [IU]/ML
0-4 INJECTION, SOLUTION INTRAVENOUS; SUBCUTANEOUS
Status: DISCONTINUED | OUTPATIENT
Start: 2024-09-28 | End: 2024-09-30

## 2024-09-28 RX ORDER — GLUCAGON 1 MG/ML
1 KIT INJECTION PRN
Status: DISCONTINUED | OUTPATIENT
Start: 2024-09-28 | End: 2024-10-01

## 2024-09-28 RX ORDER — SODIUM CHLORIDE 9 MG/ML
INJECTION, SOLUTION INTRAVENOUS CONTINUOUS
Status: DISCONTINUED | OUTPATIENT
Start: 2024-09-28 | End: 2024-09-29 | Stop reason: ALTCHOICE

## 2024-09-28 RX ORDER — HYDROCHLOROTHIAZIDE 12.5 MG/1
12.5 CAPSULE ORAL EVERY MORNING
Status: DISCONTINUED | OUTPATIENT
Start: 2024-09-28 | End: 2024-10-01

## 2024-09-28 RX ORDER — ONDANSETRON 2 MG/ML
4 INJECTION INTRAMUSCULAR; INTRAVENOUS EVERY 6 HOURS PRN
Status: DISCONTINUED | OUTPATIENT
Start: 2024-09-28 | End: 2024-10-02 | Stop reason: HOSPADM

## 2024-09-28 RX ORDER — ACETAMINOPHEN 500 MG
1000 TABLET ORAL EVERY 8 HOURS SCHEDULED
Status: DISCONTINUED | OUTPATIENT
Start: 2024-09-28 | End: 2024-10-02 | Stop reason: HOSPADM

## 2024-09-28 RX ORDER — METHOCARBAMOL 750 MG/1
750 TABLET, FILM COATED ORAL 4 TIMES DAILY
Status: DISCONTINUED | OUTPATIENT
Start: 2024-09-28 | End: 2024-09-30

## 2024-09-28 RX ORDER — GABAPENTIN 800 MG/1
400 TABLET ORAL 3 TIMES DAILY
Status: DISCONTINUED | OUTPATIENT
Start: 2024-09-28 | End: 2024-09-28

## 2024-09-28 RX ORDER — SODIUM CHLORIDE 0.9 % (FLUSH) 0.9 %
5-40 SYRINGE (ML) INJECTION EVERY 12 HOURS SCHEDULED
Status: DISCONTINUED | OUTPATIENT
Start: 2024-09-28 | End: 2024-10-01

## 2024-09-28 RX ORDER — GABAPENTIN 300 MG/1
300 CAPSULE ORAL 2 TIMES DAILY
Status: DISCONTINUED | OUTPATIENT
Start: 2024-09-28 | End: 2024-09-30

## 2024-09-28 RX ORDER — DULOXETIN HYDROCHLORIDE 30 MG/1
60 CAPSULE, DELAYED RELEASE ORAL DAILY
Status: DISCONTINUED | OUTPATIENT
Start: 2024-09-28 | End: 2024-10-02 | Stop reason: HOSPADM

## 2024-09-28 RX ORDER — DEXTROSE MONOHYDRATE 100 MG/ML
INJECTION, SOLUTION INTRAVENOUS CONTINUOUS PRN
Status: DISCONTINUED | OUTPATIENT
Start: 2024-09-28 | End: 2024-10-01

## 2024-09-28 RX ORDER — INSULIN LISPRO 100 [IU]/ML
0-4 INJECTION, SOLUTION INTRAVENOUS; SUBCUTANEOUS NIGHTLY
Status: DISCONTINUED | OUTPATIENT
Start: 2024-09-28 | End: 2024-09-30

## 2024-09-28 RX ORDER — SODIUM CHLORIDE 9 MG/ML
INJECTION, SOLUTION INTRAVENOUS PRN
Status: DISCONTINUED | OUTPATIENT
Start: 2024-09-28 | End: 2024-10-01

## 2024-09-28 RX ORDER — LISINOPRIL 5 MG/1
5 TABLET ORAL DAILY
Status: DISCONTINUED | OUTPATIENT
Start: 2024-09-28 | End: 2024-10-02 | Stop reason: HOSPADM

## 2024-09-28 RX ORDER — GLUCAGON 1 MG/ML
1 KIT INJECTION PRN
Status: DISCONTINUED | OUTPATIENT
Start: 2024-09-28 | End: 2024-09-28 | Stop reason: SDUPTHER

## 2024-09-28 RX ORDER — SODIUM CHLORIDE 0.9 % (FLUSH) 0.9 %
5-40 SYRINGE (ML) INJECTION PRN
Status: DISCONTINUED | OUTPATIENT
Start: 2024-09-28 | End: 2024-10-02 | Stop reason: HOSPADM

## 2024-09-28 RX ORDER — OXYCODONE HCL 5 MG/5 ML
5 SOLUTION, ORAL ORAL EVERY 6 HOURS PRN
Status: DISCONTINUED | OUTPATIENT
Start: 2024-09-28 | End: 2024-09-30

## 2024-09-28 RX ORDER — ONDANSETRON 4 MG/1
4 TABLET, ORALLY DISINTEGRATING ORAL EVERY 8 HOURS PRN
Status: DISCONTINUED | OUTPATIENT
Start: 2024-09-28 | End: 2024-10-02 | Stop reason: HOSPADM

## 2024-09-28 RX ADMIN — GABAPENTIN 300 MG: 300 CAPSULE ORAL at 21:17

## 2024-09-28 RX ADMIN — PANTOPRAZOLE SODIUM 40 MG: 40 TABLET, DELAYED RELEASE ORAL at 21:17

## 2024-09-28 RX ADMIN — SODIUM CHLORIDE, PRESERVATIVE FREE 10 ML: 5 INJECTION INTRAVENOUS at 21:20

## 2024-09-28 RX ADMIN — METHOCARBAMOL 750 MG: 750 TABLET ORAL at 16:51

## 2024-09-28 RX ADMIN — METHOCARBAMOL 750 MG: 750 TABLET ORAL at 11:00

## 2024-09-28 RX ADMIN — SODIUM CHLORIDE: 9 INJECTION, SOLUTION INTRAVENOUS at 21:01

## 2024-09-28 RX ADMIN — ACETAMINOPHEN 1000 MG: 500 TABLET ORAL at 16:51

## 2024-09-28 RX ADMIN — OXYCODONE HYDROCHLORIDE 5 MG: 5 SOLUTION ORAL at 04:19

## 2024-09-28 RX ADMIN — GABAPENTIN 400 MG: 800 TABLET, FILM COATED ORAL at 10:59

## 2024-09-28 RX ADMIN — POLYETHYLENE GLYCOL 3350 17 G: 17 POWDER, FOR SOLUTION ORAL at 10:59

## 2024-09-28 RX ADMIN — ACETAMINOPHEN 1000 MG: 500 TABLET ORAL at 21:17

## 2024-09-28 RX ADMIN — SODIUM CHLORIDE, PRESERVATIVE FREE 10 ML: 5 INJECTION INTRAVENOUS at 11:01

## 2024-09-28 RX ADMIN — AMLODIPINE BESYLATE 10 MG: 10 TABLET ORAL at 10:59

## 2024-09-28 RX ADMIN — INSULIN LISPRO 1 UNITS: 100 INJECTION, SOLUTION INTRAVENOUS; SUBCUTANEOUS at 16:53

## 2024-09-28 RX ADMIN — METHOCARBAMOL 750 MG: 750 TABLET ORAL at 21:17

## 2024-09-28 RX ADMIN — PANTOPRAZOLE SODIUM 40 MG: 40 TABLET, DELAYED RELEASE ORAL at 11:00

## 2024-09-28 RX ADMIN — OXYCODONE HYDROCHLORIDE 5 MG: 5 SOLUTION ORAL at 10:58

## 2024-09-28 RX ADMIN — ACETAMINOPHEN 1000 MG: 500 TABLET ORAL at 07:19

## 2024-09-28 ASSESSMENT — PAIN SCALES - GENERAL
PAINLEVEL_OUTOF10: 3
PAINLEVEL_OUTOF10: 5
PAINLEVEL_OUTOF10: 5
PAINLEVEL_OUTOF10: 7
PAINLEVEL_OUTOF10: 5
PAINLEVEL_OUTOF10: 4

## 2024-09-28 ASSESSMENT — PAIN DESCRIPTION - LOCATION
LOCATION: BACK;RIB CAGE
LOCATION: RIB CAGE
LOCATION: BACK
LOCATION: RIB CAGE

## 2024-09-28 ASSESSMENT — PAIN DESCRIPTION - DESCRIPTORS
DESCRIPTORS: ACHING
DESCRIPTORS: ACHING
DESCRIPTORS: ACHING;DISCOMFORT
DESCRIPTORS: ACHING

## 2024-09-28 ASSESSMENT — PAIN DESCRIPTION - ORIENTATION
ORIENTATION: RIGHT
ORIENTATION: RIGHT;MID
ORIENTATION: RIGHT

## 2024-09-28 ASSESSMENT — PAIN - FUNCTIONAL ASSESSMENT
PAIN_FUNCTIONAL_ASSESSMENT: ACTIVITIES ARE NOT PREVENTED
PAIN_FUNCTIONAL_ASSESSMENT: ACTIVITIES ARE NOT PREVENTED

## 2024-09-28 ASSESSMENT — PAIN SCALES - WONG BAKER: WONGBAKER_NUMERICALRESPONSE: NO HURT

## 2024-09-28 NOTE — CARE COORDINATION
SBIRT completed with pt. Pt admitted after a recent fall.   Pt reports she does not drink alcohol or use any drugs. Pt denies any recent feelings of depression/SI.  SBIRT is negative            Alcohol Screening and Brief Intervention        No results for input(s): \"ALC\" in the last 72 hours.    Alcohol Pre-screening     (WOMEN ONLY) How many times in the past year have you had 4 or more drinks in a day?: None       Drug Pre-Screening  - none       Drug Screening DAST       Mood Pre-Screening (PHQ-2)  During the past 2 weeks, have you been bothered by, feeling down, depressed or hopeless?  No        I have interviewed Jyoti Warren, [phone] regarding  Her alcohol consumption/drug use and risk for excessive use. Screenings were negative.  Patient  N/A intervention at this time.   Deferred []    Completed on: 9/28/2024   NUNO LOPEZ

## 2024-09-28 NOTE — PLAN OF CARE
Rounded with Dr. Britton  Okay for activity as tolerated  No interventions regarding T2 fracture, appears chronic, patient has no back pain  No need for a brace    --  NO ACUTE NEUROSURGICAL INTERVENTION AT THIS TIME    NEUROSURGERY TO SIGN OFF     Please contact Neurosurgery with any questions.    Electronically signed by SHANNA Eddy CNP on 9/28/2024 at 9:32 AM

## 2024-09-28 NOTE — PROGRESS NOTES
TRAUMA progress note    PATIENT NAME: Medina Trauma [facility]  YOB: 1880  MEDICAL RECORD NO. [id number]   DATE: 9/27/2024  PRIMARY CARE PHYSICIAN: No primary care provider on file.  PATIENT EVALUATED AT THE REQUEST OF : Jani      IMPRESSION AND PLAN:     Trauma  CC: FFSH, -LOC, -AC    Injuries: R 3-7 rib fx, small R hemothorax, areas of ptx, T2 ant wedge compression fx    PIC score 9  AM CXR without residual PTX  Pain controlled on MMT  Neurosurgery consulted for T2 compression fracture.  Patient is asymptomatic, nothing to do, signed off  Continue pulmonary toilet  Dispo: Encourage pulmonary toilet, I-S, wean from HFNC.  Anticipate discharge 9/29 if pain is controlled        HISTORY:     Patient was seen examined at bedside, no overnight events.  Patient reports remains well-controlled at this time.  1500 on I-S.  Denies SOB.  VSS, afebrile.    PHYSICAL EXAMINATION:     VITAL SIGNS: There were no vitals filed for this visit.    Physical Exam  Vitals reviewed.   HENT:      Head: Normocephalic.      Right Ear: External ear normal.      Left Ear: External ear normal.      Nose: Nose normal.      Mouth/Throat:      Mouth: Mucous membranes are moist.   Eyes:      Extraocular Movements: Extraocular movements intact.      Pupils: Pupils are equal, round, and reactive to light.   Cardiovascular:      Rate and Rhythm: Normal rate and regular rhythm.   Pulmonary:      Effort: Pulmonary effort is normal. No respiratory distress.   Musculoskeletal:         General: Tenderness and signs of injury present.   Skin:     General: Skin is warm and dry.      Capillary Refill: Capillary refill takes less than 2 seconds.              RADIOLOGY  XR CHEST PORTABLE    Result Date: 9/28/2024  Multiple right-sided rib fractures which are similar to the prior exam. No pneumothorax identified.  No acute cardiopulmonary disease identified.     CT THORACIC SPINE BONY RECONSTRUCTION    Result Date: 9/28/2024  1. Mild superior  endplate anterior wedge compression deformity at T2. This appears chronic but is age indeterminate and is new compared to 01/08/2023. If further evaluation is necessary, consider MRI. 2. Small right pleural effusion and trace patchy right pneumothorax.     CT LUMBAR SPINE BONY RECONSTRUCTION    Result Date: 9/28/2024  Unremarkable non-contrast CT of the lumbar spine.     CT HEAD WO CONTRAST    Result Date: 9/27/2024  Head: 1. No evidence of acute calvarial fracture or intracranial hemorrhage. 2. Prior left temporal craniotomy, with underlying area of encephalomalacia. 3. Cerebral atrophy with chronic microvascular ischemic changes. Cervical spine: 1. No acute fracture or subluxation of the cervical spine is identified. 2. Chronic compression deformities again noted at C5 and C7.     CT CERVICAL SPINE WO CONTRAST    Result Date: 9/27/2024  Head: 1. No evidence of acute calvarial fracture or intracranial hemorrhage. 2. Prior left temporal craniotomy, with underlying area of encephalomalacia. 3. Cerebral atrophy with chronic microvascular ischemic changes. Cervical spine: 1. No acute fracture or subluxation of the cervical spine is identified. 2. Chronic compression deformities again noted at C5 and C7.     CT CHEST ABDOMEN PELVIS WO CONTRAST Additional Contrast? None    Result Date: 9/27/2024  1. Nondisplaced fracture of the right lateral 3rd rib.  Displaced fractures of the right lateral 4th, 5th, 6th, and 7th ribs. 2. The small right-sided hemothorax. 3. Few tiny pneumothoraces within the right upper lobe. 4. No acute abnormality of the abdomen or pelvis is identified.     XR RIBS RIGHT INCLUDE CHEST (MIN 3 VIEWS)    Result Date: 9/27/2024  Fractures right 4th through 7th ribs with increased density in the right hemithorax suggesting fluid in the pleural space, possibly hemorrhage The findings were sent to the Radiology Results Communication Center at 3:33 pm on 9/27/2024 to be communicated to a licensed caregiver.

## 2024-09-28 NOTE — CONSULTS
[facility] Consult    Patient:  Jyoti Warren  MRN: [id number]    Consulting Physician: Dr. Peterson  Reason for Consult: Severe pain, right thorax  Primacy Care Physician: Nia Patel MD    HISTORY OF PRESENT ILLNESS:   The patient is a 68 y.o. female who sustained multiple right rib fractures after a fall from standing height at home.     Past Medical History:        Diagnosis Date    Acute cystitis with hematuria 9/19/2021    Elizalde's esophagus     Cervical spine pain 08/21/2013    Chronic headaches     Chronic sinusitis     CKD (chronic kidney disease)     stage 3    Closed fracture of distal end of left femur with routine healing 03/2021    COVID-19     Diabetes mellitus (HCC)     Dizziness     Femur fracture, left (HCC) 3/5/2021    Gastroesophageal reflux disease     barretts    H/O fall     Hypertension     Knee pain, left 08/21/2013    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HCC)     left temporal    Obesity     Peripheral neuropathy     Pneumonia 08/30/2015    Pneumonia due to COVID-19 virus 9/19/2021    Pulmonary hypertension (HCC) 01/01/2012    by echocardiogram    Shoulder pain, bilateral 08/21/2013    Spinal stenosis     Type 2 diabetes mellitus (HCC)     Unspecified essential hypertension     Essential hypertension    Vitamin D deficiency 11/05/2014       Past Surgical History:        Procedure Laterality Date    APPENDECTOMY      BACK INJECTION  02/25/2021    [facility] Right lumbar medial branch block using Fluroscopy    BRAIN SURGERY  05/24/2016    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, Dr. Noel    COLONOSCOPY  05/03/2012    diverticular disease    COLONOSCOPY N/A 12/23/2020    COLONOSCOPY WITH BIOPSY performed by Estela Dutton MD at [facility] OR, 1 hyperplastic polyp, random biopsies negative    CYSTOSCOPY Bilateral 08/03/2022

## 2024-09-28 NOTE — PROGRESS NOTES
Bladder scan 705ml.  Awaiting trauma resident response.  Patient states sometimes she doesn't void at home.

## 2024-09-28 NOTE — CONSULTS
[facility]  Office: [phone]  Johan Gold DO, Car Rojas, DO, Fei Amaro DO, Carlin Avina DO, Nya Swenson MD, Chelsea Givens MD, Zhane Dimas MD, Odalys Del Valle MD,  Derick Munoz MD, Marika You MD, Sang Diane MD,  Thuy Dobbs DO, Tina Mcguire MD, Tyrell Rivera MD, Romario Gold DO, Sierra Justin MD,  Bird Rasmussen DO, Belle Solomon MD, Fabiola Beltran MD, Gabby Tracey MD, Phillip Gilbert MD,  Erik Hernandez MD, Chanelle Prince MD, Ozzy Horn MD, Vin Guillen MD, Anthony Christopher MD, Cuauhtemoc London MD, Raman Denis, DO, Timoteo Worthington DO, Alfredo Ford DO, Bobo Vera MD, Shirley Waterhouse, CNP,  Shanna Bower CNP, Raman Fairchild, CNP,  Trena Peter, DNP, Wendy Lowery, CNP, Nadja Moore, CNP, Zoey Mcmillan, CNP, Mya Gerber, CNP, Kristine Whalen, PA-C, Elaina Camejo PA-C, Myrtle Crane, CNP, Ernesto Reilly, CNP,  Deanna Iverson, CNP, Елена Morel, CNP, Katerin Joshi, CNP, Isa Kern, CNS, Maria Dolores House, CNP, Maria Esther Gutierrez, CNP, Tracy Schwab, CNP         [facility]   IN-PATIENT SERVICE   [facility]    CONSULTATION / HISTORY AND PHYSICAL EXAMINATION            Date:   9/28/2024  Patient name:  Jyoti Warren  Date of admission:  9/28/2024 12:01 AM  MRN:   [id number]  Account:  [id number]  YOB: 1955  PCP:    Nia Patel MD  Room:   2003/2003-01  Code Status:    Full Code    Physician Requesting Consult: Rob Obrien*    Reason for Consult: Geriatric consult    Chief Complaint:     Chief Complaint   Patient presents with   • Fall       History Obtained From:     patient, electronic medical record    History of Present Illness:     68-year-old patient presents for evaluation after a fall down the stairs.  Patient states her knees gave out on her and she fell.  Does not endorse any LOC.  She was found to have multiple rib fractures and a pneumothorax.  There is also possible  difficulty sleeping can use Melatonin 6 mg QHS  If having behavioral disturbance and poses a harm to self or others can try oral low dose Seroquel or IM zyprexa if Qtc stable. Avoid physical restraints is possible.  Avoid benzodiazepines for delirium. Avoid anticholinergics.   Recommend Dietary consult to help optimize nutrition  Needs aggressive PTOT- Recommend turning patient per protocol to avoid wounds. Maintain fall precautions.   Monitor for urinary retention.         Consultations:   IP CONSULT TO NEUROSURGERY  IP CONSULT TO GERIATRICS  IP CONSULT TO ANESTHESIOLOGY      SHANNA Vincent NP  9/28/2024  1:03 PM    Copy sent to Nia Heredia MD

## 2024-09-28 NOTE — PROGRESS NOTES
Facility/Department: [facility] CAR 2- STEPDOWN  Initial Speech/Language/Cognitive Assessment    NAME: Jyoti Warren  : 1955   MRN: [id number]    ADMISSION DATE: 2024    ADMITTING DIAGNOSIS: has Shoulder pain, bilateral; Cervical spine pain; Knee pain, left; Essential hypertension; Pulmonary hypertension ([facility]); Vitamin D deficiency; Encounter for long-term (current) use of other medications; Headache; Subacute sphenoidal sinusitis; Type 2 diabetes mellitus with microalbuminuria (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); Type 2 diabetes mellitus with microalbuminuria, without long-term current use of insulin ([facility]); Morbid obesity with BMI of 40.0-44.9, adult; Restrictive lung disease secondary to obesity; Calculus of gallbladder and bile duct without cholecystitis or obstruction; Nausea; Multiple gastric ulcers; Anxiety; Chronic tension-type headache, intractable; Chronic, continuous use of opioids; Complete tear of right rotator cuff; Depression; Fatigue; Lumbosacral spondylosis without myelopathy; Major depressive disorder, single episode, moderate ([facility]); Refractory migraine; Muscle spasms of neck; Obsessive-compulsive disorders; Spondylosis of lumbar spine; Spondylosis of thoracic region without myelopathy or radiculopathy; Spinal stenosis, lumbar region, without neurogenic claudication; Elizalde's esophagus without dysplasia; Therapeutic opioid-induced constipation (OIC); Stage 3a chronic kidney disease (HCC); Acute cystitis without hematuria; Spongiotic dermatitis; Osteopenia; Dyspepsia; Gastritis without bleeding; Irregular Z line of esophagus; Myofascial pain; Gastroparesis; Early satiety; Chronic low back pain; Acute kidney injury superimposed on CKD ([facility]); AMS (altered mental status); Elevated lactic acid level; Urinary tract infection, site not specified; Multiple fractures of  ribs, right side, initial encounter for closed fracture; and Fall on their problem list.      DATE ONSET: 9/27/2024      Date of Eval: 9/28/2024   Evaluating Therapist: NIMESH Sahni      RECENT RESULTS  CT OF HEAD/MRI:   CT Head & C-spine - 9/27/2024  Head:   1. No evidence of acute calvarial fracture or intracranial hemorrhage.   2. Prior left temporal craniotomy, with underlying area of encephalomalacia.   3. Cerebral atrophy with chronic microvascular ischemic changes.     Cervical spine:   1. No acute fracture or subluxation of the cervical spine is identified.   2. Chronic compression deformities again noted at C5 and C7.         Primary Complaint:   Jyoti Warren is a 68 y.o. female who presents after a fall three days ago with multiple rib fractures, pneumothorax and was life flighted for finding of hemothorax.           IMPRESSIONS  Pt appears to be exhibiting mild cognitive impairments in attention, immediate/delayed recall, problem solving/reasoning and divergent thinking    These are not likely related to the fall but d/t chronic findings on CTH (cerebral atrophy, ischemic changes, left temporal encephalomalacia)    Pt/family acknowledge that they are worse s/p fall but it could be d/t pain (currently c/o pain +6/10)        RECOMMENDATIONS  Skilled ST during hospitalization          Pain:  Pain Assessment  Pain Assessment: 0-10  Pain Level: 7  Munoz-Baker Pain Rating: No hurt  Patient's Stated Pain Goal: 0 - No pain  Pain Location: Back, Rib cage  Pain Orientation: Right, Mid  Pain Descriptors: Aching  Functional Pain Assessment: Activities are not prevented  Non-Pharmaceutical Pain Intervention(s): Emotional support  Response to Pain Intervention: Patient satisfied  Side Effects: No reported side effects    Vision/ Hearing       Assessment:  Cognitive Diagnosis: Mild cognitive impairments   Diagnosis: Mild cognitive impairments    Recommendations:  Recommendations  Requires SLP Intervention:  Skills: Mild  Initiation: Mild  Sequencing: Mild  Executive Function Skills: Mild    Additional Assessments:          Prognosis:  Speech Therapy Prognosis  Prognosis Considerations: Age;Family/Community Support;Medical Diagnosis;Medical Prognosis;Participation Level;Potential;Co-Morbidities;Previous Level of Function  Individuals consulted  Consulted and agree with results and recommendations: Patient;Family member  Family member consulted: spouse and daughter    Education:  Patient Education Response: Demonstrated understanding;Needs reinforcement          Therapy Time:   Individual Concurrent Group Co-treatment   Time In  1235         Time Out  1303         Minutes                   Electronically signed by NIMESH Sahni on 9/28/2024 at 12:57 PM

## 2024-09-28 NOTE — ED NOTES
Pt present to ED from [facility] transferred by [facility] ground with c/o fall  Pt states she fell 09/24 while walking with walker  Pt denies loss of consciousness or hitting her head  Pt went to [facility] for L sided rib pain  [place] CT found hemothroax  Pt does not take blood thinners  Pt requiring 5LNC to maintain saturation  Pt is A&Ox4, even unlabored RR NAD  Pt connected to full cardiac monitor  Pt call light within reach denies needs at this time

## 2024-09-28 NOTE — CONSULTS
Reason for consultation: T2 compression fracture  Agree with ER resident H and P which I have reviewed.   Patient is a 68-year-old female who apparently fell approximately 3 days ago.  She was taken to an outlying hospital where she was diagnosed with multiple rib fractures as well as a hemothorax requiring a chest tube.  Additionally thoracic CT was suggestive of a anterior T2 wedge fracture.  Patient was subsequently transferred to [facility] admitted to the trauma service.  Neurosurgery is now being consulted for the thoracic compression fracture.  Currently the patient describes only rib pain.  She does not describe any discomfort over her thoracic spine.  She denies any paresthesias of the upper or lower extremities.    PMH, PSH, allergies, medications, Social history, ROS and PE as per ER resident H and P which has been reviewed.    Neurologically the patient is awake, alert, following commands appropriately with clear speech.  Comprehension is normal.  No focal cranial nerve deficits are identified.  She has good strength and sensation in both upper and lower extremities.    Thoracic spine CT demonstrates a mild T2 compression deformity which appears chronic in nature.  Cervical spine CT demonstrated no acute findings.  I did review these imaging results at the bedside with the patient and her .  At the present time the patient has no complaints of thoracic back discomfort and the findings on the thoracic CT are mild and appear chronic in nature.  Thus there is no treatment currently recommended for the T2 compression findings.      Expand All Collapse [facility]     Department of Neurosurgery  Resident Consult Note        Reason for Consult:  T2 wedge compression fracture     Neurosurgeon:   []Dr. Núñez  [x]Dr. Britton  []Dr. Joyner  []Dr. Bryant  []Dr. Scott  []Dr. Mcqueen  []Dr. Jennings              History Obtained From:  patient, electronic  vomiting  Genito-Urinary ROS - No dysuria  Musculoskeletal ROS - No neck pain, no back pain  Neurological ROS - No focal weakness or loss of sensation, no headache  Dermatological ROS - No lesions, no rash  Vascular/Lymphatic ROS - No edema     PHYSICAL EXAM:            Vitals:     09/28/24 0300   BP: 108/89   Pulse: 69   Resp:     Temp:     SpO2: 98%         CONSTITUTIONAL:  Well developed, well nourished, alert and oriented, in mild distress, nontoxic. No dysarthria, no aphasia. EOMI.    HEAD:  normocephalic, atraumatic    EYES:  PERRLA, EOMI   ENT:  moist mucous membranes, no stridor, no tracheal deviation   NECK:  no midline tenderness to palpation, no step-offs or deformities   BACK:  no midline tenderness to palpation, no step-offs or deformities    LUNGS:  CTAB, equal air entry bilaterally, no wheezes or rales   CARDIOVASCULAR:  RRR, no murmur   ABDOMEN:  Soft, no rigidity   NEUROLOGIC:  EYE OPENING     Spontaneous - 4 []       To voice - 3 []       To pain - 2 []       None - 1 []    VERBAL RESPONSE     Appropriate, oriented - 5 []       Dazed or confused - 4 []       Syllables, expletives - 3 []       Grunts - 2 []       None - 1 []    MOTOR RESPONSE     Spontaneous, command - 6 []       Localizes pain - 5 []       Withdraws pain - 4 []       Abnormal flexion - 3 []       Abnormal extension - 2 []       None - 1 []             Total GCS: 15     Mental Status:  A & O x3, awake     Denies sensory changes or weakness.  Antigravity in all 4                 SKIN:  no rash       LABS AND IMAGING:     Labs:  CBC with Differential:          Lab Results   Component Value Date/Time     WBC 17.3 09/27/2024 04:51 PM     RBC 3.90 09/27/2024 04:51 PM     HGB 11.1 09/27/2024 04:51 PM     HCT 34.7 09/27/2024 04:51 PM      09/27/2024 04:51 PM     MCV 89.0 09/27/2024 04:51 PM     MCH 28.5 09/27/2024 04:51 PM     MCHC 32.0 09/27/2024 04:51 PM     RDW 15.2 09/27/2024 04:51 PM     LYMPHOPCT 10 09/27/2024 04:51 PM

## 2024-09-28 NOTE — PROGRESS NOTES
Facility/Department: [facility] CAR 2- STEPDOWN   Occupational Therapy Initial Evaluation    Patient Name: Jyoti Warren        MRN: [id number]    : 1955    Date of Service: 2024    Chief Complaint   Patient presents with    Fall     Discharge Recommendations  Discharge Recommendations: Patient would benefit from continued therapy after discharge     Assessment  Performance deficits / Impairments: Decreased functional mobility ;Decreased ADL status;Decreased endurance;Decreased cognition;Decreased balance;Decreased strength;Decreased ROM;Decreased safe awareness;Decreased posture  Assessment: Pt demonstrates decreased strength, endurance and balance impacting performance in functional tasks this date. Pt required Mod A x2 to complete bed mobility using log rolling technique and reuqired Mod A x2 for functional transfers using RW for support. Pt required Mod A x2 for transfers, progressing from Mod A to Min A for mobility using RW for support. Pt is expected to require increased assistance for ADL task completion d/t decreased ROM and strength d/t pain from injury. Pt is a high fall risk at this time. Patient would benefit from continued acute OT services to address functional deficits through skilled intervention to promote independence and safety with ADL/IADLs and functional transfers/mobility for safe return to prior living environment and level of function.  Prognosis: Good  Decision Making: Medium Complexity  REQUIRES OT FOLLOW-UP: Yes  Activity Tolerance  Activity Tolerance: Patient limited by fatigue;Patient Tolerated treatment well  Safety Devices  Type of Devices: Nurse notified;Patient at risk for falls;Bed alarm in place;Call light within reach;Left in bed;Gait belt  Restraints  Restraints Initially in Place: No    Restrictions/Precautions  Restrictions/Precautions  Required Braces or Orthoses?: No  Position Activity Restriction  Other position/activity restrictions: Right lateral third rib fracture,  mobility  Rolling to Left: Moderate assistance  Rolling to Right: Moderate assistance  Supine to Sit: 2 Person assistance;Moderate assistance  Sit to Supine: Moderate assistance;2 Person assistance  Scooting: Moderate assistance  Bed Mobility Comments: Verbal cues and education to implement log rolling technique with fair return    Transfers  Sit to stand: Moderate assistance;2 Person assistance  Stand to sit: Minimal assistance  Transfer Comments: verbal cues for appropriate hand placement    Toilet Transfers  Toilet - Technique: Ambulating  Equipment Used: Standard bedside commode  Toilet Transfer: Moderate assistance  Toilet Transfers Comments: verbal cues for hand placement and breathing technique    Functional Mobility: Moderate assistance;Adaptive equipment;Increased time to complete (Mod A x1 to BSC, Min A x1 from BSC to bed;)  Functional Mobility Skilled Clinical Factors: Pt required assistance for RW progression d/t  cognitive deficits and decreased step length with verbal and tactile cues; Increased time to complete.     Patient Education  Patient Education  Education Given To: Patient  Education Provided: Role of Therapy;Plan of Care;Fall Prevention Strategies;ADL Adaptive Strategies;Equipment;Transfer Training;Mobility Training  Education Method: Verbal;Demonstration  Barriers to Learning: None  Education Outcome: Verbalized understanding;Continued education needed    Goals  Short Term Goals  Time Frame for Short Term Goals: Patient will, by discharge  Short Term Goal 1: demo UB ADLs at Mod I using adaptive techniques PRN  Short Term Goal 2: demo functional transfers/mobility using LRAD at CGA to participate in ADLs  Short Term Goal 3: demo toileting tasks at Min A  Short Term Goal 4: demo 20+ min of dynamic sitting balance at SBA to participate in ADLs safely  Short Term Goal 5: demo use of EC/WS techs with verbal cues to participate in ADLs safely    Plan  Occupational Therapy Plan  Times Per Week:  3-5x/wk  Current Treatment Recommendations: Strengthening, ROM, Balance training, Functional mobility training, Endurance training, Safety education & training, Patient/Caregiver education & training, Self-Care / ADL, Positioning, Equipment evaluation, education, & procurement    AM-PAC Daily Activities Inpatient  AM-PAC Daily Activity - Inpatient   How much help is needed for putting on and taking off regular lower body clothing?: A Lot  How much help is needed for bathing (which includes washing, rinsing, drying)?: A Lot  How much help is needed for toileting (which includes using toilet, bedpan, or urinal)?: A Lot  How much help is needed for putting on and taking off regular upper body clothing?: A Lot  How much help is needed for taking care of personal grooming?: A Little  How much help for eating meals?: None  AM-PAC Inpatient Daily Activity Raw Score: 15  AM-PAC Inpatient ADL T-Scale Score : 34.69  ADL Inpatient CMS 0-100% Score: 56.46  ADL Inpatient CMS G-Code Modifier : CK    Minutes  OT Individual Minutes  Time In: 1137  Time Out: 1205  Minutes: 28  Time Code Minutes   Timed Code Treatment Minutes: 9 Minutes    Electronically signed by Carolina Finley OT on 9/28/24 at 1:18 PM EDT

## 2024-09-28 NOTE — PLAN OF CARE
Problem: Discharge Planning  Goal: Discharge to home or other facility with appropriate resources  Outcome: Not Progressing  Flowsheets (Taken 9/28/2024 0900)  Discharge to home or other facility with appropriate resources: Identify barriers to discharge with patient and caregiver     Problem: Pain  Goal: Verbalizes/displays adequate comfort level or baseline comfort level  Outcome: Not Progressing  Flowsheets (Taken 9/28/2024 1100)  Verbalizes/displays adequate comfort level or baseline comfort level: Encourage patient to monitor pain and request assistance     Problem: Safety - Adult  Goal: Free from fall injury  Outcome: Progressing     Problem: Discharge Planning  Goal: Discharge to home or other facility with appropriate resources  Outcome: Not Progressing  Flowsheets (Taken 9/28/2024 0900)  Discharge to home or other facility with appropriate resources: Identify barriers to discharge with patient and caregiver     Problem: Pain  Goal: Verbalizes/displays adequate comfort level or baseline comfort level  Outcome: Not Progressing  Flowsheets (Taken 9/28/2024 1100)  Verbalizes/displays adequate comfort level or baseline comfort level: Encourage patient to monitor pain and request assistance

## 2024-09-28 NOTE — ED NOTES
ED to inpatient nurses report      Chief Complaint:  Chief Complaint   Patient presents with    Fall     Present to ED from: [facility]      MOA:     LOC: alert and orientated to name, place, date  Mobility: Independent  Oxygen Baseline: RA    Current needs required: 5LNC   Pending ED orders: NA  Present condition: Stable    Why did the patient come to the ED? Pt present to ED from [facility] transferred by [facility] ground with c/o fall  Pt states she fell 09/24 while walking with walker  Pt denies loss of consciousness or hitting her head  Pt went to [facility] for L sided rib pain  [place] CT found hemothroax  Pt does not take blood thinners  Pt requiring 5LNC to maintain saturation  Pt is A&Ox4, even unlabored RR NAD  Pt connected to full cardiac monitor  Pt call light within reach denies needs at this time  What is the plan? Admit for trauma surgery consult  Any procedures or intervention occur? IV, labs, medication  Any safety concerns?? Fall risk     Mental Status:  Level of Consciousness: Alert (0)    Psych Assessment:   Psychosocial  Psychosocial (WDL): Within Defined Limits  Vital signs   Vitals:    09/28/24 0130 09/28/24 0200 09/28/24 0201 09/28/24 0230   BP: 108/61 104/63  121/64   Pulse: 72 77  70   Resp: 12 15     Temp:       SpO2: 98%  90% 98%   Weight:       Height:            Vitals:  Patient Vitals for the past 24 hrs:   BP Temp Pulse Resp SpO2 Height Weight   09/28/24 0230 121/64 -- 70 -- 98 % -- --   09/28/24 0201 -- -- -- -- 90 % -- --   09/28/24 0200 104/63 -- 77 15 -- -- --   09/28/24 0130 108/61 -- 72 12 98 % -- --   09/28/24 0100 114/62 -- 74 14 98 % -- --   09/28/24 0025 (!) 112/56 -- 70 14 -- -- --   09/28/24 0024 -- -- -- -- 96 % -- --   09/28/24 0010 115/69 -- 78 18 97 % -- --   09/28/24 0009 -- -- 80 20 96 % -- --   09/28/24 0008 -- -- -- -- -- 1.727 m (5' 8\") 117.9 kg (260 lb)   09/28/24 0006 115/69 97.6 °F (36.4 °C) 72 20 93 % -- --   09/28/24 0006 -- -- 82 20 91 % -- --  MG PER TABLET    Take 1 tablet by mouth 2 times daily.    LISINOPRIL (PRINIVIL;ZESTRIL) 5 MG TABLET    Take 1 tablet by mouth daily    MELATONIN 3 MG TABS TABLET    Take 5 mg by mouth nightly as needed    METOCLOPRAMIDE (REGLAN) 10 MG TABLET    Take 1 tablet by mouth 4 times daily as needed (GI distress) WARNING:  May cause drowsiness.  May impair ability to operate vehicles or machinery.  Do not use in combination with alcohol.    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 Noel    COLONOSCOPY  05/03/2012    diverticular disease    COLONOSCOPY N/A 12/23/2020    COLONOSCOPY WITH BIOPSY performed by Estela Dutton MD at [facility] at the [facility] OR, 1 hyperplastic polyp, random biopsies negative    CYSTOSCOPY Bilateral 08/03/2022    CYSTO Bilateral Retrograde Pyelogram, with bladder washout  performed by Suleiman Jack MD at [facility] OR    DENTAL SURGERY  08/2015    full dental extraction    ESOPHAGOGASTRODUODENOSCOPY  10/18/2022    FEMUR CLOSED REDUCTION Left 03/05/2020    has dhara [facility]    FEMUR FRACTURE SURGERY Left 03/06/2021    left retrograde femoral nailing. Dr. hTompson, [facility]    FOREIGN BODY REMOVAL  05/28/2016    left-sided temporal craniotomy wound reexploration with removal of small retained foreign body

## 2024-09-28 NOTE — ED NOTES
Pt updated on plan of care, medicated as ordered, skin w/d, resp easy denies further needs at present time, will continue to monitor

## 2024-09-28 NOTE — CARE COORDINATION
Case Management Assessment  Initial Evaluation    Date/Time of Evaluation: 9/28/2024 8:52 AM  Assessment Completed by: Katarzyna Lopez RN    If patient is discharged prior to next notation, then this note serves as note for discharge by case management.    Patient Name: Jyoti Warren                   YOB: 1955  Diagnosis: Fall, initial encounter [W19.XXXA]  Traumatic pneumohemothorax, initial encounter [S27.2XXA]  Closed fracture of multiple ribs of right side, initial encounter [S22.41XA]  Multiple fractures of ribs, right side, initial encounter for closed fracture [S22.41XA]                   Date / Time: 9/28/2024 12:01 AM    Patient Admission Status: Inpatient   Readmission Risk (Low < 19, Mod (19-27), High > 27): Readmission Risk Score: 15.4    Current PCP: Nia Patel MD  PCP verified by CM? (P) Yes    Chart Reviewed: Yes      History Provided by: (P) Patient  Patient Orientation: (P) Alert and Oriented    Patient Cognition: (P) Alert    Hospitalization in the last 30 days (Readmission):  No    If yes, Readmission Assessment in  Navigator will be completed.    Advance Directives:      Code Status: Full Code   Patient's Primary Decision Maker is: (P) Legal Next of Kin    Primary Decision Maker: Kingsley Warren - Spouse - [phone]    Discharge Planning:    Patient lives with: (P) Spouse/Significant Other Type of Home: (P) House  Primary Care Giver: (P) Self  Patient Support Systems include: (P) Spouse/Significant Other, Family Members   Current Financial resources: (P) Medicare  Current community resources: (P) None  Current services prior to admission: (P) Durable Medical Equipment            Current DME: (P) Wheelchair, Walker, Shower Chair            Type of Home Care services:  (P) None    ADLS  Prior functional level: (P) Assistance with the following:, Bathing, Cooking, Housework, Shopping, Mobility  Current functional level: (P) Assistance with the following:, Bathing, Cooking,  Housework, Shopping, Mobility    PT AM-PAC:   /24  OT AM-PAC:   /24    Family can provide assistance at DC: (P) Yes  Would you like Case Management to discuss the discharge plan with any other family members/significant others, and if so, who? (P) Yes (Kingsley)  Plans to Return to Present Housing: (P) Yes  Other Identified Issues/Barriers to RETURNING to current housing: oxygen demand, PT/OT  Potential Assistance needed at discharge: (P) Outpatient PT/OT, Home Care            Potential DME:    Patient expects to discharge to: (P) House  Plan for transportation at discharge:      Financial    Payor: [facility] MEDICARE / Plan: [facility] MEDICARE COMPLETE / Product Type: *No Product type* /     Does insurance require precert for SNF: Yes    Potential assistance Purchasing Medications: (P) No  Meds-to-Beds request:        [id number] - [place], [place] - [address] [facility] - P [phone] - F [phone]  [address] [facility] [address]  Phone: [phone] Fax: [phone]    [facility] [address] [place] [address] -  [phone] - F [phone]  [address] [address]  Phone: [phone] Fax: [phone]    [id number] [place], [place] - [address] - P [phone] - F [phone]  [address]  [place] [address]  Phone: [phone] Fax: [phone]      Notes:    Factors facilitating achievement of predicted outcomes: Family support, Motivated, Cooperative, and Pleasant    Barriers to discharge: Medical complications    Additional Case Management Notes: From home with , he helps at home, will need PT/OT eval and monitor O2 demand for DC plan.      Case Management Services Information Letter Provided [x]      The Plan for Transition of Care is related to the following treatment goals of Fall, initial encounter [W19.XXXA]  Traumatic pneumohemothorax, initial encounter [S27.2XXA]  Closed  fracture of multiple ribs of right side, initial encounter [S22.41XA]  Multiple fractures of ribs, right side, initial encounter for closed fracture [S22.41XA]    IF APPLICABLE: The Patient and/or patient representative Jyoti and her family were provided with a choice of provider and agrees with the discharge plan. Freedom of choice list with basic dialogue that supports the patient's individualized plan of care/goals and shares the quality data associated with the providers was provided to:     Patient Representative Name:       The Patient and/or Patient Representative Agree with the Discharge Plan?      Katarzyna Lopez RN  Case Management Department  Ph: [phone] Fax: [phone]

## 2024-09-28 NOTE — ED PROVIDER NOTES
[facility]     Emergency Department     Faculty Attestation    I performed a history and physical examination of the patient and discussed management with the resident. I reviewed the resident’s note and agree with the documented findings including all diagnostic interpretations and plan of care. Any areas of disagreement are noted on the chart. I was personally present for the key portions of any procedures. I have documented in the chart those procedures where I was not present during the key portions. I have reviewed the emergency nurses triage note. I agree with the chief complaint, past medical history, past surgical history, allergies, medications, social and family history as documented unless otherwise noted below. Documentation of the HPI, Physical Exam and Medical Decision Making performed by veronica is based on my personal performance of the HPI, PE and MDM. For Physician Assistant/ Nurse Practitioner cases/documentation I have personally evaluated this patient and have completed at least one if not all key elements of the E/M (history, physical exam, and MDM). Additional findings are as noted.    Primary Care Physician: Nia Patel MD    Note Started: 4:02 AM EDT     VITAL SIGNS:   height is 1.727 m (5' 8\") and weight is 117.9 kg (260 lb). Her temperature is 97.6 °F (36.4 °C). Her blood pressure is 108/89 and her pulse is 69. Her respiration is 15 and oxygen saturation is 98%.      Medical Decision Making  Fall.  Problems anything.  Found to have multiple rib fractures as well as small hemopneumothorax.  Transfer from outlying facility.  Not requiring chest tube or significant supplemental O2.  No blood thinners.  On examination uncomfortable but not acutely distressed cardiac exam regular rate and rhythm no murmurs rubs gallops, pulmonary clear bilaterally abdomen is soft nontender nondistended no rebound no guarding.  Trauma priority based on

## 2024-09-28 NOTE — ED NOTES
Pt turned to L side  Pt CTLS maintained  No stepoffs deformities or abnormalities appreciated  Pt complains of thoracic tenderness

## 2024-09-28 NOTE — ED PROVIDER NOTES
[facility] ED  Emergency Department Encounter  Emergency Medicine Resident     Pt Name:Jyoti Warren  MRN: [id number]  Birthdate 1955  Date of evaluation: 9/28/24  PCP:  Nia Patel MD  Note Started: 1:01 AM EDT      CHIEF COMPLAINT       Chief Complaint   Patient presents with    Fall       HISTORY OF PRESENT ILLNESS  (Location/Symptom, Timing/Onset, Context/Setting, Quality, Duration, Modifying Factors, Severity.)      Jyoti Warren is a 68 y.o. female who presents as a transfer from outside hospital after a fall.  Patient reports that she was walking down some steps when her knees gave out.  She fell about 3 days ago.  She did not hit her head or lose consciousness.  She has been complaining of right-sided chest pain since then.  Was seen at [facility] initially and was discharged home.  Presented again today for reevaluation given her a worsening chest pain.  CT scan revealed right-sided rib fractures as well as hemopneumothorax.  Patient was transferred [facility] for higher level of care    PAST MEDICAL / SURGICAL / SOCIAL / FAMILY HISTORY      has a past medical history of Acute cystitis with hematuria, Elizalde's esophagus, Cervical spine pain, Chronic headaches, Chronic sinusitis, CKD (chronic kidney disease), Closed fracture of distal end of left femur with routine healing, COVID-19, Diabetes mellitus (HCC), Dizziness, Femur fracture, left (HCC), Gastroesophageal reflux disease, H/O fall, Hypertension, Knee pain, left, 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HCC), Obesity, Peripheral neuropathy, Pneumonia, Pneumonia due to COVID-19 virus, Pulmonary hypertension (HCC), Shoulder pain, bilateral, Spinal stenosis, Type 2 diabetes mellitus (HCC), Unspecified essential hypertension, and Vitamin D deficiency.     has a past surgical history that includes Hysterectomy (09/06/2005); Appendectomy; Upper gastrointestinal endoscopy; Knee arthroscopy (Left); Colonoscopy (05/03/2012); other surgical history  (Medical): No     Lack of Transportation (Non-Medical): Patient declined   Physical Activity: Insufficiently Active (9/15/2023)    Exercise Vital Sign     Days of Exercise per Week: 4 days     Minutes of Exercise per Session: 20 min   Stress: Not on file   Social Connections: Not on file   Intimate Partner Violence: Not on file   Housing Stability: Unknown (3/14/2024)    Housing Stability Vital Sign     Unable to Pay for Housing in the Last Year: No     Number of Places Lived in the Last Year: 1     Unstable Housing in the Last Year: Patient declined       Family History   Problem Relation Age of Onset    Cancer Mother     Breast Cancer Mother 47    Hypertension Father     Diabetes Father     Cancer Father        Allergies:  Asa [aspirin] and Pollen extract    Home Medications:  Prior to Admission medications    Medication Sig Start Date End Date Taking? Authorizing Provider   gabapentin (NEURONTIN) 400 MG capsule Take 1 capsule by mouth in the morning and at bedtime for 90 days. 8/7/24 11/5/24  Nia Patel MD   amLODIPine (NORVASC) 10 MG tablet Take 1 tablet by mouth daily 7/17/24 1/13/25  Nia Patel MD   DULoxetine (CYMBALTA) 60 MG extended release capsule Take 1 capsule by mouth daily 7/17/24 1/13/25  Nia Patel MD   glimepiride (AMARYL) 2 MG tablet Take 1 tablet by mouth every morning 7/17/24   Nia Patel MD   hydroCHLOROthiazide 12.5 MG capsule Take 1 capsule by mouth every morning 7/17/24 1/13/25  Nia Patel MD   pantoprazole (PROTONIX) 40 MG tablet Take 1 tablet by mouth 2 times daily 7/17/24   Nia Patel MD   metoclopramide (REGLAN) 10 MG tablet Take 1 tablet by mouth 4 times daily as needed (GI distress) WARNING:  May cause drowsiness.  May impair ability to operate vehicles or machinery.  Do not use in combination with alcohol. 7/17/24   Nia Patel MD   lisinopril (PRINIVIL;ZESTRIL) 5 MG tablet Take 1 tablet by mouth daily 7/15/24   Sav Swenson MD   potassium bicarbonate  (EFFER-K) 25 MEQ disintegrating tablet Take 1 tablet by mouth 2 times daily 6/28/24   Nia Patel MD   HYDROcodone-acetaminophen (NORCO) 5-325 MG per tablet Take 1 tablet by mouth 2 times daily. 1/26/24   ProviderMary MD   melatonin 3 MG TABS tablet Take 5 mg by mouth nightly as needed    ProviderMary MD   blood glucose monitor strips Test 1 times a day & as needed for symptoms of irregular blood glucose. Dispense sufficient amount for indicated testing frequency plus additional to accommodate PRN testing needs. 3/21/22   Nia Patel MD   Cholecalciferol (VITAMIN D3) 1.25 MG (04491 UT) CAPS TAKE 1 CAPSULE ONCE A WEEK 7/28/21   Nia Patel MD       REVIEW OF SYSTEMS       Review of Systems   Unable to perform ROS: Acuity of condition       PHYSICAL EXAM      INITIAL VITALS:   /69   Pulse 78   Temp 97.6 °F (36.4 °C)   Resp 18   Ht 1.727 m (5' 8\")   Wt 117.9 kg (260 lb)   LMP 08/24/2010 (Approximate)   SpO2 97%   BMI 39.53 kg/m²     Physical Exam  Vitals reviewed.   Constitutional:       General: She is not in acute distress.     Comments: Please see rest of exam per trauma   HENT:      Head: Normocephalic and atraumatic.      Right Ear: Tympanic membrane normal.      Left Ear: Tympanic membrane normal.      Nose: Nose normal.   Eyes:      Conjunctiva/sclera: Conjunctivae normal.      Pupils: Pupils are equal, round, and reactive to light.   Pulmonary:      Effort: Pulmonary effort is normal.      Breath sounds: Normal breath sounds. No wheezing.   Neurological:      Mental Status: She is alert.           DDX/DIAGNOSTIC RESULTS / EMERGENCY DEPARTMENT COURSE / MDM     Medical Decision Making  69-year-old female presents to the ER as a transfer from the hospital after a fall.  Patient had a fall about 3 days ago when her knees gave out while she was walking down some steps.  Denies hitting her head or loss of consciousness.  Imaging showed multiple rib fractures on the right side

## 2024-09-28 NOTE — PROGRESS NOTES
Trauma Tertiary Survey    Admit Date: 9/28/2024  Hospital day 0      Subjective:     Patient seen and examined this morning.  Pain controlled.  Denies SOB.    Objective:   PHYSICAL EXAM:   Physical Exam      Spine:     Spine Tenderness ROM   Cervical 0 /10 Normal   Thoracic 0 /10 Normal   Lumbar 0 /10 Normal     Musculoskeletal    Joint Tenderness Swelling ROM   Right shoulder absent absent normal   Left shoulder absent absent normal   Right elbow absent absent normal   Left elbow absent absent normal   Right wrist absent absent normal   Left wrist absent absent normal   Right hand grasp absent absent normal   Left hand grasp absent absent normal   Right hip absent absent normal   Left hip absent absent normal   Right knee absent absent normal   Left knee absent absent normal   Right ankle absent absent normal   Left ankle absent absent normal   Right foot absent absent normal   Left foot absent absent normal       CONSULTS: NS    PROCEDURES: None    [] Reviewed radiology reports  XR CHEST PORTABLE    Result Date: 9/28/2024  EXAMINATION: ONE XRAY VIEW OF THE CHEST 9/28/2024 7:19 am COMPARISON: September 27, 2024 HISTORY: ORDERING SYSTEM PROVIDED HISTORY: Repeat CXR rib fx TECHNOLOGIST PROVIDED HISTORY: Repeat CXR rib fx FINDINGS: No confluent infiltrate, effusion, or pneumothorax identified.  Stable cardiac and mediastinal silhouettes. Multiple right-sided rib fractures which are similar to the prior exam.     Multiple right-sided rib fractures which are similar to the prior exam. No pneumothorax identified.  No acute cardiopulmonary disease identified.     CT THORACIC SPINE BONY RECONSTRUCTION    Result Date: 9/28/2024  EXAMINATION: CT OF THE THORACIC SPINE WITHOUT CONTRAST  9/28/2024 1:20 am: TECHNIQUE: CT of the thoracic spine was performed without the administration of intravenous contrast. Multiplanar reformatted images are provided for review. Automated exposure control, iterative reconstruction, and/or

## 2024-09-28 NOTE — PROGRESS NOTES
Physical Therapy  Facility/Department: [facility] CAR 2- STEPDOWN  Physical Therapy Initial Assessment    Name: Jyoti Warren  : 1955  MRN: [id number]  Date of Service: 2024    Discharge Recommendations: Further therapy recommended at discharge.  Chief Complaint   Patient presents with    Fall     Pt is a female that presented to the Emergency Department following a fall.  Patient reports her knees gave out. She denies any head injury.  Denies any loss of consciousness. Denies anticoagulation.       PT Equipment Recommendations  Equipment Needed: No      Patient Diagnosis(es): The primary encounter diagnosis was Fall, initial encounter. Diagnoses of Closed fracture of multiple ribs of right side, initial encounter and Traumatic pneumohemothorax, initial encounter were also pertinent to this visit.  Past Medical History:  has a past medical history of Acute cystitis with hematuria, Elizalde's esophagus, Cervical spine pain, Chronic headaches, Chronic sinusitis, CKD (chronic kidney disease), Closed fracture of distal end of left femur with routine healing, COVID-19, Diabetes mellitus (HCC), Dizziness, Femur fracture, left (HCC), Gastroesophageal reflux disease, H/O fall, Hypertension, Knee pain, left, 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HCC), Obesity, Peripheral neuropathy, Pneumonia, Pneumonia due to COVID-19 virus, Pulmonary hypertension (HCC), Shoulder pain, bilateral, Spinal stenosis, Type 2 diabetes mellitus (HCC), Unspecified essential hypertension, and Vitamin D deficiency.  Past Surgical History:  has a past surgical history that includes Hysterectomy (2005); Appendectomy; Upper gastrointestinal endoscopy; Knee arthroscopy (Left); Colonoscopy (2012); other surgical history (); Dental surgery (2015); Tonsillectomy; brain surgery (2016); Foreign Body Removal (2016); Total shoulder arthroplasty (Right, 10/18/2018); Shoulder arthroscopy (Right, 2019); Nerve Block (2019); Upper  assists  Vision/Hearing  Vision  Vision: Impaired  Vision Exceptions: Wears glasses at all times  Hearing  Hearing: Within functional limits    Cognition   Orientation  Overall Orientation Status: Within Functional Limits  Orientation Level: Oriented to place;Oriented to time;Oriented to situation;Oriented to person  Cognition  Overall Cognitive Status: Exceptions  Arousal/Alertness: Appropriate responses to stimuli  Following Commands: Follows multistep commands with increased time  Attention Span: Attends with cues to redirect  Problem Solving: Decreased awareness of errors;Assistance required to correct errors made  Insights: Decreased awareness of deficits  Initiation: Requires cues for some  Sequencing: Requires cues for some            Gross Assessment  Sensation: Intact    AROM RLE (degrees)  RLE AROM: WFL  AROM LLE (degrees)  LLE AROM : WFL  AROM RUE (degrees)  RUE AROM : Exceptions  RUE General AROM: Grossly decreased, non-functional (L UE WFL, R shoulder ~65 degrees, limited d/t pain; WFL distally) per OT note  AROM LUE (degrees)  LUE AROM : Exceptions  LUE General AROM: Grossly decreased, non-functional (L UE WFL, R shoulder ~65 degrees, limited d/t pain; WFL distally) per OT note  Strength RLE  Strength RLE: Exception  R Hip Flexion: 4/5  R Knee Flexion: 4/5  R Knee Extension: 4/5  R Ankle Dorsiflexion: 4/5  R Ankle Plantar flexion: 4/5  Strength LLE  Strength LLE: Exception  L Hip Flexion: 4/5  L Knee Flexion: 4/5  L Knee Extension: 4/5  L Ankle Dorsiflexion: 4/5  L Ankle Plantar Flexion: 4/5  Strength RUE  Strength RUE: Exception  Comment: Generally decreased, functional (R shoulder 2-/5, R elbow flex/ext 3+/5; R  4-/5) per OT note  Strength LUE  Strength LUE: Exception  Comment: Generally decreased, functional (L UE 4-/5 grossly) per OT note         Bed mobility  Rolling to Left: Moderate assistance  Rolling to Right: Moderate assistance  Supine to Sit: 2 Person assistance;Moderate assistance (for

## 2024-09-28 NOTE — PROGRESS NOTES
15 Variable Trauma-Specific Frailty Index   Comorbidities   Cancer History Yes (1) No (0)   Coronary Heart Disease MI (1) CABG (0.75) Mild (0.25)  No (0)   Dementia Severe (1) Moderate (0.5) Mild (0.25)  No (0)   Daily Activities   Help With Grooming Yes (1) No (0)   Help with Managing Money Yes (1) No (0)   Help doing Housework Yes (1) No (0)   Help with Toileting Yes (1) No (0)   Help Walking Wheelchair (1) Walker (0.75) Cane (0.5) No (0)   Health Attitude   Feel Less Useful Most Time (1) Sometimes (0.5) Never (0)   Feel Sad Most Time (1) Sometimes (0.5) Never (0)   Feel Effort to Do Everything Most Time (1) Sometimes (0.5) Never (0)   Feels Lonely Most Time (1) Sometimes (0.5) Never (0)   Falls Most Time (1) Sometimes (0.5) Never (0)   Function   Sexually Active Yes (0)  No(1)   Nutrition   Albumin < 3g/dL (1)  > 3g/dL   Scoring   Score   FI (Score/15)  4.25/15 > 0.25 = Frail   *Based on 2-weeks prior to hospital admission   Trauma Specific Fraility Index > or = to 4 (4/15 = 0.26)  Trauma Specific Fraility Index Score:    4.25/15        Aliyah Gilman DO 09/28/24  2:09 AM   General Surgery PGY 1

## 2024-09-28 NOTE — PROGRESS NOTES
Trauma paged thru perfect serve that patient repeat Na is 130, she has not voided much today and is complaining of constipation and would like suppository.

## 2024-09-28 NOTE — PROGRESS NOTES
Pharmacy Note     Renal Dose Adjustment    Jyoti Warren is a 68 y.o. female. Pharmacist assessment of renally cleared medications.    Recent Labs     09/27/24  1651   BUN 30*     Recent Labs     09/27/24  1651   CREATININE 2.6*     Estimated Creatinine Clearance: 28 mL/min (A) (based on SCr of 2.6 mg/dL (H)).    Height:   Ht Readings from Last 1 Encounters:   09/28/24 1.727 m (5' 8\")     Weight:  Wt Readings from Last 1 Encounters:   09/28/24 117.9 kg (260 lb)     Per dispense report, patient was taking Gabapentin 400 mg twice daily PTA. Significant increase in SCr recently (1.2 mg/dL on 7/29/24 to 2.6 mg/dL on 9/27/24)    The following medication dose has been adjusted based upon renal function per P&T Guidelines: Gabapentin 400 mg three times daily changed to Gabapentin 300 mg twice daily.       Gini Roldan, PharmD  PGY-1 Pharmacy Practice Resident   Oxybutynin Counseling:  I discussed with the patient the risks of oxybutynin including but not limited to skin rash, drowsiness, dry mouth, difficulty urinating, and blurred vision.

## 2024-09-28 NOTE — H&P
TRAUMA H&P/CONSULT    PATIENT NAME: Dq Trauma Xxthornburg  YOB: 1880  MEDICAL RECORD NO. [id number]   DATE: 9/27/2024  PRIMARY CARE PHYSICIAN: No primary care provider on file.  PATIENT EVALUATED AT THE REQUEST OF : Jani LOPEZ   Trauma Priority      IMPRESSION AND PLAN:       Diagnosis: Right lateral third rib fracture, displaced fractures of the right lateral fourth, fifth, sixth, seventh ribs, small right-sided hemothorax small areas of pneumothorax  Admit to stepdown  Rib score 8  Daily PIC scores  Repeat CXR in am   HFNC      If intracranial hemorrhage is present, is it a:  [] BIG 1  [] BIG 2  [] BIG 3  If chest wall injury: Rib score_8__    CONSULT SERVICES  None     HISTORY:     Chief Complaint:  \"My chest hurts\"    GENERAL DATA  Patient information was obtained from patient.  History/Exam limitations: none.  Injury Date: 9/24/24   Approximate Injury Time: Unknown       Transport mode: Ambulance  Referring Hospital: [facility]    SETTING OF TRAUMATIC EVENT   Location : Home  Specific Details of Location: Other: Stairs    MECHANISM OF INJURY    Fall From standing      HISTORY:     Dq Trauma Xxthornburg is a female that presented to the Emergency Department following a fall that happened 3 days ago.  Patient reports her knees gave out.  And right side of her chest.  She denies any head injury.  Denies any loss of consciousness. Denies anticoagulation.     Traumatic loss of Consciousness: No    Total Fluids Given Prior To Arrival  mL    MEDICATIONS:   []  None     []  Information not available due to exam limitations documented above    Prior to Admission medications    Not on File       ALLERGIES:   []  None    []   Information not available due to exam limitations documented above     Patient has no allergy information on record.    PAST MEDICAL/SURGICAL HISTORY: []  None   []   Information not available due to exam limitations documented above      has no past medical history on file.

## 2024-09-29 ENCOUNTER — ANESTHESIA EVENT (OUTPATIENT)
Dept: INPATIENT UNIT | Age: 69
End: 2024-09-29
Payer: MEDICARE

## 2024-09-29 ENCOUNTER — APPOINTMENT (OUTPATIENT)
Dept: GENERAL RADIOLOGY | Age: 69
DRG: 200 | End: 2024-09-29
Payer: MEDICARE

## 2024-09-29 ENCOUNTER — ANESTHESIA (OUTPATIENT)
Dept: INPATIENT UNIT | Age: 69
End: 2024-09-29
Payer: MEDICARE

## 2024-09-29 LAB
ANION GAP SERPL CALCULATED.3IONS-SCNC: 10 MMOL/L (ref 9–16)
BACTERIA URNS QL MICRO: NORMAL
BASOPHILS # BLD: 0.05 K/UL (ref 0–0.2)
BASOPHILS NFR BLD: 0 % (ref 0–2)
BILIRUB UR QL STRIP: NEGATIVE
BUN SERPL-MCNC: 29 MG/DL (ref 8–23)
CALCIUM SERPL-MCNC: 8.4 MG/DL (ref 8.6–10.4)
CASTS #/AREA URNS LPF: NORMAL /LPF (ref 0–8)
CHLORIDE SERPL-SCNC: 101 MMOL/L (ref 98–107)
CLARITY UR: CLEAR
CO2 SERPL-SCNC: 22 MMOL/L (ref 20–31)
COLOR UR: YELLOW
CREAT SERPL-MCNC: 2 MG/DL (ref 0.5–0.9)
EOSINOPHIL # BLD: 0.24 K/UL (ref 0–0.44)
EOSINOPHILS RELATIVE PERCENT: 2 % (ref 1–4)
EPI CELLS #/AREA URNS HPF: NORMAL /HPF (ref 0–5)
ERYTHROCYTE [DISTWIDTH] IN BLOOD BY AUTOMATED COUNT: 15.2 % (ref 11.8–14.4)
GFR, ESTIMATED: 28 ML/MIN/1.73M2
GLUCOSE BLD-MCNC: 122 MG/DL (ref 65–105)
GLUCOSE BLD-MCNC: 182 MG/DL (ref 65–105)
GLUCOSE BLD-MCNC: 229 MG/DL (ref 65–105)
GLUCOSE BLD-MCNC: 269 MG/DL (ref 65–105)
GLUCOSE SERPL-MCNC: 123 MG/DL (ref 74–99)
GLUCOSE UR STRIP-MCNC: NEGATIVE MG/DL
HCT VFR BLD AUTO: 31.5 % (ref 36.3–47.1)
HGB BLD-MCNC: 10 G/DL (ref 11.9–15.1)
HGB UR QL STRIP.AUTO: NEGATIVE
IMM GRANULOCYTES # BLD AUTO: 0.11 K/UL (ref 0–0.3)
IMM GRANULOCYTES NFR BLD: 1 %
KETONES UR STRIP-MCNC: NEGATIVE MG/DL
LEUKOCYTE ESTERASE UR QL STRIP: ABNORMAL
LYMPHOCYTES NFR BLD: 1.32 K/UL (ref 1.1–3.7)
LYMPHOCYTES RELATIVE PERCENT: 9 % (ref 24–43)
MCH RBC QN AUTO: 28 PG (ref 25.2–33.5)
MCHC RBC AUTO-ENTMCNC: 31.7 G/DL (ref 28.4–34.8)
MCV RBC AUTO: 88.2 FL (ref 82.6–102.9)
MONOCYTES NFR BLD: 1.07 K/UL (ref 0.1–1.2)
MONOCYTES NFR BLD: 8 % (ref 3–12)
NEUTROPHILS NFR BLD: 80 % (ref 36–65)
NEUTS SEG NFR BLD: 11.43 K/UL (ref 1.5–8.1)
NITRITE UR QL STRIP: NEGATIVE
NRBC BLD-RTO: 0 PER 100 WBC
OSMOLALITY UR: 393 MOSM/KG (ref 80–1300)
PH UR STRIP: 5.5 [PH] (ref 5–8)
PLATELET # BLD AUTO: 308 K/UL (ref 138–453)
PMV BLD AUTO: 10.3 FL (ref 8.1–13.5)
POTASSIUM SERPL-SCNC: 4.1 MMOL/L (ref 3.7–5.3)
PROT UR STRIP-MCNC: NEGATIVE MG/DL
RBC # BLD AUTO: 3.57 M/UL (ref 3.95–5.11)
RBC # BLD: ABNORMAL 10*6/UL
RBC #/AREA URNS HPF: NORMAL /HPF (ref 0–4)
SODIUM SERPL-SCNC: 133 MMOL/L (ref 136–145)
SODIUM UR-SCNC: 69 MMOL/L
SP GR UR STRIP: 1.01 (ref 1–1.03)
UROBILINOGEN UR STRIP-ACNC: NORMAL EU/DL (ref 0–1)
WBC #/AREA URNS HPF: NORMAL /HPF (ref 0–5)
WBC OTHER # BLD: 14.2 K/UL (ref 3.5–11.3)

## 2024-09-29 PROCEDURE — 83935 ASSAY OF URINE OSMOLALITY: CPT

## 2024-09-29 PROCEDURE — 6370000000 HC RX 637 (ALT 250 FOR IP): Performed by: SURGERY

## 2024-09-29 PROCEDURE — 6370000000 HC RX 637 (ALT 250 FOR IP): Performed by: NURSE PRACTITIONER

## 2024-09-29 PROCEDURE — 3E0T33Z INTRODUCTION OF ANTI-INFLAMMATORY INTO PERIPHERAL NERVES AND PLEXI, PERCUTANEOUS APPROACH: ICD-10-PCS | Performed by: STUDENT IN AN ORGANIZED HEALTH CARE EDUCATION/TRAINING PROGRAM

## 2024-09-29 PROCEDURE — 81001 URINALYSIS AUTO W/SCOPE: CPT

## 2024-09-29 PROCEDURE — 76942 ECHO GUIDE FOR BIOPSY: CPT | Performed by: STUDENT IN AN ORGANIZED HEALTH CARE EDUCATION/TRAINING PROGRAM

## 2024-09-29 PROCEDURE — 99232 SBSQ HOSP IP/OBS MODERATE 35: CPT | Performed by: NURSE PRACTITIONER

## 2024-09-29 PROCEDURE — 51701 INSERT BLADDER CATHETER: CPT

## 2024-09-29 PROCEDURE — 2580000003 HC RX 258: Performed by: NURSE PRACTITIONER

## 2024-09-29 PROCEDURE — 3E0T3BZ INTRODUCTION OF ANESTHETIC AGENT INTO PERIPHERAL NERVES AND PLEXI, PERCUTANEOUS APPROACH: ICD-10-PCS | Performed by: STUDENT IN AN ORGANIZED HEALTH CARE EDUCATION/TRAINING PROGRAM

## 2024-09-29 PROCEDURE — 6360000002 HC RX W HCPCS: Performed by: STUDENT IN AN ORGANIZED HEALTH CARE EDUCATION/TRAINING PROGRAM

## 2024-09-29 PROCEDURE — C9290 INJ, BUPIVACAINE LIPOSOME: HCPCS | Performed by: STUDENT IN AN ORGANIZED HEALTH CARE EDUCATION/TRAINING PROGRAM

## 2024-09-29 PROCEDURE — 94761 N-INVAS EAR/PLS OXIMETRY MLT: CPT

## 2024-09-29 PROCEDURE — 84300 ASSAY OF URINE SODIUM: CPT

## 2024-09-29 PROCEDURE — 85025 COMPLETE CBC W/AUTO DIFF WBC: CPT

## 2024-09-29 PROCEDURE — 36415 COLL VENOUS BLD VENIPUNCTURE: CPT

## 2024-09-29 PROCEDURE — 71045 X-RAY EXAM CHEST 1 VIEW: CPT

## 2024-09-29 PROCEDURE — 2060000000 HC ICU INTERMEDIATE R&B

## 2024-09-29 PROCEDURE — 82947 ASSAY GLUCOSE BLOOD QUANT: CPT

## 2024-09-29 PROCEDURE — 80048 BASIC METABOLIC PNL TOTAL CA: CPT

## 2024-09-29 PROCEDURE — 6370000000 HC RX 637 (ALT 250 FOR IP)

## 2024-09-29 PROCEDURE — 99231 SBSQ HOSP IP/OBS SF/LOW 25: CPT | Performed by: SURGERY

## 2024-09-29 PROCEDURE — 2580000003 HC RX 258

## 2024-09-29 PROCEDURE — 51798 US URINE CAPACITY MEASURE: CPT

## 2024-09-29 RX ORDER — BUPIVACAINE HYDROCHLORIDE 5 MG/ML
INJECTION, SOLUTION EPIDURAL; INTRACAUDAL
Status: COMPLETED | OUTPATIENT
Start: 2024-09-29 | End: 2024-09-29

## 2024-09-29 RX ORDER — SENNOSIDES A AND B 8.6 MG/1
1 TABLET, FILM COATED ORAL NIGHTLY
Status: DISCONTINUED | OUTPATIENT
Start: 2024-09-29 | End: 2024-10-02 | Stop reason: HOSPADM

## 2024-09-29 RX ADMIN — METHOCARBAMOL 750 MG: 750 TABLET ORAL at 20:36

## 2024-09-29 RX ADMIN — GABAPENTIN 300 MG: 300 CAPSULE ORAL at 20:36

## 2024-09-29 RX ADMIN — ACETAMINOPHEN 1000 MG: 500 TABLET ORAL at 14:04

## 2024-09-29 RX ADMIN — AMLODIPINE BESYLATE 10 MG: 10 TABLET ORAL at 07:50

## 2024-09-29 RX ADMIN — PANTOPRAZOLE SODIUM 40 MG: 40 TABLET, DELAYED RELEASE ORAL at 07:50

## 2024-09-29 RX ADMIN — POLYETHYLENE GLYCOL 3350 17 G: 17 POWDER, FOR SOLUTION ORAL at 07:49

## 2024-09-29 RX ADMIN — SENNOSIDES 8.6 MG: 8.6 TABLET, FILM COATED ORAL at 20:36

## 2024-09-29 RX ADMIN — METHOCARBAMOL 750 MG: 750 TABLET ORAL at 07:50

## 2024-09-29 RX ADMIN — GABAPENTIN 300 MG: 300 CAPSULE ORAL at 07:50

## 2024-09-29 RX ADMIN — METHOCARBAMOL 750 MG: 750 TABLET ORAL at 17:22

## 2024-09-29 RX ADMIN — Medication 10 ML: at 09:37

## 2024-09-29 RX ADMIN — ACETAMINOPHEN 1000 MG: 500 TABLET ORAL at 05:33

## 2024-09-29 RX ADMIN — PANTOPRAZOLE SODIUM 40 MG: 40 TABLET, DELAYED RELEASE ORAL at 20:36

## 2024-09-29 RX ADMIN — SODIUM CHLORIDE, PRESERVATIVE FREE 10 ML: 5 INJECTION INTRAVENOUS at 07:50

## 2024-09-29 RX ADMIN — SODIUM CHLORIDE: 9 INJECTION, SOLUTION INTRAVENOUS at 07:51

## 2024-09-29 RX ADMIN — SODIUM CHLORIDE, PRESERVATIVE FREE 10 ML: 5 INJECTION INTRAVENOUS at 20:43

## 2024-09-29 RX ADMIN — INSULIN LISPRO 2 UNITS: 100 INJECTION, SOLUTION INTRAVENOUS; SUBCUTANEOUS at 17:22

## 2024-09-29 RX ADMIN — METHOCARBAMOL 750 MG: 750 TABLET ORAL at 14:04

## 2024-09-29 RX ADMIN — ACETAMINOPHEN 1000 MG: 500 TABLET ORAL at 20:36

## 2024-09-29 RX ADMIN — BUPIVACAINE 20 ML: 13.3 INJECTION, SUSPENSION, LIPOSOMAL INFILTRATION at 09:37

## 2024-09-29 ASSESSMENT — PAIN DESCRIPTION - ORIENTATION
ORIENTATION: RIGHT
ORIENTATION: RIGHT

## 2024-09-29 ASSESSMENT — PAIN SCALES - GENERAL
PAINLEVEL_OUTOF10: 6
PAINLEVEL_OUTOF10: 3
PAINLEVEL_OUTOF10: 6

## 2024-09-29 ASSESSMENT — PAIN DESCRIPTION - DESCRIPTORS
DESCRIPTORS: ACHING
DESCRIPTORS: ACHING

## 2024-09-29 ASSESSMENT — PAIN DESCRIPTION - LOCATION
LOCATION: RIB CAGE
LOCATION: RIB CAGE

## 2024-09-29 NOTE — PLAN OF CARE
Problem: Discharge Planning  Goal: Discharge to home or other facility with appropriate resources  9/28/2024 2322 by Torie Franco RN  Outcome: Progressing  9/28/2024 1743 by Chanda Velez RN  Outcome: Not Progressing  Flowsheets (Taken 9/28/2024 0900)  Discharge to home or other facility with appropriate resources: Identify barriers to discharge with patient and caregiver     Problem: Pain  Goal: Verbalizes/displays adequate comfort level or baseline comfort level  9/28/2024 2322 by Torie Franco RN  Outcome: Progressing  9/28/2024 1743 by Chanda Velez, RN  Outcome: Not Progressing  Flowsheets (Taken 9/28/2024 1100)  Verbalizes/displays adequate comfort level or baseline comfort level: Encourage patient to monitor pain and request assistance     Problem: Safety - Adult  Goal: Free from fall injury  9/28/2024 2322 by Torie Franco RN  Outcome: Progressing  9/28/2024 1743 by Chanda Velez, RN  Outcome: Progressing

## 2024-09-29 NOTE — PROGRESS NOTES
TRAUMA progress note    PATIENT NAME: Dq Trauma [facility]  YOB: 1880  MEDICAL RECORD NO. [id number]   DATE: 9/27/2024  PRIMARY CARE PHYSICIAN: No primary care provider on file.  PATIENT EVALUATED AT THE REQUEST OF : Jani      IMPRESSION AND PLAN:     Trauma  CC: FFSH, -LOC, -AC    Injuries: R 3-7 rib fx, small R hemothorax, areas of ptx, T2 ant wedge compression fx    PIC score 9  AM CXR without residual PTX  Pain controlled on MMT  PVB today with anesthesia  Neurosurgery consulted for T2 compression fracture.  Patient is asymptomatic, nothing to do, signed off  Continue pulmonary toilet  Dispo: Encourage pulmonary toilet, I-S. Pain control. PT, Ambulate        HISTORY:     Patient was seen examined at bedside, no overnight events.  Patient reports remains well-controlled at this time.  1250 on I-S.  Denies SOB.  VSS, afebrile.    PHYSICAL EXAMINATION:     VITAL SIGNS: There were no vitals filed for this visit.    Physical Exam  Vitals reviewed.   HENT:      Head: Normocephalic.      Right Ear: External ear normal.      Left Ear: External ear normal.      Nose: Nose normal.      Mouth/Throat:      Mouth: Mucous membranes are moist.   Eyes:      Extraocular Movements: Extraocular movements intact.      Pupils: Pupils are equal, round, and reactive to light.   Cardiovascular:      Rate and Rhythm: Normal rate and regular rhythm.   Pulmonary:      Effort: Pulmonary effort is normal. No respiratory distress.   Musculoskeletal:         General: Tenderness and signs of injury present.   Skin:     General: Skin is warm and dry.      Capillary Refill: Capillary refill takes less than 2 seconds.              RADIOLOGY  XR CHEST PORTABLE    Result Date: 9/28/2024  Multiple right-sided rib fractures which are similar to the prior exam. No pneumothorax identified.  No acute cardiopulmonary disease identified.     CT THORACIC SPINE BONY RECONSTRUCTION    Result Date: 9/28/2024  1. Mild superior endplate

## 2024-09-29 NOTE — PROGRESS NOTES
[facility]  Office: [phone]  Johan Gold DO, Car Rojas, DO, Fei Amaro DO, Carlin Avina, DO, Nya Swenson MD, Chelsea Givens MD, Zhane Dimas MD, Odalys Del Valle MD,  Derick Munoz MD, Marika You MD, Sang Diane MD,  Thuy Dobbs DO, Tina Mcguire MD, Tyrell Rivera MD, Romario Gold DO, Sierra Justin MD,  Bird Rasmussen DO, Belle Solomon MD, Fabiola Beltran MD, Gabby Tracey MD, Phillip Gilbert MD,  Erik Hernandez MD, Chanelle Prince MD, Ozzy Horn MD, Vin Guillen MD, Anthony Christopher MD, Cuauhtemoc London MD, Raman Denis, DO, Timoteo Worthington DO, Alfredo Ford DO, Bobo Vera MD, Shirley Waterhouse, CNP,  Shanna Bower CNP, Raman Fairchild, CNP,  Trena Peter, DNP, Wendy Lowery, CNP, Nadja Moore, CNP, Zoey Mcmillan, CNP, Mya Gerber, CNP, Kristine Whalen, PA-C, Elaina Camejo, PA-C, Myrtle Crane, CNP, Ernesto Reilly, CNP,  Deanna Iverson, CNP, Елена Morel, CNP, Katerin Joshi, CNP, Isa Kern, CNS, Maria Dolores House, CNP, Maria Esther Gutierrez, CNP, Tracy Schwab, CNP         [facility]   IN-PATIENT SERVICE   [facility]    Progress Note    9/29/2024    5:47 AM    Name:   Jyoti Warren  MRN:     [id number]     Acct:      [id number]   Room:   2003/2003-01   Day:  1  Admit Date:  9/28/2024 12:01 AM    PCP:   Nia Patel MD  Code Status:  Full Code    Subjective:     C/C:   Chief Complaint   Patient presents with    Fall     Interval History Status: improving  Patient evaluated earlier this morning  Urine studies ordered yesterday not completed including UA and urine sodium  Pt having difficulty with retention  She has been weaned off high flow nasal cannula    Brief History:     68-year-old patient presents for evaluation after a fall down the stairs.  Patient states her knees gave out on her and she fell.  Does not endorse any LOC.  She was found to have multiple rib fractures and a pneumothorax.  There is also  09/28/24  1318 09/28/24  1601 09/28/24  1945   LABA1C 7.0*  --   --   --    TSH 3.63  --   --   --    POCGLU  --  191* 214* 192*     ABG:No results found for: \"POCPH\", \"PHART\", \"PH\", \"POCPCO2\", \"JCX1CTO\", \"PCO2\", \"POCPO2\", \"PO2ART\", \"PO2\", \"POCHCO3\", \"UIM8YGO\", \"HCO3\", \"NBEA\", \"PBEA\", \"BEART\", \"BE\", \"THGBART\", \"THB\", \"CZC4ADP\", \"GVEG3ROP\", \"X8ALVCJU\", \"O2SAT\", \"FIO2\"  Lab Results   Component Value Date/Time    SPECIAL NOT REPORTED 09/19/2021 10:40 PM     Lab Results   Component Value Date/Time    CULTURE NO GROWTH 5 DAYS 03/05/2024 11:53 PM       Radiology:  XR CHEST PORTABLE    Result Date: 9/28/2024  Multiple right-sided rib fractures which are similar to the prior exam. No pneumothorax identified.  No acute cardiopulmonary disease identified.     CT THORACIC SPINE BONY RECONSTRUCTION    Result Date: 9/28/2024  1. Mild superior endplate anterior wedge compression deformity at T2. This appears chronic but is age indeterminate and is new compared to 01/08/2023. If further evaluation is necessary, consider MRI. 2. Small right pleural effusion and trace patchy right pneumothorax.     CT LUMBAR SPINE BONY RECONSTRUCTION    Result Date: 9/28/2024  Unremarkable non-contrast CT of the lumbar spine.     CT HEAD WO CONTRAST    Result Date: 9/27/2024  Head: 1. No evidence of acute calvarial fracture or intracranial hemorrhage. 2. Prior left temporal craniotomy, with underlying area of encephalomalacia. 3. Cerebral atrophy with chronic microvascular ischemic changes. Cervical spine: 1. No acute fracture or subluxation of the cervical spine is identified. 2. Chronic compression deformities again noted at C5 and C7.     CT CERVICAL SPINE WO CONTRAST    Result Date: 9/27/2024  Head: 1. No evidence of acute calvarial fracture or intracranial hemorrhage. 2. Prior left temporal craniotomy, with underlying area of encephalomalacia. 3. Cerebral atrophy with chronic microvascular ischemic changes. Cervical spine: 1. No acute fracture

## 2024-09-29 NOTE — PROGRESS NOTES
PIC Score  IS Goal Volume (15ml/kg IBW): 953   Pain  Patient reported 1-10 scale Inspiration  Incentive Spirometer    Volume obtained: 500 ml Cough  Assessed by nurse     3  Mild  Pain intensity scale 0-4    [x] 4  Above goal volume  []   3  Strong    []    3  Goal to alert volume  [x]    2  Moderate  Pain intensity scale 5-7  [] 2  Below alert volume    [] 2   Weak    [x]   1  Severe   Pain intensity scale 8-10  [] 1  Unable to perform incentive spirometry    [] 1  Absent      []         Total: 8

## 2024-09-30 ENCOUNTER — APPOINTMENT (OUTPATIENT)
Dept: GENERAL RADIOLOGY | Age: 69
DRG: 200 | End: 2024-09-30
Payer: MEDICARE

## 2024-09-30 LAB
ANION GAP SERPL CALCULATED.3IONS-SCNC: 8 MMOL/L (ref 9–16)
BASOPHILS # BLD: 0.04 K/UL (ref 0–0.2)
BASOPHILS NFR BLD: 0 % (ref 0–2)
BUN SERPL-MCNC: 22 MG/DL (ref 8–23)
CALCIUM SERPL-MCNC: 8.5 MG/DL (ref 8.6–10.4)
CHLORIDE SERPL-SCNC: 102 MMOL/L (ref 98–107)
CO2 SERPL-SCNC: 25 MMOL/L (ref 20–31)
CREAT SERPL-MCNC: 1.2 MG/DL (ref 0.5–0.9)
EOSINOPHIL # BLD: 0.18 K/UL (ref 0–0.44)
EOSINOPHILS RELATIVE PERCENT: 1 % (ref 1–4)
ERYTHROCYTE [DISTWIDTH] IN BLOOD BY AUTOMATED COUNT: 15.2 % (ref 11.8–14.4)
GFR, ESTIMATED: 51 ML/MIN/1.73M2
GLUCOSE BLD-MCNC: 135 MG/DL (ref 65–105)
GLUCOSE BLD-MCNC: 178 MG/DL (ref 65–105)
GLUCOSE BLD-MCNC: 201 MG/DL (ref 65–105)
GLUCOSE BLD-MCNC: 203 MG/DL (ref 65–105)
GLUCOSE SERPL-MCNC: 136 MG/DL (ref 74–99)
HCT VFR BLD AUTO: 30.4 % (ref 36.3–47.1)
HGB BLD-MCNC: 9.6 G/DL (ref 11.9–15.1)
IMM GRANULOCYTES # BLD AUTO: 0.15 K/UL (ref 0–0.3)
IMM GRANULOCYTES NFR BLD: 1 %
LYMPHOCYTES NFR BLD: 1.55 K/UL (ref 1.1–3.7)
LYMPHOCYTES RELATIVE PERCENT: 12 % (ref 24–43)
MCH RBC QN AUTO: 28.1 PG (ref 25.2–33.5)
MCHC RBC AUTO-ENTMCNC: 31.6 G/DL (ref 28.4–34.8)
MCV RBC AUTO: 88.9 FL (ref 82.6–102.9)
MONOCYTES NFR BLD: 0.89 K/UL (ref 0.1–1.2)
MONOCYTES NFR BLD: 7 % (ref 3–12)
NEUTROPHILS NFR BLD: 79 % (ref 36–65)
NEUTS SEG NFR BLD: 9.98 K/UL (ref 1.5–8.1)
NRBC BLD-RTO: 0 PER 100 WBC
PLATELET # BLD AUTO: 312 K/UL (ref 138–453)
PMV BLD AUTO: 10 FL (ref 8.1–13.5)
POTASSIUM SERPL-SCNC: 4.2 MMOL/L (ref 3.7–5.3)
RBC # BLD AUTO: 3.42 M/UL (ref 3.95–5.11)
RBC # BLD: ABNORMAL 10*6/UL
SODIUM SERPL-SCNC: 135 MMOL/L (ref 136–145)
WBC OTHER # BLD: 12.8 K/UL (ref 3.5–11.3)

## 2024-09-30 PROCEDURE — 6360000002 HC RX W HCPCS: Performed by: STUDENT IN AN ORGANIZED HEALTH CARE EDUCATION/TRAINING PROGRAM

## 2024-09-30 PROCEDURE — 85025 COMPLETE CBC W/AUTO DIFF WBC: CPT

## 2024-09-30 PROCEDURE — 82947 ASSAY GLUCOSE BLOOD QUANT: CPT

## 2024-09-30 PROCEDURE — 80048 BASIC METABOLIC PNL TOTAL CA: CPT

## 2024-09-30 PROCEDURE — 6370000000 HC RX 637 (ALT 250 FOR IP): Performed by: NURSE PRACTITIONER

## 2024-09-30 PROCEDURE — 2580000003 HC RX 258

## 2024-09-30 PROCEDURE — 99231 SBSQ HOSP IP/OBS SF/LOW 25: CPT | Performed by: SURGERY

## 2024-09-30 PROCEDURE — 97110 THERAPEUTIC EXERCISES: CPT

## 2024-09-30 PROCEDURE — 6370000000 HC RX 637 (ALT 250 FOR IP)

## 2024-09-30 PROCEDURE — 2060000000 HC ICU INTERMEDIATE R&B

## 2024-09-30 PROCEDURE — 97130 THER IVNTJ EA ADDL 15 MIN: CPT

## 2024-09-30 PROCEDURE — 97129 THER IVNTJ 1ST 15 MIN: CPT

## 2024-09-30 PROCEDURE — 71045 X-RAY EXAM CHEST 1 VIEW: CPT

## 2024-09-30 PROCEDURE — 97535 SELF CARE MNGMENT TRAINING: CPT

## 2024-09-30 PROCEDURE — 97530 THERAPEUTIC ACTIVITIES: CPT

## 2024-09-30 PROCEDURE — 36415 COLL VENOUS BLD VENIPUNCTURE: CPT

## 2024-09-30 PROCEDURE — 6370000000 HC RX 637 (ALT 250 FOR IP): Performed by: SURGERY

## 2024-09-30 PROCEDURE — 96127 BRIEF EMOTIONAL/BEHAV ASSMT: CPT | Performed by: SOCIAL WORKER

## 2024-09-30 PROCEDURE — 6370000000 HC RX 637 (ALT 250 FOR IP): Performed by: STUDENT IN AN ORGANIZED HEALTH CARE EDUCATION/TRAINING PROGRAM

## 2024-09-30 RX ORDER — METHOCARBAMOL 750 MG/1
750 TABLET, FILM COATED ORAL EVERY 6 HOURS
Status: DISCONTINUED | OUTPATIENT
Start: 2024-09-30 | End: 2024-10-01

## 2024-09-30 RX ORDER — LIDOCAINE 4 G/G
1 PATCH TOPICAL DAILY
Status: DISCONTINUED | OUTPATIENT
Start: 2024-09-30 | End: 2024-10-02 | Stop reason: HOSPADM

## 2024-09-30 RX ORDER — OXYCODONE HCL 5 MG/5 ML
5 SOLUTION, ORAL ORAL EVERY 4 HOURS PRN
Status: DISCONTINUED | OUTPATIENT
Start: 2024-09-30 | End: 2024-10-02 | Stop reason: HOSPADM

## 2024-09-30 RX ORDER — HEPARIN SODIUM 5000 [USP'U]/ML
5000 INJECTION, SOLUTION INTRAVENOUS; SUBCUTANEOUS EVERY 8 HOURS SCHEDULED
Status: DISCONTINUED | OUTPATIENT
Start: 2024-09-30 | End: 2024-10-02 | Stop reason: HOSPADM

## 2024-09-30 RX ORDER — INSULIN LISPRO 100 [IU]/ML
0-4 INJECTION, SOLUTION INTRAVENOUS; SUBCUTANEOUS NIGHTLY
Status: DISCONTINUED | OUTPATIENT
Start: 2024-09-30 | End: 2024-10-01

## 2024-09-30 RX ORDER — GABAPENTIN 100 MG/1
100 CAPSULE ORAL EVERY 8 HOURS
Status: DISCONTINUED | OUTPATIENT
Start: 2024-09-30 | End: 2024-10-01

## 2024-09-30 RX ORDER — INSULIN LISPRO 100 [IU]/ML
0-16 INJECTION, SOLUTION INTRAVENOUS; SUBCUTANEOUS
Status: DISCONTINUED | OUTPATIENT
Start: 2024-09-30 | End: 2024-10-01

## 2024-09-30 RX ADMIN — METHOCARBAMOL 750 MG: 750 TABLET ORAL at 20:25

## 2024-09-30 RX ADMIN — METHOCARBAMOL 750 MG: 750 TABLET ORAL at 08:52

## 2024-09-30 RX ADMIN — HEPARIN SODIUM 5000 UNITS: 5000 INJECTION INTRAVENOUS; SUBCUTANEOUS at 08:52

## 2024-09-30 RX ADMIN — ACETAMINOPHEN 1000 MG: 500 TABLET ORAL at 13:59

## 2024-09-30 RX ADMIN — GABAPENTIN 100 MG: 100 CAPSULE ORAL at 16:22

## 2024-09-30 RX ADMIN — GABAPENTIN 300 MG: 300 CAPSULE ORAL at 08:52

## 2024-09-30 RX ADMIN — OXYCODONE HYDROCHLORIDE 5 MG: 5 SOLUTION ORAL at 04:24

## 2024-09-30 RX ADMIN — SODIUM CHLORIDE, PRESERVATIVE FREE 10 ML: 5 INJECTION INTRAVENOUS at 20:28

## 2024-09-30 RX ADMIN — PANTOPRAZOLE SODIUM 40 MG: 40 TABLET, DELAYED RELEASE ORAL at 20:25

## 2024-09-30 RX ADMIN — DULOXETINE HYDROCHLORIDE 60 MG: 30 CAPSULE, DELAYED RELEASE ORAL at 10:44

## 2024-09-30 RX ADMIN — METHOCARBAMOL 750 MG: 750 TABLET ORAL at 13:59

## 2024-09-30 RX ADMIN — PANTOPRAZOLE SODIUM 40 MG: 40 TABLET, DELAYED RELEASE ORAL at 08:52

## 2024-09-30 RX ADMIN — SENNOSIDES 8.6 MG: 8.6 TABLET, FILM COATED ORAL at 20:25

## 2024-09-30 RX ADMIN — OXYCODONE HYDROCHLORIDE 5 MG: 5 SOLUTION ORAL at 10:45

## 2024-09-30 RX ADMIN — POLYETHYLENE GLYCOL 3350 17 G: 17 POWDER, FOR SOLUTION ORAL at 08:53

## 2024-09-30 RX ADMIN — OXYCODONE HYDROCHLORIDE 5 MG: 5 SOLUTION ORAL at 21:37

## 2024-09-30 RX ADMIN — INSULIN LISPRO 4 UNITS: 100 INJECTION, SOLUTION INTRAVENOUS; SUBCUTANEOUS at 16:24

## 2024-09-30 RX ADMIN — SODIUM CHLORIDE, PRESERVATIVE FREE 10 ML: 5 INJECTION INTRAVENOUS at 08:53

## 2024-09-30 RX ADMIN — AMLODIPINE BESYLATE 10 MG: 10 TABLET ORAL at 08:52

## 2024-09-30 RX ADMIN — ACETAMINOPHEN 1000 MG: 500 TABLET ORAL at 20:25

## 2024-09-30 RX ADMIN — Medication 3 MG: at 00:24

## 2024-09-30 RX ADMIN — OXYCODONE HYDROCHLORIDE 5 MG: 5 SOLUTION ORAL at 15:25

## 2024-09-30 RX ADMIN — HEPARIN SODIUM 5000 UNITS: 5000 INJECTION INTRAVENOUS; SUBCUTANEOUS at 16:21

## 2024-09-30 RX ADMIN — ACETAMINOPHEN 1000 MG: 500 TABLET ORAL at 06:22

## 2024-09-30 ASSESSMENT — PAIN DESCRIPTION - LOCATION
LOCATION: BACK

## 2024-09-30 ASSESSMENT — PAIN SCALES - GENERAL
PAINLEVEL_OUTOF10: 4
PAINLEVEL_OUTOF10: 7
PAINLEVEL_OUTOF10: 6
PAINLEVEL_OUTOF10: 0
PAINLEVEL_OUTOF10: 8
PAINLEVEL_OUTOF10: 7
PAINLEVEL_OUTOF10: 8
PAINLEVEL_OUTOF10: 9
PAINLEVEL_OUTOF10: 7
PAINLEVEL_OUTOF10: 8

## 2024-09-30 ASSESSMENT — PAIN DESCRIPTION - DIRECTION: RADIATING_TOWARDS: TO UPPER BACK

## 2024-09-30 ASSESSMENT — PAIN DESCRIPTION - ORIENTATION
ORIENTATION: UPPER
ORIENTATION: MID

## 2024-09-30 ASSESSMENT — PAIN DESCRIPTION - DESCRIPTORS
DESCRIPTORS: SHARP
DESCRIPTORS: ACHING

## 2024-09-30 ASSESSMENT — PAIN DESCRIPTION - PAIN TYPE: TYPE: ACUTE PAIN

## 2024-09-30 ASSESSMENT — PAIN DESCRIPTION - FREQUENCY: FREQUENCY: CONTINUOUS

## 2024-09-30 ASSESSMENT — PAIN - FUNCTIONAL ASSESSMENT: PAIN_FUNCTIONAL_ASSESSMENT: PREVENTS OR INTERFERES SOME ACTIVE ACTIVITIES AND ADLS

## 2024-09-30 ASSESSMENT — PAIN DESCRIPTION - ONSET: ONSET: GRADUAL

## 2024-09-30 NOTE — PROGRESS NOTES
Speech Language Pathology  [facility]    Cognitive Treatment Note    Date: 9/30/2024  Patient’s Name: Jyoti Warren  MRN: [id number]  Diagnosis:   Patient Active Problem List   Diagnosis Code    Shoulder pain, bilateral M25.511, M25.512    Cervical spine pain M54.2    Knee pain, left M25.562    Essential hypertension I10    Pulmonary hypertension (HCC) I27.20    Vitamin D deficiency E55.9    Encounter for long-term (current) use of other medications Z79.899    Headache R51.9    Subacute sphenoidal sinusitis J01.30    Type 2 diabetes mellitus with microalbuminuria ([facility]) E11.29, R80.9    Peripheral neuropathy G62.9    Dizziness R42    H/O fall Z91.81    Chronic sinusitis J32.9    Gastroesophageal reflux disease K21.9    Microalbuminuria R80.9    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    Type 2 diabetes mellitus with microalbuminuria, without long-term current use of insulin ([facility]) E11.29, R80.9    Morbid obesity with BMI of 40.0-44.9, adult E66.01, Z68.41    Restrictive lung disease secondary to obesity J98.4, E66.9    Calculus of gallbladder and bile duct without cholecystitis or obstruction K80.70    Nausea R11.0    Multiple gastric ulcers K25.9    Anxiety F41.9    Chronic tension-type headache, intractable G44.221    Chronic, continuous use of opioids F11.90    Complete tear of right rotator cuff M75.121    Depression F32.A    Fatigue R53.83    Lumbosacral spondylosis without myelopathy M47.817    Major depressive disorder, single episode, moderate ([facility]) F32.1    Refractory migraine G43.919    Muscle spasms of neck M62.838    Obsessive-compulsive disorders F42.9    Spondylosis of lumbar spine M47.816    Spondylosis of thoracic region without myelopathy or radiculopathy M47.814    Spinal stenosis, lumbar region, without neurogenic claudication M48.061    Elizalde's esophagus without dysplasia K22.70    Therapeutic opioid-induced constipation (OIC) K59.03, T40.2X5A    Stage 3a

## 2024-09-30 NOTE — PLAN OF CARE
Problem: Discharge Planning  Goal: Discharge to home or other facility with appropriate resources  9/30/2024 1010 by Torie Blair RN  Outcome: Progressing  Flowsheets (Taken 9/30/2024 0849)  Discharge to home or other facility with appropriate resources: Identify barriers to discharge with patient and caregiver  9/30/2024 0105 by Torie Fracno RN  Outcome: Progressing     Problem: Pain  Goal: Verbalizes/displays adequate comfort level or baseline comfort level  9/30/2024 1010 by Torie Blair RN  Outcome: Progressing  9/30/2024 0105 by Torie Franco RN  Outcome: Progressing     Problem: Safety - Adult  Goal: Free from fall injury  9/30/2024 1010 by Torie Blair RN  Outcome: Progressing  9/30/2024 0105 by Torie Franco RN  Outcome: Progressing

## 2024-09-30 NOTE — PROGRESS NOTES
[facility] Inpatient Brief Diagnostic Assessment Note  ANTHONY WALKER NUNO-S   9/30/2024    Jyoti Warren  1955  [id number]      Time Spent with Patient: 15 minutes or less (Brief Diagnostic Assessment) 91832    Pt was provided informed consent for the [facility]. Discussed with patient model of service to include the limits of confidentiality (i.e. abuse reporting, suicide intervention, etc.) and short-term intervention focused approach.  Pt indicated understanding.    [facility] Inpatient or Virtual Question: This was not a virtual session    Is consult a Victim of Crime?: No    Presenting Patient Report:  Patient was screened at the request of the Trauma Team.   Patient presents as a 69 y.o. female subsequent to hospital admission following Fall resulting in injury.     Patient denies any current or previous symptoms of depression or anxiety and declines Mental Health interventions at this time.    Patient was able to complete the following screens: ITSS    MSE:     Appearance:   Hospital Gown, Well Groomed, Good Hygiene, and Good Eye Contact  Speech:    spontaneous, normal rate, normal volume, and well-articulated  Affect Observed:   Appropriate to Context  Thought Content:    intact  Thought Process:    linear, goal directed, and coherent  Associations:    logical connections  Insight:    Fair  Judgment:    Intact  Orientation:    oriented to person, place, time, and general circumstances    Patient reports and/or exhibits the following symptoms:    Mood: Appropriate to Context    Cognitive symptoms: Appropriate to Context    Behaviors: Appropriate to Context    Somatic: None Reported      Assessment:  Patient denies any previous or current symptoms for depression or anxiety.  Patient scored low on Injured Trauma Survivor Screen (ITSS).  Patient does not meet criteria for depression or anxiety diagnosis at this time.     Screening Scale Results (If Any):    ITSS:  Date Screen Completed:

## 2024-09-30 NOTE — CARE COORDINATION
Met with patient's  at desk. He prefers [facility] for home care. Referral sent

## 2024-09-30 NOTE — CARE COORDINATION
Trauma Coordinator Rounding Note  Daily check in:  I met with Jyoti Warren  at bedside to review their plan of care. I allowed opportunity for Jyoti Kelvin to ask questions regarding their injuries, expected length of stay and disposition plan. All questions answered to verbal satisfaction.   []Wounds  [x]PT/OT/speech  []Incentive spirometer  [x]Diet  [x]Activity  [x]Preferred pharmacy ([facility] in [place])  [x]TRC consulted  DC Expectation:  Patient expects to be discharged to Home  Bedside Nurse:  RN on lunch break during visit.   Discharge Info:  I confirmed follow up information was placed in the discharge instructions for  trauma surgery .   Discharge instructions reviewed and updated in patient's chart.   :  I provided a clinical update to the unit  and confirmed the disposition plan. Patient refused SNF and is requesting to go home independently. Pt states she does have pain mgmt issues but will be fine if she is sent home with pain meds.   PIC Score  IS Goal Volume (15ml/kg IBW): 959   Pain  Patient reported 1-10 scale Inspiration  Incentive Spirometer    Volume obtained: 1250 ml Cough  Assessed by nurse     3  Mild  Pain intensity scale 0-4    [] 4  Above goal volume  [x]   3  Strong    [x]    3  Goal to alert volume  []    2  Moderate  Pain intensity scale 5-7  [x] 2  Below alert volume    [] 2   Weak    []   1  Severe   Pain intensity scale 8-10  [] 1  Unable to perform incentive spirometry    [] 1  Absent      []         Total: 9         Electronically signed by Jaz Rios RN on 9/30/2024 at 3:07 PM

## 2024-09-30 NOTE — PROGRESS NOTES
Occupational Therapy  Facility/Department: [facility]- [facility]   Daily Treatment Note  Patient Name: Jyoti Warren        MRN: [id number]    : 1955    Date of Service: 2024    Discharge Recommendations  Discharge Recommendations: Patient would benefit from continued therapy after discharge      Assessment  Performance deficits / Impairments: Decreased functional mobility ;Decreased ADL status;Decreased endurance;Decreased cognition;Decreased balance;Decreased strength;Decreased ROM;Decreased safe awareness;Decreased posture  Assessment: Pt limited by above deficits impacting pts functional mobility/ADL performance. Pt is expected to require skilled OT services to increase safety and promote increased independence t/o ADL/IADLs and functional mobility tasks.  Prognosis: Good  Activity Tolerance  Activity Tolerance: Patient limited by pain  Safety Devices  Type of Devices: Nurse notified;Call light within reach;Gait belt;Left in chair;Heels elevated for pressure relief  Restraints  Restraints Initially in Place: No    Restrictions/Precautions  Restrictions/Precautions  Restrictions/Precautions: Fall Risk  Required Braces or Orthoses?: No  Position Activity Restriction  Other position/activity restrictions: Right lateral third rib fracture, displaced fractures of the right lateral fourth, fifth, sixth, seventh ribs, small right-sided hemothorax; activity as tolerated per NS, no brace required; up with assistance    Subjective  General  Patient assessed for rehabilitation services?: Yes  Response to previous treatment: Patient with no complaints from previous session  Family / Caregiver Present: Yes ( and daughter)  General Comment  Comments: RN ok'd pt for OT this date. Pt agreeable, pleasant and cooperative t/o. Pt reported 8/10 pain in upper back/ribs. Pt engaged in activity and was repositioned in chair for comfort w/ all needs met at end of session.    Objective  Orientation  Overall Orientation

## 2024-09-30 NOTE — PROGRESS NOTES
TRAUMA progress note    PATIENT NAME: Medina Trauma RealHarlan  YOB: 1880  MEDICAL RECORD NO. [id number]   DATE: 9/27/2024  PRIMARY CARE PHYSICIAN: No primary care provider on file.  PATIENT EVALUATED AT THE REQUEST OF : Jani      IMPRESSION AND PLAN:     Trauma  CC: FFSH, -LOC, -AC    Injuries: R 3-7 rib fx, small R hemothorax, areas of ptx, T2 ant wedge compression fx    PIC score 9  AM CXR without residual PTX  Pain not well controlled even after PVB. Will change around pain medications  Neurosurgery consulted for T2 compression fracture.  Patient is asymptomatic, nothing to do, signed off  Continue pulmonary toilet  Dispo: Encourage pulmonary toilet, I-S. Pain control. PT, Ambulate        HISTORY:     Patient was seen examined at bedside, no overnight events.  Patient reports pain is not well controlled even after the block. 1250 on I-S.  Pt is however sitting comfortably in bed eating breakfast. Denies SOB.  VSS, afebrile.    PHYSICAL EXAMINATION:     VITAL SIGNS: There were no vitals filed for this visit.    Physical Exam  Vitals reviewed.   HENT:      Head: Normocephalic.      Right Ear: External ear normal.      Left Ear: External ear normal.      Nose: Nose normal.      Mouth/Throat:      Mouth: Mucous membranes are moist.   Eyes:      Extraocular Movements: Extraocular movements intact.      Pupils: Pupils are equal, round, and reactive to light.   Cardiovascular:      Rate and Rhythm: Normal rate and regular rhythm.   Pulmonary:      Effort: Pulmonary effort is normal. No respiratory distress.   Musculoskeletal:         General: Tenderness and signs of injury present.   Skin:     General: Skin is warm and dry.      Capillary Refill: Capillary refill takes less than 2 seconds.              RADIOLOGY  XR CHEST PORTABLE    Result Date: 9/28/2024  Multiple right-sided rib fractures which are similar to the prior exam. No pneumothorax identified.  No acute cardiopulmonary disease identified.

## 2024-09-30 NOTE — PROGRESS NOTES
Comprehensive Nutrition Assessment    Type and Reason for Visit:  Initial, Positive Nutrition Screen    Nutrition Recommendations/Plan:   Continue current diet.  Continue ONS.  Encourage PO intake.  Will monitor labs, weights, intake, GI status, and plan of care.     Malnutrition Assessment:  Malnutrition Status:  At risk for malnutrition (Comment) (09/30/24 1540)    Context:  Acute Illness     Findings of the 6 clinical characteristics of malnutrition:  Energy Intake:  75% or less of estimated energy requirements for 7 or more days  Weight Loss:   (wt loss 5.1% x6 months)     Body Fat Loss:  Unable to assess     Muscle Mass Loss:  Unable to assess    Fluid Accumulation:  No significant fluid accumulation     Strength:  Not Performed    Nutrition Assessment:    Pt admitted after a fall. Seen for wt loss and decreased appetite. Pt stated they have had lower intakes with two meals a day,  states that some of the meals she's referring to include just crackers and cheese. Pt states normal body wt is 275-280#. Pt has Glucerna ordered, she doesn't like it that much but encouraged her to try it at least once a day to increase intake. Discussed trying to eat at least a little bit at each meal and to continue that after discharge, make a goal of eating a little more each day until intake is back to normal. Pt and  stated she has had trouble with bowel movements going recently two weeks w/o until they went to the ED. Last BM per pt was last Thursday 9/26.    Nutrition Related Findings:    Trace edema generalized. Last BM 9/26. Meds: senokot, humalog. Labs: Na 135, Glu 136, Ca 8.5. Wound Type: None       Current Nutrition Intake & Therapies:    Average Meal Intake: %  Average Supplements Intake: Unable to assess  ADULT DIET; Regular; 4 carb choices (60 gm/meal)  ADULT ORAL NUTRITION SUPPLEMENT; Breakfast, Lunch, Dinner; Diabetic Oral Supplement    Anthropometric Measures:  Height: 172.7 cm (5'

## 2024-09-30 NOTE — CARE COORDINATION
Spoke to patient and family regarding transitional planning. Addressed patient's goal of returning home with HHC vs. SNF. Pt. Stated she doesn't want to go to SNF and is requesting HHC services. Patient and family provided list for HHC services.     12:35 Spoke to Violet at [facility] and said they were able to accept the patient when she is stable for discharge.

## 2024-10-01 ENCOUNTER — APPOINTMENT (OUTPATIENT)
Dept: GENERAL RADIOLOGY | Age: 69
DRG: 200 | End: 2024-10-01
Payer: MEDICARE

## 2024-10-01 LAB
ANION GAP SERPL CALCULATED.3IONS-SCNC: 10 MMOL/L (ref 9–16)
BASOPHILS # BLD: 0.05 K/UL (ref 0–0.2)
BASOPHILS NFR BLD: 1 % (ref 0–2)
BUN SERPL-MCNC: 23 MG/DL (ref 8–23)
CALCIUM SERPL-MCNC: 9 MG/DL (ref 8.6–10.4)
CHLORIDE SERPL-SCNC: 100 MMOL/L (ref 98–107)
CO2 SERPL-SCNC: 24 MMOL/L (ref 20–31)
CREAT SERPL-MCNC: 1 MG/DL (ref 0.5–0.9)
EOSINOPHIL # BLD: 0.15 K/UL (ref 0–0.44)
EOSINOPHILS RELATIVE PERCENT: 1 % (ref 1–4)
ERYTHROCYTE [DISTWIDTH] IN BLOOD BY AUTOMATED COUNT: 15.4 % (ref 11.8–14.4)
GFR, ESTIMATED: 63 ML/MIN/1.73M2
GLUCOSE BLD-MCNC: 165 MG/DL (ref 65–105)
GLUCOSE BLD-MCNC: 180 MG/DL (ref 65–105)
GLUCOSE BLD-MCNC: 196 MG/DL (ref 65–105)
GLUCOSE BLD-MCNC: 202 MG/DL (ref 65–105)
GLUCOSE SERPL-MCNC: 155 MG/DL (ref 74–99)
HCT VFR BLD AUTO: 31.9 % (ref 36.3–47.1)
HGB BLD-MCNC: 9.8 G/DL (ref 11.9–15.1)
IMM GRANULOCYTES # BLD AUTO: 0.15 K/UL (ref 0–0.3)
IMM GRANULOCYTES NFR BLD: 1 %
LYMPHOCYTES NFR BLD: 1.36 K/UL (ref 1.1–3.7)
LYMPHOCYTES RELATIVE PERCENT: 13 % (ref 24–43)
MCH RBC QN AUTO: 27.7 PG (ref 25.2–33.5)
MCHC RBC AUTO-ENTMCNC: 30.7 G/DL (ref 28.4–34.8)
MCV RBC AUTO: 90.1 FL (ref 82.6–102.9)
MONOCYTES NFR BLD: 0.68 K/UL (ref 0.1–1.2)
MONOCYTES NFR BLD: 7 % (ref 3–12)
NEUTROPHILS NFR BLD: 77 % (ref 36–65)
NEUTS SEG NFR BLD: 8.06 K/UL (ref 1.5–8.1)
NRBC BLD-RTO: 0 PER 100 WBC
PLATELET # BLD AUTO: 319 K/UL (ref 138–453)
PMV BLD AUTO: 10.1 FL (ref 8.1–13.5)
POTASSIUM SERPL-SCNC: 4.2 MMOL/L (ref 3.7–5.3)
RBC # BLD AUTO: 3.54 M/UL (ref 3.95–5.11)
RBC # BLD: ABNORMAL 10*6/UL
SODIUM SERPL-SCNC: 134 MMOL/L (ref 136–145)
WBC OTHER # BLD: 10.5 K/UL (ref 3.5–11.3)

## 2024-10-01 PROCEDURE — 97530 THERAPEUTIC ACTIVITIES: CPT

## 2024-10-01 PROCEDURE — 82947 ASSAY GLUCOSE BLOOD QUANT: CPT

## 2024-10-01 PROCEDURE — 80048 BASIC METABOLIC PNL TOTAL CA: CPT

## 2024-10-01 PROCEDURE — 6370000000 HC RX 637 (ALT 250 FOR IP): Performed by: STUDENT IN AN ORGANIZED HEALTH CARE EDUCATION/TRAINING PROGRAM

## 2024-10-01 PROCEDURE — 36415 COLL VENOUS BLD VENIPUNCTURE: CPT

## 2024-10-01 PROCEDURE — 2580000003 HC RX 258

## 2024-10-01 PROCEDURE — 6370000000 HC RX 637 (ALT 250 FOR IP): Performed by: NURSE PRACTITIONER

## 2024-10-01 PROCEDURE — 6370000000 HC RX 637 (ALT 250 FOR IP)

## 2024-10-01 PROCEDURE — 71045 X-RAY EXAM CHEST 1 VIEW: CPT

## 2024-10-01 PROCEDURE — 97130 THER IVNTJ EA ADDL 15 MIN: CPT

## 2024-10-01 PROCEDURE — 6360000002 HC RX W HCPCS: Performed by: STUDENT IN AN ORGANIZED HEALTH CARE EDUCATION/TRAINING PROGRAM

## 2024-10-01 PROCEDURE — 97129 THER IVNTJ 1ST 15 MIN: CPT

## 2024-10-01 PROCEDURE — 99232 SBSQ HOSP IP/OBS MODERATE 35: CPT | Performed by: SURGERY

## 2024-10-01 PROCEDURE — 85025 COMPLETE CBC W/AUTO DIFF WBC: CPT

## 2024-10-01 PROCEDURE — 1200000000 HC SEMI PRIVATE

## 2024-10-01 PROCEDURE — 51798 US URINE CAPACITY MEASURE: CPT

## 2024-10-01 RX ORDER — INSULIN LISPRO 100 [IU]/ML
0-16 INJECTION, SOLUTION INTRAVENOUS; SUBCUTANEOUS
Status: DISCONTINUED | OUTPATIENT
Start: 2024-10-01 | End: 2024-10-02 | Stop reason: HOSPADM

## 2024-10-01 RX ORDER — METHOCARBAMOL 500 MG/1
500 TABLET, FILM COATED ORAL EVERY 6 HOURS
Status: DISCONTINUED | OUTPATIENT
Start: 2024-10-01 | End: 2024-10-02 | Stop reason: HOSPADM

## 2024-10-01 RX ORDER — SODIUM CHLORIDE, SODIUM LACTATE, POTASSIUM CHLORIDE, AND CALCIUM CHLORIDE .6; .31; .03; .02 G/100ML; G/100ML; G/100ML; G/100ML
1000 INJECTION, SOLUTION INTRAVENOUS ONCE
Status: DISCONTINUED | OUTPATIENT
Start: 2024-10-01 | End: 2024-10-01

## 2024-10-01 RX ORDER — HYDROCHLOROTHIAZIDE 12.5 MG/1
12.5 CAPSULE ORAL EVERY MORNING
Status: DISCONTINUED | OUTPATIENT
Start: 2024-10-01 | End: 2024-10-02 | Stop reason: HOSPADM

## 2024-10-01 RX ORDER — INSULIN LISPRO 100 [IU]/ML
0-16 INJECTION, SOLUTION INTRAVENOUS; SUBCUTANEOUS
Status: DISCONTINUED | OUTPATIENT
Start: 2024-10-01 | End: 2024-10-01

## 2024-10-01 RX ORDER — SODIUM CHLORIDE, SODIUM LACTATE, POTASSIUM CHLORIDE, AND CALCIUM CHLORIDE .6; .31; .03; .02 G/100ML; G/100ML; G/100ML; G/100ML
500 INJECTION, SOLUTION INTRAVENOUS ONCE
Status: COMPLETED | OUTPATIENT
Start: 2024-10-01 | End: 2024-10-01

## 2024-10-01 RX ORDER — GABAPENTIN 300 MG/1
300 CAPSULE ORAL EVERY 8 HOURS
Status: DISCONTINUED | OUTPATIENT
Start: 2024-10-01 | End: 2024-10-02 | Stop reason: HOSPADM

## 2024-10-01 RX ORDER — IBUPROFEN 400 MG/1
400 TABLET, FILM COATED ORAL
Status: DISCONTINUED | OUTPATIENT
Start: 2024-10-01 | End: 2024-10-02 | Stop reason: HOSPADM

## 2024-10-01 RX ADMIN — ACETAMINOPHEN 1000 MG: 500 TABLET ORAL at 13:43

## 2024-10-01 RX ADMIN — SODIUM CHLORIDE, POTASSIUM CHLORIDE, SODIUM LACTATE AND CALCIUM CHLORIDE 500 ML: 600; 310; 30; 20 INJECTION, SOLUTION INTRAVENOUS at 16:35

## 2024-10-01 RX ADMIN — HEPARIN SODIUM 5000 UNITS: 5000 INJECTION INTRAVENOUS; SUBCUTANEOUS at 00:30

## 2024-10-01 RX ADMIN — ACETAMINOPHEN 1000 MG: 500 TABLET ORAL at 06:44

## 2024-10-01 RX ADMIN — Medication 3 MG: at 00:30

## 2024-10-01 RX ADMIN — OXYCODONE HYDROCHLORIDE 5 MG: 5 SOLUTION ORAL at 18:35

## 2024-10-01 RX ADMIN — GABAPENTIN 100 MG: 100 CAPSULE ORAL at 00:30

## 2024-10-01 RX ADMIN — METHOCARBAMOL TABLETS 500 MG: 500 TABLET, COATED ORAL at 21:09

## 2024-10-01 RX ADMIN — HEPARIN SODIUM 5000 UNITS: 5000 INJECTION INTRAVENOUS; SUBCUTANEOUS at 16:28

## 2024-10-01 RX ADMIN — PANTOPRAZOLE SODIUM 40 MG: 40 TABLET, DELAYED RELEASE ORAL at 08:04

## 2024-10-01 RX ADMIN — OXYCODONE HYDROCHLORIDE 5 MG: 5 SOLUTION ORAL at 08:02

## 2024-10-01 RX ADMIN — AMLODIPINE BESYLATE 10 MG: 10 TABLET ORAL at 08:04

## 2024-10-01 RX ADMIN — GABAPENTIN 300 MG: 300 CAPSULE ORAL at 16:30

## 2024-10-01 RX ADMIN — INSULIN LISPRO 4 UNITS: 100 INJECTION, SOLUTION INTRAVENOUS; SUBCUTANEOUS at 16:30

## 2024-10-01 RX ADMIN — DULOXETINE HYDROCHLORIDE 60 MG: 30 CAPSULE, DELAYED RELEASE ORAL at 08:04

## 2024-10-01 RX ADMIN — GABAPENTIN 100 MG: 100 CAPSULE ORAL at 08:04

## 2024-10-01 RX ADMIN — PANTOPRAZOLE SODIUM 40 MG: 40 TABLET, DELAYED RELEASE ORAL at 21:10

## 2024-10-01 RX ADMIN — SODIUM CHLORIDE, PRESERVATIVE FREE 10 ML: 5 INJECTION INTRAVENOUS at 21:10

## 2024-10-01 RX ADMIN — IBUPROFEN 400 MG: 400 TABLET, FILM COATED ORAL at 12:35

## 2024-10-01 RX ADMIN — METHOCARBAMOL 750 MG: 750 TABLET ORAL at 08:04

## 2024-10-01 RX ADMIN — ACETAMINOPHEN 1000 MG: 500 TABLET ORAL at 21:09

## 2024-10-01 RX ADMIN — IBUPROFEN 400 MG: 400 TABLET, FILM COATED ORAL at 16:30

## 2024-10-01 RX ADMIN — HYDROCHLOROTHIAZIDE 12.5 MG: 12.5 CAPSULE ORAL at 16:48

## 2024-10-01 RX ADMIN — METHOCARBAMOL TABLETS 500 MG: 500 TABLET, COATED ORAL at 16:30

## 2024-10-01 RX ADMIN — OXYCODONE HYDROCHLORIDE 5 MG: 5 SOLUTION ORAL at 13:43

## 2024-10-01 RX ADMIN — HEPARIN SODIUM 5000 UNITS: 5000 INJECTION INTRAVENOUS; SUBCUTANEOUS at 08:04

## 2024-10-01 RX ADMIN — SODIUM CHLORIDE, PRESERVATIVE FREE 10 ML: 5 INJECTION INTRAVENOUS at 08:03

## 2024-10-01 RX ADMIN — METHOCARBAMOL 750 MG: 750 TABLET ORAL at 03:09

## 2024-10-01 ASSESSMENT — PAIN DESCRIPTION - ORIENTATION
ORIENTATION: UPPER
ORIENTATION: MID

## 2024-10-01 ASSESSMENT — PAIN SCALES - GENERAL
PAINLEVEL_OUTOF10: 8
PAINLEVEL_OUTOF10: 8
PAINLEVEL_OUTOF10: 7
PAINLEVEL_OUTOF10: 7
PAINLEVEL_OUTOF10: 8
PAINLEVEL_OUTOF10: 3
PAINLEVEL_OUTOF10: 5

## 2024-10-01 ASSESSMENT — PAIN DESCRIPTION - LOCATION
LOCATION: BACK
LOCATION: RIB CAGE
LOCATION: BACK

## 2024-10-01 ASSESSMENT — PAIN DESCRIPTION - DESCRIPTORS
DESCRIPTORS: ACHING

## 2024-10-01 ASSESSMENT — PAIN DESCRIPTION - ONSET: ONSET: ON-GOING

## 2024-10-01 ASSESSMENT — PAIN DESCRIPTION - FREQUENCY: FREQUENCY: CONTINUOUS

## 2024-10-01 ASSESSMENT — PAIN - FUNCTIONAL ASSESSMENT: PAIN_FUNCTIONAL_ASSESSMENT: PREVENTS OR INTERFERES SOME ACTIVE ACTIVITIES AND ADLS

## 2024-10-01 ASSESSMENT — PAIN DESCRIPTION - PAIN TYPE: TYPE: ACUTE PAIN

## 2024-10-01 ASSESSMENT — PAIN DESCRIPTION - DIRECTION: RADIATING_TOWARDS: TO UPPER BACK

## 2024-10-01 NOTE — PLAN OF CARE
Problem: Discharge Planning  Goal: Discharge to home or other facility with appropriate resources  10/1/2024 0001 by Torie Franco RN  Outcome: Progressing  9/30/2024 1010 by Torie Blair RN  Outcome: Progressing  Flowsheets (Taken 9/30/2024 0849)  Discharge to home or other facility with appropriate resources: Identify barriers to discharge with patient and caregiver     Problem: Pain  Goal: Verbalizes/displays adequate comfort level or baseline comfort level  10/1/2024 0001 by Torie Franco RN  Outcome: Progressing  9/30/2024 1010 by Torie Blair RN  Outcome: Progressing     Problem: Safety - Adult  Goal: Free from fall injury  10/1/2024 0001 by Torie Franco RN  Outcome: Progressing  9/30/2024 1010 by Torie Blair RN  Outcome: Progressing     Problem: Chronic Conditions and Co-morbidities  Goal: Patient's chronic conditions and co-morbidity symptoms are monitored and maintained or improved  Outcome: Progressing     Problem: Nutrition Deficit:  Goal: Optimize nutritional status  Outcome: Progressing

## 2024-10-01 NOTE — PLAN OF CARE
Problem: Discharge Planning  Goal: Discharge to home or other facility with appropriate resources  10/1/2024 0932 by Torie Blair RN  Outcome: Progressing  Flowsheets (Taken 10/1/2024 0800)  Discharge to home or other facility with appropriate resources: Identify barriers to discharge with patient and caregiver  10/1/2024 0001 by Torie Franco RN  Outcome: Progressing     Problem: Pain  Goal: Verbalizes/displays adequate comfort level or baseline comfort level  10/1/2024 0932 by Troie Blair RN  Outcome: Progressing  10/1/2024 0001 by Torie Franco RN  Outcome: Progressing     Problem: Safety - Adult  Goal: Free from fall injury  10/1/2024 0932 by Torie Blair RN  Outcome: Progressing  10/1/2024 0001 by Torie Franco RN  Outcome: Progressing     Problem: Chronic Conditions and Co-morbidities  Goal: Patient's chronic conditions and co-morbidity symptoms are monitored and maintained or improved  10/1/2024 0932 by Torie Blair RN  Outcome: Progressing  Flowsheets (Taken 10/1/2024 0800)  Care Plan - Patient's Chronic Conditions and Co-Morbidity Symptoms are Monitored and Maintained or Improved:   Monitor and assess patient's chronic conditions and comorbid symptoms for stability, deterioration, or improvement   Collaborate with multidisciplinary team to address chronic and comorbid conditions and prevent exacerbation or deterioration   Update acute care plan with appropriate goals if chronic or comorbid symptoms are exacerbated and prevent overall improvement and discharge  10/1/2024 0001 by Torie Franco RN  Outcome: Progressing     Problem: Nutrition Deficit:  Goal: Optimize nutritional status  10/1/2024 0932 by Torie Blair RN  Outcome: Progressing  10/1/2024 0001 by Torie Franco RN  Outcome: Progressing

## 2024-10-01 NOTE — PROGRESS NOTES
Physical Therapy  Facility/Department: [facility] CAR 2- STEPDOWN  Physical Therapy Progress Note    Name: Jyoti Warren  : 1955  MRN: [id number]  Date of Service: 10/1/2024    Discharge Recommendations:  Further therapy recommended at discharge.    Assessment  Pt cooperative, very flat affect with minimal communication throughout PT session. Bed mobility: rolling to R (gets in/out of bed at home R side of the bed) very slow but with just SBA+1; supine to sit also extremely slow, but SBA+1, 2 attempts prior to successfully moving to EOB.  Sit to stand SBA+1, gait with rwalker 25'x3 min A+1, very flexed posture.  Pt's Tinetti Balance score is 11/28 which puts her at a high risk of falls.  Discussed short term rehab with pt/spouse--pt is insistent to go home.    Body Structures, Functions, Activity Limitations Requiring Skilled Therapeutic Intervention: Decreased functional mobility ;Decreased body mechanics;Decreased safe awareness;Decreased strength;Decreased balance;Decreased endurance;Decreased ADL status;Increased pain;Decreased posture  Therapy Prognosis: Fair  Decision Making: Medium Complexity  Requires PT Follow-Up: Yes  Activity Tolerance  Activity Tolerance: Patient limited by fatigue;Patient limited by endurance;Treatment limited secondary to medical complications  Activity Tolerance Comments: pt nauseous after participating with PT, asked for emesis basin; didn't vomit while PT in the room    Plan  Physical Therapy Plan  General Plan:  (5-6 visits weekly)  Current Treatment Recommendations: Strengthening, Balance training, Functional mobility training, Transfer training, Endurance training, Equipment evaluation, education, & procurement, Patient/Caregiver education & training, Neuromuscular re-education, Safety education & training, Gait training, Home exercise program, Therapeutic activities, ROM, Stair training, Pain management, Positioning  Safety Devices  Type of Devices: Call light within reach, Gait

## 2024-10-01 NOTE — PROGRESS NOTES
Loss of IV access after 250 cc of 500cc bolus infused. Secure message to ordering provider. MD states that he would like remaining 250 cc bolus infused after IV access reestablished.

## 2024-10-01 NOTE — PROGRESS NOTES
PIC Score  IS Goal Volume (15ml/kg IBW): 958.5   Pain  Patient reported 1-10 scale Inspiration  Incentive Spirometer    Volume obtained: 1250 ml Cough  Assessed by nurse     3  Mild  Pain intensity scale 0-4    [] 4  Above goal volume  [x]   3  Strong    []    3  Goal to alert volume  []    2  Moderate  Pain intensity scale 5-7  [x] 2  Below alert volume    [] 2   Weak    [x]   1  Severe   Pain intensity scale 8-10  [] 1  Unable to perform incentive spirometry    [] 1  Absent      []         Total: 8

## 2024-10-01 NOTE — DISCHARGE INSTR - COC
Continuity of Care Form    Patient Name: Jyoti Warren   :  1955  MRN:  [id number]    Admit date:  2024  Discharge date:      Code Status Order: Full Code   Advance Directives:   Advance Care Flowsheet Documentation             Admitting Physician:  Rob Obrien MD  PCP: Nia Patel MD    Discharging Nurse: Joshua  Discharging Hospital Unit/Room#:   Discharging Unit Phone Number: [phone]    Emergency Contact:   Extended Emergency Contact Information  Primary Emergency Contact: Kingsley Warren  Address: [address] [place] [address] [place] [address] [facility]  Home Phone: [phone]  Mobile Phone: [phone]  Relation: Spouse  Secondary Emergency Contact: [place] [address] [facility]  Home Phone: [phone]  Relation: Child    Past Surgical History:  Past Surgical History:   Procedure Laterality Date    APPENDECTOMY      BACK INJECTION  2021    [facility] Right lumbar medial branch block using Fluroscopy    BRAIN SURGERY  2016    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 Noel    COLONOSCOPY  2012    diverticular disease    COLONOSCOPY N/A 2020    COLONOSCOPY WITH BIOPSY performed by Estela Dutton MD at [facility], 1 hyperplastic polyp, random biopsies negative    CYSTOSCOPY Bilateral 2022    CYSTO Bilateral Retrograde Pyelogram, with bladder washout  performed by Suleiman Jack MD at [facility]    DENTAL SURGERY  2015    full dental extraction    ESOPHAGOGASTRODUODENOSCOPY  10/18/2022    FEMUR CLOSED REDUCTION Left 2020    has dhara [facility]    FEMUR FRACTURE SURGERY Left 2021    left retrograde femoral nailing. Dr. Thompson, [facility]    FOREIGN BODY REMOVAL  2016     mo+) and AFLURIA, (age 3 y+), Quadv PF, 0.5mL 02/12/2020    Influenza, FLUZONE High Dose, (age 65 y+), IM, Trivalent PF, 0.5mL 09/18/2024    Pneumococcal, PCV-13, PREVNAR 13, (age 6w+), IM, 0.5mL 07/28/2021    Pneumococcal, PCV20, PREVNAR 20, (age 6w+), IM, 0.5mL 03/14/2024    Pneumococcal, PPSV23, PNEUMOVAX 23, (age 2y+), SC/IM, 0.5mL 08/31/2015    TDaP, ADACEL (age 10y-64y), BOOSTRIX (age 10y+), IM, 0.5mL 11/16/2012       Active Problems:  Patient Active Problem List   Diagnosis Code    Shoulder pain, bilateral M25.511, M25.512    Cervical spine pain M54.2    Knee pain, left M25.562    Essential hypertension I10    Pulmonary hypertension (HCC) I27.20    Vitamin D deficiency E55.9    Encounter for long-term (current) use of other medications Z79.899    Headache R51.9    Subacute sphenoidal sinusitis J01.30    Type 2 diabetes mellitus with microalbuminuria ([facility]) E11.29, R80.9    Peripheral neuropathy G62.9    Dizziness R42    H/O fall Z91.81    Chronic sinusitis J32.9    Gastroesophageal reflux disease K21.9    Microalbuminuria R80.9    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    Type 2 diabetes mellitus with microalbuminuria, without long-term current use of insulin ([facility]) E11.29, R80.9    Morbid obesity with BMI of 40.0-44.9, adult E66.01, Z68.41    Restrictive lung disease secondary to obesity J98.4, E66.9    Calculus of gallbladder and bile duct without cholecystitis or obstruction K80.70    Nausea R11.0    Multiple gastric ulcers K25.9    Anxiety F41.9    Chronic tension-type headache, intractable G44.221    Chronic, continuous use of opioids F11.90    Complete tear of right rotator cuff M75.121    Depression F32.A    Fatigue R53.83    Lumbosacral spondylosis without myelopathy M47.817    Major depressive disorder, single episode, moderate (HCC) F32.1    Refractory migraine G43.919    Muscle spasms of neck M62.838    Obsessive-compulsive disorders F42.9    Spondylosis of lumbar spine M47.816     Spondylosis of thoracic region without myelopathy or radiculopathy M47.814    Spinal stenosis, lumbar region, without neurogenic claudication M48.061    Elizalde's esophagus without dysplasia K22.70    Therapeutic opioid-induced constipation (OIC) K59.03, T40.2X5A    Stage 3a chronic kidney disease (HCC) N18.31    Acute cystitis without hematuria N30.00    Spongiotic dermatitis L30.8    Osteopenia M85.80    Dyspepsia R10.13    Gastritis without bleeding K29.70    Irregular Z line of esophagus K22.9    Myofascial pain M79.18    Gastroparesis K31.84    Early satiety R68.81    Chronic low back pain M54.50, G89.29    DEEPALI (acute kidney injury) (HCC) N17.9    AMS (altered mental status) R41.82    Elevated lactic acid level R79.89    Urinary tract infection, site not specified N39.0    Multiple fractures of ribs, right side, initial encounter for closed fracture S22.41XA    Fall W19.XXXA       Isolation/Infection:   Isolation            Contact          Patient Infection Status       Infection Onset Added Last Indicated Last Indicated By Review Planned Expiration Resolved Resolved By    ESBL (Extended Spectrum Beta Lactamase) 23 Culture, Urine        Klebsiella Urine 2023    Resolved    COVID-19 21 COVID-19, Rapid   21 Infection                        Nurse Assessment:  Last Vital Signs: /76   Pulse 79   Temp 97.7 °F (36.5 °C) (Oral)   Resp 12   Ht 1.727 m (5' 7.99\")   Wt 117.9 kg (260 lb)   LMP 2010 (Approximate)   SpO2 96%   BMI 39.54 kg/m²     Last documented pain score (0-10 scale): Pain Level: 5  Last Weight:   Wt Readings from Last 1 Encounters:   24 117.9 kg (260 lb)     Mental Status:  oriented    IV Access:  - None    Nursing Mobility/ADLs:  Walking   Assisted  Transfer  Independent  Bathing  Assisted  Dressing  Assisted  Toileting  Independent  Feeding  Independent  Med Admin  Independent  Med Delivery   none    Wound Care

## 2024-10-01 NOTE — PROGRESS NOTES
TRAUMA progress note    PATIENT NAME: Jyoti Warren  YOB: 1955  MEDICAL RECORD NO. [id number]   DATE: 10/01/2024    IMPRESSION AND PLAN:     Trauma  CC: FFSH, -LOC, -AC    Injuries: R 3-7 rib fx, small R hemothorax, areas of ptx, T2 ant wedge compression fx    PIC score 7  Recent CXR showed no pneumothorax  Pain not well controlled even after peripheral nerve block.  Will change around pain medications  Neurosurgery consulted for T2 compression fracture.  Patient is asymptomatic, nothing to do, signed off.  Continue pulmonary toilet  Dispo: Encourage pulmonary toilet, I-S. Pain control. PT, Ambulate      HISTORY:     Patient was seen examined at bedside, no overnight events.  Patient reports pain is not well controlled even after the block. 1250 on I-S.  Pt is however sitting comfortably in bed eating breakfast. Denies SOB.  VSS, afebrile.    PHYSICAL EXAMINATION:     Physical Exam  Vitals reviewed.   HENT:      Head: Normocephalic.      Right Ear: External ear normal.      Left Ear: External ear normal.      Nose: Nose normal.      Mouth/Throat:      Mouth: Mucous membranes are moist.   Eyes:      Extraocular Movements: Extraocular movements intact.      Pupils: Pupils are equal, round, and reactive to light.   Cardiovascular:      Rate and Rhythm: Normal rate and regular rhythm.   Pulmonary:      Effort: Pulmonary effort is normal. No respiratory distress.   Musculoskeletal:         General: Tenderness and signs of injury present.   Skin:     General: Skin is warm and dry.      Capillary Refill: Capillary refill takes less than 2 seconds.        VITALS:   Vitals:    10/01/24 0715   BP: 133/76   Pulse: 79   Resp: 12   Temp: 97.7 °F (36.5 °C)   SpO2: 96%       LAB:  CBC:   Recent Labs     09/29/24  0718 09/30/24  0741   WBC 14.2* 12.8*   HGB 10.0* 9.6*   HCT 31.5* 30.4*   MCV 88.2 88.9    312     BMP:   Recent Labs     09/28/24  1444 09/29/24  0718 09/30/24  0741   * 133* 135*   K  --  4.1  4.2   CL  --  101 102   CO2  --  22 25   BUN  --  29* 22   CREATININE  --  2.0* 1.2*   GLUCOSE  --  123* 136*       RADIOLOGY:  CXR:  XR CHEST PORTABLE  Result Date: 9/28/2024  Multiple right-sided rib fractures which are similar to the prior exam. No pneumothorax identified.  No acute cardiopulmonary disease identified.      Samson Nicholson DO  Emergency Medicine Resident, PGY-1   10/1/2024  8:40 AM          Trauma Attending Attestation      I have reviewed the above [facility] note(s). I have seen and examined the pt.  I have discussed the findings, established the care plan and recommendations with Resident.      Manuel Downs DO  10/1/2024  5:13 PM

## 2024-10-01 NOTE — PROGRESS NOTES
Speech Language Pathology  [facility]    Cognitive Treatment Note    Date: 10/1/2024  Patient’s Name: Jyoti Warren  MRN: [id number]  Patient Active Problem List   Diagnosis Code    Shoulder pain, bilateral M25.511, M25.512    Cervical spine pain M54.2    Knee pain, left M25.562    Essential hypertension I10    Pulmonary hypertension ([facility]) I27.20    Vitamin D deficiency E55.9    Encounter for long-term (current) use of other medications Z79.899    Headache R51.9    Subacute sphenoidal sinusitis J01.30    Type 2 diabetes mellitus with microalbuminuria ([facility]) E11.29, R80.9    Peripheral neuropathy G62.9    Dizziness R42    H/O fall Z91.81    Chronic sinusitis J32.9    Gastroesophageal reflux disease K21.9    Microalbuminuria R80.9    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[facility]) Q01.9    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[facility]) 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    Type 2 diabetes mellitus with microalbuminuria, without long-term current use of insulin ([facility]) E11.29, R80.9    Morbid obesity with BMI of 40.0-44.9, adult E66.01, Z68.41    Restrictive lung disease secondary to obesity J98.4, E66.9    Calculus of gallbladder and bile duct without cholecystitis or obstruction K80.70    Nausea R11.0    Multiple gastric ulcers K25.9    Anxiety F41.9    Chronic tension-type headache, intractable G44.221    Chronic, continuous use of opioids F11.90    Complete tear of right rotator cuff M75.121    Depression F32.A    Fatigue R53.83    Lumbosacral spondylosis without myelopathy M47.817    Major depressive disorder, single episode, moderate ([facility]) F32.1    Refractory migraine G43.919    Muscle spasms of neck M62.838    Obsessive-compulsive disorders F42.9    Spondylosis of lumbar spine M47.816    Spondylosis of thoracic region without myelopathy or radiculopathy M47.814    Spinal stenosis, lumbar region, without neurogenic claudication M48.061    Elizalde's esophagus without dysplasia K22.70    Therapeutic opioid-induced constipation (OIC) K59.03, T40.2X5A    Stage 3a chronic kidney

## 2024-10-01 NOTE — CARE COORDINATION
Trauma Coordinator Rounding Note  Daily check in:  I met with Jyoti Warren  at bedside to review their plan of care. I allowed opportunity for Jyoti Kelvin to ask questions regarding their injuries, expected length of stay and disposition plan. All questions answered to verbal satisfaction.   [x]Wounds  [x]PT/OT/speech  [x]Incentive spirometer  [x]Diet  [x]Activity  Bedside Nurse:  I spoke with the patient's assigned nurse to review the plan of care for today and provide an opportunity to ask questions or relay any concerns to the Trauma service.  RN states that there is transfer order in for med surg.   :  I provided a clinical update to the unit  and confirmed the disposition plan. Patient is still refusing SNF. Plan is to send pt home with . Reached out to trauma resident to expedite discharge as pt is alert and oriented.   PIC Score  IS Goal Volume (15ml/kg IBW): 959   Pain  Patient reported 1-10 scale Inspiration  Incentive Spirometer    Volume obtained: 1250 ml Cough  Assessed by nurse     3  Mild  Pain intensity scale 0-4    [x] 4  Above goal volume  [x]   3  Strong    [x]    3  Goal to alert volume  []    2  Moderate  Pain intensity scale 5-7  [] 2  Below alert volume    [] 2   Weak    []   1  Severe   Pain intensity scale 8-10  [] 1  Unable to perform incentive spirometry    [] 1  Absent      []         Total: 10        Update: Per trauma resident, patient needs more physical therapy to build strength if goal is home. Also need continued monitoring of vitals at this time.   Electronically signed by Jaz Rios RN on 10/1/2024 at 3:35 PM

## 2024-10-02 VITALS
BODY MASS INDEX: 39.4 KG/M2 | DIASTOLIC BLOOD PRESSURE: 83 MMHG | HEART RATE: 88 BPM | TEMPERATURE: 98.1 F | SYSTOLIC BLOOD PRESSURE: 157 MMHG | OXYGEN SATURATION: 96 % | RESPIRATION RATE: 20 BRPM | HEIGHT: 68 IN | WEIGHT: 260 LBS

## 2024-10-02 LAB
ANION GAP SERPL CALCULATED.3IONS-SCNC: 10 MMOL/L (ref 9–16)
BASOPHILS # BLD: 0.05 K/UL (ref 0–0.2)
BASOPHILS NFR BLD: 1 % (ref 0–2)
BUN SERPL-MCNC: 24 MG/DL (ref 8–23)
CALCIUM SERPL-MCNC: 9 MG/DL (ref 8.6–10.4)
CHLORIDE SERPL-SCNC: 102 MMOL/L (ref 98–107)
CO2 SERPL-SCNC: 25 MMOL/L (ref 20–31)
CREAT SERPL-MCNC: 1.1 MG/DL (ref 0.5–0.9)
EOSINOPHIL # BLD: 0.15 K/UL (ref 0–0.44)
EOSINOPHILS RELATIVE PERCENT: 1 % (ref 1–4)
ERYTHROCYTE [DISTWIDTH] IN BLOOD BY AUTOMATED COUNT: 15.3 % (ref 11.8–14.4)
GFR, ESTIMATED: 57 ML/MIN/1.73M2
GLUCOSE BLD-MCNC: 103 MG/DL (ref 65–105)
GLUCOSE SERPL-MCNC: 132 MG/DL (ref 74–99)
HCT VFR BLD AUTO: 29.3 % (ref 36.3–47.1)
HGB BLD-MCNC: 9.2 G/DL (ref 11.9–15.1)
IMM GRANULOCYTES # BLD AUTO: 0.15 K/UL (ref 0–0.3)
IMM GRANULOCYTES NFR BLD: 1 %
LYMPHOCYTES NFR BLD: 1.35 K/UL (ref 1.1–3.7)
LYMPHOCYTES RELATIVE PERCENT: 12 % (ref 24–43)
MCH RBC QN AUTO: 28 PG (ref 25.2–33.5)
MCHC RBC AUTO-ENTMCNC: 31.4 G/DL (ref 28.4–34.8)
MCV RBC AUTO: 89.1 FL (ref 82.6–102.9)
MONOCYTES NFR BLD: 0.69 K/UL (ref 0.1–1.2)
MONOCYTES NFR BLD: 6 % (ref 3–12)
NEUTROPHILS NFR BLD: 79 % (ref 36–65)
NEUTS SEG NFR BLD: 8.64 K/UL (ref 1.5–8.1)
NRBC BLD-RTO: 0 PER 100 WBC
PLATELET # BLD AUTO: 330 K/UL (ref 138–453)
PMV BLD AUTO: 9.7 FL (ref 8.1–13.5)
POTASSIUM SERPL-SCNC: 4.1 MMOL/L (ref 3.7–5.3)
RBC # BLD AUTO: 3.29 M/UL (ref 3.95–5.11)
RBC # BLD: ABNORMAL 10*6/UL
SODIUM SERPL-SCNC: 137 MMOL/L (ref 136–145)
WBC OTHER # BLD: 11 K/UL (ref 3.5–11.3)

## 2024-10-02 PROCEDURE — 82947 ASSAY GLUCOSE BLOOD QUANT: CPT

## 2024-10-02 PROCEDURE — 2580000003 HC RX 258

## 2024-10-02 PROCEDURE — 99238 HOSP IP/OBS DSCHRG MGMT 30/<: CPT | Performed by: SURGERY

## 2024-10-02 PROCEDURE — 6370000000 HC RX 637 (ALT 250 FOR IP)

## 2024-10-02 PROCEDURE — 6370000000 HC RX 637 (ALT 250 FOR IP): Performed by: NURSE PRACTITIONER

## 2024-10-02 PROCEDURE — 36415 COLL VENOUS BLD VENIPUNCTURE: CPT

## 2024-10-02 PROCEDURE — 6360000002 HC RX W HCPCS: Performed by: STUDENT IN AN ORGANIZED HEALTH CARE EDUCATION/TRAINING PROGRAM

## 2024-10-02 PROCEDURE — 97129 THER IVNTJ 1ST 15 MIN: CPT

## 2024-10-02 PROCEDURE — 6370000000 HC RX 637 (ALT 250 FOR IP): Performed by: STUDENT IN AN ORGANIZED HEALTH CARE EDUCATION/TRAINING PROGRAM

## 2024-10-02 PROCEDURE — 80048 BASIC METABOLIC PNL TOTAL CA: CPT

## 2024-10-02 PROCEDURE — 97130 THER IVNTJ EA ADDL 15 MIN: CPT

## 2024-10-02 PROCEDURE — 85025 COMPLETE CBC W/AUTO DIFF WBC: CPT

## 2024-10-02 RX ORDER — OXYCODONE HYDROCHLORIDE 5 MG/1
5 TABLET ORAL EVERY 6 HOURS PRN
Qty: 28 TABLET | Refills: 0 | Status: SHIPPED | OUTPATIENT
Start: 2024-10-02 | End: 2024-10-09 | Stop reason: SDUPTHER

## 2024-10-02 RX ORDER — GLIPIZIDE 5 MG/1
5 TABLET ORAL
Status: DISCONTINUED | OUTPATIENT
Start: 2024-10-02 | End: 2024-10-02 | Stop reason: HOSPADM

## 2024-10-02 RX ORDER — METHOCARBAMOL 500 MG/1
500 TABLET, FILM COATED ORAL 3 TIMES DAILY
Qty: 30 TABLET | Refills: 0 | Status: SHIPPED | OUTPATIENT
Start: 2024-10-02 | End: 2024-10-10 | Stop reason: SDUPTHER

## 2024-10-02 RX ORDER — SODIUM CHLORIDE, SODIUM LACTATE, POTASSIUM CHLORIDE, AND CALCIUM CHLORIDE .6; .31; .03; .02 G/100ML; G/100ML; G/100ML; G/100ML
500 INJECTION, SOLUTION INTRAVENOUS ONCE
Status: COMPLETED | OUTPATIENT
Start: 2024-10-02 | End: 2024-10-02

## 2024-10-02 RX ADMIN — SODIUM CHLORIDE, POTASSIUM CHLORIDE, SODIUM LACTATE AND CALCIUM CHLORIDE 500 ML: 600; 310; 30; 20 INJECTION, SOLUTION INTRAVENOUS at 11:48

## 2024-10-02 RX ADMIN — ACETAMINOPHEN 1000 MG: 500 TABLET ORAL at 12:57

## 2024-10-02 RX ADMIN — METHOCARBAMOL TABLETS 500 MG: 500 TABLET, COATED ORAL at 03:44

## 2024-10-02 RX ADMIN — GABAPENTIN 300 MG: 300 CAPSULE ORAL at 00:30

## 2024-10-02 RX ADMIN — HYDROCHLOROTHIAZIDE 12.5 MG: 12.5 CAPSULE ORAL at 08:50

## 2024-10-02 RX ADMIN — ACETAMINOPHEN 1000 MG: 500 TABLET ORAL at 07:12

## 2024-10-02 RX ADMIN — LISINOPRIL 5 MG: 5 TABLET ORAL at 08:47

## 2024-10-02 RX ADMIN — OXYCODONE HYDROCHLORIDE 5 MG: 5 SOLUTION ORAL at 12:58

## 2024-10-02 RX ADMIN — GABAPENTIN 300 MG: 300 CAPSULE ORAL at 08:47

## 2024-10-02 RX ADMIN — PANTOPRAZOLE SODIUM 40 MG: 40 TABLET, DELAYED RELEASE ORAL at 08:47

## 2024-10-02 RX ADMIN — SODIUM CHLORIDE, PRESERVATIVE FREE 10 ML: 5 INJECTION INTRAVENOUS at 08:48

## 2024-10-02 RX ADMIN — DULOXETINE HYDROCHLORIDE 60 MG: 30 CAPSULE, DELAYED RELEASE ORAL at 08:46

## 2024-10-02 RX ADMIN — OXYCODONE HYDROCHLORIDE 5 MG: 5 SOLUTION ORAL at 00:28

## 2024-10-02 RX ADMIN — GLIPIZIDE 5 MG: 5 TABLET ORAL at 08:48

## 2024-10-02 RX ADMIN — AMLODIPINE BESYLATE 10 MG: 10 TABLET ORAL at 08:47

## 2024-10-02 RX ADMIN — HEPARIN SODIUM 5000 UNITS: 5000 INJECTION INTRAVENOUS; SUBCUTANEOUS at 00:30

## 2024-10-02 RX ADMIN — OXYCODONE HYDROCHLORIDE 5 MG: 5 SOLUTION ORAL at 08:25

## 2024-10-02 RX ADMIN — METHOCARBAMOL TABLETS 500 MG: 500 TABLET, COATED ORAL at 08:47

## 2024-10-02 RX ADMIN — IBUPROFEN 400 MG: 400 TABLET, FILM COATED ORAL at 08:46

## 2024-10-02 RX ADMIN — HEPARIN SODIUM 5000 UNITS: 5000 INJECTION INTRAVENOUS; SUBCUTANEOUS at 08:47

## 2024-10-02 ASSESSMENT — PAIN DESCRIPTION - DESCRIPTORS
DESCRIPTORS: DISCOMFORT
DESCRIPTORS: ACHING;DISCOMFORT
DESCRIPTORS: ACHING;DISCOMFORT;SHOOTING

## 2024-10-02 ASSESSMENT — PAIN SCALES - GENERAL
PAINLEVEL_OUTOF10: 3
PAINLEVEL_OUTOF10: 6
PAINLEVEL_OUTOF10: 8
PAINLEVEL_OUTOF10: 7

## 2024-10-02 ASSESSMENT — PAIN DESCRIPTION - ORIENTATION
ORIENTATION: MID
ORIENTATION: UPPER
ORIENTATION: MID
ORIENTATION: MID

## 2024-10-02 ASSESSMENT — PAIN DESCRIPTION - LOCATION
LOCATION: BACK

## 2024-10-02 ASSESSMENT — PAIN SCALES - WONG BAKER
WONGBAKER_NUMERICALRESPONSE: NO HURT
WONGBAKER_NUMERICALRESPONSE: NO HURT

## 2024-10-02 NOTE — PLAN OF CARE
Problem: Discharge Planning  Goal: Discharge to home or other facility with appropriate resources  10/2/2024 1305 by Joshua Izquierdo RN  Outcome: Completed  10/2/2024 1237 by Joshua Izquierdo RN  Outcome: Progressing  10/2/2024 0428 by Dasha Presley RN  Outcome: Progressing     Problem: Pain  Goal: Verbalizes/displays adequate comfort level or baseline comfort level  10/2/2024 1305 by Joshua Izquierdo RN  Outcome: Completed  10/2/2024 1237 by Joshua Izquierdo RN  Outcome: Progressing  10/2/2024 0428 by Dasha Presley RN  Outcome: Progressing     Problem: Safety - Adult  Goal: Free from fall injury  10/2/2024 1305 by Joshua Izquierdo RN  Outcome: Completed  10/2/2024 1237 by Joshua Izquierdo RN  Outcome: Progressing  10/2/2024 0428 by Dasha Presley RN  Outcome: Progressing     Problem: Chronic Conditions and Co-morbidities  Goal: Patient's chronic conditions and co-morbidity symptoms are monitored and maintained or improved  10/2/2024 1305 by Joshua Izquierdo RN  Outcome: Completed  10/2/2024 1237 by Joshua Izquierdo RN  Outcome: Progressing  10/2/2024 0428 by Dasha Presley RN  Outcome: Progressing     Problem: Nutrition Deficit:  Goal: Optimize nutritional status  10/2/2024 1305 by Joshua Izquierdo RN  Outcome: Completed  10/2/2024 1237 by Joshua Izquierdo RN  Outcome: Progressing  10/2/2024 0428 by Dasha Presley RN  Outcome: Progressing

## 2024-10-02 NOTE — DISCHARGE INSTRUCTIONS
Discharge Instructions for Trauma       What to do after you leave the hospital:      Bathing: if you have sutures or staples in place, you may shower. No tub baths, soaking or submerging yourself in water. No hot tubs or swimming.      For resources transitioning back to the community following your trauma, visit [email or web address]    General questions or concerns please call the [facility] at [phone].  If needed, the clinic fax number is [phone].    Trauma is a life-threatening condition. Your doctor will want to closely monitor you. Be sure to go to all of your appointments.

## 2024-10-02 NOTE — PLAN OF CARE
Problem: Discharge Planning  Goal: Discharge to home or other facility with appropriate resources  10/2/2024 1237 by Joshua Izquierdo RN  Outcome: Progressing  10/2/2024 0428 by Dasha Presley RN  Outcome: Progressing     Problem: Pain  Goal: Verbalizes/displays adequate comfort level or baseline comfort level  10/2/2024 1237 by Joshua Izquierdo RN  Outcome: Progressing  10/2/2024 0428 by Dasha Presley RN  Outcome: Progressing     Problem: Safety - Adult  Goal: Free from fall injury  10/2/2024 1237 by Joshua Izquierdo RN  Outcome: Progressing  10/2/2024 0428 by Dasha Presley RN  Outcome: Progressing     Problem: Chronic Conditions and Co-morbidities  Goal: Patient's chronic conditions and co-morbidity symptoms are monitored and maintained or improved  10/2/2024 1237 by Joshua Izquierdo RN  Outcome: Progressing  10/2/2024 0428 by Dasha Presley RN  Outcome: Progressing     Problem: Nutrition Deficit:  Goal: Optimize nutritional status  10/2/2024 1237 by Joshua Izquierdo RN  Outcome: Progressing  10/2/2024 0428 by Dasha Presley RN  Outcome: Progressing

## 2024-10-02 NOTE — CARE COORDINATION
Notified Olivia from [facility] that patient is discharging today. Met with patient and  to discuss transitional planning. They deny needs for home    Discharge Report    [facility]  Clinical Case Management Department  Written by: Keri Payan RN    Patient Name: Jyoti Warren  Attending Provider: Rob Obrien*  Admit Date: 2024 12:01 AM  MRN: [id number]  Account: [id number]                     : 1955  Discharge Date: 10/2/2024      Disposition: home    Keri Payan RN

## 2024-10-02 NOTE — DISCHARGE SUMMARY
DISCHARGE SUMMARY:    PATIENT NAME:  Jyoti Warren  YOB: 1955  MEDICAL RECORD NO. [id number]  DATE: 10/02/24  PRIMARY CARE PHYSICIAN: Nia Patel MD  ADMIT DATE: 9/28/2024   DISCHARGE DATE:    DISPOSITION:  ***  ADMITTING DIAGNOSIS:   Fall, initial encounter [W19.XXXA]  Traumatic pneumohemothorax, initial encounter [S27.2XXA]  Closed fracture of multiple ribs of right side, initial encounter [S22.41XA]  Multiple fractures of ribs, right side, initial encounter for closed fracture [S22.41XA]     DIAGNOSIS:   Patient Active Problem List   Diagnosis    Shoulder pain, bilateral    Cervical spine pain    Knee pain, left    Essential hypertension    Pulmonary hypertension (HCC)    Vitamin D deficiency    Encounter for long-term (current) use of other medications    Headache    Subacute sphenoidal sinusitis    Type 2 diabetes mellitus with microalbuminuria (HCC)    Peripheral neuropathy    Dizziness    H/O fall    Chronic sinusitis    Gastroesophageal reflux disease    Microalbuminuria    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HCC)    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HCC)    Type 2 diabetes mellitus with microalbuminuria, without long-term current use of insulin (HCC)    Morbid obesity with BMI of 40.0-44.9, adult    Restrictive lung disease secondary to obesity    Calculus of gallbladder and bile duct without cholecystitis or obstruction    Nausea    Multiple gastric ulcers    Anxiety    Chronic tension-type headache, intractable    Chronic, continuous use of opioids    Complete tear of right rotator cuff    Depression    Fatigue    Lumbosacral spondylosis without myelopathy    Major depressive disorder, single episode, moderate (HCC)    Refractory migraine    Muscle spasms of neck    Obsessive-compulsive disorders    Spondylosis of lumbar spine    Spondylosis of thoracic region without myelopathy or radiculopathy    Spinal stenosis, lumbar region, without neurogenic claudication    Elizalde's esophagus without dysplasia    Therapeutic

## 2024-10-02 NOTE — PROGRESS NOTES
TRAUMA progress note    PATIENT NAME: Jyoti Warren  YOB: 1955  MEDICAL RECORD NO. [id number]   DATE: 10/01/2024    IMPRESSION AND PLAN:     Trauma  CC: FFSH, -LOC, -AC    Injuries: R 3-7 rib fx, small R hemothorax, areas of ptx, T2 ant wedge compression fx    PIC score 9 from 7  Recent CXR showed no pneumothorax  Pain control Improved, s/p paravertebral block  Neurosurgery consulted for T2 compression fracture.  Patient is asymptomatic, nothing to do, signed off.  Continue pulmonary toilet  Dispo: Discharge home today, instructed to follow-up PCP for her diabetes mellitus      HISTORY:     Patient was seen examined at bedside, no overnight events.  Patient reports pain is improved compare to yesterday. 2000 on I-S.  Pt is however sitting comfortably, and stated understand the importance to follow up with PCP as outpatient for her diabetes. Denies SOB.  VSS, afebrile.    PHYSICAL EXAMINATION:     Physical Exam  Vitals reviewed.   HENT:      Head: Normocephalic.      Right Ear: External ear normal.      Left Ear: External ear normal.      Nose: Nose normal.      Mouth/Throat:      Mouth: Mucous membranes are moist.   Eyes:      Extraocular Movements: Extraocular movements intact.      Pupils: Pupils are equal, round, and reactive to light.   Cardiovascular:      Rate and Rhythm: Normal rate and regular rhythm.   Pulmonary:      Effort: Pulmonary effort is normal. No respiratory distress.   Musculoskeletal:         General: Tenderness and signs of injury present.   Skin:     General: Skin is warm and dry.      Capillary Refill: Capillary refill takes less than 2 seconds.        VITALS:   Vitals:    10/02/24 0344   BP: 127/70   Pulse: 84   Resp: 20   Temp: 97.7 °F (36.5 °C)   SpO2: 96%       LAB:  CBC:   Recent Labs     09/29/24  0718 09/30/24  0741 10/01/24  0751   WBC 14.2* 12.8* 10.5   HGB 10.0* 9.6* 9.8*   HCT 31.5* 30.4* 31.9*   MCV 88.2 88.9 90.1    312 319     BMP:   Recent Labs

## 2024-10-02 NOTE — PROGRESS NOTES
PIC Score  IS Goal Volume (15ml/kg IBW): 918   Pain  Patient reported 1-10 scale Inspiration  Incentive Spirometer    Volume obtained: 1000 ml Cough  Assessed by nurse     3  Mild  Pain intensity scale 0-4    [] 4  Above goal volume  [x]   3  Strong    [x]    3  Goal to alert volume  []    2  Moderate  Pain intensity scale 5-7  [x] 2  Below alert volume    [] 2   Weak    []   1  Severe   Pain intensity scale 8-10  [] 1  Unable to perform incentive spirometry    [] 1  Absent      []         Total: 9         Electronically signed by Arya Quintanilla RN on 10/2/2024 at 11:53 AM

## 2024-10-02 NOTE — DISCHARGE INSTR - DIET

## 2024-10-02 NOTE — PROGRESS NOTES
Speech Language Pathology  [facility]    Cognitive Treatment Note    Date: 10/2/2024  Patient’s Name: Jyoti Warren  MRN: [id number]  Diagnosis:   Patient Active Problem List   Diagnosis Code    Shoulder pain, bilateral M25.511, M25.512    Cervical spine pain M54.2    Knee pain, left M25.562    Essential hypertension I10    Pulmonary hypertension (HCC) I27.20    Vitamin D deficiency E55.9    Encounter for long-term (current) use of other medications Z79.899    Headache R51.9    Subacute sphenoidal sinusitis J01.30    Type 2 diabetes mellitus with microalbuminuria ([facility]) E11.29, R80.9    Peripheral neuropathy G62.9    Dizziness R42    H/O fall Z91.81    Chronic sinusitis J32.9    Gastroesophageal reflux disease K21.9    Microalbuminuria R80.9    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    Type 2 diabetes mellitus with microalbuminuria, without long-term current use of insulin ([facility]) E11.29, R80.9    Morbid obesity with BMI of 40.0-44.9, adult E66.01, Z68.41    Restrictive lung disease secondary to obesity J98.4, E66.9    Calculus of gallbladder and bile duct without cholecystitis or obstruction K80.70    Nausea R11.0    Multiple gastric ulcers K25.9    Anxiety F41.9    Chronic tension-type headache, intractable G44.221    Chronic, continuous use of opioids F11.90    Complete tear of right rotator cuff M75.121    Depression F32.A    Fatigue R53.83    Lumbosacral spondylosis without myelopathy M47.817    Major depressive disorder, single episode, moderate ([facility]) F32.1    Refractory migraine G43.919    Muscle spasms of neck M62.838    Obsessive-compulsive disorders F42.9    Spondylosis of lumbar spine M47.816    Spondylosis of thoracic region without myelopathy or radiculopathy M47.814    Spinal stenosis, lumbar region, without neurogenic claudication M48.061    Elizalde's esophagus without dysplasia K22.70    Therapeutic opioid-induced constipation (OIC) K59.03, T40.2X5A    Stage 3a

## 2024-10-03 ENCOUNTER — TELEPHONE (OUTPATIENT)
Dept: FAMILY MEDICINE CLINIC | Age: 69
End: 2024-10-03

## 2024-10-03 NOTE — TELEPHONE ENCOUNTER
Pt was discharged on 10/02/24 [facility]       DX Multiple fractures of ribs, right side, initial encounter for closed fracture

## 2024-10-04 NOTE — TELEPHONE ENCOUNTER
Care Transitions Initial Follow Up Call    Outreach made within 2 business days of discharge: Yes    Patient: Jyoti Warren   Patient : 1955   MRN: [id number]   Reason for Admission: Fall  Discharge Date: 10/2/24       Spoke with: Jyoti    Discharge department/facility: [facility] Interactive Patient Contact:  Was patient able to fill all prescriptions: Yes  Was patient instructed to bring all medications to the follow-up visit: Yes  Is patient taking all medications as directed in the discharge summary? Yes  Does patient understand their discharge instructions: Yes  Does patient have questions or concerns that need addressed prior to 7-14 day follow up office visit: no    Additional needs identified to be addressed with provider  No needs identified             Scheduled appointment with PCP within 7-14 days    Follow Up  Future Appointments   Date Time Provider Department Center   10/9/2024 11:20 AM Nia Patel MD [facility] DEP   10/28/2024  1:45 PM [facility]   [facility]  3[facility]10 PM [facility]   2025  1:20 PM Nia Patel MD [facility] DEP   2025  1:20 PM Mervat Villagran APR[facility] - CNP [facility]       Esha Perales LPN

## 2024-10-09 ENCOUNTER — OFFICE VISIT (OUTPATIENT)
Dept: FAMILY MEDICINE CLINIC | Age: 69
End: 2024-10-09
Payer: MEDICARE

## 2024-10-09 ENCOUNTER — TELEPHONE (OUTPATIENT)
Dept: FAMILY MEDICINE CLINIC | Age: 69
End: 2024-10-09

## 2024-10-09 VITALS
BODY MASS INDEX: 39.4 KG/M2 | DIASTOLIC BLOOD PRESSURE: 82 MMHG | TEMPERATURE: 97.4 F | WEIGHT: 260 LBS | HEIGHT: 68 IN | OXYGEN SATURATION: 96 % | RESPIRATION RATE: 16 BRPM | HEART RATE: 94 BPM | SYSTOLIC BLOOD PRESSURE: 136 MMHG

## 2024-10-09 DIAGNOSIS — J94.2 HEMOTHORAX ON RIGHT: ICD-10-CM

## 2024-10-09 DIAGNOSIS — S22.41XA CLOSED TRAUMATIC FRACTURE OF RIBS OF RIGHT SIDE WITH PNEUMOTHORAX: ICD-10-CM

## 2024-10-09 DIAGNOSIS — S22.41XA MULTIPLE FRACTURES OF RIBS, RIGHT SIDE, INITIAL ENCOUNTER FOR CLOSED FRACTURE: ICD-10-CM

## 2024-10-09 DIAGNOSIS — Z91.81 HISTORY OF FALL: ICD-10-CM

## 2024-10-09 DIAGNOSIS — F32.1 MAJOR DEPRESSIVE DISORDER, SINGLE EPISODE, MODERATE (HCC): ICD-10-CM

## 2024-10-09 DIAGNOSIS — Z09 HOSPITAL DISCHARGE FOLLOW-UP: Primary | ICD-10-CM

## 2024-10-09 DIAGNOSIS — M79.18 MYOFASCIAL PAIN: ICD-10-CM

## 2024-10-09 DIAGNOSIS — R53.1 GENERALIZED WEAKNESS: ICD-10-CM

## 2024-10-09 DIAGNOSIS — I27.20 PULMONARY HYPERTENSION (HCC): ICD-10-CM

## 2024-10-09 DIAGNOSIS — S27.0XXA CLOSED TRAUMATIC FRACTURE OF RIBS OF RIGHT SIDE WITH PNEUMOTHORAX: ICD-10-CM

## 2024-10-09 PROCEDURE — 1090F PRES/ABSN URINE INCON ASSESS: CPT | Performed by: FAMILY MEDICINE

## 2024-10-09 PROCEDURE — 1111F DSCHRG MED/CURRENT MED MERGE: CPT | Performed by: FAMILY MEDICINE

## 2024-10-09 PROCEDURE — 3075F SYST BP GE 130 - 139MM HG: CPT | Performed by: FAMILY MEDICINE

## 2024-10-09 PROCEDURE — G8417 CALC BMI ABV UP PARAM F/U: HCPCS | Performed by: FAMILY MEDICINE

## 2024-10-09 PROCEDURE — G8482 FLU IMMUNIZE ORDER/ADMIN: HCPCS | Performed by: FAMILY MEDICINE

## 2024-10-09 PROCEDURE — 1036F TOBACCO NON-USER: CPT | Performed by: FAMILY MEDICINE

## 2024-10-09 PROCEDURE — G8400 PT W/DXA NO RESULTS DOC: HCPCS | Performed by: FAMILY MEDICINE

## 2024-10-09 PROCEDURE — 3017F COLORECTAL CA SCREEN DOC REV: CPT | Performed by: FAMILY MEDICINE

## 2024-10-09 PROCEDURE — 3079F DIAST BP 80-89 MM HG: CPT | Performed by: FAMILY MEDICINE

## 2024-10-09 PROCEDURE — 99213 OFFICE O/P EST LOW 20 MIN: CPT | Performed by: FAMILY MEDICINE

## 2024-10-09 PROCEDURE — G2211 COMPLEX E/M VISIT ADD ON: HCPCS | Performed by: FAMILY MEDICINE

## 2024-10-09 PROCEDURE — 99213 OFFICE O/P EST LOW 20 MIN: CPT

## 2024-10-09 PROCEDURE — 1123F ACP DISCUSS/DSCN MKR DOCD: CPT | Performed by: FAMILY MEDICINE

## 2024-10-09 PROCEDURE — G8427 DOCREV CUR MEDS BY ELIG CLIN: HCPCS | Performed by: FAMILY MEDICINE

## 2024-10-09 RX ORDER — OXYCODONE HYDROCHLORIDE 5 MG/1
5 TABLET ORAL EVERY 6 HOURS PRN
Qty: 28 TABLET | Refills: 0 | Status: SHIPPED | OUTPATIENT
Start: 2024-10-09 | End: 2024-10-15 | Stop reason: SDUPTHER

## 2024-10-09 RX ORDER — TIZANIDINE 2 MG/1
2 TABLET ORAL NIGHTLY PRN
Qty: 30 TABLET | Refills: 0 | Status: SHIPPED | OUTPATIENT
Start: 2024-10-09 | End: 2024-10-10

## 2024-10-09 NOTE — TELEPHONE ENCOUNTER
Pt calling stating she was seen today but pharmacy will not fill her script that was sent in as it needs a prior auth, please advise.

## 2024-10-09 NOTE — PROGRESS NOTES
Transitional Care Office Visit    Date of Face-to-Face: 10/9/2024    Here for follow after hospitalization for: multiple rib fractures, T2 compression fracture, pneumothorax    Persons at visit: spouse    Medication changes during hospitalization: ADDED: Oxycodone 5 mg every 6 hours as needed for up to 7 days, Added Robaxin 500 mg- 1 tablet TID for 10 days    Procedures during hospitalization:  CT lumbar spice, CT thoracic spine, CT cervical spine. CT head. CT chest .    Activity: activity as tolerated.    Any medication changes since post-hospitalization phone call?  No.    Any treatment changes since post-hospitalization phone call?  No.    Other follow-up appointments scheduled:  None.                 
combination with alcohol. 180 tablet 1    lisinopril (PRINIVIL;ZESTRIL) 5 MG tablet Take 1 tablet by mouth daily 90 tablet 3    melatonin 3 MG TABS tablet Take 5 mg by mouth nightly as needed      blood glucose monitor strips Test 1 times a day & as needed for symptoms of irregular blood glucose. Dispense sufficient amount for indicated testing frequency plus additional to accommodate PRN testing needs. 100 strip 1    Cholecalciferol (VITAMIN D3) 1.25 MG (95797 UT) CAPS TAKE 1 CAPSULE ONCE A WEEK 13 capsule 1        Medications patient taking as of now reconciled against medications ordered at time of hospital discharge: Yes        Objective:    /82 (Site: Left Upper Arm, Position: Sitting, Cuff Size: Large Adult)   Pulse 94   Temp 97.4 °F (36.3 °C) (Tympanic)   Resp 16   Ht 1.727 m (5' 8\")   Wt 117.9 kg (260 lb) Comment: Patient reported- unable to stand on scale due to rib pain  LMP 08/24/2010 (Approximate)   SpO2 96%   BMI 39.53 kg/m²   General Appearance: alert and oriented to person, place and time, well-developed and well-nourished, in no acute distress  Neck: neck supple and non tender without mass, no thyromegaly or thyroid nodules, no cervical lymphadenopathy   Pulmonary/Chest: clear to auscultation bilaterally- no wheezes, rales or rhonchi, normal air movement, no respiratory distress  Cardiovascular: normal rate, and rhythm, no murmurs  There is tenderness to palpation of the ribs on the right.  Extremities: no edema  Affect is flat. Thought processes are logical. There is no evidence of paranoia or delusions.  Responses to questions are appropriate.  Dress and grooming are appropriate.     An electronic signature was used to authenticate this note.  --Nia Patel MD

## 2024-10-10 DIAGNOSIS — M54.50 CHRONIC LOW BACK PAIN, UNSPECIFIED BACK PAIN LATERALITY, UNSPECIFIED WHETHER SCIATICA PRESENT: Primary | ICD-10-CM

## 2024-10-10 DIAGNOSIS — G89.29 CHRONIC LOW BACK PAIN, UNSPECIFIED BACK PAIN LATERALITY, UNSPECIFIED WHETHER SCIATICA PRESENT: Primary | ICD-10-CM

## 2024-10-10 RX ORDER — METHOCARBAMOL 500 MG/1
500 TABLET, FILM COATED ORAL 3 TIMES DAILY
Qty: 30 TABLET | Refills: 0 | Status: SHIPPED | OUTPATIENT
Start: 2024-10-10 | End: 2024-10-20

## 2024-10-10 NOTE — TELEPHONE ENCOUNTER
Pt states the Zanaflex is not working for her, she's in constant pain and would like to go back on the Robaxin, pt uses pended pharmacy, please advise.

## 2024-10-15 DIAGNOSIS — M54.50 CHRONIC LOW BACK PAIN, UNSPECIFIED BACK PAIN LATERALITY, UNSPECIFIED WHETHER SCIATICA PRESENT: ICD-10-CM

## 2024-10-15 DIAGNOSIS — G89.29 CHRONIC LOW BACK PAIN, UNSPECIFIED BACK PAIN LATERALITY, UNSPECIFIED WHETHER SCIATICA PRESENT: ICD-10-CM

## 2024-10-15 DIAGNOSIS — S22.41XA MULTIPLE FRACTURES OF RIBS, RIGHT SIDE, INITIAL ENCOUNTER FOR CLOSED FRACTURE: ICD-10-CM

## 2024-10-15 NOTE — TELEPHONE ENCOUNTER
Jyoti called requesting a refill of the below medication which has been pended for you:       OARRS from [place], [place], and [place] reviewed. Oxycodone 5 mg last filled 10/9/24 #28/7 days    Requested Prescriptions     Pending Prescriptions Disp Refills    oxyCODONE (ROXICODONE) 5 MG immediate release tablet 28 tablet 0     Sig: Take 1 tablet by mouth every 6 hours as needed for Pain for up to 7 days. Intended supply: 5 days. Take lowest dose possible to manage pain Max Daily Amount: 20 mg    methocarbamol (ROBAXIN) 500 MG tablet 30 tablet 0     Sig: Take 1 tablet by mouth 3 times daily for 10 days       Last Appointment Date: 10/9/2024  Next Appointment Date: 1/21/2025    Allergies   Allergen Reactions    Asa [Aspirin] Other (See Comments)     Stomach irritation    Pollen Extract

## 2024-10-16 RX ORDER — METHOCARBAMOL 500 MG/1
500 TABLET, FILM COATED ORAL 3 TIMES DAILY
Qty: 30 TABLET | Refills: 0 | Status: SHIPPED | OUTPATIENT
Start: 2024-10-16 | End: 2024-10-26

## 2024-10-16 RX ORDER — OXYCODONE HYDROCHLORIDE 5 MG/1
5 TABLET ORAL EVERY 6 HOURS PRN
Qty: 28 TABLET | Refills: 0 | Status: SHIPPED | OUTPATIENT
Start: 2024-10-16 | End: 2024-10-23

## 2024-10-16 NOTE — TELEPHONE ENCOUNTER
Controlled substances monitoring: No signs of potential drug abuse or diversion identified when the OAR[place] report from [place], [place], and [place] was reviewed today.  The activity on the report was consistent with the treatment plan.     I refilled Oxycodone and Robaxin.       Please advise the patient to schedule an appointment for additional refills.

## 2024-10-16 NOTE — PROGRESS NOTES
[facility] ([facility]) Inpatient Discharge Summary  SANDOR BONNER  10/16/2024        Jyoti Kelvin  1955  [id number]    Date & Time of Discharge: 10/2/2024  2:20 PM     Is consult a Victim of Crime?: No    Services provided:  Screenings: ITSS    Screen Results (If any):      ITSS:  Date Screen Completed: 09/30/2024  Patient's score= 0 for PTSD risk. ( >= 2 is positive for PTSD risk. )  Patient's score= 0 for depression risk.  ( >= 2 is positive for Depression risk )      Discharge Recommendations:  No outpatient mental health services recommended      The therapist may be reached through the [facility] offices at [phone].

## 2024-10-21 DIAGNOSIS — G44.201 INTRACTABLE TENSION-TYPE HEADACHE, UNSPECIFIED CHRONICITY PATTERN: ICD-10-CM

## 2024-10-23 ENCOUNTER — OFFICE VISIT (OUTPATIENT)
Dept: FAMILY MEDICINE CLINIC | Age: 69
End: 2024-10-23

## 2024-10-23 VITALS
DIASTOLIC BLOOD PRESSURE: 82 MMHG | HEART RATE: 70 BPM | OXYGEN SATURATION: 98 % | WEIGHT: 249 LBS | HEIGHT: 68 IN | BODY MASS INDEX: 37.74 KG/M2 | RESPIRATION RATE: 16 BRPM | SYSTOLIC BLOOD PRESSURE: 134 MMHG | TEMPERATURE: 97.4 F

## 2024-10-23 DIAGNOSIS — S22.41XA MULTIPLE FRACTURES OF RIBS, RIGHT SIDE, INITIAL ENCOUNTER FOR CLOSED FRACTURE: ICD-10-CM

## 2024-10-23 DIAGNOSIS — R63.0 DECREASED APPETITE: Primary | ICD-10-CM

## 2024-10-23 DIAGNOSIS — G44.201 INTRACTABLE TENSION-TYPE HEADACHE, UNSPECIFIED CHRONICITY PATTERN: ICD-10-CM

## 2024-10-23 RX ORDER — GABAPENTIN 400 MG/1
400 CAPSULE ORAL 2 TIMES DAILY
Qty: 60 CAPSULE | Refills: 2 | Status: SHIPPED | OUTPATIENT
Start: 2024-10-23 | End: 2025-01-21

## 2024-10-23 RX ORDER — OXYCODONE HYDROCHLORIDE 5 MG/1
5 TABLET ORAL EVERY 6 HOURS PRN
Qty: 28 TABLET | Refills: 0 | Status: SHIPPED | OUTPATIENT
Start: 2024-10-23 | End: 2024-10-30

## 2024-10-23 RX ORDER — GABAPENTIN 400 MG/1
400 CAPSULE ORAL 2 TIMES DAILY
Qty: 60 CAPSULE | Refills: 2 | OUTPATIENT
Start: 2024-10-23

## 2024-10-23 NOTE — PROGRESS NOTES
10y-64y), BOOSTRIX (age 10y+), IM, 0.5mL 11/16/2012          She has the following problem list:  Patient Active Problem List   Diagnosis    Shoulder pain, bilateral    Cervical spine pain    Knee pain, left    Essential hypertension    Pulmonary hypertension ([facility])    Vitamin D deficiency    Encounter for long-term (current) use of other medications    Headache    Subacute sphenoidal sinusitis    Type 2 diabetes mellitus with microalbuminuria ([facility])    Peripheral neuropathy    Dizziness    H/O fall    Chronic sinusitis    Gastroesophageal reflux disease    Microalbuminuria    Type 2 diabetes mellitus with microalbuminuria, without long-term current use of insulin ([facility])    Morbid obesity with BMI of 40.0-44.9, adult    Restrictive lung disease secondary to obesity    Calculus of gallbladder and bile duct without cholecystitis or obstruction    Nausea    Multiple gastric ulcers    Anxiety    Chronic tension-type headache, intractable    Chronic, continuous use of opioids    Complete tear of right rotator cuff    Depression    Fatigue    Lumbosacral spondylosis without myelopathy    Major depressive disorder, single episode, moderate ([facility])    Refractory migraine    Muscle spasms of neck    Obsessive-compulsive disorders    Spondylosis of lumbar spine    Spondylosis of thoracic region without myelopathy or radiculopathy    Spinal stenosis, lumbar region, without neurogenic claudication    Elizalde's esophagus without dysplasia    Therapeutic opioid-induced constipation (OIC)    Stage 3a chronic kidney disease ([facility])    Acute cystitis without hematuria    Spongiotic dermatitis    Osteopenia    Dyspepsia    Gastritis without bleeding    Irregular Z line of esophagus    Myofascial pain    Gastroparesis    Early satiety    Chronic low back pain    DEEPALI (acute kidney injury) ([facility])    AMS (altered mental status)    Elevated lactic acid level    Urinary tract infection, site not specified    Multiple fractures of ribs, right side,

## 2024-10-26 DIAGNOSIS — R80.9 TYPE 2 DIABETES MELLITUS WITH MICROALBUMINURIA, WITHOUT LONG-TERM CURRENT USE OF INSULIN (HCC): ICD-10-CM

## 2024-10-26 DIAGNOSIS — E11.29 TYPE 2 DIABETES MELLITUS WITH MICROALBUMINURIA, WITHOUT LONG-TERM CURRENT USE OF INSULIN (HCC): ICD-10-CM

## 2024-10-26 DIAGNOSIS — I10 ESSENTIAL HYPERTENSION: ICD-10-CM

## 2024-10-28 ENCOUNTER — PREP FOR PROCEDURE (OUTPATIENT)
Dept: GASTROENTEROLOGY | Age: 69
End: 2024-10-28

## 2024-10-28 ENCOUNTER — HOSPITAL ENCOUNTER (INPATIENT)
Age: 69
LOS: 1 days | Discharge: HOME HEALTH | DRG: 690 | End: 2024-10-30
Attending: STUDENT IN AN ORGANIZED HEALTH CARE EDUCATION/TRAINING PROGRAM | Admitting: INTERNAL MEDICINE
Payer: MEDICARE

## 2024-10-28 ENCOUNTER — APPOINTMENT (OUTPATIENT)
Dept: GENERAL RADIOLOGY | Age: 69
DRG: 690 | End: 2024-10-28
Payer: MEDICARE

## 2024-10-28 ENCOUNTER — TELEPHONE (OUTPATIENT)
Dept: GASTROENTEROLOGY | Age: 69
End: 2024-10-28

## 2024-10-28 ENCOUNTER — OFFICE VISIT (OUTPATIENT)
Dept: GASTROENTEROLOGY | Age: 69
End: 2024-10-28

## 2024-10-28 ENCOUNTER — APPOINTMENT (OUTPATIENT)
Dept: CT IMAGING | Age: 69
DRG: 690 | End: 2024-10-28
Payer: MEDICARE

## 2024-10-28 VITALS
RESPIRATION RATE: 16 BRPM | TEMPERATURE: 97.2 F | HEART RATE: 80 BPM | DIASTOLIC BLOOD PRESSURE: 74 MMHG | SYSTOLIC BLOOD PRESSURE: 107 MMHG | OXYGEN SATURATION: 100 %

## 2024-10-28 DIAGNOSIS — K22.9 IRREGULAR Z LINE OF ESOPHAGUS: ICD-10-CM

## 2024-10-28 DIAGNOSIS — K22.70 BARRETT'S ESOPHAGUS WITHOUT DYSPLASIA: Primary | ICD-10-CM

## 2024-10-28 DIAGNOSIS — R80.9 TYPE 2 DIABETES MELLITUS WITH MICROALBUMINURIA, WITHOUT LONG-TERM CURRENT USE OF INSULIN (HCC): ICD-10-CM

## 2024-10-28 DIAGNOSIS — E11.29 TYPE 2 DIABETES MELLITUS WITH MICROALBUMINURIA, WITHOUT LONG-TERM CURRENT USE OF INSULIN (HCC): ICD-10-CM

## 2024-10-28 DIAGNOSIS — D64.9 ANEMIA, UNSPECIFIED TYPE: ICD-10-CM

## 2024-10-28 DIAGNOSIS — K59.00 CONSTIPATION, UNSPECIFIED CONSTIPATION TYPE: ICD-10-CM

## 2024-10-28 DIAGNOSIS — R11.0 NAUSEA: ICD-10-CM

## 2024-10-28 DIAGNOSIS — K31.84 GASTROPARESIS: ICD-10-CM

## 2024-10-28 DIAGNOSIS — R53.1 GENERALIZED WEAKNESS: ICD-10-CM

## 2024-10-28 DIAGNOSIS — R74.8 ELEVATED ALKALINE PHOSPHATASE LEVEL: ICD-10-CM

## 2024-10-28 DIAGNOSIS — R55 SYNCOPE, UNSPECIFIED SYNCOPE TYPE: ICD-10-CM

## 2024-10-28 DIAGNOSIS — N39.0 ACUTE UTI: Primary | ICD-10-CM
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
## 2024-10-28 LAB
ANION GAP SERPL CALCULATED.3IONS-SCNC: 13 MMOL/L (ref 9–17)
BUN SERPL-MCNC: 28 MG/DL (ref 8–23)
BUN/CREAT SERPL: 23 (ref 9–20)
CALCIUM SERPL-MCNC: 9.1 MG/DL (ref 8.6–10.4)
CHLORIDE SERPL-SCNC: 92 MMOL/L (ref 98–107)
CO2 SERPL-SCNC: 27 MMOL/L (ref 20–31)
CREAT SERPL-MCNC: 1.2 MG/DL (ref 0.5–0.9)
ERYTHROCYTE [DISTWIDTH] IN BLOOD BY AUTOMATED COUNT: 14.8 % (ref 11.8–14.4)
GFR, ESTIMATED: 49 ML/MIN/1.73M2
GLUCOSE SERPL-MCNC: 202 MG/DL (ref 70–99)
HCT VFR BLD AUTO: 35.1 % (ref 36.3–47.1)
HGB BLD-MCNC: 11.5 G/DL (ref 11.9–15.1)
MAGNESIUM SERPL-MCNC: 1.8 MG/DL (ref 1.6–2.6)
MCH RBC QN AUTO: 28.8 PG (ref 25.2–33.5)
MCHC RBC AUTO-ENTMCNC: 32.8 G/DL (ref 25.2–33.5)
MCV RBC AUTO: 87.8 FL (ref 82.6–102.9)
NRBC BLD-RTO: 0 PER 100 WBC
PLATELET # BLD AUTO: 444 K/UL (ref 138–453)
PMV BLD AUTO: 10.2 FL (ref 8.1–13.5)
POTASSIUM SERPL-SCNC: 3.7 MMOL/L (ref 3.7–5.3)
RBC # BLD AUTO: 4 M/UL (ref 3.95–5.11)
SODIUM SERPL-SCNC: 132 MMOL/L (ref 135–144)
TROPONIN I SERPL HS-MCNC: 20 NG/L (ref 0–14)
WBC OTHER # BLD: 20.4 K/UL (ref 3.5–11.3)

## 2024-10-28 PROCEDURE — 72125 CT NECK SPINE W/O DYE: CPT

## 2024-10-28 PROCEDURE — 3017F COLORECTAL CA SCREEN DOC REV: CPT | Performed by: INTERNAL MEDICINE

## 2024-10-28 PROCEDURE — 96365 THER/PROPH/DIAG IV INF INIT: CPT

## 2024-10-28 PROCEDURE — 1036F TOBACCO NON-USER: CPT | Performed by: INTERNAL MEDICINE

## 2024-10-28 PROCEDURE — 99285 EMERGENCY DEPT VISIT HI MDM: CPT

## 2024-10-28 PROCEDURE — 1090F PRES/ABSN URINE INCON ASSESS: CPT | Performed by: INTERNAL MEDICINE

## 2024-10-28 PROCEDURE — 71045 X-RAY EXAM CHEST 1 VIEW: CPT

## 2024-10-28 PROCEDURE — 93005 ELECTROCARDIOGRAM TRACING: CPT | Performed by: STUDENT IN AN ORGANIZED HEALTH CARE EDUCATION/TRAINING PROGRAM

## 2024-10-28 PROCEDURE — 80048 BASIC METABOLIC PNL TOTAL CA: CPT

## 2024-10-28 PROCEDURE — 1111F DSCHRG MED/CURRENT MED MERGE: CPT | Performed by: INTERNAL MEDICINE

## 2024-10-28 PROCEDURE — G8400 PT W/DXA NO RESULTS DOC: HCPCS | Performed by: INTERNAL MEDICINE

## 2024-10-28 PROCEDURE — G8427 DOCREV CUR MEDS BY ELIG CLIN: HCPCS | Performed by: INTERNAL MEDICINE

## 2024-10-28 PROCEDURE — 83735 ASSAY OF MAGNESIUM: CPT

## 2024-10-28 PROCEDURE — 85027 COMPLETE CBC AUTOMATED: CPT

## 2024-10-28 PROCEDURE — 36415 COLL VENOUS BLD VENIPUNCTURE: CPT

## 2024-10-28 PROCEDURE — 70450 CT HEAD/BRAIN W/O DYE: CPT

## 2024-10-28 PROCEDURE — G8482 FLU IMMUNIZE ORDER/ADMIN: HCPCS | Performed by: INTERNAL MEDICINE

## 2024-10-28 PROCEDURE — G8417 CALC BMI ABV UP PARAM F/U: HCPCS | Performed by: INTERNAL MEDICINE

## 2024-10-28 PROCEDURE — 84484 ASSAY OF TROPONIN QUANT: CPT

## 2024-10-28 RX ORDER — GLIMEPIRIDE 2 MG/1
2 TABLET ORAL EVERY MORNING
Qty: 90 TABLET | Refills: 0 | Status: ON HOLD | OUTPATIENT
Start: 2024-10-28 | End: 2024-10-30

## 2024-10-28 RX ORDER — AMLODIPINE BESYLATE 10 MG/1
10 TABLET ORAL DAILY
Qty: 90 TABLET | Refills: 0 | Status: SHIPPED | OUTPATIENT
Start: 2024-10-28 | End: 2025-04-26

## 2024-10-28 ASSESSMENT — ENCOUNTER SYMPTOMS
RECTAL PAIN: 0
ANAL BLEEDING: 0
TROUBLE SWALLOWING: 0
WHEEZING: 0
CONSTIPATION: 1
CHOKING: 0
DIARRHEA: 0
SORE THROAT: 0
ABDOMINAL DISTENTION: 0
NAUSEA: 1
COUGH: 0
VOICE CHANGE: 0
BLOOD IN STOOL: 0
VOMITING: 0
ABDOMINAL PAIN: 0

## 2024-10-28 ASSESSMENT — PAIN SCALES - GENERAL: PAINLEVEL_OUTOF10: 6

## 2024-10-28 ASSESSMENT — PAIN DESCRIPTION - LOCATION: LOCATION: HEAD

## 2024-10-28 ASSESSMENT — PAIN - FUNCTIONAL ASSESSMENT: PAIN_FUNCTIONAL_ASSESSMENT: 0-10

## 2024-10-28 ASSESSMENT — PAIN DESCRIPTION - ORIENTATION: ORIENTATION: LEFT;POSTERIOR

## 2024-10-28 NOTE — TELEPHONE ENCOUNTER
Jyoti called requesting a refill of the below medication which has been pended for you:     Requested Prescriptions     Pending Prescriptions Disp Refills    amLODIPine (NORVASC) 10 MG tablet [Pharmacy Med Name: amLODIPine Besylate 10 MG Oral Tablet] 90 tablet 0     Sig: TAKE 1 TABLET BY MOUTH DAILY    glimepiride (AMARYL) 2 MG tablet [Pharmacy Med Name: Glimepiride 2 MG Oral Tablet] 90 tablet 0     Sig: TAKE 1 TABLET BY MOUTH IN THE  MORNING       Last Appointment Date: 10/23/2024  Next Appointment Date: 1/21/2025    Allergies   Allergen Reactions    Asa [Aspirin] Other (See Comments)     Stomach irritation    Pollen Extract

## 2024-10-28 NOTE — TELEPHONE ENCOUNTER
Procedure scheduled/Dr Dutton  Procedure:EGD  Dx:Elizalde's esophagus without dysplasia; Gastroparesis; Nausea; Anemia, unspecified type; Irregular Z line of esophagus   Date:01/28/2025  Time:11:30 am / Arrive: 10:00 am   Hospital:[facility] phone call:memo  Bowel Prep instructions given:egd  In office/via phone:office   Clearance needed:none

## 2024-10-28 NOTE — PROGRESS NOTES
GI CLINIC FOLLOW UP    INTERVAL HISTORY:   Nia Patel MD  [address],  [place] [address]    Chief Complaint   Patient presents with    Nausea     Occasional nausea after eating     Constipation     Occasional constipation. Takes Miralax as needed        HISTORY OF PRESENT ILLNESS: Ms.Nancy Warren is a 69 y.o. female with T2DM, referred for evaluation of BE, GERD, pulmonary htn, delayed gastric emptying, s/p appy.    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 ESR 51. Elevated proline, alanine, sarcosine. She did not follow up with them.    Here today regarding postprandial nausea and constipation  Continues to feel full very quickly. No episodes of emesis. No heartburn, dysphagia, globus.   She continues to take opiate pain medications - currently on roxicodone 5mg. Seems to be aggravating her already present symptoms. She also is on reglan 10mg QID. She has not had any drug holidays since starting this medication.     She has a BM every 4 days.   Taking Miralax OTC prn  BMs do not require straining  No black/bloody stools  No rectal pain  Last colonoscopy 12/23/20 - 5 yr recall      Last labs: 9/28/24  A1c 7.0%  Hgb 9.2, hct 29.3  BUN 24, Cr 1.1, eGFR 57  TSH ok  Low albumin   Low folate 2.7  Low vitamin D - has started high-dose weekly vit d since then      Last imaging: CT chest abd pelvis 9/27/24   IMPRESSION:  1. Nondisplaced fracture of the right lateral 3rd rib.  Displaced fractures  of the right lateral 4th, 5th, 6th, and 7th ribs.  2. The small right-sided hemothorax.  3. Few tiny pneumothoraces within the right upper lobe.  4. No acute abnormality of the abdomen or pelvis is identified.           Past Medical,Family, and Social History reviewed and does contribute to the patient presentingcondition.    I did review all the labs

## 2024-10-29 ENCOUNTER — ENROLLMENT (OUTPATIENT)
Dept: PHARMACY | Facility: CLINIC | Age: 69
End: 2024-10-29

## 2024-10-29 ENCOUNTER — TELEPHONE (OUTPATIENT)
Dept: FAMILY MEDICINE CLINIC | Age: 69
End: 2024-10-29

## 2024-10-29 ENCOUNTER — APPOINTMENT (OUTPATIENT)
Age: 69
DRG: 690 | End: 2024-10-29
Payer: MEDICARE

## 2024-10-29 DIAGNOSIS — R80.9 TYPE 2 DIABETES MELLITUS WITH MICROALBUMINURIA, WITHOUT LONG-TERM CURRENT USE OF INSULIN (HCC): ICD-10-CM

## 2024-10-29 DIAGNOSIS — I10 ESSENTIAL HYPERTENSION: ICD-10-CM

## 2024-10-29 DIAGNOSIS — S22.41XA MULTIPLE FRACTURES OF RIBS, RIGHT SIDE, INITIAL ENCOUNTER FOR CLOSED FRACTURE: Primary | ICD-10-CM

## 2024-10-29 DIAGNOSIS — E11.29 TYPE 2 DIABETES MELLITUS WITH MICROALBUMINURIA, WITHOUT LONG-TERM CURRENT USE OF INSULIN (HCC): ICD-10-CM

## 2024-10-29 PROBLEM — R55 SYNCOPE: Status: ACTIVE | Noted: 2024-10-29

## 2024-10-29 PROBLEM — G58.8 INTERCOSTAL NEURALGIA: Status: ACTIVE | Noted: 2024-10-10

## 2024-10-29 PROBLEM — N39.0 ACUTE UTI: Status: ACTIVE | Noted: 2024-10-29

## 2024-10-29 PROBLEM — R11.10 VOMITING: Status: ACTIVE | Noted: 2022-10-18

## 2024-10-29 PROBLEM — R53.1 GENERALIZED WEAKNESS: Status: ACTIVE | Noted: 2024-10-29

## 2024-10-29 PROBLEM — M45.9 ANKYLOSING SPONDYLITIS (HCC): Status: ACTIVE | Noted: 2024-07-18

## 2024-10-29 LAB
ANION GAP SERPL CALCULATED.3IONS-SCNC: 13 MMOL/L (ref 9–17)
BACTERIA URNS QL MICRO: ABNORMAL
BASOPHILS # BLD: 0.07 K/UL (ref 0–0.2)
BASOPHILS NFR BLD: 0 % (ref 0–2)
BILIRUB UR QL STRIP: NEGATIVE
BUN SERPL-MCNC: 26 MG/DL (ref 8–23)
BUN/CREAT SERPL: 24 (ref 9–20)
CALCIUM SERPL-MCNC: 8.9 MG/DL (ref 8.6–10.4)
CHARACTER UR: ABNORMAL
CHLORIDE SERPL-SCNC: 97 MMOL/L (ref 98–107)
CLARITY UR: CLEAR
CO2 SERPL-SCNC: 25 MMOL/L (ref 20–31)
COLOR UR: YELLOW
CREAT SERPL-MCNC: 1.1 MG/DL (ref 0.5–0.9)
ECHO AO ROOT DIAM: 3.1 CM
ECHO AO ROOT INDEX: 1.38 CM/M2
ECHO AV AREA PEAK VELOCITY: 3.1 CM2
ECHO AV AREA VTI: 3.1 CM2
ECHO AV AREA/BSA PEAK VELOCITY: 1.4 CM2/M2
ECHO AV AREA/BSA VTI: 1.4 CM2/M2
ECHO AV MEAN GRADIENT: 7 MMHG
ECHO AV MEAN VELOCITY: 1.1 M/S
ECHO AV PEAK GRADIENT: 14 MMHG
ECHO AV PEAK VELOCITY: 1.9 M/S
ECHO AV VELOCITY RATIO: 0.89
ECHO AV VTI: 38.7 CM
ECHO BSA: 2.34 M2
ECHO EST RA PRESSURE: 3 MMHG
ECHO LA AREA 2C: 17.8 CM2
ECHO LA AREA 4C: 19.1 CM2
ECHO LA DIAMETER INDEX: 1.33 CM/M2
ECHO LA DIAMETER: 3 CM
ECHO LA MAJOR AXIS: 6.5 CM
ECHO LA MINOR AXIS: 5.6 CM
ECHO LA TO AORTIC ROOT RATIO: 0.97
ECHO LA VOL BP: 49 ML (ref 22–52)
ECHO LA VOL MOD A2C: 46 ML (ref 22–52)
ECHO LA VOL MOD A4C: 46 ML (ref 22–52)
ECHO LA VOL/BSA BIPLANE: 22 ML/M2 (ref 16–34)
ECHO LA VOLUME INDEX MOD A2C: 20 ML/M2 (ref 16–34)
ECHO LA VOLUME INDEX MOD A4C: 20 ML/M2 (ref 16–34)
ECHO LV E' LATERAL VELOCITY: 11.9 CM/S
ECHO LV E' SEPTAL VELOCITY: 7.8 CM/S
ECHO LV EF PHYSICIAN: 60 %
ECHO LV FRACTIONAL SHORTENING: 34 % (ref 28–44)
ECHO LV INTERNAL DIMENSION DIASTOLE INDEX: 1.69 CM/M2
ECHO LV INTERNAL DIMENSION DIASTOLIC: 3.8 CM (ref 3.9–5.3)
ECHO LV INTERNAL DIMENSION SYSTOLIC INDEX: 1.11 CM/M2
ECHO LV INTERNAL DIMENSION SYSTOLIC: 2.5 CM
ECHO LV IVSD: 1.2 CM (ref 0.6–0.9)
ECHO LV MASS 2D: 134.7 G (ref 67–162)
ECHO LV MASS INDEX 2D: 59.8 G/M2 (ref 43–95)
ECHO LV POSTERIOR WALL DIASTOLIC: 1 CM (ref 0.6–0.9)
ECHO LV RELATIVE WALL THICKNESS RATIO: 0.53
ECHO LVOT AREA: 3.5 CM2
ECHO LVOT AV VTI INDEX: 0.9
ECHO LVOT DIAM: 2.1 CM
ECHO LVOT MEAN GRADIENT: 5 MMHG
ECHO LVOT PEAK GRADIENT: 11 MMHG
ECHO LVOT PEAK VELOCITY: 1.7 M/S
ECHO LVOT STROKE VOLUME INDEX: 53.9 ML/M2
ECHO LVOT SV: 121.2 ML
ECHO LVOT VTI: 35 CM
ECHO MV A VELOCITY: 0.8 M/S
ECHO MV E DECELERATION TIME (DT): 211 MS
ECHO MV E VELOCITY: 1.05 M/S
ECHO MV E/A RATIO: 1.31
ECHO MV E/E' LATERAL: 8.82
ECHO MV E/E' RATIO (AVERAGED): 11.14
ECHO MV E/E' SEPTAL: 13.46
ECHO RIGHT VENTRICULAR SYSTOLIC PRESSURE (RVSP): 34 MMHG
ECHO RV TAPSE: 2.5 CM (ref 1.7–?)
ECHO TV REGURGITANT MAX VELOCITY: 2.79 M/S
ECHO TV REGURGITANT PEAK GRADIENT: 31 MMHG
EOSINOPHIL # BLD: 0.12 K/UL (ref 0–0.44)
EOSINOPHILS RELATIVE PERCENT: 1 % (ref 1–4)
EPI CELLS #/AREA URNS HPF: ABNORMAL /HPF (ref 0–5)
ERYTHROCYTE [DISTWIDTH] IN BLOOD BY AUTOMATED COUNT: 14.9 % (ref 11.8–14.4)
GFR, ESTIMATED: 54 ML/MIN/1.73M2
GLUCOSE BLD-MCNC: 113 MG/DL (ref 65–105)
GLUCOSE BLD-MCNC: 129 MG/DL (ref 65–105)
GLUCOSE BLD-MCNC: 174 MG/DL (ref 65–105)
GLUCOSE SERPL-MCNC: 159 MG/DL (ref 70–99)
GLUCOSE UR STRIP-MCNC: ABNORMAL MG/DL
HCT VFR BLD AUTO: 34.5 % (ref 36.3–47.1)
HGB BLD-MCNC: 11 G/DL (ref 11.9–15.1)
HGB UR QL STRIP.AUTO: NEGATIVE
IMM GRANULOCYTES # BLD AUTO: 0.29 K/UL (ref 0–0.3)
IMM GRANULOCYTES NFR BLD: 2 %
KETONES UR STRIP-MCNC: NEGATIVE MG/DL
LEUKOCYTE ESTERASE UR QL STRIP: NEGATIVE
LYMPHOCYTES NFR BLD: 1.82 K/UL (ref 1.1–3.7)
LYMPHOCYTES RELATIVE PERCENT: 11 % (ref 24–43)
MCH RBC QN AUTO: 28.3 PG (ref 25.2–33.5)
MCHC RBC AUTO-ENTMCNC: 31.9 G/DL (ref 25.2–33.5)
MCV RBC AUTO: 88.7 FL (ref 82.6–102.9)
MONOCYTES NFR BLD: 1.01 K/UL (ref 0.1–1.2)
MONOCYTES NFR BLD: 6 % (ref 3–12)
NEUTROPHILS NFR BLD: 81 % (ref 36–65)
NEUTS SEG NFR BLD: 13.75 K/UL (ref 1.5–8.1)
NITRITE UR QL STRIP: POSITIVE
NRBC BLD-RTO: 0 PER 100 WBC
PH UR STRIP: 6 [PH] (ref 5–6)
PLATELET # BLD AUTO: ABNORMAL K/UL (ref 138–453)
PLATELET, FLUORESCENCE: 292 K/UL (ref 138–453)
PLATELETS.RETICULATED NFR BLD AUTO: 7.3 % (ref 1.1–10.3)
POTASSIUM SERPL-SCNC: 4.1 MMOL/L (ref 3.7–5.3)
PROT UR STRIP-MCNC: NEGATIVE MG/DL
RBC # BLD AUTO: 3.89 M/UL (ref 3.95–5.11)
RBC #/AREA URNS HPF: ABNORMAL /HPF (ref 0–4)
SODIUM SERPL-SCNC: 135 MMOL/L (ref 135–144)
SP GR UR STRIP: 1.02 (ref 1.01–1.02)
TROPONIN I SERPL HS-MCNC: 22 NG/L (ref 0–14)
UROBILINOGEN UR STRIP-ACNC: NORMAL EU/DL (ref 0–1)
WBC #/AREA URNS HPF: ABNORMAL /HPF (ref 0–4)
WBC OTHER # BLD: 17.1 K/UL (ref 3.5–11.3)

## 2024-10-29 PROCEDURE — 87186 SC STD MICRODIL/AGAR DIL: CPT

## 2024-10-29 PROCEDURE — 6370000000 HC RX 637 (ALT 250 FOR IP)

## 2024-10-29 PROCEDURE — 2580000003 HC RX 258: Performed by: STUDENT IN AN ORGANIZED HEALTH CARE EDUCATION/TRAINING PROGRAM

## 2024-10-29 PROCEDURE — 99223 1ST HOSP IP/OBS HIGH 75: CPT | Performed by: INTERNAL MEDICINE

## 2024-10-29 PROCEDURE — 87077 CULTURE AEROBIC IDENTIFY: CPT

## 2024-10-29 PROCEDURE — 6360000002 HC RX W HCPCS

## 2024-10-29 PROCEDURE — 99222 1ST HOSP IP/OBS MODERATE 55: CPT

## 2024-10-29 PROCEDURE — 36415 COLL VENOUS BLD VENIPUNCTURE: CPT

## 2024-10-29 PROCEDURE — 97161 PT EVAL LOW COMPLEX 20 MIN: CPT

## 2024-10-29 PROCEDURE — 97165 OT EVAL LOW COMPLEX 30 MIN: CPT | Performed by: OCCUPATIONAL THERAPIST

## 2024-10-29 PROCEDURE — 82947 ASSAY GLUCOSE BLOOD QUANT: CPT

## 2024-10-29 PROCEDURE — 96365 THER/PROPH/DIAG IV INF INIT: CPT

## 2024-10-29 PROCEDURE — 85025 COMPLETE CBC W/AUTO DIFF WBC: CPT

## 2024-10-29 PROCEDURE — 93306 TTE W/DOPPLER COMPLETE: CPT

## 2024-10-29 PROCEDURE — 93306 TTE W/DOPPLER COMPLETE: CPT | Performed by: INTERNAL MEDICINE

## 2024-10-29 PROCEDURE — 6360000002 HC RX W HCPCS: Performed by: STUDENT IN AN ORGANIZED HEALTH CARE EDUCATION/TRAINING PROGRAM

## 2024-10-29 PROCEDURE — 81001 URINALYSIS AUTO W/SCOPE: CPT

## 2024-10-29 PROCEDURE — 80048 BASIC METABOLIC PNL TOTAL CA: CPT

## 2024-10-29 PROCEDURE — 87086 URINE CULTURE/COLONY COUNT: CPT

## 2024-10-29 PROCEDURE — 2060000000 HC ICU INTERMEDIATE R&B

## 2024-10-29 PROCEDURE — 2580000003 HC RX 258

## 2024-10-29 PROCEDURE — 84484 ASSAY OF TROPONIN QUANT: CPT

## 2024-10-29 RX ORDER — GLUCAGON 1 MG/ML
1 KIT INJECTION PRN
Status: DISCONTINUED | OUTPATIENT
Start: 2024-10-29 | End: 2024-10-30 | Stop reason: HOSPADM

## 2024-10-29 RX ORDER — ONDANSETRON 4 MG/1
4 TABLET, ORALLY DISINTEGRATING ORAL EVERY 8 HOURS PRN
Status: DISCONTINUED | OUTPATIENT
Start: 2024-10-29 | End: 2024-10-30 | Stop reason: HOSPADM

## 2024-10-29 RX ORDER — SODIUM CHLORIDE 0.9 % (FLUSH) 0.9 %
5-40 SYRINGE (ML) INJECTION EVERY 12 HOURS SCHEDULED
Status: DISCONTINUED | OUTPATIENT
Start: 2024-10-29 | End: 2024-10-30 | Stop reason: HOSPADM

## 2024-10-29 RX ORDER — ACETAMINOPHEN 650 MG/1
650 SUPPOSITORY RECTAL EVERY 6 HOURS PRN
Status: DISCONTINUED | OUTPATIENT
Start: 2024-10-29 | End: 2024-10-30 | Stop reason: HOSPADM

## 2024-10-29 RX ORDER — METOCLOPRAMIDE 10 MG/1
10 TABLET ORAL 4 TIMES DAILY PRN
Status: DISCONTINUED | OUTPATIENT
Start: 2024-10-29 | End: 2024-10-30 | Stop reason: HOSPADM

## 2024-10-29 RX ORDER — LANOLIN ALCOHOL/MO/W.PET/CERES
6 CREAM (GRAM) TOPICAL NIGHTLY PRN
Status: DISCONTINUED | OUTPATIENT
Start: 2024-10-29 | End: 2024-10-30 | Stop reason: HOSPADM

## 2024-10-29 RX ORDER — SODIUM CHLORIDE 9 MG/ML
INJECTION, SOLUTION INTRAVENOUS PRN
Status: DISCONTINUED | OUTPATIENT
Start: 2024-10-29 | End: 2024-10-30 | Stop reason: HOSPADM

## 2024-10-29 RX ORDER — GLIPIZIDE 5 MG/1
5 TABLET ORAL
Status: DISCONTINUED | OUTPATIENT
Start: 2024-10-29 | End: 2024-10-30 | Stop reason: HOSPADM

## 2024-10-29 RX ORDER — ONDANSETRON 2 MG/ML
4 INJECTION INTRAMUSCULAR; INTRAVENOUS EVERY 6 HOURS PRN
Status: DISCONTINUED | OUTPATIENT
Start: 2024-10-29 | End: 2024-10-30 | Stop reason: HOSPADM

## 2024-10-29 RX ORDER — DULOXETIN HYDROCHLORIDE 30 MG/1
60 CAPSULE, DELAYED RELEASE ORAL DAILY
Status: DISCONTINUED | OUTPATIENT
Start: 2024-10-29 | End: 2024-10-30 | Stop reason: HOSPADM

## 2024-10-29 RX ORDER — SODIUM CHLORIDE 9 MG/ML
INJECTION, SOLUTION INTRAVENOUS CONTINUOUS
Status: DISCONTINUED | OUTPATIENT
Start: 2024-10-29 | End: 2024-10-29

## 2024-10-29 RX ORDER — PANTOPRAZOLE SODIUM 40 MG/1
40 TABLET, DELAYED RELEASE ORAL 2 TIMES DAILY
Status: DISCONTINUED | OUTPATIENT
Start: 2024-10-29 | End: 2024-10-30 | Stop reason: HOSPADM

## 2024-10-29 RX ORDER — DEXTROSE MONOHYDRATE 100 MG/ML
INJECTION, SOLUTION INTRAVENOUS CONTINUOUS PRN
Status: DISCONTINUED | OUTPATIENT
Start: 2024-10-29 | End: 2024-10-30 | Stop reason: HOSPADM

## 2024-10-29 RX ORDER — METHOCARBAMOL 500 MG/1
500 TABLET, FILM COATED ORAL 3 TIMES DAILY
Status: ON HOLD | COMMUNITY
End: 2024-10-30 | Stop reason: HOSPADM

## 2024-10-29 RX ORDER — SODIUM CHLORIDE 0.9 % (FLUSH) 0.9 %
5-40 SYRINGE (ML) INJECTION PRN
Status: DISCONTINUED | OUTPATIENT
Start: 2024-10-29 | End: 2024-10-30 | Stop reason: HOSPADM

## 2024-10-29 RX ORDER — AMLODIPINE BESYLATE 5 MG/1
10 TABLET ORAL DAILY
Status: DISCONTINUED | OUTPATIENT
Start: 2024-10-29 | End: 2024-10-30 | Stop reason: HOSPADM

## 2024-10-29 RX ORDER — ACETAMINOPHEN 325 MG/1
650 TABLET ORAL EVERY 6 HOURS PRN
Status: DISCONTINUED | OUTPATIENT
Start: 2024-10-29 | End: 2024-10-30 | Stop reason: HOSPADM

## 2024-10-29 RX ORDER — OXYCODONE HYDROCHLORIDE 5 MG/1
5 TABLET ORAL EVERY 6 HOURS PRN
Status: DISCONTINUED | OUTPATIENT
Start: 2024-10-29 | End: 2024-10-30 | Stop reason: HOSPADM

## 2024-10-29 RX ORDER — ENOXAPARIN SODIUM 100 MG/ML
40 INJECTION SUBCUTANEOUS DAILY
Status: DISCONTINUED | OUTPATIENT
Start: 2024-10-29 | End: 2024-10-30 | Stop reason: HOSPADM

## 2024-10-29 RX ORDER — POLYETHYLENE GLYCOL 3350 17 G/17G
17 POWDER, FOR SOLUTION ORAL DAILY PRN
Status: DISCONTINUED | OUTPATIENT
Start: 2024-10-29 | End: 2024-10-30 | Stop reason: HOSPADM

## 2024-10-29 RX ORDER — INSULIN LISPRO 100 [IU]/ML
0-4 INJECTION, SOLUTION INTRAVENOUS; SUBCUTANEOUS
Status: DISCONTINUED | OUTPATIENT
Start: 2024-10-29 | End: 2024-10-30 | Stop reason: HOSPADM

## 2024-10-29 RX ORDER — INSULIN GLARGINE 100 [IU]/ML
10 INJECTION, SOLUTION SUBCUTANEOUS NIGHTLY
Status: DISCONTINUED | OUTPATIENT
Start: 2024-10-29 | End: 2024-10-30 | Stop reason: HOSPADM

## 2024-10-29 RX ADMIN — ENOXAPARIN SODIUM 40 MG: 100 INJECTION SUBCUTANEOUS at 08:56

## 2024-10-29 RX ADMIN — CEFTRIAXONE 1000 MG: 1 INJECTION, POWDER, FOR SOLUTION INTRAMUSCULAR; INTRAVENOUS at 02:01

## 2024-10-29 RX ADMIN — AMLODIPINE BESYLATE 10 MG: 5 TABLET ORAL at 08:56

## 2024-10-29 RX ADMIN — SODIUM CHLORIDE, PRESERVATIVE FREE 10 ML: 5 INJECTION INTRAVENOUS at 21:05

## 2024-10-29 RX ADMIN — GLIPIZIDE 5 MG: 5 TABLET ORAL at 06:59

## 2024-10-29 RX ADMIN — INSULIN GLARGINE 10 UNITS: 100 INJECTION, SOLUTION SUBCUTANEOUS at 21:05

## 2024-10-29 RX ADMIN — PANTOPRAZOLE SODIUM 40 MG: 40 TABLET, DELAYED RELEASE ORAL at 08:56

## 2024-10-29 RX ADMIN — PANTOPRAZOLE SODIUM 40 MG: 40 TABLET, DELAYED RELEASE ORAL at 21:01

## 2024-10-29 RX ADMIN — GABAPENTIN 400 MG: 300 CAPSULE ORAL at 08:56

## 2024-10-29 RX ADMIN — GABAPENTIN 400 MG: 300 CAPSULE ORAL at 21:01

## 2024-10-29 RX ADMIN — OXYCODONE 5 MG: 5 TABLET ORAL at 21:02

## 2024-10-29 RX ADMIN — DULOXETINE HYDROCHLORIDE 60 MG: 30 CAPSULE, DELAYED RELEASE ORAL at 08:56

## 2024-10-29 RX ADMIN — SODIUM CHLORIDE: 9 INJECTION, SOLUTION INTRAVENOUS at 04:12

## 2024-10-29 ASSESSMENT — PAIN SCALES - WONG BAKER: WONGBAKER_NUMERICALRESPONSE: NO HURT

## 2024-10-29 ASSESSMENT — PAIN - FUNCTIONAL ASSESSMENT
PAIN_FUNCTIONAL_ASSESSMENT: ACTIVITIES ARE NOT PREVENTED
PAIN_FUNCTIONAL_ASSESSMENT: PREVENTS OR INTERFERES SOME ACTIVE ACTIVITIES AND ADLS

## 2024-10-29 ASSESSMENT — PAIN DESCRIPTION - DESCRIPTORS
DESCRIPTORS: ACHING;DISCOMFORT
DESCRIPTORS: ACHING

## 2024-10-29 ASSESSMENT — PAIN DESCRIPTION - LOCATION
LOCATION: BACK;RIB CAGE
LOCATION: BACK

## 2024-10-29 ASSESSMENT — ENCOUNTER SYMPTOMS
VOMITING: 0
COUGH: 0
SHORTNESS OF BREATH: 0
ABDOMINAL PAIN: 0
NAUSEA: 0

## 2024-10-29 ASSESSMENT — PAIN SCALES - GENERAL
PAINLEVEL_OUTOF10: 7
PAINLEVEL_OUTOF10: 4
PAINLEVEL_OUTOF10: 7
PAINLEVEL_OUTOF10: 0

## 2024-10-29 ASSESSMENT — PAIN DESCRIPTION - PAIN TYPE
TYPE: CHRONIC PAIN
TYPE: CHRONIC PAIN

## 2024-10-29 ASSESSMENT — PAIN DESCRIPTION - ORIENTATION: ORIENTATION: RIGHT

## 2024-10-29 NOTE — TELEPHONE ENCOUNTER
Home health certification and plan of care done 10/29/24 on patient for date services 10/4/24-12/2/24. Verified medications. Physician time spent is 15 minutes for activities to coordinate services, documenting, medical decision making, and review of reports, treatment plans, and test results.

## 2024-10-29 NOTE — DISCHARGE INSTR - DIET
Good nutrition is important when healing from an illness, injury, or surgery.  Follow any nutrition recommendations given to you during your hospital stay.   If you were given an oral nutrition supplement while in the hospital, continue to take this supplement at home.  You can take it with meals, in-between meals, and/or before bedtime. These supplements can be purchased at most local grocery stores, pharmacies, and chain [employer]-stores.   If you have any questions about your diet or nutrition, call the hospital and ask for the dietitian.  Regular Cardiac, Diabetic Diet.

## 2024-10-29 NOTE — PLAN OF CARE
Problem: Chronic Conditions and Co-morbidities  Goal: Patient's chronic conditions and co-morbidity symptoms are monitored and maintained or improved  10/29/2024 1458 by Acacia Sequeira RN  Outcome: Progressing  Flowsheets (Taken 10/29/2024 1458)  Care Plan - Patient's Chronic Conditions and Co-Morbidity Symptoms are Monitored and Maintained or Improved:   Monitor and assess patient's chronic conditions and comorbid symptoms for stability, deterioration, or improvement   Collaborate with multidisciplinary team to address chronic and comorbid conditions and prevent exacerbation or deterioration   Update acute care plan with appropriate goals if chronic or comorbid symptoms are exacerbated and prevent overall improvement and discharge  10/29/2024 0515 by Luann Patrick RN  Outcome: Progressing     Problem: Discharge Planning  Goal: Discharge to home or other facility with appropriate resources  10/29/2024 1458 by Acacia Sequeira, RN  Outcome: Progressing  Flowsheets (Taken 10/29/2024 1458)  Discharge to home or other facility with appropriate resources:   Identify barriers to discharge with patient and caregiver   Arrange for needed discharge resources and transportation as appropriate   Identify discharge learning needs (meds, wound care, etc)   Refer to discharge planning if patient needs post-hospital services based on physician order or complex needs related to functional status, cognitive ability or social support system  Note: Plan for discharge back to home when appropriate  10/29/2024 0515 by Luann Patrick, RN  Outcome: Progressing     Problem: Pain  Goal: Verbalizes/displays adequate comfort level or baseline comfort level  10/29/2024 1458 by Acacia Sequeira RN  Outcome: Progressing  Flowsheets (Taken 10/29/2024 1458)  Verbalizes/displays adequate comfort level or baseline comfort level:   Encourage patient to monitor pain and request assistance   Assess pain using appropriate pain

## 2024-10-29 NOTE — TELEPHONE ENCOUNTER
I agree with the documentation of Esha Perales LPN.   Electronically signed by Nia Patel MD on 10/29/24 at 4:50 PM EDT.

## 2024-10-29 NOTE — CONSULTS
[facility]  In Patient Cardiology Consult      Date:   10/29/2024  Patient name: Jyoti Warren  Date of admission:  10/28/2024 10:38 PM  MRN:   [id number]  YOB: 1955    Reason for Admission: Fall    CHIEF COMPLAINT: Fall    History Obtained From: The patient and chart    HISTORY OF PRESENT ILLNESS:    This is a 69-year-old female.  She came to the emergency room.  She states that she had a fall.  Apparently she was getting off the commode.  She felt dizzy.  She lost her footing.  She did strike her head.  However she did not have any loss of consciousness.  Cardiology was consulted due to possible syncope.    Past Medical History:    Past Medical History:   Diagnosis Date    Acute cystitis with hematuria 9/19/2021    Elizalde's esophagus     Cervical spine pain 08/21/2013    Chronic headaches     Chronic sinusitis     CKD (chronic kidney disease)     stage 3    Closed fracture of distal end of left femur with routine healing 03/2021    COVID-19     Diabetes mellitus (HCC)     Dizziness     Femur fracture, left (HCC) 3/5/2021    Gastroesophageal reflux disease     barretts    H/O fall     Hypertension     Knee pain, left 08/21/2013    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HCC)     left temporal    Obesity     Peripheral neuropathy     Pneumonia 08/30/2015    Pneumonia due to COVID-19 virus 9/19/2021    Pulmonary hypertension (HCC) 01/01/2012    by echocardiogram    Shoulder pain, bilateral 08/21/2013    Spinal stenosis     Type 2 diabetes mellitus (HCC)     Unspecified essential hypertension     Essential hypertension    Vitamin D deficiency 11/05/2014         Past Surgical History:    Past Surgical History:   Procedure Laterality Date    APPENDECTOMY      BACK INJECTION  02/25/2021    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

## 2024-10-29 NOTE — ED NOTES
ED Report    Presents to ED from: Home    Chief Complaint   Patient presents with    Fall     1. Acute UTI    2. Generalized weakness      Present Condition: stable  Pertinent Event(s): Pt fell after standing from using commode, reports feeling dizzy prior, hit head on end table, abrasion to lt forearm and rt knee, denies LOC    LOC:  A+O x 4  Code Status: FULL    Vital Signs   Vitals:    10/28/24 2304   BP: (!) 147/75   Pulse: 88   Resp: 18   Temp: 96.9 °F (36.1 °C)   TempSrc: Tympanic   SpO2: 98%   Weight: 97.5 kg (215 lb)   Height: 1.727 m (5' 8\")     Oxygen Baseline: Room Air       Current Oxygen Needs: Room Air  Mobility: x1 Assist       Mobility Aide: Walker    LDAs:   Peripheral IV Left;Proximal Forearm (Active)     Abnormal ED Labs:    Labs Reviewed   TROPONIN - Abnormal; Notable for the following components:       Result Value    Troponin, High Sensitivity 20 (*)     All other components within normal limits   TROPONIN - Abnormal; Notable for the following components:    Troponin, High Sensitivity 22 (*)     All other components within normal limits   BASIC METABOLIC PANEL - Abnormal; Notable for the following components:    Sodium 132 (*)     Chloride 92 (*)     Glucose 202 (*)     BUN 28 (*)     Creatinine 1.2 (*)     Est, Glom Filt Rate 49 (*)     BUN/Creatinine Ratio 23 (*)     All other components within normal limits   CBC - Abnormal; Notable for the following components:    WBC 20.4 (*)     Hemoglobin 11.5 (*)     Hematocrit 35.1 (*)     RDW 14.8 (*)     All other components within normal limits   URINALYSIS WITH REFLEX TO CULTURE - Abnormal; Notable for the following components:    Glucose, Ur TRACE (*)     Nitrite, Urine POSITIVE (*)     All other components within normal limits   MICROSCOPIC URINALYSIS - Abnormal; Notable for the following components:    Bacteria, UA MANY (*)     Other Observations UA   (*)     Value: Utilizing a urinalysis as the only screening method to exclude a potential

## 2024-10-29 NOTE — CARE COORDINATION
Case Management Assessment  Initial Evaluation    Date/Time of Evaluation: 10/29/2024 12:34 PM  Assessment Completed by: Thais Cavazos RN    If patient is discharged prior to next notation, then this note serves as note for discharge by case management.    Patient Name: Jyoti Warren                   YOB: 1955  Diagnosis: Generalized weakness [R53.1]  Acute UTI [N39.0]  Acute cystitis without hematuria [N30.00]                   Date / Time: 10/28/2024 10:38 PM    Patient Admission Status: Inpatient   Readmission Risk (Low < 19, Mod (19-27), High > 27): Readmission Risk Score: 21.5    Current PCP: Nia Patel MD  PCP verified by CM? Yes    Chart Reviewed: Yes      History Provided by: Patient  Patient Orientation: Alert and Oriented    Patient Cognition: Alert    Hospitalization in the last 30 days (Readmission):  No    If yes, Readmission Assessment in CM Navigator will be completed.    Advance Directives:      Code Status: Full Code   Patient's Primary Decision Maker is:      Primary Decision Maker: Kingsley Warren - Spouse - [phone]    Discharge Planning:    Patient lives with: Spouse/Significant Other Type of Home: House  Primary Care Giver: Spouse  Patient Support Systems include: Spouse/Significant Other, Children   Current Financial resources: Medicare  Current community resources: ECF/Home Care  Current services prior to admission: Durable Medical Equipment, Home Care            Current DME: Walker            Type of Home Care services:  Aide Services, PT, OT, Nursing Services    ADLS  Prior functional level: Assistance with the following:, Bathing, Cooking, Housework, Shopping, Mobility  Current functional level: Assistance with the following:, Bathing, Cooking, Housework, Shopping, Mobility    PT AM-PAC:   /24  OT AM-PAC:   /24    Family can provide assistance at DC: Yes  Would you like Case Management to discuss the discharge plan with any other family members/significant others,

## 2024-10-29 NOTE — ED PROVIDER NOTES
[facility] ED  [address] [address]  Phone: [phone]  EMERGENCY DEPARTMENT ENCOUNTER      Pt Name: Jyoti Warren  MRN: [id number]  Birthdate 1955  Date of evaluation: 10/28/2024    CHIEF COMPLAINT       Chief Complaint   Patient presents with    Fall       HISTORY OF PRESENT ILLNESS    Jyoti Warren is a 69 y.o. female who presents to the emergency department after fall.  She states she was getting up off the commode when she felt a little dizzy and then lost her footing.  Patient states she felt off at that time, did strike her head.  Did not lose consciousness.  She is not on anticoagulation, was recently admitted due to fall.  Denies any chest pain, headache or difficulty breathing.    REVIEW OF SYSTEMS     Review of Systems   Constitutional:  Positive for fatigue. Negative for chills and fever.   Respiratory:  Negative for cough and shortness of breath.    Cardiovascular:  Negative for chest pain.   Gastrointestinal:  Negative for abdominal pain, nausea and vomiting.   Skin:  Positive for wound.   Neurological:  Positive for dizziness. Negative for syncope, weakness, light-headedness and headaches.       PAST MEDICAL HISTORY    has a past medical history of Acute cystitis with hematuria, Elizalde's esophagus, Cervical spine pain, Chronic headaches, Chronic sinusitis, CKD (chronic kidney disease), Closed fracture of distal end of left femur with routine healing, COVID-19, Diabetes mellitus (HCC), Dizziness, Femur fracture, left (HCC), Gastroesophageal reflux disease, H/O fall, Hypertension, Knee pain, left, 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HCC), Obesity, Peripheral neuropathy, Pneumonia, Pneumonia due to COVID-19 virus, Pulmonary hypertension (HCC), Shoulder pain, bilateral, Spinal stenosis, Type 2 diabetes mellitus (HCC), Unspecified essential hypertension, and Vitamin D deficiency.    SURGICAL HISTORY      has a past surgical history that includes Hysterectomy (09/06/2005); Appendectomy;

## 2024-10-29 NOTE — H&P
HOSPITALIST ADMISSION H&P      REASON FOR ADMISSION:  UTI, DEEPALI superimposed on CKD.  ESTIMATED LENGTH OF STAY:> 2 midnights, 3-4 days    ATTENDING/ADMITTING PHYSICIAN: Manuel Sarabia MD  PCP: Nia Patel MD    HISTORY OF PRESENT ILLNESS:      The patient is a 69 y.o. female patient of Nia Patel MD who presents from ER with c/o fall. Patient reports she had a fall and hit her head on the coffee table, denies loss of consciousness, \"my  wanted me to get checked\". Patient reports she has been having issues with falling for \"couple of years\", uses a walker. Patient unable to say if she felt dizzy or not \"when I fall I just fall\", denies feeling room or head spinning. Patient reports they found she had a UTI when she got to ER. Denies fever, chills, SOB, Chest pain, nausea, vomiting, urinary symptoms, does report issues with constipation with last bowel movement being yesterday.    DMII: HgbA1C 7.0 on 9/28/24, Chronic nausea-gastroparesis..    CKD: Fluctuates between stage 3a and stage 4. Follows with Nephrology.    Chronic Opioid use w/drug induced constipation.    Hypertension: Labile    In ER: Rocephin.   EKG:   Normal sinus rhythm--84  Normal ECG  When compared with ECG of 26-JUN-2024 15:43,  Premature atrial complexes are no longer present.    CT Head w/o,CT Cervical spine w/o:   IMPRESSION:  1. No acute intracranial abnormality. Bilateral temporal encephalomalacia  changes are seen with overlying craniotomy changes.  2. No acute osseous abnormality of the cervical spine.    CXR:   IMPRESSION:  There is no evidence of acute chest disease.    Wounds and LDAs present prior to admission: Left occipital raised area, abrasions left elbow and knee     See below for additional PMH.    Patient aebs-yfdvyuaylq-nfaoleyf-available records reviewed, including, but not limited to ER records, imaging results, lab results, office records, personal records, and OARRS --  45 prescriptions, 5 prescribers, 3 pharmacies

## 2024-10-30 VITALS
RESPIRATION RATE: 18 BRPM | BODY MASS INDEX: 37.72 KG/M2 | DIASTOLIC BLOOD PRESSURE: 66 MMHG | SYSTOLIC BLOOD PRESSURE: 113 MMHG | OXYGEN SATURATION: 97 % | HEIGHT: 68 IN | TEMPERATURE: 98 F | WEIGHT: 248.9 LBS | HEART RATE: 69 BPM

## 2024-10-30 DIAGNOSIS — S22.41XA MULTIPLE FRACTURES OF RIBS, RIGHT SIDE, INITIAL ENCOUNTER FOR CLOSED FRACTURE: ICD-10-CM

## 2024-10-30 PROBLEM — K22.9 IRREGULAR Z LINE OF ESOPHAGUS: Status: ACTIVE | Noted: 2024-10-28

## 2024-10-30 LAB
ANION GAP SERPL CALCULATED.3IONS-SCNC: 9 MMOL/L (ref 9–17)
BASOPHILS # BLD: 0.04 K/UL (ref 0–0.2)
BASOPHILS NFR BLD: 0 % (ref 0–2)
BUN SERPL-MCNC: 20 MG/DL (ref 8–23)
BUN/CREAT SERPL: 20 (ref 9–20)
CALCIUM SERPL-MCNC: 8.9 MG/DL (ref 8.6–10.4)
CHLORIDE SERPL-SCNC: 102 MMOL/L (ref 98–107)
CO2 SERPL-SCNC: 27 MMOL/L (ref 20–31)
CREAT SERPL-MCNC: 1 MG/DL (ref 0.5–0.9)
EKG ATRIAL RATE: 84 BPM
EKG P AXIS: 77 DEGREES
EKG P-R INTERVAL: 162 MS
EKG Q-T INTERVAL: 368 MS
EKG QRS DURATION: 74 MS
EKG QTC CALCULATION (BAZETT): 434 MS
EKG R AXIS: 17 DEGREES
EKG T AXIS: 44 DEGREES
EKG VENTRICULAR RATE: 84 BPM
EOSINOPHIL # BLD: 0.12 K/UL (ref 0–0.44)
EOSINOPHILS RELATIVE PERCENT: 1 % (ref 1–4)
ERYTHROCYTE [DISTWIDTH] IN BLOOD BY AUTOMATED COUNT: 15.1 % (ref 11.8–14.4)
GFR, ESTIMATED: 61 ML/MIN/1.73M2
GLUCOSE BLD-MCNC: 134 MG/DL (ref 65–105)
GLUCOSE SERPL-MCNC: 153 MG/DL (ref 70–99)
HCT VFR BLD AUTO: 29.7 % (ref 36.3–47.1)
HCT VFR BLD AUTO: 33.5 % (ref 36.3–47.1)
HGB BLD-MCNC: 10.7 G/DL (ref 11.9–15.1)
HGB BLD-MCNC: 9.5 G/DL (ref 11.9–15.1)
IMM GRANULOCYTES # BLD AUTO: 0.13 K/UL (ref 0–0.3)
IMM GRANULOCYTES NFR BLD: 1 %
LYMPHOCYTES NFR BLD: 1.53 K/UL (ref 1.1–3.7)
LYMPHOCYTES RELATIVE PERCENT: 16 % (ref 24–43)
MAGNESIUM SERPL-MCNC: 2 MG/DL (ref 1.6–2.6)
MCH RBC QN AUTO: 28.3 PG (ref 25.2–33.5)
MCHC RBC AUTO-ENTMCNC: 32 G/DL (ref 25.2–33.5)
MCV RBC AUTO: 88.4 FL (ref 82.6–102.9)
MONOCYTES NFR BLD: 0.65 K/UL (ref 0.1–1.2)
MONOCYTES NFR BLD: 7 % (ref 3–12)
NEUTROPHILS NFR BLD: 75 % (ref 36–65)
NEUTS SEG NFR BLD: 7.2 K/UL (ref 1.5–8.1)
NRBC BLD-RTO: 0 PER 100 WBC
PLATELET # BLD AUTO: 297 K/UL (ref 138–453)
PMV BLD AUTO: 10.3 FL (ref 8.1–13.5)
POTASSIUM SERPL-SCNC: 4.2 MMOL/L (ref 3.7–5.3)
RBC # BLD AUTO: 3.36 M/UL (ref 3.95–5.11)
RBC # BLD: ABNORMAL 10*6/UL
SODIUM SERPL-SCNC: 138 MMOL/L (ref 135–144)
WBC OTHER # BLD: 9.7 K/UL (ref 3.5–11.3)

## 2024-10-30 PROCEDURE — 85025 COMPLETE CBC W/AUTO DIFF WBC: CPT

## 2024-10-30 PROCEDURE — 85018 HEMOGLOBIN: CPT

## 2024-10-30 PROCEDURE — 36415 COLL VENOUS BLD VENIPUNCTURE: CPT

## 2024-10-30 PROCEDURE — 6360000002 HC RX W HCPCS

## 2024-10-30 PROCEDURE — 2580000003 HC RX 258

## 2024-10-30 PROCEDURE — 85014 HEMATOCRIT: CPT

## 2024-10-30 PROCEDURE — 99238 HOSP IP/OBS DSCHRG MGMT 30/<: CPT | Performed by: INTERNAL MEDICINE

## 2024-10-30 PROCEDURE — 80048 BASIC METABOLIC PNL TOTAL CA: CPT

## 2024-10-30 PROCEDURE — 6370000000 HC RX 637 (ALT 250 FOR IP)

## 2024-10-30 PROCEDURE — 97530 THERAPEUTIC ACTIVITIES: CPT

## 2024-10-30 PROCEDURE — 82947 ASSAY GLUCOSE BLOOD QUANT: CPT

## 2024-10-30 PROCEDURE — 83735 ASSAY OF MAGNESIUM: CPT

## 2024-10-30 PROCEDURE — 97116 GAIT TRAINING THERAPY: CPT

## 2024-10-30 RX ORDER — OXYCODONE HYDROCHLORIDE 5 MG/1
5 TABLET ORAL EVERY 6 HOURS PRN
Qty: 28 TABLET | Refills: 0 | OUTPATIENT
Start: 2024-10-30 | End: 2024-11-06

## 2024-10-30 RX ORDER — METHOCARBAMOL 500 MG/1
500 TABLET, FILM COATED ORAL 3 TIMES DAILY
OUTPATIENT
Start: 2024-10-30

## 2024-10-30 RX ORDER — GLIMEPIRIDE 2 MG/1
3 TABLET ORAL EVERY MORNING
Qty: 90 TABLET | Refills: 0 | Status: SHIPPED | OUTPATIENT
Start: 2024-10-30

## 2024-10-30 RX ORDER — SELENIUM 50 MCG
1 TABLET ORAL
COMMUNITY
Start: 2024-10-30 | End: 2024-11-09

## 2024-10-30 RX ADMIN — AMLODIPINE BESYLATE 10 MG: 5 TABLET ORAL at 09:08

## 2024-10-30 RX ADMIN — OXYCODONE 5 MG: 5 TABLET ORAL at 09:07

## 2024-10-30 RX ADMIN — GABAPENTIN 400 MG: 300 CAPSULE ORAL at 09:09

## 2024-10-30 RX ADMIN — SODIUM CHLORIDE, PRESERVATIVE FREE 10 ML: 5 INJECTION INTRAVENOUS at 09:08

## 2024-10-30 RX ADMIN — GLIPIZIDE 5 MG: 5 TABLET ORAL at 05:57

## 2024-10-30 RX ADMIN — CEFTRIAXONE 1000 MG: 1 INJECTION, POWDER, FOR SOLUTION INTRAMUSCULAR; INTRAVENOUS at 00:50

## 2024-10-30 RX ADMIN — PANTOPRAZOLE SODIUM 40 MG: 40 TABLET, DELAYED RELEASE ORAL at 09:09

## 2024-10-30 RX ADMIN — ENOXAPARIN SODIUM 40 MG: 100 INJECTION SUBCUTANEOUS at 09:07

## 2024-10-30 RX ADMIN — DULOXETINE HYDROCHLORIDE 60 MG: 30 CAPSULE, DELAYED RELEASE ORAL at 09:08

## 2024-10-30 ASSESSMENT — PAIN DESCRIPTION - DESCRIPTORS: DESCRIPTORS: ACHING;SORE

## 2024-10-30 ASSESSMENT — PAIN DESCRIPTION - LOCATION: LOCATION: RIB CAGE

## 2024-10-30 ASSESSMENT — PAIN SCALES - GENERAL
PAINLEVEL_OUTOF10: 4
PAINLEVEL_OUTOF10: 6

## 2024-10-30 ASSESSMENT — PAIN DESCRIPTION - ORIENTATION: ORIENTATION: RIGHT

## 2024-10-30 ASSESSMENT — PAIN - FUNCTIONAL ASSESSMENT: PAIN_FUNCTIONAL_ASSESSMENT: ACTIVITIES ARE NOT PREVENTED

## 2024-10-30 NOTE — PLAN OF CARE
Problem: Chronic Conditions and Co-morbidities  Goal: Patient's chronic conditions and co-morbidity symptoms are monitored and maintained or improved  10/30/2024 0906 by Jaz Saha RN  Outcome: Progressing  Flowsheets (Taken 10/30/2024 0840)  Care Plan - Patient's Chronic Conditions and Co-Morbidity Symptoms are Monitored and Maintained or Improved:   Monitor and assess patient's chronic conditions and comorbid symptoms for stability, deterioration, or improvement   Collaborate with multidisciplinary team to address chronic and comorbid conditions and prevent exacerbation or deterioration   Update acute care plan with appropriate goals if chronic or comorbid symptoms are exacerbated and prevent overall improvement and discharge  10/29/2024 2348 by Ivanna Mcdonough RN  Outcome: Progressing     Problem: Discharge Planning  Goal: Discharge to home or other facility with appropriate resources  10/30/2024 0906 by Jaz Saha RN  Outcome: Progressing  Flowsheets (Taken 10/30/2024 0840)  Discharge to home or other facility with appropriate resources:   Identify barriers to discharge with patient and caregiver   Arrange for needed discharge resources and transportation as appropriate   Identify discharge learning needs (meds, wound care, etc)   Refer to discharge planning if patient needs post-hospital services based on physician order or complex needs related to functional status, cognitive ability or social support system  10/29/2024 2348 by Ivanna Mcdonough RN  Outcome: Progressing     Problem: Pain  Goal: Verbalizes/displays adequate comfort level or baseline comfort level  10/30/2024 0906 by Jaz Saha RN  Outcome: Progressing  10/29/2024 2348 by Ivanna Mcdonough, RN  Outcome: Progressing     Problem: Safety - Adult  Goal: Free from fall injury  10/30/2024 0906 by Jaz Saha RN  Outcome: Progressing  10/29/2024 2348 by Ivanna Mcdonough, RN  Outcome: Progressing     Problem: ABCDS Injury

## 2024-10-30 NOTE — FLOWSHEET NOTE
10/30/24 0355   Vital Signs   Orthostatic B/P and Pulse? Yes   Blood Pressure Lying 118/61   Pulse Lying 71 PER MINUTE   Blood Pressure Sitting 133/79   Pulse Sitting 88 PER MINUTE   Blood Pressure Standing 129/87   Pulse Standing 89 PER MINUTE     Pt denies any dizziness

## 2024-10-31 ENCOUNTER — CARE COORDINATION (OUTPATIENT)
Dept: CARE COORDINATION | Age: 69
End: 2024-10-31

## 2024-10-31 ENCOUNTER — OFFICE VISIT (OUTPATIENT)
Dept: FAMILY MEDICINE CLINIC | Age: 69
End: 2024-10-31

## 2024-10-31 ENCOUNTER — TELEPHONE (OUTPATIENT)
Dept: FAMILY MEDICINE CLINIC | Age: 69
End: 2024-10-31

## 2024-10-31 VITALS
SYSTOLIC BLOOD PRESSURE: 116 MMHG | WEIGHT: 247 LBS | HEART RATE: 88 BPM | OXYGEN SATURATION: 96 % | BODY MASS INDEX: 37.44 KG/M2 | DIASTOLIC BLOOD PRESSURE: 72 MMHG | TEMPERATURE: 97.5 F | HEIGHT: 68 IN

## 2024-10-31 DIAGNOSIS — N30.00 ACUTE CYSTITIS WITHOUT HEMATURIA: Primary | ICD-10-CM

## 2024-10-31 DIAGNOSIS — Z91.81 HISTORY OF FALLING: ICD-10-CM

## 2024-10-31 DIAGNOSIS — A49.9 BACTERIAL UTI: ICD-10-CM

## 2024-10-31 DIAGNOSIS — S22.41XA MULTIPLE FRACTURES OF RIBS, RIGHT SIDE, INITIAL ENCOUNTER FOR CLOSED FRACTURE: ICD-10-CM

## 2024-10-31 DIAGNOSIS — Z09 HOSPITAL DISCHARGE FOLLOW-UP: Primary | ICD-10-CM

## 2024-10-31 DIAGNOSIS — N39.0 BACTERIAL UTI: ICD-10-CM

## 2024-10-31 DIAGNOSIS — Z87.81 HISTORY OF RIB FRACTURE: ICD-10-CM

## 2024-10-31 LAB
MICROORGANISM SPEC CULT: ABNORMAL
MICROORGANISM SPEC CULT: ABNORMAL
SERVICE CMNT-IMP: ABNORMAL
SPECIMEN DESCRIPTION: ABNORMAL

## 2024-10-31 PROCEDURE — 1111F DSCHRG MED/CURRENT MED MERGE: CPT | Performed by: FAMILY MEDICINE

## 2024-10-31 RX ORDER — METHOCARBAMOL 500 MG/1
500 TABLET, FILM COATED ORAL 3 TIMES DAILY
Qty: 30 TABLET | Refills: 0 | Status: SHIPPED | OUTPATIENT
Start: 2024-10-31

## 2024-10-31 RX ORDER — OXYCODONE HYDROCHLORIDE 5 MG/1
5 TABLET ORAL EVERY 6 HOURS PRN
Qty: 28 TABLET | Refills: 0 | Status: SHIPPED | OUTPATIENT
Start: 2024-10-31 | End: 2024-11-07

## 2024-10-31 NOTE — PROGRESS NOTES
Transitional Care Office Visit    Date of Face-to-Face: 10/31/2024    Here for follow after hospitalization for: UTI    Persons at visit:     Medication changes during hospitalization:  STOPPED: hydrochlorothiazide, Lisinopril, Robaxin, and Melatonin .  CHANGED: Glimepiride- increased to 3 mg daily  ADDED: Acidophilus- TID, Augmentin 875-125 mg- BID for 7 days    Procedures during hospitalization:  CT head WO contrast, CT cervical spine WO contrast, chest x ray . EKG, ECHO    Activity: activity as tolerated.    Any medication changes since post-hospitalization phone call?  No.    Any treatment changes since post-hospitalization phone call?  No.    Other follow-up appointments scheduled:  None.                  
distress  Cardiovascular: normal rate, normal S1 and S2, no gallops, intact distal pulses, and no carotid bruits  Abdomen: soft, non-tender      An electronic signature was used to authenticate this note.  --Nia Patel MD

## 2024-10-31 NOTE — DISCHARGE SUMMARY
c. Gabapentin, Neurontin, 400 mg morning and bedtime.     d. Reglan, metoclopramide, 10 mg 4 times daily p.r.n. GI distress.     e. Oxycodone 5 mg immediate release every 6 hours p.r.n. pain.     f. Pantoprazole, Protonix, 40 mg p.o. b.i.d.     g. Vitamin D3 1.25 mg 50,000 units p.o. weekly.  4. Given the patient's falls and orthostatic intolerance, two medications have been discontinued:  Hydrochlorothiazide, HCTZ and lisinopril.  In addition other medications discontinued, Robaxin given this may contribute to loss of balance, melatonin 3 mg.  Follow up with the patient's personal physician, Nia Patel, [facility].  Follow up with Dr. Judd of Neurology, [facility].    Any aspect of the patient's care not discussed in the chart and/or dictation will be addressed and treated as an outpatient.    The patient's medications have been reviewed including, but not limited to, pre-hospital, hospital and discharge medications.  The patient and/or the patient's personal representatives were specifically advised the only medications to be taken are those set forth in the discharge orders and no other medications should be taken.  Any prior medications not on the discharge orders are specifically discontinued.          MATHIEU ERICKSON MD      D:  10/30/2024 18:05:28     T:  10/31/2024 03:49:18     LEONARDA/RAD  Job #:  [id number]     Doc#:  [id number]    CC:   [facility]

## 2024-10-31 NOTE — PROGRESS NOTES
Physician Progress Note      PATIENT:               FARHAN SCHNEIDER  CSN #:                  [id number]  :                       1955  ADMIT DATE:       10/28/2024 10:38 PM  DISCH DATE:  RESPONDING  PROVIDER #:        Manuel Sarabia MD          QUERY TEXT:    Patient admitted 10/28/24 with UTI.  Noted documentation of Acute Kidney   Injury in H and P.  Admission creatinine 1.2  ->  1.0 on 10/30/24    In order to support the diagnosis of DEEPALI, please include additional clinical   indicators in your documentation.? Or please document if the diagnosis of DEEPALI   has been ruled out after further study.  The medical record reflects the following:  Risk Factors: per H/P: CKD: Fluctuates between stage 3a and stage 4  Clinical Indicators:    BUN / Cr / eGFR  10/2/24      24 / 1.1 / 57  10/28/24    28 / 1. 49 admission  10/29/24    26 / 1. 54  10/30/24    20 /1.0   Treatment: IVF, lab monitoring    Defined by Kidney Disease Improving Global Outcomes (KDIGO) clinical practice   guideline for acute kidney injury:  -Increase in SCr by greater than or equal to 0.3 mg/dl within 48 hours; or  -Increase or decrease in SCr to greater than or equal to 1.5 times baseline,   which is known or presumed to have occurred within the prior 7 days; or  -Urine volume < 0.5ml/kg/h for 6 hours.    Anitha Armendariz, MSN, RN, CCDS, CRCR  Clinical   .  Options provided:  -- Acute kidney injury evidenced by, Please document evidence as well as a   numerical baseline creatinine, if known.  -- Acute kidney injury ruled out after study  -- Other - I will add my own diagnosis  -- Disagree - Not applicable / Not valid  -- Disagree - Clinically unable to determine / Unknown  -- Refer to Clinical Documentation Reviewer    PROVIDER RESPONSE TEXT:    Acute kidney injury was ruled out after study.    Query created by: Anitha Armendariz on 10/30/2024 8:41 AM      Electronically signed by:  Manuel Sarabia MD 10/30/2024 9:00 
CLINICAL PHARMACY NOTE: MEDS TO BEDS    Total # of Prescriptions Filled: 2   The following medications were delivered to the patient:  Augmentin 875/125  Glimepiride 2mg    Additional Documentation:   
Comprehensive Nutrition Assessment    Type and Reason for Visit:  Initial, Positive nutrition screen    Nutrition Recommendations/Plan:   Continue current diet order.   Monitor and follow.     Malnutrition Assessment:  Malnutrition Status:  At risk for malnutrition (r/t presence of chronic illness and BMI > 25.) (10/30/24 1610)    Context:  Chronic Illness     Findings of the 6 clinical characteristics of malnutrition:  Energy Intake:  Mild decrease in energy intake  Weight Loss:  Mild weight loss     Body Fat Loss:  Unable to assess     Muscle Mass Loss:  Unable to assess    Fluid Accumulation:  Mild     Strength:  Not Performed    Nutrition Assessment:    Pt admitted for weakness/UTI. Seen for wt loss and decreased appetite. Pt states that her appetite is currently good. Evidenced 100% breakfast meal eaten. 4 carb cardiac diet is appropriate. Pt has lost weight insignificantly: 6.4#/2.4% x 1 month, 20.4#/7.4% x 6 months, and 30#/10.6% x 12 months. Pt states prior normal body wt is 275-280#, and she does not want to gain back. She remains and appears obese. Pt and writer agreed no ONS needed at this time. When asked about her Elizalde's, Pt denied trouble swallowing. LBM 10/29. Trace pitting edema noted to BLE.    Nutrition Related Findings:    Labs/meds reviewed. Cr of 1.0, other BMP wnl. -174 past 24 hrs. Meds include Vit D3 and insulins. Wound Type: None       Current Nutrition Intake & Therapies:    Average Meal Intake: %  Average Supplements Intake: None Ordered  ADULT DIET; Regular; 4 carb choices (60 gm/meal); Low Fat/Low Chol/High Fiber/KARTHIK    Anthropometric Measures:  Height: 172 cm (5' 7.72\")  Ideal Body Weight (IBW): 139 lbs (63 kg)    Admission Body Weight: 115 kg (253 lb 9.6 oz)  Current Body Weight: 112.9 kg (248 lb 14.4 oz), 179.1 % IBW. Weight Source: Bed scale  Current BMI (kg/m2): 38.2  Usual Body Weight: 124.7 kg (275 lb)     % Weight Change (Calculated): -9.5  Weight Adjustment 
Found pill bottle on patient's bedside for oxycodone. Asked patient if took any from bottle since bottle was found empty. Patient states she took one pill about 30 mins ago. Nurse reminded patient not take any medications from home due to the potential of getting more than prescribed. Patient also mentions taking a muscle relaxer at home. Home med list reviewed and no muscle relaxer on list and informed patient. Asked patient if knows what muscle relaxer it is and states no. Patient states had it filled through [facility] pharmacy. Nurse called [facility] pharmacy to verify med and was told script was for Robaxin 500 mg 1 pill three times a day for 10 days. Added to home med list. Informed patient that oxycodone is ordered here and she just needs to ask for it when needed. House supervisor and Dr. Sarabia made aware.   
Occupational Therapy  Facility/Department: MD  PROGRESSIVE CARE  Daily Treatment Note  NAME: Jyoti Warren  : 1955  MRN: [id number]    Date of Service: 10/30/2024    Discharge Recommendations:  Home with Home health OT  OT Equipment Recommendations  Equipment Needed: Yes  Mobility Devices: ADL Assistive Devices      Patient Diagnosis(es): The primary encounter diagnosis was Acute UTI. Diagnoses of Generalized weakness and Syncope, unspecified syncope type were also pertinent to this visit.     Assessment   Activity Tolerance: Patient tolerated treatment well  Discharge Recommendations: Home with Home health OT  Equipment Needed: Yes  Mobility Devices: ADL Assistive Devices     Plan  Occupational Therapy Plan  Times Per Week: 1-2  Current Treatment Recommendations: Self-Care / ADL;Safety education & training;Patient/Caregiver education & training;Equipment evaluation, education, & procurement;Functional mobility training    Restrictions       Subjective  Subjective  Subjective: Patient rec'd seated in recliner, agreeable and pleasant for OT. Spouse present.  Orientation  Overall Orientation Status: Within Functional Limits  Pain: 6/10 back pain  Cognition  Overall Cognitive Status: WFL       Objective  Vitals  Vitals  O2 Device: None (Room air)       ADL  Skin Care: Bath wipes        Safety Devices  Type of Devices: All fall risk precautions in place;Call light within reach;Chair alarm in place;Left in chair;Gait belt    Patient Education  Education Given To: Patient;Family  Education Provided: Equipment;ADL Adaptive Strategies;Energy Conservation  Education Provided Comments: Adaptive Equipment for ease in LB ADLs; Energy Conservation and Work Simplification for ease in I/ADLs  Education Method: Demonstration;Verbal;Printed Information/Hand-outs  Barriers to Learning: None  Education Outcome: Verbalized understanding    Goals  Short Term Goals  Time Frame for Short Term Goals: duration of hospital stay  Short 
Physical Therapy  Facility/Department: [facility]  PROGRESSIVE CARE  Daily Treatment Note  NAME: Jyoti Warren  : 1955  MRN: [id number]    Date of Service: 10/30/2024    Discharge Recommendations:  Home with Home health PT, Continue to assess pending progress        Patient Diagnosis(es): The primary encounter diagnosis was Acute UTI. Diagnoses of Generalized weakness and Syncope, unspecified syncope type were also pertinent to this visit.    Assessment  Activity Tolerance: Patient tolerated evaluation without incident    Plan  Physical Therapy Plan  General Plan: 5-7 times per week  Current Treatment Recommendations: Strengthening;ROM;Balance training;Functional mobility training;Transfer training;Gait training;Endurance training;Neuromuscular re-education;Home exercise program;Safety education & training;Patient/Caregiver education & training;Equipment evaluation, education, & procurement    Restrictions        Subjective   Subjective  Subjective: Pt in chair upon arrival. Pt pleasant and agreeable to therapy session  Pain: Back pain not numerically rated  Orientation  Overall Orientation Status: Within Functional Limits  Cognition  Overall Cognitive Status: WFL    Objective  Vitals  O2 Device: None (Room air)  Bed Mobility Training  Bed Mobility Training: No  Transfer Training  Transfer Training: Yes  Overall Level of Assistance: Contact-guard assistance  Sit to Stand: Contact-guard assistance  Stand to Sit: Contact-guard assistance  Gait  Gait Training: Yes  Overall Level of Assistance: Contact-guard assistance  Distance (ft):  (20' x 2)  Assistive Device: Gait belt;Walker, rolling  Speed/Paige: Slow  Step Length: Left shortened;Right shortened  Gait Abnormalities:  (forward flexed posture. cues to maintain upright posture)     PT Exercises  Exercise Treatment: Seated ankle pumps, LAQ, marches, resisted HS curls x 15 ea     Safety Devices  Type of Devices: All fall risk precautions in place;Call light within 
Physical Therapy  Facility/Department: [facility]  PROGRESSIVE CARE  Physical Therapy Initial Assessment    Name: Jyoti Warren  : 1955  MRN: [id number]  Date of Service: 10/29/2024    Discharge Recommendations:  Home with Home health PT, Continue to assess pending progress          Patient Diagnosis(es): The primary encounter diagnosis was Acute UTI. Diagnoses of Generalized weakness and Syncope, unspecified syncope type were also pertinent to this visit.  Past Medical History:  has a past medical history of Acute cystitis with hematuria, Elizalde's esophagus, Cervical spine pain, Chronic headaches, Chronic sinusitis, CKD (chronic kidney disease), Closed fracture of distal end of left femur with routine healing, COVID-19, Diabetes mellitus (HCC), Dizziness, Femur fracture, left (HCC), Gastroesophageal reflux disease, H/O fall, Hypertension, Knee pain, left, 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HCC), Obesity, Peripheral neuropathy, Pneumonia, Pneumonia due to COVID-19 virus, Pulmonary hypertension (HCC), Shoulder pain, bilateral, Spinal stenosis, Type 2 diabetes mellitus (HCC), Unspecified essential hypertension, and Vitamin D deficiency.  Past Surgical History:  has a past surgical history that includes Hysterectomy (2005); Appendectomy; Upper gastrointestinal endoscopy; Knee arthroscopy (Left); Colonoscopy (2012); other surgical history (); Dental surgery (2015); Tonsillectomy; brain surgery (2016); Foreign Body Removal (2016); Total shoulder arthroplasty (Right, 10/18/2018); Shoulder arthroscopy (Right, 2019); Nerve Block (2019); Upper gastrointestinal endoscopy (2020); Upper gastrointestinal endoscopy (N/A, 2020); Colonoscopy (N/A, 2020); Back Injection (2021); Femur Closed Reduction (Left, 2020); polysomnography (2020); Femur fracture surgery (Left, 2021); Cystoscopy (Bilateral, 2022); joint replacement; Esophagogastroduodenoscopy 
AM    MONOPCT 7 10/30/2024 05:12 AM    EOSPCT 1 10/30/2024 05:12 AM    BASOPCT 0 10/30/2024 05:12 AM    MONOSABS 0.65 10/30/2024 05:12 AM    LYMPHSABS 1.53 10/30/2024 05:12 AM    EOSABS 0.12 10/30/2024 05:12 AM    BASOSABS 0.04 10/30/2024 05:12 AM    DIFFTYPE NOT REPORTED 10/18/2021 08:20 AM     BMP:    Lab Results   Component Value Date/Time     10/30/2024 05:12 AM    K 4.2 10/30/2024 05:12 AM     10/30/2024 05:12 AM    CO2 27 10/30/2024 05:12 AM    BUN 20 10/30/2024 05:12 AM    CREATININE 1.0 10/30/2024 05:12 AM    CALCIUM 8.9 10/30/2024 05:12 AM    GFRAA 43 07/27/2022 08:15 AM    LABGLOM 61 10/30/2024 05:12 AM    LABGLOM 55 03/14/2024 11:19 AM    GLUCOSE 153 10/30/2024 05:12 AM           Physical Exam:  Vitals: /66   Pulse 70   Temp 98 °F (36.7 °C) (Temporal)   Resp 18   Ht 1.72 m (5' 7.72\")   Wt 112.9 kg (248 lb 14.4 oz)   LMP 08/24/2010 (Approximate)   SpO2 95%   BMI 38.16 kg/m²   24 hour intake/output:  Intake/Output Summary (Last 24 hours) at 10/30/2024 1237  Last data filed at 10/30/2024 1228  Gross per 24 hour   Intake 1622.82 ml   Output 1500 ml   Net 122.82 ml     Last 3 weights:  Wt Readings from Last 3 Encounters:   10/30/24 112.9 kg (248 lb 14.4 oz)   10/23/24 112.9 kg (249 lb)   10/09/24 117.9 kg (260 lb)     HEENT: Normocephalic and Atraumatic  Neck: Supple, No Masses, Tenderness, Nodularity, and No Lymphadenopathy  Chest/Lungs: Clear to Auscultation without Rales, Rhonchi, or Wheezes and Distant Breath Sounds  Cardiac: Regular Rate and Rhythm  GI/Abdomen: Bowel Sounds Present and Soft, Non-tender, without Guarding or Rebound Tenderness  : Not examined  EXT/Skin: No Cyanosis, No Clubbing, and Edema Present---minimal  Neuro:  alert---generalized weakness         Assessment:    Principal Problem:    Acute cystitis without hematuria  Active Problems:    Syncope    Generalized weakness    Acute UTI    Frequent falls    Acute kidney injury superimposed on CKD (HCC)  Resolved 
Position: Semi fowlers  Orthostatic B/P and Pulse?: Yes  Blood Pressure Lyin/77  Blood Pressure Sittin/92  Blood Pressure Standin/81  Pulse Lyin PER MINUTE  Pulse Sittin PER MINUTE  Pulse Standin PER MINUTE             Safety Devices  Type of Devices: Bed alarm in place;Call light within reach;Left in bed           ADL  LE Dressing: Stand by assistance  Toileting: Stand by assistance  Skin Care: Bath wipes        Bed mobility  Supine to Sit: Independent  Sit to Supine: Independent  Scooting: Independent  Transfers  Sit to stand: Stand by assistance;Contact guard assistance  Stand to sit: Stand by assistance  Vision  Vision: Within Functional Limits  Hearing  Hearing: Within functional limits  Cognition  Overall Cognitive Status: WFL  Orientation  Overall Orientation Status: Within Functional Limits      Goals  Short Term Goals  Time Frame for Short Term Goals: duration of hospital stay  Short Term Goal 1: Patient education adaptive equipment and technique LB bathing and dressing for increased ease and safety ADLs  Short Term Goal 2: Patient education EC/WS for increased ease and safety I/ADLs      Therapy Time   Individual Concurrent Group Co-treatment   Time In 0915         Time Out 0928         Minutes 13         Timed Code Treatment Minutes: 0 Minutes       CHARLEY PISANO OTR, OT

## 2024-10-31 NOTE — TELEPHONE ENCOUNTER
Controlled substances monitoring: No signs of potential drug abuse or diversion identified when the [place] report from [place], [place], and [place] was reviewed today.  The activity on the report was consistent with the treatment plan.

## 2024-10-31 NOTE — CARE COORDINATION
Care Transitions Note    Initial Call - Call within 2 business days of discharge: Yes    Patient Current Location:  Home: [address] [place] [address] [place] [address]    Care Transition Nurse contacted the patient by telephone to perform post hospital discharge assessment, verified name and  as identifiers. Provided introduction to self, and explanation of the Care Transition Nurse role.     Patient: Jyoti Warren    Patient : 1955   MRN: [id number]    Reason for Admission: Acute UTI/Fall  Discharge Date: 10/30/24  RURS: Readmission Risk Score: 19.3      Last Discharge Facility       Date Complaint Diagnosis Description Type Department Provider    10/28/24 Fall Acute UTI ... ED to Hosp-Admission (Discharged) (ADMITTED) Manuel Griffith MD; Rosa De La Cruz...            Was this an external facility discharge? No    Additional needs identified to be addressed with provider   High priority: HFU appointment with PCP             Method of communication with provider: chart routing.    Patients top risk factors for readmission: functional physical ability, lack of knowledge about disease, and medical condition-HTN, DM    Interventions to address risk factors:   Review of patient management of conditions/medications: discharge    Care Summary Note: Was able to contact Jyoti for initial transitional outreach.  She stated that she was doing \"ok\"  She was encouraged to go to the hospital by her spouse due to a fall at home and hitting her head on the coffee table.  CT head ruled out acute findings, and she was found to have a UTI.  She was treated with antibiotics and did get fluids for her CKD.  She was sent home on po antibiotics and referral for neurology for frequent falls.  She denied any urinary issues, no fever/chills, no falls since discharge, is eating and drinking fluids.  She said that she had all her medications and no appointment noted in Book it for HFU appointment   will message office .  P

## 2024-10-31 NOTE — TELEPHONE ENCOUNTER
Jyoti called requesting a refill of the below medication which has been pended for you:     OARRS from [place], [place], and [place] reviewed. Oxycodone 5 mg last filled 10/23/24 #28/7 days    Requested Prescriptions     Pending Prescriptions Disp Refills    oxyCODONE (ROXICODONE) 5 MG immediate release tablet 28 tablet 0     Sig: Take 1 tablet by mouth every 6 hours as needed for Pain for up to 7 days. Intended supply: 5 days. Take lowest dose possible to manage pain Max Daily Amount: 20 mg    methocarbamol (ROBAXIN) 500 MG tablet 30 tablet 0     Sig: Take 1 tablet by mouth 3 times daily For 10 days; script given on 10/15/24       Last Appointment Date: 10/23/2024  Next Appointment Date: 10/31/2024    Allergies   Allergen Reactions    Asa [Aspirin] Other (See Comments)     Stomach irritation    Pollen Extract

## 2024-11-01 ENCOUNTER — CARE COORDINATION (OUTPATIENT)
Dept: CARE COORDINATION | Age: 69
End: 2024-11-01

## 2024-11-01 RX ORDER — CEFDINIR 300 MG/1
300 CAPSULE ORAL 2 TIMES DAILY
Qty: 10 CAPSULE | Refills: 0 | Status: SHIPPED | OUTPATIENT
Start: 2024-11-01 | End: 2024-11-06

## 2024-11-01 NOTE — CARE COORDINATION
Care Transitions Note    Follow Up Call     Patient Current Location:  Home: [address] [place] [address] [place] [address]    Care Transition Nurse contacted the patient by telephone. Verified name and  as identifiers.    Additional needs identified to be addressed with provider   No needs identified                 Method of communication with provider: none.    Care Summary Note: Was able to contact Jyoti for transitional outreach.  She stated that she was doing well.  She denied any further falls, no headaches, no visual disturbances and no urinary problems.  She said that the hospital did call in another antibiotic and she will be picking it up later.  She said that her appetite was slowly improving, discuss water intake and she is using a incentive spirometer for that last fall that resulted in a rib fx.  She said that she get the IS up to .  She had no further questions or concerns.     Plan of care updates since last contact:  Review of patient management of conditions/medications:         Advance Care Planning:   Does patient have an Advance Directive: reviewed during previous call, see note. .    Medication Review:  Medications changed since last call, reviewed today.     Remote Patient Monitoring:  Offered patient enrollment in the Remote Patient Monitoring (RPM) program for in-home monitoring: Patient is not eligible for RPM program because: patient does not have qualifying diagnosis.    Assessments:  Care Transitions Subsequent and Final Call    Subsequent and Final Calls  Do you have any ongoing symptoms?: No  Have your medications changed?: Yes  Patient Reports: omnicef  Do you have any questions related to your medications?: No  Do you currently have any active services?: No  Do you have any needs or concerns that I can assist you with?: No  Care Transitions Interventions  Other Interventions:              Follow Up Appointment:   Reviewed upcoming appointment(s).  Future Appointments

## 2024-11-06 ENCOUNTER — CARE COORDINATION (OUTPATIENT)
Dept: CARE COORDINATION | Age: 69
End: 2024-11-06

## 2024-11-06 DIAGNOSIS — S22.41XA MULTIPLE FRACTURES OF RIBS, RIGHT SIDE, INITIAL ENCOUNTER FOR CLOSED FRACTURE: Primary | ICD-10-CM

## 2024-11-06 RX ORDER — OXYCODONE HYDROCHLORIDE 5 MG/1
5 TABLET ORAL EVERY 6 HOURS PRN
Qty: 28 TABLET | Refills: 0 | Status: SHIPPED | OUTPATIENT
Start: 2024-11-06 | End: 2024-11-13

## 2024-11-06 NOTE — CARE COORDINATION
Care Transitions Note    Follow Up Call     Attempted to reach patient for transitions of care follow up.  Unable to reach patient.      Outreach Attempts:# 1  HIPAA compliant voicemail left for patient.     Care Summary Note: 1st attempt     Follow Up Appointment:   Future Appointments         Provider Specialty Dept Phone    12/2/2024 3:10 PM Sav Swenson MD Nephrology [phone]    12/12/2024 2:00 PM Dinh Judd MD Neurology [phone]    1/21/2025 1:20 PM Nia Patel MD Family Medicine [phone]    2/17/2025 1:20 PM Mervat Villagran, APRN - [facility] Urology [phone]            Plan for follow-up on next business day.  based on severity of symptoms and risk factors. Plan for next call: symptom management-UTI/FALLS?     Ankita Casiano LPN

## 2024-11-06 NOTE — TELEPHONE ENCOUNTER
Jyoti called requesting a refill of the below medication which has been pended for you:     [place]ARRS from [place], and [place] reviewed. Oxycodone 5 mg last filled 10/31/24 #28/7 days  Requested Prescriptions     Pending Prescriptions Disp Refills    oxyCODONE (ROXICODONE) 5 MG immediate release tablet 28 tablet 0     Sig: Take 1 tablet by mouth every 6 hours as needed for Pain for up to 7 days. Intended supply: 5 days. Take lowest dose possible to manage pain Max Daily Amount: 20 mg       Last Appointment Date: 10/31/2024  Next Appointment Date: 1/21/2025    Allergies   Allergen Reactions    Asa [Aspirin] Other (See Comments)     Stomach irritation    Pollen Extract

## 2024-11-07 ENCOUNTER — CARE COORDINATION (OUTPATIENT)
Dept: CARE COORDINATION | Age: 69
End: 2024-11-07

## 2024-11-07 NOTE — CARE COORDINATION
Care Transitions Note    Follow Up Call     Attempted to reach patient for transitions of care follow up.  Unable to reach patient.      Outreach Attempts:#2   HIPAA compliant voicemail left for patient.     Care Summary Note: 2nd attempt     Follow Up Appointment:   Future Appointments         Provider Specialty Dept Phone    12/2/2024 3:10 PM Sav Swenson MD Nephrology [phone]    12/12/2024 2:00 PM Dinh Judd MD Neurology [phone]    1/21/2025 1:20 PM Nia Patel MD Family Medicine [phone]    2/17/2025 1:20 PM Mervat Villagran, APRN - [facility] Urology [phone]            No further follow-up call indicated based on severity of symptoms and risk factors.  Ankita Casiano LPN

## 2024-11-13 DIAGNOSIS — K22.70 BARRETT'S ESOPHAGUS WITHOUT DYSPLASIA: ICD-10-CM

## 2024-11-13 DIAGNOSIS — S22.41XA MULTIPLE FRACTURES OF RIBS, RIGHT SIDE, INITIAL ENCOUNTER FOR CLOSED FRACTURE: ICD-10-CM

## 2024-11-13 DIAGNOSIS — R11.0 NAUSEA: ICD-10-CM

## 2024-11-13 DIAGNOSIS — K31.84 GASTROPARESIS: ICD-10-CM

## 2024-11-13 RX ORDER — OXYCODONE HYDROCHLORIDE 5 MG/1
5 TABLET ORAL EVERY 6 HOURS PRN
Qty: 28 TABLET | Refills: 0 | Status: SHIPPED | OUTPATIENT
Start: 2024-11-13 | End: 2024-11-20

## 2024-11-13 NOTE — TELEPHONE ENCOUNTER
Jyoti called requesting a refill of the below medication which has been pended for you:     [place] from [place], and [place] reviewed. Oxycodone 5 mg last filled 11/6/24 #28/7 days    Requested Prescriptions     Pending Prescriptions Disp Refills    oxyCODONE (ROXICODONE) 5 MG immediate release tablet 28 tablet 0     Sig: Take 1 tablet by mouth every 6 hours as needed for Pain for up to 7 days. Intended supply: 5 days. Take lowest dose possible to manage pain Max Daily Amount: 20 mg       Last Appointment Date: 10/31/2024  Next Appointment Date: 01/21/25    Allergies   Allergen Reactions    Asa [Aspirin] Other (See Comments)     Stomach irritation    Pollen Extract

## 2024-11-14 RX ORDER — PANTOPRAZOLE SODIUM 40 MG/1
40 TABLET, DELAYED RELEASE ORAL 2 TIMES DAILY
Qty: 180 TABLET | Refills: 0 | Status: SHIPPED | OUTPATIENT
Start: 2024-11-14

## 2024-11-14 NOTE — TELEPHONE ENCOUNTER
Jyoti called requesting a refill of the below medication which has been pended for you:     Requested Prescriptions     Pending Prescriptions Disp Refills    pantoprazole (PROTONIX) 40 MG tablet [Pharmacy Med Name: Pantoprazole Sodium 40 MG Oral Tablet Delayed Release] 180 tablet 1     Sig: TAKE 1 TABLET BY MOUTH TWICE  DAILY       Last Appointment Date: 10/31/2024  Next Appointment Date: 1/21/2025    Allergies   Allergen Reactions    Asa [Aspirin] Other (See Comments)     Stomach irritation    Pollen Extract

## 2024-11-19 DIAGNOSIS — S22.41XA MULTIPLE FRACTURES OF RIBS, RIGHT SIDE, INITIAL ENCOUNTER FOR CLOSED FRACTURE: ICD-10-CM

## 2024-11-19 RX ORDER — OXYCODONE HYDROCHLORIDE 5 MG/1
5 TABLET ORAL EVERY 6 HOURS PRN
Qty: 28 TABLET | Refills: 0 | Status: CANCELLED | OUTPATIENT
Start: 2024-11-19 | End: 2024-11-26

## 2024-11-19 NOTE — TELEPHONE ENCOUNTER
Jyoti called requesting a refill of the below medication which has been pended for you:     [place] from [place], [place], and [place] reviewed. Oxycodone 5 mg last filled 11/13/24 #28/7 days  Requested Prescriptions     Pending Prescriptions Disp Refills    oxyCODONE (ROXICODONE) 5 MG immediate release tablet 28 tablet 0     Sig: Take 1 tablet by mouth every 6 hours as needed for Pain for up to 7 days. Intended supply: 5 days. Take lowest dose possible to manage pain Max Daily Amount: 20 mg       Last Appointment Date: 10/31/2024  Next Appointment Date: 1/21/2025    Allergies   Allergen Reactions    Asa [Aspirin] Other (See Comments)     Stomach irritation    Pollen Extract

## 2024-11-20 RX ORDER — HYDROCODONE BITARTRATE AND ACETAMINOPHEN 5; 325 MG/1; MG/1
1 TABLET ORAL EVERY 4 HOURS PRN
Qty: 28 TABLET | Refills: 0 | Status: SHIPPED | OUTPATIENT
Start: 2024-11-20 | End: 2024-11-27

## 2024-11-20 NOTE — TELEPHONE ENCOUNTER
A prescription for [place] was sent to [facility] in [place].    Controlled substances monitoring: No signs of potential drug abuse or diversion identified when the OARRS report from [place], [place], and [place] was reviewed today.  The activity on the report was consistent with the treatment plan.

## 2024-11-20 NOTE — TELEPHONE ENCOUNTER
Please advise Jyoti that I recommend beginning to decrease the amount of pain medication that she is taking.    Does she tolerate Norco?

## 2024-11-25 ENCOUNTER — HOSPITAL ENCOUNTER (OUTPATIENT)
Age: 69
Discharge: HOME | End: 2024-11-25
Payer: MEDICARE

## 2024-11-25 DIAGNOSIS — E87.6 HYPOKALEMIA: ICD-10-CM

## 2024-11-25 DIAGNOSIS — E83.42 HYPOMAGNESEMIA: ICD-10-CM

## 2024-11-25 DIAGNOSIS — N18.32 STAGE 3B CHRONIC KIDNEY DISEASE (HCC): ICD-10-CM

## 2024-11-25 LAB
25(OH)D3 SERPL-MCNC: 30.9 NG/ML (ref 30–100)
ANION GAP SERPL CALCULATED.3IONS-SCNC: 9 MMOL/L (ref 9–17)
BASOPHILS # BLD: 0.04 K/UL (ref 0–0.2)
BASOPHILS NFR BLD: 0 % (ref 0–2)
BUN SERPL-MCNC: 16 MG/DL (ref 8–23)
BUN/CREAT SERPL: 15 (ref 9–20)
CA-I BLD-SCNC: 1.16 MMOL/L (ref 1.13–1.33)
CALCIUM SERPL-MCNC: 9.8 MG/DL (ref 8.6–10.4)
CHLORIDE SERPL-SCNC: 96 MMOL/L (ref 98–107)
CO2 SERPL-SCNC: 33 MMOL/L (ref 20–31)
CREAT SERPL-MCNC: 1.1 MG/DL (ref 0.5–0.9)
EOSINOPHIL # BLD: 0.16 K/UL (ref 0–0.44)
EOSINOPHILS RELATIVE PERCENT: 1 % (ref 1–4)
ERYTHROCYTE [DISTWIDTH] IN BLOOD BY AUTOMATED COUNT: 14.5 % (ref 11.8–14.4)
GFR, ESTIMATED: 54 ML/MIN/1.73M2
GLUCOSE SERPL-MCNC: 212 MG/DL (ref 70–99)
HCT VFR BLD AUTO: 35.5 % (ref 36.3–47.1)
HGB BLD-MCNC: 11.1 G/DL (ref 11.9–15.1)
IMM GRANULOCYTES # BLD AUTO: 0.09 K/UL (ref 0–0.3)
IMM GRANULOCYTES NFR BLD: 1 %
LYMPHOCYTES NFR BLD: 1.79 K/UL (ref 1.1–3.7)
LYMPHOCYTES RELATIVE PERCENT: 15 % (ref 24–43)
MAGNESIUM SERPL-MCNC: 1.8 MG/DL (ref 1.6–2.6)
MCH RBC QN AUTO: 27.8 PG (ref 25.2–33.5)
MCHC RBC AUTO-ENTMCNC: 31.3 G/DL (ref 25.2–33.5)
MCV RBC AUTO: 89 FL (ref 82.6–102.9)
MONOCYTES NFR BLD: 0.73 K/UL (ref 0.1–1.2)
MONOCYTES NFR BLD: 6 % (ref 3–12)
NEUTROPHILS NFR BLD: 77 % (ref 36–65)
NEUTS SEG NFR BLD: 9.27 K/UL (ref 1.5–8.1)
NRBC BLD-RTO: 0 PER 100 WBC
PHOSPHATE SERPL-MCNC: 2.7 MG/DL (ref 2.6–4.5)
PLATELET # BLD AUTO: 358 K/UL (ref 138–453)
PMV BLD AUTO: 10.3 FL (ref 8.1–13.5)
POTASSIUM SERPL-SCNC: 3.6 MMOL/L (ref 3.7–5.3)
PTH-INTACT SERPL-MCNC: 42 PG/ML (ref 15–65)
RBC # BLD AUTO: 3.99 M/UL (ref 3.95–5.11)
RBC # BLD: ABNORMAL 10*6/UL
SODIUM SERPL-SCNC: 138 MMOL/L (ref 135–144)
WBC OTHER # BLD: 12.1 K/UL (ref 3.5–11.3)

## 2024-11-25 PROCEDURE — 82306 VITAMIN D 25 HYDROXY: CPT

## 2024-11-25 PROCEDURE — 36415 COLL VENOUS BLD VENIPUNCTURE: CPT

## 2024-11-25 PROCEDURE — 83970 ASSAY OF PARATHORMONE: CPT

## 2024-11-25 PROCEDURE — 84100 ASSAY OF PHOSPHORUS: CPT

## 2024-11-25 PROCEDURE — 80048 BASIC METABOLIC PNL TOTAL CA: CPT

## 2024-11-25 PROCEDURE — 83735 ASSAY OF MAGNESIUM: CPT

## 2024-11-25 PROCEDURE — 82330 ASSAY OF CALCIUM: CPT

## 2024-11-25 PROCEDURE — 85025 COMPLETE CBC W/AUTO DIFF WBC: CPT

## 2024-11-26 DIAGNOSIS — S22.41XA MULTIPLE FRACTURES OF RIBS, RIGHT SIDE, INITIAL ENCOUNTER FOR CLOSED FRACTURE: Primary | ICD-10-CM

## 2024-11-26 RX ORDER — HYDROCODONE BITARTRATE AND ACETAMINOPHEN 5; 325 MG/1; MG/1
1 TABLET ORAL EVERY 4 HOURS PRN
Qty: 28 TABLET | Refills: 0 | Status: SHIPPED | OUTPATIENT
Start: 2024-11-26 | End: 2024-12-03

## 2024-11-26 NOTE — TELEPHONE ENCOUNTER
Jyoti called requesting a refill of the below medication which has been pended for you:     O[place]RS from [place], [place], and [place] reviewed. NORCO 5-325 mg last filled 11/20/24 #28/7 days    Requested Prescriptions     Pending Prescriptions Disp Refills    HYDROcodone-acetaminophen (NORCO) 5-325 MG per tablet 28 tablet 0     Sig: Take 1 tablet by mouth every 4 hours as needed for Pain for up to 7 days. Intended supply: 5 days. Take lowest dose possible to manage pain Max Daily Amount: 6 tablets       Last Appointment Date: 10/31/2024  Next Appointment Date: 1/21/25    Allergies   Allergen Reactions    Asa [Aspirin] Other (See Comments)     Stomach irritation    Pollen Extract

## 2024-11-27 ENCOUNTER — HOSPITAL ENCOUNTER (OUTPATIENT)
Age: 69
Setting detail: SPECIMEN
Discharge: HOME | End: 2024-11-27
Payer: MEDICARE

## 2024-11-27 ENCOUNTER — TELEPHONE (OUTPATIENT)
Dept: PHARMACY | Facility: CLINIC | Age: 69
End: 2024-11-27

## 2024-11-27 DIAGNOSIS — G44.201 INTRACTABLE TENSION-TYPE HEADACHE, UNSPECIFIED CHRONICITY PATTERN: ICD-10-CM

## 2024-11-27 DIAGNOSIS — N18.32 STAGE 3B CHRONIC KIDNEY DISEASE (HCC): Primary | ICD-10-CM

## 2024-11-27 DIAGNOSIS — N18.32 STAGE 3B CHRONIC KIDNEY DISEASE (HCC): ICD-10-CM

## 2024-11-27 LAB
CREAT UR-MCNC: 103 MG/DL (ref 28–217)
TOTAL PROTEIN, URINE: 49 MG/DL
URINE TOTAL PROTEIN CREATININE RATIO: 0.48 (ref 0–0.2)

## 2024-11-27 PROCEDURE — 82570 ASSAY OF URINE CREATININE: CPT

## 2024-11-27 PROCEDURE — 84156 ASSAY OF PROTEIN URINE: CPT

## 2024-11-27 RX ORDER — DULOXETIN HYDROCHLORIDE 60 MG/1
60 CAPSULE, DELAYED RELEASE ORAL DAILY
Qty: 90 CAPSULE | Refills: 0 | Status: SHIPPED | OUTPATIENT
Start: 2024-11-27 | End: 2025-02-25

## 2024-11-27 NOTE — TELEPHONE ENCOUNTER
Jyoti called requesting a refill of the below medication which has been pended for you:     Requested Prescriptions     Pending Prescriptions Disp Refills    DULoxetine (CYMBALTA) 60 MG extended release capsule [Pharmacy Med Name: DULoxetine HCl 60 MG Oral Capsule Delayed Release Particles] 90 capsule 1     Sig: TAKE 1 CAPSULE BY MOUTH DAILY       Last Appointment Date: 10/31/2024  Next Appointment Date: 1/21/2025    Allergies   Allergen Reactions    Asa [Aspirin] Other (See Comments)     Stomach irritation    Pollen Extract

## 2024-11-27 NOTE — TELEPHONE ENCOUNTER
Nia Patel MD, please see below - would patient benefit from updated DEXA due to recent fracture?  Fracture of multiple ribs in September s/p fall  Last DEXA Feb 2022 showed osteopenia, with plan to repeat DEXA in @2 years    If appropriate, please order and have your staff notify patient.    Thank you,  Caren Wood, PharmD, [facility]  Population Health Pharmacist  [facility] Clinical Pharmacy  Department, toll free: [phone], option 1    ==================================================================  POPULATION [facility] CLINICAL PHARMACY REVIEW: RECENT FRACTURE    Jyoti Warren is a 69 y.o. old White (non-) female patient who recently had a fracture of multiple ribs (9/27/24 ED visit; s/p fall)    Lab Results   Component Value Date    VITD25 30.9 11/25/2024      Lab Results   Component Value Date    CALCIUM 9.8 11/25/2024    PHOS 2.7 11/25/2024     estimated creatinine clearance is 63 mL/min (A) (based on SCr of 1.1 mg/dL (H)).    DEXA 2/16/22:  Osteopenia  - Appears plan at that time was to repeat DEXA in ~2 years    FRAX-calculated 10-year fracture probability:   Per WHO calculator: major Osteoporotic = 15% and Hip = 2.4%    Assessment:   - 69 y.o. female with recent fracture and may benefit from DEXA to assess current BMD  Last DEXA February 2022 - osteopenia, plans to repeat in @2 years    Considerations:  - Suggest obtaining DEXA

## 2024-12-01 NOTE — PROGRESS NOTES
SIGNED. RAHAT SHELTON M.D. 12/07/2020 12:00 PM    PATH ELECTRONICALLY SIGNED. RAHAT SHELTON M.D. 12/07/2020 12:00 PM     UPEP: No results found for: \"TPU\"   HEPBSAG:  Lab Results   Component Value Date/Time    HEPBSAG NONREACTIVE 12/07/2020 12:00 PM     HEPCAB:  Lab Results   Component Value Date/Time    HEPCAB NONREACTIVE 12/07/2020 12:00 PM     C3:   Lab Results   Component Value Date    C3 167 12/07/2020     C4:   Lab Results   Component Value Date    C4 27 12/07/2020     MPO ANCA:   Lab Results   Component Value Date/Time    MPO 26 06/16/2017 02:00 PM    .  PR3 ANCA:    Lab Results   Component Value Date/Time    PR3 12 06/16/2017 02:00 PM                      URINALYSIS/URINE CHEMISTRIES      URINE CREATININE:    No results found for: \"LABCREA\"    URINE EOSINOPHILS : No results found for: \"UREO\"  URINE PROTEIN:  No results found for: \"TPU\"        RADIOLOGY      Results for orders placed during the hospital encounter of 12/07/20   US RENAL COMPLETE    Narrative EXAMINATION:  RETROPERITONEAL ULTRASOUND OF THE KIDNEYS    12/7/2020    COMPARISON:  None    HISTORY:  ORDERING SYSTEM PROVIDED HISTORY: Stage 3a chronic kidney disease  TECHNOLOGIST PROVIDED HISTORY:  Please check post void residual  For cortical thickness, renal size and corticomedullary differentiation.  Reason for Exam: CKD STAGE 3  Acuity: Chronic  Type of Exam: Ongoing    FINDINGS:    Kidneys:    The right kidney measures 7.5 cm in length and the left kidney measures 9.4  cm in length.    Kidneys demonstrate normal cortical echogenicity.  No evidence of  hydronephrosis or intrarenal stones.  Mild bilateral cortical thinning noted.      Bladder:    The urinary bladder is empty and not well visualized.      Impression Bilateral renal cortical thinning.  No nephrolithiasis or urinary obstruction.    Urinary bladder is empty and not well visualized.           ASSESSMENT     1. CKD 3 likely due to diabetic and hypertensive nephrosclerosis. Her

## 2024-12-02 ENCOUNTER — OFFICE VISIT (OUTPATIENT)
Dept: NEPHROLOGY | Age: 69
End: 2024-12-02
Payer: MEDICARE

## 2024-12-02 VITALS
WEIGHT: 250 LBS | DIASTOLIC BLOOD PRESSURE: 72 MMHG | SYSTOLIC BLOOD PRESSURE: 118 MMHG | HEART RATE: 76 BPM | BODY MASS INDEX: 37.89 KG/M2 | HEIGHT: 68 IN

## 2024-12-02 DIAGNOSIS — E11.22 TYPE 2 DIABETES MELLITUS WITH STAGE 3B CHRONIC KIDNEY DISEASE, WITHOUT LONG-TERM CURRENT USE OF INSULIN (HCC): ICD-10-CM

## 2024-12-02 DIAGNOSIS — E83.42 HYPOMAGNESEMIA: ICD-10-CM

## 2024-12-02 DIAGNOSIS — N18.32 STAGE 3B CHRONIC KIDNEY DISEASE (HCC): Primary | ICD-10-CM

## 2024-12-02 DIAGNOSIS — I10 ESSENTIAL HYPERTENSION: ICD-10-CM

## 2024-12-02 DIAGNOSIS — I10 HYPERTENSION, ESSENTIAL: ICD-10-CM

## 2024-12-02 DIAGNOSIS — N20.0 NEPHROLITHIASIS: ICD-10-CM

## 2024-12-02 DIAGNOSIS — E87.6 HYPOKALEMIA: ICD-10-CM

## 2024-12-02 DIAGNOSIS — N18.32 TYPE 2 DIABETES MELLITUS WITH STAGE 3B CHRONIC KIDNEY DISEASE, WITHOUT LONG-TERM CURRENT USE OF INSULIN (HCC): ICD-10-CM

## 2024-12-02 DIAGNOSIS — E55.9 VITAMIN D DEFICIENCY: ICD-10-CM

## 2024-12-02 PROCEDURE — 2022F DILAT RTA XM EVC RTNOPTHY: CPT | Performed by: INTERNAL MEDICINE

## 2024-12-02 PROCEDURE — 3074F SYST BP LT 130 MM HG: CPT | Performed by: INTERNAL MEDICINE

## 2024-12-02 PROCEDURE — 3078F DIAST BP <80 MM HG: CPT | Performed by: INTERNAL MEDICINE

## 2024-12-02 PROCEDURE — G8482 FLU IMMUNIZE ORDER/ADMIN: HCPCS | Performed by: INTERNAL MEDICINE

## 2024-12-02 PROCEDURE — G8417 CALC BMI ABV UP PARAM F/U: HCPCS | Performed by: INTERNAL MEDICINE

## 2024-12-02 PROCEDURE — 99213 OFFICE O/P EST LOW 20 MIN: CPT | Performed by: INTERNAL MEDICINE

## 2024-12-02 PROCEDURE — 1090F PRES/ABSN URINE INCON ASSESS: CPT | Performed by: INTERNAL MEDICINE

## 2024-12-02 PROCEDURE — G8400 PT W/DXA NO RESULTS DOC: HCPCS | Performed by: INTERNAL MEDICINE

## 2024-12-02 PROCEDURE — 1036F TOBACCO NON-USER: CPT | Performed by: INTERNAL MEDICINE

## 2024-12-02 PROCEDURE — 1159F MED LIST DOCD IN RCRD: CPT | Performed by: INTERNAL MEDICINE

## 2024-12-02 PROCEDURE — 1123F ACP DISCUSS/DSCN MKR DOCD: CPT | Performed by: INTERNAL MEDICINE

## 2024-12-02 PROCEDURE — 3051F HG A1C>EQUAL 7.0%<8.0%: CPT | Performed by: INTERNAL MEDICINE

## 2024-12-02 PROCEDURE — 3017F COLORECTAL CA SCREEN DOC REV: CPT | Performed by: INTERNAL MEDICINE

## 2024-12-02 PROCEDURE — G8427 DOCREV CUR MEDS BY ELIG CLIN: HCPCS | Performed by: INTERNAL MEDICINE

## 2024-12-02 RX ORDER — AMLODIPINE BESYLATE 5 MG/1
5 TABLET ORAL DAILY
Qty: 90 TABLET | Refills: 3 | Status: SHIPPED | OUTPATIENT
Start: 2024-12-02

## 2024-12-02 RX ORDER — LISINOPRIL 10 MG/1
10 TABLET ORAL DAILY
Qty: 90 TABLET | Refills: 3 | Status: SHIPPED | OUTPATIENT
Start: 2024-12-02

## 2024-12-12 ENCOUNTER — HOSPITAL ENCOUNTER (OUTPATIENT)
Age: 69
Discharge: HOME | End: 2024-12-12
Payer: MEDICARE

## 2024-12-12 ENCOUNTER — OFFICE VISIT (OUTPATIENT)
Dept: NEUROLOGY | Age: 69
End: 2024-12-12
Payer: MEDICARE

## 2024-12-12 VITALS
SYSTOLIC BLOOD PRESSURE: 126 MMHG | BODY MASS INDEX: 35.12 KG/M2 | HEART RATE: 70 BPM | DIASTOLIC BLOOD PRESSURE: 76 MMHG | OXYGEN SATURATION: 97 % | RESPIRATION RATE: 16 BRPM | WEIGHT: 231 LBS

## 2024-12-12 DIAGNOSIS — K21.9 GASTROESOPHAGEAL REFLUX DISEASE, UNSPECIFIED WHETHER ESOPHAGITIS PRESENT: ICD-10-CM

## 2024-12-12 DIAGNOSIS — G89.29 CHRONIC PAIN OF LEFT KNEE: ICD-10-CM

## 2024-12-12 DIAGNOSIS — R80.9 TYPE 2 DIABETES MELLITUS WITH MICROALBUMINURIA, WITHOUT LONG-TERM CURRENT USE OF INSULIN (HCC): ICD-10-CM

## 2024-12-12 DIAGNOSIS — M79.18 MYOFASCIAL PAIN: ICD-10-CM

## 2024-12-12 DIAGNOSIS — R29.6 RECURRENT FALLS: ICD-10-CM

## 2024-12-12 DIAGNOSIS — R42 DIZZINESS: ICD-10-CM

## 2024-12-12 DIAGNOSIS — N18.32 STAGE 3B CHRONIC KIDNEY DISEASE (HCC): ICD-10-CM

## 2024-12-12 DIAGNOSIS — F41.9 ANXIETY: ICD-10-CM

## 2024-12-12 DIAGNOSIS — R41.3 MEMORY PROBLEM: ICD-10-CM

## 2024-12-12 DIAGNOSIS — R53.1 GENERALIZED WEAKNESS: ICD-10-CM

## 2024-12-12 DIAGNOSIS — R29.898 WEAKNESS OF LEFT LOWER EXTREMITY: ICD-10-CM

## 2024-12-12 DIAGNOSIS — G60.8 POLYNEUROPATHY, PERIPHERAL SENSORIMOTOR AXONAL: ICD-10-CM

## 2024-12-12 DIAGNOSIS — K31.84 GASTROPARESIS: ICD-10-CM

## 2024-12-12 DIAGNOSIS — I10 ESSENTIAL HYPERTENSION: ICD-10-CM

## 2024-12-12 DIAGNOSIS — M25.562 CHRONIC PAIN OF LEFT KNEE: ICD-10-CM

## 2024-12-12 DIAGNOSIS — R26.89 BALANCE PROBLEMS: ICD-10-CM

## 2024-12-12 DIAGNOSIS — R26.9 GAIT DIFFICULTY: ICD-10-CM

## 2024-12-12 DIAGNOSIS — G93.89 ENCEPHALOMALACIA ON IMAGING STUDY: ICD-10-CM

## 2024-12-12 DIAGNOSIS — E11.29 TYPE 2 DIABETES MELLITUS WITH MICROALBUMINURIA, WITHOUT LONG-TERM CURRENT USE OF INSULIN (HCC): ICD-10-CM

## 2024-12-12 DIAGNOSIS — R29.6 RECURRENT FALLS: Primary | ICD-10-CM

## 2024-12-12 DIAGNOSIS — G44.221 CHRONIC TENSION-TYPE HEADACHE, INTRACTABLE: ICD-10-CM

## 2024-12-12 DIAGNOSIS — R55 SYNCOPE, UNSPECIFIED SYNCOPE TYPE: ICD-10-CM

## 2024-12-12 DIAGNOSIS — I27.20 PULMONARY HYPERTENSION (HCC): ICD-10-CM

## 2024-12-12 DIAGNOSIS — M54.2 CERVICAL SPINE PAIN: ICD-10-CM

## 2024-12-12 DIAGNOSIS — Z98.890 H/O CRANIOTOMY: ICD-10-CM

## 2024-12-12 DIAGNOSIS — R29.6 FREQUENT FALLS: ICD-10-CM

## 2024-12-12 DIAGNOSIS — E83.42 HYPOMAGNESEMIA: ICD-10-CM

## 2024-12-12 LAB
25(OH)D3 SERPL-MCNC: 33.4 NG/ML (ref 30–100)
ANION GAP SERPL CALCULATED.3IONS-SCNC: 11 MMOL/L (ref 9–17)
BASOPHILS # BLD: 0.07 K/UL (ref 0–0.2)
BASOPHILS NFR BLD: 0 % (ref 0–2)
BUN SERPL-MCNC: 20 MG/DL (ref 8–23)
BUN/CREAT SERPL: 18 (ref 9–20)
CA-I BLD-SCNC: 1.24 MMOL/L (ref 1.13–1.33)
CALCIUM SERPL-MCNC: 9.7 MG/DL (ref 8.6–10.4)
CHLORIDE SERPL-SCNC: 95 MMOL/L (ref 98–107)
CO2 SERPL-SCNC: 31 MMOL/L (ref 20–31)
CREAT SERPL-MCNC: 1.1 MG/DL (ref 0.5–0.9)
EOSINOPHIL # BLD: 0.1 K/UL (ref 0–0.44)
EOSINOPHILS RELATIVE PERCENT: 1 % (ref 1–4)
ERYTHROCYTE [DISTWIDTH] IN BLOOD BY AUTOMATED COUNT: 14.6 % (ref 11.8–14.4)
EST. AVERAGE GLUCOSE BLD GHB EST-MCNC: 146 MG/DL
FOLATE SERPL-MCNC: 5 NG/ML (ref 4.8–24.2)
GFR, ESTIMATED: 54 ML/MIN/1.73M2
GLUCOSE SERPL-MCNC: 187 MG/DL (ref 70–99)
HBA1C MFR BLD: 6.7 % (ref 4–6)
HCT VFR BLD AUTO: 35.8 % (ref 36.3–47.1)
HGB BLD-MCNC: 11.5 G/DL (ref 11.9–15.1)
IMM GRANULOCYTES # BLD AUTO: 0.1 K/UL (ref 0–0.3)
IMM GRANULOCYTES NFR BLD: 1 %
LYMPHOCYTES NFR BLD: 1.75 K/UL (ref 1.1–3.7)
LYMPHOCYTES RELATIVE PERCENT: 11 % (ref 24–43)
MAGNESIUM SERPL-MCNC: 1.9 MG/DL (ref 1.6–2.6)
MCH RBC QN AUTO: 27.9 PG (ref 25.2–33.5)
MCHC RBC AUTO-ENTMCNC: 32.1 G/DL (ref 25.2–33.5)
MCV RBC AUTO: 86.9 FL (ref 82.6–102.9)
MONOCYTES NFR BLD: 0.97 K/UL (ref 0.1–1.2)
MONOCYTES NFR BLD: 6 % (ref 3–12)
NEUTROPHILS NFR BLD: 81 % (ref 36–65)
NEUTS SEG NFR BLD: 12.82 K/UL (ref 1.5–8.1)
NRBC BLD-RTO: 0 PER 100 WBC
PHOSPHATE SERPL-MCNC: 3.5 MG/DL (ref 2.6–4.5)
PLATELET # BLD AUTO: 393 K/UL (ref 138–453)
PMV BLD AUTO: 10.2 FL (ref 8.1–13.5)
POTASSIUM SERPL-SCNC: 3.7 MMOL/L (ref 3.7–5.3)
PTH-INTACT SERPL-MCNC: 46 PG/ML (ref 15–65)
RBC # BLD AUTO: 4.12 M/UL (ref 3.95–5.11)
RBC # BLD: ABNORMAL 10*6/UL
SODIUM SERPL-SCNC: 137 MMOL/L (ref 135–144)
TSH SERPL DL<=0.05 MIU/L-ACNC: 1.71 UIU/ML (ref 0.3–5)
VIT B12 SERPL-MCNC: 416 PG/ML (ref 232–1245)
WBC OTHER # BLD: 15.8 K/UL (ref 3.5–11.3)

## 2024-12-12 PROCEDURE — 82330 ASSAY OF CALCIUM: CPT

## 2024-12-12 PROCEDURE — 84100 ASSAY OF PHOSPHORUS: CPT

## 2024-12-12 PROCEDURE — 82607 VITAMIN B-12: CPT

## 2024-12-12 PROCEDURE — 84443 ASSAY THYROID STIM HORMONE: CPT

## 2024-12-12 PROCEDURE — 83970 ASSAY OF PARATHORMONE: CPT

## 2024-12-12 PROCEDURE — 83735 ASSAY OF MAGNESIUM: CPT

## 2024-12-12 PROCEDURE — 82746 ASSAY OF FOLIC ACID SERUM: CPT

## 2024-12-12 PROCEDURE — 82306 VITAMIN D 25 HYDROXY: CPT

## 2024-12-12 PROCEDURE — 83036 HEMOGLOBIN GLYCOSYLATED A1C: CPT

## 2024-12-12 PROCEDURE — 85025 COMPLETE CBC W/AUTO DIFF WBC: CPT

## 2024-12-12 PROCEDURE — 36415 COLL VENOUS BLD VENIPUNCTURE: CPT

## 2024-12-12 PROCEDURE — 99204 OFFICE O/P NEW MOD 45 MIN: CPT | Performed by: PSYCHIATRY & NEUROLOGY

## 2024-12-12 PROCEDURE — 80048 BASIC METABOLIC PNL TOTAL CA: CPT

## 2024-12-12 RX ORDER — HYDROCODONE BITARTRATE AND ACETAMINOPHEN 5; 325 MG/1; MG/1
1 TABLET ORAL 2 TIMES DAILY PRN
COMMUNITY
Start: 2024-12-02

## 2024-12-12 ASSESSMENT — ENCOUNTER SYMPTOMS
BACK PAIN: 1
FACIAL SWELLING: 0
CHOKING: 0
CONSTIPATION: 0
NAUSEA: 0
WHEEZING: 0
COLOR CHANGE: 0
SHORTNESS OF BREATH: 0
VOMITING: 0
DIARRHEA: 0
EYE REDNESS: 0
TROUBLE SWALLOWING: 0
EYE PAIN: 0
SINUS PRESSURE: 0
EYE DISCHARGE: 0
SORE THROAT: 0
EYE ITCHING: 0
CHEST TIGHTNESS: 0
ABDOMINAL DISTENTION: 0
BLOOD IN STOOL: 0
COUGH: 0
ABDOMINAL PAIN: 0
PHOTOPHOBIA: 0
VOICE CHANGE: 0
APNEA: 0

## 2024-12-12 ASSESSMENT — PATIENT HEALTH QUESTIONNAIRE - PHQ9
1. LITTLE INTEREST OR PLEASURE IN DOING THINGS: NOT AT ALL
SUM OF ALL RESPONSES TO PHQ9 QUESTIONS 1 & 2: 0
SUM OF ALL RESPONSES TO PHQ QUESTIONS 1-9: 0
SUM OF ALL RESPONSES TO PHQ QUESTIONS 1-9: 0
2. FEELING DOWN, DEPRESSED OR HOPELESS: NOT AT ALL
SUM OF ALL RESPONSES TO PHQ QUESTIONS 1-9: 0
SUM OF ALL RESPONSES TO PHQ QUESTIONS 1-9: 0

## 2024-12-12 NOTE — PROGRESS NOTES
few weeks after you quit.  Limit how much alcohol you drink. Moderate amounts of alcohol (up to 2 drinks a day for men, 1drink a day for women) are okay. But drinking too much can lead to liver problems, high blood pressure, and other health problems.  health  If you have a family, there are many things you can do together to improve your health.  Eat meals together as a family as often as possible.  Eat healthy foods. This includes fruits, vegetables, lean meats and dairy, and whole grains.  Include your family in your fitness plan. Most peoplethink of activities such as jogging or tennis as the way to fitness, but there are many ways you and your family can be more active. Anything that makes you breathe hard and gets your heart pumping is exercise. Here are sometips:  Walk to do errands or to take your child to school or the bus.  Go for a family bike ride after dinner instead of watching TV.  Where can you learn more?  Go [email or web address] and sign in to your [employer] account. Enter U807 in the Search HealthInformation box to learn more about \"A Healthy Lifestyle: Care Instructions.\"     If you do not have anaccount, please click on the \"Sign Up Now\" link.  Current as of: July 26, 2016  Content Version: 11.2  © 2711-8789 [employer]. Care instructions adapted under license by [employer]. If you have questions about a medical condition or this instruction, always ask your healthcare professional. [employer] disclaims any warranty or liability for your use of this information.

## 2024-12-12 NOTE — PATIENT INSTRUCTIONS
* FALL   PRECAUTIONS.            *  USE   WALKING  ASSISTANCE  DEVICES    /   WALKER/   WHEEL   CHAIR            *   ADEQUATE   FLUID  INTAKE   AND  ELECTROLYTE  BALANCE             * KEEP  DAIRY  OF   THE  NEUROLOGICAL  SYMPTOMS          *  TO  MAINTAIN  REGULAR  SLEEP  WAKE  CYCLES.     *   TO  HAVE  ADEQUATE  REST  AND   SLEEP    HOURS.          *    AVOID  USAGE OF   TOBACCO,  EXCESSIVE  ALCOHOL                AND   ILLEGAL   SUBSTANCES,  IF  ANY          *  Maintain   Healthy  Life Style    With   Periodic  Monitoring  Of         Any  Medical  Conditions  Including   Elevated  Blood  Pressure,  Lipid  Profile,       Blood  Sugar levels  And   Heart  Disease.                *   Period   Screening  For  Cancers  Involving  Breast,  Colon,         Lungs  And  Other  Organs  As  Applicable,           In consultation   With  Your  Primary Care Providers.                *  If   There is  Any  Significant  Worsening   Of  Current  Symptoms  And             Or  If    Any additional  New  Neurological  Symptoms  and          Significant  Concerns   Should  Call  911 or  Go  To  Emergency  Department            For  Further  Immediate  Evaluation.

## 2024-12-16 ENCOUNTER — HOSPITAL ENCOUNTER (OUTPATIENT)
Dept: INTERVENTIONAL RADIOLOGY/VASCULAR | Age: 69
Discharge: HOME | End: 2024-12-18
Payer: MEDICARE

## 2024-12-16 ENCOUNTER — HOSPITAL ENCOUNTER (OUTPATIENT)
Age: 69
Setting detail: SPECIMEN
Discharge: HOME | End: 2024-12-16
Payer: MEDICARE

## 2024-12-16 DIAGNOSIS — R55 SYNCOPE, UNSPECIFIED SYNCOPE TYPE: ICD-10-CM

## 2024-12-16 DIAGNOSIS — M54.2 CERVICAL SPINE PAIN: ICD-10-CM

## 2024-12-16 DIAGNOSIS — I10 ESSENTIAL HYPERTENSION: ICD-10-CM

## 2024-12-16 DIAGNOSIS — E11.29 TYPE 2 DIABETES MELLITUS WITH MICROALBUMINURIA, WITHOUT LONG-TERM CURRENT USE OF INSULIN (HCC): ICD-10-CM

## 2024-12-16 DIAGNOSIS — R42 DIZZINESS: ICD-10-CM

## 2024-12-16 DIAGNOSIS — K21.9 GASTROESOPHAGEAL REFLUX DISEASE, UNSPECIFIED WHETHER ESOPHAGITIS PRESENT: ICD-10-CM

## 2024-12-16 DIAGNOSIS — R29.898 WEAKNESS OF LEFT LOWER EXTREMITY: ICD-10-CM

## 2024-12-16 DIAGNOSIS — R29.6 FREQUENT FALLS: ICD-10-CM

## 2024-12-16 DIAGNOSIS — G44.221 CHRONIC TENSION-TYPE HEADACHE, INTRACTABLE: ICD-10-CM

## 2024-12-16 DIAGNOSIS — I27.20 PULMONARY HYPERTENSION (HCC): ICD-10-CM

## 2024-12-16 DIAGNOSIS — M25.562 CHRONIC PAIN OF LEFT KNEE: ICD-10-CM

## 2024-12-16 DIAGNOSIS — R29.6 RECURRENT FALLS: ICD-10-CM

## 2024-12-16 DIAGNOSIS — F41.9 ANXIETY: ICD-10-CM

## 2024-12-16 DIAGNOSIS — R26.89 BALANCE PROBLEMS: ICD-10-CM

## 2024-12-16 DIAGNOSIS — Z98.890 H/O CRANIOTOMY: ICD-10-CM

## 2024-12-16 DIAGNOSIS — M79.18 MYOFASCIAL PAIN: ICD-10-CM

## 2024-12-16 DIAGNOSIS — G93.89 ENCEPHALOMALACIA ON IMAGING STUDY: ICD-10-CM

## 2024-12-16 DIAGNOSIS — G60.8 POLYNEUROPATHY, PERIPHERAL SENSORIMOTOR AXONAL: ICD-10-CM

## 2024-12-16 DIAGNOSIS — R80.9 TYPE 2 DIABETES MELLITUS WITH MICROALBUMINURIA, WITHOUT LONG-TERM CURRENT USE OF INSULIN (HCC): ICD-10-CM

## 2024-12-16 DIAGNOSIS — N18.32 STAGE 3B CHRONIC KIDNEY DISEASE (HCC): ICD-10-CM

## 2024-12-16 DIAGNOSIS — R41.3 MEMORY PROBLEM: ICD-10-CM

## 2024-12-16 DIAGNOSIS — K31.84 GASTROPARESIS: ICD-10-CM

## 2024-12-16 DIAGNOSIS — G89.29 CHRONIC PAIN OF LEFT KNEE: ICD-10-CM

## 2024-12-16 LAB
CREAT UR-MCNC: 40 MG/DL (ref 28–217)
TOTAL PROTEIN, URINE: 16 MG/DL
URINE TOTAL PROTEIN CREATININE RATIO: 0.4 (ref 0–0.2)
VAS LEFT CCA DIST EDV: 18.1 CM/S
VAS LEFT CCA DIST PSV: 81.2 CM/S
VAS LEFT CCA MID EDV: 18.13 CM/S
VAS LEFT CCA MID PSV: 95.01 CM/S
VAS LEFT CCA PROX EDV: 16.1 CM/S
VAS LEFT CCA PROX PSV: 68.8 CM/S
VAS LEFT ECA EDV: 5.39 CM/S
VAS LEFT ECA PSV: 51.6 CM/S
VAS LEFT ICA DIST EDV: 23 CM/S
VAS LEFT ICA DIST PSV: 70.2 CM/S
VAS LEFT ICA MID EDV: 20.8 CM/S
VAS LEFT ICA MID PSV: 68 CM/S
VAS LEFT ICA PROX EDV: 14.2 CM/S
VAS LEFT ICA PROX PSV: 47.2 CM/S
VAS LEFT ICA/CCA PSV: 0.74 NO UNITS
VAS LEFT VERTEBRAL EDV: 14.18 CM/S
VAS LEFT VERTEBRAL PSV: 50.5 CM/S
VAS RIGHT CCA DIST EDV: 14.1 CM/S
VAS RIGHT CCA DIST PSV: 60.7 CM/S
VAS RIGHT CCA MID EDV: 17.99 CM/S
VAS RIGHT CCA MID PSV: 80.11 CM/S
VAS RIGHT CCA PROX EDV: 17.5 CM/S
VAS RIGHT CCA PROX PSV: 120 CM/S
VAS RIGHT ECA EDV: 15.4 CM/S
VAS RIGHT ECA PSV: 71.1 CM/S
VAS RIGHT ICA DIST EDV: 12.8 CM/S
VAS RIGHT ICA DIST PSV: 55.5 CM/S
VAS RIGHT ICA MID EDV: 19.3 CM/S
VAS RIGHT ICA MID PSV: 72.3 CM/S
VAS RIGHT ICA PROX EDV: 16.7 CM/S
VAS RIGHT ICA PROX PSV: 67.2 CM/S
VAS RIGHT ICA/CCA PSV: 0.9 NO UNITS
VAS RIGHT VERTEBRAL EDV: 21.87 CM/S
VAS RIGHT VERTEBRAL PSV: 54.2 CM/S

## 2024-12-16 PROCEDURE — 93880 EXTRACRANIAL BILAT STUDY: CPT

## 2024-12-16 PROCEDURE — 82570 ASSAY OF URINE CREATININE: CPT

## 2024-12-16 PROCEDURE — 93880 EXTRACRANIAL BILAT STUDY: CPT | Performed by: SURGERY

## 2024-12-16 PROCEDURE — 84156 ASSAY OF PROTEIN URINE: CPT

## 2024-12-16 NOTE — TELEPHONE ENCOUNTER
Nia Patel MD, please see below - would patient benefit from updated DEXA due to recent fracture?  Fracture of multiple ribs in September s/p fall  Last DEXA Feb 2022 showed osteopenia, with plan to repeat DEXA in @2 years     If appropriate, please order and have your staff notify patient.     Thank you,  Caren Wood, PharmD, [facility]  Population Health Pharmacist  [facility] Clinical Pharmacy  Department, toll free: [phone], option 1     =========================================================    Noted encounter remains open in provider inbasket.  Will also send patient letter and close for now.      For Pharmacy Admin Tracking Only    Program: [facility]  CPA in place:  No  Recommendation Provided To: Provider: 1 via Note to Provider  Intervention Detail: Lab(s) Ordered  Intervention Accepted By: Provider: 0  Gap Closed?: No   Time Spent (min): 15

## 2024-12-18 NOTE — CONSULTS
04:51 PM    EOSPCT 1 09/27/2024 04:51 PM    BASOPCT 0 09/27/2024 04:51 PM    MONOSABS 1.02 09/27/2024 04:51 PM    LYMPHSABS 1.76 09/27/2024 04:51 PM    EOSABS 0.17 09/27/2024 04:51 PM    BASOSABS 0.06 09/27/2024 04:51 PM    DIFFTYPE NOT REPORTED 10/18/2021 08:20 AM     BMP:    Lab Results   Component Value Date/Time     09/27/2024 04:51 PM    K 4.4 09/27/2024 04:51 PM    CL 92 09/27/2024 04:51 PM    CO2 26 09/27/2024 04:51 PM    BUN 30 09/27/2024 04:51 PM    CREATININE 2.6 09/27/2024 04:51 PM    CALCIUM 9.2 09/27/2024 04:51 PM    GFRAA 43 07/27/2022 08:15 AM    LABGLOM 19 09/27/2024 04:51 PM    LABGLOM 55 03/14/2024 11:19 AM    GLUCOSE 243 09/27/2024 04:51 PM       Radiology Review:  CT Thoracic spine    ASSESSMENT AND PLAN:       Patient Active Problem List   Diagnosis    Shoulder pain, bilateral    Cervical spine pain    Knee pain, left    Essential hypertension    Pulmonary hypertension (HCC)    Vitamin D deficiency    Encounter for long-term (current) use of other medications    Headache    Subacute sphenoidal sinusitis    Type 2 diabetes mellitus with microalbuminuria (HCC)    Peripheral neuropathy    Dizziness    H/O fall    Chronic sinusitis    Gastroesophageal reflux disease    Microalbuminuria    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HCC)    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HCC)    Type 2 diabetes mellitus with microalbuminuria, without long-term current use of insulin (HCC)    Morbid obesity with BMI of 40.0-44.9, adult    Restrictive lung disease secondary to obesity    Calculus of gallbladder and bile duct without cholecystitis or obstruction    Nausea    Multiple gastric ulcers    Anxiety    Chronic tension-type headache, intractable    Chronic, continuous use of opioids    Complete tear of right rotator cuff    Depression    Fatigue    Lumbosacral spondylosis without myelopathy    Major depressive disorder, single episode, moderate (HCC)    Refractory migraine    Muscle spasms of neck    Obsessive-compulsive disorders     Spondylosis of lumbar spine    Spondylosis of thoracic region without myelopathy or radiculopathy    Spinal stenosis, lumbar region, without neurogenic claudication    Elizalde's esophagus without dysplasia    Therapeutic opioid-induced constipation (OIC)    Stage 3a chronic kidney disease (HCC)    Acute cystitis without hematuria    Spongiotic dermatitis    Osteopenia    Dyspepsia    Gastritis without bleeding    Irregular Z line of esophagus    Myofascial pain    Gastroparesis    Early satiety    Chronic low back pain    Acute kidney injury superimposed on CKD (HCC)    AMS (altered mental status)    Elevated lactic acid level    Urinary tract infection, site not specified    Multiple fractures of ribs, right side, initial encounter for closed fracture       A/P:  Jyoti Warren is a 68 y.o. female who presents after a fall three days ago with multiple rib fractures, pneumothorax and was life flighted for finding of hemothorax. NSG was consulted for ant wedge compression fracture of T2, appears chronic on CT.      - No neurosurgical interventions planned for now, will reassess in a few hours   - Maintain spine neutrality    - MRI thoracic spine    - Neuro checks per protocol      Additional recommendations may follow    Please contact neurosurgery with any changes in patient's neurologic status.     Thank you for your consult.       Nicole Holly MD    PGY-2 Neurology Resident Physician  Neurosurgery Team - pager [phone]  [facility]  9/28/2024 4:16 AM      Detail Level: Simple Render Risk Assessment In Note?: no Additional Notes: ============Biopsy confirmed, prior ILK, course of Doxycycline, previous topical regimen of TCM BID [Tacrolimus ([employer] compounded solution) > Clobetasol solution > Minoxidil] but the patient would like to reduce cost and denies any itch or inflammation or progression.  Recommend eliminating clobetasol solution and continuing the Tacrolimus solution and Minoxidil (OTC per patient’s preference)

## 2024-12-23 DIAGNOSIS — M17.12 LOCALIZED OSTEOARTHRITIS OF LEFT KNEE: ICD-10-CM

## 2024-12-23 DIAGNOSIS — M25.562 LEFT KNEE PAIN, UNSPECIFIED CHRONICITY: Primary | ICD-10-CM

## 2024-12-31 ENCOUNTER — HOSPITAL ENCOUNTER (OUTPATIENT)
Age: 69
Discharge: HOME | End: 2024-12-31

## 2025-01-02 ENCOUNTER — HOSPITAL ENCOUNTER (OUTPATIENT)
Age: 70
Setting detail: SPECIMEN
Discharge: HOME | End: 2025-01-02
Payer: MEDICARE

## 2025-01-02 ENCOUNTER — OFFICE VISIT (OUTPATIENT)
Dept: PRIMARY CARE CLINIC | Age: 70
End: 2025-01-02
Payer: MEDICARE

## 2025-01-02 VITALS
TEMPERATURE: 97.7 F | DIASTOLIC BLOOD PRESSURE: 82 MMHG | HEART RATE: 91 BPM | OXYGEN SATURATION: 95 % | SYSTOLIC BLOOD PRESSURE: 128 MMHG

## 2025-01-02 DIAGNOSIS — N30.00 ACUTE CYSTITIS WITHOUT HEMATURIA: Primary | ICD-10-CM

## 2025-01-02 DIAGNOSIS — R35.0 URINARY FREQUENCY: ICD-10-CM

## 2025-01-02 LAB
BACTERIA URNS QL MICRO: ABNORMAL
BILIRUB UR QL STRIP: NEGATIVE
CHARACTER UR: ABNORMAL
CLARITY UR: CLEAR
COLOR UR: YELLOW
CRYSTALS URNS MICRO: ABNORMAL /HPF
EPI CELLS #/AREA URNS HPF: ABNORMAL /HPF (ref 0–5)
GLUCOSE UR STRIP-MCNC: NEGATIVE MG/DL
HGB UR QL STRIP.AUTO: NEGATIVE
KETONES UR STRIP-MCNC: NEGATIVE MG/DL
LEUKOCYTE ESTERASE UR QL STRIP: ABNORMAL
NITRITE UR QL STRIP: POSITIVE
PH UR STRIP: 6 [PH] (ref 5–6)
PROT UR STRIP-MCNC: NEGATIVE MG/DL
RBC #/AREA URNS HPF: ABNORMAL /HPF (ref 0–4)
SP GR UR STRIP: 1.01 (ref 1.01–1.02)
UROBILINOGEN UR STRIP-ACNC: NORMAL EU/DL (ref 0–1)
WBC #/AREA URNS HPF: ABNORMAL /HPF (ref 0–4)

## 2025-01-02 PROCEDURE — 87088 URINE BACTERIA CULTURE: CPT

## 2025-01-02 PROCEDURE — 87086 URINE CULTURE/COLONY COUNT: CPT

## 2025-01-02 PROCEDURE — 81001 URINALYSIS AUTO W/SCOPE: CPT

## 2025-01-02 PROCEDURE — 87186 SC STD MICRODIL/AGAR DIL: CPT

## 2025-01-02 PROCEDURE — 99214 OFFICE O/P EST MOD 30 MIN: CPT | Performed by: FAMILY MEDICINE

## 2025-01-02 RX ORDER — CEPHALEXIN 500 MG/1
500 CAPSULE ORAL 3 TIMES DAILY
Qty: 21 CAPSULE | Refills: 0 | Status: SHIPPED | OUTPATIENT
Start: 2025-01-02 | End: 2025-01-09

## 2025-01-02 ASSESSMENT — ENCOUNTER SYMPTOMS
GASTROINTESTINAL NEGATIVE: 1
NAUSEA: 0
RESPIRATORY NEGATIVE: 1
VOMITING: 0

## 2025-01-02 NOTE — PROGRESS NOTES
2025     Jyoti Warren (:  1955) is a 69 y.o. female, here for evaluation of the following medical concerns:    Dysuria   This is a new problem. The current episode started in the past 7 days. The problem occurs every urination. The problem has been gradually worsening. The quality of the pain is described as burning. There has been no fever. Associated symptoms include flank pain (slight, but has some chronic back pain issues), frequency and urgency. Pertinent negatives include no chills, hematuria, nausea or vomiting. Treatments tried: cranberry pills. The treatment provided moderate relief. Her past medical history is significant for recurrent UTIs.     Did review patient's med list, allergies, social history,pmhx and pshx today as noted in the record.      Review of Systems   Constitutional:  Negative for chills, fatigue and fever.   HENT: Negative.     Respiratory: Negative.     Cardiovascular: Negative.    Gastrointestinal: Negative.  Negative for nausea and vomiting.   Genitourinary:  Positive for dysuria, flank pain (slight, but has some chronic back pain issues), frequency and urgency. Negative for hematuria.       Prior to Visit Medications    Medication Sig Taking? Authorizing Provider   HYDROcodone-acetaminophen (NORCO) 5-325 MG per tablet Take 1 tablet by mouth 2 times daily as needed. Yes Provider, MD Mary   amLODIPine (NORVASC) 5 MG tablet Take 1 tablet by mouth daily Yes Sav Swenson MD   lisinopril (PRINIVIL;ZESTRIL) 10 MG tablet Take 1 tablet by mouth daily Yes Sav Swenson MD   DULoxetine (CYMBALTA) 60 MG extended release capsule Take 1 capsule by mouth daily Yes Nia Patel MD   pantoprazole (PROTONIX) 40 MG tablet TAKE 1 TABLET BY MOUTH TWICE  DAILY Yes Nia Patel MD   glimepiride (AMARYL) 2 MG tablet Take 1.5 tablets by mouth every morning Yes Manuel Sarabia MD   gabapentin (NEURONTIN) 400 MG capsule Take 1 capsule by mouth in the morning and at bedtime for 90

## 2025-01-04 LAB
MICROORGANISM SPEC CULT: ABNORMAL
SPECIMEN DESCRIPTION: ABNORMAL

## 2025-01-15 DIAGNOSIS — R11.0 NAUSEA: ICD-10-CM

## 2025-01-15 DIAGNOSIS — K31.84 GASTROPARESIS: ICD-10-CM

## 2025-01-15 DIAGNOSIS — K22.70 BARRETT'S ESOPHAGUS WITHOUT DYSPLASIA: ICD-10-CM

## 2025-01-16 RX ORDER — PANTOPRAZOLE SODIUM 40 MG/1
40 TABLET, DELAYED RELEASE ORAL 2 TIMES DAILY
Qty: 180 TABLET | Refills: 0 | Status: SHIPPED | OUTPATIENT
Start: 2025-01-16

## 2025-01-16 NOTE — TELEPHONE ENCOUNTER
Jyoti called requesting a refill of the below medication which has been pended for you:     Requested Prescriptions     Pending Prescriptions Disp Refills    pantoprazole (PROTONIX) 40 MG tablet [Pharmacy Med Name: Pantoprazole Sodium 40 MG Oral Tablet Delayed Release] 180 tablet 0     Sig: TAKE 1 TABLET BY MOUTH TWICE  DAILY       Last Appointment Date: 10/31/2024  Next Appointment Date: 2/20/2025    Allergies   Allergen Reactions    Asa [Aspirin] Other (See Comments)     Stomach irritation    Pollen Extract Other (See Comments)

## 2025-01-22 ENCOUNTER — HOSPITAL ENCOUNTER (OUTPATIENT)
Dept: NEUROLOGY | Age: 70
Discharge: HOME | End: 2025-01-22
Payer: MEDICARE

## 2025-01-22 DIAGNOSIS — R41.3 MEMORY PROBLEM: ICD-10-CM

## 2025-01-22 DIAGNOSIS — I10 ESSENTIAL HYPERTENSION: ICD-10-CM

## 2025-01-22 DIAGNOSIS — Z98.890 H/O CRANIOTOMY: ICD-10-CM

## 2025-01-22 DIAGNOSIS — K31.84 GASTROPARESIS: ICD-10-CM

## 2025-01-22 DIAGNOSIS — M25.562 CHRONIC PAIN OF LEFT KNEE: ICD-10-CM

## 2025-01-22 DIAGNOSIS — R29.6 RECURRENT FALLS: ICD-10-CM

## 2025-01-22 DIAGNOSIS — R42 DIZZINESS: ICD-10-CM

## 2025-01-22 DIAGNOSIS — F41.9 ANXIETY: ICD-10-CM

## 2025-01-22 DIAGNOSIS — K21.9 GASTROESOPHAGEAL REFLUX DISEASE, UNSPECIFIED WHETHER ESOPHAGITIS PRESENT: ICD-10-CM

## 2025-01-22 DIAGNOSIS — E11.29 TYPE 2 DIABETES MELLITUS WITH MICROALBUMINURIA, WITHOUT LONG-TERM CURRENT USE OF INSULIN (HCC): ICD-10-CM

## 2025-01-22 DIAGNOSIS — R55 SYNCOPE, UNSPECIFIED SYNCOPE TYPE: ICD-10-CM

## 2025-01-22 DIAGNOSIS — I27.20 PULMONARY HYPERTENSION (HCC): ICD-10-CM

## 2025-01-22 DIAGNOSIS — G89.29 CHRONIC PAIN OF LEFT KNEE: ICD-10-CM

## 2025-01-22 DIAGNOSIS — R80.9 TYPE 2 DIABETES MELLITUS WITH MICROALBUMINURIA, WITHOUT LONG-TERM CURRENT USE OF INSULIN (HCC): ICD-10-CM

## 2025-01-22 DIAGNOSIS — G93.89 ENCEPHALOMALACIA ON IMAGING STUDY: ICD-10-CM

## 2025-01-22 DIAGNOSIS — R29.6 FREQUENT FALLS: ICD-10-CM

## 2025-01-22 DIAGNOSIS — M79.18 MYOFASCIAL PAIN: ICD-10-CM

## 2025-01-22 DIAGNOSIS — R29.898 WEAKNESS OF LEFT LOWER EXTREMITY: ICD-10-CM

## 2025-01-22 DIAGNOSIS — R11.0 NAUSEA: ICD-10-CM

## 2025-01-22 DIAGNOSIS — M54.2 CERVICAL SPINE PAIN: ICD-10-CM

## 2025-01-22 DIAGNOSIS — G60.8 POLYNEUROPATHY, PERIPHERAL SENSORIMOTOR AXONAL: ICD-10-CM

## 2025-01-22 DIAGNOSIS — G44.221 CHRONIC TENSION-TYPE HEADACHE, INTRACTABLE: ICD-10-CM

## 2025-01-22 DIAGNOSIS — R26.89 BALANCE PROBLEMS: ICD-10-CM

## 2025-01-22 PROCEDURE — 95813 EEG EXTND MNTR 61-119 MIN: CPT

## 2025-01-22 PROCEDURE — 93886 INTRACRANIAL COMPLETE STUDY: CPT

## 2025-01-22 RX ORDER — METOCLOPRAMIDE 10 MG/1
10 TABLET ORAL
Qty: 20 TABLET | Refills: 0 | Status: CANCELLED | OUTPATIENT
Start: 2025-01-22 | End: 2025-02-01

## 2025-01-22 NOTE — PROGRESS NOTES
TCD Completed without Emboli Detection.    PCP: Nia Patel MD    Ordering: Dinh Judd Neurologist    Interpreting Physician: Francine Gross    Electronically signed by Shirley Tubbs RN on 1/22/2025 at 10:41 AM

## 2025-01-22 NOTE — TELEPHONE ENCOUNTER
Jyoti called requesting a refill of the below medication which has been pended for you:     Per MAR- hydrochlorothiazide was discontinued on 10/30/24 by Dr. Sarabia after a hospital stay for falls/UTI.  Patient states she is taking this medication daily.   Do you want her to continue?   Please advise    Requested Prescriptions     Pending Prescriptions Disp Refills    hydroCHLOROthiazide 12.5 MG capsule 30 capsule 3     Sig: Take 1 capsule by mouth every morning    metoclopramide (REGLAN) 10 MG tablet 180 tablet 1     Sig: Take 1 tablet by mouth 4 times daily as needed (GI distress) WARNING:  May cause drowsiness.  May impair ability to operate vehicles or machinery.  Do not use in combination with alcohol.       Last Appointment Date: 10/31/2024  Next Appointment Date: 2/20/2025    Allergies   Allergen Reactions    Asa [Aspirin] Other (See Comments)     Stomach irritation    Pollen Extract Other (See Comments)

## 2025-01-22 NOTE — TELEPHONE ENCOUNTER
It appears that Hydrochlorothiazide was discontinued due to concerns regarding dehydration and/or low blood pressure contributing to unsteadiness and falling.  Does she check her blood pressure at home?  Any recent episodes of dizziness?

## 2025-01-22 NOTE — PROGRESS NOTES
EXTENDED EEG Completed with See Analysis.    PCP: Nia Patel MD    Ordering: Dinh Judd Neurologist    Interpreting Physician: Binta Ch Neurologlynne    Technician: Shirley Tubbs RN

## 2025-01-23 ENCOUNTER — HOSPITAL ENCOUNTER (OUTPATIENT)
Age: 70
Discharge: HOME | End: 2025-01-23
Payer: MEDICARE

## 2025-01-23 ENCOUNTER — OFFICE VISIT (OUTPATIENT)
Dept: PRIMARY CARE CLINIC | Age: 70
End: 2025-01-23
Payer: MEDICARE

## 2025-01-23 VITALS
HEART RATE: 86 BPM | TEMPERATURE: 99 F | DIASTOLIC BLOOD PRESSURE: 68 MMHG | BODY MASS INDEX: 37.89 KG/M2 | OXYGEN SATURATION: 99 % | HEIGHT: 68 IN | WEIGHT: 250 LBS | SYSTOLIC BLOOD PRESSURE: 132 MMHG

## 2025-01-23 DIAGNOSIS — R30.0 BURNING WITH URINATION: ICD-10-CM

## 2025-01-23 DIAGNOSIS — N30.01 ACUTE CYSTITIS WITH HEMATURIA: Primary | ICD-10-CM

## 2025-01-23 LAB
BACTERIA URNS QL MICRO: ABNORMAL
BILIRUB UR QL STRIP: NEGATIVE
CHARACTER UR: ABNORMAL
CLARITY UR: CLEAR
COLOR UR: YELLOW
EPI CELLS #/AREA URNS HPF: ABNORMAL /HPF (ref 0–5)
GLUCOSE UR STRIP-MCNC: NEGATIVE MG/DL
HGB UR QL STRIP.AUTO: ABNORMAL
KETONES UR STRIP-MCNC: NEGATIVE MG/DL
LEUKOCYTE ESTERASE UR QL STRIP: ABNORMAL
NITRITE UR QL STRIP: POSITIVE
PH UR STRIP: 5.5 [PH] (ref 5–6)
PROT UR STRIP-MCNC: NEGATIVE MG/DL
RBC #/AREA URNS HPF: ABNORMAL /HPF (ref 0–4)
SP GR UR STRIP: 1.02 (ref 1.01–1.02)
UROBILINOGEN UR STRIP-ACNC: NORMAL EU/DL (ref 0–1)
WBC #/AREA URNS HPF: ABNORMAL /HPF (ref 0–4)

## 2025-01-23 PROCEDURE — 99214 OFFICE O/P EST MOD 30 MIN: CPT

## 2025-01-23 PROCEDURE — 1036F TOBACCO NON-USER: CPT

## 2025-01-23 PROCEDURE — G8427 DOCREV CUR MEDS BY ELIG CLIN: HCPCS

## 2025-01-23 PROCEDURE — 3078F DIAST BP <80 MM HG: CPT

## 2025-01-23 PROCEDURE — 1090F PRES/ABSN URINE INCON ASSESS: CPT

## 2025-01-23 PROCEDURE — 87086 URINE CULTURE/COLONY COUNT: CPT

## 2025-01-23 PROCEDURE — 1160F RVW MEDS BY RX/DR IN RCRD: CPT

## 2025-01-23 PROCEDURE — G8400 PT W/DXA NO RESULTS DOC: HCPCS

## 2025-01-23 PROCEDURE — 99213 OFFICE O/P EST LOW 20 MIN: CPT

## 2025-01-23 PROCEDURE — 1159F MED LIST DOCD IN RCRD: CPT

## 2025-01-23 PROCEDURE — G8417 CALC BMI ABV UP PARAM F/U: HCPCS

## 2025-01-23 PROCEDURE — 1123F ACP DISCUSS/DSCN MKR DOCD: CPT

## 2025-01-23 PROCEDURE — 3017F COLORECTAL CA SCREEN DOC REV: CPT

## 2025-01-23 PROCEDURE — 87088 URINE BACTERIA CULTURE: CPT

## 2025-01-23 PROCEDURE — 81001 URINALYSIS AUTO W/SCOPE: CPT

## 2025-01-23 PROCEDURE — 87186 SC STD MICRODIL/AGAR DIL: CPT

## 2025-01-23 PROCEDURE — 3075F SYST BP GE 130 - 139MM HG: CPT

## 2025-01-23 RX ORDER — CEPHALEXIN 500 MG/1
500 CAPSULE ORAL 3 TIMES DAILY
Qty: 21 CAPSULE | Refills: 0 | Status: SHIPPED | OUTPATIENT
Start: 2025-01-23 | End: 2025-01-30

## 2025-01-23 RX ORDER — HYDROCHLOROTHIAZIDE 12.5 MG/1
12.5 CAPSULE ORAL EVERY MORNING
Qty: 30 CAPSULE | Refills: 3 | Status: SHIPPED | OUTPATIENT
Start: 2025-01-23 | End: 2025-01-23 | Stop reason: SDUPTHER

## 2025-01-23 RX ORDER — METOCLOPRAMIDE 10 MG/1
10 TABLET ORAL 4 TIMES DAILY PRN
Qty: 180 TABLET | Refills: 1 | Status: SHIPPED | OUTPATIENT
Start: 2025-01-23

## 2025-01-23 RX ORDER — HYDROCHLOROTHIAZIDE 12.5 MG/1
12.5 CAPSULE ORAL EVERY MORNING
Qty: 90 CAPSULE | Refills: 0 | Status: SHIPPED | OUTPATIENT
Start: 2025-01-23 | End: 2025-04-23

## 2025-01-23 ASSESSMENT — ENCOUNTER SYMPTOMS
CONSTIPATION: 0
DIARRHEA: 0
VOMITING: 0
STRIDOR: 0
WHEEZING: 0
SHORTNESS OF BREATH: 0
ABDOMINAL DISTENTION: 0
ABDOMINAL PAIN: 1
BACK PAIN: 0
NAUSEA: 1

## 2025-01-23 NOTE — TELEPHONE ENCOUNTER
Patient states she does take her blood pressure, and she will call back with the readings.  She declines any episodes of dizziness or unsteadiness

## 2025-01-23 NOTE — TELEPHONE ENCOUNTER
Jyoti called requesting a refill of the below medication which has been pended for you:     Requesting 90 day supply    Requested Prescriptions     Pending Prescriptions Disp Refills    hydroCHLOROthiazide 12.5 MG capsule 90 capsule 0     Sig: Take 1 capsule by mouth every morning       Last Appointment Date: 10/31/2024  Next Appointment Date: 2/20/2025    Allergies   Allergen Reactions    Asa [Aspirin] Other (See Comments)     Stomach irritation    Pollen Extract Other (See Comments)

## 2025-01-23 NOTE — PROGRESS NOTES
verbalized understanding. Instructed to continue current medications, diet and exercise. Patient agreed with the treatment plan. Encouraged patient to follow up with PCP or return to the clinic for no improvement and or worsening of symptoms.    Electronically signed by SHANNA Jacobo CNP on 1/23/2025 at 1:46 PM

## 2025-01-23 NOTE — PATIENT INSTRUCTIONS
Head to ER if symptoms worsen- vomiting, chills/fevers, back pain  Will call the culture results  Take full course of antibiotics  Can take Azo/Tylenol as needed for symptoms  Push fluids  Follow up with PCP or return to walk in clinic if symptoms are not improving after course of antibiotic

## 2025-01-24 ENCOUNTER — ANESTHESIA EVENT (OUTPATIENT)
Dept: OPERATING ROOM | Age: 70
End: 2025-01-24
Payer: MEDICARE

## 2025-01-24 NOTE — PRE-PROCEDURE INSTRUCTIONS
VM left with pre-EGD instructions:     Date/time/location of procedure     NPO after MN status     Need for       Instructed pt to take BP meds with a small sip of water prior to procedure.    [facility] phone number [phone] provided for pt to call with any further questions prior to procedure.

## 2025-01-25 LAB
MICROORGANISM SPEC CULT: ABNORMAL
SPECIMEN DESCRIPTION: ABNORMAL

## 2025-01-25 NOTE — PROCEDURES
[facility]                [address] [place] [address]                    TRANSCRANIAL DOPPLER (TCD) STUDY      PATIENT NAME: FARHAN SCHNEIDER                 : 1955  MED REC NO: [id number]                         ROOM:   ACCOUNT NO: [id number]                       ADMIT DATE: 2025  PROVIDER: Grisel Lao MD      DATE OF STUDY:  2025    REFERRING PHYSICIAN:  GRISEL LAO    TECHNIQUE:  Transcranial Doppler study of intracranial arteries was performed using [employer] equipment with digital Doppler technology, with high resolution 250 [place] M-mode display and Multigate Spectral Windows and 2 MHz Doppler probes via temporal, suboccipital, and orbital approaches.    CLINICAL DATA:  The patient is 69-year-old with a history of recurrent dizziness, falls, type 2 diabetes, migraines, syncope, generalized weakness, gait and balance problems.    The purpose of study is to evaluate for stroke, intracranial focal stenosis, flow abnormalities, vertebrobasilar insufficiency evaluations.    SUMMARY:  Mean flow velocities in the left middle anterior and posterior cerebral arteries are low with elevated PI values.  Mean flow velocities in the right anterior circulation could not be evaluated due to the absence of the corresponding temporal windows.    Mean flow velocities in the right and left vertebral arteries, basilar artery are low to borderline with elevated PI values.    IMPRESSION:    1. There is severe diffuse intracranial small vessel disease process noted in the left anterior circulation.  2. Right anterior circulation could not be evaluated due to the absence of the corresponding temporal windows.  3. There is moderate vertebral artery stenosis bilaterally.  4. There is mild basilar stenosis.  5. Further clinical correlation and followup recommended.          GRISEL LAO MD      D:  2025 18:16:37     T:  2025 02:16:08

## 2025-01-25 NOTE — PROCEDURES
[facility]                [address] [place] [address]                       ELECTROENCEPHALOGRAM REPORT      PATIENT NAME: FARHAN SCHNEIDER                 : 1955  MED REC NO: [id number]                         ROOM:   ACCOUNT NO: [id number]                       ADMIT DATE: 2025  PROVIDER: Grisel Lao MD      DATE OF SERVICE:  2025    REFERRING PHYSICIAN:  GRISEL LAO    TECHNIQUE:  23 channels of EEG, 2 channels of EOG, 2 channel of EKG, and 1 channel ground and 1 channel reference were recorded with [employer] 32 channel system utilizing the International 10/20 system protocol.    See detection and seizure analysis protocols were utilized, and the study was reviewed using the comprehensive EEG monitoring.    This is an extended EEG recorded for 1 hour and 2 minutes.    CLINICAL DATA:  The patient is 69 years old with a history of dizziness, recurrent falls, chronic tension headaches, migraines, OCD, history of syncope, confusion, encephalomalacia on imaging study, memory problem, previous history of craniotomy.    The purpose of the study is to evaluate for seizure activity.    MEDICATIONS:  Cymbalta, gabapentin.    BACKGROUND:  While the patient was awake, the background activity consisted of poor to fairly regulated 8 to 9 hertz waveforms seen over both cerebral hemispheres.    There were intermittent frontal muscle artifacts noted.    There are generalized slow waves and slow sharp waves noted diffusely.    ACTIVATION PROCEDURES:  HYPERVENTILATION:  Hyperventilation could not be performed due to the patient's clinical condition.    PHOTIC STIMULATION:  Photic stimulation was performed at the following frequencies: 1 Hz, 3 Hz, 6 Hz, 9 Hz, 12 Hz, 15 Hz, 18 Hz, 21 Hz, 25 Hz, 30 Hz and patient tolerated well.  Photic stimulation:  Unremarkable.    SLEEP:  Stage I.    EKG:  EKG showed normal sinus rhythm without any

## 2025-01-28 ENCOUNTER — HOSPITAL ENCOUNTER (OUTPATIENT)
Age: 70
Setting detail: OUTPATIENT SURGERY
Discharge: HOME | End: 2025-01-28
Attending: INTERNAL MEDICINE | Admitting: INTERNAL MEDICINE
Payer: MEDICARE

## 2025-01-28 ENCOUNTER — ANESTHESIA (OUTPATIENT)
Dept: OPERATING ROOM | Age: 70
End: 2025-01-28
Payer: MEDICARE

## 2025-01-28 VITALS
BODY MASS INDEX: 37.13 KG/M2 | DIASTOLIC BLOOD PRESSURE: 81 MMHG | TEMPERATURE: 97.2 F | RESPIRATION RATE: 17 BRPM | SYSTOLIC BLOOD PRESSURE: 93 MMHG | WEIGHT: 245 LBS | HEART RATE: 70 BPM | HEIGHT: 68 IN | OXYGEN SATURATION: 98 %

## 2025-01-28 DIAGNOSIS — K31.84 GASTROPARESIS: ICD-10-CM

## 2025-01-28 DIAGNOSIS — K22.70 BARRETT'S ESOPHAGUS WITHOUT DYSPLASIA: ICD-10-CM

## 2025-01-28 DIAGNOSIS — R11.0 NAUSEA: ICD-10-CM

## 2025-01-28 DIAGNOSIS — G44.201 INTRACTABLE TENSION-TYPE HEADACHE, UNSPECIFIED CHRONICITY PATTERN: ICD-10-CM

## 2025-01-28 DIAGNOSIS — D64.9 ANEMIA, UNSPECIFIED TYPE: ICD-10-CM

## 2025-01-28 DIAGNOSIS — K22.9 IRREGULAR Z LINE OF ESOPHAGUS: ICD-10-CM

## 2025-01-28 LAB — GLUCOSE BLD-MCNC: 158 MG/DL (ref 65–105)

## 2025-01-28 PROCEDURE — 2500000003 HC RX 250 WO HCPCS: Performed by: ANESTHESIOLOGY

## 2025-01-28 PROCEDURE — 2580000003 HC RX 258: Performed by: ANESTHESIOLOGY

## 2025-01-28 PROCEDURE — 43239 EGD BIOPSY SINGLE/MULTIPLE: CPT | Performed by: INTERNAL MEDICINE

## 2025-01-28 PROCEDURE — 3609012400 HC EGD TRANSORAL BIOPSY SINGLE/MULTIPLE: Performed by: INTERNAL MEDICINE

## 2025-01-28 PROCEDURE — 3700000000 HC ANESTHESIA ATTENDED CARE: Performed by: INTERNAL MEDICINE

## 2025-01-28 PROCEDURE — 82947 ASSAY GLUCOSE BLOOD QUANT: CPT

## 2025-01-28 PROCEDURE — 7100000010 HC PHASE II RECOVERY - FIRST 15 MIN: Performed by: INTERNAL MEDICINE

## 2025-01-28 PROCEDURE — 6360000002 HC RX W HCPCS: Performed by: NURSE ANESTHETIST, CERTIFIED REGISTERED

## 2025-01-28 PROCEDURE — 88305 TISSUE EXAM BY PATHOLOGIST: CPT

## 2025-01-28 PROCEDURE — 2709999900 HC NON-CHARGEABLE SUPPLY: Performed by: INTERNAL MEDICINE

## 2025-01-28 PROCEDURE — 7100000011 HC PHASE II RECOVERY - ADDTL 15 MIN: Performed by: INTERNAL MEDICINE

## 2025-01-28 RX ORDER — PROPOFOL 10 MG/ML
INJECTION, EMULSION INTRAVENOUS
Status: DISCONTINUED | OUTPATIENT
Start: 2025-01-28 | End: 2025-01-28 | Stop reason: SDUPTHER

## 2025-01-28 RX ORDER — SODIUM CHLORIDE 0.9 % (FLUSH) 0.9 %
5-40 SYRINGE (ML) INJECTION PRN
Status: DISCONTINUED | OUTPATIENT
Start: 2025-01-28 | End: 2025-01-28 | Stop reason: HOSPADM

## 2025-01-28 RX ORDER — SODIUM CHLORIDE, SODIUM LACTATE, POTASSIUM CHLORIDE, CALCIUM CHLORIDE 600; 310; 30; 20 MG/100ML; MG/100ML; MG/100ML; MG/100ML
INJECTION, SOLUTION INTRAVENOUS CONTINUOUS
Status: DISCONTINUED | OUTPATIENT
Start: 2025-01-28 | End: 2025-01-28 | Stop reason: HOSPADM

## 2025-01-28 RX ORDER — NALOXONE HYDROCHLORIDE 0.4 MG/ML
INJECTION, SOLUTION INTRAMUSCULAR; INTRAVENOUS; SUBCUTANEOUS PRN
Status: CANCELLED | OUTPATIENT
Start: 2025-01-28

## 2025-01-28 RX ORDER — SODIUM CHLORIDE 9 MG/ML
INJECTION, SOLUTION INTRAVENOUS PRN
Status: CANCELLED | OUTPATIENT
Start: 2025-01-28

## 2025-01-28 RX ORDER — SODIUM CHLORIDE 9 MG/ML
INJECTION, SOLUTION INTRAVENOUS CONTINUOUS
Status: DISCONTINUED | OUTPATIENT
Start: 2025-01-28 | End: 2025-01-28 | Stop reason: HOSPADM

## 2025-01-28 RX ORDER — HYDRALAZINE HYDROCHLORIDE 20 MG/ML
10 INJECTION INTRAMUSCULAR; INTRAVENOUS
Status: CANCELLED | OUTPATIENT
Start: 2025-01-28

## 2025-01-28 RX ORDER — PROCHLORPERAZINE EDISYLATE 5 MG/ML
5 INJECTION INTRAMUSCULAR; INTRAVENOUS
Status: CANCELLED | OUTPATIENT
Start: 2025-01-28 | End: 2025-01-29

## 2025-01-28 RX ORDER — SODIUM CHLORIDE 0.9 % (FLUSH) 0.9 %
5-40 SYRINGE (ML) INJECTION PRN
Status: CANCELLED | OUTPATIENT
Start: 2025-01-28

## 2025-01-28 RX ORDER — SODIUM CHLORIDE 9 MG/ML
INJECTION, SOLUTION INTRAVENOUS PRN
Status: DISCONTINUED | OUTPATIENT
Start: 2025-01-28 | End: 2025-01-28 | Stop reason: HOSPADM

## 2025-01-28 RX ORDER — LABETALOL HYDROCHLORIDE 5 MG/ML
10 INJECTION, SOLUTION INTRAVENOUS
Status: CANCELLED | OUTPATIENT
Start: 2025-01-28

## 2025-01-28 RX ORDER — SODIUM CHLORIDE 0.9 % (FLUSH) 0.9 %
5-40 SYRINGE (ML) INJECTION EVERY 12 HOURS SCHEDULED
Status: CANCELLED | OUTPATIENT
Start: 2025-01-28

## 2025-01-28 RX ORDER — SODIUM CHLORIDE 0.9 % (FLUSH) 0.9 %
5-40 SYRINGE (ML) INJECTION EVERY 12 HOURS SCHEDULED
Status: DISCONTINUED | OUTPATIENT
Start: 2025-01-28 | End: 2025-01-28 | Stop reason: HOSPADM

## 2025-01-28 RX ADMIN — SODIUM CHLORIDE: 9 INJECTION, SOLUTION INTRAVENOUS at 10:09

## 2025-01-28 RX ADMIN — SODIUM CHLORIDE, POTASSIUM CHLORIDE, SODIUM LACTATE AND CALCIUM CHLORIDE: 600; 310; 30; 20 INJECTION, SOLUTION INTRAVENOUS at 11:53

## 2025-01-28 RX ADMIN — SODIUM CHLORIDE, PRESERVATIVE FREE 10 ML: 5 INJECTION INTRAVENOUS at 10:09

## 2025-01-28 RX ADMIN — PROPOFOL 100 MCG/KG/MIN: 10 INJECTION, EMULSION INTRAVENOUS at 11:55

## 2025-01-28 ASSESSMENT — PAIN - FUNCTIONAL ASSESSMENT
PAIN_FUNCTIONAL_ASSESSMENT: NONE - DENIES PAIN
PAIN_FUNCTIONAL_ASSESSMENT: NONE - DENIES PAIN

## 2025-01-28 NOTE — DISCHARGE INSTRUCTIONS
Normal changes you may experience after a EGD:  Sore throat    Activity   You have had anesthesia today  Do not drive, operate heavy equipment, consume alcoholic beverages, or make any important decisions  for 24 hours   Take your time changing positions today. You may feel light headed or dizzy if you move too quickly.   Rest for the next 24 hours.   Diet   You can eat your normal diet when you feel well. You should start off with bland foods like chicken soup, toast, or yogurt. Then advance as tolerated.  Drink plenty of fluids (unless your doctor tells you not to). Your urine should be very lightly colored without a strong odor.    Medicines   Continue your home medications as ordered by your physician.     Call your doctor now or seek immediate medical care if: [phone]  You are passing blood rectally or vomiting blood (color of blood may be red or black)  You have coffee ground looking vomit  Severe abdominal pain or tenderness    You have a fever, chills or excessive sweating   You have persistent nausea or vomiting   Redness or swelling at the IV site

## 2025-01-28 NOTE — H&P
Procedure History and Physical    Pre-Procedural Diagnosis:    Elizalde's esophagus  Gastroparesis  Nausea  Anemia    Indications:  same    Procedure Planned: endoscopy     History Obtained From:  patient    HISTORY OF PRESENT ILLNESS:       The patient is a 69 y.o. female who presents for the above procedure.        Past Medical History:    Past Medical History:   Diagnosis Date    Acute cystitis with hematuria 09/19/2021    Elizalde's esophagus     Cervical spine pain 08/21/2013    Chronic headaches     Chronic sinusitis     CKD (chronic kidney disease)     stage 3    Closed fracture of distal end of left femur with routine healing 03/2021    COVID-19     Diabetes mellitus (HCC)     Dizziness     Femur fracture, left (HCC) 03/05/2021    Gastroesophageal reflux disease     barretts    H/O fall     Hypertension     Knee pain, left 08/21/2013    Major depressive disorder, single episode, moderate ([facility]) 01/13/2009    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    Microalbuminuria 12/05/2015    Morbid obesity with BMI of 40.0-44.9, adult 08/24/2016    Obesity     Peripheral neuropathy     Pneumonia 08/30/2015    Pneumonia due to COVID-19 virus 09/19/2021    Pulmonary hypertension (HCC) 01/01/2012    by echocardiogram    Shoulder pain, bilateral 08/21/2013    Spinal stenosis     Type 2 diabetes mellitus ([facility])     Type 2 diabetes mellitus with microalbuminuria ([facility]) 11/05/2015    Unspecified essential hypertension     Essential hypertension    Vitamin D deficiency 11/05/2014       Past Surgical History:    Past Surgical History:   Procedure Laterality Date    APPENDECTOMY      BACK INJECTION  02/25/2021    [facility] Right lumbar medial branch block using Fluroscopy    BRAIN SURGERY  05/24/2016    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 Noel

## 2025-01-28 NOTE — ANESTHESIA POSTPROCEDURE EVALUATION
Department of Anesthesiology  Postprocedure Note    Patient: Jyoti Warren  MRN: [id number]  YOB: 1955  Date of evaluation: 1/28/2025    Procedure Summary       Date: 01/28/25 Room / Location: [facility] PROCEDURE ROOM / [facility]    Anesthesia Start: 1153 Anesthesia Stop: 1209    Procedure: ESOPHAGOGASTRODUODENOSCOPY BIOPSY Diagnosis:       Elizalde's esophagus without dysplasia      Nausea      Gastroparesis      Anemia, unspecified type      Irregular Z line of esophagus      (Elizalde's esophagus without dysplasia [K22.70])      (Nausea [R11.0])      (Gastroparesis [K31.84])      (Anemia, unspecified type [D64.9])      (Irregular Z line of esophagus [K22.9])    Surgeons: Estela Dutton MD Responsible Provider: Nighat Desir MD    Anesthesia Type: TIVA ASA Status: 3            Anesthesia Type: No value filed.    Hodan Phase I: Hodan Score: 10    Hodan Phase II: Hodan Score: 10    Anesthesia Post Evaluation    Patient location during evaluation: PACU  Patient participation: complete - patient participated  Level of consciousness: awake and alert  Airway patency: patent  Nausea & Vomiting: no nausea and no vomiting  Cardiovascular status: hemodynamically stable  Respiratory status: room air and spontaneous ventilation  Hydration status: euvolemic  Multimodal analgesia pain management approach  Pain management: adequate    No notable events documented.

## 2025-01-28 NOTE — ANESTHESIA PRE PROCEDURE
Chronic tension-type headache, intractable G44.221   • Chronic, continuous use of opioids F11.90   • Complete tear of right rotator cuff M75.121   • Depression F32.A   • Fatigue R53.83   • Lumbosacral spondylosis without myelopathy M47.817   • Refractory migraine G43.919   • Muscle spasms of neck M62.838   • Obsessive-compulsive disorders F42.9   • Spondylosis of lumbar spine M47.816   • Spondylosis of thoracic region without myelopathy or radiculopathy M47.814   • Spinal stenosis, lumbar region, without neurogenic claudication M48.061   • Elizalde's esophagus without dysplasia K22.70   • Drug-induced constipation K59.03   • Stage 3a chronic kidney disease (HCC) N18.31   • Acute cystitis without hematuria N30.00   • Spongiotic dermatitis L30.8   • Osteopenia M85.80   • Dyspepsia R10.13   • Gastritis without bleeding K29.70   • Vomiting R11.10   • Myofascial pain M79.18   • Gastroparesis K31.84   • Early satiety R68.81   • Chronic low back pain M54.50, G89.29   • Acute kidney injury superimposed on CKD (HCC) N17.9, N18.9   • AMS (altered mental status) R41.82   • Elevated lactic acid level R79.89   • Urinary tract infection, site not specified N39.0   • Multiple fractures of ribs, right side, initial encounter for closed fracture S22.41XA   • Anemia D64.9   • Intercostal neuralgia G58.8   • Ankylosing spondylitis (HCC) M45.9   • Syncope R55   • Generalized weakness R53.1   • Acute UTI N39.0   • Irregular Z line of esophagus K22.9   • Gait difficulty R26.9   • Balance problems R26.89   • Weakness of left lower extremity R29.898   • Polyneuropathy, peripheral sensorimotor axonal G60.8   • H/O craniotomy Z98.890   • Encephalomalacia on imaging study G93.89   • Memory problem R41.3       Past Medical History:        Diagnosis Date   • Acute cystitis with hematuria 09/19/2021   • Elizalde's esophagus    • Cervical spine pain 08/21/2013   • Chronic headaches    • Chronic sinusitis    • CKD (chronic kidney disease)     stage

## 2025-01-28 NOTE — OP NOTE
PROCEDURE NOTE    DATE OF PROCEDURE: 1/28/2025     SURGEON: Estela Dutton MD  Facility: [facility]   ASSISTANT: None  Anesthesia: MAC  PREOPERATIVE DIAGNOSIS:   Elizalde's esophagus    Diagnosis:    Patient had intermittent irregular Z-line with a circular raised area that is all what was irregular for which I went ahead and took biopsies from his        Innumerable number of tiny gastric polyps most likely fundic gland polyps        POSTOPERATIVE DIAGNOSIS: As described below    OPERATION: Upper GI endoscopy with Biopsy    ANESTHESIA: Moderate Sedation     ESTIMATED BLOOD LOSS: Less than 50 ml    COMPLICATIONS: None.     SPECIMENS:  Was Obtained: As above    HISTORY: The patient is a 69 y.o. year old female with history of above preop diagnosis.  I recommended esophagogastroduodenoscopy with possible biopsy and I explained the risk, benefits, expected outcome, and alternatives to the procedure.  Risks included but are not limited to bleeding, infection, respiratory distress, hypotension, and perforation of the esophagus, stomach, or duodenum.  Patient understands and is in agreement.      The patient was counseled at length about the risks of libertad Covid-19 during their perioperative period and any recovery window from their procedure.  The patient was made aware that libertad Covid-19  may worsen their prognosis for recovering from their procedure  and lend to a higher morbidity and/or mortality risk.  All material risks, benefits, and reasonable alternatives including postponing the procedure were discussed. The patient does wish to proceed with the procedure at this time.         PROCEDURE: The patient was given IV conscious sedation.  The patient's SPO2 remained above 90% throughout the procedure.The gastroscope was inserted orally and advanced under direct vision through the esophagus, through the stomach, through the pylorus, and into the descending duodenum.      Post sedation note :The

## 2025-01-29 ENCOUNTER — OFFICE VISIT (OUTPATIENT)
Dept: FAMILY MEDICINE CLINIC | Age: 70
End: 2025-01-29

## 2025-01-29 VITALS
HEIGHT: 68 IN | WEIGHT: 245 LBS | HEART RATE: 88 BPM | DIASTOLIC BLOOD PRESSURE: 80 MMHG | OXYGEN SATURATION: 98 % | SYSTOLIC BLOOD PRESSURE: 130 MMHG | TEMPERATURE: 98.5 F | BODY MASS INDEX: 37.13 KG/M2

## 2025-01-29 DIAGNOSIS — Z87.440 HISTORY OF RECURRENT UTIS: Primary | ICD-10-CM

## 2025-01-29 DIAGNOSIS — R30.0 DYSURIA: ICD-10-CM

## 2025-01-29 RX ORDER — DULOXETIN HYDROCHLORIDE 60 MG/1
60 CAPSULE, DELAYED RELEASE ORAL DAILY
Qty: 90 CAPSULE | Refills: 3 | OUTPATIENT
Start: 2025-01-29 | End: 2025-04-29

## 2025-01-29 ASSESSMENT — PATIENT HEALTH QUESTIONNAIRE - PHQ9
4. FEELING TIRED OR HAVING LITTLE ENERGY: NOT AT ALL
5. POOR APPETITE OR OVEREATING: NOT AT ALL
SUM OF ALL RESPONSES TO PHQ QUESTIONS 1-9: 0
9. THOUGHTS THAT YOU WOULD BE BETTER OFF DEAD, OR OF HURTING YOURSELF: NOT AT ALL
SUM OF ALL RESPONSES TO PHQ QUESTIONS 1-9: 0
SUM OF ALL RESPONSES TO PHQ QUESTIONS 1-9: 0
6. FEELING BAD ABOUT YOURSELF - OR THAT YOU ARE A FAILURE OR HAVE LET YOURSELF OR YOUR FAMILY DOWN: NOT AT ALL
10. IF YOU CHECKED OFF ANY PROBLEMS, HOW DIFFICULT HAVE THESE PROBLEMS MADE IT FOR YOU TO DO YOUR WORK, TAKE CARE OF THINGS AT HOME, OR GET ALONG WITH OTHER PEOPLE: NOT DIFFICULT AT ALL
8. MOVING OR SPEAKING SO SLOWLY THAT OTHER PEOPLE COULD HAVE NOTICED. OR THE OPPOSITE, BEING SO FIGETY OR RESTLESS THAT YOU HAVE BEEN MOVING AROUND A LOT MORE THAN USUAL: NOT AT ALL
7. TROUBLE CONCENTRATING ON THINGS, SUCH AS READING THE NEWSPAPER OR WATCHING TELEVISION: NOT AT ALL
SUM OF ALL RESPONSES TO PHQ QUESTIONS 1-9: 0
SUM OF ALL RESPONSES TO PHQ9 QUESTIONS 1 & 2: 0
1. LITTLE INTEREST OR PLEASURE IN DOING THINGS: NOT AT ALL
2. FEELING DOWN, DEPRESSED OR HOPELESS: NOT AT ALL
3. TROUBLE FALLING OR STAYING ASLEEP: NOT AT ALL

## 2025-01-29 NOTE — PROGRESS NOTES
[facility]             [address] [place] [address]                        Telephone [phone]             Fax [phone]       Jyoti Warren  :  1955  Age:  69 y.o.   MRN:  [id number]  Date of visit:  2025       Assessment and Plan:     1. History of recurrent UTIs  She was referred to urology.  - [facility], [place]    2. Dysuria  As noted, she completes her antibiotic therapy today, but she continues to have symptoms.  - Urinalysis with Reflex to Culture; Future was ordered.    She plans to bring in a urine sample tomorrow.       Follow up instructions were given to the patient:  She was advised to keep the appointment scheduled next month.           Subjective:    Jyoti Warren is a 69 y.o. female who presents to [facility] today (2025) for follow up/evaluation of:  Urgent care follow up (Reoccurring UTI's. Requesting referral to urology. Currently on Keflex for the 2nd time) and Urinary Frequency (Still having increased urinary frequency)         History of Present Illness  The patient presents for evaluation of frequent urinary tract infections.    She has had two recent urinary tract infections.  The most recent was diagnosed on 2025.  Keflex was prescribed.    She reports a slight improvement in her symptoms following the initiation of Keflex, with one remaining dose to be taken tonight. She continues to experiencing increased urinary frequency, accompanied by dysuria and pruritus. She does not report any systemic symptoms such as fever, chills, nausea, vomiting, or back pain. She also reports a persistent sensation of coldness, which is exacerbated by exposure to cold environments or water activities. She has not previously consulted with a urologist.             She has received the Covid-19 vaccine.  The most recent dose was 2024.         Immunization history was

## 2025-01-30 ENCOUNTER — HOSPITAL ENCOUNTER (OUTPATIENT)
Dept: GENERAL RADIOLOGY | Age: 70
Discharge: HOME | End: 2025-02-01
Attending: ORTHOPAEDIC SURGERY
Payer: MEDICARE

## 2025-01-30 ENCOUNTER — HOSPITAL ENCOUNTER (OUTPATIENT)
Age: 70
Setting detail: SPECIMEN
Discharge: HOME | End: 2025-01-30
Payer: MEDICARE

## 2025-01-30 ENCOUNTER — OFFICE VISIT (OUTPATIENT)
Dept: ORTHOPEDIC SURGERY | Age: 70
End: 2025-01-30
Payer: MEDICARE

## 2025-01-30 VITALS
WEIGHT: 231 LBS | HEIGHT: 68 IN | BODY MASS INDEX: 35.01 KG/M2 | SYSTOLIC BLOOD PRESSURE: 100 MMHG | DIASTOLIC BLOOD PRESSURE: 60 MMHG

## 2025-01-30 DIAGNOSIS — M17.12 LOCALIZED OSTEOARTHRITIS OF LEFT KNEE: ICD-10-CM

## 2025-01-30 DIAGNOSIS — R30.0 DYSURIA: ICD-10-CM

## 2025-01-30 DIAGNOSIS — M25.562 LEFT KNEE PAIN, UNSPECIFIED CHRONICITY: Primary | ICD-10-CM

## 2025-01-30 DIAGNOSIS — M25.562 LEFT KNEE PAIN, UNSPECIFIED CHRONICITY: ICD-10-CM

## 2025-01-30 LAB
BILIRUB UR QL STRIP: NEGATIVE
CLARITY UR: CLEAR
COLOR UR: YELLOW
COMMENT: ABNORMAL
GLUCOSE UR STRIP-MCNC: NEGATIVE MG/DL
HGB UR QL STRIP.AUTO: NEGATIVE
KETONES UR STRIP-MCNC: NEGATIVE MG/DL
LEUKOCYTE ESTERASE UR QL STRIP: NEGATIVE
NITRITE UR QL STRIP: NEGATIVE
PH UR STRIP: 6 [PH] (ref 5–6)
PROT UR STRIP-MCNC: NEGATIVE MG/DL
SP GR UR STRIP: 1 (ref 1.01–1.02)
SURGICAL PATHOLOGY REPORT: NORMAL
UROBILINOGEN UR STRIP-ACNC: NORMAL EU/DL (ref 0–1)

## 2025-01-30 PROCEDURE — 20610 DRAIN/INJ JOINT/BURSA W/O US: CPT | Performed by: NURSE PRACTITIONER

## 2025-01-30 PROCEDURE — 99214 OFFICE O/P EST MOD 30 MIN: CPT | Performed by: NURSE PRACTITIONER

## 2025-01-30 PROCEDURE — 81003 URINALYSIS AUTO W/O SCOPE: CPT

## 2025-01-30 PROCEDURE — 73562 X-RAY EXAM OF KNEE 3: CPT

## 2025-01-30 RX ORDER — BUPIVACAINE HYDROCHLORIDE 5 MG/ML
5 INJECTION, SOLUTION PERINEURAL ONCE
Status: COMPLETED | OUTPATIENT
Start: 2025-01-30 | End: 2025-01-30

## 2025-01-30 RX ORDER — TRIAMCINOLONE ACETONIDE 40 MG/ML
80 INJECTION, SUSPENSION INTRA-ARTICULAR; INTRAMUSCULAR ONCE
Status: COMPLETED | OUTPATIENT
Start: 2025-01-30 | End: 2025-01-30

## 2025-01-30 RX ADMIN — BUPIVACAINE HYDROCHLORIDE 25 MG: 5 INJECTION, SOLUTION PERINEURAL at 10:57

## 2025-01-30 RX ADMIN — TRIAMCINOLONE ACETONIDE 80 MG: 200 INJECTION, SUSPENSION INTRA-ARTICULAR; INTRAMUSCULAR at 10:58

## 2025-01-30 NOTE — PROGRESS NOTES
Orthopaedic Progress Note      CHIEF COMPLAINT: Left knee pain    HISTORY OF PRESENT ILLNESS:       Ms. Warren  is a 69 y.o. female  who presents with left knee pain.  She does have a history of osteoarthritis.  She has had previous injections which have been helpful into the knee.  She has pain now with prolonged walking and standing.  Pain with getting up and down from a seated position.  Patient was hoping for another left knee corticosteroid injection today.      Past Medical History:    Past Medical History:   Diagnosis Date    Acute cystitis with hematuria 09/19/2021    Elizalde's esophagus     Cervical spine pain 08/21/2013    Chronic headaches     Chronic sinusitis     CKD (chronic kidney disease)     stage 3    Closed fracture of distal end of left femur with routine healing 03/2021    COVID-19     Diabetes mellitus ([facility])     Dizziness     Femur fracture, left ([facility]) 03/05/2021    Gastroesophageal reflux disease     barretts    H/O fall     Hypertension     Knee pain, left 08/21/2013    Major depressive disorder, single episode, moderate (HCC) 01/13/2009    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    Microalbuminuria 12/05/2015    Morbid obesity with BMI of 40.0-44.9, adult 08/24/2016    Obesity     Peripheral neuropathy     Pneumonia 08/30/2015    Pneumonia due to COVID-19 virus 09/19/2021    Pulmonary hypertension (HCC) 01/01/2012    by echocardiogram    Shoulder pain, bilateral 08/21/2013    Spinal stenosis     Type 2 diabetes mellitus ([facility])     Type 2 diabetes mellitus with microalbuminuria ([facility]) 11/05/2015    Unspecified essential hypertension     Essential hypertension    Vitamin D deficiency 11/05/2014       Past Surgical History:    Past Surgical History:   Procedure Laterality Date    APPENDECTOMY      BACK INJECTION  02/25/2021    [facility] Right lumbar medial branch block using Fluroscopy    BRAIN SURGERY  05/24/2016    left temporal meningoencephalocele with herniation of cerebrospinal

## 2025-02-12 ENCOUNTER — HOSPITAL ENCOUNTER (OUTPATIENT)
Age: 70
Discharge: HOME | End: 2025-02-12
Payer: MEDICARE

## 2025-02-12 DIAGNOSIS — E87.6 HYPOKALEMIA: ICD-10-CM

## 2025-02-12 DIAGNOSIS — E11.29 TYPE 2 DIABETES MELLITUS WITH MICROALBUMINURIA, WITHOUT LONG-TERM CURRENT USE OF INSULIN (HCC): ICD-10-CM

## 2025-02-12 DIAGNOSIS — R80.9 TYPE 2 DIABETES MELLITUS WITH MICROALBUMINURIA, WITHOUT LONG-TERM CURRENT USE OF INSULIN (HCC): ICD-10-CM

## 2025-02-12 DIAGNOSIS — E78.2 MIXED HYPERLIPIDEMIA: ICD-10-CM

## 2025-02-12 LAB
ALBUMIN SERPL-MCNC: 3.9 G/DL (ref 3.5–5.2)
ALBUMIN/GLOB SERPL: 1.1 {RATIO} (ref 1–2.5)
ALP SERPL-CCNC: 151 U/L (ref 35–104)
ALT SERPL-CCNC: 6 U/L (ref 5–33)
ANION GAP SERPL CALCULATED.3IONS-SCNC: 13 MMOL/L (ref 9–17)
AST SERPL-CCNC: 10 U/L
BILIRUB SERPL-MCNC: 0.3 MG/DL (ref 0.3–1.2)
BUN SERPL-MCNC: 28 MG/DL (ref 8–23)
BUN/CREAT SERPL: 25 (ref 9–20)
CALCIUM SERPL-MCNC: 9.3 MG/DL (ref 8.6–10.4)
CHLORIDE SERPL-SCNC: 96 MMOL/L (ref 98–107)
CHOLEST SERPL-MCNC: 146 MG/DL (ref 0–199)
CHOLESTEROL/HDL RATIO: 2.9
CO2 SERPL-SCNC: 29 MMOL/L (ref 20–31)
CREAT SERPL-MCNC: 1.1 MG/DL (ref 0.5–0.9)
GFR, ESTIMATED: 54 ML/MIN/1.73M2
GLUCOSE SERPL-MCNC: 209 MG/DL (ref 70–99)
HDLC SERPL-MCNC: 50 MG/DL
LDLC SERPL CALC-MCNC: 80 MG/DL (ref 0–100)
POTASSIUM SERPL-SCNC: 3.5 MMOL/L (ref 3.7–5.3)
PROT SERPL-MCNC: 7.3 G/DL (ref 6.4–8.3)
SODIUM SERPL-SCNC: 138 MMOL/L (ref 135–144)
TRIGL SERPL-MCNC: 78 MG/DL
VLDLC SERPL CALC-MCNC: 16 MG/DL (ref 1–30)

## 2025-02-12 PROCEDURE — 80061 LIPID PANEL: CPT

## 2025-02-12 PROCEDURE — 83036 HEMOGLOBIN GLYCOSYLATED A1C: CPT

## 2025-02-12 PROCEDURE — 36415 COLL VENOUS BLD VENIPUNCTURE: CPT

## 2025-02-12 PROCEDURE — 80053 COMPREHEN METABOLIC PANEL: CPT

## 2025-02-13 LAB
EST. AVERAGE GLUCOSE BLD GHB EST-MCNC: 163 MG/DL
HBA1C MFR BLD: 7.3 % (ref 4–6)

## 2025-02-14 ENCOUNTER — HOSPITAL ENCOUNTER (OUTPATIENT)
Age: 70
Discharge: HOME | End: 2025-02-14
Payer: MEDICARE

## 2025-02-14 ENCOUNTER — ENROLLMENT (OUTPATIENT)
Dept: PHARMACY | Facility: CLINIC | Age: 70
End: 2025-02-14

## 2025-02-14 ENCOUNTER — OFFICE VISIT (OUTPATIENT)
Dept: PRIMARY CARE CLINIC | Age: 70
End: 2025-02-14
Payer: MEDICARE

## 2025-02-14 VITALS
DIASTOLIC BLOOD PRESSURE: 68 MMHG | OXYGEN SATURATION: 100 % | WEIGHT: 245 LBS | TEMPERATURE: 98.2 F | HEART RATE: 70 BPM | BODY MASS INDEX: 37.25 KG/M2 | SYSTOLIC BLOOD PRESSURE: 130 MMHG

## 2025-02-14 DIAGNOSIS — N30.00 ACUTE CYSTITIS WITHOUT HEMATURIA: Primary | ICD-10-CM

## 2025-02-14 DIAGNOSIS — R30.0 BURNING WITH URINATION: ICD-10-CM

## 2025-02-14 LAB
BACTERIA URNS QL MICRO: ABNORMAL
BILIRUB UR QL STRIP: NEGATIVE
CHARACTER UR: ABNORMAL
CLARITY UR: CLEAR
COLOR UR: YELLOW
EPI CELLS #/AREA URNS HPF: ABNORMAL /HPF (ref 0–5)
GLUCOSE UR STRIP-MCNC: NEGATIVE MG/DL
HGB UR QL STRIP.AUTO: ABNORMAL
KETONES UR STRIP-MCNC: NEGATIVE MG/DL
LEUKOCYTE ESTERASE UR QL STRIP: ABNORMAL
NITRITE UR QL STRIP: POSITIVE
PH UR STRIP: 6 [PH] (ref 5–6)
PROT UR STRIP-MCNC: NEGATIVE MG/DL
RBC #/AREA URNS HPF: ABNORMAL /HPF (ref 0–4)
SP GR UR STRIP: 1.02 (ref 1.01–1.02)
UROBILINOGEN UR STRIP-ACNC: NORMAL EU/DL (ref 0–1)
WBC #/AREA URNS HPF: ABNORMAL /HPF (ref 0–4)

## 2025-02-14 PROCEDURE — 87088 URINE BACTERIA CULTURE: CPT

## 2025-02-14 PROCEDURE — 87086 URINE CULTURE/COLONY COUNT: CPT

## 2025-02-14 PROCEDURE — 99213 OFFICE O/P EST LOW 20 MIN: CPT | Performed by: FAMILY MEDICINE

## 2025-02-14 PROCEDURE — 81001 URINALYSIS AUTO W/SCOPE: CPT

## 2025-02-14 PROCEDURE — 87186 SC STD MICRODIL/AGAR DIL: CPT

## 2025-02-14 RX ORDER — NITROFURANTOIN 25; 75 MG/1; MG/1
100 CAPSULE ORAL 2 TIMES DAILY
Qty: 14 CAPSULE | Refills: 0 | Status: SHIPPED | OUTPATIENT
Start: 2025-02-14

## 2025-02-14 RX ORDER — CEPHALEXIN 500 MG/1
500 CAPSULE ORAL 3 TIMES DAILY
Qty: 30 CAPSULE | Refills: 0 | Status: SHIPPED | OUTPATIENT
Start: 2025-02-14 | End: 2025-02-14 | Stop reason: ALTCHOICE

## 2025-02-14 ASSESSMENT — PATIENT HEALTH QUESTIONNAIRE - PHQ9
2. FEELING DOWN, DEPRESSED OR HOPELESS: NOT AT ALL
1. LITTLE INTEREST OR PLEASURE IN DOING THINGS: NOT AT ALL
SUM OF ALL RESPONSES TO PHQ9 QUESTIONS 1 & 2: 0
SUM OF ALL RESPONSES TO PHQ QUESTIONS 1-9: 0

## 2025-02-14 ASSESSMENT — ENCOUNTER SYMPTOMS
VOMITING: 0
WHEEZING: 0
ABDOMINAL PAIN: 0
SHORTNESS OF BREATH: 0
COUGH: 0
CONSTIPATION: 0
BACK PAIN: 0
NAUSEA: 0
DIARRHEA: 0
CHEST TIGHTNESS: 0

## 2025-02-14 NOTE — PROGRESS NOTES
2025     Jyoti Warren (:  1955) is a 69 y.o. female, here for evaluation of the following medical concerns:    Dysuria   This is a new problem. The current episode started yesterday. The problem occurs every urination. The problem has been gradually worsening. The quality of the pain is described as burning. Associated symptoms include frequency and urgency. Pertinent negatives include no chills, flank pain, hematuria, nausea or vomiting. Associated symptoms comments: Urine is not cloudy or malodorous. She has tried increased fluids for the symptoms. The treatment provided no relief. Her past medical history is significant for recurrent UTIs.     Did review patient's med list, allergies, social history,pmhx and pshx today as noted in the record.      Review of Systems   Constitutional:  Negative for chills, fatigue and fever.   HENT: Negative.     Respiratory:  Negative for cough, chest tightness, shortness of breath and wheezing.    Cardiovascular:  Negative for chest pain and palpitations.   Gastrointestinal:  Negative for abdominal pain, constipation, diarrhea, nausea and vomiting.   Genitourinary:  Positive for dysuria, frequency and urgency. Negative for flank pain and hematuria.   Musculoskeletal:  Negative for back pain and myalgias.       Prior to Visit Medications    Medication Sig Taking? Authorizing Provider   metoclopramide (REGLAN) 10 MG tablet Take 1 tablet by mouth 4 times daily as needed (GI distress) WARNING:  May cause drowsiness.  May impair ability to operate vehicles or machinery.  Do not use in combination with alcohol. Yes Nia Patel MD   hydroCHLOROthiazide 12.5 MG capsule Take 1 capsule by mouth every morning Yes Nia Patel MD   pantoprazole (PROTONIX) 40 MG tablet TAKE 1 TABLET BY MOUTH TWICE  DAILY Yes Nia Patel MD   HYDROcodone-acetaminophen (NORCO) 5-325 MG per tablet Take 1 tablet by mouth 2 times daily as needed. Yes Provider, MD Mary   amLODIPine

## 2025-02-16 LAB
MICROORGANISM SPEC CULT: ABNORMAL
SPECIMEN DESCRIPTION: ABNORMAL

## 2025-02-17 ENCOUNTER — OFFICE VISIT (OUTPATIENT)
Dept: UROLOGY | Age: 70
End: 2025-02-17
Payer: MEDICARE

## 2025-02-17 VITALS
HEIGHT: 68 IN | WEIGHT: 245 LBS | DIASTOLIC BLOOD PRESSURE: 66 MMHG | BODY MASS INDEX: 37.13 KG/M2 | HEART RATE: 102 BPM | OXYGEN SATURATION: 97 % | RESPIRATION RATE: 16 BRPM | SYSTOLIC BLOOD PRESSURE: 112 MMHG

## 2025-02-17 DIAGNOSIS — N39.0 RECURRENT UTI: Primary | ICD-10-CM

## 2025-02-17 DIAGNOSIS — R33.9 INCOMPLETE BLADDER EMPTYING: ICD-10-CM

## 2025-02-17 PROCEDURE — PBSHW MEAS,POST-VOID RES,US,NON-IMAGING: Performed by: NURSE PRACTITIONER

## 2025-02-17 PROCEDURE — 1090F PRES/ABSN URINE INCON ASSESS: CPT | Performed by: NURSE PRACTITIONER

## 2025-02-17 PROCEDURE — G8417 CALC BMI ABV UP PARAM F/U: HCPCS | Performed by: NURSE PRACTITIONER

## 2025-02-17 PROCEDURE — 3074F SYST BP LT 130 MM HG: CPT | Performed by: NURSE PRACTITIONER

## 2025-02-17 PROCEDURE — 1123F ACP DISCUSS/DSCN MKR DOCD: CPT | Performed by: NURSE PRACTITIONER

## 2025-02-17 PROCEDURE — G8400 PT W/DXA NO RESULTS DOC: HCPCS | Performed by: NURSE PRACTITIONER

## 2025-02-17 PROCEDURE — 99213 OFFICE O/P EST LOW 20 MIN: CPT | Performed by: NURSE PRACTITIONER

## 2025-02-17 PROCEDURE — 51798 US URINE CAPACITY MEASURE: CPT | Performed by: NURSE PRACTITIONER

## 2025-02-17 PROCEDURE — 3078F DIAST BP <80 MM HG: CPT | Performed by: NURSE PRACTITIONER

## 2025-02-17 PROCEDURE — 1036F TOBACCO NON-USER: CPT | Performed by: NURSE PRACTITIONER

## 2025-02-17 PROCEDURE — 1159F MED LIST DOCD IN RCRD: CPT | Performed by: NURSE PRACTITIONER

## 2025-02-17 PROCEDURE — G8427 DOCREV CUR MEDS BY ELIG CLIN: HCPCS | Performed by: NURSE PRACTITIONER

## 2025-02-17 PROCEDURE — 3017F COLORECTAL CA SCREEN DOC REV: CPT | Performed by: NURSE PRACTITIONER

## 2025-02-17 RX ORDER — NITROFURANTOIN MACROCRYSTALS 50 MG/1
50 CAPSULE ORAL NIGHTLY
Qty: 90 CAPSULE | Refills: 0 | Status: SHIPPED | OUTPATIENT
Start: 2025-02-17 | End: 2025-05-18

## 2025-02-17 RX ORDER — ESTRADIOL 0.1 MG/G
1 CREAM VAGINAL DAILY
Qty: 42.5 G | Refills: 2 | Status: SHIPPED | OUTPATIENT
Start: 2025-02-17 | End: 2025-03-19

## 2025-02-17 NOTE — PATIENT INSTRUCTIONS
Start estrogen cream daily for 2 weeks, following this only have to use 2 times a week.     Start daily Macrodantin following completion of current antibiotic.     Continue D-mannose and cranberry pills.     Call sooner with any questions or concerns.

## 2025-02-17 NOTE — PROGRESS NOTES
Post void residual by bladder scanner 118cc.  
Appendectomy; Upper gastrointestinal endoscopy; Knee arthroscopy (Left); Colonoscopy (05/03/2012); other surgical history (2011); Dental surgery (08/2015); Tonsillectomy; brain surgery (05/24/2016); Foreign Body Removal (05/28/2016); Total shoulder arthroplasty (Right, 10/18/2018); Shoulder arthroscopy (Right, 03/07/2019); Nerve Block (09/09/2019); Upper gastrointestinal endoscopy (05/27/2020); Upper gastrointestinal endoscopy (N/A, 12/23/2020); Colonoscopy (N/A, 12/23/2020); Back Injection (02/25/2021); Femur Closed Reduction (Left, 03/05/2020); polysomnography (11/30/2020); Femur fracture surgery (Left, 03/06/2021); Cystoscopy (Bilateral, 08/03/2022); joint replacement; Esophagogastroduodenoscopy (10/18/2022); Upper gastrointestinal endoscopy (N/A, 10/18/2022); and Upper gastrointestinal endoscopy (N/A, 1/28/2025).    Family History  This patient's family history includes Breast Cancer (age of onset: 47) in her mother; Cancer in her father and mother; Diabetes in her father; Hypertension in her father.    Social History  Jyoti  reports that she has never smoked. She has been exposed to tobacco smoke. She has never used smokeless tobacco. She reports current alcohol use. She reports that she does not use drugs.      Subjective:      REVIEW OF SYSTEMS:  Constitutional: negative  Eyes: negative  Respiratory: negative  Cardiovascular: negative  Gastrointestinal: negative  Musculoskeletal: negative  Genitourinary: negative except for what is in HPI  Skin: negative   Neurological: negative  Hematological/Lymphatic: negative  Psychological: negative    Objective:   /66 (Site: Left Upper Arm, Position: Sitting, Cuff Size: Large Adult)   Pulse (!) 102   Resp 16   Ht 1.727 m (5' 8\")   Wt 111.1 kg (245 lb)   LMP 08/24/2010 (Approximate)   SpO2 97%   BMI 37.25 kg/m²     Patient is a 69 y.o. female in no acute distress and alert and oriented to person, place and time.    Pulmonary: Non-labored

## 2025-02-19 DIAGNOSIS — E83.42 HYPOMAGNESEMIA: ICD-10-CM

## 2025-02-19 DIAGNOSIS — E87.6 HYPOKALEMIA: Primary | ICD-10-CM

## 2025-02-19 DIAGNOSIS — G44.201 INTRACTABLE TENSION-TYPE HEADACHE, UNSPECIFIED CHRONICITY PATTERN: ICD-10-CM

## 2025-02-19 RX ORDER — GABAPENTIN 400 MG/1
400 CAPSULE ORAL 2 TIMES DAILY
Qty: 60 CAPSULE | Refills: 2 | OUTPATIENT
Start: 2025-02-19

## 2025-02-20 ENCOUNTER — HOSPITAL ENCOUNTER (OUTPATIENT)
Age: 70
Discharge: HOME | End: 2025-02-20
Payer: MEDICARE

## 2025-02-20 ENCOUNTER — OFFICE VISIT (OUTPATIENT)
Dept: FAMILY MEDICINE CLINIC | Age: 70
End: 2025-02-20

## 2025-02-20 VITALS
HEART RATE: 84 BPM | DIASTOLIC BLOOD PRESSURE: 72 MMHG | HEIGHT: 68 IN | WEIGHT: 244 LBS | SYSTOLIC BLOOD PRESSURE: 108 MMHG | OXYGEN SATURATION: 99 % | BODY MASS INDEX: 36.98 KG/M2

## 2025-02-20 DIAGNOSIS — K31.84 GASTROPARESIS: ICD-10-CM

## 2025-02-20 DIAGNOSIS — G44.201 INTRACTABLE TENSION-TYPE HEADACHE, UNSPECIFIED CHRONICITY PATTERN: ICD-10-CM

## 2025-02-20 DIAGNOSIS — E83.42 HYPOMAGNESEMIA: ICD-10-CM

## 2025-02-20 DIAGNOSIS — I10 ESSENTIAL HYPERTENSION: ICD-10-CM

## 2025-02-20 DIAGNOSIS — R11.0 NAUSEA: ICD-10-CM

## 2025-02-20 DIAGNOSIS — E11.29 TYPE 2 DIABETES MELLITUS WITH MICROALBUMINURIA, WITHOUT LONG-TERM CURRENT USE OF INSULIN (HCC): Primary | ICD-10-CM

## 2025-02-20 DIAGNOSIS — R80.9 TYPE 2 DIABETES MELLITUS WITH MICROALBUMINURIA, WITHOUT LONG-TERM CURRENT USE OF INSULIN (HCC): Primary | ICD-10-CM

## 2025-02-20 DIAGNOSIS — K22.70 BARRETT'S ESOPHAGUS WITHOUT DYSPLASIA: ICD-10-CM

## 2025-02-20 DIAGNOSIS — E87.6 HYPOKALEMIA: ICD-10-CM

## 2025-02-20 LAB
ANION GAP SERPL CALCULATED.3IONS-SCNC: 9 MMOL/L (ref 9–17)
BUN SERPL-MCNC: 31 MG/DL (ref 8–23)
BUN/CREAT SERPL: 24 (ref 9–20)
CALCIUM SERPL-MCNC: 9.4 MG/DL (ref 8.6–10.4)
CHLORIDE SERPL-SCNC: 97 MMOL/L (ref 98–107)
CO2 SERPL-SCNC: 29 MMOL/L (ref 20–31)
CREAT SERPL-MCNC: 1.3 MG/DL (ref 0.5–0.9)
GFR, ESTIMATED: 45 ML/MIN/1.73M2
GLUCOSE SERPL-MCNC: 223 MG/DL (ref 70–99)
MAGNESIUM SERPL-MCNC: 1.7 MG/DL (ref 1.6–2.6)
POTASSIUM SERPL-SCNC: 4.6 MMOL/L (ref 3.7–5.3)
SODIUM SERPL-SCNC: 135 MMOL/L (ref 135–144)

## 2025-02-20 PROCEDURE — 36415 COLL VENOUS BLD VENIPUNCTURE: CPT

## 2025-02-20 PROCEDURE — 83735 ASSAY OF MAGNESIUM: CPT

## 2025-02-20 PROCEDURE — 80048 BASIC METABOLIC PNL TOTAL CA: CPT

## 2025-02-20 RX ORDER — GABAPENTIN 400 MG/1
400 CAPSULE ORAL 2 TIMES DAILY
Qty: 60 CAPSULE | Refills: 2 | Status: SHIPPED | OUTPATIENT
Start: 2025-02-20 | End: 2025-05-21

## 2025-02-20 RX ORDER — GLIMEPIRIDE 2 MG/1
3 TABLET ORAL EVERY MORNING
Qty: 135 TABLET | Refills: 1 | Status: SHIPPED | OUTPATIENT
Start: 2025-02-20

## 2025-02-20 RX ORDER — DULOXETIN HYDROCHLORIDE 60 MG/1
60 CAPSULE, DELAYED RELEASE ORAL DAILY
Qty: 90 CAPSULE | Refills: 1 | Status: SHIPPED | OUTPATIENT
Start: 2025-02-20

## 2025-02-20 RX ORDER — PANTOPRAZOLE SODIUM 40 MG/1
40 TABLET, DELAYED RELEASE ORAL 2 TIMES DAILY
Qty: 180 TABLET | Refills: 1 | Status: SHIPPED | OUTPATIENT
Start: 2025-02-20

## 2025-02-20 RX ORDER — HYDROCHLOROTHIAZIDE 12.5 MG/1
12.5 CAPSULE ORAL EVERY MORNING
Qty: 90 CAPSULE | Refills: 1 | Status: SHIPPED | OUTPATIENT
Start: 2025-02-20

## 2025-02-20 NOTE — PROGRESS NOTES
[place] [address]                        Telephone [phone]             Fax [phone]       Jyoti Warren  :  1955  Age:  69 y.o.   MRN:  [id number]  Date of visit:  2025       Assessment and Plan:     1. Type 2 diabetes mellitus with microalbuminuria, without long-term current use of insulin (HCC)  Her HgbA1c was 7.3 %, which is near goal.   She was advised to continue her current regimen.   Glimepiride was refilled:   - glimepiride (AMARYL) 2 MG tablet; Take 1.5 tablets by mouth every morning  Dispense: 135 tablet; Refill: 1    Labs were ordered to be done prior to her return visit in 6 months:   - Albumin/Creatinine Ratio, Urine; Future  - Comprehensive Metabolic Panel; Future  - Lipid Panel; Future  - Hemoglobin A1C; Future    2. Essential hypertension  Her blood pressure is well-controlled today.  (BP: 108/72)   She was advised to continue current medications.  Hydrochlorothiazide was refilled:   - hydroCHLOROthiazide 12.5 MG capsule; Take 1 capsule by mouth every morning  Dispense: 90 capsule; Refill: 1    3. Intractable tension-type headache, unspecified chronicity pattern  Gabapentin and Cymbalta were refilled.  - gabapentin (NEURONTIN) 400 MG capsule; Take 1 capsule by mouth in the morning and at bedtime for 90 days.  Dispense: 60 capsule; Refill: 2  - DULoxetine (CYMBALTA) 60 MG extended release capsule; Take 1 capsule by mouth daily  Dispense: 90 capsule; Refill: 1    4. Elizalde's esophagus without dysplasia  5. Nausea  6. Gastroparesis  Protonix was refilled.  - pantoprazole (PROTONIX) 40 MG tablet; Take 1 tablet by mouth 2 times daily  Dispense: 180 tablet; Refill: 1     7.  Routine health maintenance  Health maintenance was reviewed with the patient.   She has received the updated 9511-0021 Covid vaccine.   She has received an influenza vaccine this influenza season.  Shingrix was

## 2025-02-28 ENCOUNTER — OFFICE VISIT (OUTPATIENT)
Dept: NEUROLOGY | Age: 70
End: 2025-02-28
Payer: MEDICARE

## 2025-02-28 ENCOUNTER — HOSPITAL ENCOUNTER (OUTPATIENT)
Age: 70
Discharge: HOME | End: 2025-02-28
Payer: MEDICARE

## 2025-02-28 VITALS
WEIGHT: 244 LBS | DIASTOLIC BLOOD PRESSURE: 68 MMHG | OXYGEN SATURATION: 100 % | RESPIRATION RATE: 18 BRPM | HEART RATE: 88 BPM | SYSTOLIC BLOOD PRESSURE: 118 MMHG | BODY MASS INDEX: 37.1 KG/M2

## 2025-02-28 DIAGNOSIS — I10 ESSENTIAL HYPERTENSION: ICD-10-CM

## 2025-02-28 DIAGNOSIS — M79.18 MYOFASCIAL PAIN: ICD-10-CM

## 2025-02-28 DIAGNOSIS — E11.29 TYPE 2 DIABETES MELLITUS WITH MICROALBUMINURIA, WITHOUT LONG-TERM CURRENT USE OF INSULIN (HCC): ICD-10-CM

## 2025-02-28 DIAGNOSIS — F41.9 ANXIETY: ICD-10-CM

## 2025-02-28 DIAGNOSIS — R80.9 TYPE 2 DIABETES MELLITUS WITH MICROALBUMINURIA, WITHOUT LONG-TERM CURRENT USE OF INSULIN (HCC): ICD-10-CM

## 2025-02-28 DIAGNOSIS — I27.20 PULMONARY HYPERTENSION (HCC): ICD-10-CM

## 2025-02-28 DIAGNOSIS — R42 DIZZINESS: ICD-10-CM

## 2025-02-28 DIAGNOSIS — R29.898 WEAKNESS OF LEFT LOWER EXTREMITY: ICD-10-CM

## 2025-02-28 DIAGNOSIS — R29.6 RECURRENT FALLS: ICD-10-CM

## 2025-02-28 DIAGNOSIS — M54.2 CERVICAL SPINE PAIN: ICD-10-CM

## 2025-02-28 DIAGNOSIS — Z98.890 H/O CRANIOTOMY: ICD-10-CM

## 2025-02-28 DIAGNOSIS — R26.89 BALANCE PROBLEMS: ICD-10-CM

## 2025-02-28 DIAGNOSIS — R41.3 MEMORY PROBLEM: ICD-10-CM

## 2025-02-28 DIAGNOSIS — R29.6 FREQUENT FALLS: ICD-10-CM

## 2025-02-28 DIAGNOSIS — G93.89 ENCEPHALOMALACIA ON IMAGING STUDY: ICD-10-CM

## 2025-02-28 DIAGNOSIS — G60.8 POLYNEUROPATHY, PERIPHERAL SENSORIMOTOR AXONAL: Primary | ICD-10-CM

## 2025-02-28 LAB
CREAT UR-MCNC: 91.9 MG/DL (ref 28–217)
MICROALBUMIN UR-MCNC: <12 MG/L (ref 0–20)
MICROALBUMIN/CREAT UR-RTO: NORMAL MCG/MG CREAT (ref 0–25)

## 2025-02-28 PROCEDURE — 99214 OFFICE O/P EST MOD 30 MIN: CPT | Performed by: PSYCHIATRY & NEUROLOGY

## 2025-02-28 PROCEDURE — 82043 UR ALBUMIN QUANTITATIVE: CPT

## 2025-02-28 PROCEDURE — 82570 ASSAY OF URINE CREATININE: CPT

## 2025-02-28 ASSESSMENT — ENCOUNTER SYMPTOMS
COLOR CHANGE: 0
SHORTNESS OF BREATH: 0
BACK PAIN: 1
EYE REDNESS: 0
PHOTOPHOBIA: 0
ABDOMINAL DISTENTION: 0
CHOKING: 0
SORE THROAT: 0
CONSTIPATION: 0
BLOOD IN STOOL: 0
CHEST TIGHTNESS: 0
WHEEZING: 0
APNEA: 0
EYE PAIN: 0
DIARRHEA: 0
SINUS PRESSURE: 0
EYE DISCHARGE: 0
TROUBLE SWALLOWING: 0
FACIAL SWELLING: 0
EYE ITCHING: 0
VOICE CHANGE: 0
COUGH: 0

## 2025-02-28 NOTE — PROGRESS NOTES
[facility]  Neurology    [address] [id number]  Phone:[phone]   Fax: [phone]        SUBJECTIVE:       PATIENT ID:  Jyoti Warren is a  RIGHT     HANDED 69 y.o. female.      Neurologic Problem  The patient's primary symptoms include clumsiness, focal sensory loss, focal weakness, a loss of balance, memory loss and weakness. The patient's pertinent negatives include no syncope. Primary symptoms comment: H/O   CHRONIC  GAIT  AND  BALANCE   PROBLEMS;    H/O   RECURRENT  FALLING;   WEAKNESS  LEFT  LOWER   EXTREMITY;  PERIPHERAL POLYNEUROPATHY;   DIZZINESS;   MEMORY PROBLEMS. This is a chronic problem. The neurological problem developed gradually. The problem has been gradually worsening since onset. Associated symptoms include back pain, bladder incontinence, dizziness, light-headedness and neck pain. Pertinent negatives include no chest pain, confusion, fatigue, palpitations or shortness of breath. Past treatments include bed rest, medication and sleep. The treatment provided no relief. Her past medical history is significant for dementia. There is no history of a bleeding disorder, a clotting disorder, a CVA, head trauma, liver disease, mood changes or seizures.           History obtained from  The   PATIENT    AND  HER         and other  available   medical  records   were  Also  reviewed.          The  Duration,  Quality,  Severity,  Location,  Timing,  Context,  Modifying  Factors   Of   The   Chief   Complaint       And  Present  Illness  Was   Reviewed   In   Chronological   Manner.                                            PATIENT'S  MAIN  CONCERNS INCLUDE :                     1)      H/O   CHRONIC  GAIT  AND  BALANCE  PROBLEMS                                                FOR     6   YEARS                           2)   H/O    RECURRENT   MULTIPLE  FALLS      SINCE    2021                                     MULTI  FACTORIAL  ETIOLOGIES

## 2025-02-28 NOTE — PATIENT INSTRUCTIONS
* FALL   PRECAUTIONS.            *  USE   WALKING  ASSISTANCE  DEVICES      /   WALKER          *   ADEQUATE   FLUID  INTAKE   AND  ELECTROLYTE  BALANCE             * KEEP  DAIRY  OF   THE  NEUROLOGICAL  SYMPTOMS          *  TO  MAINTAIN  REGULAR  SLEEP  WAKE  CYCLES.     *   TO  HAVE  ADEQUATE  REST  AND   SLEEP    HOURS.          *    AVOID  USAGE OF   TOBACCO,  EXCESSIVE  ALCOHOL                AND   ILLEGAL   SUBSTANCES,  IF  ANY          *  Maintain   Healthy  Life Style    With   Periodic  Monitoring  Of         Any  Medical  Conditions  Including   Elevated  Blood  Pressure,  Lipid  Profile,       Blood  Sugar levels  And   Heart  Disease.                *   Period   Screening  For  Cancers  Involving  Breast,  Colon,         Lungs  And  Other  Organs  As  Applicable,           In consultation   With  Your  Primary Care Providers.                *  If   There is  Any  Significant  Worsening   Of  Current  Symptoms  And             Or  If    Any additional  New  Neurological  Symptoms  and          Significant  Concerns   Should  Call  911 or  Go  To  Emergency  Department            For  Further  Immediate  Evaluation.

## 2025-03-05 ENCOUNTER — TELEPHONE (OUTPATIENT)
Dept: FAMILY MEDICINE CLINIC | Age: 70
End: 2025-03-05

## 2025-03-05 ENCOUNTER — TELEMEDICINE (OUTPATIENT)
Dept: FAMILY MEDICINE CLINIC | Age: 70
End: 2025-03-05

## 2025-03-05 DIAGNOSIS — Z00.00 MEDICARE ANNUAL WELLNESS VISIT, SUBSEQUENT: Primary | ICD-10-CM

## 2025-03-05 ASSESSMENT — PATIENT HEALTH QUESTIONNAIRE - PHQ9
SUM OF ALL RESPONSES TO PHQ QUESTIONS 1-9: 0
10. IF YOU CHECKED OFF ANY PROBLEMS, HOW DIFFICULT HAVE THESE PROBLEMS MADE IT FOR YOU TO DO YOUR WORK, TAKE CARE OF THINGS AT HOME, OR GET ALONG WITH OTHER PEOPLE: NOT DIFFICULT AT ALL
9. THOUGHTS THAT YOU WOULD BE BETTER OFF DEAD, OR OF HURTING YOURSELF: NOT AT ALL
SUM OF ALL RESPONSES TO PHQ QUESTIONS 1-9: 0
4. FEELING TIRED OR HAVING LITTLE ENERGY: NOT AT ALL
SUM OF ALL RESPONSES TO PHQ QUESTIONS 1-9: 0
7. TROUBLE CONCENTRATING ON THINGS, SUCH AS READING THE NEWSPAPER OR WATCHING TELEVISION: NOT AT ALL
SUM OF ALL RESPONSES TO PHQ QUESTIONS 1-9: 0
1. LITTLE INTEREST OR PLEASURE IN DOING THINGS: NOT AT ALL
3. TROUBLE FALLING OR STAYING ASLEEP: NOT AT ALL
2. FEELING DOWN, DEPRESSED OR HOPELESS: NOT AT ALL
8. MOVING OR SPEAKING SO SLOWLY THAT OTHER PEOPLE COULD HAVE NOTICED. OR THE OPPOSITE, BEING SO FIGETY OR RESTLESS THAT YOU HAVE BEEN MOVING AROUND A LOT MORE THAN USUAL: NOT AT ALL
5. POOR APPETITE OR OVEREATING: NOT AT ALL
6. FEELING BAD ABOUT YOURSELF - OR THAT YOU ARE A FAILURE OR HAVE LET YOURSELF OR YOUR FAMILY DOWN: NOT AT ALL

## 2025-03-05 NOTE — PATIENT INSTRUCTIONS
foods.     Read food labels and try to avoid saturated and trans fats. They increase your risk of heart disease by raising cholesterol levels.     Limit the amount of solid fat--butter, margarine, and shortening--you eat. Use olive, peanut, or canola oil when you cook. Bake, broil, and steam foods instead of frying them.     Eat a variety of fruit and vegetables every day. Dark green, deep orange, red, or yellow fruits and vegetables are especially good for you. Examples include spinach, carrots, peaches, and berries.     Foods high in fiber can reduce your cholesterol and provide important vitamins and minerals. High-fiber foods include whole-grain cereals and breads, oatmeal, beans, brown rice, citrus fruits, and apples.     Eat lean proteins. Heart-healthy proteins include seafood, lean meats and poultry, eggs, beans, peas, nuts, seeds, and soy products.     Limit drinks and foods with added sugar. These include candy, desserts, and soda pop.   Heart-healthy lifestyle    If your doctor recommends it, get more exercise. For many people, walking is a good choice. Or you may want to swim, bike, or do other activities. Bit by bit, increase the time you're active every day. Try for at least 30 minutes on most days of the week.     Try to quit or cut back on using tobacco and other nicotine products. This includes smoking and vaping. If you need help quitting, talk to your doctor about stop-smoking programs and medicines. These can increase your chances of quitting for good. Quitting is one of the most important things you can do to protect your heart. It is never too late to quit. Try to avoid secondhand smoke too.     Stay at a weight that's healthy for you. Talk to your doctor if you need help losing weight.     Try to get 7 to 9 hours of sleep each night.     Limit alcohol to 2 drinks a day for men and 1 drink a day for women. Too much alcohol can cause health problems.     Manage other health problems such as

## 2025-03-05 NOTE — TELEPHONE ENCOUNTER
Patient was drinking ensure daily to help with unbalanced diet, however it is causing upset stomach.   She decided to switch to a [facility] one a day multivitamin.     Patient would like to know if this is OK    Please advise.    Last OV 2/20/25  Next OV 8/20/25

## 2025-03-05 NOTE — PROGRESS NOTES
evaluation or treatment        Dentist Screen:  Have you seen the dentist within the past year?: (!) No    Intervention:  Patient declines any further evaluation or treatment       ADL's:   Patient reports needing help with:  Select all that apply: (!) Dressing, Bathing  Select all that apply: (!) Laundry, Housekeeping, Transportation, Food Preparation    Interventions:  Patient comments: Patient states her spouse helps with all ADL's    Advanced Directives:  Do you have a Living Will?: (!) No    Intervention:  has NO advanced directive - not interested in additional information                     Objective    Patient-Reported Vitals  Patient-Reported Weight: 245lbs  Patient-Reported Height: 5'8\"                 Allergies   Allergen Reactions    Asa [Aspirin] Other (See Comments)     Stomach irritation    Pollen Extract Other (See Comments)     Prior to Visit Medications    Medication Sig Taking? Authorizing Provider   gabapentin (NEURONTIN) 400 MG capsule Take 1 capsule by mouth in the morning and at bedtime for 90 days. Yes Nia Patel MD   glimepiride (AMARYL) 2 MG tablet Take 1.5 tablets by mouth every morning Yes Nia Patel MD   DULoxetine (CYMBALTA) 60 MG extended release capsule Take 1 capsule by mouth daily Yes Nia Patel MD   hydroCHLOROthiazide 12.5 MG capsule Take 1 capsule by mouth every morning Yes Nia Patel MD   pantoprazole (PROTONIX) 40 MG tablet Take 1 tablet by mouth 2 times daily Yes Nia Patel MD   nitrofurantoin (MACRODANTIN) 50 MG capsule Take 1 capsule by mouth nightly  Patient taking differently: Take 1 capsule by mouth nightly Starting 2/21/25 Yes Mervat Villagran APRN - CNP   estradiol (ESTRACE VAGINAL) 0.1 MG/GM vaginal cream Place 1 g vaginally daily Place 1g (dime size) amount vaginally daily for 2 weeks. Then place 2 times a week. Yes Mervat Villagran APRN - CNP   metoclopramide (REGLAN) 10 MG tablet Take 1 tablet by mouth 4 times daily as needed (GI distress)

## 2025-03-17 ENCOUNTER — OFFICE VISIT (OUTPATIENT)
Dept: GASTROENTEROLOGY | Age: 70
End: 2025-03-17
Payer: MEDICARE

## 2025-03-17 VITALS
RESPIRATION RATE: 16 BRPM | TEMPERATURE: 97 F | HEART RATE: 84 BPM | OXYGEN SATURATION: 93 % | WEIGHT: 241 LBS | DIASTOLIC BLOOD PRESSURE: 70 MMHG | SYSTOLIC BLOOD PRESSURE: 100 MMHG | BODY MASS INDEX: 36.64 KG/M2

## 2025-03-17 DIAGNOSIS — K31.84 GASTROPARESIS: ICD-10-CM

## 2025-03-17 DIAGNOSIS — D64.9 ANEMIA, UNSPECIFIED TYPE: ICD-10-CM

## 2025-03-17 DIAGNOSIS — K22.70 BARRETT'S ESOPHAGUS WITHOUT DYSPLASIA: Primary | ICD-10-CM

## 2025-03-17 DIAGNOSIS — K59.00 CONSTIPATION, UNSPECIFIED CONSTIPATION TYPE: ICD-10-CM

## 2025-03-17 DIAGNOSIS — R74.8 ELEVATED ALKALINE PHOSPHATASE LEVEL: ICD-10-CM

## 2025-03-17 PROCEDURE — 1123F ACP DISCUSS/DSCN MKR DOCD: CPT | Performed by: INTERNAL MEDICINE

## 2025-03-17 PROCEDURE — 3074F SYST BP LT 130 MM HG: CPT | Performed by: INTERNAL MEDICINE

## 2025-03-17 PROCEDURE — 1126F AMNT PAIN NOTED NONE PRSNT: CPT | Performed by: INTERNAL MEDICINE

## 2025-03-17 PROCEDURE — 3017F COLORECTAL CA SCREEN DOC REV: CPT | Performed by: INTERNAL MEDICINE

## 2025-03-17 PROCEDURE — 1036F TOBACCO NON-USER: CPT | Performed by: INTERNAL MEDICINE

## 2025-03-17 PROCEDURE — 1090F PRES/ABSN URINE INCON ASSESS: CPT | Performed by: INTERNAL MEDICINE

## 2025-03-17 PROCEDURE — 1160F RVW MEDS BY RX/DR IN RCRD: CPT | Performed by: INTERNAL MEDICINE

## 2025-03-17 PROCEDURE — G8400 PT W/DXA NO RESULTS DOC: HCPCS | Performed by: INTERNAL MEDICINE

## 2025-03-17 PROCEDURE — G8427 DOCREV CUR MEDS BY ELIG CLIN: HCPCS | Performed by: INTERNAL MEDICINE

## 2025-03-17 PROCEDURE — 1159F MED LIST DOCD IN RCRD: CPT | Performed by: INTERNAL MEDICINE

## 2025-03-17 PROCEDURE — G8417 CALC BMI ABV UP PARAM F/U: HCPCS | Performed by: INTERNAL MEDICINE

## 2025-03-17 PROCEDURE — 3078F DIAST BP <80 MM HG: CPT | Performed by: INTERNAL MEDICINE

## 2025-03-17 PROCEDURE — 99214 OFFICE O/P EST MOD 30 MIN: CPT | Performed by: INTERNAL MEDICINE

## 2025-03-17 ASSESSMENT — ENCOUNTER SYMPTOMS
BLOOD IN STOOL: 0
DIARRHEA: 0
ABDOMINAL DISTENTION: 0
ABDOMINAL PAIN: 0
VOMITING: 0
RECTAL PAIN: 0
WHEEZING: 0
SORE THROAT: 0
VOICE CHANGE: 0
TROUBLE SWALLOWING: 0
ANAL BLEEDING: 0
CHOKING: 0
NAUSEA: 0
COUGH: 0
CONSTIPATION: 0

## 2025-03-17 NOTE — PROGRESS NOTES
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    Microalbuminuria 12/05/2015    Morbid obesity with BMI of 40.0-44.9, adult 08/24/2016    Obesity     Peripheral neuropathy     Pneumonia 08/30/2015    Pneumonia due to COVID-19 virus 09/19/2021    Pulmonary hypertension (HCC) 01/01/2012    by echocardiogram    Shoulder pain, bilateral 08/21/2013    Spinal stenosis     Type 2 diabetes mellitus ([facility])     Type 2 diabetes mellitus with microalbuminuria ([facility]) 11/05/2015    Unspecified essential hypertension     Essential hypertension    Vitamin D deficiency 11/05/2014       Past Surgical History:   Procedure Laterality Date    APPENDECTOMY      BACK INJECTION  02/25/2021    [facility] Right lumbar medial branch block using Fluroscopy    BRAIN SURGERY  05/24/2016    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 Noel    COLONOSCOPY  05/03/2012    diverticular disease    COLONOSCOPY N/A 12/23/2020    COLONOSCOPY WITH BIOPSY performed by Estela Dutton MD at [facility] OR, 1 hyperplastic polyp, random biopsies negative    CYSTOSCOPY Bilateral 08/03/2022    CYSTO Bilateral Retrograde Pyelogram, with bladder washout  performed by Suleiman Jack MD at [facility] OR    DENTAL SURGERY  08/2015    full dental

## 2025-03-19 ENCOUNTER — TRANSCRIBE ORDERS (OUTPATIENT)
Dept: GENERAL RADIOLOGY | Age: 70
End: 2025-03-19

## 2025-03-19 DIAGNOSIS — M51.26 OTHER INTERVERTEBRAL DISC DISPLACEMENT, LUMBAR REGION: Primary | ICD-10-CM

## 2025-04-03 ENCOUNTER — HOSPITAL ENCOUNTER (OUTPATIENT)
Dept: MRI IMAGING | Age: 70
Discharge: HOME | End: 2025-04-05
Payer: MEDICARE

## 2025-04-03 DIAGNOSIS — M51.26 OTHER INTERVERTEBRAL DISC DISPLACEMENT, LUMBAR REGION: ICD-10-CM

## 2025-04-03 PROCEDURE — 72148 MRI LUMBAR SPINE W/O DYE: CPT

## 2025-04-30 ENCOUNTER — HOSPITAL ENCOUNTER (OUTPATIENT)
Age: 70
Discharge: HOME | End: 2025-04-30
Payer: MEDICARE

## 2025-04-30 DIAGNOSIS — R74.8 ELEVATED ALKALINE PHOSPHATASE LEVEL: ICD-10-CM

## 2025-04-30 LAB
HAV AB SERPL IA-ACNC: NONREACTIVE
HBV CORE AB SER QL: NONREACTIVE
HBV SURFACE AB SERPL IA-ACNC: <3.5 MIU/ML
HBV SURFACE AG SERPL QL IA: NONREACTIVE
HCV AB SERPL QL IA: NONREACTIVE
IRON SATN MFR SERPL: 15 % (ref 20–55)
IRON SERPL-MCNC: 35 UG/DL (ref 37–145)
TIBC SERPL-MCNC: 238 UG/DL (ref 250–450)
UNSATURATED IRON BINDING CAPACITY: 203 UG/DL (ref 112–347)

## 2025-04-30 PROCEDURE — 83516 IMMUNOASSAY NONANTIBODY: CPT

## 2025-04-30 PROCEDURE — 86803 HEPATITIS C AB TEST: CPT

## 2025-04-30 PROCEDURE — 82104 ALPHA-1-ANTITRYPSIN PHENO: CPT

## 2025-04-30 PROCEDURE — 83550 IRON BINDING TEST: CPT

## 2025-04-30 PROCEDURE — 86704 HEP B CORE ANTIBODY TOTAL: CPT

## 2025-04-30 PROCEDURE — 87340 HEPATITIS B SURFACE AG IA: CPT

## 2025-04-30 PROCEDURE — 83540 ASSAY OF IRON: CPT

## 2025-04-30 PROCEDURE — 36415 COLL VENOUS BLD VENIPUNCTURE: CPT

## 2025-04-30 PROCEDURE — 86038 ANTINUCLEAR ANTIBODIES: CPT

## 2025-04-30 PROCEDURE — 86708 HEPATITIS A ANTIBODY: CPT

## 2025-04-30 PROCEDURE — 86225 DNA ANTIBODY NATIVE: CPT

## 2025-04-30 PROCEDURE — 82103 ALPHA-1-ANTITRYPSIN TOTAL: CPT

## 2025-04-30 PROCEDURE — 86317 IMMUNOASSAY INFECTIOUS AGENT: CPT

## 2025-05-01 LAB
ANA SER QL IA: NEGATIVE
DSDNA IGG SER QL IA: 2 IU/ML
MITOCHONDRIA M2 IGG SER-ACNC: 3.5 U/ML (ref 0–4)
NUCLEAR IGG SER IA-RTO: 0.2 U/ML

## 2025-05-02 ENCOUNTER — HOSPITAL ENCOUNTER (OUTPATIENT)
Age: 70
Discharge: HOME | End: 2025-05-02
Payer: MEDICARE

## 2025-05-02 DIAGNOSIS — N18.32 STAGE 3B CHRONIC KIDNEY DISEASE (HCC): ICD-10-CM

## 2025-05-02 DIAGNOSIS — E83.42 HYPOMAGNESEMIA: ICD-10-CM

## 2025-05-02 LAB
25(OH)D3 SERPL-MCNC: 30.6 NG/ML (ref 30–100)
ANION GAP SERPL CALCULATED.3IONS-SCNC: 16 MMOL/L (ref 9–17)
BASOPHILS # BLD: 0.08 K/UL (ref 0–0.2)
BASOPHILS NFR BLD: 1 % (ref 0–2)
BUN SERPL-MCNC: 26 MG/DL (ref 8–23)
BUN/CREAT SERPL: 17 (ref 9–20)
CA-I BLD-SCNC: 1.19 MMOL/L (ref 1.13–1.33)
CALCIUM SERPL-MCNC: 9.2 MG/DL (ref 8.6–10.4)
CHLORIDE SERPL-SCNC: 94 MMOL/L (ref 98–107)
CO2 SERPL-SCNC: 26 MMOL/L (ref 20–31)
CREAT SERPL-MCNC: 1.5 MG/DL (ref 0.5–0.9)
EOSINOPHIL # BLD: 0.14 K/UL (ref 0–0.44)
EOSINOPHILS RELATIVE PERCENT: 1 % (ref 1–4)
ERYTHROCYTE [DISTWIDTH] IN BLOOD BY AUTOMATED COUNT: 13.9 % (ref 11.8–14.4)
GFR, ESTIMATED: 37 ML/MIN/1.73M2
GLUCOSE SERPL-MCNC: 319 MG/DL (ref 70–99)
HCT VFR BLD AUTO: 34.3 % (ref 36.3–47.1)
HGB BLD-MCNC: 10.9 G/DL (ref 11.9–15.1)
IMM GRANULOCYTES # BLD AUTO: 0.09 K/UL (ref 0–0.3)
IMM GRANULOCYTES NFR BLD: 1 %
LYMPHOCYTES NFR BLD: 2.18 K/UL (ref 1.1–3.7)
LYMPHOCYTES RELATIVE PERCENT: 16 % (ref 24–43)
MAGNESIUM SERPL-MCNC: 2 MG/DL (ref 1.6–2.6)
MCH RBC QN AUTO: 28.4 PG (ref 25.2–33.5)
MCHC RBC AUTO-ENTMCNC: 31.8 G/DL (ref 25.2–33.5)
MCV RBC AUTO: 89.3 FL (ref 82.6–102.9)
MONOCYTES NFR BLD: 0.9 K/UL (ref 0.1–1.2)
MONOCYTES NFR BLD: 7 % (ref 3–12)
NEUTROPHILS NFR BLD: 74 % (ref 36–65)
NEUTS SEG NFR BLD: 10.07 K/UL (ref 1.5–8.1)
NRBC BLD-RTO: 0 PER 100 WBC
PHOSPHATE SERPL-MCNC: 4.6 MG/DL (ref 2.6–4.5)
PLATELET # BLD AUTO: 380 K/UL (ref 138–453)
PMV BLD AUTO: 10.9 FL (ref 8.1–13.5)
POTASSIUM SERPL-SCNC: 3.8 MMOL/L (ref 3.7–5.3)
PTH-INTACT SERPL-MCNC: 51 PG/ML (ref 17.9–58.6)
RBC # BLD AUTO: 3.84 M/UL (ref 3.95–5.11)
SODIUM SERPL-SCNC: 136 MMOL/L (ref 135–144)
WBC OTHER # BLD: 13.5 K/UL (ref 3.5–11.3)

## 2025-05-02 PROCEDURE — 83735 ASSAY OF MAGNESIUM: CPT

## 2025-05-02 PROCEDURE — 36415 COLL VENOUS BLD VENIPUNCTURE: CPT

## 2025-05-02 PROCEDURE — 83970 ASSAY OF PARATHORMONE: CPT

## 2025-05-02 PROCEDURE — 82306 VITAMIN D 25 HYDROXY: CPT

## 2025-05-02 PROCEDURE — 84100 ASSAY OF PHOSPHORUS: CPT

## 2025-05-02 PROCEDURE — 82330 ASSAY OF CALCIUM: CPT

## 2025-05-02 PROCEDURE — 80048 BASIC METABOLIC PNL TOTAL CA: CPT

## 2025-05-02 PROCEDURE — 85025 COMPLETE CBC W/AUTO DIFF WBC: CPT

## 2025-05-03 ENCOUNTER — HOSPITAL ENCOUNTER (OUTPATIENT)
Age: 70
Setting detail: SPECIMEN
Discharge: HOME | End: 2025-05-03
Payer: MEDICARE

## 2025-05-03 DIAGNOSIS — N18.32 STAGE 3B CHRONIC KIDNEY DISEASE (HCC): ICD-10-CM

## 2025-05-03 LAB
CREAT UR-MCNC: 68.4 MG/DL (ref 28–217)
SMOOTH MUSCLE ANTIBODY: 13 UNITS (ref 0–19)
TOTAL PROTEIN, URINE: 9 MG/DL
URINE TOTAL PROTEIN CREATININE RATIO: 0.13

## 2025-05-03 PROCEDURE — 84156 ASSAY OF PROTEIN URINE: CPT

## 2025-05-03 PROCEDURE — 82570 ASSAY OF URINE CREATININE: CPT

## 2025-05-05 ENCOUNTER — OFFICE VISIT (OUTPATIENT)
Dept: NEPHROLOGY | Age: 70
End: 2025-05-05
Payer: MEDICARE

## 2025-05-05 VITALS
RESPIRATION RATE: 10 BRPM | HEIGHT: 68 IN | SYSTOLIC BLOOD PRESSURE: 110 MMHG | DIASTOLIC BLOOD PRESSURE: 64 MMHG | HEART RATE: 75 BPM | WEIGHT: 245 LBS | BODY MASS INDEX: 37.13 KG/M2 | OXYGEN SATURATION: 95 %

## 2025-05-05 DIAGNOSIS — E11.22 TYPE 2 DIABETES MELLITUS WITH STAGE 3B CHRONIC KIDNEY DISEASE, WITHOUT LONG-TERM CURRENT USE OF INSULIN (HCC): ICD-10-CM

## 2025-05-05 DIAGNOSIS — E55.9 VITAMIN D DEFICIENCY: ICD-10-CM

## 2025-05-05 DIAGNOSIS — N18.32 STAGE 3B CHRONIC KIDNEY DISEASE (HCC): Primary | ICD-10-CM

## 2025-05-05 DIAGNOSIS — N18.32 TYPE 2 DIABETES MELLITUS WITH STAGE 3B CHRONIC KIDNEY DISEASE, WITHOUT LONG-TERM CURRENT USE OF INSULIN (HCC): ICD-10-CM

## 2025-05-05 DIAGNOSIS — N20.0 NEPHROLITHIASIS: ICD-10-CM

## 2025-05-05 DIAGNOSIS — I10 HYPERTENSION, ESSENTIAL: ICD-10-CM

## 2025-05-05 DIAGNOSIS — E83.42 HYPOMAGNESEMIA: ICD-10-CM

## 2025-05-05 DIAGNOSIS — E87.6 HYPOKALEMIA: ICD-10-CM

## 2025-05-05 DIAGNOSIS — I10 ESSENTIAL HYPERTENSION: ICD-10-CM

## 2025-05-05 LAB
ALPHA-1 ANTITRYPSIN PHENOTYPE: NORMAL
ALPHA-1 ANTITRYPSIN: 177 MG/DL (ref 90–200)

## 2025-05-05 PROCEDURE — 99213 OFFICE O/P EST LOW 20 MIN: CPT

## 2025-05-05 NOTE — PROGRESS NOTES
Nephrology Progress Note    Nia Jimenez MD      SUBJECTIVE      Chief Complaint   Patient presents with    Chronic Kidney Disease     5 month follow up     5/5/2025:  Patient is here for follow-up of her CKD 3 likely due to diabetic and hypertensive nephrosclerosis.  Her baseline creatinine now seems to be around 1.4-1.8 mg/dl.    Patient was recently found to have low potassium levels and was sent to ED in June 2024 started on potassium replacements.  Now seems to doing better  Patient doing well. No new issues. No fever, no chills, no CP, no SOB, no N/V/D, no abdomen pain, no urinary complaints, +ve some chronic LE edema- better currently.   Patient is currently on amlodipine 5 mg daily, hydrochlorothiazide 12.5 mg daily, lisinopril 10 mg daily, vitamin D 50,000 units weekly as prescribed by PCP  She is off of Metformin.   She drinks about 2 cans of coke and 20 oz tea and not much water about 20 oz daily.   Last labs 5/2/2025: BUN/Cr 26/1.5, Na 139, K 3.8, Cl 94, Bicarb 26, Ca 9.2, Mg 2.0, Phos 4.6, PTH 51.0, Vit D 30.6, Hb 10.9, UPC 0.13.  BP today 110/64, HR 75.     12/2/2024:  Patient is here for follow-up of her CKD 3 likely due to diabetic and hypertensive nephrosclerosis.  Her baseline creatinine now seems to be around 1.4-1.8 mg/dl.    Patient was recently found to have low potassium levels and was sent to ED in June 2024 started on potassium replacements.  Now seems to doing better  Patient doing well. No new issues. No fever, no chills, no CP, no SOB, no N/V/D, no abdomen pain, no urinary complaints, +ve some chronic LE edema.  Patient is currently on amlodipine 10 mg daily, vitamin D 50,000 units weekly as prescribed by PCP, also is on Metformin. Her hydrochlorothiazide 12.5 mg daily, Lisinopril 20 mg daily (was dced in the hospital visit), potassium bicarbonate 1 tablet 2 times daily all have been stopped.   She states she is trying to drink more, previously was only drinking

## 2025-05-09 ENCOUNTER — TRANSCRIBE ORDERS (OUTPATIENT)
Dept: GENERAL RADIOLOGY | Age: 70
End: 2025-05-09

## 2025-05-09 DIAGNOSIS — M54.14 RADICULOPATHY, THORACIC REGION: Primary | ICD-10-CM

## 2025-05-13 DIAGNOSIS — G44.201 INTRACTABLE TENSION-TYPE HEADACHE, UNSPECIFIED CHRONICITY PATTERN: ICD-10-CM

## 2025-05-13 NOTE — TELEPHONE ENCOUNTER
NOT DUE UNTIL 5/27/25    Meds verified in Epic last filled 4/27/25 for #60/30 days    Last OV: 2/20/25  Next OV: 8/20/25

## 2025-05-14 RX ORDER — GABAPENTIN 400 MG/1
400 CAPSULE ORAL 2 TIMES DAILY
Qty: 60 CAPSULE | Refills: 2 | OUTPATIENT
Start: 2025-05-14 | End: 2025-08-12

## 2025-05-15 DIAGNOSIS — G44.201 INTRACTABLE TENSION-TYPE HEADACHE, UNSPECIFIED CHRONICITY PATTERN: ICD-10-CM

## 2025-05-15 RX ORDER — GABAPENTIN 400 MG/1
400 CAPSULE ORAL 2 TIMES DAILY
Qty: 60 CAPSULE | Refills: 2 | Status: SHIPPED | OUTPATIENT
Start: 2025-05-15 | End: 2025-08-13

## 2025-05-15 NOTE — TELEPHONE ENCOUNTER
Patient calling to state she needs an early refill if possible to [place] 2 [place]. She leaves 5/21 and expected to return 6/1/25.  This is for her Gabapentin

## 2025-05-16 RX ORDER — GABAPENTIN 400 MG/1
400 CAPSULE ORAL 2 TIMES DAILY
Qty: 60 CAPSULE | Refills: 2 | OUTPATIENT
Start: 2025-05-16

## 2025-06-03 ENCOUNTER — HOSPITAL ENCOUNTER (OUTPATIENT)
Dept: MRI IMAGING | Age: 70
Discharge: HOME | End: 2025-06-05
Payer: MEDICARE

## 2025-06-03 DIAGNOSIS — M54.14 RADICULOPATHY, THORACIC REGION: ICD-10-CM

## 2025-06-03 PROCEDURE — 72146 MRI CHEST SPINE W/O DYE: CPT

## 2025-06-07 ENCOUNTER — HOSPITAL ENCOUNTER (INPATIENT)
Age: 70
LOS: 2 days | Discharge: HOME HEALTH | DRG: 690 | End: 2025-06-09
Attending: EMERGENCY MEDICINE | Admitting: FAMILY MEDICINE
Payer: MEDICARE

## 2025-06-07 DIAGNOSIS — N30.00 ACUTE CYSTITIS WITHOUT HEMATURIA: Primary | ICD-10-CM

## 2025-06-07 DIAGNOSIS — R41.82 ALTERED MENTAL STATUS, UNSPECIFIED ALTERED MENTAL STATUS TYPE: ICD-10-CM

## 2025-06-07 PROBLEM — R79.89 ELEVATED LACTIC ACID LEVEL: Status: RESOLVED | Noted: 2024-03-06 | Resolved: 2025-06-07

## 2025-06-07 PROBLEM — N39.0 ACUTE UTI: Status: RESOLVED | Noted: 2024-10-29 | Resolved: 2025-06-07

## 2025-06-07 PROBLEM — N18.9 ACUTE KIDNEY INJURY SUPERIMPOSED ON CKD: Status: RESOLVED | Noted: 2023-08-18 | Resolved: 2025-06-07

## 2025-06-07 PROBLEM — R11.10 VOMITING: Status: RESOLVED | Noted: 2022-10-18 | Resolved: 2025-06-07

## 2025-06-07 PROBLEM — N39.0 UTI (URINARY TRACT INFECTION): Status: ACTIVE | Noted: 2025-06-07

## 2025-06-07 PROBLEM — R11.0 NAUSEA: Status: RESOLVED | Noted: 2020-06-12 | Resolved: 2025-06-07

## 2025-06-07 PROBLEM — N17.9 ACUTE KIDNEY INJURY SUPERIMPOSED ON CKD: Status: RESOLVED | Noted: 2023-08-18 | Resolved: 2025-06-07

## 2025-06-07 PROBLEM — N39.0 URINARY TRACT INFECTION, SITE NOT SPECIFIED: Status: RESOLVED | Noted: 2024-03-06 | Resolved: 2025-06-07

## 2025-06-07 LAB
ALBUMIN SERPL-MCNC: 3.7 G/DL (ref 3.5–5.2)
ALBUMIN/GLOB SERPL: 1 {RATIO} (ref 1–2.5)
ALP SERPL-CCNC: 142 U/L (ref 35–104)
ALT SERPL-CCNC: 6 U/L (ref 10–35)
ANION GAP SERPL CALCULATED.3IONS-SCNC: 13 MMOL/L (ref 9–16)
AST SERPL-CCNC: 16 U/L (ref 10–35)
BACTERIA URNS QL MICRO: ABNORMAL
BASOPHILS # BLD: 0.07 K/UL (ref 0–0.2)
BASOPHILS NFR BLD: 1 % (ref 0–2)
BILIRUB SERPL-MCNC: 0.2 MG/DL (ref 0–1.2)
BILIRUB UR QL STRIP: NEGATIVE
BUN SERPL-MCNC: 17 MG/DL (ref 8–23)
BUN/CREAT SERPL: 14 (ref 9–20)
CALCIUM SERPL-MCNC: 9.5 MG/DL (ref 8.6–10.4)
CHARACTER UR: ABNORMAL
CHLORIDE SERPL-SCNC: 97 MMOL/L (ref 98–107)
CLARITY UR: CLEAR
CO2 SERPL-SCNC: 27 MMOL/L (ref 20–31)
COLOR UR: YELLOW
CREAT SERPL-MCNC: 1.2 MG/DL (ref 0.6–0.9)
EOSINOPHIL # BLD: 0.12 K/UL (ref 0–0.44)
EOSINOPHILS RELATIVE PERCENT: 1 % (ref 1–4)
EPI CELLS #/AREA URNS HPF: ABNORMAL /HPF (ref 0–5)
ERYTHROCYTE [DISTWIDTH] IN BLOOD BY AUTOMATED COUNT: 13.7 % (ref 11.8–14.4)
GFR, ESTIMATED: 49 ML/MIN/1.73M2
GLUCOSE BLD-MCNC: 201 MG/DL (ref 65–105)
GLUCOSE SERPL-MCNC: 345 MG/DL (ref 74–99)
GLUCOSE UR STRIP-MCNC: ABNORMAL MG/DL
HCT VFR BLD AUTO: 36 % (ref 36.3–47.1)
HGB BLD-MCNC: 11.5 G/DL (ref 11.9–15.1)
HGB UR QL STRIP.AUTO: ABNORMAL
IMM GRANULOCYTES # BLD AUTO: 0.07 K/UL (ref 0–0.3)
IMM GRANULOCYTES NFR BLD: 1 %
KETONES UR STRIP-MCNC: NEGATIVE MG/DL
LACTATE BLDV-SCNC: 1.3 MMOL/L (ref 0.5–2.2)
LACTATE BLDV-SCNC: 2.9 MMOL/L (ref 0.5–2.2)
LEUKOCYTE ESTERASE UR QL STRIP: ABNORMAL
LYMPHOCYTES NFR BLD: 1.33 K/UL (ref 1.1–3.7)
LYMPHOCYTES RELATIVE PERCENT: 9 % (ref 24–43)
MCH RBC QN AUTO: 28.5 PG (ref 25.2–33.5)
MCHC RBC AUTO-ENTMCNC: 31.9 G/DL (ref 25.2–33.5)
MCV RBC AUTO: 89.3 FL (ref 82.6–102.9)
MONOCYTES NFR BLD: 0.59 K/UL (ref 0.1–1.2)
MONOCYTES NFR BLD: 4 % (ref 3–12)
NEUTROPHILS NFR BLD: 84 % (ref 36–65)
NEUTS SEG NFR BLD: 12.51 K/UL (ref 1.5–8.1)
NITRITE UR QL STRIP: NEGATIVE
NRBC BLD-RTO: 0 PER 100 WBC
PH UR STRIP: 6.5 [PH] (ref 5–6)
PLATELET # BLD AUTO: 371 K/UL (ref 138–453)
PMV BLD AUTO: 10.9 FL (ref 8.1–13.5)
POTASSIUM SERPL-SCNC: 3.7 MMOL/L (ref 3.7–5.3)
PROT SERPL-MCNC: 7.5 G/DL (ref 6.6–8.7)
PROT UR STRIP-MCNC: NEGATIVE MG/DL
RBC # BLD AUTO: 4.03 M/UL (ref 3.95–5.11)
RBC #/AREA URNS HPF: ABNORMAL /HPF (ref 0–4)
SODIUM SERPL-SCNC: 137 MMOL/L (ref 136–145)
SP GR UR STRIP: 1.01 (ref 1.01–1.02)
UROBILINOGEN UR STRIP-ACNC: NORMAL EU/DL (ref 0–1)
WBC #/AREA URNS HPF: ABNORMAL /HPF (ref 0–4)
WBC OTHER # BLD: 14.7 K/UL (ref 3.5–11.3)

## 2025-06-07 PROCEDURE — 6360000002 HC RX W HCPCS: Performed by: FAMILY MEDICINE

## 2025-06-07 PROCEDURE — 99285 EMERGENCY DEPT VISIT HI MDM: CPT

## 2025-06-07 PROCEDURE — 87040 BLOOD CULTURE FOR BACTERIA: CPT

## 2025-06-07 PROCEDURE — 96361 HYDRATE IV INFUSION ADD-ON: CPT

## 2025-06-07 PROCEDURE — G0378 HOSPITAL OBSERVATION PER HR: HCPCS

## 2025-06-07 PROCEDURE — 80053 COMPREHEN METABOLIC PANEL: CPT

## 2025-06-07 PROCEDURE — 2060000000 HC ICU INTERMEDIATE R&B

## 2025-06-07 PROCEDURE — 6360000002 HC RX W HCPCS: Performed by: EMERGENCY MEDICINE

## 2025-06-07 PROCEDURE — 85025 COMPLETE CBC W/AUTO DIFF WBC: CPT

## 2025-06-07 PROCEDURE — 99222 1ST HOSP IP/OBS MODERATE 55: CPT | Performed by: NURSE PRACTITIONER

## 2025-06-07 PROCEDURE — 87186 SC STD MICRODIL/AGAR DIL: CPT

## 2025-06-07 PROCEDURE — 6370000000 HC RX 637 (ALT 250 FOR IP): Performed by: FAMILY MEDICINE

## 2025-06-07 PROCEDURE — 82947 ASSAY GLUCOSE BLOOD QUANT: CPT

## 2025-06-07 PROCEDURE — 87088 URINE BACTERIA CULTURE: CPT

## 2025-06-07 PROCEDURE — 2580000003 HC RX 258: Performed by: FAMILY MEDICINE

## 2025-06-07 PROCEDURE — 96372 THER/PROPH/DIAG INJ SC/IM: CPT

## 2025-06-07 PROCEDURE — 83605 ASSAY OF LACTIC ACID: CPT

## 2025-06-07 PROCEDURE — 96374 THER/PROPH/DIAG INJ IV PUSH: CPT

## 2025-06-07 PROCEDURE — 81001 URINALYSIS AUTO W/SCOPE: CPT

## 2025-06-07 PROCEDURE — 87086 URINE CULTURE/COLONY COUNT: CPT

## 2025-06-07 PROCEDURE — 2500000003 HC RX 250 WO HCPCS: Performed by: EMERGENCY MEDICINE

## 2025-06-07 PROCEDURE — 2580000003 HC RX 258: Performed by: EMERGENCY MEDICINE

## 2025-06-07 PROCEDURE — 36415 COLL VENOUS BLD VENIPUNCTURE: CPT

## 2025-06-07 RX ORDER — MAGNESIUM SULFATE IN WATER 40 MG/ML
2000 INJECTION, SOLUTION INTRAVENOUS PRN
Status: DISCONTINUED | OUTPATIENT
Start: 2025-06-07 | End: 2025-06-09 | Stop reason: HOSPADM

## 2025-06-07 RX ORDER — SODIUM CHLORIDE 0.9 % (FLUSH) 0.9 %
5-40 SYRINGE (ML) INJECTION PRN
Status: DISCONTINUED | OUTPATIENT
Start: 2025-06-07 | End: 2025-06-09 | Stop reason: HOSPADM

## 2025-06-07 RX ORDER — DEXTROSE MONOHYDRATE 100 MG/ML
INJECTION, SOLUTION INTRAVENOUS CONTINUOUS PRN
Status: DISCONTINUED | OUTPATIENT
Start: 2025-06-07 | End: 2025-06-09 | Stop reason: HOSPADM

## 2025-06-07 RX ORDER — ONDANSETRON 4 MG/1
4 TABLET, ORALLY DISINTEGRATING ORAL EVERY 8 HOURS PRN
Status: DISCONTINUED | OUTPATIENT
Start: 2025-06-07 | End: 2025-06-09 | Stop reason: HOSPADM

## 2025-06-07 RX ORDER — HYDROCODONE BITARTRATE AND ACETAMINOPHEN 5; 325 MG/1; MG/1
1 TABLET ORAL EVERY 8 HOURS PRN
Refills: 0 | Status: DISCONTINUED | OUTPATIENT
Start: 2025-06-07 | End: 2025-06-09 | Stop reason: HOSPADM

## 2025-06-07 RX ORDER — HYDROCODONE BITARTRATE AND ACETAMINOPHEN 5; 325 MG/1; MG/1
1 TABLET ORAL EVERY 6 HOURS PRN
Status: DISCONTINUED | OUTPATIENT
Start: 2025-06-07 | End: 2025-06-07

## 2025-06-07 RX ORDER — ONDANSETRON 2 MG/ML
4 INJECTION INTRAMUSCULAR; INTRAVENOUS EVERY 6 HOURS PRN
Status: DISCONTINUED | OUTPATIENT
Start: 2025-06-07 | End: 2025-06-09 | Stop reason: HOSPADM

## 2025-06-07 RX ORDER — LISINOPRIL 5 MG/1
10 TABLET ORAL DAILY
Status: DISCONTINUED | OUTPATIENT
Start: 2025-06-07 | End: 2025-06-09 | Stop reason: HOSPADM

## 2025-06-07 RX ORDER — GLUCAGON 1 MG/ML
1 KIT INJECTION PRN
Status: DISCONTINUED | OUTPATIENT
Start: 2025-06-07 | End: 2025-06-09 | Stop reason: HOSPADM

## 2025-06-07 RX ORDER — ACETAMINOPHEN 650 MG/1
650 SUPPOSITORY RECTAL EVERY 6 HOURS PRN
Status: DISCONTINUED | OUTPATIENT
Start: 2025-06-07 | End: 2025-06-09 | Stop reason: HOSPADM

## 2025-06-07 RX ORDER — ACETAMINOPHEN 325 MG/1
650 TABLET ORAL EVERY 6 HOURS PRN
Status: DISCONTINUED | OUTPATIENT
Start: 2025-06-07 | End: 2025-06-09 | Stop reason: HOSPADM

## 2025-06-07 RX ORDER — ESTRADIOL 0.1 MG/G
1 CREAM VAGINAL DAILY
Status: DISCONTINUED | OUTPATIENT
Start: 2025-06-07 | End: 2025-06-07

## 2025-06-07 RX ORDER — PANTOPRAZOLE SODIUM 40 MG/1
40 TABLET, DELAYED RELEASE ORAL 2 TIMES DAILY
Status: DISCONTINUED | OUTPATIENT
Start: 2025-06-07 | End: 2025-06-09 | Stop reason: HOSPADM

## 2025-06-07 RX ORDER — POTASSIUM CHLORIDE 1500 MG/1
40 TABLET, EXTENDED RELEASE ORAL PRN
Status: DISCONTINUED | OUTPATIENT
Start: 2025-06-07 | End: 2025-06-09 | Stop reason: HOSPADM

## 2025-06-07 RX ORDER — SODIUM CHLORIDE 9 MG/ML
INJECTION, SOLUTION INTRAVENOUS CONTINUOUS
Status: ACTIVE | OUTPATIENT
Start: 2025-06-07 | End: 2025-06-08

## 2025-06-07 RX ORDER — POTASSIUM CHLORIDE 7.45 MG/ML
10 INJECTION INTRAVENOUS PRN
Status: DISCONTINUED | OUTPATIENT
Start: 2025-06-07 | End: 2025-06-09 | Stop reason: HOSPADM

## 2025-06-07 RX ORDER — SODIUM CHLORIDE 9 MG/ML
INJECTION, SOLUTION INTRAVENOUS PRN
Status: DISCONTINUED | OUTPATIENT
Start: 2025-06-07 | End: 2025-06-09 | Stop reason: HOSPADM

## 2025-06-07 RX ORDER — DULOXETIN HYDROCHLORIDE 30 MG/1
60 CAPSULE, DELAYED RELEASE ORAL DAILY
Status: DISCONTINUED | OUTPATIENT
Start: 2025-06-07 | End: 2025-06-09 | Stop reason: HOSPADM

## 2025-06-07 RX ORDER — AMLODIPINE BESYLATE 5 MG/1
5 TABLET ORAL DAILY
Status: DISCONTINUED | OUTPATIENT
Start: 2025-06-07 | End: 2025-06-09 | Stop reason: HOSPADM

## 2025-06-07 RX ORDER — GLIPIZIDE 5 MG/1
5 TABLET ORAL
Status: DISCONTINUED | OUTPATIENT
Start: 2025-06-08 | End: 2025-06-09 | Stop reason: HOSPADM

## 2025-06-07 RX ORDER — INSULIN LISPRO 100 [IU]/ML
0-8 INJECTION, SOLUTION INTRAVENOUS; SUBCUTANEOUS
Status: DISCONTINUED | OUTPATIENT
Start: 2025-06-07 | End: 2025-06-09 | Stop reason: HOSPADM

## 2025-06-07 RX ORDER — POLYETHYLENE GLYCOL 3350 17 G/17G
17 POWDER, FOR SOLUTION ORAL DAILY PRN
Status: DISCONTINUED | OUTPATIENT
Start: 2025-06-07 | End: 2025-06-09 | Stop reason: HOSPADM

## 2025-06-07 RX ORDER — 0.9 % SODIUM CHLORIDE 0.9 %
1000 INTRAVENOUS SOLUTION INTRAVENOUS ONCE
Status: COMPLETED | OUTPATIENT
Start: 2025-06-07 | End: 2025-06-07

## 2025-06-07 RX ORDER — HYDROCHLOROTHIAZIDE 12.5 MG/1
12.5 TABLET ORAL EVERY MORNING
Status: DISCONTINUED | OUTPATIENT
Start: 2025-06-08 | End: 2025-06-09 | Stop reason: HOSPADM

## 2025-06-07 RX ORDER — ENOXAPARIN SODIUM 100 MG/ML
30 INJECTION SUBCUTANEOUS 2 TIMES DAILY
Status: DISCONTINUED | OUTPATIENT
Start: 2025-06-07 | End: 2025-06-09 | Stop reason: HOSPADM

## 2025-06-07 RX ORDER — SODIUM CHLORIDE 0.9 % (FLUSH) 0.9 %
5-40 SYRINGE (ML) INJECTION EVERY 12 HOURS SCHEDULED
Status: DISCONTINUED | OUTPATIENT
Start: 2025-06-07 | End: 2025-06-09 | Stop reason: HOSPADM

## 2025-06-07 RX ADMIN — WATER 1000 MG: 1 INJECTION INTRAMUSCULAR; INTRAVENOUS; SUBCUTANEOUS at 16:02

## 2025-06-07 RX ADMIN — GABAPENTIN 400 MG: 300 CAPSULE ORAL at 22:10

## 2025-06-07 RX ADMIN — INSULIN LISPRO 2 UNITS: 100 INJECTION, SOLUTION INTRAVENOUS; SUBCUTANEOUS at 22:10

## 2025-06-07 RX ADMIN — SODIUM CHLORIDE 1000 ML: 0.9 INJECTION, SOLUTION INTRAVENOUS at 14:28

## 2025-06-07 RX ADMIN — ENOXAPARIN SODIUM 30 MG: 100 INJECTION SUBCUTANEOUS at 22:09

## 2025-06-07 RX ADMIN — SODIUM CHLORIDE: 0.9 INJECTION, SOLUTION INTRAVENOUS at 18:25

## 2025-06-07 RX ADMIN — PANTOPRAZOLE SODIUM 40 MG: 40 TABLET, DELAYED RELEASE ORAL at 22:10

## 2025-06-07 NOTE — ED NOTES
All other components within normal limits   CULTURE, BLOOD 1   CULTURE, BLOOD 2   CULTURE, URINE   LACTIC ACID     Imaging:    No orders to display     Medications Administered         sodium chloride 0.9 % bolus 1,000 mL Admin Date  06/07/2025 Action  New Bag Dose  1,000 mL Rate  983.6 mL/hr Route  IntraVENous Documented By  Pinky Paulino, RN          Medication Reconciliation Completed: Yes      Consults:    [] Hospitalist  Completed     [] yes     [] no    [] Cardiology  Completed     [] yes     [] no    [] General Surgery  Completed     [] yes     [] no                 [] Orthopedics  Completed     [] yes     [] no    [] OB/GYN   Completed     [] yes     [] no    [] Oncology   Completed     [] yes     [] no    [] Neurology   Completed     [] yes     [] no    [] Podiatry   Completed     [] yes     [] no    [] Urology   Completed     [] yes     [] no         Electronically signed by Pinky Paulino RN on 6/7/2025 at 3:55 PM

## 2025-06-07 NOTE — ED PROVIDER NOTES
shoulder arthroplasty (Right, 10/18/2018); Shoulder arthroscopy (Right, 2019); Nerve Block (2019); Upper gastrointestinal endoscopy (2020); Upper gastrointestinal endoscopy (N/A, 2020); Colonoscopy (N/A, 2020); Back Injection (2021); Femur Closed Reduction (Left, 2020); polysomnography (2020); Femur fracture surgery (Left, 2021); Cystoscopy (Bilateral, 2022); joint replacement; Esophagogastroduodenoscopy (10/18/2022); Upper gastrointestinal endoscopy (N/A, 10/18/2022); and Upper gastrointestinal endoscopy (N/A, 2025).    FAMILY HISTORY:   She indicated that her mother is . She indicated that her father is . She indicated that her sister is alive. She indicated that her brother is alive. She indicated that her maternal grandmother is . She indicated that her maternal grandfather is . She indicated that her paternal grandmother is . She indicated that her paternal grandfather is . She indicated that her daughter is alive. She indicated that her son is alive.     family history includes Breast Cancer (age of onset: 47) in her mother; Cancer in her father and mother; Diabetes in her father; Hypertension in her father.    SOCIAL HISTORY:     reports that she has never smoked. She has been exposed to tobacco smoke. She has never used smokeless tobacco. She reports current alcohol use. She reports that she does not use drugs.    IMMUNIZATION HISTORY:      Immunization History   Administered Date(s) Administered    COVID-19, MODERNA Booster BLUE border, (age 18y+), IM, 50mcg/0.25mL 2022    COVID-19, MODERNA, , (age 12y+), IM, 50mcg/0.5mL 2021, 2021, 2024    COVID-19, PFIZER PURPLE top, DILUTE for use, (age 12 y+), 30mcg/0.3mL 2021, 2021, 11/10/2021    COVID-19, PFIZER, , (age 12y+), IM, 30mcg/0.3mL 11/15/2023    DTP 2011    Influenza, AFLURIA (age 3 y+),

## 2025-06-08 LAB
ANION GAP SERPL CALCULATED.3IONS-SCNC: 11 MMOL/L (ref 9–16)
BASOPHILS # BLD: 0.03 K/UL (ref 0–0.2)
BASOPHILS NFR BLD: 0 % (ref 0–2)
BUN SERPL-MCNC: 11 MG/DL (ref 8–23)
BUN/CREAT SERPL: 11 (ref 9–20)
CALCIUM SERPL-MCNC: 9 MG/DL (ref 8.6–10.4)
CHLORIDE SERPL-SCNC: 104 MMOL/L (ref 98–107)
CO2 SERPL-SCNC: 25 MMOL/L (ref 20–31)
CREAT SERPL-MCNC: 1 MG/DL (ref 0.6–0.9)
EOSINOPHIL # BLD: <0.03 K/UL (ref 0–0.44)
EOSINOPHILS RELATIVE PERCENT: 0 % (ref 1–4)
ERYTHROCYTE [DISTWIDTH] IN BLOOD BY AUTOMATED COUNT: 13.6 % (ref 11.8–14.4)
EST. AVERAGE GLUCOSE BLD GHB EST-MCNC: 174 MG/DL
GFR, ESTIMATED: 61 ML/MIN/1.73M2
GLUCOSE BLD-MCNC: 163 MG/DL (ref 65–105)
GLUCOSE BLD-MCNC: 172 MG/DL (ref 65–105)
GLUCOSE BLD-MCNC: 230 MG/DL (ref 65–105)
GLUCOSE SERPL-MCNC: 183 MG/DL (ref 74–99)
HBA1C MFR BLD: 7.7 % (ref 4–6)
HCT VFR BLD AUTO: 30.8 % (ref 36.3–47.1)
HGB BLD-MCNC: 10.1 G/DL (ref 11.9–15.1)
IMM GRANULOCYTES # BLD AUTO: 0.07 K/UL (ref 0–0.3)
IMM GRANULOCYTES NFR BLD: 1 %
LYMPHOCYTES NFR BLD: 1.74 K/UL (ref 1.1–3.7)
LYMPHOCYTES RELATIVE PERCENT: 15 % (ref 24–43)
MAGNESIUM SERPL-MCNC: 2 MG/DL (ref 1.6–2.4)
MCH RBC QN AUTO: 28.5 PG (ref 25.2–33.5)
MCHC RBC AUTO-ENTMCNC: 32.8 G/DL (ref 25.2–33.5)
MCV RBC AUTO: 87 FL (ref 82.6–102.9)
MONOCYTES NFR BLD: 0.75 K/UL (ref 0.1–1.2)
MONOCYTES NFR BLD: 7 % (ref 3–12)
NEUTROPHILS NFR BLD: 77 % (ref 36–65)
NEUTS SEG NFR BLD: 8.93 K/UL (ref 1.5–8.1)
NRBC BLD-RTO: 0 PER 100 WBC
PLATELET # BLD AUTO: 290 K/UL (ref 138–453)
PMV BLD AUTO: 10.7 FL (ref 8.1–13.5)
POTASSIUM SERPL-SCNC: 3.6 MMOL/L (ref 3.7–5.3)
RBC # BLD AUTO: 3.54 M/UL (ref 3.95–5.11)
SODIUM SERPL-SCNC: 140 MMOL/L (ref 136–145)
WBC OTHER # BLD: 11.5 K/UL (ref 3.5–11.3)

## 2025-06-08 PROCEDURE — 82947 ASSAY GLUCOSE BLOOD QUANT: CPT

## 2025-06-08 PROCEDURE — 6360000002 HC RX W HCPCS: Performed by: FAMILY MEDICINE

## 2025-06-08 PROCEDURE — 83036 HEMOGLOBIN GLYCOSYLATED A1C: CPT

## 2025-06-08 PROCEDURE — 80048 BASIC METABOLIC PNL TOTAL CA: CPT

## 2025-06-08 PROCEDURE — 96372 THER/PROPH/DIAG INJ SC/IM: CPT

## 2025-06-08 PROCEDURE — 83735 ASSAY OF MAGNESIUM: CPT

## 2025-06-08 PROCEDURE — 99233 SBSQ HOSP IP/OBS HIGH 50: CPT | Performed by: FAMILY MEDICINE

## 2025-06-08 PROCEDURE — 2500000003 HC RX 250 WO HCPCS: Performed by: FAMILY MEDICINE

## 2025-06-08 PROCEDURE — G0378 HOSPITAL OBSERVATION PER HR: HCPCS

## 2025-06-08 PROCEDURE — 36415 COLL VENOUS BLD VENIPUNCTURE: CPT

## 2025-06-08 PROCEDURE — 85025 COMPLETE CBC W/AUTO DIFF WBC: CPT

## 2025-06-08 PROCEDURE — 2580000003 HC RX 258: Performed by: FAMILY MEDICINE

## 2025-06-08 PROCEDURE — 6370000000 HC RX 637 (ALT 250 FOR IP): Performed by: FAMILY MEDICINE

## 2025-06-08 PROCEDURE — 2060000000 HC ICU INTERMEDIATE R&B

## 2025-06-08 PROCEDURE — 96361 HYDRATE IV INFUSION ADD-ON: CPT

## 2025-06-08 PROCEDURE — 6370000000 HC RX 637 (ALT 250 FOR IP): Performed by: NURSE PRACTITIONER

## 2025-06-08 PROCEDURE — 96376 TX/PRO/DX INJ SAME DRUG ADON: CPT

## 2025-06-08 RX ORDER — MUPIROCIN 2 %
OINTMENT (GRAM) TOPICAL 2 TIMES DAILY
Status: DISCONTINUED | OUTPATIENT
Start: 2025-06-08 | End: 2025-06-09 | Stop reason: HOSPADM

## 2025-06-08 RX ORDER — POTASSIUM CHLORIDE 1500 MG/1
20 TABLET, EXTENDED RELEASE ORAL ONCE
Status: COMPLETED | OUTPATIENT
Start: 2025-06-08 | End: 2025-06-08

## 2025-06-08 RX ADMIN — WATER 1000 MG: 1 INJECTION INTRAMUSCULAR; INTRAVENOUS; SUBCUTANEOUS at 16:45

## 2025-06-08 RX ADMIN — PANTOPRAZOLE SODIUM 40 MG: 40 TABLET, DELAYED RELEASE ORAL at 08:45

## 2025-06-08 RX ADMIN — DULOXETINE HYDROCHLORIDE 60 MG: 30 CAPSULE, DELAYED RELEASE ORAL at 08:45

## 2025-06-08 RX ADMIN — ENOXAPARIN SODIUM 30 MG: 100 INJECTION SUBCUTANEOUS at 21:17

## 2025-06-08 RX ADMIN — GLIPIZIDE 5 MG: 5 TABLET ORAL at 08:48

## 2025-06-08 RX ADMIN — POTASSIUM CHLORIDE 20 MEQ: 1500 TABLET, EXTENDED RELEASE ORAL at 08:45

## 2025-06-08 RX ADMIN — SODIUM CHLORIDE: 0.9 INJECTION, SOLUTION INTRAVENOUS at 07:12

## 2025-06-08 RX ADMIN — AMLODIPINE BESYLATE 5 MG: 5 TABLET ORAL at 08:45

## 2025-06-08 RX ADMIN — SODIUM CHLORIDE, PRESERVATIVE FREE 10 ML: 5 INJECTION INTRAVENOUS at 21:23

## 2025-06-08 RX ADMIN — ENOXAPARIN SODIUM 30 MG: 100 INJECTION SUBCUTANEOUS at 08:45

## 2025-06-08 RX ADMIN — GABAPENTIN 400 MG: 300 CAPSULE ORAL at 21:17

## 2025-06-08 RX ADMIN — GABAPENTIN 400 MG: 300 CAPSULE ORAL at 08:45

## 2025-06-08 RX ADMIN — INSULIN LISPRO 2 UNITS: 100 INJECTION, SOLUTION INTRAVENOUS; SUBCUTANEOUS at 08:46

## 2025-06-08 RX ADMIN — HYDROCHLOROTHIAZIDE 12.5 MG: 12.5 TABLET ORAL at 08:45

## 2025-06-08 RX ADMIN — MUPIROCIN: 20 OINTMENT TOPICAL at 21:17

## 2025-06-08 RX ADMIN — PANTOPRAZOLE SODIUM 40 MG: 40 TABLET, DELAYED RELEASE ORAL at 21:17

## 2025-06-08 RX ADMIN — INSULIN LISPRO 2 UNITS: 100 INJECTION, SOLUTION INTRAVENOUS; SUBCUTANEOUS at 21:17

## 2025-06-08 RX ADMIN — LISINOPRIL 10 MG: 5 TABLET ORAL at 08:45

## 2025-06-08 NOTE — H&P
examination and evaluation of the patient, placing orders, providing education, coordinating care, reviewing test results, documenting in the medical record, and discussion/communicating with patient/caregiver/family.    Electronically signed by SHANNA Pantoja CNP, NP-C, FNP-BC on 6/7/2025 at 10:58 PM  Hospitalist/Nocturnist

## 2025-06-09 ENCOUNTER — TELEPHONE (OUTPATIENT)
Dept: FAMILY MEDICINE CLINIC | Age: 70
End: 2025-06-09

## 2025-06-09 VITALS
DIASTOLIC BLOOD PRESSURE: 71 MMHG | TEMPERATURE: 97.7 F | HEIGHT: 68 IN | HEART RATE: 64 BPM | RESPIRATION RATE: 16 BRPM | BODY MASS INDEX: 37.44 KG/M2 | OXYGEN SATURATION: 97 % | SYSTOLIC BLOOD PRESSURE: 126 MMHG | WEIGHT: 247 LBS

## 2025-06-09 LAB
ANION GAP SERPL CALCULATED.3IONS-SCNC: 11 MMOL/L (ref 9–16)
BASOPHILS # BLD: 0.06 K/UL (ref 0–0.2)
BASOPHILS NFR BLD: 1 % (ref 0–2)
BUN SERPL-MCNC: 12 MG/DL (ref 8–23)
BUN/CREAT SERPL: 12 (ref 9–20)
CALCIUM SERPL-MCNC: 9.3 MG/DL (ref 8.6–10.4)
CHLORIDE SERPL-SCNC: 104 MMOL/L (ref 98–107)
CO2 SERPL-SCNC: 25 MMOL/L (ref 20–31)
CREAT SERPL-MCNC: 1 MG/DL (ref 0.6–0.9)
EOSINOPHIL # BLD: 0.06 K/UL (ref 0–0.44)
EOSINOPHILS RELATIVE PERCENT: 1 % (ref 1–4)
ERYTHROCYTE [DISTWIDTH] IN BLOOD BY AUTOMATED COUNT: 13.8 % (ref 11.8–14.4)
GFR, ESTIMATED: 61 ML/MIN/1.73M2
GLUCOSE BLD-MCNC: 123 MG/DL (ref 65–105)
GLUCOSE SERPL-MCNC: 195 MG/DL (ref 74–99)
HCT VFR BLD AUTO: 31.8 % (ref 36.3–47.1)
HGB BLD-MCNC: 10.4 G/DL (ref 11.9–15.1)
IMM GRANULOCYTES # BLD AUTO: 0.12 K/UL (ref 0–0.3)
IMM GRANULOCYTES NFR BLD: 1 %
LYMPHOCYTES NFR BLD: 1.77 K/UL (ref 1.1–3.7)
LYMPHOCYTES RELATIVE PERCENT: 18 % (ref 24–43)
MCH RBC QN AUTO: 28.9 PG (ref 25.2–33.5)
MCHC RBC AUTO-ENTMCNC: 32.7 G/DL (ref 25.2–33.5)
MCV RBC AUTO: 88.3 FL (ref 82.6–102.9)
MONOCYTES NFR BLD: 0.71 K/UL (ref 0.1–1.2)
MONOCYTES NFR BLD: 7 % (ref 3–12)
NEUTROPHILS NFR BLD: 72 % (ref 36–65)
NEUTS SEG NFR BLD: 7.05 K/UL (ref 1.5–8.1)
NRBC BLD-RTO: 0 PER 100 WBC
PLATELET # BLD AUTO: 271 K/UL (ref 138–453)
PMV BLD AUTO: 10.8 FL (ref 8.1–13.5)
POTASSIUM SERPL-SCNC: 3.9 MMOL/L (ref 3.7–5.3)
RBC # BLD AUTO: 3.6 M/UL (ref 3.95–5.11)
SODIUM SERPL-SCNC: 140 MMOL/L (ref 136–145)
WBC OTHER # BLD: 9.8 K/UL (ref 3.5–11.3)

## 2025-06-09 PROCEDURE — G0378 HOSPITAL OBSERVATION PER HR: HCPCS

## 2025-06-09 PROCEDURE — 36415 COLL VENOUS BLD VENIPUNCTURE: CPT

## 2025-06-09 PROCEDURE — 6370000000 HC RX 637 (ALT 250 FOR IP): Performed by: FAMILY MEDICINE

## 2025-06-09 PROCEDURE — 2500000003 HC RX 250 WO HCPCS: Performed by: FAMILY MEDICINE

## 2025-06-09 PROCEDURE — 99238 HOSP IP/OBS DSCHRG MGMT 30/<: CPT | Performed by: INTERNAL MEDICINE

## 2025-06-09 PROCEDURE — 80048 BASIC METABOLIC PNL TOTAL CA: CPT

## 2025-06-09 PROCEDURE — 6360000002 HC RX W HCPCS: Performed by: FAMILY MEDICINE

## 2025-06-09 PROCEDURE — 85025 COMPLETE CBC W/AUTO DIFF WBC: CPT

## 2025-06-09 PROCEDURE — 82947 ASSAY GLUCOSE BLOOD QUANT: CPT

## 2025-06-09 PROCEDURE — 96372 THER/PROPH/DIAG INJ SC/IM: CPT

## 2025-06-09 RX ORDER — MUPIROCIN 2 %
OINTMENT (GRAM) TOPICAL
Qty: 1 EACH | Refills: 0
Start: 2025-06-09 | End: 2025-06-16

## 2025-06-09 RX ORDER — ACETAMINOPHEN 500 MG
1 TABLET ORAL
COMMUNITY
Start: 2025-06-09 | End: 2025-06-19

## 2025-06-09 RX ORDER — CEFDINIR 300 MG/1
300 CAPSULE ORAL 2 TIMES DAILY
Qty: 6 CAPSULE | Refills: 0 | Status: SHIPPED | OUTPATIENT
Start: 2025-06-09 | End: 2025-06-12

## 2025-06-09 RX ADMIN — INSULIN LISPRO 2 UNITS: 100 INJECTION, SOLUTION INTRAVENOUS; SUBCUTANEOUS at 09:10

## 2025-06-09 RX ADMIN — AMLODIPINE BESYLATE 5 MG: 5 TABLET ORAL at 09:10

## 2025-06-09 RX ADMIN — HYDROCHLOROTHIAZIDE 12.5 MG: 12.5 TABLET ORAL at 09:10

## 2025-06-09 RX ADMIN — ENOXAPARIN SODIUM 30 MG: 100 INJECTION SUBCUTANEOUS at 09:10

## 2025-06-09 RX ADMIN — GABAPENTIN 400 MG: 300 CAPSULE ORAL at 09:10

## 2025-06-09 RX ADMIN — MUPIROCIN: 20 OINTMENT TOPICAL at 09:12

## 2025-06-09 RX ADMIN — LISINOPRIL 10 MG: 5 TABLET ORAL at 09:10

## 2025-06-09 RX ADMIN — DULOXETINE HYDROCHLORIDE 60 MG: 30 CAPSULE, DELAYED RELEASE ORAL at 09:10

## 2025-06-09 RX ADMIN — GLIPIZIDE 5 MG: 5 TABLET ORAL at 06:06

## 2025-06-09 RX ADMIN — PANTOPRAZOLE SODIUM 40 MG: 40 TABLET, DELAYED RELEASE ORAL at 09:10

## 2025-06-09 RX ADMIN — SODIUM CHLORIDE, PRESERVATIVE FREE 10 ML: 5 INJECTION INTRAVENOUS at 09:15

## 2025-06-09 NOTE — PROGRESS NOTES
Hospitalist Progress Note    Patient:  Jyoti Warren     YOB: 1955    MRN: [id number]   Admit date: 6/7/2025     Acct: [id number]     PCP: Nia Patel MD    CC--Interval History:   UTI---POA----on Rocephin IV    Mental state---confusion---resolved----back to baseline---family agrees    CKD---Stage 2---improved from 3a     DM2---hyperglycemia ---HgbA1c ~ 7.5    Lactic acid 2.9 ---> IVF responsive 1.3--sepsis ruled out    HTN    Prior history of craniotomy and re-exploration for retained clip---meningiocele--2016--chronic headaches    See below    All other ROS negative except noted in HPI    Diet:  ADULT DIET; Regular; 4 carb choices (60 gm/meal); Low Fat/Low Chol/High Fiber/KARTHIK    Medications:  Scheduled Meds:   mupirocin   Topical BID    amLODIPine  5 mg Oral Daily    DULoxetine  60 mg Oral Daily    gabapentin  400 mg Oral BID    glipiZIDE  5 mg Oral QAM AC    lisinopril  10 mg Oral Daily    pantoprazole  40 mg Oral BID    hydroCHLOROthiazide  12.5 mg Oral QAM    sodium chloride flush  5-40 mL IntraVENous 2 times per day    enoxaparin  30 mg SubCUTAneous BID    insulin lispro  0-8 Units SubCUTAneous 4x Daily AC & HS    cefTRIAXone (ROCEPHIN) IV  1,000 mg IntraVENous Q24H     Continuous Infusions:   sodium chloride      dextrose       PRN Meds:melatonin, sodium chloride flush, sodium chloride, potassium chloride **OR** potassium alternative oral replacement **OR** potassium chloride, magnesium sulfate, ondansetron **OR** ondansetron, polyethylene glycol, acetaminophen **OR** acetaminophen, glucose, dextrose bolus **OR** dextrose bolus, glucagon (rDNA), dextrose, HYDROcodone-acetaminophen    Objective:  Labs:  CBC with Differential:    Lab Results   Component Value Date/Time    WBC 9.8 06/09/2025 05:08 AM    RBC 3.60 06/09/2025 05:08 AM    HGB 10.4 06/09/2025 05:08 AM    HCT 31.8 06/09/2025 05:08 AM     06/09/2025 05:08 AM    MCV 88.3 06/09/2025 05:08 AM    [facility] 28.9 06/09/2025 05:08 AM    [facility]

## 2025-06-09 NOTE — PLAN OF CARE
Problem: Chronic Conditions and Co-morbidities  Goal: Patient's chronic conditions and co-morbidity symptoms are monitored and maintained or improved  6/8/2025 1042 by Malia Boyd RN  Outcome: Progressing  6/8/2025 0150 by Dorys Acosta RN  Outcome: Progressing  Flowsheets (Taken 6/7/2025 1931)  Care Plan - Patient's Chronic Conditions and Co-Morbidity Symptoms are Monitored and Maintained or Improved:   Monitor and assess patient's chronic conditions and comorbid symptoms for stability, deterioration, or improvement   Collaborate with multidisciplinary team to address chronic and comorbid conditions and prevent exacerbation or deterioration   Update acute care plan with appropriate goals if chronic or comorbid symptoms are exacerbated and prevent overall improvement and discharge     Problem: Discharge Planning  Goal: Discharge to home or other facility with appropriate resources  6/8/2025 1042 by Malia Boyd RN  Outcome: Progressing  Flowsheets (Taken 6/8/2025 0855)  Discharge to home or other facility with appropriate resources: Identify barriers to discharge with patient and caregiver  6/8/2025 0150 by Dorys Acosta RN  Outcome: Progressing  Flowsheets  Taken 6/7/2025 1931 by Dorys Acosta RN  Discharge to home or other facility with appropriate resources:   Identify barriers to discharge with patient and caregiver   Arrange for needed discharge resources and transportation as appropriate   Identify discharge learning needs (meds, wound care, etc)   Refer to discharge planning if patient needs post-hospital services based on physician order or complex needs related to functional status, cognitive ability or social support system  Taken 6/7/2025 1821 by Malia Boyd RN  Discharge to home or other facility with appropriate resources: Identify barriers to discharge with patient and caregiver     Problem: Safety - Adult  Goal: Free from fall injury  6/8/2025 1042 by Oliver 
Malia Boyd RN  Outcome: Progressing     Problem: Metabolic/Fluid and Electrolytes - Adult  Goal: Electrolytes maintained within normal limits  6/8/2025 2130 by Jann Marquez RN  Outcome: Progressing  6/8/2025 1042 by Malia Boyd RN  Outcome: Progressing  Goal: Hemodynamic stability and optimal renal function maintained  Outcome: Progressing  Goal: Glucose maintained within prescribed range  Outcome: Progressing     Problem: Nutrition Deficit:  Goal: Optimize nutritional status  6/8/2025 2130 by Jann Marquez RN  Outcome: Progressing  6/8/2025 1042 by Malia Boyd RN  Outcome: Progressing     Problem: Pain  Goal: Verbalizes/displays adequate comfort level or baseline comfort level  6/8/2025 2130 by Jann Marquez RN  Outcome: Progressing  6/8/2025 1042 by Malia Boyd RN  Outcome: Progressing     
RN  Outcome: Progressing  6/8/2025 2130 by Jann Marquez RN  Outcome: Progressing     Problem: Metabolic/Fluid and Electrolytes - Adult  Goal: Electrolytes maintained within normal limits  6/9/2025 0918 by Jonna Brewster RN  Outcome: Progressing  6/8/2025 2130 by Jann Marquez RN  Outcome: Progressing  Goal: Hemodynamic stability and optimal renal function maintained  6/9/2025 0918 by Jonna Brewster RN  Outcome: Progressing  6/8/2025 2130 by Jann Marquez RN  Outcome: Progressing  Goal: Glucose maintained within prescribed range  6/9/2025 0918 by Jonna Brewster RN  Outcome: Progressing  6/8/2025 2130 by Jann Marquez RN  Outcome: Progressing     Problem: Nutrition Deficit:  Goal: Optimize nutritional status  6/9/2025 0918 by Jonna Brewster RN  Outcome: Progressing  6/8/2025 2130 by aJnn Marquez RN  Outcome: Progressing     Problem: Pain  Goal: Verbalizes/displays adequate comfort level or baseline comfort level  6/9/2025 0918 by Jonna Brewster RN  Outcome: Progressing  6/8/2025 2130 by Jann Marquez RN  Outcome: Progressing     
to safest level of function  Outcome: Progressing  Flowsheets (Taken 6/7/2025 1931)  Return Mobility to Safest Level of Function:   Assess patient stability and activity tolerance for standing, transferring and ambulating with or without assistive devices   Assist with transfers and ambulation using safe patient handling equipment as needed   Obtain physical therapy/occupational therapy consults as needed  Goal: Return ADL status to a safe level of function  Outcome: Progressing  Flowsheets (Taken 6/7/2025 1931)  Return ADL Status to a Safe Level of Function:   Administer medication as ordered   Assess activities of daily living deficits and provide assistive devices as needed   Obtain physical therapy/occupational therapy consults as needed   Assist and instruct patient to increase activity and self care as tolerated     Problem: Gastrointestinal - Adult  Goal: Maintains or returns to baseline bowel function  Outcome: Progressing  Flowsheets (Taken 6/7/2025 1931)  Maintains or returns to baseline bowel function:   Assess bowel function   Encourage oral fluids to ensure adequate hydration   Administer IV fluids as ordered to ensure adequate hydration   Administer ordered medications as needed   Encourage mobilization and activity  Goal: Maintains adequate nutritional intake  Outcome: Progressing  Flowsheets (Taken 6/7/2025 1931)  Maintains adequate nutritional intake:   Monitor percentage of each meal consumed   Assist with meals as needed   Monitor intake and output, weight and lab values     Problem: Genitourinary - Adult  Goal: Absence of urinary retention  Outcome: Progressing  Flowsheets (Taken 6/7/2025 1931)  Absence of urinary retention:   Assess patient’s ability to void and empty bladder   Monitor intake/output and perform bladder scan as needed     Problem: Infection - Adult  Goal: Absence of infection at discharge  Outcome: Progressing  Flowsheets (Taken 6/7/2025 1931)  Absence of infection at

## 2025-06-09 NOTE — PROGRESS NOTES
Nutrition Note    Liberalizing diet to regular/ 4 carb choices. Discontinuing cardiac restrictions to better meet nutrition requirements.    Electronically signed by TERRANCE MITCHELL RD on 6/9/25 at 9:42 AM EDT    Contact: [phone]

## 2025-06-09 NOTE — DISCHARGE INSTR - DIET

## 2025-06-09 NOTE — CARE COORDINATION
06/09/25 1234   IMM Letter   IMM Letter given to Patient/Family/Significant other/Guardian/POA/by: Second IMM given to pt by KIERRA Cavazos RN   IMM Letter date given: 06/09/25   IMM Letter time given: 1234     IMM letter provided to patient.  Patient offered four hours to make informed decision regarding appeal process; patient agreeable to discharge.   
current housing: yes  Potential Assistance needed at discharge: N/A            Potential DME:    Patient expects to discharge to: House  Plan for transportation at discharge:      Financial    Payor: [facility] MEDICARE / Plan: [facility] MEDICARE COMPLETE / Product Type: *No Product type* /     Does insurance require precert for SNF: Yes    Potential assistance Purchasing Medications:    Meds-to-Beds request:        [facility] - [place], [place] - [address] - P [phone] - F [phone]  [address]  [place] [address]  [facility] [address]  Phone: [phone] Fax: [phone]    [id number] - [place], [place] - [address] - P [phone] - F [phone]  [address]  [place] [address]  Phone: [phone] Fax: [phone]      Notes:    Factors facilitating achievement of predicted outcomes: Family support, Motivated, Cooperative, and Pleasant    Barriers to discharge: Decreased endurance    Additional Case Management Notes: Patient lives at home with her  and is independent. Pt currently receiving PT through [facility]. Pt denies any additional discharge needs at present time.    The Plan for Transition of Care is related to the following treatment goals of UTI (urinary tract infection) [N39.0]  Acute cystitis without hematuria [N30.00]  Altered mental status, unspecified altered mental status type [R41.82]    IF APPLICABLE: The Patient and/or patient representative Jyoti and her family were provided with a choice of provider and agrees with the discharge plan. Freedom of choice list with basic dialogue that supports the patient's individualized plan of care/goals and shares the quality data associated with the providers was provided to:     Patient Representative Name:       The Patient and/or Patient Representative Agree with the Discharge Plan?      Thais Cavazos RN  Case Management Department

## 2025-06-09 NOTE — PROGRESS NOTES
06/09/25 1042   Encounter Summary   Service Provided For Patient   Referral/Consult From Rounding   Support System Spouse;Children   Last Encounter  06/09/25   Complexity of Encounter Low   Begin Time 1036   End Time  1043   Total Time Calculated 7 min   Assessment/Intervention/Outcome   Assessment Calm   Intervention Active listening;Prayer (assurance of)/[place]   Outcome Engaged in conversation;Expressed Gratitude      rounding on PCU    Assessment: Patient is sitting up in bed and expects to be discharged today. No spiritual concerns expressed.    Intervention: Engaged in conversation. Patient expressed appreciation for visit and offer of continued prayer.    Plan: Chaplains are available on site or on call 24/7 for spiritual and emotional support.

## 2025-06-10 ENCOUNTER — FOLLOWUP TELEPHONE ENCOUNTER (OUTPATIENT)
Dept: INPATIENT UNIT | Age: 70
End: 2025-06-10

## 2025-06-10 ENCOUNTER — CARE COORDINATION (OUTPATIENT)
Dept: CARE COORDINATION | Age: 70
End: 2025-06-10

## 2025-06-10 DIAGNOSIS — N30.01 ACUTE CYSTITIS WITH HEMATURIA: Primary | ICD-10-CM

## 2025-06-10 LAB
MICROORGANISM SPEC CULT: ABNORMAL
SPECIMEN DESCRIPTION: ABNORMAL

## 2025-06-10 PROCEDURE — 1111F DSCHRG MED/CURRENT MED MERGE: CPT | Performed by: FAMILY MEDICINE

## 2025-06-10 NOTE — TELEPHONE ENCOUNTER
Care Transitions Initial Follow Up Call    Outreach made within 2 business days of discharge: Yes    Patient: Jyoti Warren Patient : 1955   MRN: [id number]    Reason for Admission: UTI  Discharge Date: 25       Spoke with: Jyoti    Discharge department/facility: [facility] 25    TCM Interactive Patient Contact:  Was patient able to fill all prescriptions: Yes  Was patient instructed to bring all medications to the follow-up visit: Yes  Is patient taking all medications as directed in the discharge summary? Yes  Does patient understand their discharge instructions: Yes  Does patient have questions or concerns that need addressed prior to 7-14 day follow up office visit: no    Additional needs identified to be addressed with provider  No needs identified             Scheduled appointment with PCP within 7-14 days    Follow Up  Future Appointments   Date Time Provider Department Center   2025  4:20 PM Nia Patel MD [facility] DEP   2025  3:00 PM Estela Dutton MD [facility] GI TOP   2025  2:00 PM Mervat Villagran APRN - CNP [facility] DPP   2025  3:40 PM Nia Patel MD [facility] DEP   11/3/2025  3:10 PM Sav Swenson MD [facility]   2026 11:15 AM [facility]       Esha Perales LPN

## 2025-06-10 NOTE — DISCHARGE SUMMARY
mg p.o. b.i.d. 3 days.     b. Mupirocin, Bactroban, 2% ointment topically 3 times a day.     c. Probiotic, lactobacillus, 1 p.o. 3 times a day.  2. Following medications continued without change:     a. Amlodipine, Norvasc, 5 mg p.o. daily.     b. Duloxetine, Cymbalta, 60 mg p.o. daily.     c. Estradiol, Estrace, 0.1 mg/g vaginal cream vaginally daily, then twice a week.     d. Gabapentin, Neurontin, 400 mg p.o. morning and bedtime.     e. Glimepiride, Amaryl, 2 mg tablets, 1-1/2 tablets for a 3 mg every a.m.     f. Hydrochlorothiazide, HCTZ, 12.5 mg p.o. daily.     g. Hydrocodone/APAP 5/325, [place], 1 p.o. b.i.d. p.r.n. pain.     h. Lisinopril 10 mg p.o. daily.     i. Pantoprazole, Protonix, 40 mg p.o. b.i.d.     j. Vitamin D3 1.25 mg 50,000 units p.o. weekly.  3. Specifically discontinued was metoclopramide, Reglan, given the patient's earlier confusional event.    FOLLOWUP:  Follow up with the patient's personal physician, Nia Patel, [facility].    Any aspect of the patient's care not discussed in the chart and/or dictation will be addressed and treated as an outpatient.    The patient's medications have been reviewed including, but not limited to, pre-hospital, hospital and discharge medications.  The patient and/or the patient's personal representatives were specifically advised the only medications to be taken are those set forth in the discharge orders and no other medications should be taken.  Any prior medications not on the discharge orders are specifically discontinued.          MATHIEU ERICKSON MD      D:  06/09/2025 17:57:47     T:  06/10/2025 04:31:45     LEONRADA/RAD  Job #:  [id number]     Doc#:  [id number]    CC:   [facility]

## 2025-06-10 NOTE — CARE COORDINATION
Care Transitions Note    Initial Call - Call within 2 business days of discharge: Yes    Patient Current Location:  Home: [address] [place] [address] [address] [place] [address]    Care Transition Nurse contacted the patient by telephone to perform post hospital discharge assessment, verified name and  as identifiers.  Provided introduction to self, and explanation of the Care Transition Nurse role.    Patient: Jyoti Warren      Patient : 1955   MRN: [id number]      Reason for Admission: UTI with AMS  Discharge Date: 25    RURS: Readmission Risk Score: 13.4    -  [place] admission for UTI with AMS.  AMS and burning resolved with treatment for UTI.  IV Rocephin + IVF.  Discharged home on 3 more days of omnicef + probiotic.  F/U with PCP.  [facility] resumed.     Last Discharge Facility       Date Complaint Diagnosis Description Type Department Provider    25 Dysuria Acute cystitis without hematuria ... ED to Hosp-Admission (Discharged) (ADMITTED) Calvin Alcantar MD; Debra...            Was this an external facility discharge? No    Additional needs identified to be addressed with provider   No needs identified             Method of communication with provider: none. CTN scheduled for  hfu    Patients top risk factors for readmission: medical condition-UTI    Interventions to address risk factors:   Review of patient management of conditions/medications: reviewed  Home Health: [facility]   Appts    Care Summary Note:   -  [place] admission for UTI with AMS.  AMS and burning resolved with treatment for UTI.  IV Rocephin + IVF.  Discharged home on 3 more days of omnicef + probiotic.  F/U with PCP.  [facility] resumed.     Jyoti reports that she is doing much \"better\" since discharge.  Denies any further confusion or burning upon urination.  Confirmed she is completing her remaining 3 days of omnicef for UTI.  Up and around without any issues.  Eating and drinking - staying

## 2025-06-11 NOTE — PROGRESS NOTES
Physician Progress Note      PATIENT:               FARHAN SCHNEIDER  CSN #:                  [id number]  :                       1955  ADMIT DATE:       2025 12:34 PM  DISCH DATE:        2025 3:00 PM  RESPONDING  PROVIDER #:        Calvin Drew MD          QUERY TEXT:    Metabolic encephalopathy was documented in the medical record   by Calvin Neely MD.  The diagnosis was not noted in subsequent   documentation.  Please clarify the status of this condition.    The clinical indicators include:  -Age 69 yrs, female, Acute cystitis, HTN, DM, CKD    - \"Pt presents with complaints of Dysuria and found to have Acute cystitis,   altered mental status, unspecified altered mental status type\" (from ED  by   Blu Dumont MD).    -\" Acute cystitis with hematuria -- confusion. Her  states she has had   intermittent confusion the past 2 days and acute urinary incontinence, which   is typical for her when she gets a UTI. In ER, she was afebrile, WBC 14.7,   lactic acid 2.9 --> 1.3. Urinalysis shows signs of UTI. Urine culture is   pending. In ER, she received 1L IV NS and IV Rocephin. Confusion -- likely   metabolic encephalopathy due to UTI -- neuro check, reorient prn, maintain   patient safety\" (from   by Calvin Neely MD)    -\"Mental state---confusion---resolved----back to baseline---family agrees.   Lactic acid 2.9 ---> IVF responsive 1.3--sepsis ruled out\" (PN  by Manuel Bates MD)    -\"The patient has had an acute urinary incontinence typical of a urinary tract   infection. Confusion now resolved\" (DS  by Manuel Bates MD)    -Treated with IV fluids, Rocephin, Omnicef, Neuro checks, Serial labs-(from   [facility] '[facility]' on ).      Thank you.  Gisela Santos [facility] CDS  Options provided:  -- Metabolic encephalopathy due to Acute cystitis confirmed after study  -- Metabolic encephalopathy ruled out after study  -- Other - I will add my

## 2025-06-17 ENCOUNTER — OFFICE VISIT (OUTPATIENT)
Dept: FAMILY MEDICINE CLINIC | Age: 70
End: 2025-06-17

## 2025-06-17 ENCOUNTER — TELEPHONE (OUTPATIENT)
Dept: PHARMACY | Facility: CLINIC | Age: 70
End: 2025-06-17

## 2025-06-17 VITALS
OXYGEN SATURATION: 98 % | WEIGHT: 242 LBS | HEIGHT: 68 IN | DIASTOLIC BLOOD PRESSURE: 80 MMHG | BODY MASS INDEX: 36.68 KG/M2 | TEMPERATURE: 98.3 F | SYSTOLIC BLOOD PRESSURE: 114 MMHG | HEART RATE: 74 BPM | RESPIRATION RATE: 16 BRPM

## 2025-06-17 DIAGNOSIS — N18.1 TYPE 2 DIABETES MELLITUS WITH STAGE 1 CHRONIC KIDNEY DISEASE, WITHOUT LONG-TERM CURRENT USE OF INSULIN (HCC): ICD-10-CM

## 2025-06-17 DIAGNOSIS — Z09 HOSPITAL DISCHARGE FOLLOW-UP: Primary | ICD-10-CM

## 2025-06-17 DIAGNOSIS — N39.0 ACUTE UTI: ICD-10-CM

## 2025-06-17 DIAGNOSIS — N39.0 RECURRENT UTI: ICD-10-CM

## 2025-06-17 DIAGNOSIS — E11.22 TYPE 2 DIABETES MELLITUS WITH STAGE 1 CHRONIC KIDNEY DISEASE, WITHOUT LONG-TERM CURRENT USE OF INSULIN (HCC): ICD-10-CM

## 2025-06-17 DIAGNOSIS — R41.82 ALTERED MENTAL STATUS, UNSPECIFIED ALTERED MENTAL STATUS TYPE: ICD-10-CM

## 2025-06-17 NOTE — PROGRESS NOTES
Transitional Care Office Visit    Date of Face-to-Face: 6/17/2025    Here for follow after hospitalization for: UTI    Persons at visit: spouse    Medication changes during hospitalization: Added: Cefdinir 300 mg BID for 3 days, probiotic TID, Mupirocin, Bactroban 2% TID. STOPPED: Metoclopramide, given patients confusional event     Procedures during hospitalization: EKG, .    Activity: activity as tolerated.    Any medication changes since post-hospitalization phone call?  No.    Any treatment changes since post-hospitalization phone call?  No.    Other follow-up appointments scheduled:  None.

## 2025-06-17 NOTE — PROGRESS NOTES
Post-Discharge Transitional Care  Follow Up      Jyoti Warren   YOB: 1955    Date of Office Visit:  6/17/2025  Date of Hospital Admission: 6/7/25  Date of Hospital Discharge: 6/9/25  Risk of hospital readmission (high >=14%. Medium >=10%) :Readmission Risk Score: 13.4      Care management risk score Rising risk (score 2-5) and Complex Care (Scores >=6): No Risk Score On File     Non face to face  following discharge, date last encounter closed (first attempt may have been earlier): 06/10/2025    Call initiated 2 business days of discharge: Yes    ASSESSMENT/PLAN:     1. Hospital discharge follow-up  2. Altered mental status, unspecified altered mental status type  3. Acute UTI  - NC DISCHARGE MEDS RECONCILED W/ CURRENT OUTPATIENT MED LIST    4. Recurrent UTI  She has a urology follow up appointment scheduled in August.  A referral was ordered to attempt to schedule a sooner appointment.   - [facility], [place]    - Urinalysis with Reflex to Culture; Future was ordered to be done today.  She will be contacted when the results are available.     5. Type 2 diabetes mellitus with stage 1 chronic kidney disease, without long-term current use of insulin (HCC)  I reviewed the lipid panel done 2/12/2025 with the patient.    Lab Results   Component Value Date    CHOL 146 02/12/2025    TRIG 78 02/12/2025    HDL 50 02/12/2025    LDL 80 02/12/2025    VLDL 16 02/12/2025    CHOLHDLRATIO 2.9 02/12/2025      I advised the patient that the LDL goal for patients with diabetes is less than 70.  I discussed beginning a statin.  She declined beginning a statin at this time.  I advised her that we can discuss this again after her next lipid panel.       Medical Decision Making: moderate complexity  Price was advised to keep the appointment she has scheduled in August.          Subjective:   HPI:  Follow up of Hospital problems/diagnosis(es): Altered mental status, urinary tract infection     Inpatient

## 2025-06-17 NOTE — TELEPHONE ENCOUNTER
Dr. Nia Patel MD,      Patient's insurance has identified statin use in persons with diabetes (SUPD) care gap:   70yo female with Type 2 DM, LDL 80 (goal <70) - Would patient benefit from statin therapy?     Last visit: 03/05/2025, Next visit: 06/17/2025     See encounter note(s) below for complete details. Please let me know if you have any questions.      Thank you,  Bg Luna, PharmD, BCACP, BCGP  Population Health Pharmacist   [facility] Clinical Pharmacy  Department, toll free: [phone], option 1    =======================================================    [facility] CLINICAL PHARMACY: STATIN THERAPY REVIEW  Identified statin use in persons with diabetes (SUPD) care gap per [employer] Records dated: 6/17/25    Allergies   Allergen Reactions    Asa [Aspirin] Other (Stomach irritation)    Pollen Extract Other (See Comments)     STATIN GAP IDENTIFIED    Per Reconcile Dispense History as of 06/17/2025:   Atorvastatin filled once in 2021 and once in 2022    Lipid Panel, LFTs   Component Value Date/Time    CHOL 146 02/12/2025 01:09 PM    TRIG 78 02/12/2025 01:09 PM    HDL 50 02/12/2025 01:09 PM    LDL 80 02/12/2025 01:09 PM    ALT 6 06/07/2025 12:42 PM    AST 16 06/07/2025 12:42 PM      The ASCVD Risk score (Ne JOHNSTON, et al., 2019) failed to calculate for the following reasons:    Risk score cannot be calculated because patient has a medical history suggesting prior/existing ASCVD     BP Readings from Last 3 Encounters:   06/09/25 126/71   05/05/25 110/64   03/17/25 100/70     Hemoglobin A1c   Component Value Date    LABA1C 7.7 (H) 06/08/2025    LABA1C 7.3 (H) 02/12/2025    LABA1C 6.7 (H) 12/12/2024     Renal Function   Component Value Date    LABGLOM  61 06/09/2025    CREATININE 1.0 (H) 06/09/2025   Estimated Creatinine Clearance: 70 mL/min (A) (based on SCr of 1 mg/dL (H)).     Latest Reference Range & Units 02/28/25 11:15 05/03/25 05:00   Albumin Urine 0 - 20 mg/L <12    Creatinine, Ur

## 2025-06-18 ENCOUNTER — RESULTS FOLLOW-UP (OUTPATIENT)
Dept: FAMILY MEDICINE CLINIC | Age: 70
End: 2025-06-18

## 2025-06-18 ENCOUNTER — HOSPITAL ENCOUNTER (OUTPATIENT)
Age: 70
Setting detail: SPECIMEN
Discharge: HOME | End: 2025-06-18
Payer: MEDICARE

## 2025-06-18 DIAGNOSIS — N39.0 RECURRENT UTI: ICD-10-CM

## 2025-06-18 LAB
BILIRUB UR QL STRIP: NEGATIVE
CLARITY UR: CLEAR
COLOR UR: YELLOW
COMMENT: NORMAL
GLUCOSE UR STRIP-MCNC: NEGATIVE MG/DL
HGB UR QL STRIP.AUTO: NEGATIVE
KETONES UR STRIP-MCNC: NEGATIVE MG/DL
LEUKOCYTE ESTERASE UR QL STRIP: NEGATIVE
NITRITE UR QL STRIP: NEGATIVE
PH UR STRIP: 6 [PH] (ref 5–6)
PROT UR STRIP-MCNC: NEGATIVE MG/DL
SP GR UR STRIP: 1.01 (ref 1.01–1.02)
UROBILINOGEN UR STRIP-ACNC: NORMAL EU/DL (ref 0–1)

## 2025-06-18 PROCEDURE — 81003 URINALYSIS AUTO W/O SCOPE: CPT

## 2025-06-18 NOTE — TELEPHONE ENCOUNTER
[facility] CLINICAL PHARMACY REVIEW: STATIN THERAPY    Appreciate provider's response.     Will send letter to patient regarding glimepiride adherence.     Bg Luna, PharmD, BCACP, BCGP  Population Health Pharmacist   [facility] Clinical Pharmacy  Department, toll free: [phone], option 1    =======================================================    For Pharmacy Admin Tracking Only  Program: [facility] [employer]  CPA in place:  No  Recommendation Provided To: Provider: 1 via Note to Provider  Intervention Detail: New Rx: 1, reason: Needs Additional Therapy  Intervention Accepted By: Provider: 1  Gap Closed?: No   Time Spent (min): 45

## 2025-06-18 NOTE — TELEPHONE ENCOUNTER
I discussed beginning a statin with the patient at her office visit 6/17/2025, and she declined.  She has a lipid panel ordered to be done before her next visit with me in August, and she may consider a statin if her lipid panel at that time if her results have not improved.

## 2025-06-19 ENCOUNTER — CARE COORDINATION (OUTPATIENT)
Dept: CARE COORDINATION | Age: 70
End: 2025-06-19

## 2025-06-19 NOTE — CARE COORDINATION
Care Transitions Note    Follow Up Call     Patient Current Location:  Home: [address] [place] [address] [address] [facility] [address]    [facility] Care Coordinator contacted the patient by telephone. Verified name and  as identifiers.    Additional needs identified to be addressed with provider   No needs identified                 Method of communication with provider: none.    Care Summary Note: Writer spoke with Jyoti for a follow up care transitions call. She states she is doing good and feeling better. She has completed her antibiotics and denies having any urinary symptoms. Np fever,chills,hematuria,dysuria urgency or frequency. She completed her PCP follow up-no changes were made. Her repeat urine culture showed her infection is now gone. She thanked writer for calling and denied having any other needs or concerns at this time.     Plan of care updates since last contact:  Review of patient management of conditions/medications:         Advance Care Planning:   Does patient have an Advance Directive: reviewed during previous call, see note. .    Medication Review:  No changes since last call.     Remote Patient Monitoring:  Offered patient enrollment in the Remote Patient Monitoring (RPM) program for in-home monitoring: Yes, but did not enroll at this time: controlled chronic disease management.    Assessments:  Care Transitions Subsequent and Final Call    Subsequent and Final Calls  Do you have any ongoing symptoms?: No  Have your medications changed?: No  Do you have any questions related to your medications?: No  Do you currently have any active services?: No  Do you have any needs or concerns that I can assist you with?: No  Identified Barriers: None  Care Transitions Interventions  Other Interventions:              Follow Up Appointment:   [facility] appointment attended as scheduled   Future Appointments         Provider Specialty Dept Phone    2025 3:00 PM Estela Dutton MD Gastroenterology [phone]    2025 11:20

## 2025-06-25 ENCOUNTER — APPOINTMENT (OUTPATIENT)
Dept: CT IMAGING | Age: 70
End: 2025-06-25
Payer: MEDICARE

## 2025-06-25 ENCOUNTER — HOSPITAL ENCOUNTER (EMERGENCY)
Age: 70
Discharge: HOME | End: 2025-06-25
Attending: STUDENT IN AN ORGANIZED HEALTH CARE EDUCATION/TRAINING PROGRAM
Payer: MEDICARE

## 2025-06-25 VITALS
RESPIRATION RATE: 18 BRPM | HEART RATE: 82 BPM | DIASTOLIC BLOOD PRESSURE: 78 MMHG | SYSTOLIC BLOOD PRESSURE: 117 MMHG | WEIGHT: 245 LBS | HEIGHT: 68 IN | OXYGEN SATURATION: 98 % | TEMPERATURE: 99.1 F | BODY MASS INDEX: 37.13 KG/M2

## 2025-06-25 DIAGNOSIS — N17.9 AKI (ACUTE KIDNEY INJURY): ICD-10-CM

## 2025-06-25 DIAGNOSIS — R19.7 NAUSEA VOMITING AND DIARRHEA: Primary | ICD-10-CM

## 2025-06-25 DIAGNOSIS — R11.2 NAUSEA VOMITING AND DIARRHEA: Primary | ICD-10-CM

## 2025-06-25 DIAGNOSIS — N39.0 ACUTE UTI: ICD-10-CM

## 2025-06-25 LAB
ALBUMIN SERPL-MCNC: 3.9 G/DL (ref 3.5–5.2)
ALBUMIN/GLOB SERPL: 1 {RATIO} (ref 1–2.5)
ALP SERPL-CCNC: 145 U/L (ref 35–104)
ALT SERPL-CCNC: <5 U/L (ref 10–35)
ANION GAP SERPL CALCULATED.3IONS-SCNC: 17 MMOL/L (ref 9–16)
AST SERPL-CCNC: 28 U/L (ref 10–35)
BACTERIA URNS QL MICRO: ABNORMAL
BASOPHILS # BLD: 0.06 K/UL (ref 0–0.2)
BASOPHILS NFR BLD: 0 % (ref 0–2)
BILIRUB SERPL-MCNC: 0.5 MG/DL (ref 0–1.2)
BILIRUB UR QL STRIP: NEGATIVE
BUN SERPL-MCNC: 27 MG/DL (ref 8–23)
BUN/CREAT SERPL: 18 (ref 9–20)
CALCIUM SERPL-MCNC: 9.8 MG/DL (ref 8.6–10.4)
CASTS #/AREA URNS LPF: ABNORMAL /LPF (ref 0–2)
CASTS #/AREA URNS LPF: ABNORMAL /LPF (ref 0–2)
CHARACTER UR: ABNORMAL
CHLORIDE SERPL-SCNC: 97 MMOL/L (ref 98–107)
CLARITY UR: ABNORMAL
CO2 SERPL-SCNC: 21 MMOL/L (ref 20–31)
COLOR UR: ABNORMAL
CREAT SERPL-MCNC: 1.5 MG/DL (ref 0.6–0.9)
EOSINOPHIL # BLD: 0.03 K/UL (ref 0–0.44)
EOSINOPHILS RELATIVE PERCENT: 0 % (ref 1–4)
EPI CELLS #/AREA URNS HPF: ABNORMAL /HPF (ref 0–5)
ERYTHROCYTE [DISTWIDTH] IN BLOOD BY AUTOMATED COUNT: 13.4 % (ref 11.8–14.4)
GFR, ESTIMATED: 37 ML/MIN/1.73M2
GLUCOSE SERPL-MCNC: 222 MG/DL (ref 74–99)
GLUCOSE UR STRIP-MCNC: NEGATIVE MG/DL
HCT VFR BLD AUTO: 38.7 % (ref 36.3–47.1)
HGB BLD-MCNC: 12.7 G/DL (ref 11.9–15.1)
HGB UR QL STRIP.AUTO: ABNORMAL
IMM GRANULOCYTES # BLD AUTO: 0.13 K/UL (ref 0–0.3)
IMM GRANULOCYTES NFR BLD: 1 %
KETONES UR STRIP-MCNC: NEGATIVE MG/DL
LEUKOCYTE ESTERASE UR QL STRIP: ABNORMAL
LIPASE SERPL-CCNC: 19 U/L (ref 13–60)
LYMPHOCYTES NFR BLD: 0.89 K/UL (ref 1.1–3.7)
LYMPHOCYTES RELATIVE PERCENT: 5 % (ref 24–43)
MCH RBC QN AUTO: 28.5 PG (ref 25.2–33.5)
MCHC RBC AUTO-ENTMCNC: 32.8 G/DL (ref 25.2–33.5)
MCV RBC AUTO: 87 FL (ref 82.6–102.9)
MONOCYTES NFR BLD: 0.91 K/UL (ref 0.1–1.2)
MONOCYTES NFR BLD: 5 % (ref 3–12)
NEUTROPHILS NFR BLD: 89 % (ref 36–65)
NEUTS SEG NFR BLD: 16.13 K/UL (ref 1.5–8.1)
NITRITE UR QL STRIP: POSITIVE
NRBC BLD-RTO: 0 PER 100 WBC
PH UR STRIP: 6 [PH] (ref 5–6)
PLATELET # BLD AUTO: 382 K/UL (ref 138–453)
PMV BLD AUTO: 11.3 FL (ref 8.1–13.5)
POTASSIUM SERPL-SCNC: 4.9 MMOL/L (ref 3.7–5.3)
PROT SERPL-MCNC: 8 G/DL (ref 6.6–8.7)
PROT UR STRIP-MCNC: NEGATIVE MG/DL
RBC # BLD AUTO: 4.45 M/UL (ref 3.95–5.11)
RBC #/AREA URNS HPF: ABNORMAL /HPF (ref 0–4)
SODIUM SERPL-SCNC: 135 MMOL/L (ref 136–145)
SP GR UR STRIP: 1.01 (ref 1.01–1.02)
UROBILINOGEN UR STRIP-ACNC: NORMAL EU/DL (ref 0–1)
WBC #/AREA URNS HPF: ABNORMAL /HPF (ref 0–4)
WBC OTHER # BLD: 18.2 K/UL (ref 3.5–11.3)

## 2025-06-25 PROCEDURE — 99284 EMERGENCY DEPT VISIT MOD MDM: CPT

## 2025-06-25 PROCEDURE — 81001 URINALYSIS AUTO W/SCOPE: CPT

## 2025-06-25 PROCEDURE — 80053 COMPREHEN METABOLIC PANEL: CPT

## 2025-06-25 PROCEDURE — 87077 CULTURE AEROBIC IDENTIFY: CPT

## 2025-06-25 PROCEDURE — 36415 COLL VENOUS BLD VENIPUNCTURE: CPT

## 2025-06-25 PROCEDURE — 6360000002 HC RX W HCPCS: Performed by: STUDENT IN AN ORGANIZED HEALTH CARE EDUCATION/TRAINING PROGRAM

## 2025-06-25 PROCEDURE — 6370000000 HC RX 637 (ALT 250 FOR IP): Performed by: STUDENT IN AN ORGANIZED HEALTH CARE EDUCATION/TRAINING PROGRAM

## 2025-06-25 PROCEDURE — 96372 THER/PROPH/DIAG INJ SC/IM: CPT

## 2025-06-25 PROCEDURE — 96361 HYDRATE IV INFUSION ADD-ON: CPT

## 2025-06-25 PROCEDURE — 83690 ASSAY OF LIPASE: CPT

## 2025-06-25 PROCEDURE — 96374 THER/PROPH/DIAG INJ IV PUSH: CPT

## 2025-06-25 PROCEDURE — 87086 URINE CULTURE/COLONY COUNT: CPT

## 2025-06-25 PROCEDURE — 2580000003 HC RX 258: Performed by: STUDENT IN AN ORGANIZED HEALTH CARE EDUCATION/TRAINING PROGRAM

## 2025-06-25 PROCEDURE — 85025 COMPLETE CBC W/AUTO DIFF WBC: CPT

## 2025-06-25 PROCEDURE — 74176 CT ABD & PELVIS W/O CONTRAST: CPT

## 2025-06-25 PROCEDURE — 87186 SC STD MICRODIL/AGAR DIL: CPT

## 2025-06-25 RX ORDER — CEPHALEXIN 500 MG/1
500 CAPSULE ORAL 2 TIMES DAILY
Qty: 10 CAPSULE | Refills: 0 | Status: SHIPPED | OUTPATIENT
Start: 2025-06-25 | End: 2025-06-30

## 2025-06-25 RX ORDER — 0.9 % SODIUM CHLORIDE 0.9 %
1000 INTRAVENOUS SOLUTION INTRAVENOUS ONCE
Status: COMPLETED | OUTPATIENT
Start: 2025-06-25 | End: 2025-06-25

## 2025-06-25 RX ORDER — ONDANSETRON 2 MG/ML
4 INJECTION INTRAMUSCULAR; INTRAVENOUS ONCE
Status: COMPLETED | OUTPATIENT
Start: 2025-06-25 | End: 2025-06-25

## 2025-06-25 RX ORDER — ONDANSETRON 4 MG/1
4 TABLET, FILM COATED ORAL EVERY 8 HOURS PRN
Qty: 20 TABLET | Refills: 0 | Status: SHIPPED | OUTPATIENT
Start: 2025-06-25

## 2025-06-25 RX ORDER — DICYCLOMINE HYDROCHLORIDE 10 MG/ML
20 INJECTION INTRAMUSCULAR ONCE
Status: COMPLETED | OUTPATIENT
Start: 2025-06-25 | End: 2025-06-25

## 2025-06-25 RX ADMIN — CEPHALEXIN 500 MG: 250 CAPSULE ORAL at 14:54

## 2025-06-25 RX ADMIN — SODIUM CHLORIDE 1000 ML: 0.9 INJECTION, SOLUTION INTRAVENOUS at 11:10

## 2025-06-25 RX ADMIN — DICYCLOMINE HYDROCHLORIDE 20 MG: 10 INJECTION, SOLUTION INTRAMUSCULAR at 11:13

## 2025-06-25 RX ADMIN — ONDANSETRON 4 MG: 2 INJECTION, SOLUTION INTRAMUSCULAR; INTRAVENOUS at 11:12

## 2025-06-25 ASSESSMENT — PAIN SCALES - GENERAL
PAINLEVEL_OUTOF10: 0
PAINLEVEL_OUTOF10: 7
PAINLEVEL_OUTOF10: 3

## 2025-06-25 ASSESSMENT — ENCOUNTER SYMPTOMS
NAUSEA: 1
VOMITING: 1
SHORTNESS OF BREATH: 0
ABDOMINAL PAIN: 1
DIARRHEA: 1

## 2025-06-25 ASSESSMENT — PAIN DESCRIPTION - FREQUENCY: FREQUENCY: CONTINUOUS

## 2025-06-25 ASSESSMENT — PAIN DESCRIPTION - ORIENTATION: ORIENTATION: LOWER

## 2025-06-25 ASSESSMENT — PAIN DESCRIPTION - DESCRIPTORS: DESCRIPTORS: CRAMPING

## 2025-06-25 ASSESSMENT — PAIN DESCRIPTION - LOCATION: LOCATION: ABDOMEN

## 2025-06-25 ASSESSMENT — PAIN DESCRIPTION - PAIN TYPE: TYPE: ACUTE PAIN

## 2025-06-25 ASSESSMENT — PAIN - FUNCTIONAL ASSESSMENT
PAIN_FUNCTIONAL_ASSESSMENT: NONE - DENIES PAIN
PAIN_FUNCTIONAL_ASSESSMENT: 0-10

## 2025-06-25 NOTE — DISCHARGE INSTRUCTIONS
We evaluated you for your symptoms.  We found you have a urinary tract infection.  Complete the antibiotics.  You felt much improved.  Use the nausea medicine as needed.    Your kidney number was elevated consistent with dehydration.  Please call your primary doctor and have them recheck your kidney numbers in the next few days.    Follow close with your primary doctor.    Return to the emergency department you develop any worsening or concerning symptoms.

## 2025-06-25 NOTE — ED PROVIDER NOTES
[facility] Emergency Department  [address] [facility] [address]  Phone: [phone]        [facility] EMERGENCY DEPARTMENT  EMERGENCY DEPARTMENT ENCOUNTER      Pt Name: Jyoti Warren  MRN: [id number]  Birthdate 1955  Date of evaluation: 6/25/2025  Provider: Faustina Childers DO    CHIEF COMPLAINT       Chief Complaint   Patient presents with    Vomiting    Diarrhea       HISTORY OF PRESENT ILLNESS   (Location/Symptom, Timing/Onset,Context/Setting, Quality, Duration, Modifying Factors, Severity)  Note limiting factors.     Jyoti Warren is a 69 y.o. female who presents to the emergency department with nausea, vomiting, diarrhea.  Patient states diarrhea started around 12:30 AM.  States she has had countless episodes of watery diarrhea.  Nonbloody.  Patient also has had a few episodes of vomiting.  Seems like the vomiting has slowed down however still feels very nauseous.  History of a previous appendectomy, denies any other abdominal surgeries.  No known sick contacts, denies eating any questionable food.  Denies anything like this ever happening in the past.  Patient states she is scared to eat anything now because she feels like everything will just come back up or either go through her.  No fevers or chills.  Patient does also get frequent UTIs, has been on some recent antibiotics for this.  Denies any urinary symptoms currently.    Nursing Notes were reviewed.    REVIEW OF SYSTEMS       Review of Systems   Constitutional:  Negative for chills and fever.   Respiratory:  Negative for shortness of breath.    Cardiovascular:  Negative for chest pain.   Gastrointestinal:  Positive for abdominal pain, diarrhea, nausea and vomiting.   Neurological:  Negative for headaches.       PAST MEDICAL HISTORY     Past Medical History:   Diagnosis Date    Acute cystitis with hematuria 09/19/2021    Acute cystitis without hematuria 09/19/2021    Acute kidney injury superimposed on CKD 08/18/2023    Acute

## 2025-06-26 ENCOUNTER — CARE COORDINATION (OUTPATIENT)
Dept: CARE COORDINATION | Age: 70
End: 2025-06-26

## 2025-06-26 DIAGNOSIS — N17.9 AKI (ACUTE KIDNEY INJURY): Primary | ICD-10-CM

## 2025-06-26 NOTE — CARE COORDINATION
Care Transitions Note    Follow Up Call  (ED visit )    Patient Current Location:  Home: [address] [place] [address] [place] [address]    Care Transition Nurse contacted the patient by telephone. Verified name and  as identifiers.    Patient: Jyoti Warren                                      Patient : 1955   MRN: [id number]                               Reason for Admission: UTI with AMS  Discharge Date: 25           RURS: Readmission Risk Score: 13.4     -  [facility] admission for UTI with AMS.  AMS and burning resolved with treatment for UTI.  IV Rocephin + IVF.  Discharged home on 3 more days of omnicef + probiotic.  F/U with PCP.  [facility] resumed.     Additional needs identified to be addressed with provider   High priority: Please see  ED report - ED physician recommended admission, but patient declined - ED note says to f/u labs to closely monitor kidney function, but I don't see any orders for labs. (BUN/CRE elevated 27/1.5)  Did you want her to f/u in office?                 Method of communication with provider: chart routing.    Care Summary Note:   Reviewed yesterday's  ED visit for nausea, vomiting, diarrhea- discharged after 4 hours on zofran + 5 day course of keflex (was discharged last time on omnicef).  Patient declined admission - wanted to go home.  ED recommends close outpatient f/u with repeat labs to monitor kidney function    Jyoti says she feels better now- just a bit of a \"tummy ache.\"  No other verbalized symptoms of UTI - she thinks she caught this one in time.    She tells me she had an isolated BS reading today at 220, but was not able to recall if it was after any specific food.  Has not rechecked it again since, but says it's usually in the lower - mid 100 range.  Instructed her to keep track of BS, but be able to correlate with what she is eating and timing of BS check.    Plan for next call: symptom management-check any further signs of UTI   medication

## 2025-06-27 LAB
MICROORGANISM SPEC CULT: ABNORMAL
MICROORGANISM SPEC CULT: ABNORMAL
SPECIMEN DESCRIPTION: ABNORMAL

## 2025-07-01 ENCOUNTER — RESULTS FOLLOW-UP (OUTPATIENT)
Dept: EMERGENCY DEPT | Age: 70
End: 2025-07-01

## 2025-07-01 ENCOUNTER — CARE COORDINATION (OUTPATIENT)
Dept: CARE COORDINATION | Age: 70
End: 2025-07-01

## 2025-07-01 ENCOUNTER — HOSPITAL ENCOUNTER (OUTPATIENT)
Age: 70
Discharge: HOME | End: 2025-07-01
Payer: MEDICARE

## 2025-07-01 ENCOUNTER — RESULTS FOLLOW-UP (OUTPATIENT)
Dept: FAMILY MEDICINE CLINIC | Age: 70
End: 2025-07-01

## 2025-07-01 DIAGNOSIS — N18.31 STAGE 3A CHRONIC KIDNEY DISEASE (HCC): ICD-10-CM

## 2025-07-01 DIAGNOSIS — E87.6 HYPOKALEMIA: Primary | ICD-10-CM

## 2025-07-01 DIAGNOSIS — N17.9 AKI (ACUTE KIDNEY INJURY): ICD-10-CM

## 2025-07-01 LAB
ANION GAP SERPL CALCULATED.3IONS-SCNC: 13 MMOL/L (ref 9–16)
BUN SERPL-MCNC: 18 MG/DL (ref 8–23)
BUN/CREAT SERPL: 13 (ref 9–20)
CALCIUM SERPL-MCNC: 9.4 MG/DL (ref 8.6–10.4)
CHLORIDE SERPL-SCNC: 99 MMOL/L (ref 98–107)
CO2 SERPL-SCNC: 25 MMOL/L (ref 20–31)
CREAT SERPL-MCNC: 1.4 MG/DL (ref 0.6–0.9)
GFR, ESTIMATED: 41 ML/MIN/1.73M2
GLUCOSE SERPL-MCNC: 242 MG/DL (ref 74–99)
POTASSIUM SERPL-SCNC: 3.8 MMOL/L (ref 3.7–5.3)
SODIUM SERPL-SCNC: 137 MMOL/L (ref 136–145)

## 2025-07-01 PROCEDURE — 36415 COLL VENOUS BLD VENIPUNCTURE: CPT

## 2025-07-01 PROCEDURE — 80048 BASIC METABOLIC PNL TOTAL CA: CPT

## 2025-07-01 NOTE — CARE COORDINATION
Care Transitions Note    Follow Up Call     Patient Current Location:  Home: [address] [place] [address] [place] [address]    Care Transition Nurse contacted the patient by telephone. Verified name and  as identifiers.    Patient: Jyoti Warren                                      Patient : 1955   MRN: [id number]                               Reason for Admission: UTI with AMS  Discharge Date: 25           RURS: Readmission Risk Score: 13.4     -  [facility] admission for UTI with AMS.  AMS and burning resolved with treatment for UTI.  IV Rocephin + IVF.  Discharged home on 3 more days of omnicef + probiotic.  F/U with PCP.  [facility] resumed.     Additional needs identified to be addressed with provider   No needs identified                 Method of communication with provider: none.    Care Summary Note:   Jyoti reports feeling better - denies any further symptoms of UTI.    She did receive further instructions from [facility] ED as copied below:  (See Results Follow-Up Call from ED,  visit)  Result Note 25 10:54 AM   Pt contacted. Told to discontinue Keflex and start Levaquin 750mg PO QD x 5 days per Dr. Godoy.   Pt denies questions or distress.     She confirms she completed the course of Keflex and is now taking Levaquin for 5 days.    Plan for next call: symptom management-Check if any UTI symptoms  medication management-check if course of levaquin is completed  Check if [facility] is still in place.  Final CT call next outreach.        Plan of care updates since last contact:  Review of patient management of conditions/medications: reviewed       Advance Care Planning:   Does patient have an Advance Directive: not on file.    Medication Review:  Medications changed since last call, reviewed today.  started Levaquin 750mg PO QD x 5 days per Dr. Godoy    Remote Patient Monitoring:  Offered patient enrollment in the Remote Patient Monitoring (RPM) program for in-home

## 2025-07-02 RX ORDER — LEVOFLOXACIN 750 MG/1
750 TABLET, FILM COATED ORAL DAILY
COMMUNITY
Start: 2025-07-01 | End: 2025-07-05

## 2025-07-07 PROBLEM — N39.0 UTI (URINARY TRACT INFECTION): Status: RESOLVED | Noted: 2025-06-07 | Resolved: 2025-07-07

## 2025-07-10 ENCOUNTER — CARE COORDINATION (OUTPATIENT)
Dept: CARE COORDINATION | Age: 70
End: 2025-07-10

## 2025-07-10 DIAGNOSIS — G44.201 INTRACTABLE TENSION-TYPE HEADACHE, UNSPECIFIED CHRONICITY PATTERN: ICD-10-CM

## 2025-07-10 NOTE — CARE COORDINATION
Care Transitions Note    Final Call     Patient Current Location:  Home: [address] [place] [address]   [place] [address]    [facility] Care Coordinator contacted the patient by telephone. Verified name and  as identifiers.    Patient graduated from the Care Transitions program on 07/10/25.  Patient/family has the ability to self-manage at this time..      Advance Care Planning:   Does patient have an Advance Directive: reviewed during previous call, see note. .    Handoff:   Patient was not referred to the ACM team due to no additional needs identified.       Care Summary Note: Writer spoke with Jyoit for her final care transitions call. She states she has completed all of her antibiotics and is feeling back to her baseline. She denies having any ongoing urinary symptoms and is not having any nausea or fever/chills. She thanked writer for calling and denies having any new needs or concerns-will end care transitions at this time.     Assessments:  Care Transitions Subsequent and Final Call    Subsequent and Final Calls  Do you have any ongoing symptoms?: No  Have your medications changed?: No  Do you have any questions related to your medications?: No  Do you currently have any active services?: No  Do you have any needs or concerns that I can assist you with?: No  Identified Barriers: None  Care Transitions Interventions  Other Interventions:              Upcoming Appointments:    Future Appointments         Provider Specialty Dept Phone    2025 3:00 PM Estela Dutton MD Gastroenterology [phone]    2025 11:20 AM Mervat Villagran APRN - [facility] Urology [phone]    2025 3:40 PM Nia Patel MD Family Medicine [phone]    11/3/2025 3:10 PM Sav Swenson MD Nephrology [phone]    2026 11:15 AM Dinh Judd MD Neurology [phone]            Patient has agreed to contact primary care provider and/or specialist for any further questions, concerns, or needs.    Glory Sandoval LPN

## 2025-07-15 RX ORDER — DULOXETIN HYDROCHLORIDE 60 MG/1
60 CAPSULE, DELAYED RELEASE ORAL DAILY
Qty: 90 CAPSULE | Refills: 0 | Status: SHIPPED | OUTPATIENT
Start: 2025-07-15

## 2025-07-16 ENCOUNTER — HOSPITAL ENCOUNTER (OUTPATIENT)
Age: 70
Discharge: HOME | End: 2025-07-16
Payer: MEDICARE

## 2025-07-16 DIAGNOSIS — E87.6 HYPOKALEMIA: ICD-10-CM

## 2025-07-16 DIAGNOSIS — N18.31 STAGE 3A CHRONIC KIDNEY DISEASE (HCC): ICD-10-CM

## 2025-07-16 LAB
ANION GAP SERPL CALCULATED.3IONS-SCNC: 10 MMOL/L (ref 9–16)
BUN SERPL-MCNC: 15 MG/DL (ref 8–23)
BUN/CREAT SERPL: 13 (ref 9–20)
CALCIUM SERPL-MCNC: 9.4 MG/DL (ref 8.6–10.4)
CHLORIDE SERPL-SCNC: 99 MMOL/L (ref 98–107)
CO2 SERPL-SCNC: 29 MMOL/L (ref 20–31)
CREAT SERPL-MCNC: 1.2 MG/DL (ref 0.6–0.9)
GFR, ESTIMATED: 49 ML/MIN/1.73M2
GLUCOSE SERPL-MCNC: 216 MG/DL (ref 74–99)
POTASSIUM SERPL-SCNC: 3.8 MMOL/L (ref 3.7–5.3)
SODIUM SERPL-SCNC: 138 MMOL/L (ref 136–145)

## 2025-07-16 PROCEDURE — 36415 COLL VENOUS BLD VENIPUNCTURE: CPT

## 2025-07-16 PROCEDURE — 80048 BASIC METABOLIC PNL TOTAL CA: CPT

## 2025-07-21 ENCOUNTER — OFFICE VISIT (OUTPATIENT)
Dept: GASTROENTEROLOGY | Age: 70
End: 2025-07-21
Payer: MEDICARE

## 2025-07-21 VITALS
WEIGHT: 245 LBS | HEART RATE: 79 BPM | BODY MASS INDEX: 37.25 KG/M2 | TEMPERATURE: 97.1 F | RESPIRATION RATE: 18 BRPM | SYSTOLIC BLOOD PRESSURE: 91 MMHG | DIASTOLIC BLOOD PRESSURE: 61 MMHG | OXYGEN SATURATION: 98 %

## 2025-07-21 DIAGNOSIS — Z86.0100 HX OF COLONIC POLYPS: ICD-10-CM

## 2025-07-21 DIAGNOSIS — R10.13 EPIGASTRIC PAIN: Primary | ICD-10-CM

## 2025-07-21 DIAGNOSIS — K22.9 IRREGULAR Z LINE OF ESOPHAGUS: ICD-10-CM

## 2025-07-21 DIAGNOSIS — D64.9 ANEMIA, UNSPECIFIED TYPE: ICD-10-CM

## 2025-07-21 DIAGNOSIS — K31.84 GASTROPARESIS: ICD-10-CM

## 2025-07-21 DIAGNOSIS — R74.8 ELEVATED ALKALINE PHOSPHATASE LEVEL: ICD-10-CM

## 2025-07-21 DIAGNOSIS — K59.00 CONSTIPATION, UNSPECIFIED CONSTIPATION TYPE: ICD-10-CM

## 2025-07-21 PROCEDURE — 3078F DIAST BP <80 MM HG: CPT | Performed by: INTERNAL MEDICINE

## 2025-07-21 PROCEDURE — 99214 OFFICE O/P EST MOD 30 MIN: CPT | Performed by: INTERNAL MEDICINE

## 2025-07-21 PROCEDURE — 1126F AMNT PAIN NOTED NONE PRSNT: CPT | Performed by: INTERNAL MEDICINE

## 2025-07-21 PROCEDURE — 1159F MED LIST DOCD IN RCRD: CPT | Performed by: INTERNAL MEDICINE

## 2025-07-21 PROCEDURE — G8400 PT W/DXA NO RESULTS DOC: HCPCS | Performed by: INTERNAL MEDICINE

## 2025-07-21 PROCEDURE — 3017F COLORECTAL CA SCREEN DOC REV: CPT | Performed by: INTERNAL MEDICINE

## 2025-07-21 PROCEDURE — 1160F RVW MEDS BY RX/DR IN RCRD: CPT | Performed by: INTERNAL MEDICINE

## 2025-07-21 PROCEDURE — G8417 CALC BMI ABV UP PARAM F/U: HCPCS | Performed by: INTERNAL MEDICINE

## 2025-07-21 PROCEDURE — 1090F PRES/ABSN URINE INCON ASSESS: CPT | Performed by: INTERNAL MEDICINE

## 2025-07-21 PROCEDURE — 1123F ACP DISCUSS/DSCN MKR DOCD: CPT | Performed by: INTERNAL MEDICINE

## 2025-07-21 PROCEDURE — 1036F TOBACCO NON-USER: CPT | Performed by: INTERNAL MEDICINE

## 2025-07-21 PROCEDURE — G8427 DOCREV CUR MEDS BY ELIG CLIN: HCPCS | Performed by: INTERNAL MEDICINE

## 2025-07-21 PROCEDURE — 3074F SYST BP LT 130 MM HG: CPT | Performed by: INTERNAL MEDICINE

## 2025-07-21 ASSESSMENT — ENCOUNTER SYMPTOMS
SORE THROAT: 0
NAUSEA: 0
CONSTIPATION: 0
ANAL BLEEDING: 0
ABDOMINAL DISTENTION: 0
VOMITING: 0
TROUBLE SWALLOWING: 0
WHEEZING: 0
VOICE CHANGE: 0
RECTAL PAIN: 0
COUGH: 0
BLOOD IN STOOL: 0
CHOKING: 0
DIARRHEA: 0
ABDOMINAL PAIN: 0

## 2025-07-21 NOTE — PROGRESS NOTES
GI CLINIC FOLLOW UP    INTERVAL HISTORY:   No referring provider defined for this encounter.    Chief Complaint   Patient presents with    Follow-up     Follow up on labs       HISTORY OF PRESENT ILLNESS: Ms.Nancy Warren is a 69 y.o. female , referred for evaluation of BE, GERD, pulmonary htn, delayed gastric emptying, s/p appy, anemia elevated ALK P.    Here for f/u  History of BE repeat EGD 1/2025 showing improvement of Elizalde's, currently only reflux esophagitis w/o intestinal metaplasia on bx. Fundic gland polyps also present.   Continues pantoprazole 40mg BID   However she reports epigastric pain every morning when she wakes up, lasting for approximately 2 hours at a time which is aggravated by eating her breakfast of pretty crackers and milk  No late night eating  Does take a lot of medications at bedtime on an empty stomach  No new medications  No associated symptoms  Was following with [facility] for autoimmune GI dysmotility issue but was rbas-pw-criosc-up    Liver w/u ordered at last visit 2/2 alk phos  Negative hep a/b  Normal BERT/AMA/ASMA  A1AT is M1M1  Fe sat 15%  CT abd pelvis unremarkable    Regular BMs   Miralax OTC prn  BMs do not require straining  No black/bloody stools  No rectal pain  Last colonoscopy 12/23/20 - 5 yr recall    Denies unintentional weight loss, dysphagia/odynophagia, n/v, diarrhea/constipation, black or bloody stools.       Past Medical,Family, and Social History reviewed and does contribute to the patient presentingcondition.    I did review all the labs results available for the labs which were ordered by the primary care physician, and the other consultants, we search on [employer] at [facility] and all the available care everywhere [facility]    I did review all the imaging studies of the abdomen available on EMR, ordered by the primary care physician and the other consultant    I did review all the pathology from the biopsies done on the previous endoscopies    Patient's PMH/PSH,SH,PSYCH Hx,

## 2025-07-25 ENCOUNTER — HOSPITAL ENCOUNTER (OUTPATIENT)
Age: 70
Discharge: HOME | End: 2025-07-25
Payer: MEDICARE

## 2025-07-25 ENCOUNTER — OFFICE VISIT (OUTPATIENT)
Dept: PRIMARY CARE CLINIC | Age: 70
End: 2025-07-25
Payer: MEDICARE

## 2025-07-25 VITALS
DIASTOLIC BLOOD PRESSURE: 80 MMHG | SYSTOLIC BLOOD PRESSURE: 110 MMHG | WEIGHT: 240 LBS | BODY MASS INDEX: 36.37 KG/M2 | OXYGEN SATURATION: 98 % | TEMPERATURE: 97.3 F | HEIGHT: 68 IN | HEART RATE: 91 BPM

## 2025-07-25 DIAGNOSIS — A49.9 BACTERIAL UTI: Primary | ICD-10-CM

## 2025-07-25 DIAGNOSIS — N39.0 BACTERIAL UTI: Primary | ICD-10-CM

## 2025-07-25 DIAGNOSIS — R30.0 DYSURIA: ICD-10-CM

## 2025-07-25 LAB
BACTERIA URNS QL MICRO: ABNORMAL
BILIRUB UR QL STRIP: NEGATIVE
CHARACTER UR: ABNORMAL
CLARITY UR: ABNORMAL
COLOR UR: YELLOW
EPI CELLS #/AREA URNS HPF: ABNORMAL /HPF (ref 0–5)
GLUCOSE UR STRIP-MCNC: NEGATIVE MG/DL
HGB UR QL STRIP.AUTO: ABNORMAL
KETONES UR STRIP-MCNC: NEGATIVE MG/DL
LEUKOCYTE ESTERASE UR QL STRIP: ABNORMAL
NITRITE UR QL STRIP: POSITIVE
PH UR STRIP: 6 [PH] (ref 5–6)
PROT UR STRIP-MCNC: NEGATIVE MG/DL
RBC #/AREA URNS HPF: ABNORMAL /HPF (ref 0–4)
SP GR UR STRIP: 1.02 (ref 1.01–1.02)
UROBILINOGEN UR STRIP-ACNC: NORMAL EU/DL (ref 0–1)
WBC #/AREA URNS HPF: ABNORMAL /HPF (ref 0–4)

## 2025-07-25 PROCEDURE — 99213 OFFICE O/P EST LOW 20 MIN: CPT | Performed by: FAMILY MEDICINE

## 2025-07-25 PROCEDURE — G2211 COMPLEX E/M VISIT ADD ON: HCPCS | Performed by: FAMILY MEDICINE

## 2025-07-25 PROCEDURE — 87186 SC STD MICRODIL/AGAR DIL: CPT

## 2025-07-25 PROCEDURE — 1160F RVW MEDS BY RX/DR IN RCRD: CPT | Performed by: FAMILY MEDICINE

## 2025-07-25 PROCEDURE — 87088 URINE BACTERIA CULTURE: CPT

## 2025-07-25 PROCEDURE — G8417 CALC BMI ABV UP PARAM F/U: HCPCS | Performed by: FAMILY MEDICINE

## 2025-07-25 PROCEDURE — 3079F DIAST BP 80-89 MM HG: CPT | Performed by: FAMILY MEDICINE

## 2025-07-25 PROCEDURE — G8427 DOCREV CUR MEDS BY ELIG CLIN: HCPCS | Performed by: FAMILY MEDICINE

## 2025-07-25 PROCEDURE — 3017F COLORECTAL CA SCREEN DOC REV: CPT | Performed by: FAMILY MEDICINE

## 2025-07-25 PROCEDURE — 3074F SYST BP LT 130 MM HG: CPT | Performed by: FAMILY MEDICINE

## 2025-07-25 PROCEDURE — 81001 URINALYSIS AUTO W/SCOPE: CPT

## 2025-07-25 PROCEDURE — 1090F PRES/ABSN URINE INCON ASSESS: CPT | Performed by: FAMILY MEDICINE

## 2025-07-25 PROCEDURE — 1036F TOBACCO NON-USER: CPT | Performed by: FAMILY MEDICINE

## 2025-07-25 PROCEDURE — 87086 URINE CULTURE/COLONY COUNT: CPT

## 2025-07-25 PROCEDURE — 1159F MED LIST DOCD IN RCRD: CPT | Performed by: FAMILY MEDICINE

## 2025-07-25 PROCEDURE — 1123F ACP DISCUSS/DSCN MKR DOCD: CPT | Performed by: FAMILY MEDICINE

## 2025-07-25 PROCEDURE — G8400 PT W/DXA NO RESULTS DOC: HCPCS | Performed by: FAMILY MEDICINE

## 2025-07-25 RX ORDER — LEVOFLOXACIN 750 MG/1
750 TABLET, FILM COATED ORAL DAILY
Qty: 7 TABLET | Refills: 0 | Status: SHIPPED | OUTPATIENT
Start: 2025-07-25 | End: 2025-08-01

## 2025-07-25 NOTE — PROGRESS NOTES
[place] [address]                        Telephone [phone]             Fax [phone]       Jyoti Warren  :  1955  Age:  69 y.o.   MRN:  [id number]  Date of visit:  2025        Assessment and Plan:    Bacterial UTI  I reviewed the results of testing done today with the patient.  Levaquin was prescribed.  - levoFLOXacin (LEVAQUIN) 750 MG tablet; Take 1 tablet by mouth daily for 7 days  Dispense: 7 tablet; Refill: 0    She will be contacted when the ID and sensitivity results are available.   She was advised to call if she has not been contacted with the results by Monday noon.      Subjective:    Jyoti Warren is a 69 y.o. female who presents to [facility] today (2025) for evaluation of:  Urinary Frequency (Burning. Sx began 2 days ago. )      History of Present Illness  The patient presents for evaluation of a urinary symptoms.    She reports experiencing symptoms indicative of a urinary tract infection, which began a few days ago. She has not had any fever at home. She has an upcoming appointment with a urologist scheduled for 2025. She has previously been treated with Levaquin 750 mg and Keflex, both of which she tolerated well.   Her last urinary tract infection was 2025.  The culture grew both Pseudomonas and Enterococcus.  Keflex was initially prescribed, and Levaquin was added when the culture results became available.  She has a history of frequent urinary tract infections.  She sees urology for management.  She has an appointment scheduled with Mervat Villagran in urology on 2025.                She has the following problem list:  Patient Active Problem List   Diagnosis    Shoulder pain, bilateral    Cervical spine pain    Knee pain, left    Essential hypertension    Pulmonary hypertension (HCC)    Vitamin D deficiency    Headache    Subacute sphenoidal

## 2025-07-25 NOTE — PATIENT INSTRUCTIONS
We will contact you when the results of your culture are available.  This usually takes at least 48 hours.  Please call if you have not been contacted with the results by Monday noon.

## 2025-07-27 LAB
MICROORGANISM SPEC CULT: ABNORMAL
SPECIMEN DESCRIPTION: ABNORMAL

## 2025-08-01 ENCOUNTER — APPOINTMENT (OUTPATIENT)
Dept: CT IMAGING | Age: 70
DRG: 683 | End: 2025-08-01
Payer: MEDICARE

## 2025-08-01 ENCOUNTER — HOSPITAL ENCOUNTER (INPATIENT)
Age: 70
LOS: 1 days | Discharge: HOME | DRG: 683 | End: 2025-08-03
Attending: EMERGENCY MEDICINE | Admitting: FAMILY MEDICINE
Payer: MEDICARE

## 2025-08-01 DIAGNOSIS — N17.9 AKI (ACUTE KIDNEY INJURY): Primary | ICD-10-CM

## 2025-08-01 DIAGNOSIS — E86.0 DEHYDRATION: ICD-10-CM

## 2025-08-01 DIAGNOSIS — R19.7 DIARRHEA, UNSPECIFIED TYPE: ICD-10-CM

## 2025-08-01 PROBLEM — K57.90 DIVERTICULOSIS: Status: ACTIVE | Noted: 2025-08-01

## 2025-08-01 LAB
ALBUMIN SERPL-MCNC: 3.8 G/DL (ref 3.5–5.2)
ALBUMIN/GLOB SERPL: 1.1 {RATIO} (ref 1–2.5)
ALP SERPL-CCNC: 117 U/L (ref 35–104)
ALT SERPL-CCNC: <5 U/L (ref 10–35)
ANION GAP SERPL CALCULATED.3IONS-SCNC: 17 MMOL/L (ref 9–16)
AST SERPL-CCNC: 12 U/L (ref 10–35)
BASOPHILS # BLD: 0.05 K/UL (ref 0–0.2)
BASOPHILS NFR BLD: 0 % (ref 0–2)
BILIRUB SERPL-MCNC: 0.2 MG/DL (ref 0–1.2)
BILIRUB UR QL STRIP: NEGATIVE
BUN SERPL-MCNC: 39 MG/DL (ref 8–23)
BUN/CREAT SERPL: 13 (ref 9–20)
CALCIUM SERPL-MCNC: 9.6 MG/DL (ref 8.6–10.4)
CHLORIDE SERPL-SCNC: 96 MMOL/L (ref 98–107)
CLARITY UR: CLEAR
CO2 SERPL-SCNC: 22 MMOL/L (ref 20–31)
COLOR UR: YELLOW
COMMENT: ABNORMAL
CREAT SERPL-MCNC: 3.1 MG/DL (ref 0.6–0.9)
EOSINOPHIL # BLD: 0.12 K/UL (ref 0–0.44)
EOSINOPHILS RELATIVE PERCENT: 1 % (ref 1–4)
ERYTHROCYTE [DISTWIDTH] IN BLOOD BY AUTOMATED COUNT: 13.8 % (ref 11.8–14.4)
GFR, ESTIMATED: 16 ML/MIN/1.73M2
GLUCOSE SERPL-MCNC: 317 MG/DL (ref 74–99)
GLUCOSE UR STRIP-MCNC: NEGATIVE MG/DL
HCT VFR BLD AUTO: 33.5 % (ref 36.3–47.1)
HGB BLD-MCNC: 10.8 G/DL (ref 11.9–15.1)
HGB UR QL STRIP.AUTO: NEGATIVE
IMM GRANULOCYTES # BLD AUTO: 0.2 K/UL (ref 0–0.3)
IMM GRANULOCYTES NFR BLD: 1 %
KETONES UR STRIP-MCNC: NEGATIVE MG/DL
LACTATE BLDV-SCNC: 2.7 MMOL/L (ref 0.5–2.2)
LEUKOCYTE ESTERASE UR QL STRIP: NEGATIVE
LIPASE SERPL-CCNC: 18 U/L (ref 13–60)
LYMPHOCYTES NFR BLD: 1.58 K/UL (ref 1.1–3.7)
LYMPHOCYTES RELATIVE PERCENT: 9 % (ref 24–43)
MCH RBC QN AUTO: 28.3 PG (ref 25.2–33.5)
MCHC RBC AUTO-ENTMCNC: 32.2 G/DL (ref 25.2–33.5)
MCV RBC AUTO: 87.9 FL (ref 82.6–102.9)
MONOCYTES NFR BLD: 1.06 K/UL (ref 0.1–1.2)
MONOCYTES NFR BLD: 6 % (ref 3–12)
NEUTROPHILS NFR BLD: 83 % (ref 36–65)
NEUTS SEG NFR BLD: 15.56 K/UL (ref 1.5–8.1)
NITRITE UR QL STRIP: NEGATIVE
NRBC BLD-RTO: 0 PER 100 WBC
PH UR STRIP: 5.5 [PH] (ref 5–6)
PLATELET # BLD AUTO: 325 K/UL (ref 138–453)
PMV BLD AUTO: 10.7 FL (ref 8.1–13.5)
POTASSIUM SERPL-SCNC: 4.1 MMOL/L (ref 3.7–5.3)
PROT SERPL-MCNC: 7.2 G/DL (ref 6.6–8.7)
PROT UR STRIP-MCNC: NEGATIVE MG/DL
RBC # BLD AUTO: 3.81 M/UL (ref 3.95–5.11)
SODIUM SERPL-SCNC: 135 MMOL/L (ref 136–145)
SP GR UR STRIP: 1.03 (ref 1.01–1.02)
TROPONIN I SERPL HS-MCNC: 28 NG/L (ref 0–14)
UROBILINOGEN UR STRIP-ACNC: NORMAL EU/DL (ref 0–1)
WBC OTHER # BLD: 18.6 K/UL (ref 3.5–11.3)

## 2025-08-01 PROCEDURE — 96360 HYDRATION IV INFUSION INIT: CPT

## 2025-08-01 PROCEDURE — 83690 ASSAY OF LIPASE: CPT

## 2025-08-01 PROCEDURE — 80053 COMPREHEN METABOLIC PANEL: CPT

## 2025-08-01 PROCEDURE — 96361 HYDRATE IV INFUSION ADD-ON: CPT

## 2025-08-01 PROCEDURE — 84145 PROCALCITONIN (PCT): CPT

## 2025-08-01 PROCEDURE — 84484 ASSAY OF TROPONIN QUANT: CPT

## 2025-08-01 PROCEDURE — 93005 ELECTROCARDIOGRAM TRACING: CPT | Performed by: EMERGENCY MEDICINE

## 2025-08-01 PROCEDURE — 99285 EMERGENCY DEPT VISIT HI MDM: CPT

## 2025-08-01 PROCEDURE — 83605 ASSAY OF LACTIC ACID: CPT

## 2025-08-01 PROCEDURE — 81003 URINALYSIS AUTO W/O SCOPE: CPT

## 2025-08-01 PROCEDURE — 2580000003 HC RX 258: Performed by: EMERGENCY MEDICINE

## 2025-08-01 PROCEDURE — 85025 COMPLETE CBC W/AUTO DIFF WBC: CPT

## 2025-08-01 PROCEDURE — 74176 CT ABD & PELVIS W/O CONTRAST: CPT

## 2025-08-01 RX ORDER — 0.9 % SODIUM CHLORIDE 0.9 %
1000 INTRAVENOUS SOLUTION INTRAVENOUS ONCE
Status: COMPLETED | OUTPATIENT
Start: 2025-08-01 | End: 2025-08-01

## 2025-08-01 RX ADMIN — SODIUM CHLORIDE 1000 ML: 0.9 INJECTION, SOLUTION INTRAVENOUS at 21:42

## 2025-08-01 ASSESSMENT — PAIN - FUNCTIONAL ASSESSMENT: PAIN_FUNCTIONAL_ASSESSMENT: 0-10

## 2025-08-01 ASSESSMENT — PAIN SCALES - GENERAL: PAINLEVEL_OUTOF10: 8

## 2025-08-01 ASSESSMENT — PAIN DESCRIPTION - LOCATION: LOCATION: ABDOMEN

## 2025-08-02 PROBLEM — N17.9 AKI (ACUTE KIDNEY INJURY): Status: ACTIVE | Noted: 2025-08-02

## 2025-08-02 PROBLEM — G89.4 CHRONIC PAIN SYNDROME: Status: ACTIVE | Noted: 2025-08-02

## 2025-08-02 PROBLEM — E11.22 TYPE 2 DIABETES MELLITUS WITH CHRONIC KIDNEY DISEASE, WITHOUT LONG-TERM CURRENT USE OF INSULIN (HCC): Status: ACTIVE | Noted: 2025-08-02

## 2025-08-02 PROBLEM — E11.42 TYPE 2 DIABETES MELLITUS WITH DIABETIC POLYNEUROPATHY, WITHOUT LONG-TERM CURRENT USE OF INSULIN (HCC): Status: ACTIVE | Noted: 2025-08-02

## 2025-08-02 PROBLEM — E11.65 TYPE 2 DIABETES MELLITUS WITH HYPERGLYCEMIA, WITHOUT LONG-TERM CURRENT USE OF INSULIN (HCC): Status: ACTIVE | Noted: 2025-08-02

## 2025-08-02 LAB
ALBUMIN SERPL-MCNC: 3.4 G/DL (ref 3.5–5.2)
ALBUMIN/GLOB SERPL: 1.1 {RATIO} (ref 1–2.5)
ALP SERPL-CCNC: 112 U/L (ref 35–104)
ALT SERPL-CCNC: <5 U/L (ref 10–35)
ANION GAP SERPL CALCULATED.3IONS-SCNC: 12 MMOL/L (ref 9–16)
AST SERPL-CCNC: 12 U/L (ref 10–35)
B-OH-BUTYR SERPL-MCNC: 0.16 MMOL/L (ref 0.02–0.27)
BASOPHILS # BLD: 0.05 K/UL (ref 0–0.2)
BASOPHILS NFR BLD: 0 % (ref 0–2)
BILIRUB SERPL-MCNC: <0.2 MG/DL (ref 0–1.2)
BUN SERPL-MCNC: 36 MG/DL (ref 8–23)
BUN/CREAT SERPL: 15 (ref 9–20)
CALCIUM SERPL-MCNC: 8.9 MG/DL (ref 8.6–10.4)
CHLORIDE SERPL-SCNC: 103 MMOL/L (ref 98–107)
CO2 SERPL-SCNC: 22 MMOL/L (ref 20–31)
CREAT SERPL-MCNC: 2.4 MG/DL (ref 0.6–0.9)
EOSINOPHIL # BLD: 0.18 K/UL (ref 0–0.44)
EOSINOPHILS RELATIVE PERCENT: 1 % (ref 1–4)
ERYTHROCYTE [DISTWIDTH] IN BLOOD BY AUTOMATED COUNT: 13.7 % (ref 11.8–14.4)
GFR, ESTIMATED: 21 ML/MIN/1.73M2
GLUCOSE BLD-MCNC: 106 MG/DL (ref 65–105)
GLUCOSE BLD-MCNC: 167 MG/DL (ref 65–105)
GLUCOSE BLD-MCNC: 197 MG/DL (ref 65–105)
GLUCOSE BLD-MCNC: 87 MG/DL (ref 65–105)
GLUCOSE SERPL-MCNC: 97 MG/DL (ref 74–99)
HCT VFR BLD AUTO: 31.5 % (ref 36.3–47.1)
HGB BLD-MCNC: 10.1 G/DL (ref 11.9–15.1)
IMM GRANULOCYTES # BLD AUTO: 0.12 K/UL (ref 0–0.3)
IMM GRANULOCYTES NFR BLD: 1 %
LACTATE BLDV-SCNC: 1.6 MMOL/L (ref 0.5–2.2)
LYMPHOCYTES NFR BLD: 1.38 K/UL (ref 1.1–3.7)
LYMPHOCYTES RELATIVE PERCENT: 9 % (ref 24–43)
MAGNESIUM SERPL-MCNC: 2 MG/DL (ref 1.6–2.4)
MCH RBC QN AUTO: 28 PG (ref 25.2–33.5)
MCHC RBC AUTO-ENTMCNC: 32.1 G/DL (ref 25.2–33.5)
MCV RBC AUTO: 87.3 FL (ref 82.6–102.9)
MONOCYTES NFR BLD: 1.02 K/UL (ref 0.1–1.2)
MONOCYTES NFR BLD: 7 % (ref 3–12)
NEUTROPHILS NFR BLD: 82 % (ref 36–65)
NEUTS SEG NFR BLD: 12.25 K/UL (ref 1.5–8.1)
NRBC BLD-RTO: 0 PER 100 WBC
PLATELET # BLD AUTO: 281 K/UL (ref 138–453)
PMV BLD AUTO: 10.6 FL (ref 8.1–13.5)
POTASSIUM SERPL-SCNC: 3.4 MMOL/L (ref 3.7–5.3)
PROCALCITONIN SERPL-MCNC: 0.14 NG/ML
PROT SERPL-MCNC: 6.5 G/DL (ref 6.6–8.7)
RBC # BLD AUTO: 3.61 M/UL (ref 3.95–5.11)
SODIUM SERPL-SCNC: 137 MMOL/L (ref 136–145)
TROPONIN I SERPL HS-MCNC: 21 NG/L (ref 0–14)
WBC OTHER # BLD: 15 K/UL (ref 3.5–11.3)

## 2025-08-02 PROCEDURE — 83735 ASSAY OF MAGNESIUM: CPT

## 2025-08-02 PROCEDURE — 2500000003 HC RX 250 WO HCPCS: Performed by: NURSE PRACTITIONER

## 2025-08-02 PROCEDURE — 99222 1ST HOSP IP/OBS MODERATE 55: CPT | Performed by: NURSE PRACTITIONER

## 2025-08-02 PROCEDURE — 6360000002 HC RX W HCPCS: Performed by: NURSE PRACTITIONER

## 2025-08-02 PROCEDURE — 82947 ASSAY GLUCOSE BLOOD QUANT: CPT

## 2025-08-02 PROCEDURE — 36415 COLL VENOUS BLD VENIPUNCTURE: CPT

## 2025-08-02 PROCEDURE — 1200000000 HC SEMI PRIVATE

## 2025-08-02 PROCEDURE — 6370000000 HC RX 637 (ALT 250 FOR IP): Performed by: NURSE PRACTITIONER

## 2025-08-02 PROCEDURE — 83605 ASSAY OF LACTIC ACID: CPT

## 2025-08-02 PROCEDURE — 2580000003 HC RX 258: Performed by: NURSE PRACTITIONER

## 2025-08-02 PROCEDURE — 96372 THER/PROPH/DIAG INJ SC/IM: CPT

## 2025-08-02 PROCEDURE — 80053 COMPREHEN METABOLIC PANEL: CPT

## 2025-08-02 PROCEDURE — G0378 HOSPITAL OBSERVATION PER HR: HCPCS

## 2025-08-02 PROCEDURE — 85025 COMPLETE CBC W/AUTO DIFF WBC: CPT

## 2025-08-02 PROCEDURE — 82010 KETONE BODYS QUAN: CPT

## 2025-08-02 PROCEDURE — 99232 SBSQ HOSP IP/OBS MODERATE 35: CPT | Performed by: FAMILY MEDICINE

## 2025-08-02 PROCEDURE — 96361 HYDRATE IV INFUSION ADD-ON: CPT

## 2025-08-02 PROCEDURE — 84484 ASSAY OF TROPONIN QUANT: CPT

## 2025-08-02 RX ORDER — HYDROCODONE BITARTRATE AND ACETAMINOPHEN 5; 325 MG/1; MG/1
1 TABLET ORAL 2 TIMES DAILY PRN
Status: DISCONTINUED | OUTPATIENT
Start: 2025-08-02 | End: 2025-08-03 | Stop reason: HOSPADM

## 2025-08-02 RX ORDER — LISINOPRIL 5 MG/1
10 TABLET ORAL DAILY
Status: DISCONTINUED | OUTPATIENT
Start: 2025-08-02 | End: 2025-08-03 | Stop reason: HOSPADM

## 2025-08-02 RX ORDER — HYDROCHLOROTHIAZIDE 12.5 MG/1
12.5 TABLET ORAL EVERY MORNING
Status: DISCONTINUED | OUTPATIENT
Start: 2025-08-02 | End: 2025-08-03 | Stop reason: HOSPADM

## 2025-08-02 RX ORDER — DICYCLOMINE HYDROCHLORIDE 10 MG/1
10 CAPSULE ORAL 3 TIMES DAILY PRN
Status: DISCONTINUED | OUTPATIENT
Start: 2025-08-02 | End: 2025-08-03 | Stop reason: HOSPADM

## 2025-08-02 RX ORDER — ACETAMINOPHEN 325 MG/1
650 TABLET ORAL EVERY 6 HOURS PRN
Status: DISCONTINUED | OUTPATIENT
Start: 2025-08-02 | End: 2025-08-03 | Stop reason: HOSPADM

## 2025-08-02 RX ORDER — SODIUM CHLORIDE 9 MG/ML
INJECTION, SOLUTION INTRAVENOUS CONTINUOUS
Status: DISCONTINUED | OUTPATIENT
Start: 2025-08-02 | End: 2025-08-03 | Stop reason: HOSPADM

## 2025-08-02 RX ORDER — SODIUM CHLORIDE 0.9 % (FLUSH) 0.9 %
5-40 SYRINGE (ML) INJECTION EVERY 12 HOURS SCHEDULED
Status: DISCONTINUED | OUTPATIENT
Start: 2025-08-02 | End: 2025-08-03 | Stop reason: HOSPADM

## 2025-08-02 RX ORDER — GLUCAGON 1 MG/ML
1 KIT INJECTION PRN
Status: DISCONTINUED | OUTPATIENT
Start: 2025-08-02 | End: 2025-08-03 | Stop reason: HOSPADM

## 2025-08-02 RX ORDER — SODIUM CHLORIDE 9 MG/ML
INJECTION, SOLUTION INTRAVENOUS PRN
Status: DISCONTINUED | OUTPATIENT
Start: 2025-08-02 | End: 2025-08-03 | Stop reason: HOSPADM

## 2025-08-02 RX ORDER — ACETAMINOPHEN 650 MG/1
650 SUPPOSITORY RECTAL EVERY 6 HOURS PRN
Status: DISCONTINUED | OUTPATIENT
Start: 2025-08-02 | End: 2025-08-03 | Stop reason: HOSPADM

## 2025-08-02 RX ORDER — POTASSIUM CHLORIDE 1500 MG/1
20 TABLET, EXTENDED RELEASE ORAL ONCE
Status: COMPLETED | OUTPATIENT
Start: 2025-08-02 | End: 2025-08-02

## 2025-08-02 RX ORDER — GABAPENTIN 100 MG/1
200 CAPSULE ORAL 2 TIMES DAILY
Status: DISCONTINUED | OUTPATIENT
Start: 2025-08-02 | End: 2025-08-03 | Stop reason: HOSPADM

## 2025-08-02 RX ORDER — GABAPENTIN 100 MG/1
100 CAPSULE ORAL 2 TIMES DAILY
Status: DISCONTINUED | OUTPATIENT
Start: 2025-08-02 | End: 2025-08-02

## 2025-08-02 RX ORDER — SODIUM CHLORIDE 0.9 % (FLUSH) 0.9 %
5-40 SYRINGE (ML) INJECTION PRN
Status: DISCONTINUED | OUTPATIENT
Start: 2025-08-02 | End: 2025-08-03 | Stop reason: HOSPADM

## 2025-08-02 RX ORDER — PANTOPRAZOLE SODIUM 40 MG/1
40 TABLET, DELAYED RELEASE ORAL 2 TIMES DAILY
Status: DISCONTINUED | OUTPATIENT
Start: 2025-08-02 | End: 2025-08-03 | Stop reason: HOSPADM

## 2025-08-02 RX ORDER — DULOXETIN HYDROCHLORIDE 30 MG/1
60 CAPSULE, DELAYED RELEASE ORAL DAILY
Status: DISCONTINUED | OUTPATIENT
Start: 2025-08-02 | End: 2025-08-02

## 2025-08-02 RX ORDER — HEPARIN SODIUM 5000 [USP'U]/ML
5000 INJECTION, SOLUTION INTRAVENOUS; SUBCUTANEOUS EVERY 8 HOURS SCHEDULED
Status: DISCONTINUED | OUTPATIENT
Start: 2025-08-02 | End: 2025-08-03

## 2025-08-02 RX ORDER — AMLODIPINE BESYLATE 5 MG/1
5 TABLET ORAL DAILY
Status: DISCONTINUED | OUTPATIENT
Start: 2025-08-02 | End: 2025-08-03 | Stop reason: HOSPADM

## 2025-08-02 RX ORDER — ONDANSETRON 2 MG/ML
4 INJECTION INTRAMUSCULAR; INTRAVENOUS EVERY 6 HOURS PRN
Status: DISCONTINUED | OUTPATIENT
Start: 2025-08-02 | End: 2025-08-03 | Stop reason: HOSPADM

## 2025-08-02 RX ORDER — INSULIN LISPRO 100 [IU]/ML
0-8 INJECTION, SOLUTION INTRAVENOUS; SUBCUTANEOUS
Status: DISCONTINUED | OUTPATIENT
Start: 2025-08-02 | End: 2025-08-03 | Stop reason: HOSPADM

## 2025-08-02 RX ORDER — DEXTROSE MONOHYDRATE 100 MG/ML
INJECTION, SOLUTION INTRAVENOUS CONTINUOUS PRN
Status: DISCONTINUED | OUTPATIENT
Start: 2025-08-02 | End: 2025-08-03 | Stop reason: HOSPADM

## 2025-08-02 RX ORDER — GLIPIZIDE 5 MG/1
10 TABLET ORAL
Status: DISCONTINUED | OUTPATIENT
Start: 2025-08-02 | End: 2025-08-03 | Stop reason: HOSPADM

## 2025-08-02 RX ORDER — DULOXETIN HYDROCHLORIDE 30 MG/1
30 CAPSULE, DELAYED RELEASE ORAL DAILY
Status: DISCONTINUED | OUTPATIENT
Start: 2025-08-03 | End: 2025-08-03 | Stop reason: HOSPADM

## 2025-08-02 RX ADMIN — SODIUM CHLORIDE: 0.9 INJECTION, SOLUTION INTRAVENOUS at 17:37

## 2025-08-02 RX ADMIN — PANTOPRAZOLE SODIUM 40 MG: 40 TABLET, DELAYED RELEASE ORAL at 21:30

## 2025-08-02 RX ADMIN — HEPARIN SODIUM 5000 UNITS: 5000 INJECTION INTRAVENOUS; SUBCUTANEOUS at 15:53

## 2025-08-02 RX ADMIN — HEPARIN SODIUM 5000 UNITS: 5000 INJECTION INTRAVENOUS; SUBCUTANEOUS at 05:34

## 2025-08-02 RX ADMIN — POTASSIUM CHLORIDE 20 MEQ: 1500 TABLET, EXTENDED RELEASE ORAL at 10:26

## 2025-08-02 RX ADMIN — SODIUM CHLORIDE: 0.9 INJECTION, SOLUTION INTRAVENOUS at 01:17

## 2025-08-02 RX ADMIN — INSULIN LISPRO 2 UNITS: 100 INJECTION, SOLUTION INTRAVENOUS; SUBCUTANEOUS at 01:23

## 2025-08-02 RX ADMIN — SODIUM CHLORIDE: 0.9 INJECTION, SOLUTION INTRAVENOUS at 09:32

## 2025-08-02 RX ADMIN — HYDROCODONE BITARTRATE AND ACETAMINOPHEN 1 TABLET: 5; 325 TABLET ORAL at 18:31

## 2025-08-02 RX ADMIN — AMLODIPINE BESYLATE 5 MG: 5 TABLET ORAL at 10:26

## 2025-08-02 RX ADMIN — SODIUM CHLORIDE, PRESERVATIVE FREE 10 ML: 5 INJECTION INTRAVENOUS at 21:30

## 2025-08-02 RX ADMIN — GLIPIZIDE 10 MG: 5 TABLET ORAL at 05:34

## 2025-08-02 RX ADMIN — GABAPENTIN 100 MG: 100 CAPSULE ORAL at 10:26

## 2025-08-02 RX ADMIN — HEPARIN SODIUM 5000 UNITS: 5000 INJECTION INTRAVENOUS; SUBCUTANEOUS at 21:30

## 2025-08-02 RX ADMIN — GABAPENTIN 200 MG: 100 CAPSULE ORAL at 21:30

## 2025-08-02 RX ADMIN — PANTOPRAZOLE SODIUM 40 MG: 40 TABLET, DELAYED RELEASE ORAL at 10:26

## 2025-08-02 ASSESSMENT — PAIN DESCRIPTION - DESCRIPTORS: DESCRIPTORS: PATIENT UNABLE TO DESCRIBE

## 2025-08-02 ASSESSMENT — PAIN SCALES - GENERAL
PAINLEVEL_OUTOF10: 0
PAINLEVEL_OUTOF10: 4
PAINLEVEL_OUTOF10: 8

## 2025-08-02 ASSESSMENT — PAIN DESCRIPTION - LOCATION: LOCATION: BACK;NECK

## 2025-08-03 VITALS
TEMPERATURE: 97.8 F | HEIGHT: 68 IN | DIASTOLIC BLOOD PRESSURE: 71 MMHG | WEIGHT: 240.9 LBS | BODY MASS INDEX: 36.51 KG/M2 | SYSTOLIC BLOOD PRESSURE: 128 MMHG | OXYGEN SATURATION: 98 % | RESPIRATION RATE: 24 BRPM | HEART RATE: 70 BPM

## 2025-08-03 LAB
ANION GAP SERPL CALCULATED.3IONS-SCNC: 8 MMOL/L (ref 9–16)
BASOPHILS # BLD: <0.03 K/UL (ref 0–0.2)
BASOPHILS NFR BLD: 0 % (ref 0–2)
BUN SERPL-MCNC: 21 MG/DL (ref 8–23)
BUN/CREAT SERPL: 15 (ref 9–20)
CALCIUM SERPL-MCNC: 8.8 MG/DL (ref 8.6–10.4)
CHLORIDE SERPL-SCNC: 110 MMOL/L (ref 98–107)
CO2 SERPL-SCNC: 24 MMOL/L (ref 20–31)
CREAT SERPL-MCNC: 1.4 MG/DL (ref 0.6–0.9)
EOSINOPHIL # BLD: 0.11 K/UL (ref 0–0.44)
EOSINOPHILS RELATIVE PERCENT: 1 % (ref 1–4)
ERYTHROCYTE [DISTWIDTH] IN BLOOD BY AUTOMATED COUNT: 13.9 % (ref 11.8–14.4)
GFR, ESTIMATED: 41 ML/MIN/1.73M2
GLUCOSE SERPL-MCNC: 95 MG/DL (ref 74–99)
HCT VFR BLD AUTO: 29.7 % (ref 36.3–47.1)
HGB BLD-MCNC: 9.5 G/DL (ref 11.9–15.1)
IMM GRANULOCYTES # BLD AUTO: 0.08 K/UL (ref 0–0.3)
IMM GRANULOCYTES NFR BLD: 1 %
LYMPHOCYTES NFR BLD: 1.81 K/UL (ref 1.1–3.7)
LYMPHOCYTES RELATIVE PERCENT: 23 % (ref 24–43)
MCH RBC QN AUTO: 28.2 PG (ref 25.2–33.5)
MCHC RBC AUTO-ENTMCNC: 32 G/DL (ref 25.2–33.5)
MCV RBC AUTO: 88.1 FL (ref 82.6–102.9)
MONOCYTES NFR BLD: 0.61 K/UL (ref 0.1–1.2)
MONOCYTES NFR BLD: 8 % (ref 3–12)
NEUTROPHILS NFR BLD: 67 % (ref 36–65)
NEUTS SEG NFR BLD: 5.39 K/UL (ref 1.5–8.1)
NRBC BLD-RTO: 0 PER 100 WBC
PLATELET # BLD AUTO: 234 K/UL (ref 138–453)
PMV BLD AUTO: 10.9 FL (ref 8.1–13.5)
POTASSIUM SERPL-SCNC: 3.5 MMOL/L (ref 3.7–5.3)
RBC # BLD AUTO: 3.37 M/UL (ref 3.95–5.11)
SODIUM SERPL-SCNC: 142 MMOL/L (ref 136–145)
WBC OTHER # BLD: 8 K/UL (ref 3.5–11.3)

## 2025-08-03 PROCEDURE — 85025 COMPLETE CBC W/AUTO DIFF WBC: CPT

## 2025-08-03 PROCEDURE — 96361 HYDRATE IV INFUSION ADD-ON: CPT

## 2025-08-03 PROCEDURE — 96372 THER/PROPH/DIAG INJ SC/IM: CPT

## 2025-08-03 PROCEDURE — 80048 BASIC METABOLIC PNL TOTAL CA: CPT

## 2025-08-03 PROCEDURE — 36415 COLL VENOUS BLD VENIPUNCTURE: CPT

## 2025-08-03 PROCEDURE — 6370000000 HC RX 637 (ALT 250 FOR IP): Performed by: NURSE PRACTITIONER

## 2025-08-03 PROCEDURE — 6360000002 HC RX W HCPCS: Performed by: NURSE PRACTITIONER

## 2025-08-03 PROCEDURE — 99238 HOSP IP/OBS DSCHRG MGMT 30/<: CPT | Performed by: FAMILY MEDICINE

## 2025-08-03 PROCEDURE — 2580000003 HC RX 258: Performed by: NURSE PRACTITIONER

## 2025-08-03 PROCEDURE — G0378 HOSPITAL OBSERVATION PER HR: HCPCS

## 2025-08-03 RX ORDER — ENOXAPARIN SODIUM 100 MG/ML
30 INJECTION SUBCUTANEOUS DAILY
Status: DISCONTINUED | OUTPATIENT
Start: 2025-08-03 | End: 2025-08-03 | Stop reason: HOSPADM

## 2025-08-03 RX ORDER — POTASSIUM CHLORIDE 1500 MG/1
20 TABLET, EXTENDED RELEASE ORAL ONCE
Status: COMPLETED | OUTPATIENT
Start: 2025-08-03 | End: 2025-08-03

## 2025-08-03 RX ORDER — CEPHALEXIN 500 MG/1
500 CAPSULE ORAL 4 TIMES DAILY
Qty: 28 CAPSULE | Refills: 0 | Status: SHIPPED | OUTPATIENT
Start: 2025-08-03 | End: 2025-08-03 | Stop reason: HOSPADM

## 2025-08-03 RX ADMIN — PANTOPRAZOLE SODIUM 40 MG: 40 TABLET, DELAYED RELEASE ORAL at 09:13

## 2025-08-03 RX ADMIN — GABAPENTIN 200 MG: 100 CAPSULE ORAL at 09:13

## 2025-08-03 RX ADMIN — HYDROCODONE BITARTRATE AND ACETAMINOPHEN 1 TABLET: 5; 325 TABLET ORAL at 00:26

## 2025-08-03 RX ADMIN — DULOXETINE 30 MG: 30 CAPSULE, DELAYED RELEASE ORAL at 09:13

## 2025-08-03 RX ADMIN — GLIPIZIDE 10 MG: 5 TABLET ORAL at 06:04

## 2025-08-03 RX ADMIN — SODIUM CHLORIDE: 0.9 INJECTION, SOLUTION INTRAVENOUS at 02:00

## 2025-08-03 RX ADMIN — ENOXAPARIN SODIUM 30 MG: 100 INJECTION SUBCUTANEOUS at 09:13

## 2025-08-03 RX ADMIN — POTASSIUM CHLORIDE 20 MEQ: 1500 TABLET, EXTENDED RELEASE ORAL at 07:01

## 2025-08-03 RX ADMIN — AMLODIPINE BESYLATE 5 MG: 5 TABLET ORAL at 09:13

## 2025-08-03 ASSESSMENT — PAIN DESCRIPTION - DESCRIPTORS: DESCRIPTORS: SORE

## 2025-08-03 ASSESSMENT — PAIN SCALES - GENERAL: PAINLEVEL_OUTOF10: 6

## 2025-08-03 ASSESSMENT — PAIN DESCRIPTION - LOCATION: LOCATION: BACK;NECK

## 2025-08-03 ASSESSMENT — PAIN SCALES - WONG BAKER: WONGBAKER_NUMERICALRESPONSE: HURTS A LITTLE BIT

## 2025-08-04 ENCOUNTER — OFFICE VISIT (OUTPATIENT)
Dept: UROLOGY | Age: 70
End: 2025-08-04
Payer: MEDICARE

## 2025-08-04 ENCOUNTER — CARE COORDINATION (OUTPATIENT)
Dept: CARE COORDINATION | Age: 70
End: 2025-08-04

## 2025-08-04 VITALS
HEART RATE: 74 BPM | RESPIRATION RATE: 16 BRPM | SYSTOLIC BLOOD PRESSURE: 110 MMHG | HEIGHT: 68 IN | WEIGHT: 240 LBS | BODY MASS INDEX: 36.37 KG/M2 | DIASTOLIC BLOOD PRESSURE: 60 MMHG | OXYGEN SATURATION: 98 %

## 2025-08-04 DIAGNOSIS — N17.9 AKI (ACUTE KIDNEY INJURY): Primary | ICD-10-CM

## 2025-08-04 DIAGNOSIS — N39.0 RECURRENT UTI: Primary | ICD-10-CM

## 2025-08-04 LAB
EKG ATRIAL RATE: 85 BPM
EKG P AXIS: 71 DEGREES
EKG P-R INTERVAL: 164 MS
EKG Q-T INTERVAL: 406 MS
EKG QRS DURATION: 94 MS
EKG QTC CALCULATION (BAZETT): 483 MS
EKG R AXIS: 19 DEGREES
EKG T AXIS: 42 DEGREES
EKG VENTRICULAR RATE: 85 BPM

## 2025-08-04 PROCEDURE — 1036F TOBACCO NON-USER: CPT | Performed by: NURSE PRACTITIONER

## 2025-08-04 PROCEDURE — 1090F PRES/ABSN URINE INCON ASSESS: CPT | Performed by: NURSE PRACTITIONER

## 2025-08-04 PROCEDURE — 1111F DSCHRG MED/CURRENT MED MERGE: CPT | Performed by: NURSE PRACTITIONER

## 2025-08-04 PROCEDURE — 1123F ACP DISCUSS/DSCN MKR DOCD: CPT | Performed by: NURSE PRACTITIONER

## 2025-08-04 PROCEDURE — 1111F DSCHRG MED/CURRENT MED MERGE: CPT | Performed by: FAMILY MEDICINE

## 2025-08-04 PROCEDURE — G8417 CALC BMI ABV UP PARAM F/U: HCPCS | Performed by: NURSE PRACTITIONER

## 2025-08-04 PROCEDURE — 3074F SYST BP LT 130 MM HG: CPT | Performed by: NURSE PRACTITIONER

## 2025-08-04 PROCEDURE — G8427 DOCREV CUR MEDS BY ELIG CLIN: HCPCS | Performed by: NURSE PRACTITIONER

## 2025-08-04 PROCEDURE — 3078F DIAST BP <80 MM HG: CPT | Performed by: NURSE PRACTITIONER

## 2025-08-04 PROCEDURE — 3017F COLORECTAL CA SCREEN DOC REV: CPT | Performed by: NURSE PRACTITIONER

## 2025-08-04 PROCEDURE — 99213 OFFICE O/P EST LOW 20 MIN: CPT | Performed by: NURSE PRACTITIONER

## 2025-08-04 PROCEDURE — G8400 PT W/DXA NO RESULTS DOC: HCPCS | Performed by: NURSE PRACTITIONER

## 2025-08-04 PROCEDURE — 1159F MED LIST DOCD IN RCRD: CPT | Performed by: NURSE PRACTITIONER

## 2025-08-04 PROCEDURE — 99212 OFFICE O/P EST SF 10 MIN: CPT | Performed by: NURSE PRACTITIONER

## 2025-08-04 RX ORDER — NITROFURANTOIN MACROCRYSTALS 50 MG/1
50 CAPSULE ORAL NIGHTLY
Qty: 90 CAPSULE | Refills: 3 | Status: SHIPPED | OUTPATIENT
Start: 2025-08-04 | End: 2025-11-02

## 2025-08-04 RX ORDER — NITROFURANTOIN MACROCRYSTALS 50 MG/1
50 CAPSULE ORAL NIGHTLY
Qty: 30 CAPSULE | Refills: 0 | Status: SHIPPED | OUTPATIENT
Start: 2025-08-04 | End: 2025-08-06

## 2025-08-05 ENCOUNTER — TELEPHONE (OUTPATIENT)
Dept: UROLOGY | Age: 70
End: 2025-08-05

## 2025-08-05 ENCOUNTER — FOLLOWUP TELEPHONE ENCOUNTER (OUTPATIENT)
Dept: INPATIENT UNIT | Age: 70
End: 2025-08-05

## 2025-08-06 ENCOUNTER — OFFICE VISIT (OUTPATIENT)
Dept: FAMILY MEDICINE CLINIC | Age: 70
End: 2025-08-06

## 2025-08-06 VITALS
BODY MASS INDEX: 36.37 KG/M2 | HEIGHT: 68 IN | OXYGEN SATURATION: 96 % | DIASTOLIC BLOOD PRESSURE: 82 MMHG | WEIGHT: 240 LBS | HEART RATE: 83 BPM | SYSTOLIC BLOOD PRESSURE: 124 MMHG

## 2025-08-06 DIAGNOSIS — N17.9 AKI (ACUTE KIDNEY INJURY): Primary | ICD-10-CM

## 2025-08-06 DIAGNOSIS — E86.0 ACUTE DEHYDRATION: ICD-10-CM

## 2025-08-06 DIAGNOSIS — R19.7 DIARRHEA, UNSPECIFIED TYPE: ICD-10-CM

## 2025-08-06 ASSESSMENT — ENCOUNTER SYMPTOMS
RESPIRATORY NEGATIVE: 1
EYES NEGATIVE: 1
CONSTIPATION: 0
ABDOMINAL PAIN: 0
DIARRHEA: 0
VOMITING: 0

## 2025-08-13 ENCOUNTER — OFFICE VISIT (OUTPATIENT)
Dept: FAMILY MEDICINE CLINIC | Age: 70
End: 2025-08-13

## 2025-08-13 ENCOUNTER — CARE COORDINATION (OUTPATIENT)
Dept: CARE COORDINATION | Age: 70
End: 2025-08-13

## 2025-08-13 ENCOUNTER — TELEPHONE (OUTPATIENT)
Dept: FAMILY MEDICINE CLINIC | Age: 70
End: 2025-08-13

## 2025-08-13 VITALS
SYSTOLIC BLOOD PRESSURE: 116 MMHG | HEIGHT: 68 IN | WEIGHT: 240 LBS | OXYGEN SATURATION: 98 % | HEART RATE: 78 BPM | BODY MASS INDEX: 36.37 KG/M2 | DIASTOLIC BLOOD PRESSURE: 62 MMHG

## 2025-08-13 DIAGNOSIS — F32.9 REACTIVE DEPRESSION: Primary | ICD-10-CM

## 2025-08-13 RX ORDER — SERTRALINE HYDROCHLORIDE 25 MG/1
25 TABLET, FILM COATED ORAL DAILY
Qty: 30 TABLET | Refills: 3 | Status: SHIPPED | OUTPATIENT
Start: 2025-08-13

## 2025-08-13 ASSESSMENT — ENCOUNTER SYMPTOMS
EYES NEGATIVE: 1
BACK PAIN: 1
RESPIRATORY NEGATIVE: 1
GASTROINTESTINAL NEGATIVE: 1
COUGH: 0

## 2025-08-14 ENCOUNTER — CARE COORDINATION (OUTPATIENT)
Dept: CARE COORDINATION | Age: 70
End: 2025-08-14

## 2025-08-18 ENCOUNTER — HOSPITAL ENCOUNTER (OUTPATIENT)
Dept: LAB | Age: 70
Discharge: HOME | End: 2025-08-18
Payer: MEDICARE

## 2025-08-18 DIAGNOSIS — E11.29 TYPE 2 DIABETES MELLITUS WITH MICROALBUMINURIA, WITHOUT LONG-TERM CURRENT USE OF INSULIN (HCC): ICD-10-CM

## 2025-08-18 DIAGNOSIS — R80.9 TYPE 2 DIABETES MELLITUS WITH MICROALBUMINURIA, WITHOUT LONG-TERM CURRENT USE OF INSULIN (HCC): ICD-10-CM

## 2025-08-18 LAB
ALBUMIN SERPL-MCNC: 3.8 G/DL (ref 3.5–5.2)
ALBUMIN/GLOB SERPL: 1.1 {RATIO} (ref 1–2.5)
ALP SERPL-CCNC: 127 U/L (ref 35–104)
ALT SERPL-CCNC: <5 U/L (ref 10–35)
ANION GAP SERPL CALCULATED.3IONS-SCNC: 11 MMOL/L (ref 9–16)
AST SERPL-CCNC: 14 U/L (ref 10–35)
BILIRUB SERPL-MCNC: 0.2 MG/DL (ref 0–1.2)
BUN SERPL-MCNC: 12 MG/DL (ref 8–23)
BUN/CREAT SERPL: 11 (ref 9–20)
CALCIUM SERPL-MCNC: 9.5 MG/DL (ref 8.6–10.4)
CHLORIDE SERPL-SCNC: 100 MMOL/L (ref 98–107)
CO2 SERPL-SCNC: 27 MMOL/L (ref 20–31)
CREAT SERPL-MCNC: 1.1 MG/DL (ref 0.6–0.9)
EST. AVERAGE GLUCOSE BLD GHB EST-MCNC: 137 MG/DL
GFR, ESTIMATED: 54 ML/MIN/1.73M2
GLUCOSE SERPL-MCNC: 267 MG/DL (ref 74–99)
HBA1C MFR BLD: 6.4 % (ref 4–6)
POTASSIUM SERPL-SCNC: 3.9 MMOL/L (ref 3.7–5.3)
PROT SERPL-MCNC: 7.3 G/DL (ref 6.6–8.7)
SODIUM SERPL-SCNC: 138 MMOL/L (ref 136–145)

## 2025-08-18 PROCEDURE — 80053 COMPREHEN METABOLIC PANEL: CPT

## 2025-08-18 PROCEDURE — 36415 COLL VENOUS BLD VENIPUNCTURE: CPT

## 2025-08-18 PROCEDURE — 83036 HEMOGLOBIN GLYCOSYLATED A1C: CPT

## 2025-08-20 ENCOUNTER — OFFICE VISIT (OUTPATIENT)
Dept: FAMILY MEDICINE CLINIC | Age: 70
End: 2025-08-20

## 2025-08-20 VITALS
WEIGHT: 231 LBS | TEMPERATURE: 98 F | BODY MASS INDEX: 35.01 KG/M2 | HEIGHT: 68 IN | HEART RATE: 81 BPM | OXYGEN SATURATION: 98 % | SYSTOLIC BLOOD PRESSURE: 116 MMHG | DIASTOLIC BLOOD PRESSURE: 72 MMHG

## 2025-08-20 DIAGNOSIS — E66.01 MORBID (SEVERE) OBESITY DUE TO EXCESS CALORIES (HCC): ICD-10-CM

## 2025-08-20 DIAGNOSIS — K31.84 GASTROPARESIS: ICD-10-CM

## 2025-08-20 DIAGNOSIS — N18.1 TYPE 2 DIABETES MELLITUS WITH STAGE 1 CHRONIC KIDNEY DISEASE, WITHOUT LONG-TERM CURRENT USE OF INSULIN (HCC): Primary | ICD-10-CM

## 2025-08-20 DIAGNOSIS — R11.0 NAUSEA: ICD-10-CM

## 2025-08-20 DIAGNOSIS — F32.A DEPRESSION, UNSPECIFIED DEPRESSION TYPE: ICD-10-CM

## 2025-08-20 DIAGNOSIS — E11.22 TYPE 2 DIABETES MELLITUS WITH STAGE 1 CHRONIC KIDNEY DISEASE, WITHOUT LONG-TERM CURRENT USE OF INSULIN (HCC): Primary | ICD-10-CM

## 2025-08-20 DIAGNOSIS — K22.70 BARRETT'S ESOPHAGUS WITHOUT DYSPLASIA: ICD-10-CM

## 2025-08-20 RX ORDER — PANTOPRAZOLE SODIUM 40 MG/1
40 TABLET, DELAYED RELEASE ORAL 2 TIMES DAILY
Qty: 180 TABLET | Refills: 1 | Status: SHIPPED | OUTPATIENT
Start: 2025-08-20

## 2025-08-20 RX ORDER — GLIMEPIRIDE 2 MG/1
2 TABLET ORAL EVERY MORNING
Qty: 90 TABLET | Refills: 1 | Status: SHIPPED | OUTPATIENT
Start: 2025-08-20

## 2025-08-21 DIAGNOSIS — G44.201 INTRACTABLE TENSION-TYPE HEADACHE, UNSPECIFIED CHRONICITY PATTERN: ICD-10-CM

## 2025-08-21 RX ORDER — GABAPENTIN 400 MG/1
400 CAPSULE ORAL 2 TIMES DAILY
Qty: 60 CAPSULE | Refills: 0 | Status: SHIPPED | OUTPATIENT
Start: 2025-08-21 | End: 2025-09-20

## 2025-08-26 ENCOUNTER — CARE COORDINATION (OUTPATIENT)
Dept: CARE COORDINATION | Age: 70
End: 2025-08-26

## 2025-08-28 ENCOUNTER — CARE COORDINATION (OUTPATIENT)
Dept: CARE COORDINATION | Age: 70
End: 2025-08-28

## (undated) DEVICE — PACK,CYSTOSCOPY,PK VI: Brand: MEDLINE

## (undated) DEVICE — TUBING, SUCTION, 3/16" X 10', STRAIGHT: Brand: MEDLINE

## (undated) DEVICE — POUCH INSTR W40XL26IN BUTT FLD COLL FLTR DRNGE PRT

## (undated) DEVICE — FLUFF UNDERPAD,MODERATE: Brand: WINGS

## (undated) DEVICE — BITEBLOCK 54FR W/ DENT RIM BLOX

## (undated) DEVICE — GOWN,AURORA,NONRNF,XL,30/CS: Brand: MEDLINE

## (undated) DEVICE — CONNECTOR TBNG AUX H2O JET DISP FOR OLY 160/180 SER

## (undated) DEVICE — ADAPTER,CATHETER/SYRINGE/LUER,STERILE: Brand: MEDLINE

## (undated) DEVICE — PERRYSBURG ENDO PACK: Brand: MEDLINE INDUSTRIES, INC.

## (undated) DEVICE — NEEDLE SYR 18GA L1.5IN RED PLAS HUB S STL BLNT FILL W/O

## (undated) DEVICE — Device: Brand: DEFENDO VALVE AND CONNECTOR KIT

## (undated) DEVICE — SOLUTION SCRB 4OZ 4% CHG CLN BASE FOR PT SKIN ANTISEPSIS

## (undated) DEVICE — GOWN NONREINF XLG: Brand: MEDLINE INDUSTRIES, INC.

## (undated) DEVICE — SOLUTION IV 1000ML 0.9% SOD CHL PH 5 INJ USP VIAFLX PLAS

## (undated) DEVICE — SYRINGE MED 50ML LUERLOCK TIP

## (undated) DEVICE — 4-PORT MANIFOLD: Brand: NEPTUNE 2

## (undated) DEVICE — GLOVE ORANGE PI 7 1/2   MSG9075

## (undated) DEVICE — Z DISCONTINUED USE 2751540 TUBING IRRIG L10IN DISP PMP ENDOGATOR

## (undated) DEVICE — FORCEPS BX L240CM JAW DIA2.8MM L CAP W/ NDL MIC MESH TOOTH

## (undated) DEVICE — SOLUTION IV IRRIG WATER 1000ML POUR BRL 2F7114

## (undated) DEVICE — CANNULA IV 18GA L15IN BLNT FILL LUERLOCK HUB MJCT

## (undated) DEVICE — YANKAUER,BULB TIP,W/O VENT,RIGID,STERILE: Brand: MEDLINE

## (undated) DEVICE — GARMENT,MEDLINE,DVT,INT,CALF,LG, GEN2: Brand: MEDLINE

## (undated) DEVICE — JELLY LUBRICATING 4OZ FLIP TOP TB E Z

## (undated) DEVICE — ADAPTER CLEANING PORPOISE CLEANING

## (undated) DEVICE — GOWN,POLY REINFORCED,LG: Brand: MEDLINE

## (undated) DEVICE — CATHETER URET 5FR L70CM TIP 8FR OPN END CONE TIP INJ HUB

## (undated) DEVICE — GOWN,AURORA,NONREINFORCED,LARGE: Brand: MEDLINE

## (undated) DEVICE — GAUZE,SPONGE,3"X3",4PLY,NS,LF: Brand: MEDLINE

## (undated) DEVICE — BASIN EMSIS 700ML GRAPHITE PLAS KID SHP GRAD

## (undated) DEVICE — SINGLE-USE BIOPSY FORCEPS: Brand: RADIAL JAW 4

## (undated) DEVICE — 20 ML SYRINGE LUER-LOCK TIP: Brand: MONOJECT

## (undated) DEVICE — JELLY,LUBE,STERILE,FLIP TOP,TUBE,2-OZ: Brand: MEDLINE

## (undated) DEVICE — TUBING INSUFFLATION CAP W/ EXT CARBON DIOX ENDO SMARTCAP

## (undated) DEVICE — CYSTO/BLADDER IRRIGATION SET, REGULATING CLAMP